# Patient Record
Sex: FEMALE | Race: BLACK OR AFRICAN AMERICAN | NOT HISPANIC OR LATINO | Employment: UNEMPLOYED | URBAN - METROPOLITAN AREA
[De-identification: names, ages, dates, MRNs, and addresses within clinical notes are randomized per-mention and may not be internally consistent; named-entity substitution may affect disease eponyms.]

---

## 2017-04-27 ENCOUNTER — OFFICE VISIT (OUTPATIENT)
Dept: INTERNAL MEDICINE | Facility: CLINIC | Age: 47
End: 2017-04-27
Payer: COMMERCIAL

## 2017-04-27 VITALS
DIASTOLIC BLOOD PRESSURE: 86 MMHG | HEIGHT: 66 IN | HEART RATE: 95 BPM | WEIGHT: 155.88 LBS | BODY MASS INDEX: 25.05 KG/M2 | SYSTOLIC BLOOD PRESSURE: 118 MMHG | TEMPERATURE: 98 F | OXYGEN SATURATION: 99 %

## 2017-04-27 DIAGNOSIS — D48.9 NEOPLASM OF UNCERTAIN BEHAVIOR: ICD-10-CM

## 2017-04-27 DIAGNOSIS — R30.0 DYSURIA: Primary | ICD-10-CM

## 2017-04-27 LAB
BILIRUB SERPL-MCNC: NEGATIVE MG/DL
BLOOD URINE, POC: NEGATIVE
COLOR, POC UA: YELLOW
GLUCOSE UR QL STRIP: NORMAL
KETONES UR QL STRIP: ABNORMAL
LEUKOCYTE ESTERASE URINE, POC: NEGATIVE
NITRITE, POC UA: NEGATIVE
PH, POC UA: 6
PROTEIN, POC: NEGATIVE
SPECIFIC GRAVITY, POC UA: 1.01
UROBILINOGEN, POC UA: NORMAL

## 2017-04-27 PROCEDURE — 81002 URINALYSIS NONAUTO W/O SCOPE: CPT | Mod: S$GLB,,, | Performed by: NURSE PRACTITIONER

## 2017-04-27 PROCEDURE — 1160F RVW MEDS BY RX/DR IN RCRD: CPT | Mod: S$GLB,,, | Performed by: NURSE PRACTITIONER

## 2017-04-27 PROCEDURE — 99213 OFFICE O/P EST LOW 20 MIN: CPT | Mod: S$GLB,,, | Performed by: NURSE PRACTITIONER

## 2017-04-27 PROCEDURE — 99999 PR PBB SHADOW E&M-EST. PATIENT-LVL IV: CPT | Mod: PBBFAC,,, | Performed by: NURSE PRACTITIONER

## 2017-04-27 PROCEDURE — 87086 URINE CULTURE/COLONY COUNT: CPT

## 2017-04-27 RX ORDER — PHENAZOPYRIDINE HYDROCHLORIDE 200 MG/1
200 TABLET, FILM COATED ORAL 3 TIMES DAILY PRN
Qty: 9 TABLET | Refills: 0 | Status: SHIPPED | OUTPATIENT
Start: 2017-04-27 | End: 2017-04-30

## 2017-04-27 NOTE — MR AVS SNAPSHOT
[facility] - Internal Medicine  [address]  [place] [address]  Phone: [phone]  Fax: [phone]                  Laz Quintero   2017 4:30 PM   Office Visit    Description:  Female : 1970   Provider:  Norma Youssef NP   Department:  [facility] - Internal Medicine           Reason for Visit     Urinary Tract Infection           Diagnoses this Visit        Comments    Dysuria    -  Primary     Neoplasm of uncertain behavior                To Do List           Future Appointments        Provider Department Dept Phone    2017 10:20 AM Zora Rivera MD [place] - Dermatology [phone]    2017 2:45 PM Charlotte Jacinto MD [facility] - Internal Medicine [phone]      Goals (5 Years of Data)     None       These Medications        Disp Refills Start End    phenazopyridine (PYRIDIUM) 200 MG tablet 9 tablet 0 2017    Take 1 tablet (200 mg total) by mouth 3 (three) times daily as needed for Pain. - Oral    Pharmacy: [facility] [address] [facility] [address] [facility] Ph #: [phone]         [facility]     [facility] Nurse Care Line -  Assistance  Unless otherwise directed by your provider, please contact [facility], our nurse care line that is available for  assistance.     Registered nurses in the [facility] provide: appointment scheduling, clinical advisement, health education, and other advisory services.  Call: [phone] (toll free)               Medications           Message regarding Medications     Verify the changes and/or additions to your medication regime listed below are the same as discussed with your clinician today.  If any of these changes or additions are incorrect, please notify your healthcare provider.        START taking these NEW medications        Refills    phenazopyridine (PYRIDIUM) 200 MG tablet 0    Sig: Take 1 tablet (200 mg total) by mouth 3 (three) times daily as needed for  "Pain.    Class: Normal    Route: Oral      STOP taking these medications     amoxicillin-clavulanate 875-125mg (AUGMENTIN) 875-125 mg per tablet Take 1 tablet by mouth every 12 (twelve) hours.           Verify that the below list of medications is an accurate representation of the medications you are currently taking.  If none reported, the list may be blank. If incorrect, please contact your healthcare provider. Carry this list with you in case of emergency.           Current Medications     cyclobenzaprine (FLEXERIL) 10 MG tablet Take 0.5-1 tablets (5-10 mg total) by mouth 3 (three) times daily as needed for Muscle spasms.    diclofenac (VOLTAREN) 75 MG EC tablet     docusate sodium (COLACE) 100 MG capsule Take 1 capsule (100 mg total) by mouth 2 (two) times daily as needed for Constipation.    epinephrine (EPIPEN JR) 0.15 mg/0.3 mL (1:2,000) pen injection Inject 0.3 mLs (0.15 mg total) into the muscle as needed.    esomeprazole (NEXIUM) 40 MG capsule Take 1 capsule (40 mg total) by mouth once daily.    fluticasone (FLONASE) 50 mcg/actuation nasal spray 2 sprays by Each Nare route once daily.    gabapentin (NEURONTIN) 300 MG capsule Take 2-3 capsules (600-900 mg total) by mouth 3 (three) times daily.    triamcinolone acetonide 0.1% (KENALOG) 0.1 % cream Apply topically 2 (two) times daily. Cream Topical Twice a day .  disp:  60g tube AAA    escitalopram oxalate (LEXAPRO) 10 MG tablet Take 1 tablet (10 mg total) by mouth once daily.    phenazopyridine (PYRIDIUM) 200 MG tablet Take 1 tablet (200 mg total) by mouth 3 (three) times daily as needed for Pain.           Clinical Reference Information           Your Vitals Were     BP Pulse Temp Height Weight Last Period    118/86 95 97.9 °F (36.6 °C) (Oral) 5' 6" (1.676 m) 70.7 kg (155 lb 13.8 oz) 06/07/2015 (Exact Date)    SpO2 BMI             99% 25.16 kg/m2         Blood Pressure          Most Recent Value    BP  118/86      Allergies as of 4/27/2017     Clindamycin "    Sulfa Dyne    Sulfamethoxazole-trimethoprim    Dermabond [Tissue Glues]      Immunizations Administered on Date of Encounter - 4/27/2017     None      Orders Placed During Today's Visit      Normal Orders This Visit    Ambulatory consult to Dermatology     POCT urine dipstick without microscope     Urine culture       Instructions    - Increase water intake to 1.5 Liters daily       Language Assistance Services     ATTENTION: Language assistance services are available, free of charge. Please call [phone].      ATENCIÓN: Si habla español, tiene a carreon disposición servicios gratuitos de asistencia lingüística. Llame al [phone].     ALEXANDRO Ý: N?u b?n nói Ti?ng Vi?t, có các d?ch v? h? tr? ngôn ng? mi?n phí dành cho b?n. G?i s? [phone].         [facility] complies with applicable Federal civil rights laws and does not discriminate on the basis of race, color, national origin, age, disability, or sex.

## 2017-04-29 LAB — BACTERIA UR CULT: NO GROWTH

## 2017-05-01 NOTE — PROGRESS NOTES
Subjective:       Patient ID: Laz Quintero is a 46 y.o. female.    Chief Complaint: Urinary Tract Infection (frequent urination and buring with urination x4 days)  Ms Quintero just returned from a trip to [facility].  While there she noticed some increased burning with urination and came in to be evaluated for possible UTI.   Urinary Tract Infection    This is a new problem. The current episode started gradual onset. The problem occurs intermittently. The problem has been waxing and waning. The quality of the pain is described as burning. The pain is mild. There has been no fever. Associated symptoms include flank pain, frequency and urgency. Pertinent negatives include no behavior changes, chills, discharge, hematuria, hesitancy, nausea, possible pregnancy, sweats, vomiting, weight loss, constipation, rash or withholding. Her past medical history is significant for recurrent UTIs. There is no history of catheterization, diabetes insipidus, diabetes mellitus, genitourinary reflux, hypertension, kidney stones, a single kidney, STD, urinary stasis or a urological procedure.   Dysuria    This is a new problem. The current episode started in the past 7 days. The problem occurs every urination. The problem has been gradually worsening. The quality of the pain is described as aching and burning. The pain is at a severity of 7/10. The pain is moderate. There has been no fever. The fever has been present for less than 1 day. She is sexually active. There is no history of pyelonephritis. Associated symptoms include flank pain, frequency and urgency. Pertinent negatives include no behavior changes, chills, discharge, hematuria, hesitancy, nausea, possible pregnancy, sweats, vomiting, weight loss, constipation, rash or withholding. She has tried NSAIDs for the symptoms. The treatment provided no relief. Her past medical history is significant for recurrent UTIs. There is no history of catheterization, diabetes insipidus, diabetes  mellitus, genitourinary reflux, hypertension, kidney stones, a single kidney, STD, urinary stasis or a urological procedure.     Review of Systems   Constitutional: Negative for chills and weight loss.   HENT: Negative for facial swelling.    Eyes: Negative for visual disturbance.   Respiratory: Negative for shortness of breath.    Cardiovascular: Negative for chest pain.   Gastrointestinal: Negative for constipation, nausea and vomiting.   Genitourinary: Positive for dysuria, flank pain, frequency and urgency. Negative for hematuria and hesitancy.   Musculoskeletal: Negative for joint swelling.   Skin: Negative for rash.   Neurological: Negative for headaches.   Psychiatric/Behavioral: Negative for confusion.       Objective:      Physical Exam   Constitutional: She is oriented to person, place, and time. She appears well-developed and well-nourished. No distress.   HENT:   Head: Normocephalic and atraumatic.   Eyes: No scleral icterus.   Neck: Normal range of motion. Neck supple.   Cardiovascular: Normal rate, regular rhythm and normal heart sounds.    Pulmonary/Chest: Effort normal and breath sounds normal. No respiratory distress. She has no wheezes. She has no rales.   Abdominal: Soft. Bowel sounds are normal. She exhibits no distension and no mass. There is no tenderness. There is no rebound, no guarding and no CVA tenderness.   Musculoskeletal: Normal range of motion.   Neurological: She is alert and oriented to person, place, and time.   Skin: Skin is warm. She is not diaphoretic.        Psychiatric: She has a normal mood and affect. Her behavior is normal.   Nursing note and vitals reviewed.      Assessment:       1. Dysuria    2. Neoplasm of uncertain behavior        Plan:   1. Dysuria  - Likely due to mild dehydration. Encouraged to increase PO fluid intake.  - POCT urine dipstick without microscope  - Urine culture  - phenazopyridine (PYRIDIUM) 200 MG tablet; Take 1 tablet (200 mg total) by mouth 3  (three) times daily as needed for Pain.  Dispense: 9 tablet; Refill: 0    2. Neoplasm of uncertain behavior  - Ambulatory consult to Dermatology      Pt has been given instructions populated from [employer] database and has verbalized understanding of the after visit summary and information contained wherein.    Follow up with a primary care provider. May go to ER for acute shortness of breath, lightheadedness, fever, or any other emergent complaints or changes in condition.

## 2017-05-25 ENCOUNTER — TELEPHONE (OUTPATIENT)
Dept: DERMATOLOGY | Facility: CLINIC | Age: 47
End: 2017-05-25

## 2017-05-25 ENCOUNTER — INITIAL CONSULT (OUTPATIENT)
Dept: DERMATOLOGY | Facility: CLINIC | Age: 47
End: 2017-05-25
Payer: COMMERCIAL

## 2017-05-25 DIAGNOSIS — L82.1 DERMATOSIS PAPULOSA NIGRA: Primary | ICD-10-CM

## 2017-05-25 DIAGNOSIS — D23.9 SYRINGOMA: ICD-10-CM

## 2017-05-25 DIAGNOSIS — R20.2 NOTALGIA PARESTHETICA: ICD-10-CM

## 2017-05-25 DIAGNOSIS — D23.9 DERMATOFIBROMA: ICD-10-CM

## 2017-05-25 PROCEDURE — 99202 OFFICE O/P NEW SF 15 MIN: CPT | Mod: S$GLB,,, | Performed by: DERMATOLOGY

## 2017-05-25 PROCEDURE — 99999 PR PBB SHADOW E&M-EST. PATIENT-LVL III: CPT | Mod: PBBFAC,,, | Performed by: DERMATOLOGY

## 2017-05-25 NOTE — PROGRESS NOTES
Subjective:       Patient ID:  Laz Quintero is a 46 y.o. female who presents for   Chief Complaint   Patient presents with    Lesion     right shoulder, right eyelid     History of Present Illness: The patient presents with chief complaint of lesion.  Location: right eyelid   Duration: years  Signs/Symptoms: growing    Prior treatments: none    Also lesions on cheeks, a spot on her right shoulder and itching on back for over a year no tx.         Lesion         Review of Systems   Constitutional: Negative for fever.   Skin: Positive for itching. Negative for rash.   Hematologic/Lymphatic: Does not bruise/bleed easily.        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

## 2017-05-25 NOTE — LETTER
May 25, 2017      Norma Youssef, NP  [address]  [place] [address]           [place] - Dermatology  [address] [facility]  [place] [address]  Phone: [phone]  Fax: [phone]          Patient: Laz Quintero   MR Number: [id number]   YOB: 1970   Date of Visit: 5/25/2017       Dear Norma Youssef:    Thank you for referring Laz Quintero to me for evaluation. Attached you will find relevant portions of my assessment and plan of care.    If you have questions, please do not hesitate to call me. I look forward to following Laz Quintero along with you.    Sincerely,    Zora Rivera MD    Enclosure  CC:  No Recipients    If you would like to receive this communication electronically, please contact [email or web address] or [phone] to request more information on [employer] access.    For providers and/or their staff who would like to refer a patient to [facility], please contact us through our one-stop-shop provider referral line, [facility], at [phone].    If you feel you have received this communication in error or would no longer like to receive these types of communications, please e-mail [email or web address]

## 2017-08-22 ENCOUNTER — OFFICE VISIT (OUTPATIENT)
Dept: INTERNAL MEDICINE | Facility: CLINIC | Age: 47
End: 2017-08-22
Payer: COMMERCIAL

## 2017-08-22 DIAGNOSIS — Z13.89 SCREENING FOR MULTIPLE CONDITIONS: ICD-10-CM

## 2017-08-22 DIAGNOSIS — Z01.419 ENCOUNTER FOR WELL WOMAN EXAM WITH ROUTINE GYNECOLOGICAL EXAM: ICD-10-CM

## 2017-08-22 DIAGNOSIS — J98.59 MEDIASTINAL MASS: ICD-10-CM

## 2017-08-22 DIAGNOSIS — Z12.31 ENCOUNTER FOR SCREENING MAMMOGRAM FOR BREAST CANCER: Primary | ICD-10-CM

## 2017-08-22 DIAGNOSIS — F41.9 ANXIETY: ICD-10-CM

## 2017-08-22 DIAGNOSIS — Z80.0 FAMILY HISTORY OF COLON CANCER: ICD-10-CM

## 2017-08-22 DIAGNOSIS — R91.1 LUNG NODULE: ICD-10-CM

## 2017-08-22 PROCEDURE — 99214 OFFICE O/P EST MOD 30 MIN: CPT | Mod: S$GLB,,, | Performed by: INTERNAL MEDICINE

## 2017-08-22 PROCEDURE — 99999 PR PBB SHADOW E&M-EST. PATIENT-LVL V: CPT | Mod: PBBFAC,,, | Performed by: INTERNAL MEDICINE

## 2017-08-22 RX ORDER — ALPRAZOLAM 0.5 MG/1
0.5 TABLET ORAL DAILY PRN
Qty: 20 TABLET | Refills: 1 | Status: SHIPPED | OUTPATIENT
Start: 2017-08-22 | End: 2019-09-24 | Stop reason: SDUPTHER

## 2017-08-22 RX ORDER — AMOXICILLIN 875 MG/1
875 TABLET, FILM COATED ORAL EVERY 12 HOURS
Qty: 14 TABLET | Refills: 0 | Status: SHIPPED | OUTPATIENT
Start: 2017-08-22 | End: 2018-01-10

## 2017-08-26 ENCOUNTER — LAB VISIT (OUTPATIENT)
Dept: LAB | Facility: HOSPITAL | Age: 47
End: 2017-08-26
Attending: INTERNAL MEDICINE
Payer: COMMERCIAL

## 2017-08-26 DIAGNOSIS — Z13.89 SCREENING FOR MULTIPLE CONDITIONS: ICD-10-CM

## 2017-08-26 LAB
25(OH)D3+25(OH)D2 SERPL-MCNC: 18 NG/ML
ALBUMIN SERPL BCP-MCNC: 3.7 G/DL
ALP SERPL-CCNC: 52 U/L
ALT SERPL W/O P-5'-P-CCNC: 12 U/L
ANION GAP SERPL CALC-SCNC: 8 MMOL/L
AST SERPL-CCNC: 15 U/L
BASOPHILS # BLD AUTO: 0.02 K/UL
BASOPHILS NFR BLD: 0.2 %
BILIRUB SERPL-MCNC: 0.3 MG/DL
BUN SERPL-MCNC: 8 MG/DL
CALCIUM SERPL-MCNC: 9.6 MG/DL
CHLORIDE SERPL-SCNC: 105 MMOL/L
CHOLEST/HDLC SERPL: 2.7 {RATIO}
CO2 SERPL-SCNC: 26 MMOL/L
CREAT SERPL-MCNC: 0.7 MG/DL
DIFFERENTIAL METHOD: ABNORMAL
EOSINOPHIL # BLD AUTO: 0.1 K/UL
EOSINOPHIL NFR BLD: 1.2 %
ERYTHROCYTE [DISTWIDTH] IN BLOOD BY AUTOMATED COUNT: 12.7 %
EST. GFR  (AFRICAN AMERICAN): >60 ML/MIN/1.73 M^2
EST. GFR  (NON AFRICAN AMERICAN): >60 ML/MIN/1.73 M^2
GLUCOSE SERPL-MCNC: 93 MG/DL
HCT VFR BLD AUTO: 36 %
HDL/CHOLESTEROL RATIO: 36.7 %
HDLC SERPL-MCNC: 180 MG/DL
HDLC SERPL-MCNC: 66 MG/DL
HGB BLD-MCNC: 12 G/DL
LDLC SERPL CALC-MCNC: 101.4 MG/DL
LYMPHOCYTES # BLD AUTO: 2.8 K/UL
LYMPHOCYTES NFR BLD: 34.6 %
MCH RBC QN AUTO: 28.8 PG
MCHC RBC AUTO-ENTMCNC: 33.3 G/DL
MCV RBC AUTO: 87 FL
MONOCYTES # BLD AUTO: 0.5 K/UL
MONOCYTES NFR BLD: 6.5 %
NEUTROPHILS # BLD AUTO: 4.6 K/UL
NEUTROPHILS NFR BLD: 57.4 %
NONHDLC SERPL-MCNC: 114 MG/DL
PLATELET # BLD AUTO: 245 K/UL
PMV BLD AUTO: 9.7 FL
POTASSIUM SERPL-SCNC: 4.8 MMOL/L
PROT SERPL-MCNC: 7.1 G/DL
PTH-INTACT SERPL-MCNC: 50 PG/ML
RBC # BLD AUTO: 4.16 M/UL
SODIUM SERPL-SCNC: 139 MMOL/L
TRIGL SERPL-MCNC: 63 MG/DL
WBC # BLD AUTO: 8.01 K/UL

## 2017-08-26 PROCEDURE — 83970 ASSAY OF PARATHORMONE: CPT

## 2017-08-26 PROCEDURE — 80061 LIPID PANEL: CPT

## 2017-08-26 PROCEDURE — 82306 VITAMIN D 25 HYDROXY: CPT

## 2017-08-26 PROCEDURE — 80053 COMPREHEN METABOLIC PANEL: CPT

## 2017-08-26 PROCEDURE — 36415 COLL VENOUS BLD VENIPUNCTURE: CPT

## 2017-08-26 PROCEDURE — 85025 COMPLETE CBC W/AUTO DIFF WBC: CPT

## 2017-08-27 ENCOUNTER — TELEPHONE (OUTPATIENT)
Dept: INTERNAL MEDICINE | Facility: CLINIC | Age: 47
End: 2017-08-27

## 2017-08-27 VITALS
WEIGHT: 156 LBS | TEMPERATURE: 98 F | OXYGEN SATURATION: 99 % | SYSTOLIC BLOOD PRESSURE: 124 MMHG | BODY MASS INDEX: 25.07 KG/M2 | DIASTOLIC BLOOD PRESSURE: 70 MMHG | HEIGHT: 66 IN | HEART RATE: 91 BPM

## 2017-08-27 DIAGNOSIS — R91.1 LUNG NODULE: ICD-10-CM

## 2017-08-27 DIAGNOSIS — Z12.31 ENCOUNTER FOR SCREENING MAMMOGRAM FOR BREAST CANCER: ICD-10-CM

## 2017-08-27 DIAGNOSIS — J98.59 MEDIASTINAL MASS: Primary | ICD-10-CM

## 2017-08-27 NOTE — TELEPHONE ENCOUNTER
When she was in the office, I had requested a CT scan, pulmonary appointment and mammogram. Unfortunately, none of these have been scheduled. Orders in, please schedule

## 2017-08-27 NOTE — PROGRESS NOTES
Subjective:       Patient ID: Laz Quintero is a 46 y.o. female.    Chief Complaint: Establish Care    HPI: She is here for an initial visit.  She has a history of a mediastinal mass.  Is in need of a follow-up CT scan of the chest and pulmonary follow-up appointment.    Past medical history: Mediastinal mass, status post hysterectomy, hyperparathyroidism, status post cholecystectomy.  Positive family history of colon cancer-father, diagnosed at age 50    Medications: None    ALLERGIES: Clindamycin, sulfa    Family history: Her father had colon cancer      Review of Systems   Constitutional: Negative for chills, fatigue, fever and unexpected weight change.   Respiratory: Negative for chest tightness and shortness of breath.    Cardiovascular: Negative for chest pain and palpitations.   Gastrointestinal: Negative for abdominal pain and blood in stool.   Neurological: Negative for dizziness, syncope, numbness and headaches.       Objective:      Physical Exam   HENT:   Right Ear: External ear normal.   Left Ear: External ear normal.   Nose: Nose normal.   Mouth/Throat: Oropharynx is clear and moist.   Eyes: Pupils are equal, round, and reactive to light.   Neck: Normal range of motion.   Cardiovascular: Normal rate and regular rhythm.    No murmur heard.  Pulmonary/Chest: Breath sounds normal.   Abdominal: She exhibits no distension. There is no hepatosplenomegaly. There is no tenderness.   Lymphadenopathy:     She has no cervical adenopathy.     She has no axillary adenopathy.   Neurological: She has normal strength and normal reflexes. No cranial nerve deficit or sensory deficit.     breast exam: No mass palpated, no nipple discharge expressed  Assessment:         assessment and plan: 1.  Mediastinal mass: Schedule follow-up CT scan of chest and pulmonary appointments  2.  Family history of colon cancer: Schedule colonoscopy  3.  Check CMP, lipid panel, PTH, CBC, vitamin D.  Schedule mammogram  Plan:       As  above

## 2017-08-28 NOTE — TELEPHONE ENCOUNTER
Spoke with pt and  Pt stated that she's doing an insurance change and will wait til her new insurance takes place. Pt stated she will call to schedule appts

## 2017-09-01 ENCOUNTER — TELEPHONE (OUTPATIENT)
Dept: INTERNAL MEDICINE | Facility: CLINIC | Age: 47
End: 2017-09-01

## 2017-09-01 NOTE — TELEPHONE ENCOUNTER
Please contact patient and let her know her labwork indicates her vitamin D is low. I will need to send a supplement for her. Please ask her what pharmacy she would like that sent to

## 2017-09-05 RX ORDER — CHOLECALCIFEROL (VITAMIN D3) 1250 MCG
1 TABLET ORAL WEEKLY
Qty: 8 TABLET | Refills: 0 | Status: SHIPPED | OUTPATIENT
Start: 2017-09-05 | End: 2017-10-25

## 2017-09-07 ENCOUNTER — HOSPITAL ENCOUNTER (OUTPATIENT)
Dept: RADIOLOGY | Facility: HOSPITAL | Age: 47
Discharge: HOME | End: 2017-09-07
Attending: INTERNAL MEDICINE
Payer: COMMERCIAL

## 2017-09-07 DIAGNOSIS — Z12.31 ENCOUNTER FOR SCREENING MAMMOGRAM FOR BREAST CANCER: ICD-10-CM

## 2017-09-07 PROCEDURE — 77067 SCR MAMMO BI INCL CAD: CPT | Mod: 26,,, | Performed by: RADIOLOGY

## 2017-09-07 PROCEDURE — 77067 SCR MAMMO BI INCL CAD: CPT | Mod: TC

## 2017-11-29 ENCOUNTER — OFFICE VISIT (OUTPATIENT)
Dept: INTERNAL MEDICINE | Facility: CLINIC | Age: 47
End: 2017-11-29
Payer: COMMERCIAL

## 2017-11-29 ENCOUNTER — HOSPITAL ENCOUNTER (OUTPATIENT)
Dept: RADIOLOGY | Facility: HOSPITAL | Age: 47
Discharge: HOME | End: 2017-11-29
Attending: NURSE PRACTITIONER
Payer: COMMERCIAL

## 2017-11-29 VITALS
BODY MASS INDEX: 25.85 KG/M2 | DIASTOLIC BLOOD PRESSURE: 90 MMHG | HEIGHT: 66 IN | HEART RATE: 98 BPM | SYSTOLIC BLOOD PRESSURE: 134 MMHG | WEIGHT: 160.88 LBS | TEMPERATURE: 98 F

## 2017-11-29 DIAGNOSIS — R31.9 HEMATURIA, UNSPECIFIED TYPE: ICD-10-CM

## 2017-11-29 DIAGNOSIS — R31.9 HEMATURIA, UNSPECIFIED TYPE: Primary | ICD-10-CM

## 2017-11-29 DIAGNOSIS — N39.0 URINARY TRACT INFECTION WITH HEMATURIA, SITE UNSPECIFIED: ICD-10-CM

## 2017-11-29 DIAGNOSIS — R10.9 ACUTE RIGHT FLANK PAIN: ICD-10-CM

## 2017-11-29 DIAGNOSIS — R31.9 URINARY TRACT INFECTION WITH HEMATURIA, SITE UNSPECIFIED: ICD-10-CM

## 2017-11-29 DIAGNOSIS — Z29.9 PROPHYLACTIC MEASURE: ICD-10-CM

## 2017-11-29 DIAGNOSIS — R11.0 NAUSEA: ICD-10-CM

## 2017-11-29 LAB
BILIRUB SERPL-MCNC: NORMAL MG/DL
BLOOD URINE, POC: NORMAL
COLOR, POC UA: YELLOW
GLUCOSE UR QL STRIP: NORMAL
KETONES UR QL STRIP: NORMAL
LEUKOCYTE ESTERASE URINE, POC: NORMAL
NITRITE, POC UA: NORMAL
PH, POC UA: 6
PROTEIN, POC: NORMAL
SPECIFIC GRAVITY, POC UA: 1.01
UROBILINOGEN, POC UA: NORMAL

## 2017-11-29 PROCEDURE — 99214 OFFICE O/P EST MOD 30 MIN: CPT | Mod: 25,S$GLB,, | Performed by: NURSE PRACTITIONER

## 2017-11-29 PROCEDURE — 74176 CT ABD & PELVIS W/O CONTRAST: CPT | Mod: 26,,, | Performed by: RADIOLOGY

## 2017-11-29 PROCEDURE — 99999 PR PBB SHADOW E&M-EST. PATIENT-LVL V: CPT | Mod: PBBFAC,,, | Performed by: NURSE PRACTITIONER

## 2017-11-29 PROCEDURE — 81002 URINALYSIS NONAUTO W/O SCOPE: CPT | Mod: S$GLB,,, | Performed by: NURSE PRACTITIONER

## 2017-11-29 PROCEDURE — 87088 URINE BACTERIA CULTURE: CPT

## 2017-11-29 PROCEDURE — 96372 THER/PROPH/DIAG INJ SC/IM: CPT | Mod: S$GLB,,, | Performed by: INTERNAL MEDICINE

## 2017-11-29 PROCEDURE — 87086 URINE CULTURE/COLONY COUNT: CPT

## 2017-11-29 PROCEDURE — 87186 SC STD MICRODIL/AGAR DIL: CPT

## 2017-11-29 PROCEDURE — S0119 ONDANSETRON 4 MG: HCPCS | Mod: S$GLB,,, | Performed by: INTERNAL MEDICINE

## 2017-11-29 PROCEDURE — 74176 CT ABD & PELVIS W/O CONTRAST: CPT | Mod: TC

## 2017-11-29 PROCEDURE — 87077 CULTURE AEROBIC IDENTIFY: CPT

## 2017-11-29 RX ORDER — HYDROCODONE BITARTRATE AND ACETAMINOPHEN 5; 325 MG/1; MG/1
1 TABLET ORAL EVERY 6 HOURS PRN
Qty: 20 TABLET | Refills: 0 | Status: SHIPPED | OUTPATIENT
Start: 2017-11-29 | End: 2017-12-04

## 2017-11-29 RX ORDER — ONDANSETRON 4 MG/1
4 TABLET, FILM COATED ORAL
Status: COMPLETED | OUTPATIENT
Start: 2017-11-29 | End: 2017-11-29

## 2017-11-29 RX ORDER — CIPROFLOXACIN 500 MG/1
500 TABLET ORAL EVERY 12 HOURS
Qty: 14 TABLET | Refills: 0 | Status: SHIPPED | OUTPATIENT
Start: 2017-11-29 | End: 2018-01-10

## 2017-11-29 RX ORDER — FLUCONAZOLE 150 MG/1
150 TABLET ORAL DAILY
Qty: 1 TABLET | Refills: 0 | Status: SHIPPED | OUTPATIENT
Start: 2017-11-29 | End: 2017-11-30

## 2017-11-29 RX ORDER — KETOROLAC TROMETHAMINE 30 MG/ML
60 INJECTION, SOLUTION INTRAMUSCULAR; INTRAVENOUS
Status: COMPLETED | OUTPATIENT
Start: 2017-11-29 | End: 2017-11-29

## 2017-11-29 RX ADMIN — ONDANSETRON 4 MG: 4 TABLET, FILM COATED ORAL at 07:11

## 2017-11-29 RX ADMIN — KETOROLAC TROMETHAMINE 60 MG: 30 INJECTION, SOLUTION INTRAMUSCULAR; INTRAVENOUS at 07:11

## 2017-11-29 NOTE — LETTER
November 29, 2017                   [facility]  Internal Medicine  [address]  [facility] [address]  Phone: [phone]  Fax: [phone]   November 29, 2017     Patient: Laz Quintero   YOB: 1970   Date of Visit: 11/29/2017       To Whom it May Concern:    Laz Quintero was seen in my clinic on 11/29/2017. She may return to work on 12/4/17.    If you have any questions or concerns, please don't hesitate to call.    Sincerely,         Norma Youssef, NP

## 2017-11-29 NOTE — LETTER
November 29, 2017                   [facility]  Internal Medicine  [address]  [facility] [address]  Phone: [phone]  Fax: [phone]   November 29, 2017     Patient: Laz Quintero   YOB: 1970   Date of Visit: 11/29/2017       To Whom it May Concern:    Laz Quintero was seen in my clinic on 11/29/2017. She may return to work on 12/2/17.    If you have any questions or concerns, please don't hesitate to call.    Sincerely,         Norma Youssef, NP

## 2017-11-30 ENCOUNTER — PATIENT MESSAGE (OUTPATIENT)
Dept: INTERNAL MEDICINE | Facility: CLINIC | Age: 47
End: 2017-11-30

## 2017-11-30 RX ORDER — PHENAZOPYRIDINE HYDROCHLORIDE 200 MG/1
200 TABLET, FILM COATED ORAL 3 TIMES DAILY PRN
Qty: 9 TABLET | Refills: 0 | Status: SHIPPED | OUTPATIENT
Start: 2017-11-30 | End: 2017-12-03

## 2017-11-30 RX ORDER — FERROUS SULFATE 325(65) MG
325 TABLET ORAL
Qty: 30 TABLET | Refills: 1 | Status: SHIPPED | OUTPATIENT
Start: 2017-11-30 | End: 2019-12-31

## 2017-12-02 LAB — BACTERIA UR CULT: NORMAL

## 2017-12-14 ENCOUNTER — PATIENT MESSAGE (OUTPATIENT)
Dept: INTERNAL MEDICINE | Facility: CLINIC | Age: 47
End: 2017-12-14

## 2017-12-14 ENCOUNTER — TELEPHONE (OUTPATIENT)
Dept: INTERNAL MEDICINE | Facility: CLINIC | Age: 47
End: 2017-12-14

## 2017-12-14 DIAGNOSIS — R10.9 ABDOMINAL PAIN, UNSPECIFIED ABDOMINAL LOCATION: Primary | ICD-10-CM

## 2017-12-14 DIAGNOSIS — Z12.11 SPECIAL SCREENING FOR MALIGNANT NEOPLASMS, COLON: Primary | ICD-10-CM

## 2017-12-14 RX ORDER — POLYETHYLENE GLYCOL 3350, SODIUM SULFATE ANHYDROUS, SODIUM BICARBONATE, SODIUM CHLORIDE, POTASSIUM CHLORIDE 236; 22.74; 6.74; 5.86; 2.97 G/4L; G/4L; G/4L; G/4L; G/4L
4 POWDER, FOR SOLUTION ORAL ONCE
Qty: 4000 ML | Refills: 0 | Status: SHIPPED | OUTPATIENT
Start: 2017-12-14 | End: 2017-12-14

## 2018-01-10 ENCOUNTER — OFFICE VISIT (OUTPATIENT)
Dept: INTERNAL MEDICINE | Facility: CLINIC | Age: 48
End: 2018-01-10
Payer: COMMERCIAL

## 2018-01-10 ENCOUNTER — LAB VISIT (OUTPATIENT)
Dept: LAB | Facility: HOSPITAL | Age: 48
End: 2018-01-10
Attending: NURSE PRACTITIONER
Payer: COMMERCIAL

## 2018-01-10 VITALS
OXYGEN SATURATION: 99 % | DIASTOLIC BLOOD PRESSURE: 64 MMHG | WEIGHT: 164.44 LBS | TEMPERATURE: 98 F | SYSTOLIC BLOOD PRESSURE: 126 MMHG | HEIGHT: 66 IN | BODY MASS INDEX: 26.43 KG/M2 | HEART RATE: 93 BPM

## 2018-01-10 DIAGNOSIS — M54.42 ACUTE LEFT-SIDED LOW BACK PAIN WITH LEFT-SIDED SCIATICA: Primary | ICD-10-CM

## 2018-01-10 DIAGNOSIS — R60.0 LOCALIZED EDEMA: Primary | ICD-10-CM

## 2018-01-10 DIAGNOSIS — M70.62 TROCHANTERIC BURSITIS OF LEFT HIP: ICD-10-CM

## 2018-01-10 DIAGNOSIS — D64.9 ANEMIA, UNSPECIFIED TYPE: ICD-10-CM

## 2018-01-10 LAB
25(OH)D3+25(OH)D2 SERPL-MCNC: 46 NG/ML
ALBUMIN SERPL BCP-MCNC: 3.6 G/DL
ALP SERPL-CCNC: 58 U/L
ALT SERPL W/O P-5'-P-CCNC: 14 U/L
ANION GAP SERPL CALC-SCNC: 7 MMOL/L
AST SERPL-CCNC: 17 U/L
BASOPHILS # BLD AUTO: 0.02 K/UL
BASOPHILS NFR BLD: 0.2 %
BILIRUB SERPL-MCNC: 0.2 MG/DL
BUN SERPL-MCNC: 10 MG/DL
CALCIUM SERPL-MCNC: 9.2 MG/DL
CHLORIDE SERPL-SCNC: 103 MMOL/L
CO2 SERPL-SCNC: 28 MMOL/L
CREAT SERPL-MCNC: 0.7 MG/DL
DIFFERENTIAL METHOD: ABNORMAL
EOSINOPHIL # BLD AUTO: 0.2 K/UL
EOSINOPHIL NFR BLD: 1.8 %
ERYTHROCYTE [DISTWIDTH] IN BLOOD BY AUTOMATED COUNT: 12.5 %
EST. GFR  (AFRICAN AMERICAN): >60 ML/MIN/1.73 M^2
EST. GFR  (NON AFRICAN AMERICAN): >60 ML/MIN/1.73 M^2
FERRITIN SERPL-MCNC: 136 NG/ML
GLUCOSE SERPL-MCNC: 113 MG/DL
HCT VFR BLD AUTO: 36.3 %
HGB BLD-MCNC: 11.9 G/DL
IRON SERPL-MCNC: 49 UG/DL
LYMPHOCYTES # BLD AUTO: 3.4 K/UL
LYMPHOCYTES NFR BLD: 37.1 %
MCH RBC QN AUTO: 28.7 PG
MCHC RBC AUTO-ENTMCNC: 32.8 G/DL
MCV RBC AUTO: 88 FL
MONOCYTES # BLD AUTO: 0.6 K/UL
MONOCYTES NFR BLD: 6.8 %
NEUTROPHILS # BLD AUTO: 4.9 K/UL
NEUTROPHILS NFR BLD: 53.9 %
NRBC BLD-RTO: 0 /100 WBC
PLATELET # BLD AUTO: 256 K/UL
PMV BLD AUTO: 10 FL
POTASSIUM SERPL-SCNC: 3.8 MMOL/L
PROT SERPL-MCNC: 7.6 G/DL
RBC # BLD AUTO: 4.15 M/UL
SATURATED IRON: 13 %
SODIUM SERPL-SCNC: 138 MMOL/L
TOTAL IRON BINDING CAPACITY: 367 UG/DL
TRANSFERRIN SERPL-MCNC: 248 MG/DL
WBC # BLD AUTO: 9.12 K/UL

## 2018-01-10 PROCEDURE — 99214 OFFICE O/P EST MOD 30 MIN: CPT | Mod: 25,S$GLB,, | Performed by: NURSE PRACTITIONER

## 2018-01-10 PROCEDURE — 96372 THER/PROPH/DIAG INJ SC/IM: CPT | Mod: S$GLB,,, | Performed by: INTERNAL MEDICINE

## 2018-01-10 PROCEDURE — 99999 PR PBB SHADOW E&M-EST. PATIENT-LVL IV: CPT | Mod: PBBFAC,,, | Performed by: NURSE PRACTITIONER

## 2018-01-10 PROCEDURE — 83540 ASSAY OF IRON: CPT

## 2018-01-10 PROCEDURE — 36415 COLL VENOUS BLD VENIPUNCTURE: CPT

## 2018-01-10 PROCEDURE — 82728 ASSAY OF FERRITIN: CPT

## 2018-01-10 PROCEDURE — 85025 COMPLETE CBC W/AUTO DIFF WBC: CPT

## 2018-01-10 PROCEDURE — 82306 VITAMIN D 25 HYDROXY: CPT

## 2018-01-10 PROCEDURE — 80053 COMPREHEN METABOLIC PANEL: CPT

## 2018-01-10 RX ORDER — METHYLPREDNISOLONE 4 MG/1
TABLET ORAL
Qty: 1 PACKAGE | Refills: 0 | Status: SHIPPED | OUTPATIENT
Start: 2018-01-10 | End: 2018-02-19

## 2018-01-10 RX ORDER — OXYCODONE AND ACETAMINOPHEN 5; 325 MG/1; MG/1
1 TABLET ORAL EVERY 6 HOURS PRN
Qty: 24 TABLET | Refills: 0 | Status: SHIPPED | OUTPATIENT
Start: 2018-01-10 | End: 2018-01-16

## 2018-01-10 RX ORDER — CYCLOBENZAPRINE HCL 10 MG
5-10 TABLET ORAL 3 TIMES DAILY PRN
Qty: 90 TABLET | Refills: 2 | Status: SHIPPED | OUTPATIENT
Start: 2018-01-10 | End: 2020-11-20 | Stop reason: ALTCHOICE

## 2018-01-10 RX ORDER — KETOROLAC TROMETHAMINE 30 MG/ML
60 INJECTION, SOLUTION INTRAMUSCULAR; INTRAVENOUS
Status: COMPLETED | OUTPATIENT
Start: 2018-01-10 | End: 2018-01-10

## 2018-01-10 RX ADMIN — KETOROLAC TROMETHAMINE 60 MG: 30 INJECTION, SOLUTION INTRAMUSCULAR; INTRAVENOUS at 06:01

## 2018-01-11 NOTE — PROGRESS NOTES
Subjective:       Patient ID: Laz Quintero is a 47 y.o. female.    Chief Complaint: Back Pain    Ms Quintero presents today for severe low back pain that radiates to her left buttock and left leg.  She says she has not been able to sleep for more than a few hours over the past few weeks because it is so severe.  The pain began about 5 weeks ago and has grown progressively worse.  Gabapentin and NSAIDs are not providing any relief. She had a past occurrence of pain this severe in 2014 and had a epidural which provided significant relief.       Back Pain   This is a recurrent problem. The problem occurs constantly. The problem has been gradually worsening since onset. The pain is present in the lumbar spine, sacro-iliac and gluteal. The quality of the pain is described as aching, burning, shooting and stabbing. The pain radiates to the left thigh. The pain is at a severity of 10/10. The pain is severe. The pain is worse during the night. The symptoms are aggravated by position, bending, twisting and sitting. Stiffness is present all day. Associated symptoms include leg pain, paresthesias and tingling. Pertinent negatives include no abdominal pain, bladder incontinence, bowel incontinence, chest pain, dysuria, fever, headaches, numbness, paresis, pelvic pain, perianal numbness, weakness or weight loss. She has tried NSAIDs for the symptoms. The treatment provided no relief.     Review of Systems   Constitutional: Negative for fever and weight loss.   HENT: Negative for facial swelling.    Eyes: Negative for visual disturbance.   Respiratory: Negative for shortness of breath.    Cardiovascular: Negative for chest pain.   Gastrointestinal: Negative for abdominal pain and bowel incontinence.   Genitourinary: Negative for bladder incontinence, dysuria and pelvic pain.   Musculoskeletal: Positive for back pain.   Skin: Negative for rash.   Neurological: Positive for tingling and paresthesias. Negative for weakness,  numbness and headaches.   Psychiatric/Behavioral: Positive for dysphoric mood.       Objective:      Physical Exam   Constitutional: She is oriented to person, place, and time. She appears well-developed and well-nourished. She appears distressed.   HENT:   Head: Normocephalic and atraumatic.   Eyes: No scleral icterus.   Neck: Normal range of motion. Neck supple.   Cardiovascular: Normal rate, regular rhythm and normal heart sounds.    Pulmonary/Chest: Effort normal and breath sounds normal. No respiratory distress. She has no wheezes. She has no rales.   Musculoskeletal:        Left hip: She exhibits decreased range of motion, decreased strength, tenderness and bony tenderness. She exhibits no swelling, no crepitus, no deformity and no laceration.        Lumbar back: She exhibits decreased range of motion, tenderness and pain. She exhibits no bony tenderness, no swelling, no edema, no deformity, no laceration, no spasm and normal pulse.   Lymphadenopathy:     She has no cervical adenopathy.   Neurological: She is alert and oriented to person, place, and time.   Skin: Skin is warm and dry. She is not diaphoretic.   Psychiatric: She has a normal mood and affect. Her behavior is normal.   Nursing note and vitals reviewed.      Assessment:       1. Acute left-sided low back pain with left-sided sciatica    2. Trochanteric bursitis of left hip    3. Anemia, unspecified type        Plan:   1. Acute left-sided low back pain with left-sided sciatica  - Pain management appt schedule for evaluation for possible repeat epidural.   - ketorolac injection 60 mg; Inject 2 mLs (60 mg total) into the muscle one time.  - cyclobenzaprine (FLEXERIL) 10 MG tablet; Take 0.5-1 tablets (5-10 mg total) by mouth 3 (three) times daily as needed for Muscle spasms.  Dispense: 90 tablet; Refill: 2  - methylPREDNISolone (MEDROL DOSEPACK) 4 mg tablet; use as directed  Dispense: 1 Package; Refill: 0  - oxyCODONE-acetaminophen (PERCOCET) 5-325 mg  per tablet; Take 1 tablet by mouth every 6 (six) hours as needed for Pain.  Dispense: 24 tablet; Refill: 0  - Ambulatory consult to Physical Therapy    2. Trochanteric bursitis of left hip  - ketorolac injection 60 mg; Inject 2 mLs (60 mg total) into the muscle one time.  - methylPREDNISolone (MEDROL DOSEPACK) 4 mg tablet; use as directed  Dispense: 1 Package; Refill: 0  - oxyCODONE-acetaminophen (PERCOCET) 5-325 mg per tablet; Take 1 tablet by mouth every 6 (six) hours as needed for Pain.  Dispense: 24 tablet; Refill: 0  - Ambulatory consult to Physical Therapy    3. Anemia, unspecified type  - CBC auto differential; Future  - IRON AND TIBC; Future  - FERRITIN; Future  - Vitamin D; Future  - Comprehensive metabolic panel; Future      Pt has been given instructions populated from [employer] database and has verbalized understanding of the after visit summary and information contained wherein.    Follow up with a primary care provider. May go to ER for acute shortness of breath, lightheadedness, fever, or any other emergent complaints or changes in condition.

## 2018-01-16 ENCOUNTER — OFFICE VISIT (OUTPATIENT)
Dept: PAIN MEDICINE | Facility: CLINIC | Age: 48
End: 2018-01-16
Attending: ANESTHESIOLOGY
Payer: COMMERCIAL

## 2018-01-16 VITALS
SYSTOLIC BLOOD PRESSURE: 134 MMHG | WEIGHT: 166.88 LBS | TEMPERATURE: 98 F | HEIGHT: 66 IN | DIASTOLIC BLOOD PRESSURE: 90 MMHG | HEART RATE: 91 BPM | BODY MASS INDEX: 26.82 KG/M2

## 2018-01-16 DIAGNOSIS — M47.26 OSTEOARTHRITIS OF SPINE WITH RADICULOPATHY, LUMBAR REGION: Primary | ICD-10-CM

## 2018-01-16 DIAGNOSIS — G83.4 CAUDA EQUINA COMPRESSION: ICD-10-CM

## 2018-01-16 PROCEDURE — 99215 OFFICE O/P EST HI 40 MIN: CPT | Mod: S$GLB,,, | Performed by: ANESTHESIOLOGY

## 2018-01-16 PROCEDURE — 99999 PR PBB SHADOW E&M-EST. PATIENT-LVL III: CPT | Mod: PBBFAC,,, | Performed by: ANESTHESIOLOGY

## 2018-01-16 RX ORDER — DIAZEPAM 2 MG/1
TABLET ORAL
Qty: 2 TABLET | Refills: 0 | Status: SHIPPED | OUTPATIENT
Start: 2018-01-16 | End: 2019-09-24 | Stop reason: SDUPTHER

## 2018-01-16 NOTE — PROGRESS NOTES
Subjective:      Patient ID: Laz Quintero is a 47 y.o. female.    Chief Complaint: No chief complaint on file.    Referred by: Self, Aaareferral     HPI      Chief complaint;  Low back pain radiating to left lower extremity.      She has chronic lower back pain that radiated to the left lower extremity  She used gabapentin and nonsteroidal anti-inflammatory medications She is status post epidural steroid injection 2012 that caused her to feel numb in her lower extremity. He received this injection while hospitalized for lower back pain and radiculopathy. In 2016 the patient received another epidural for several symptomatology with some help.  5 weeks ago she had acute worsening of her lower back pain with radiation to the left lower extremity. It gradually got worse.  She felt weak in her left lower extremity.  2 weeks ago her symptoms got much worse with the weakness in the left lower extremity as well as numbness and tingling in the perineal area.  She also noticed bowel and bladder incontinence. It seems it happens in the neurogenic pattern as well as significant urgency.  No new imaging.  No recent physical therapy.      Past Medical History:   Diagnosis Date    Abnormal Pap smear     Annular tear of lumbar disc, L5-S1. 7/26/2012    Chronic LBP     HPTH (hyperparathyroidism)     Menorrhagia     PONV (postoperative nausea and vomiting)     Right lumbar radiculopathy 7/26/2012    Seasonal allergies        Past Surgical History:   Procedure Laterality Date    CHOLECYSTECTOMY      HYSTERECTOMY      TUBAL LIGATION      [facility]    WISDOM TOOTH EXTRACTION      2005       Review of patient's allergies indicates:   Allergen Reactions    Clindamycin     Sulfa dyne     Sulfamethoxazole-trimethoprim      Other reaction(s): Anaphylaxis    Dermabond [tissue glues] Rash       Current Outpatient Prescriptions   Medication Sig Dispense Refill    cyclobenzaprine (FLEXERIL) 10 MG tablet Take 0.5-1 tablets  (5-10 mg total) by mouth 3 (three) times daily as needed for Muscle spasms. 90 tablet 2    diclofenac (VOLTAREN) 75 MG EC tablet       docusate sodium (COLACE) 100 MG capsule Take 1 capsule (100 mg total) by mouth 2 (two) times daily as needed for Constipation.  0    epinephrine (EPIPEN JR) 0.15 mg/0.3 mL (1:2,000) pen injection Inject 0.3 mLs (0.15 mg total) into the muscle as needed. 1 each 2    ferrous sulfate 325 mg (65 mg iron) Tab tablet Take 1 tablet (325 mg total) by mouth daily with breakfast. 30 tablet 1    fluticasone (FLONASE) 50 mcg/actuation nasal spray 2 sprays by Each Nare route once daily. 16 g 0    gabapentin (NEURONTIN) 300 MG capsule Take 2-3 capsules (600-900 mg total) by mouth 3 (three) times daily. 210 capsule 2    methylPREDNISolone (MEDROL DOSEPACK) 4 mg tablet use as directed 1 Package 0    oxyCODONE-acetaminophen (PERCOCET) 5-325 mg per tablet Take 1 tablet by mouth every 6 (six) hours as needed for Pain. 24 tablet 0    triamcinolone acetonide 0.1% (KENALOG) 0.1 % cream Apply topically 2 (two) times daily. Cream Topical Twice a day .  disp:  60g tube AAA 80 g 1    alprazolam (XANAX) 0.5 MG tablet Take 1 tablet (0.5 mg total) by mouth daily as needed for Anxiety. 20 tablet 1    diazePAM (VALIUM) 2 MG tablet On call to MRI may repeat once 2 tablet 0    esomeprazole (NEXIUM) 40 MG capsule Take 1 capsule (40 mg total) by mouth once daily. 30 capsule 1     No current facility-administered medications for this visit.        Family History   Problem Relation Age of Onset    Diabetes Father     Diabetes Mother     Breast cancer Neg Hx     Colon cancer Neg Hx     Ovarian cancer Neg Hx     Melanoma Neg Hx        Social History     Social History    Marital status:      Spouse name: N/A    Number of children: N/A    Years of education: N/A     Occupational History    nurse [employer]     Social History Main Topics    Smoking status: Never Smoker     "Smokeless tobacco: Never Used    Alcohol use No    Drug use: No    Sexual activity: Yes     Partners: Male     Birth control/ protection: Surgical      Comment:  AND MONOGOMOUS - [facility]     Other Topics Concern    Are You Pregnant Or Think You May Be? No    Breast-Feeding No     Social History Narrative    No narrative on file         Review of Systems   Constitution: Negative for chills, fever, malaise/fatigue, weight gain and weight loss.   HENT: Negative for ear pain and hoarse voice.    Eyes: Negative for blurred vision, pain and visual disturbance.   Cardiovascular: Negative for chest pain, dyspnea on exertion and irregular heartbeat.   Respiratory: Negative for cough, shortness of breath and wheezing.    Endocrine: Negative for cold intolerance and heat intolerance.   Hematologic/Lymphatic: Negative for adenopathy and bleeding problem. Does not bruise/bleed easily.   Skin: Negative for color change, itching and rash.   Musculoskeletal: Negative for back pain and neck pain.   Gastrointestinal: Negative for change in bowel habit, diarrhea, hematemesis, hematochezia, melena and vomiting.   Genitourinary: Negative for flank pain, frequency, hematuria and urgency.   Neurological: Negative for difficulty with concentration, dizziness, headaches, loss of balance and seizures.   Psychiatric/Behavioral: Negative for altered mental status, depression and suicidal ideas. The patient is not nervous/anxious.    Allergic/Immunologic: Negative for HIV exposure.           Objective:      BP (!) 134/90   Pulse 91   Temp 98.3 °F (36.8 °C)   Ht 5' 6" (1.676 m)   Wt 75.7 kg (166 lb 14.2 oz)   LMP 06/07/2015 (Exact Date)   BMI 26.94 kg/m²   Normocephalic.  Atraumatic.  Affect appropriate.  Breathing unlabored.  Extra ocular muscles intact.          General Musculoskeletal Exam   Gait: normal     Right Ankle/Foot Exam     Tests   Heel Walk: able to perform  Tiptoe Walk: able to perform    Left Ankle/Foot Exam "     Tests   Heel Walk: able to perform  Tiptoe Walk: unable to perform      Right Hip Exam     Range of Motion   Internal Rotation: normal   External Rotation: normal     Tests   Pain w/ forced internal rotation (FRANCK): absent  Seth: negative    Other   Sensation: normal  Left Hip Exam     Range of Motion   Internal Rotation: normal   External Rotation: normal     Tests   Pain w/ forced internal rotation (FRANCK): absent  Seth: negative    Other   Sensation: normal      Back (L-Spine & T-Spine) / Neck (C-Spine) Exam     Back (L-Spine & T-Spine) Range of Motion   Extension: normal   Flexion: abnormal Back flexion: with pain and radicular symptoms down the left lower extremity.     Back (L-Spine & T-Spine) Tests   Right Side Tests  Femoral Stretch: negative  Left Side Tests  Straight leg raise:     Sitting SLR: 30 degrees      Supine SLR:  30 degrees  Femoral Stretch: negative    Comments:  He states that she has tingling and numbness in the perineal and perirectal area.      Muscle Strength   Right Lower Extremity   Hip Flexion: 5/5   Quadriceps:  5/5   Hamstrin/5   Gastrocsoleus:  5/5/5  EHL:  5/5  Left Lower Extremity   Hip Flexion: 4/5   Quadriceps:  3/5   Hamstrin/5   Gastrocsoleus:  3/5/5  EHL:  4/5    Reflexes     Left Side  Quadriceps:  1+  Achilles:  0  Babinski Sign:  absent  Ankle Clonus:  absent    Right Side   Quadriceps:  2+  Achilles:  0  Babinski Sign:  absent  Ankle Clonus:  absent    Vascular Exam     Right Pulses  Dorsalis Pedis:      2+          Left Pulses  Dorsalis Pedis:      2+          Capillary Refill  Right Hand: normal capillary refill  Left Hand: normal capillary refill    Edema  Right Upper Leg: absent  Left Upper Leg: absent        Assessment:       Encounter Diagnoses   Name Primary?    Osteoarthritis of spine with radiculopathy, lumbar region Yes    Cauda equina compression          Plan:       Diagnoses and all orders for this visit:    Osteoarthritis of spine with  radiculopathy, lumbar region  -     MRI Lumbar Spine Without Contrast; Future    Cauda equina compression  -     MRI Lumbar Spine Without Contrast; Future    Other orders  -     diazePAM (VALIUM) 2 MG tablet; On call to MRI may repeat once         We discussed with the patient the assessment and recommendations. The following is the plan we agreed on:  1. MRI as soon as possible of the lumbar spine  2.  Spine surgery consult as soon as possible  3.  Return as needed.  Otherwise, follow-up after the above is done  If surgery is the next step she does not need to follow up she may call and cancel her follow-up appointment with me.

## 2018-01-18 ENCOUNTER — HOSPITAL ENCOUNTER (OUTPATIENT)
Dept: RADIOLOGY | Facility: OTHER | Age: 48
Discharge: HOME | End: 2018-01-18
Attending: ANESTHESIOLOGY
Payer: COMMERCIAL

## 2018-01-18 DIAGNOSIS — G83.4 CAUDA EQUINA COMPRESSION: ICD-10-CM

## 2018-01-18 DIAGNOSIS — M47.26 OSTEOARTHRITIS OF SPINE WITH RADICULOPATHY, LUMBAR REGION: ICD-10-CM

## 2018-01-18 PROCEDURE — 72148 MRI LUMBAR SPINE W/O DYE: CPT | Mod: TC

## 2018-01-18 PROCEDURE — 72148 MRI LUMBAR SPINE W/O DYE: CPT | Mod: 26,,, | Performed by: RADIOLOGY

## 2018-01-19 ENCOUNTER — HOSPITAL ENCOUNTER (OUTPATIENT)
Facility: HOSPITAL | Age: 48
Discharge: HOME | End: 2018-01-19
Attending: INTERNAL MEDICINE | Admitting: INTERNAL MEDICINE
Payer: COMMERCIAL

## 2018-01-19 ENCOUNTER — SURGERY (OUTPATIENT)
Age: 48
End: 2018-01-19

## 2018-01-19 ENCOUNTER — ANESTHESIA EVENT (OUTPATIENT)
Dept: ENDOSCOPY | Facility: HOSPITAL | Age: 48
End: 2018-01-19
Payer: COMMERCIAL

## 2018-01-19 ENCOUNTER — ANESTHESIA (OUTPATIENT)
Dept: ENDOSCOPY | Facility: HOSPITAL | Age: 48
End: 2018-01-19
Payer: COMMERCIAL

## 2018-01-19 VITALS
SYSTOLIC BLOOD PRESSURE: 127 MMHG | BODY MASS INDEX: 24.91 KG/M2 | DIASTOLIC BLOOD PRESSURE: 76 MMHG | HEART RATE: 77 BPM | TEMPERATURE: 98 F | OXYGEN SATURATION: 100 % | WEIGHT: 155 LBS | RESPIRATION RATE: 16 BRPM | HEIGHT: 66 IN

## 2018-01-19 DIAGNOSIS — R10.9 ABDOMINAL PAIN, UNSPECIFIED ABDOMINAL LOCATION: Primary | ICD-10-CM

## 2018-01-19 DIAGNOSIS — R10.9 ABDOMINAL PAIN: ICD-10-CM

## 2018-01-19 PROCEDURE — 88305 TISSUE EXAM BY PATHOLOGIST: CPT | Mod: 26,,,

## 2018-01-19 PROCEDURE — 88305 TISSUE EXAM BY PATHOLOGIST: CPT

## 2018-01-19 PROCEDURE — 27201089 HC SNARE, DISP (ANY): Performed by: INTERNAL MEDICINE

## 2018-01-19 PROCEDURE — D9220A PRA ANESTHESIA: Mod: CRNA,,, | Performed by: NURSE ANESTHETIST, CERTIFIED REGISTERED

## 2018-01-19 PROCEDURE — 37000009 HC ANESTHESIA EA ADD 15 MINS: Performed by: INTERNAL MEDICINE

## 2018-01-19 PROCEDURE — 43239 EGD BIOPSY SINGLE/MULTIPLE: CPT | Performed by: INTERNAL MEDICINE

## 2018-01-19 PROCEDURE — 43239 EGD BIOPSY SINGLE/MULTIPLE: CPT | Mod: 51,,, | Performed by: INTERNAL MEDICINE

## 2018-01-19 PROCEDURE — 63600175 PHARM REV CODE 636 W HCPCS: Performed by: ANESTHESIOLOGY

## 2018-01-19 PROCEDURE — 63600175 PHARM REV CODE 636 W HCPCS: Performed by: NURSE ANESTHETIST, CERTIFIED REGISTERED

## 2018-01-19 PROCEDURE — 27201012 HC FORCEPS, HOT/COLD, DISP: Performed by: INTERNAL MEDICINE

## 2018-01-19 PROCEDURE — 45385 COLONOSCOPY W/LESION REMOVAL: CPT | Mod: 33,,, | Performed by: INTERNAL MEDICINE

## 2018-01-19 PROCEDURE — C1773 RET DEV, INSERTABLE: HCPCS | Performed by: INTERNAL MEDICINE

## 2018-01-19 PROCEDURE — 88342 IMHCHEM/IMCYTCHM 1ST ANTB: CPT

## 2018-01-19 PROCEDURE — D9220A PRA ANESTHESIA: Mod: ANES,,, | Performed by: ANESTHESIOLOGY

## 2018-01-19 PROCEDURE — 88342 IMHCHEM/IMCYTCHM 1ST ANTB: CPT | Mod: 26,,,

## 2018-01-19 PROCEDURE — 37000008 HC ANESTHESIA 1ST 15 MINUTES: Performed by: INTERNAL MEDICINE

## 2018-01-19 PROCEDURE — 25000003 PHARM REV CODE 250: Performed by: INTERNAL MEDICINE

## 2018-01-19 PROCEDURE — 25000003 PHARM REV CODE 250: Performed by: NURSE ANESTHETIST, CERTIFIED REGISTERED

## 2018-01-19 PROCEDURE — 45385 COLONOSCOPY W/LESION REMOVAL: CPT | Performed by: INTERNAL MEDICINE

## 2018-01-19 RX ORDER — SODIUM CHLORIDE 9 MG/ML
INJECTION, SOLUTION INTRAVENOUS CONTINUOUS
Status: DISCONTINUED | OUTPATIENT
Start: 2018-01-19 | End: 2018-01-19 | Stop reason: HOSPADM

## 2018-01-19 RX ORDER — ONDANSETRON 2 MG/ML
4 INJECTION INTRAMUSCULAR; INTRAVENOUS ONCE
Status: COMPLETED | OUTPATIENT
Start: 2018-01-19 | End: 2018-01-19

## 2018-01-19 RX ORDER — PROPOFOL 10 MG/ML
VIAL (ML) INTRAVENOUS CONTINUOUS PRN
Status: DISCONTINUED | OUTPATIENT
Start: 2018-01-19 | End: 2018-01-19

## 2018-01-19 RX ORDER — LIDOCAINE HCL/PF 100 MG/5ML
SYRINGE (ML) INTRAVENOUS
Status: DISCONTINUED | OUTPATIENT
Start: 2018-01-19 | End: 2018-01-19

## 2018-01-19 RX ORDER — GLYCOPYRROLATE 0.2 MG/ML
INJECTION INTRAMUSCULAR; INTRAVENOUS
Status: DISCONTINUED | OUTPATIENT
Start: 2018-01-19 | End: 2018-01-19

## 2018-01-19 RX ORDER — PROPOFOL 10 MG/ML
VIAL (ML) INTRAVENOUS
Status: DISCONTINUED | OUTPATIENT
Start: 2018-01-19 | End: 2018-01-19

## 2018-01-19 RX ADMIN — PROPOFOL 50 MG: 10 INJECTION, EMULSION INTRAVENOUS at 11:01

## 2018-01-19 RX ADMIN — ONDANSETRON 4 MG: 2 INJECTION, SOLUTION INTRAMUSCULAR; INTRAVENOUS at 12:01

## 2018-01-19 RX ADMIN — PROPOFOL 100 MG: 10 INJECTION, EMULSION INTRAVENOUS at 11:01

## 2018-01-19 RX ADMIN — GLYCOPYRROLATE 0.2 MG: 0.2 INJECTION, SOLUTION INTRAMUSCULAR; INTRAVENOUS at 11:01

## 2018-01-19 RX ADMIN — PROPOFOL 250 MCG/KG/MIN: 10 INJECTION, EMULSION INTRAVENOUS at 11:01

## 2018-01-19 RX ADMIN — SODIUM CHLORIDE: 0.9 INJECTION, SOLUTION INTRAVENOUS at 10:01

## 2018-01-19 RX ADMIN — LIDOCAINE HYDROCHLORIDE 50 MG: 20 INJECTION, SOLUTION INTRAVENOUS at 11:01

## 2018-01-19 NOTE — H&P
Short Stay Endoscopy History and Physical    PCP - Roger Cormier Jr, MD (Inactive)  Referring Physician - Charlotte Jacinto MD  [address] [place] [address]    Procedure - EGD/colonoscopy  ASA - per anesthesia  Mallampati - per anesthesia  History of Anesthesia problems - see anesthesia note  Family history Anesthesia problems -  see anesthesia note  Plan of anesthesia - as per anesthesia    HPI  47 y.o. female    Reason for procedure: screening CRC, epigastric discomfort    Abdominal/epigastric pain: No  Reflux: No   Dysphagia: No  Change in bowel habits: No      ROS:  Constitutional: No fevers, chills  CV: No chest pain  Pulm: No shortness of breath  GI: see HPI    Medical History:  has a past medical history of Abnormal Pap smear; Annular tear of lumbar disc, L5-S1. (7/26/2012); Chronic LBP; HPTH (hyperparathyroidism); Menorrhagia; PONV (postoperative nausea and vomiting); Right lumbar radiculopathy (7/26/2012); and Seasonal allergies.    Surgical History:  has a past surgical history that includes Johnston tooth extraction; Cholecystectomy; Tubal ligation; and Hysterectomy.    Family History: family history includes Diabetes in her father and mother.    Social History:  reports that she has never smoked. She has never used smokeless tobacco. She reports that she does not drink alcohol or use drugs.    Review of patient's allergies indicates:   Allergen Reactions    Clindamycin     Sulfa dyne     Sulfamethoxazole-trimethoprim      Other reaction(s): Anaphylaxis    Dermabond [tissue glues] Rash       Medications:   Prescriptions Prior to Admission   Medication Sig Dispense Refill Last Dose    cyclobenzaprine (FLEXERIL) 10 MG tablet Take 0.5-1 tablets (5-10 mg total) by mouth 3 (three) times daily as needed for Muscle spasms. 90 tablet 2 1/18/2018 at Unknown time    diazePAM (VALIUM) 2 MG tablet On call to MRI may repeat once 2 tablet 0 Past Week at Unknown time    diclofenac (VOLTAREN) 75 MG EC  tablet    Past Week at Unknown time    docusate sodium (COLACE) 100 MG capsule Take 1 capsule (100 mg total) by mouth 2 (two) times daily as needed for Constipation.  0 Past Week at Unknown time    epinephrine (EPIPEN JR) 0.15 mg/0.3 mL (1:2,000) pen injection Inject 0.3 mLs (0.15 mg total) into the muscle as needed. 1 each 2 Past Week at Unknown time    ferrous sulfate 325 mg (65 mg iron) Tab tablet Take 1 tablet (325 mg total) by mouth daily with breakfast. 30 tablet 1 Past Week at Unknown time    fluticasone (FLONASE) 50 mcg/actuation nasal spray 2 sprays by Each Nare route once daily. 16 g 0 Past Week at Unknown time    gabapentin (NEURONTIN) 300 MG capsule Take 2-3 capsules (600-900 mg total) by mouth 3 (three) times daily. 210 capsule 2 Past Week at Unknown time    methylPREDNISolone (MEDROL DOSEPACK) 4 mg tablet use as directed 1 Package 0 Past Week at Unknown time    triamcinolone acetonide 0.1% (KENALOG) 0.1 % cream Apply topically 2 (two) times daily. Cream Topical Twice a day .  disp:  60g tube AAA 80 g 1 Past Week at Unknown time    alprazolam (XANAX) 0.5 MG tablet Take 1 tablet (0.5 mg total) by mouth daily as needed for Anxiety. 20 tablet 1 Taking    esomeprazole (NEXIUM) 40 MG capsule Take 1 capsule (40 mg total) by mouth once daily. 30 capsule 1 Taking       Physical Exam:    Vital Signs:   Vitals:    01/19/18 1042   BP: 117/80   Pulse: 97   Resp: 15   Temp: 98.6 °F (37 °C)       General Appearance: Well appearing in no acute distress  Lungs: No respiratory distress.   Heart:  Regular rate, S1, S2 normal.  Abdomen: Soft, non tender, non distended with normal bowel sounds, no masses    Labs:  Lab Results   Component Value Date    WBC 9.12 01/10/2018    HGB 11.9 (L) 01/10/2018    HCT 36.3 (L) 01/10/2018    MCV 88 01/10/2018     01/10/2018     Lab Results   Component Value Date    INR 1.0 04/12/2015     Lab Results   Component Value Date    IRON 49 01/10/2018    FERRITIN 136 01/10/2018     TIBC 367 01/10/2018    FESATURATED 13 (L) 01/10/2018     Lab Results   Component Value Date     01/10/2018    K 3.8 01/10/2018     01/10/2018    CO2 28 01/10/2018    BUN 10 01/10/2018    CREATININE 0.7 01/10/2018       I have explained the risks and benefits of this endoscopic procedure to the patient/family including but not limited to bleeding, inflammation, infection, perforation, hypoxia/respiratory failure, and death.       Pavel Samayoa MD  Gastroenterology & Hepatology Fellow  Pager: [phone]

## 2018-01-19 NOTE — TRANSFER OF CARE
"Anesthesia Transfer of Care Note    Patient: Laz Quintero    Procedure(s) Performed: Procedure(s) (LRB):  ESOPHAGOGASTRODUODENOSCOPY (EGD) (N/A)  COLONOSCOPY (N/A)    Patient location: PACU    Anesthesia Type: general    Transport from OR: Transported from OR on 2-3 L/min O2 by NC with adequate spontaneous ventilation    Post pain: adequate analgesia    Post assessment: no apparent anesthetic complications and tolerated procedure well    Post vital signs: stable    Level of consciousness: awake, alert and oriented    Nausea/Vomiting: no nausea/vomiting    Complications: none    Transfer of care protocol was followed      Last vitals:   Visit Vitals  /80   Pulse 97   Temp 37 °C (98.6 °F)   Resp 15   Ht 5' 6" (1.676 m)   Wt 70.3 kg (155 lb)   LMP 06/07/2015 (Exact Date)   SpO2 97%   BMI 25.02 kg/m²     "

## 2018-01-19 NOTE — PROVATION PATIENT INSTRUCTIONS
Discharge Summary/Instructions after an Endoscopic Procedure  Patient Name: Laz Quintero  Patient MRN: [id number]  Patient YOB: 1970 Friday, January 19, 2018  Malachi Olmedo MD  RESTRICTIONS:  During your procedure today, you received medications for sedation.  These   medications may affect your judgment, balance and coordination.  Therefore,   for 24 hours, you have the following restrictions:   - DO NOT drive a car, operate machinery, make legal/financial decisions,   sign important papers or drink alcohol.    ACTIVITY:  The following day: return to full activity including work, except no heavy   lifting, straining or running for 3 days if polyps were removed.  DIET:  Eat and drink normally unless instructed otherwise.     TREATMENT FOR COMMON SIDE EFFECTS:  - Mild abdominal pain, belching, bloating or excessive gas: rest, eat   lightly and use a heating pad.  - Sore Throat: treat with throat lozenges and/or gargle with warm salt   water.  SYMPTOMS TO WATCH FOR AND REPORT TO YOUR PHYSICIAN:  1. Abdominal pain or bloating, other than gas cramps.  2. Chest pain.  3. Back pain.  4. Chills or fever occurring within 24 hours after the procedure.  5. Rectal bleeding, which would show as bright red, maroon, or black stools.   (A tablespoon of blood from the rectum is not serious, especially if   hemorrhoids are present.)  6. Vomiting.  7. Weakness or dizziness.  8. Because air was used during the procedure, expelling large amounts of air   from your rectum or belching is normal.  9. If a bowel prep was taken, you may not have a bowel movement for 1-3   days.  This is normal.  GO DIRECTLY TO THE NEAREST EMERGENCY ROOM IF YOU HAVE ANY OF THE FOLLOWING:      Difficulty breathing  Chills and/or fever over 101 F   Persistent vomiting and/or vomiting blood   Severe abdominal pain   Severe chest pain   Black, tarry stools   Bleeding- more than one tablespoon   Any other symptom or condition that you may feel needs  urgent attention  Your doctor recommends these additional instructions:  If any biopsies were taken, your doctor s clinic will contact you in 1 to 2   weeks with any results.  You are being discharged to home.   You have a contact number available for emergencies.  The signs and symptoms   of potential delayed complications were discussed with you.  You may return   to normal activities tomorrow.  Written discharge instructions were   provided to you.   Resume your previous diet.   Continue your present medications.   We are waiting for your pathology results.   Telephone your GI clinic for pathology results in two weeks.   Your physician has recommended a repeat colonoscopy in five years for   surveillance.  For questions, problems or results please call your physician - Malachi Olmedo MD at Work:  [phone].  [facility], EMERGENCY ROOM PHONE NUMBER: [phone]  IF A COMPLICATION OR EMERGENCY SITUATION ARISES AND YOU ARE UNABLE TO REACH   YOUR PHYSICIAN - GO DIRECTLY TO THE EMERGENCY ROOM.  Malachi Olmedo MD  1/19/2018 11:48:09 AM  This report has been verified and signed electronically.

## 2018-01-19 NOTE — ANESTHESIA PREPROCEDURE EVALUATION
01/19/2018  Laz Quintero is a 47 y.o., female   Patient Active Problem List   Diagnosis    Low back pain radiating to right leg    Seasonal allergies    HPTH (hyperparathyroidism)    Chronic LBP    Right lumbar radiculopathy    Anxiety and depression    S/P laparoscopic hysterectomy    Left lumbar radiculitis    Abdominal pain     .    Anesthesia Evaluation         Review of Systems      Physical Exam  General:  Well nourished    Airway/Jaw/Neck:  Airway Findings: Mouth Opening: Normal Tongue: Normal  General Airway Assessment: Adult  Mallampati: II  Improves to II with phonation.  TM Distance: Normal, at least 6 cm      Dental:  Dental Findings: In tact   Chest/Lungs:  Chest/Lungs Findings: Clear to auscultation     Heart/Vascular:  Heart Findings: Rate: Normal  Rhythm: Regular Rhythm  Sounds: Normal        Mental Status:  Mental Status Findings:  Cooperative, Alert and Oriented         Anesthesia Plan  Type of Anesthesia, risks & benefits discussed:  Anesthesia Type:  general  Patient's Preference: General  Intra-op Monitoring Plan: standard ASA monitors  Intra-op Monitoring Plan Comments: Standard ASA monitors.   Post Op Pain Control Plan: per primary service following discharge from PACU  Post Op Pain Control Plan Comments: Per primary service.     Induction:   IV  Beta Blocker:  Patient is not currently on a Beta-Blocker (No further documentation required).       Informed Consent: Patient understands risks and agrees with Anesthesia plan.  Questions answered. Anesthesia consent signed with patient.  ASA Score: 2     Day of Surgery Review of History & Physical:    H&P update referred to the surgeon.     Anesthesia Plan Notes: Chart reviewed, patient interviewed and examined.  The plan for general anesthesia was explained.  Questions were answered and the consent was signed.  [employer] CONTRERAS          Ready For Surgery From Anesthesia Perspective.

## 2018-01-19 NOTE — ANESTHESIA POSTPROCEDURE EVALUATION
"Anesthesia Post Evaluation    Patient: Laz Quintero    Procedure(s) Performed: Procedure(s) (LRB):  ESOPHAGOGASTRODUODENOSCOPY (EGD) (N/A)  COLONOSCOPY (N/A)    Final Anesthesia Type: general  Patient location during evaluation: PACU  Patient participation: Yes- Able to Participate  Level of consciousness: awake and alert  Post-procedure vital signs: reviewed and stable  Pain management: adequate  Airway patency: patent  PONV status at discharge: No PONV  Anesthetic complications: no      Cardiovascular status: hemodynamically stable  Respiratory status: unassisted  Hydration status: euvolemic  Follow-up not needed.        Visit Vitals  /68 (BP Location: Left arm, Patient Position: Lying)   Pulse 74   Temp 36.7 °C (98.1 °F)   Resp 16   Ht 5' 6" (1.676 m)   Wt 70.3 kg (155 lb)   LMP 06/07/2015 (Exact Date)   SpO2 100%   BMI 25.02 kg/m²       Pain/Maria Guadalupe Score: Pain Assessment Performed: Yes (1/19/2018 12:07 PM)  Presence of Pain: complains of pain/discomfort (1/19/2018 12:07 PM)  Maria Guadalupe Score: 10 (1/19/2018 12:07 PM)      "

## 2018-01-22 ENCOUNTER — OFFICE VISIT (OUTPATIENT)
Dept: NEUROSURGERY | Facility: CLINIC | Age: 48
End: 2018-01-22
Payer: COMMERCIAL

## 2018-01-22 VITALS
BODY MASS INDEX: 26.33 KG/M2 | SYSTOLIC BLOOD PRESSURE: 138 MMHG | WEIGHT: 163.81 LBS | DIASTOLIC BLOOD PRESSURE: 92 MMHG | HEART RATE: 89 BPM | HEIGHT: 66 IN | TEMPERATURE: 98 F

## 2018-01-22 DIAGNOSIS — M54.16 LEFT LUMBAR RADICULITIS: ICD-10-CM

## 2018-01-22 DIAGNOSIS — G89.29 CHRONIC LEFT-SIDED LOW BACK PAIN WITH LEFT-SIDED SCIATICA: Primary | ICD-10-CM

## 2018-01-22 DIAGNOSIS — M54.42 CHRONIC LEFT-SIDED LOW BACK PAIN WITH LEFT-SIDED SCIATICA: Primary | ICD-10-CM

## 2018-01-22 DIAGNOSIS — M51.27 HERNIATED NUCLEUS PULPOSUS, L5-S1, LEFT: ICD-10-CM

## 2018-01-22 PROBLEM — M54.40 CHRONIC LEFT-SIDED LOW BACK PAIN WITH SCIATICA: Status: ACTIVE | Noted: 2018-01-22

## 2018-01-22 PROCEDURE — 99999 PR PBB SHADOW E&M-EST. PATIENT-LVL III: CPT | Mod: PBBFAC,,, | Performed by: NEUROLOGICAL SURGERY

## 2018-01-22 PROCEDURE — 99204 OFFICE O/P NEW MOD 45 MIN: CPT | Mod: S$GLB,,, | Performed by: NEUROLOGICAL SURGERY

## 2018-01-22 NOTE — LETTER
January 22, 2018      Omar Hussein MD  [address]  [address]  [facility] [address]           [facility] - Neurosurgery 7th Fl  [address]  [place] [address]  Phone: [phone]          Patient: Laz Quintero   MR Number: [id number]   YOB: 1970   Date of Visit: 1/22/2018       Dear Dr. Omar Husseni:    Thank you for referring Laz Quintero to me for evaluation. Attached you will find relevant portions of my assessment and plan of care.    If you have questions, please do not hesitate to call me. I look forward to following Laz Quintero along with you.    Sincerely,    Jese Mane MD    Enclosure  CC:  No Recipients    If you would like to receive this communication electronically, please contact [email or web address] or [phone] to request more information on [employer] access.    For providers and/or their staff who would like to refer a patient to [facility], please contact us through our one-stop-shop provider referral line, [facility] , at [phone].    If you feel you have received this communication in error or would no longer like to receive these types of communications, please e-mail [email or web address]

## 2018-01-22 NOTE — PROGRESS NOTES
History & Physical    I, Danial Gómez, attest that this documentation has been prepared under the direction and in the presence of Jese Mane MD.    01/22/2018    Chief Complaint   Patient presents with    Lumbar Spine Pain (L-Spine)       History of Present Illness:  Laz Quintero is a 47 y.o. patient with history of chronic low back pain.  Patient was referred to me by Norma Youssef NP.     Patient reports her symptoms as constant, stabbing, left-sided low back pain and shooting left sciatica like pain (shooting pain over the left medial groin, thigh, calf and going to the toes) as well as occasional bowel and bladder incontinence, when having acute pain. Patient states that her pain is at times 10/10 in intensity. Patient states that her low back pain and left leg pain have been present for 6 weeks and that her numbness and bowel and bladder incontinence have been present for about 4 weeks. Alleviating factors identifiable by patient are standing. Exacerbating factors identifiable by patient are prolonged sitting. Patient had tried all conservative therapies, including NSAIDS, PT and injections. She has also tried a 5 day steroid pack which she just finished 5 days ago and which is no longer providing relief.       Patient states that her symptoms initially started in 2012 (patient works as a nurse) when a patient fell on her when she was trying to stop him from falling. At the time she had back pain and BL non radicular bilateral leg pain. Eventually she completely recovered after 8 months of physical therapy and had been doing well until 2016 when she slipped on a spot of oil in the parking garage and had resumption of back pain and right leg pain. Since then she has had intermittent flare ups of back pain and leg pain which have been managed with gabapentin, muscle relaxants, antiinflammatories, physical therapy, Pain Management.  Patient denies any history of other medical problems, history of  diabetes, history of cardiac issues.     Review of patient's allergies indicates:   Allergen Reactions    Clindamycin     Sulfa dyne     Sulfamethoxazole-trimethoprim      Other reaction(s): Anaphylaxis    Dermabond [tissue glues] Rash       Current Outpatient Prescriptions   Medication Sig Dispense Refill    alprazolam (XANAX) 0.5 MG tablet Take 1 tablet (0.5 mg total) by mouth daily as needed for Anxiety. 20 tablet 1    cyclobenzaprine (FLEXERIL) 10 MG tablet Take 0.5-1 tablets (5-10 mg total) by mouth 3 (three) times daily as needed for Muscle spasms. 90 tablet 2    diazePAM (VALIUM) 2 MG tablet On call to MRI may repeat once 2 tablet 0    diclofenac (VOLTAREN) 75 MG EC tablet       docusate sodium (COLACE) 100 MG capsule Take 1 capsule (100 mg total) by mouth 2 (two) times daily as needed for Constipation.  0    epinephrine (EPIPEN JR) 0.15 mg/0.3 mL (1:2,000) pen injection Inject 0.3 mLs (0.15 mg total) into the muscle as needed. 1 each 2    esomeprazole (NEXIUM) 40 MG capsule Take 1 capsule (40 mg total) by mouth once daily. 30 capsule 1    ferrous sulfate 325 mg (65 mg iron) Tab tablet Take 1 tablet (325 mg total) by mouth daily with breakfast. 30 tablet 1    fluticasone (FLONASE) 50 mcg/actuation nasal spray 2 sprays by Each Nare route once daily. 16 g 0    gabapentin (NEURONTIN) 300 MG capsule Take 2-3 capsules (600-900 mg total) by mouth 3 (three) times daily. 210 capsule 2    methylPREDNISolone (MEDROL DOSEPACK) 4 mg tablet use as directed 1 Package 0    triamcinolone acetonide 0.1% (KENALOG) 0.1 % cream Apply topically 2 (two) times daily. Cream Topical Twice a day .  disp:  60g tube AAA 80 g 1     No current facility-administered medications for this visit.        Past Medical History:   Diagnosis Date    Abnormal Pap smear     Annular tear of lumbar disc, L5-S1. 7/26/2012    Chronic LBP     HPTH (hyperparathyroidism)     Menorrhagia     PONV (postoperative nausea and vomiting)   "   Right lumbar radiculopathy 7/26/2012    Seasonal allergies        Past Surgical History:   Procedure Laterality Date    CHOLECYSTECTOMY      COLONOSCOPY N/A 1/19/2018    Procedure: COLONOSCOPY;  Surgeon: Malachi Olmedo MD;  Location: [facility] ([address] [place] [address]);  Service: Endoscopy;  Laterality: N/A;    HYSTERECTOMY      TUBAL LIGATION      Essure    WISDOM TOOTH EXTRACTION      2005       Family History   Problem Relation Age of Onset    Diabetes Father     Diabetes Mother     Breast cancer Neg Hx     Ovarian cancer Neg Hx     Melanoma Neg Hx        Social History   Substance Use Topics    Smoking status: Never Smoker    Smokeless tobacco: Never Used    Alcohol use No        Review of Systems:  Review of Systems   Constitutional: Negative for activity change.   HENT: Negative for congestion.    Eyes: Negative for discharge.   Respiratory: Negative for apnea.    Cardiovascular: Negative for chest pain.   Gastrointestinal: Negative for abdominal distention.        Positive for bowel incontinence   Endocrine: Negative for cold intolerance.   Genitourinary: Negative for difficulty urinating.        Positive for bladder incontinence.   Musculoskeletal: Positive for back pain. Negative for arthralgias.   Neurological: Positive for numbness (LLE). Negative for dizziness, weakness and headaches.        Positive for left lower extremity pain. Positive for left lower extremity paresthesias.  Positive for vaginal paresthesias and numbness.    Psychiatric/Behavioral: Negative for agitation.       Vital Signs (Most Recent)  Temp: 98.1 °F (36.7 °C) (01/22/18 1509)  Pulse: 89 (01/22/18 1509)  BP: (!) 138/92 (01/22/18 1509)  5' 6" (1.676 m)  74.3 kg (163 lb 12.8 oz)         Physical Exam:    Constitutional: She appears well-developed.     Eyes: Pupils are equal, round, and reactive to light. EOM are normal. Right eye exhibits no discharge. Left eye exhibits no discharge.     Abdominal: Soft.     Skin: Skin displays no " rash on trunk and no rash on extremities.     Psych/Behavior: She is alert. She is oriented to person, place, and time.     Musculoskeletal:        Neck: There is no tenderness.        Back: Range of motion is full.        Right Upper Extremities: Range of motion is full. Muscle strength is 5/5. Tone is normal.        Left Upper Extremities: Range of motion is full. Muscle strength is 5/5. Tone is normal.       Right Lower Extremities: Range of motion is full. Muscle strength is 5/5. Tone is normal.        Left Lower Extremities: Range of motion is full. Muscle strength is 4/5. Tone is normal.     Neurological:        Coordination: She has a normal Romberg Test and normal tandem walking coordination.        Sensory: There is no sensory deficit in the trunk. There is no sensory deficit in the extremities.        DTRs: DTRs are DTRS NORMAL AND SYMMETRICnormal and symmetric. Tricep reflexes are 2+ on the right side and 2+ on the left side. Bicep reflexes are 2+ on the right side and 2+ on the left side. Brachioradialis reflexes are 2+ on the right side and 2+ on the left side. Patellar reflexes are 2+ on the right side and 2+ on the left side. Achilles reflexes are 2+ on the right side and 2+ on the left side. She displays no Babinski's sign on the right side. She displays no Babinski's sign on the left side.        Cranial nerves: Cranial nerve(s) II, III, IV, V, VI, VII, VIII, IX, X, XI and XII are intact.       4/5 strength on the left lower extremity throughout, pain limited.   4/5 strength to EHL on the left side.   Positive SLR test on the left side.   Positive Audi's test on the left side.    Decreased sensation of left inner/upper thigh. Decreased sensation of the outer aspect of the left foot.     Laboratory  CBC: Reviewed  CMP: Reviewed    Diagnostic Results:  US: Reviewed  MRI: Reviewed   MRI Lumbar Spine Without Contrast, dated 1/18/2018: Reviewed personally and explained them to the patient.    Degenerative changes of the lumbar spine which remain most pronounced at L5-S1 secondary to a circumferential disc bulge and superimposed left paracentral disc extrusion narrowing the left lateral recess and likely contacting the descending left S1 nerve root. Please see above for additional level by level details.    CT Renal Stone Study ABD Pelvis WO, dated 11/29/2017: Reviewed.   1. Urinary bladder circumferential wall thickening which may be related to underdistention versus cystitis. Correlate clinically.    2. Otherwise, no radiodense calculus within the collecting systems on either side or evidence of obstructive uropathy.    3. Cholecystectomy and hysterectomy.    4. Hepatomegaly.    5. Previous left renal lower pole cyst is not well-visualized on this exam without intravenous contrast. Further evaluation with elective renal ultrasound versus contrast-enhanced CT or MRI the abdomen with renal mass protocol can be obtained as warranted.    MRI Lumbar Spine Without Contrast , dated 7/15/2016: Reviewed.   Degenerative change of the lumbar spine most pronounced at L5/S1 with bulging disc and superimposed left paracentral disc protrusion.  There is mild left neural foraminal stenosis.  In addition the protruding disc abuts the descending left S1 nerve root, clinical correlation for left S1 nerve radiculopathy advised.    MRI Lumbar Spine Without Contrast, dated 5/29/212: Reviewed.   1.  Small left paracentral disc extrusion at L4-5 impressing upon the   thecal sac and the L4 nerve root on the left.    MRI Lumbar Spine Without Contrast, dated 5/22/2012: Reviewed.   Small left paracentral disk extrusion at L4-5, possibly impinging on the   left descending L5 nerve root.  Small annular tear at L5-S1 noted.  No   significant central canal or neuroforaminal stenosis.    MRI Thoracic Spine Without Contrast, dated 6/14/2012: Reviewed.   1.  No significant abnormality identified.       ASSESSMENT/PLAN:       ICD-10-CM  ICD-9-CM   1. Chronic left-sided low back pain with left-sided sciatica M54.42 724.2    G89.29 724.3     338.29   2. Left lumbar radiculitis M54.16 724.4   3. Herniated nucleus pulposus, L5-S1, left M51.27 722.10       PLAN:      1. L5-S1 left-sided disk herniation with radiculopathy and sciatica.     2. I have explained the natural history of herniated disks, the likelihood of improvement spontaneously over time and the role of surgery to speed up improvement of symptoms.     3. I have recommended the patient to proceed with left-sided minimally invasive microdiskectomy, since she is having worsening pain, LLE weakness and experienced failure of any conservative treatment. I have also explained that because her disk is calcified (seen on the CT abdomen and pelvis from 2017), I am not sure that I will be able to decompress the nerve with a diskectomy alone. I have explained that if the disk is calcified and I need to further decompress the nerves, she will need a left sided transforaminal lumbar interbody fusion at L5-S1. I have explained that given her failure with conservative management, I would favor surgical decompression sooner rather than later given her recent bowel and bladder dysfunction. Patient will discuss with her  and come back to us.     4. I spent 35 minutes with the patient, 50% of time in counseling.       Laz was seen today for lumbar spine pain (l-spine).    Diagnoses and all orders for this visit:    Chronic left-sided low back pain with left-sided sciatica    Left lumbar radiculitis    Herniated nucleus pulposus, L5-S1, left      I, Dr. Jese Mane, personally performed the services described in this documentation. All medical record entries made by the scribe were at my direction and in my presence.  I have reviewed the chart and agree that the record reflects my personal performance and is accurate and complete. Jese Mane MD.  10:17 PM 01/22/2018

## 2018-01-23 ENCOUNTER — TELEPHONE (OUTPATIENT)
Dept: GASTROENTEROLOGY | Facility: CLINIC | Age: 48
End: 2018-01-23

## 2018-01-23 ENCOUNTER — PATIENT MESSAGE (OUTPATIENT)
Dept: GASTROENTEROLOGY | Facility: CLINIC | Age: 48
End: 2018-01-23

## 2018-01-23 DIAGNOSIS — A04.8 H. PYLORI INFECTION: Primary | ICD-10-CM

## 2018-01-23 RX ORDER — CLARITHROMYCIN 500 MG/1
500 TABLET, FILM COATED ORAL 2 TIMES DAILY
Qty: 28 TABLET | Refills: 0 | Status: SHIPPED | OUTPATIENT
Start: 2018-01-23 | End: 2018-02-06

## 2018-01-23 RX ORDER — AMOXICILLIN 500 MG/1
1000 TABLET, FILM COATED ORAL 2 TIMES DAILY
Qty: 56 TABLET | Refills: 0 | Status: SHIPPED | OUTPATIENT
Start: 2018-01-23 | End: 2018-02-06

## 2018-01-23 RX ORDER — FLUCONAZOLE 150 MG/1
150 TABLET ORAL ONCE
Qty: 1 TABLET | Refills: 0 | Status: SHIPPED | OUTPATIENT
Start: 2018-01-23 | End: 2018-01-23

## 2018-01-23 NOTE — TELEPHONE ENCOUNTER
The patient's stomach biopsies are back and are POSITIVE for H pylori.  This can explain her abdominal pain and needs treatment.  I recommend triple therapy with the following:    Amoxicillin 1000 mg PO bid,  Clarithromycin 500 mg PO bid, and   Nexium 40 mg PO bid (already prescribed this and may take her current prescription - however, she can use the OTC version if needed so she doesn't run out).      All meds to be taken for 14 days.    Patient needs follow-up for H pylori in GI clinic in 3 months.  Can be with [facility].      Also, the patient's colon polyp was a precancerous type making it a risk factor for colon cancer, but completely benign (no concerning changes).  Repeat the colonoscopy as previously recommended in 5 years.          MA to call patient with results and recommendations.

## 2018-01-23 NOTE — TELEPHONE ENCOUNTER
Results and recommendation given to patient's per Dr. Olmedo. Patient verbalized understanding. Gastro appointment scheduled and mailed to address on file.

## 2018-01-24 ENCOUNTER — OFFICE VISIT (OUTPATIENT)
Dept: PAIN MEDICINE | Facility: CLINIC | Age: 48
End: 2018-01-24
Attending: ANESTHESIOLOGY
Payer: COMMERCIAL

## 2018-01-24 VITALS
BODY MASS INDEX: 26.22 KG/M2 | HEIGHT: 66 IN | SYSTOLIC BLOOD PRESSURE: 131 MMHG | DIASTOLIC BLOOD PRESSURE: 85 MMHG | WEIGHT: 163.13 LBS | HEART RATE: 88 BPM | RESPIRATION RATE: 18 BRPM | TEMPERATURE: 99 F

## 2018-01-24 DIAGNOSIS — M47.26 OSTEOARTHRITIS OF SPINE WITH RADICULOPATHY, LUMBAR REGION: Primary | ICD-10-CM

## 2018-01-24 PROCEDURE — 99999 PR PBB SHADOW E&M-EST. PATIENT-LVL III: CPT | Mod: PBBFAC,,, | Performed by: ANESTHESIOLOGY

## 2018-01-24 PROCEDURE — 99214 OFFICE O/P EST MOD 30 MIN: CPT | Mod: S$GLB,,, | Performed by: ANESTHESIOLOGY

## 2018-01-24 RX ORDER — GABAPENTIN 100 MG/1
CAPSULE ORAL
Qty: 270 CAPSULE | Refills: 0 | Status: SHIPPED | OUTPATIENT
Start: 2018-01-24 | End: 2018-02-19 | Stop reason: SDUPTHER

## 2018-01-24 RX ORDER — GABAPENTIN 100 MG/1
100 CAPSULE ORAL 3 TIMES DAILY
Qty: 270 CAPSULE | Refills: 0 | Status: SHIPPED | OUTPATIENT
Start: 2018-01-24 | End: 2018-01-24 | Stop reason: SDUPTHER

## 2018-01-24 NOTE — PROGRESS NOTES
Subjective:      Patient ID: Laz Quintero is a 47 y.o. female.    Chief Complaint: Low-back Pain and Leg Pain (left leg pain )    Referred by: No ref. provider found     PDI: 50  Pain Scales  Best: 3/10  Worst: 10/10  Usually: 6/10  Today: 7/10    Low-back Pain   This is a chronic problem. The current episode started more than 1 year ago. The problem has been gradually worsening since onset. The pain is present in the lumbar spine. The pain is at a severity of 7/10. The pain is severe. Associated symptoms include bladder incontinence, bowel incontinence, a fever, leg pain, numbness and tingling. Pertinent negatives include no chest pain, headaches or weight loss. (Left leg pain ) The treatment provided mild relief. Physical therapy was effective (2016 some what effective ).  Leg Pain    Associated symptoms include a fever, numbness and tingling. Pertinent negatives include no itching.     HPI: 47yF with chronic low back pain radiating to left lower extremity last seen in clinic on 1/16/18. Pain had worsened at that visit over the previous 5 weeks, feeling weakness in lle. During the previous two weeks her pain got even worse adding numbness in perineal area and b/b incontinence. At that time of last visit, she was referred for NSGY consultation and MRI. NSGY (Dr. Mane) recommended microdiskectomy and may need left sided transforaminal lumbar interbody fusion at L5-S1. She would like a second opinion and would like to try medications at this time. Has not tried gabapentin. Has not tried injections but would like to wait due to insurance issues at this time. No worsening or new symptoms at this time.    Interventional Pain History  2012 Epidural  2016 Epidural    Review of Systems   Constitution: Positive for fever. Negative for chills, malaise/fatigue, weight gain and weight loss.   HENT: Negative for ear pain and hoarse voice.    Eyes: Negative for blurred vision, pain and visual disturbance.  "  Cardiovascular: Negative for chest pain, dyspnea on exertion and irregular heartbeat.   Respiratory: Negative for cough, shortness of breath and wheezing.    Endocrine: Negative for cold intolerance and heat intolerance.   Hematologic/Lymphatic: Negative for adenopathy and bleeding problem. Does not bruise/bleed easily.   Skin: Negative for color change, itching and rash.   Musculoskeletal: Positive for back pain and myalgias. Negative for neck pain.   Gastrointestinal: Positive for bowel incontinence. Negative for change in bowel habit, diarrhea, hematemesis, hematochezia, melena and vomiting.   Genitourinary: Positive for bladder incontinence. Negative for flank pain, frequency, hematuria and urgency.   Neurological: Positive for numbness and tingling. Negative for difficulty with concentration, dizziness, headaches, loss of balance and seizures.   Psychiatric/Behavioral: Negative for altered mental status, depression and suicidal ideas. The patient is not nervous/anxious.    Allergic/Immunologic: Negative for HIV exposure.           Objective:      /85   Pulse 88   Temp 99.4 °F (37.4 °C) (Oral)   Resp 18   Ht 5' 6" (1.676 m)   Wt 74 kg (163 lb 1.6 oz)   LMP 06/07/2015 (Exact Date)   BMI 26.33 kg/m²   GEN: No apparent distress. Affect normal.   HEENT: Normocephalic, Atraumatic; EOM intact  CV: regular rate and rhythm  RESP: clear to auscultation bilaterally; no increased work of breathing  ABD: soft, non-tender, non-distended, normal bowel sounds  SKIN: intact, No rashes    Ortho/SPM Exam      LUMBAR SPINE EXAM:   INSPECTION: No gross deformities or abnormalities noted.   STABILITY: No instability noted/appreciated.   STRAIGHT LEG RAISE: Positive bilaterally.   FADIR MANEUVER: Unremarkable bilaterally.   HIP EXAM: Unremarkable with internal and external rotation of the hip bilaterally.   MUSCLE STRENGTH: 5/5 and symmetrical bilateral lower extremities except for 4/5 in LLE except for 3/5 in " gastrocsoleus.  DTRs: 2+ and symmetrical in patellar, unable to get in achilles bilaterally.    PATHOLOGICAL REFLEXES:   CLONUS: Negative bilaterally.   BABINSKI: Downgoing plantar response bilaterally.    L-spine MRI 1/18/18  Findings:    Lumbar spine alignment is within normal limits.  The vertebral body heights are well maintained, with no fracture.  There is no marrow signal abnormality suspicious for infiltrative process.  There is disc desiccation at L4-5 and L5-S1.  The conus is normal in appearance and terminates at the L1 level.      L1-L2: There is no focal disk herniation.  No significant central canal or neural foraminal narrowing.    L2-L3: There is no focal disk herniation.  No significant central canal or neural foraminal narrowing.    L3-L4: There is a minimal circumferential disc bulge and ligamentum flavum thickening.  No significant spinal canal or neural foraminal narrowing.    L4-L5: There is a circumferential disc bulge, ligamentum flavum thickening, and bilateral facet arthropathy. There is mild spinal canal stenosis and mild bilateral neural foraminal narrowing, left greater than right.      L5-S1: There is a circumferential disc bulge with superimposed left paracentral disc extrusion.  This narrows the left lateral recess and likely contacts the descending left S1 nerve root. There is associated mild spinal canal narrowing. There is mild left-sided neural foraminal narrowing.    Limited views of the posterior abdomen demonstrate a stable left renal T2 hyperintensity corresponding to a parapelvic cyst.  Impression         Degenerative changes of the lumbar spine which remain most pronounced at L5-S1 secondary to a circumferential disc bulge and superimposed left paracentral disc extrusion narrowing the left lateral recess and likely contacting the descending left S1 nerve root. Please see above for additional level by level details.  Assessment:       Encounter Diagnosis   Name Primary?     Osteoarthritis of spine with radiculopathy, lumbar region Yes         Plan:       Laz was seen today for low-back pain and leg pain.    Diagnoses and all orders for this visit:    Osteoarthritis of spine with radiculopathy, lumbar region    Other orders  -     Discontinue: gabapentin (NEURONTIN) 100 MG capsule; Take 1 capsule (100 mg total) by mouth 3 (three) times daily.  -     gabapentin (NEURONTIN) 100 MG capsule; 1@ bedtime x3 days then one 3x a day for 3 days then two 3x a day for 3 days then 3 caps 3x a day to follow    We discussed with the patient the assessment and recommendations. The following is the plan we agreed on:  1. Advised patient that she will need surgery and that injections were unlikely to be of benefit at this time.  2. Prescribed gabapentin po and will titrate up for lumbar djd with radiculopathy.  3. RTC in one month to titrate up gabapentin    Jluis Carmichael MD, PGY-2    I have personally taken the history and examined this patient and agree with the resident's note as stated above.

## 2018-01-26 ENCOUNTER — TELEPHONE (OUTPATIENT)
Dept: ENDOSCOPY | Facility: HOSPITAL | Age: 48
End: 2018-01-26

## 2018-02-19 ENCOUNTER — OFFICE VISIT (OUTPATIENT)
Dept: OBSTETRICS AND GYNECOLOGY | Facility: CLINIC | Age: 48
End: 2018-02-19
Attending: OBSTETRICS & GYNECOLOGY
Payer: COMMERCIAL

## 2018-02-19 ENCOUNTER — TELEPHONE (OUTPATIENT)
Dept: OBSTETRICS AND GYNECOLOGY | Facility: CLINIC | Age: 48
End: 2018-02-19

## 2018-02-19 VITALS
WEIGHT: 163.56 LBS | DIASTOLIC BLOOD PRESSURE: 80 MMHG | SYSTOLIC BLOOD PRESSURE: 122 MMHG | BODY MASS INDEX: 26.29 KG/M2 | HEIGHT: 66 IN

## 2018-02-19 DIAGNOSIS — Z12.31 ENCOUNTER FOR SCREENING MAMMOGRAM FOR BREAST CANCER: ICD-10-CM

## 2018-02-19 DIAGNOSIS — N39.3 STRESS INCONTINENCE OF URINE: ICD-10-CM

## 2018-02-19 DIAGNOSIS — Z01.419 VISIT FOR GYNECOLOGIC EXAMINATION: Primary | ICD-10-CM

## 2018-02-19 PROCEDURE — 99999 PR PBB SHADOW E&M-EST. PATIENT-LVL III: CPT | Mod: PBBFAC,,, | Performed by: OBSTETRICS & GYNECOLOGY

## 2018-02-19 PROCEDURE — 99396 PREV VISIT EST AGE 40-64: CPT | Mod: S$GLB,,, | Performed by: OBSTETRICS & GYNECOLOGY

## 2018-02-19 NOTE — PROGRESS NOTES
"CC: Well woman exam    Laz Quintero is a 47 y.o. female  presents for a well woman exam.      Reports urinary and fecal incontinence.  She has been diagnosed with back issues and surgery has been recommended.  She has nerve damage as a results.      Past Medical History:   Diagnosis Date    Abnormal Pap smear     Annular tear of lumbar disc, L5-S1. 2012    Chronic LBP     HPTH (hyperparathyroidism)     Menorrhagia     PONV (postoperative nausea and vomiting)     Right lumbar radiculopathy 2012    Seasonal allergies      Past Surgical History:   Procedure Laterality Date    CHOLECYSTECTOMY      COLONOSCOPY N/A 2018    Procedure: COLONOSCOPY;  Surgeon: Malachi Olmedo MD;  Location: [address]);  Service: Endoscopy;  Laterality: N/A;    HYSTERECTOMY      TUBAL LIGATION      Essure    WISDOM TOOTH EXTRACTION           Family History   Problem Relation Age of Onset    Diabetes Father     Diabetes Mother     Breast cancer Neg Hx     Ovarian cancer Neg Hx     Melanoma Neg Hx     Colon cancer Neg Hx      Social History   Substance Use Topics    Smoking status: Never Smoker    Smokeless tobacco: Never Used    Alcohol use No     OB History      Para Term  AB Living    5 5 3 0 0 2    SAB TAB Ectopic Multiple Live Births    0 0 0 0 2          /80 (BP Location: Left arm, Patient Position: Sitting, BP Method: Small (Manual))   Ht 5' 6" (1.676 m)   Wt 74.2 kg (163 lb 9.3 oz)   LMP 2015 (Exact Date)   BMI 26.40 kg/m²     ROS:  GENERAL: Denies weight gain or weight loss. Feeling well overall.   SKIN: Denies rash or lesions.   HEAD: Denies head injury or headache.   NODES: Denies enlarged lymph nodes.   CHEST: Denies chest pain or shortness of breath.   CARDIOVASCULAR: Denies palpitations or left sided chest pain.   ABDOMEN: No abdominal pain, constipation, diarrhea, nausea, vomiting or rectal bleeding.   URINARY: No frequency, dysuria, " hematuria, or burning on urination.  REPRODUCTIVE: See HPI.   BREASTS: The patient performs breast self-examination and denies pain, lumps, or nipple discharge.   HEMATOLOGIC: No easy bruisability or excessive bleeding.   MUSCULOSKELETAL: Denies joint pain or swelling.   NEUROLOGIC: Denies syncope or weakness.   PSYCHIATRIC: Denies depression, anxiety or mood swings.    PE:   APPEARANCE: Well nourished, well developed, in no acute distress.  AFFECT: WNL, alert and oriented x 3.  SKIN: No acne or hirsutism.  NECK: Neck symmetric without masses or thyromegaly.  NODES: No inguinal, cervical, axillary or femoral lymph node enlargement.  CHEST: Good respiratory effort.   ABDOMEN: Soft. No tenderness or masses. No hepatosplenomegaly. No hernias.  BREASTS: Symmetrical, no skin changes or visible lesions. No palpable masses, nipple discharge bilaterally.  PELVIC: Normal external female genitalia without lesions. Normal hair distribution. Adequate perineal body, normal urethral meatus. Vagina atrophic without lesions or discharge. No significant cystocele or rectocele. Bimanual exam shows uterus and cervix to be surgically absent. Adnexa without masses or tenderness.  RECTAL: Rectovaginal exam confirms above with normal sphincter tone, no masses.  EXTREMITIES: No edema.      ICD-10-CM ICD-9-CM    1. Visit for gynecologic examination Z01.419 V72.31    2. Stress incontinence of urine N39.3 [id number] Urine culture      Urinalysis   3. Encounter for screening mammogram for breast cancer Z12.31 V76.12 Mammo Digital Screening Bilat with CAD           Patient was counseled today on A.C.S. Pap guidelines and recommendations for yearly pelvic exams, mammograms and monthly self breast exams; to see her PCP for other health maintenance.     Discussed treatment options for bladder and fecal incontinence.  Recommended f/u after back surgery if symptoms have not resolved.      Follow-up in about 1 year (around 2/19/2019).

## 2018-02-19 NOTE — TELEPHONE ENCOUNTER
Attempted to contact the pt to inquire if she left a urine sample with the staff. No answer, unable to leave VM message for the pt to call the clinic back. Line busy.

## 2018-02-20 ENCOUNTER — TELEPHONE (OUTPATIENT)
Dept: OBSTETRICS AND GYNECOLOGY | Facility: CLINIC | Age: 48
End: 2018-02-20

## 2018-02-20 NOTE — TELEPHONE ENCOUNTER
Attempted to contact the pt to inform her that Dr. Ye wanted to send her urine to the lab to be culture for bacteria. No answer unable to leave VM message for the pt to call the clinic back line busy, home number is a fax machine.

## 2018-10-01 ENCOUNTER — PATIENT MESSAGE (OUTPATIENT)
Dept: PULMONOLOGY | Facility: CLINIC | Age: 48
End: 2018-10-01

## 2018-10-01 DIAGNOSIS — R91.1 LUNG NODULE: ICD-10-CM

## 2018-11-15 ENCOUNTER — HOSPITAL ENCOUNTER (OUTPATIENT)
Dept: RADIOLOGY | Facility: HOSPITAL | Age: 48
Discharge: HOME | End: 2018-11-15
Attending: INTERNAL MEDICINE
Payer: COMMERCIAL

## 2018-11-15 ENCOUNTER — HOSPITAL ENCOUNTER (OUTPATIENT)
Dept: RADIOLOGY | Facility: HOSPITAL | Age: 48
Discharge: HOME | End: 2018-11-15
Attending: OBSTETRICS & GYNECOLOGY
Payer: COMMERCIAL

## 2018-11-15 VITALS — BODY MASS INDEX: 26.2 KG/M2 | WEIGHT: 163 LBS | HEIGHT: 66 IN

## 2018-11-15 DIAGNOSIS — Z12.31 ENCOUNTER FOR SCREENING MAMMOGRAM FOR BREAST CANCER: ICD-10-CM

## 2018-11-15 DIAGNOSIS — R91.1 LUNG NODULE: ICD-10-CM

## 2018-11-15 PROCEDURE — 71250 CT THORAX DX C-: CPT | Mod: TC

## 2018-11-15 PROCEDURE — 77063 BREAST TOMOSYNTHESIS BI: CPT | Mod: TC

## 2018-11-15 PROCEDURE — 77067 SCR MAMMO BI INCL CAD: CPT | Mod: 26,,, | Performed by: RADIOLOGY

## 2018-11-15 PROCEDURE — 77063 BREAST TOMOSYNTHESIS BI: CPT | Mod: 26,,, | Performed by: RADIOLOGY

## 2018-11-15 PROCEDURE — 71250 CT THORAX DX C-: CPT | Mod: 26,,, | Performed by: RADIOLOGY

## 2018-12-05 ENCOUNTER — OFFICE VISIT (OUTPATIENT)
Dept: OBSTETRICS AND GYNECOLOGY | Facility: CLINIC | Age: 48
End: 2018-12-05
Attending: OBSTETRICS & GYNECOLOGY
Payer: COMMERCIAL

## 2018-12-05 VITALS
DIASTOLIC BLOOD PRESSURE: 80 MMHG | BODY MASS INDEX: 24.41 KG/M2 | WEIGHT: 151.88 LBS | HEIGHT: 66 IN | SYSTOLIC BLOOD PRESSURE: 140 MMHG

## 2018-12-05 DIAGNOSIS — R10.2 VULVOVAGINAL PAIN: Primary | ICD-10-CM

## 2018-12-05 DIAGNOSIS — L29.2 VULVAR ITCHING: ICD-10-CM

## 2018-12-05 DIAGNOSIS — N90.89 VULVAR IRRITATION: ICD-10-CM

## 2018-12-05 DIAGNOSIS — B37.31 CANDIDIASIS OF VULVA AND VAGINA: ICD-10-CM

## 2018-12-05 PROCEDURE — 99214 OFFICE O/P EST MOD 30 MIN: CPT | Mod: S$GLB,,, | Performed by: OBSTETRICS & GYNECOLOGY

## 2018-12-05 PROCEDURE — 87106 FUNGI IDENTIFICATION YEAST: CPT

## 2018-12-05 PROCEDURE — 87210 SMEAR WET MOUNT SALINE/INK: CPT | Mod: QW,S$GLB,, | Performed by: OBSTETRICS & GYNECOLOGY

## 2018-12-05 PROCEDURE — 87102 FUNGUS ISOLATION CULTURE: CPT

## 2018-12-05 PROCEDURE — 99999 PR PBB SHADOW E&M-EST. PATIENT-LVL III: CPT | Mod: PBBFAC,,, | Performed by: OBSTETRICS & GYNECOLOGY

## 2018-12-05 PROCEDURE — 3008F BODY MASS INDEX DOCD: CPT | Mod: CPTII,S$GLB,, | Performed by: OBSTETRICS & GYNECOLOGY

## 2018-12-05 RX ORDER — CLOBETASOL PROPIONATE 0.5 MG/G
OINTMENT TOPICAL
Qty: 15 G | Refills: 3 | Status: SHIPPED | OUTPATIENT
Start: 2018-12-05 | End: 2022-01-11

## 2018-12-05 RX ORDER — FLUCONAZOLE 200 MG/1
TABLET ORAL
Qty: 3 TABLET | Refills: 0 | Status: SHIPPED | OUTPATIENT
Start: 2018-12-05 | End: 2019-12-31

## 2018-12-05 NOTE — PROGRESS NOTES
Subjective:     Patient ID: Laz Quintero is a 48 y.o. female.     Chief Complaint: Vulvar Itch (irritation since Monday, increasing in intensity); Vaginal Discharge; and Vulva Burning     History of Present Illness: This patient is a 48 y.o. female, who presents to the GYN Vulva clinic for evaluation of vulvar itching and burning which developed after the patient had back surgery [id number].    Patient's last menstrual period was 06/07/2015 (exact date).    Review of Systems    GENERAL: No fever, chills, fatigability or weightchange  SKIN: No rashes, itching or changes in color or texture of skin.  HEAD: No headaches or recent head trauma.  EYES: Visual acuity fine. No photophobia,r diplopia.  EARS: Denies earache or vertigo  NOSE: No loss of smell, no epistaxis or postnasal drip.  MOUTH & THROAT: No hoarseness or change in voice.   NODES: Denies swollen glands.  CHEST: Denies MEMBRENO, cyanosis, wheezing, cough and sputum production.  CARDIOVASCULAR: Denies chest pain, PND, orthopnea or reduced exercise tolerance.  ABDOMEN: Appetite fine. No weight loss. bloating, Denies diarrhea, abdominal pain, hematemesis or blood in stool.  URINARY: No flank pain, dysuria or hematuria.  PERIPHERAL VASCULAR: No claudication or cyanosis.Varicosities  MUSCULOSKELETAL: No joint stiffness or swelling. Denies back pain.muscle aches  NEUROLOGIC: No history of seizures, paralysis, alteration of gait or coordination.       Objective:       Physical Exam     APPEARANCE: Well nourished, well developed, in no acute distress.    GENITOURINARY:  Vulva: No lesions. Normal female genital architecture.   Urethral Meatus: Normal size and location, no lesions, no prolapse.  Urethra: No masses, tenderness, prolapse or scarring.  Vagina: thin, atrophic and with decreased rugae, thick, white, creamy discharge noted, no significant cystocele or rectocele.  Cervix: Absent  Uterus:Absent  Adnexa: No masses, tenderness or CDS nodularity.  Anus Perineum:  No lesions, no relaxation, no external hemorrhoids.  Abdomen: No masses, tenderness, hernia or ascites, no hepatasplenomegaly  Skin: No rashes, lesions, ulcers, acne, hirsutism.  Peripheral/lower extremities: No edema, erythema or tenderness.  Lymphatic: No axillary, neck or groin nodes palp.  Mental Status: Alert, oriented x 3, normal affect and mood.          @PROCEDURE:@  Wet Prep:  pH = 4.4  -WBCS = occasional  -Lactobacilli = present  -BV = Amsel negative  -Candida = Hyphae multiple hyphae and budding yeast noted  -Trichomnas = none seen  -Cells- Basal and Parabasal, Superficial: Maturation:Superficial cells predominate dominate.  -Impression:  Candida vulvovaginitis  -Treatment:  Diflucan is a 1 every 3rd night for 3 doses and boric acid 600 mg strength capsules 1 a night for 14 days.  -[facility] p.r.n.         Assessment:      1. Vulvovaginal pain    2. Vulvar irritation    3. Vulvar itching               Plan:  1.  Candida culture of the vagina taken today.  2.  Diflucan 200 mg strength tablets dispensed 3 1 every 3rd day for 3 doses.  3.  Boric acid capsules 600 mg strength 1 per vagina nightly for 14 days patient informed that oral boric acid is poison.  4.  Clobetasol ointment apply twice a day for 1 week to 10 days.  5.  Return vulva clinic p.r.n.                    No orders of the defined types were placed in this encounter.

## 2019-01-07 LAB — FUNGUS SPEC CULT: NORMAL

## 2019-09-03 ENCOUNTER — PATIENT MESSAGE (OUTPATIENT)
Dept: GASTROENTEROLOGY | Facility: CLINIC | Age: 49
End: 2019-09-03

## 2019-09-03 ENCOUNTER — TELEPHONE (OUTPATIENT)
Dept: GASTROENTEROLOGY | Facility: CLINIC | Age: 49
End: 2019-09-03

## 2019-09-03 NOTE — TELEPHONE ENCOUNTER
MA attempted to contact patient, but she did not answer. Unable to leave voicemail.     Portal message sent to patient.

## 2019-09-03 NOTE — TELEPHONE ENCOUNTER
----- Message from Kati Hartley sent at 9/3/2019  3:22 PM CDT -----  Contact: self [phone]  Pt states can she come tomorrow states she replied in the email to come today for 2;30 but never was confirmed she states please call back to discuss

## 2019-09-04 ENCOUNTER — PATIENT MESSAGE (OUTPATIENT)
Dept: GASTROENTEROLOGY | Facility: CLINIC | Age: 49
End: 2019-09-04

## 2019-09-05 ENCOUNTER — LAB VISIT (OUTPATIENT)
Dept: LAB | Facility: HOSPITAL | Age: 49
End: 2019-09-05
Payer: COMMERCIAL

## 2019-09-05 ENCOUNTER — OFFICE VISIT (OUTPATIENT)
Dept: GASTROENTEROLOGY | Facility: CLINIC | Age: 49
End: 2019-09-05
Payer: COMMERCIAL

## 2019-09-05 ENCOUNTER — TELEPHONE (OUTPATIENT)
Dept: ENDOSCOPY | Facility: HOSPITAL | Age: 49
End: 2019-09-05

## 2019-09-05 VITALS
DIASTOLIC BLOOD PRESSURE: 78 MMHG | SYSTOLIC BLOOD PRESSURE: 133 MMHG | WEIGHT: 146.19 LBS | BODY MASS INDEX: 23.49 KG/M2 | HEART RATE: 73 BPM | HEIGHT: 66 IN

## 2019-09-05 DIAGNOSIS — R11.2 NAUSEA AND VOMITING, INTRACTABILITY OF VOMITING NOT SPECIFIED, UNSPECIFIED VOMITING TYPE: ICD-10-CM

## 2019-09-05 DIAGNOSIS — R10.9 ABDOMINAL PAIN, UNSPECIFIED ABDOMINAL LOCATION: Primary | ICD-10-CM

## 2019-09-05 DIAGNOSIS — R10.9 ABDOMINAL PAIN, UNSPECIFIED ABDOMINAL LOCATION: ICD-10-CM

## 2019-09-05 DIAGNOSIS — R10.813 RIGHT LOWER QUADRANT ABDOMINAL TENDERNESS WITHOUT REBOUND TENDERNESS: ICD-10-CM

## 2019-09-05 DIAGNOSIS — R63.4 WEIGHT LOSS: ICD-10-CM

## 2019-09-05 LAB
ALBUMIN SERPL BCP-MCNC: 4.2 G/DL (ref 3.5–5.2)
ALP SERPL-CCNC: 53 U/L (ref 55–135)
ALT SERPL W/O P-5'-P-CCNC: 11 U/L (ref 10–44)
ANION GAP SERPL CALC-SCNC: 8 MMOL/L (ref 8–16)
AST SERPL-CCNC: 20 U/L (ref 10–40)
BASOPHILS # BLD AUTO: 0.02 K/UL (ref 0–0.2)
BASOPHILS NFR BLD: 0.3 % (ref 0–1.9)
BILIRUB SERPL-MCNC: 0.7 MG/DL (ref 0.1–1)
BUN SERPL-MCNC: 8 MG/DL (ref 6–20)
CALCIUM SERPL-MCNC: 9.9 MG/DL (ref 8.7–10.5)
CHLORIDE SERPL-SCNC: 99 MMOL/L (ref 95–110)
CO2 SERPL-SCNC: 30 MMOL/L (ref 23–29)
CREAT SERPL-MCNC: 0.8 MG/DL (ref 0.5–1.4)
CRP SERPL-MCNC: 1.3 MG/L (ref 0–8.2)
DIFFERENTIAL METHOD: ABNORMAL
EOSINOPHIL # BLD AUTO: 0.1 K/UL (ref 0–0.5)
EOSINOPHIL NFR BLD: 0.9 % (ref 0–8)
ERYTHROCYTE [DISTWIDTH] IN BLOOD BY AUTOMATED COUNT: 12.8 % (ref 11.5–14.5)
EST. GFR  (AFRICAN AMERICAN): >60 ML/MIN/1.73 M^2
EST. GFR  (NON AFRICAN AMERICAN): >60 ML/MIN/1.73 M^2
GLUCOSE SERPL-MCNC: 87 MG/DL (ref 70–110)
HCT VFR BLD AUTO: 39.4 % (ref 37–48.5)
HGB BLD-MCNC: 12.5 G/DL (ref 12–16)
IMM GRANULOCYTES # BLD AUTO: 0.02 K/UL (ref 0–0.04)
IMM GRANULOCYTES NFR BLD AUTO: 0.3 % (ref 0–0.5)
LIPASE SERPL-CCNC: 8 U/L (ref 4–60)
LYMPHOCYTES # BLD AUTO: 2.6 K/UL (ref 1–4.8)
LYMPHOCYTES NFR BLD: 40.7 % (ref 18–48)
MCH RBC QN AUTO: 28.5 PG (ref 27–31)
MCHC RBC AUTO-ENTMCNC: 31.7 G/DL (ref 32–36)
MCV RBC AUTO: 90 FL (ref 82–98)
MONOCYTES # BLD AUTO: 0.4 K/UL (ref 0.3–1)
MONOCYTES NFR BLD: 6.6 % (ref 4–15)
NEUTROPHILS # BLD AUTO: 3.3 K/UL (ref 1.8–7.7)
NEUTROPHILS NFR BLD: 51.2 % (ref 38–73)
NRBC BLD-RTO: 0 /100 WBC
PLATELET # BLD AUTO: 256 K/UL (ref 150–350)
PMV BLD AUTO: 9.4 FL (ref 9.2–12.9)
POTASSIUM SERPL-SCNC: 3.7 MMOL/L (ref 3.5–5.1)
PROT SERPL-MCNC: 7.6 G/DL (ref 6–8.4)
RBC # BLD AUTO: 4.38 M/UL (ref 4–5.4)
SODIUM SERPL-SCNC: 137 MMOL/L (ref 136–145)
WBC # BLD AUTO: 6.48 K/UL (ref 3.9–12.7)

## 2019-09-05 PROCEDURE — 99999 PR PBB SHADOW E&M-EST. PATIENT-LVL III: CPT | Mod: PBBFAC,,, | Performed by: NURSE PRACTITIONER

## 2019-09-05 PROCEDURE — 3008F PR BODY MASS INDEX (BMI) DOCUMENTED: ICD-10-PCS | Mod: CPTII,S$GLB,, | Performed by: NURSE PRACTITIONER

## 2019-09-05 PROCEDURE — 86140 C-REACTIVE PROTEIN: CPT

## 2019-09-05 PROCEDURE — 83690 ASSAY OF LIPASE: CPT

## 2019-09-05 PROCEDURE — 36415 COLL VENOUS BLD VENIPUNCTURE: CPT

## 2019-09-05 PROCEDURE — 99214 OFFICE O/P EST MOD 30 MIN: CPT | Mod: S$GLB,,, | Performed by: NURSE PRACTITIONER

## 2019-09-05 PROCEDURE — 85025 COMPLETE CBC W/AUTO DIFF WBC: CPT

## 2019-09-05 PROCEDURE — 80053 COMPREHEN METABOLIC PANEL: CPT

## 2019-09-05 PROCEDURE — 99999 PR PBB SHADOW E&M-EST. PATIENT-LVL III: ICD-10-PCS | Mod: PBBFAC,,, | Performed by: NURSE PRACTITIONER

## 2019-09-05 PROCEDURE — 99214 PR OFFICE/OUTPT VISIT, EST, LEVL IV, 30-39 MIN: ICD-10-PCS | Mod: S$GLB,,, | Performed by: NURSE PRACTITIONER

## 2019-09-05 PROCEDURE — 3008F BODY MASS INDEX DOCD: CPT | Mod: CPTII,S$GLB,, | Performed by: NURSE PRACTITIONER

## 2019-09-05 NOTE — PROGRESS NOTES
[facility] Consultation Note    Reason for Consult:  The primary encounter diagnosis was Abdominal pain, unspecified abdominal location. Diagnoses of Weight loss, Nausea and vomiting, intractability of vomiting not specified, unspecified vomiting type, and Right lower quadrant abdominal tenderness without rebound tenderness were also pertinent to this visit.    PCP:   Charlotte Jacinto       Referring MD:  No referring provider defined for this encounter.    HPI:  This is a 48 y.o. female here for evaluation of a number of GI complaints. She is an established pt. Has not been seen in clinic,but had an EGD with Dr. Olmedo 1/201, she was found to be positive for H. Pylori and was treated with Biaxin triple therapy.     She reports her sx have been stable until about May 2019 in her symptoms returned.  They were manageable until about 2 weeks ago she had girls trip to [place] and reports she drank a lot of alcohol.  Since then she has had nausea and vomiting as well as some weight loss.  She did have some diarrhea but that resolved this past Sunday.  She has been having reflux daily.  Her weight loss has consisted of 16 lb since November 2018 and that has been unintentional.  She reports 1 episode of black stools.  No bright red blood per rectum. She started back on the Nexium recently with little to none improvement.        ROS:  Constitutional: No fevers, +chills, + weight loss  ENT: No allergies  CV: No chest pain  Pulm: No cough, No shortness of breath  GI: see HPI  : No dysuria, No hematuria    Medical History:  has a past medical history of Abnormal Pap smear, Annular tear of lumbar disc, L5-S1. (7/26/2012), Chronic LBP, HPTH (hyperparathyroidism), Menorrhagia, PONV (postoperative nausea and vomiting), Right lumbar radiculopathy (7/26/2012), and Seasonal allergies.    Surgical History:  has a past surgical history that includes [place] tooth extraction; Cholecystectomy; Tubal ligation;  Hysterectomy; and Colonoscopy (N/A, 1/19/2018).    Family History: family history includes Diabetes in her father and mother..     Social History:  reports that she has never smoked. She has never used smokeless tobacco. She reports that she does not drink alcohol or use drugs.    Review of patient's allergies indicates:   Allergen Reactions    Clindamycin     Sulfa dyne     Sulfamethoxazole-trimethoprim      Other reaction(s): Anaphylaxis    Dermabond [tissue glues] Rash       Current Outpatient Medications on File Prior to Visit   Medication Sig Dispense Refill    alprazolam (XANAX) 0.5 MG tablet Take 1 tablet (0.5 mg total) by mouth daily as needed for Anxiety. 20 tablet 1    clobetasol 0.05% (TEMOVATE) 0.05 % Oint Apply amount of 1/3 of pencil eraser twice daily for a week to 10 days . (Patient taking differently: Apply amount of 1/3 of pencil eraser twice daily for a week to 10 days .) 15 g 3    cyclobenzaprine (FLEXERIL) 10 MG tablet Take 0.5-1 tablets (5-10 mg total) by mouth 3 (three) times daily as needed for Muscle spasms. 90 tablet 2    diclofenac (VOLTAREN) 75 MG EC tablet       docusate sodium (COLACE) 100 MG capsule Take 1 capsule (100 mg total) by mouth 2 (two) times daily as needed for Constipation.  0    epinephrine (EPIPEN JR) 0.15 mg/0.3 mL (1:2,000) pen injection Inject 0.3 mLs (0.15 mg total) into the muscle as needed. 1 each 2    esomeprazole (NEXIUM) 40 MG capsule Take 1 capsule (40 mg total) by mouth once daily. 30 capsule 1    ferrous sulfate 325 mg (65 mg iron) Tab tablet Take 1 tablet (325 mg total) by mouth daily with breakfast. 30 tablet 1    fluticasone (FLONASE) 50 mcg/actuation nasal spray 2 sprays by Each Nare route once daily. (Patient taking differently: 2 sprays by Each Nostril route once daily. ) 16 g 0    gabapentin (NEURONTIN) 300 MG capsule Take 2-3 capsules (600-900 mg total) by mouth 3 (three) times daily. 210 capsule 2    triamcinolone acetonide 0.1%  "(KENALOG) 0.1 % cream Apply topically 2 (two) times daily. Cream Topical Twice a day .  disp:  60g tube AAA (Patient taking differently: Apply topically 2 (two) times daily. Cream Topical Twice a day .  disp:  60g tube AAA) 80 g 1    diazePAM (VALIUM) 2 MG tablet On call to MRI may repeat once 2 tablet 0    fluconazole (DIFLUCAN) 200 MG Tab Take one tablet by mouth every 3 days for 3 doses. 3 tablet 0     No current facility-administered medications on file prior to visit.          Objective Findings:    Vital Signs:  /78 (BP Location: Right arm)   Pulse 73   Ht 5' 6" (1.676 m)   Wt 66.3 kg (146 lb 2.6 oz)   LMP 06/07/2015 (Exact Date)   BMI 23.59 kg/m²   Body mass index is 23.59 kg/m².    Physical Exam:  General Appearance: Well appearing in no acute distress  Head:   Normocephalic, without obvious abnormality  Eyes:    No scleral icterus  ENT: Neck supple  Lungs: CTA bilaterally in anterior and posterior fields, no wheezes, no crackles.  Heart:  Regular rate and rhythm, S1, S2 normal, no murmurs heard  Abdomen: Soft, non distended with positive bowel sounds in all four quadrants. + moderate tenderness to RLQ  Extremities: No edema  Skin: No rash  Neurologic: AAO x 3      Labs:  Lab Results   Component Value Date    WBC 6.48 09/05/2019    HGB 12.5 09/05/2019    HCT 39.4 09/05/2019     09/05/2019    CRP 1.3 09/05/2019    CHOL 180 08/26/2017    TRIG 63 08/26/2017    HDL 66 08/26/2017    ALT 11 09/05/2019    AST 20 09/05/2019     09/05/2019    K 3.7 09/05/2019    CL 99 09/05/2019    CREATININE 0.8 09/05/2019    BUN 8 09/05/2019    CO2 30 (H) 09/05/2019    TSH 1.192 07/03/2014    INR 1.0 04/12/2015    HGBA1C 5.6 10/13/2015       Imaging:    Endoscopy:    EGD 1/2018 reviewed. HP pos    Colonoscopy 1/2018 Reviewed  - One 3 mm polyp at the hepatic flexure, removed                         with a cold snare. Resected and retrieved.                        - The examination was otherwise normal on " direct                         and retroflexion views.                        - The examined portion of the ileum was normal.    Assessment:    Ms. Quintero is a 48 y.o. BF with:    1. Abdominal pain, unspecified abdominal location    2. Weight loss    3. Nausea and vomiting, intractability of vomiting not specified, unspecified vomiting type    4. Right lower quadrant abdominal tenderness without rebound tenderness      Her unintentional wt loss is the most concerning feature. EGD ordered. Will r/o. H. Pylori with stool. She knows to hold her PPI therapy for at least a week before turning in. Will also get CT due to the wt loss and the abd tenderness.   Will get labs and GI pathogens panel to r/o infection    Recommendations:  1. labs  2. Stool  3. EGD  4. CT  5. May take PPI once stool is turned in.    Follow up in about 5 weeks (around 10/10/2019).      Order summary:  Orders Placed This Encounter    CT Abdomen Pelvis With Contrast    Gastrointestinal Pathogens Panel, PCR    H. pylori antigen, stool    CBC auto differential    Comprehensive metabolic panel    C-reactive protein    Lipase    Case request GI: EGD (ESOPHAGOGASTRODUODENOSCOPY)         Thank you so much for allowing me to participate in the care of Laz Quintero    RICKY Mcadams

## 2019-09-06 ENCOUNTER — ANESTHESIA EVENT (OUTPATIENT)
Dept: ENDOSCOPY | Facility: HOSPITAL | Age: 49
End: 2019-09-06
Payer: COMMERCIAL

## 2019-09-06 ENCOUNTER — ANESTHESIA (OUTPATIENT)
Dept: ENDOSCOPY | Facility: HOSPITAL | Age: 49
End: 2019-09-06
Payer: COMMERCIAL

## 2019-09-06 ENCOUNTER — HOSPITAL ENCOUNTER (OUTPATIENT)
Facility: HOSPITAL | Age: 49
Discharge: HOME | End: 2019-09-06
Attending: INTERNAL MEDICINE | Admitting: INTERNAL MEDICINE
Payer: COMMERCIAL

## 2019-09-06 VITALS
SYSTOLIC BLOOD PRESSURE: 115 MMHG | BODY MASS INDEX: 23.3 KG/M2 | HEIGHT: 66 IN | WEIGHT: 145 LBS | TEMPERATURE: 99 F | HEART RATE: 68 BPM | OXYGEN SATURATION: 100 % | RESPIRATION RATE: 18 BRPM | DIASTOLIC BLOOD PRESSURE: 68 MMHG

## 2019-09-06 DIAGNOSIS — R10.9 ABDOMINAL PAIN: ICD-10-CM

## 2019-09-06 DIAGNOSIS — R10.9 ABDOMINAL PAIN, UNSPECIFIED ABDOMINAL LOCATION: Primary | ICD-10-CM

## 2019-09-06 PROCEDURE — 88305 TISSUE EXAM BY PATHOLOGIST: CPT | Mod: 26,,, | Performed by: PATHOLOGY

## 2019-09-06 PROCEDURE — 88342 TISSUE SPECIMEN TO PATHOLOGY - SURGERY: ICD-10-PCS | Mod: 26,,, | Performed by: PATHOLOGY

## 2019-09-06 PROCEDURE — 88305 TISSUE EXAM BY PATHOLOGIST: CPT | Performed by: PATHOLOGY

## 2019-09-06 PROCEDURE — 88305 TISSUE SPECIMEN TO PATHOLOGY - SURGERY: ICD-10-PCS | Mod: 26,,, | Performed by: PATHOLOGY

## 2019-09-06 PROCEDURE — 43239 EGD BIOPSY SINGLE/MULTIPLE: CPT | Performed by: INTERNAL MEDICINE

## 2019-09-06 PROCEDURE — 43239 EGD BIOPSY SINGLE/MULTIPLE: CPT | Mod: ,,, | Performed by: INTERNAL MEDICINE

## 2019-09-06 PROCEDURE — E9220 PRA ENDO ANESTHESIA: ICD-10-PCS | Mod: ,,, | Performed by: NURSE ANESTHETIST, CERTIFIED REGISTERED

## 2019-09-06 PROCEDURE — 88342 IMHCHEM/IMCYTCHM 1ST ANTB: CPT | Mod: 26,,, | Performed by: PATHOLOGY

## 2019-09-06 PROCEDURE — 43239 PR EGD, FLEX, W/BIOPSY, SGL/MULTI: ICD-10-PCS | Mod: ,,, | Performed by: INTERNAL MEDICINE

## 2019-09-06 PROCEDURE — 88342 IMHCHEM/IMCYTCHM 1ST ANTB: CPT | Performed by: PATHOLOGY

## 2019-09-06 PROCEDURE — 27201012 HC FORCEPS, HOT/COLD, DISP: Performed by: INTERNAL MEDICINE

## 2019-09-06 PROCEDURE — 63600175 PHARM REV CODE 636 W HCPCS: Performed by: NURSE ANESTHETIST, CERTIFIED REGISTERED

## 2019-09-06 PROCEDURE — 37000008 HC ANESTHESIA 1ST 15 MINUTES: Performed by: INTERNAL MEDICINE

## 2019-09-06 PROCEDURE — 37000009 HC ANESTHESIA EA ADD 15 MINS: Performed by: INTERNAL MEDICINE

## 2019-09-06 PROCEDURE — E9220 PRA ENDO ANESTHESIA: HCPCS | Mod: ,,, | Performed by: NURSE ANESTHETIST, CERTIFIED REGISTERED

## 2019-09-06 RX ORDER — PROPOFOL 10 MG/ML
VIAL (ML) INTRAVENOUS
Status: DISCONTINUED | OUTPATIENT
Start: 2019-09-06 | End: 2019-09-06

## 2019-09-06 RX ORDER — LIDOCAINE HCL/PF 100 MG/5ML
SYRINGE (ML) INTRAVENOUS
Status: DISCONTINUED | OUTPATIENT
Start: 2019-09-06 | End: 2019-09-06

## 2019-09-06 RX ORDER — SODIUM CHLORIDE 9 MG/ML
INJECTION, SOLUTION INTRAVENOUS CONTINUOUS PRN
Status: DISCONTINUED | OUTPATIENT
Start: 2019-09-06 | End: 2019-09-06

## 2019-09-06 RX ORDER — SODIUM CHLORIDE 9 MG/ML
INJECTION, SOLUTION INTRAVENOUS CONTINUOUS
Status: DISCONTINUED | OUTPATIENT
Start: 2019-09-06 | End: 2019-09-06 | Stop reason: HOSPADM

## 2019-09-06 RX ORDER — PROPOFOL 10 MG/ML
VIAL (ML) INTRAVENOUS CONTINUOUS PRN
Status: DISCONTINUED | OUTPATIENT
Start: 2019-09-06 | End: 2019-09-06

## 2019-09-06 RX ADMIN — LIDOCAINE HYDROCHLORIDE 100 MG: 20 INJECTION, SOLUTION INTRAVENOUS at 03:09

## 2019-09-06 RX ADMIN — PROPOFOL 100 MG: 10 INJECTION, EMULSION INTRAVENOUS at 03:09

## 2019-09-06 RX ADMIN — PROPOFOL 200 MCG/KG/MIN: 10 INJECTION, EMULSION INTRAVENOUS at 03:09

## 2019-09-06 RX ADMIN — SODIUM CHLORIDE: 0.9 INJECTION, SOLUTION INTRAVENOUS at 02:09

## 2019-09-06 RX ADMIN — PROPOFOL 20 MG: 10 INJECTION, EMULSION INTRAVENOUS at 03:09

## 2019-09-06 NOTE — [employer] PATIENT INSTRUCTIONS
Discharge Summary/Instructions after an Endoscopic Procedure  Patient Name: Laz Quintero  Patient MRN: [id number]  Patient YOB: 1970 Friday, September 06, 2019  Jose Carlos Ventura MD  RESTRICTIONS:  During your procedure today, you received medications for sedation.  These   medications may affect your judgment, balance and coordination.  Therefore,   for 24 hours, you have the following restrictions:   - DO NOT drive a car, operate machinery, make legal/financial decisions,   sign important papers or drink alcohol.    ACTIVITY:  Today: no heavy lifting, straining or running due to procedural   sedation/anesthesia.  The following day: return to full activity including work.  DIET:  Eat and drink normally unless instructed otherwise.     TREATMENT FOR COMMON SIDE EFFECTS:  - Mild abdominal pain, nausea, belching, bloating or excessive gas:  rest,   eat lightly and use a heating pad.  - Sore Throat: treat with throat lozenges and/or gargle with warm salt   water.  - Because air was used during the procedure, expelling large amounts of air   from your rectum or belching is normal.  - If a bowel prep was taken, you may not have a bowel movement for 1-3 days.    This is normal.  SYMPTOMS TO WATCH FOR AND REPORT TO YOUR PHYSICIAN:  1. Abdominal pain or bloating, other than gas cramps.  2. Chest pain.  3. Back pain.  4. Signs of infection such as: chills or fever occurring within 24 hours   after the procedure.  5. Rectal bleeding, which would show as bright red, maroon, or black stools.   (A tablespoon of blood from the rectum is not serious, especially if   hemorrhoids are present.)  6. Vomiting.  7. Weakness or dizziness.  GO DIRECTLY TO THE NEAREST EMERGENCY ROOM IF YOU HAVE ANY OF THE FOLLOWING:      Difficulty breathing              Chills and/or fever over 101 F   Persistent vomiting and/or vomiting blood   Severe abdominal pain   Severe chest pain   Black, tarry stools   Bleeding- more than one  tablespoon   Any other symptom or condition that you feel may need urgent attention  Your doctor recommends these additional instructions:  If any biopsies were taken, your doctors clinic will contact you in 1 to 2   weeks with any results.  - Patient has a contact number available for emergencies.  The signs and   symptoms of potential delayed complications were discussed with the   patient.  Return to normal activities tomorrow.  Written discharge   instructions were provided to the patient.   - Discharge patient to home.   - Resume previous diet.   - Continue present medications.   - Await pathology results.   - Consider cross sectional imaging of the abdomen.  For questions, problems or results please call your physician - Jose Carlos Ventura MD at Work:  [phone].  [facility], EMERGENCY ROOM PHONE NUMBER: [phone]  IF A COMPLICATION OR EMERGENCY SITUATION ARISES AND YOU ARE UNABLE TO REACH   YOUR PHYSICIAN - GO DIRECTLY TO THE EMERGENCY ROOM.  Jose Carlos Ventura MD  9/6/2019 3:14:34 PM  This report has been verified and signed electronically.  [employer]

## 2019-09-06 NOTE — DISCHARGE INSTRUCTIONS

## 2019-09-06 NOTE — ANESTHESIA PREPROCEDURE EVALUATION
09/06/2019  Laz Quintero is a 48 y.o., female.    Past Medical History:   Diagnosis Date    Abnormal Pap smear     Annular tear of lumbar disc, L5-S1. 7/26/2012    Chronic LBP     HPTH (hyperparathyroidism)     Menorrhagia     PONV (postoperative nausea and vomiting)     Right lumbar radiculopathy 7/26/2012    Seasonal allergies      Patient Active Problem List   Diagnosis    Low back pain radiating to right leg    Seasonal allergies    HPTH (hyperparathyroidism)    Chronic LBP    Right lumbar radiculopathy    Anxiety and depression    S/P laparoscopic hysterectomy    Left lumbar radiculitis    Abdominal pain    Herniated nucleus pulposus, L5-S1, left    Chronic left-sided low back pain with sciatica     Past Surgical History:   Procedure Laterality Date    CHOLECYSTECTOMY      CHOLECYSTECTOMY-LAPAROSCOPIC N/A 12/22/2014    Per[facility] David [facility] Jr[facility] OR 2ND FLR    COLONOSCOPY N/A 1/19/2018    Performed by Malachi Olmedo MD at [facility] ENDO (4TH FLR)    CYSTOSCOPY N/A 6/23/2015    [facility] at [facility] OR    ESOPHAGOGASTRODUODENOSCOPY (EGD) N/A 1/19/2018    Performed by Malachi [facility] at [facility] ENDO (4TH FLR)    HYSTERECTOMY      HYSTERECTOMY-TOTAL LAPAROSCOPIC (TLH) N/A 6/23/2015    Performed by [facility] at [facility] OR    TUBAL LIGATION      [place]    WISDOM TOOTH EXTRACTION      2005         Anesthesia Evaluation    I have reviewed the Patient Summary Reports.     I have reviewed the Medications.     Review of Systems  Anesthesia Hx:  Hx of Anesthetic complications PONV Denies Family Hx of Anesthesia complications.   Denies Personal Hx of Anesthesia complications.   Hematology/Oncology:  Hematology Normal   Oncology Normal     EENT/Dental:EENT/Dental Normal   Cardiovascular:  Cardiovascular Normal      Pulmonary:  Pulmonary Normal    Renal/:  Renal/ Normal     Hepatic/GI:  Hepatic/GI Normal    Musculoskeletal:  Musculoskeletal Normal    Neurological:   Neuromuscular Disease,    Endocrine:  Endocrine Normal    Dermatological:  Skin Normal    Psych:  Psychiatric Normal           Physical Exam  General:  Well nourished    Airway/Jaw/Neck:  Airway Findings: Mouth Opening: Normal Tongue: Normal  General Airway Assessment: Adult  Mallampati: II  TM Distance: Normal, at least 6 cm  Jaw/Neck Findings:  Neck ROM: Normal ROM  Neck Findings: Normal     Dental:  Dental Findings: In tact   Chest/Lungs:  Chest/Lungs Clear    Heart/Vascular:  Heart Findings: Normal    Abdomen:  Abdomen Findings: Normal      Mental Status:  Mental Status Findings: Normal        Anesthesia Plan  Type of Anesthesia, risks & benefits discussed:  Anesthesia Type:  general  Patient's Preference:   Intra-op Monitoring Plan: standard ASA monitors  Intra-op Monitoring Plan Comments:   Post Op Pain Control Plan:   Post Op Pain Control Plan Comments:   Induction:   IV  Beta Blocker:  Patient is not currently on a Beta-Blocker (No further documentation required).       Informed Consent: Patient understands risks and agrees with Anesthesia plan.  Questions answered. Anesthesia consent signed with patient.  ASA Score: 2     Day of Surgery Review of History & Physical:    H&P update referred to the provider.         Ready For Surgery From Anesthesia Perspective.

## 2019-09-06 NOTE — ANESTHESIA POSTPROCEDURE EVALUATION
Anesthesia Post Evaluation    Patient: Laz Quintero    Procedure(s) Performed: Procedure(s) (LRB):  EGD (ESOPHAGOGASTRODUODENOSCOPY) (N/A)    Final Anesthesia Type: general  Patient location during evaluation: PACU  Patient participation: Yes- Able to Participate  Level of consciousness: awake and alert and oriented  Post-procedure vital signs: reviewed and stable  Pain management: adequate  Airway patency: patent  PONV status at discharge: No PONV  Anesthetic complications: no      Cardiovascular status: blood pressure returned to baseline and hemodynamically stable  Respiratory status: unassisted, room air and spontaneous ventilation  Hydration status: euvolemic  Follow-up not needed.          Vitals Value Taken Time   /73 9/6/2019  3:15 PM   Temp 37.1 °C (98.8 °F) 9/6/2019  3:15 PM   Pulse 110 9/6/2019  3:15 PM   Resp 18 9/6/2019  3:15 PM   SpO2 100 % 9/6/2019  3:15 PM         No case tracking events are documented in the log.      Pain/Maria Guadalupe Score: Maria Guadalupe Score: 9 (9/6/2019  3:15 PM)

## 2019-09-06 NOTE — H&P
Short Stay Endoscopy History and Physical    PCP - Charlotte Jacinto MD    Procedure - EGD  Sedation: GA  ASA - per anesthesia  Mallampati - per anesthesia  History of Anesthesia problems - no  Family history Anesthesia problems -  no     HPI:  This is a 48 y.o. female here for evaluation of : Upper abdominal pain    Reflux - no  Dysphagia - no  Abdominal pain - yes  Diarrhea - no    ROS:  Constitutional: No fevers, chills, No weight loss  ENT: No allergies  CV: No chest pain  Pulm: No cough, No shortness of breath  Ophtho: No vision changes  GI: see HPI  Medical History:  has a past medical history of Abnormal Pap smear, Annular tear of lumbar disc, L5-S1. (7/26/2012), Chronic LBP, HPTH (hyperparathyroidism), Menorrhagia, PONV (postoperative nausea and vomiting), Right lumbar radiculopathy (7/26/2012), and Seasonal allergies.    Surgical History:  has a past surgical history that includes [place] tooth extraction; Cholecystectomy; Tubal ligation; Hysterectomy; and Colonoscopy (N/A, 1/19/2018).    Family History: family history includes Diabetes in her father and mother.. Otherwise no colon cancer, inflammatory bowel disease, or GI malignancies.    Social History:  reports that she has never smoked. She has never used smokeless tobacco. She reports that she does not drink alcohol or use drugs.    Review of patient's allergies indicates:   Allergen Reactions    Clindamycin     Sulfa dyne     Sulfamethoxazole-trimethoprim      Other reaction(s): Anaphylaxis    Dermabond [tissue glues] Rash       Medications:   Medications Prior to Admission   Medication Sig Dispense Refill Last Dose    cyclobenzaprine (FLEXERIL) 10 MG tablet Take 0.5-1 tablets (5-10 mg total) by mouth 3 (three) times daily as needed for Muscle spasms. 90 tablet 2 Past Month at Unknown time    diazePAM (VALIUM) 2 MG tablet On call to MRI may repeat once 2 tablet 0 Past Month at Unknown time    diclofenac (VOLTAREN) 75 MG EC tablet    Past Month at  Unknown time    docusate sodium (COLACE) 100 MG capsule Take 1 capsule (100 mg total) by mouth 2 (two) times daily as needed for Constipation.  0 Past Month at Unknown time    epinephrine (EPIPEN JR) 0.15 mg/0.3 mL (1:2,000) pen injection Inject 0.3 mLs (0.15 mg total) into the muscle as needed. 1 each 2 Past Month at Unknown time    ferrous sulfate 325 mg (65 mg iron) Tab tablet Take 1 tablet (325 mg total) by mouth daily with breakfast. 30 tablet 1 Past Month at Unknown time    fluconazole (DIFLUCAN) 200 MG Tab Take one tablet by mouth every 3 days for 3 doses. 3 tablet 0 Past Month at Unknown time    fluticasone (FLONASE) 50 mcg/actuation nasal spray 2 sprays by Each Nare route once daily. (Patient taking differently: 2 sprays by Each Nostril route once daily. ) 16 g 0 Past Month at Unknown time    gabapentin (NEURONTIN) 300 MG capsule Take 2-3 capsules (600-900 mg total) by mouth 3 (three) times daily. 210 capsule 2 Past Month at Unknown time    triamcinolone acetonide 0.1% (KENALOG) 0.1 % cream Apply topically 2 (two) times daily. Cream Topical Twice a day .  disp:  60g tube AAA (Patient taking differently: Apply topically 2 (two) times daily. Cream Topical Twice a day .  disp:  60g tube AAA) 80 g 1 Past Month at Unknown time    alprazolam (XANAX) 0.5 MG tablet Take 1 tablet (0.5 mg total) by mouth daily as needed for Anxiety. 20 tablet 1 Taking    clobetasol 0.05% (TEMOVATE) 0.05 % Oint Apply amount of 1/3 of pencil eraser twice daily for a week to 10 days . (Patient taking differently: Apply amount of 1/3 of pencil eraser twice daily for a week to 10 days .) 15 g 3 Unknown at Unknown time    esomeprazole (NEXIUM) 40 MG capsule Take 1 capsule (40 mg total) by mouth once daily. 30 capsule 1 Taking       Objective Findings:    Vital Signs: Per nursing notes.    Physical Exam:  General Appearance: Well appearing in no acute distress  Head:   Normocephalic, without obvious abnormality  Eyes:    No  scleral icterus  Airway: Open  Neck: No restriction in mobility  Lungs: CTA bilaterally in anterior and posterior fields, no wheezes, no crackles.  Heart:  Regular rate and rhythm, S1, S2 normal, no murmurs heard  Abdomen: Soft, non tender, non distended      Labs:  Lab Results   Component Value Date    WBC 6.48 09/05/2019    HGB 12.5 09/05/2019    HCT 39.4 09/05/2019     09/05/2019    CHOL 180 08/26/2017    TRIG 63 08/26/2017    HDL 66 08/26/2017    ALT 11 09/05/2019    AST 20 09/05/2019     09/05/2019    K 3.7 09/05/2019    CL 99 09/05/2019    CREATININE 0.8 09/05/2019    BUN 8 09/05/2019    CO2 30 (H) 09/05/2019    TSH 1.192 07/03/2014    INR 1.0 04/12/2015    HGBA1C 5.6 10/13/2015         I have explained the risks and benefits of endoscopy procedures to the patient including but not limited to bleeding, perforation, infection, and death.    Thank you so much for allowing me to participate in the care of Laz Quintero    Jose Carlos Ventura MD

## 2019-09-06 NOTE — TRANSFER OF CARE
"Anesthesia Transfer of Care Note    Patient: Laz Quintero    Procedure(s) Performed: Procedure(s) (LRB):  EGD (ESOPHAGOGASTRODUODENOSCOPY) (N/A)    Patient location: PACU    Anesthesia Type: general    Transport from OR: Transported from OR on room air with adequate spontaneous ventilation    Post pain: adequate analgesia    Post assessment: no apparent anesthetic complications    Post vital signs: stable    Level of consciousness: sedated    Nausea/Vomiting: no nausea/vomiting    Complications: none    Transfer of care protocol was followed      Last vitals:   Visit Vitals  /73   Pulse 110   Temp 37.1 °C (98.8 °F)   Resp 18   Ht 5' 6" (1.676 m)   Wt 65.8 kg (145 lb)   LMP 06/07/2015 (Exact Date)   SpO2 100%   Breastfeeding? No   BMI 23.40 kg/m²     "

## 2019-09-09 ENCOUNTER — HOSPITAL ENCOUNTER (OUTPATIENT)
Dept: RADIOLOGY | Facility: OTHER | Age: 49
Discharge: HOME | End: 2019-09-09
Attending: NURSE PRACTITIONER
Payer: COMMERCIAL

## 2019-09-09 ENCOUNTER — TELEPHONE (OUTPATIENT)
Dept: GASTROENTEROLOGY | Facility: CLINIC | Age: 49
End: 2019-09-09

## 2019-09-09 DIAGNOSIS — R10.9 ABDOMINAL PAIN, UNSPECIFIED ABDOMINAL LOCATION: ICD-10-CM

## 2019-09-09 DIAGNOSIS — R10.813 RIGHT LOWER QUADRANT ABDOMINAL TENDERNESS WITHOUT REBOUND TENDERNESS: ICD-10-CM

## 2019-09-09 DIAGNOSIS — R11.2 NAUSEA AND VOMITING, INTRACTABILITY OF VOMITING NOT SPECIFIED, UNSPECIFIED VOMITING TYPE: ICD-10-CM

## 2019-09-09 DIAGNOSIS — R63.4 WEIGHT LOSS: ICD-10-CM

## 2019-09-09 PROCEDURE — 74177 CT ABDOMEN PELVIS WITH CONTRAST: ICD-10-PCS | Mod: 26,,, | Performed by: INTERNAL MEDICINE

## 2019-09-09 PROCEDURE — 74177 CT ABD & PELVIS W/CONTRAST: CPT | Mod: 26,,, | Performed by: INTERNAL MEDICINE

## 2019-09-09 PROCEDURE — 74177 CT ABD & PELVIS W/CONTRAST: CPT | Mod: TC

## 2019-09-09 PROCEDURE — 25500020 PHARM REV CODE 255: Performed by: NURSE PRACTITIONER

## 2019-09-09 RX ADMIN — IOHEXOL 1000 ML: 9 SOLUTION ORAL at 12:09

## 2019-09-09 RX ADMIN — IOHEXOL 75 ML: 350 INJECTION, SOLUTION INTRAVENOUS at 01:09

## 2019-09-09 NOTE — TELEPHONE ENCOUNTER
MA contacted pt to give test results per Julia. Pt did not answer, MA was unable to leave message on voicemail.        ----- Message from Julia Swenson NP sent at 9/6/2019  6:39 PM CDT -----  Labs are normal

## 2019-09-10 ENCOUNTER — PATIENT MESSAGE (OUTPATIENT)
Dept: GASTROENTEROLOGY | Facility: CLINIC | Age: 49
End: 2019-09-10

## 2019-09-10 ENCOUNTER — PATIENT MESSAGE (OUTPATIENT)
Dept: OBSTETRICS AND GYNECOLOGY | Facility: CLINIC | Age: 49
End: 2019-09-10

## 2019-09-13 ENCOUNTER — TELEPHONE (OUTPATIENT)
Dept: ENDOSCOPY | Facility: HOSPITAL | Age: 49
End: 2019-09-13

## 2019-09-16 ENCOUNTER — TELEPHONE (OUTPATIENT)
Dept: GASTROENTEROLOGY | Facility: CLINIC | Age: 49
End: 2019-09-16

## 2019-09-16 DIAGNOSIS — N83.201 CYST OF RIGHT OVARY: Primary | ICD-10-CM

## 2019-09-16 NOTE — TELEPHONE ENCOUNTER
----- Message from Jose Carlos Ventura MD sent at 9/16/2019  7:27 AM CDT -----  Please notify patient, the stomach biopsies did not reveal any H.pylori.

## 2019-09-17 ENCOUNTER — TELEPHONE (OUTPATIENT)
Dept: OBSTETRICS AND GYNECOLOGY | Facility: CLINIC | Age: 49
End: 2019-09-17

## 2019-09-17 NOTE — TELEPHONE ENCOUNTER
Contacted the patient to schedule pelvic US per Dr. Ye. No answer, unable to leave voicemail. Call unable to be completed.

## 2019-09-17 NOTE — TELEPHONE ENCOUNTER
----- Message from Iesha Ye MD sent at 9/16/2019  4:00 PM CDT -----  Please order pelvic ultrasound

## 2019-09-20 ENCOUNTER — LAB VISIT (OUTPATIENT)
Dept: LAB | Facility: HOSPITAL | Age: 49
End: 2019-09-20
Payer: COMMERCIAL

## 2019-09-20 DIAGNOSIS — R10.9 ABDOMINAL PAIN, UNSPECIFIED ABDOMINAL LOCATION: ICD-10-CM

## 2019-09-20 PROCEDURE — 87338 HPYLORI STOOL AG IA: CPT

## 2019-09-20 PROCEDURE — 87507 IADNA-DNA/RNA PROBE TQ 12-25: CPT

## 2019-09-24 ENCOUNTER — OFFICE VISIT (OUTPATIENT)
Dept: INTERNAL MEDICINE | Facility: CLINIC | Age: 49
End: 2019-09-24
Payer: COMMERCIAL

## 2019-09-24 DIAGNOSIS — Z13.89 SCREENING FOR MULTIPLE CONDITIONS: Primary | ICD-10-CM

## 2019-09-24 DIAGNOSIS — R91.1 LUNG NODULE: ICD-10-CM

## 2019-09-24 PROCEDURE — 99396 PR PREVENTIVE VISIT,EST,40-64: ICD-10-PCS | Mod: S$GLB,,, | Performed by: INTERNAL MEDICINE

## 2019-09-24 PROCEDURE — 99999 PR PBB SHADOW E&M-EST. PATIENT-LVL IV: ICD-10-PCS | Mod: PBBFAC,,, | Performed by: INTERNAL MEDICINE

## 2019-09-24 PROCEDURE — 99396 PREV VISIT EST AGE 40-64: CPT | Mod: S$GLB,,, | Performed by: INTERNAL MEDICINE

## 2019-09-24 PROCEDURE — 99999 PR PBB SHADOW E&M-EST. PATIENT-LVL IV: CPT | Mod: PBBFAC,,, | Performed by: INTERNAL MEDICINE

## 2019-09-24 RX ORDER — ALPRAZOLAM 0.5 MG/1
0.5 TABLET ORAL DAILY PRN
Qty: 20 TABLET | Refills: 1 | Status: SHIPPED | OUTPATIENT
Start: 2019-09-24 | End: 2022-01-11

## 2019-09-25 ENCOUNTER — TELEPHONE (OUTPATIENT)
Dept: GASTROENTEROLOGY | Facility: CLINIC | Age: 49
End: 2019-09-25

## 2019-09-25 DIAGNOSIS — R19.7 DIARRHEA, UNSPECIFIED TYPE: Primary | ICD-10-CM

## 2019-09-25 NOTE — TELEPHONE ENCOUNTER
"Received this message from the lab:    "Gastrointestinal Pathogens Panel PCR was cancelled on 09/25/2019 at 15:08 by [facility]; Laboratory accident occured while handling this specimen. Patient's account willl be credited. Please recollect."    New order placed. Pt needs to recollect.  "

## 2019-09-26 ENCOUNTER — TELEPHONE (OUTPATIENT)
Dept: GASTROENTEROLOGY | Facility: CLINIC | Age: 49
End: 2019-09-26

## 2019-09-26 LAB — H PYLORI AG STL QL IA: NOT DETECTED

## 2019-09-26 NOTE — TELEPHONE ENCOUNTER
MA contacted pt to give test results per Julia. Pt did not answer, Ma didn't leave message because voicemail box was not setup.        ----- Message from Julia Swenson NP sent at 9/26/2019  1:03 PM CDT -----  H. Pylori neg

## 2019-09-26 NOTE — TELEPHONE ENCOUNTER
MA contacted pt to let her know she needs to recollect her lab (stool collect) and why . Pt did not answer and also had no voicemail set up

## 2019-09-29 VITALS
HEIGHT: 66 IN | OXYGEN SATURATION: 99 % | BODY MASS INDEX: 23.38 KG/M2 | WEIGHT: 145.5 LBS | SYSTOLIC BLOOD PRESSURE: 126 MMHG | HEART RATE: 81 BPM | DIASTOLIC BLOOD PRESSURE: 78 MMHG

## 2019-09-30 NOTE — PROGRESS NOTES
Subjective:       Patient ID: Laz Quintero is a 49 y.o. female.    Chief Complaint: Annual Exam    HPI  She is here for annual exam.  Currently without complaint    Past medical history: Mediastinal mass, status post hysterectomy, hyperparathyroidism, status post cholecystectomy.  Positive family history of colon cancer-father, diagnosed at age 50.  She had a colonoscopy January 2018     Medications: None     ALLERGIES: Clindamycin, sulfa       Review of Systems   Constitutional: Negative for chills, fatigue, fever and unexpected weight change.   Respiratory: Negative for chest tightness and shortness of breath.    Cardiovascular: Negative for chest pain and palpitations.   Gastrointestinal: Negative for abdominal pain and blood in stool.   Neurological: Negative for dizziness, syncope, numbness and headaches.       Objective:      Physical Exam   HENT:   Right Ear: External ear normal.   Left Ear: External ear normal.   Nose: Nose normal.   Mouth/Throat: Oropharynx is clear and moist.   Eyes: Pupils are equal, round, and reactive to light.   Neck: Normal range of motion.   Cardiovascular: Normal rate and regular rhythm.   No murmur heard.  Pulmonary/Chest: Breath sounds normal.   Abdominal: She exhibits no distension. There is no hepatosplenomegaly. There is no tenderness.   Lymphadenopathy:     She has no cervical adenopathy.     She has no axillary adenopathy.   Neurological: She has normal strength and normal reflexes. No cranial nerve deficit or sensory deficit.       Assessment/Plan       Assessment and plan:  Annual exam.  Check lipid panel, PTH, vitamin-D.  Schedule CT scan of chest

## 2019-10-08 ENCOUNTER — HOSPITAL ENCOUNTER (OUTPATIENT)
Dept: RADIOLOGY | Facility: OTHER | Age: 49
Discharge: HOME | End: 2019-10-08
Attending: INTERNAL MEDICINE
Payer: COMMERCIAL

## 2019-10-08 ENCOUNTER — PATIENT OUTREACH (OUTPATIENT)
Dept: ADMINISTRATIVE | Facility: OTHER | Age: 49
End: 2019-10-08

## 2019-10-08 DIAGNOSIS — R91.1 LUNG NODULE: ICD-10-CM

## 2019-10-08 PROCEDURE — 71250 CT CHEST WITHOUT CONTRAST: ICD-10-PCS | Mod: 26,,, | Performed by: RADIOLOGY

## 2019-10-08 PROCEDURE — 71250 CT THORAX DX C-: CPT | Mod: 26,,, | Performed by: RADIOLOGY

## 2019-10-08 PROCEDURE — 71250 CT THORAX DX C-: CPT | Mod: TC

## 2019-10-10 ENCOUNTER — OFFICE VISIT (OUTPATIENT)
Dept: GASTROENTEROLOGY | Facility: CLINIC | Age: 49
End: 2019-10-10
Payer: COMMERCIAL

## 2019-10-10 ENCOUNTER — PATIENT MESSAGE (OUTPATIENT)
Dept: INTERNAL MEDICINE | Facility: CLINIC | Age: 49
End: 2019-10-10

## 2019-10-10 VITALS
HEIGHT: 66 IN | BODY MASS INDEX: 23.77 KG/M2 | HEART RATE: 64 BPM | SYSTOLIC BLOOD PRESSURE: 129 MMHG | DIASTOLIC BLOOD PRESSURE: 77 MMHG | WEIGHT: 147.94 LBS

## 2019-10-10 DIAGNOSIS — R10.9 ABDOMINAL PAIN, UNSPECIFIED ABDOMINAL LOCATION: ICD-10-CM

## 2019-10-10 DIAGNOSIS — K21.9 GASTROESOPHAGEAL REFLUX DISEASE WITHOUT ESOPHAGITIS: Primary | ICD-10-CM

## 2019-10-10 DIAGNOSIS — Z11.3 SCREEN FOR STD (SEXUALLY TRANSMITTED DISEASE): Primary | ICD-10-CM

## 2019-10-10 DIAGNOSIS — R63.4 WEIGHT LOSS: ICD-10-CM

## 2019-10-10 PROCEDURE — 99214 OFFICE O/P EST MOD 30 MIN: CPT | Mod: S$GLB,,, | Performed by: NURSE PRACTITIONER

## 2019-10-10 PROCEDURE — 3008F BODY MASS INDEX DOCD: CPT | Mod: CPTII,S$GLB,, | Performed by: NURSE PRACTITIONER

## 2019-10-10 PROCEDURE — 3008F PR BODY MASS INDEX (BMI) DOCUMENTED: ICD-10-PCS | Mod: CPTII,S$GLB,, | Performed by: NURSE PRACTITIONER

## 2019-10-10 PROCEDURE — 99214 PR OFFICE/OUTPT VISIT, EST, LEVL IV, 30-39 MIN: ICD-10-PCS | Mod: S$GLB,,, | Performed by: NURSE PRACTITIONER

## 2019-10-10 PROCEDURE — 99999 PR PBB SHADOW E&M-EST. PATIENT-LVL III: ICD-10-PCS | Mod: PBBFAC,,, | Performed by: NURSE PRACTITIONER

## 2019-10-10 PROCEDURE — 99999 PR PBB SHADOW E&M-EST. PATIENT-LVL III: CPT | Mod: PBBFAC,,, | Performed by: NURSE PRACTITIONER

## 2019-10-10 RX ORDER — ESOMEPRAZOLE MAGNESIUM 40 MG/1
40 CAPSULE, DELAYED RELEASE ORAL
Qty: 90 CAPSULE | Refills: 1 | Status: SHIPPED | OUTPATIENT
Start: 2019-10-10 | End: 2020-04-07

## 2019-10-10 NOTE — PROGRESS NOTES
[facility] Consultation Note    Reason for Consult:  The primary encounter diagnosis was Gastroesophageal reflux disease without esophagitis. Diagnoses of Abdominal pain, unspecified abdominal location and Weight loss were also pertinent to this visit.    PCP:   Charlotte Jacinto       Referring MD:  No referring provider defined for this encounter.    HPI:  This is a 49 y.o. female here for evaluation of a number of GI complaints. She is an established pt. Has not been seen in clinic,but had an EGD with Dr. Olmedo 1/201, she was found to be positive for H. Pylori and was treated with Biaxin triple therapy.     She reports her sx have been stable until about May 2019 in her symptoms returned.  They were manageable until about 2 weeks ago she had girls trip to [place] and reports she drank a lot of alcohol.  Since then she has had nausea and vomiting as well as some weight loss.  She did have some diarrhea but that resolved this past Sunday.  She has been having reflux daily.  Her weight loss has consisted of 16 lb since November 2018 and that has been unintentional.  She reports 1 episode of black stools.  No bright red blood per rectum. She started back on the Nexium recently with little to none improvement.    Today 10/10/19  Changed diet. No spicy or fried.  Pain not as severe.  Reflux - Rice diet. Not taking Nexium due to h.pylori, never resumed.     ROS:  Constitutional: No fevers, +chills, + weight loss  ENT: No allergies  CV: No chest pain  Pulm: No cough, No shortness of breath  GI: see HPI  : No dysuria, No hematuria    Medical History:  has a past medical history of Abnormal Pap smear, Annular tear of lumbar disc, L5-S1. (7/26/2012), Chronic LBP, HPTH (hyperparathyroidism), Menorrhagia, PONV (postoperative nausea and vomiting), Right lumbar radiculopathy (7/26/2012), and Seasonal allergies.    Surgical History:  has a past surgical history that includes [place] tooth extraction;  Cholecystectomy; Tubal ligation; Hysterectomy; Colonoscopy (N/A, 1/19/2018); and Esophagogastroduodenoscopy (N/A, 9/6/2019).    Family History: family history includes Diabetes in her father and mother..     Social History:  reports that she has never smoked. She has never used smokeless tobacco. She reports that she does not drink alcohol or use drugs.    Review of patient's allergies indicates:   Allergen Reactions    Clindamycin     Sulfa dyne     Sulfamethoxazole-trimethoprim      Other reaction(s): Anaphylaxis    Dermabond [tissue glues] Rash       Current Outpatient Medications on File Prior to Visit   Medication Sig Dispense Refill    ALPRAZolam (XANAX) 0.5 MG tablet Take 1 tablet (0.5 mg total) by mouth daily as needed for Anxiety. 20 tablet 1    clobetasol 0.05% (TEMOVATE) 0.05 % Oint Apply amount of 1/3 of pencil eraser twice daily for a week to 10 days . (Patient taking differently: Apply amount of 1/3 of pencil eraser twice daily for a week to 10 days .) 15 g 3    cyclobenzaprine (FLEXERIL) 10 MG tablet Take 0.5-1 tablets (5-10 mg total) by mouth 3 (three) times daily as needed for Muscle spasms. 90 tablet 2    diclofenac (VOLTAREN) 75 MG EC tablet       docusate sodium (COLACE) 100 MG capsule Take 1 capsule (100 mg total) by mouth 2 (two) times daily as needed for Constipation.  0    epinephrine (EPIPEN JR) 0.15 mg/0.3 mL (1:2,000) pen injection Inject 0.3 mLs (0.15 mg total) into the muscle as needed. 1 each 2    fluconazole (DIFLUCAN) 200 MG Tab Take one tablet by mouth every 3 days for 3 doses. 3 tablet 0    fluticasone (FLONASE) 50 mcg/actuation nasal spray 2 sprays by Each Nare route once daily. (Patient taking differently: 2 sprays by Each Nostril route once daily. ) 16 g 0    gabapentin (NEURONTIN) 300 MG capsule Take 2-3 capsules (600-900 mg total) by mouth 3 (three) times daily. 210 capsule 2    triamcinolone acetonide 0.1% (KENALOG) 0.1 % cream Apply topically 2 (two) times  "daily. Cream Topical Twice a day .  disp:  60g tube AAA (Patient taking differently: Apply topically 2 (two) times daily. Cream Topical Twice a day .  disp:  60g tube AAA) 80 g 1    ferrous sulfate 325 mg (65 mg iron) Tab tablet Take 1 tablet (325 mg total) by mouth daily with breakfast. 30 tablet 1     No current facility-administered medications on file prior to visit.          Objective Findings:    Vital Signs:  /77   Pulse 64   Ht 5' 6" (1.676 m)   Wt 67.1 kg (147 lb 14.9 oz)   LMP 2015 (Exact Date)   BMI 23.88 kg/m²   Body mass index is 23.88 kg/m².    Physical Exam:  General Appearance: Well appearing in no acute distress  Head:   Normocephalic, without obvious abnormality  Eyes:    No scleral icterus  ENT: Neck supple  Extremities: No edema  Skin: No rash  Neurologic: AAO x 3      Labs:  Lab Results   Component Value Date    WBC 6.48 2019    HGB 12.5 2019    HCT 39.4 2019     2019    CRP 1.3 2019    CHOL 180 2017    TRIG 63 2017    HDL 66 2017    ALT 11 2019    AST 20 2019     2019    K 3.7 2019    CL 99 2019    CREATININE 0.8 2019    BUN 8 2019    CO2 30 (H) 2019    TSH 1.192 2014    INR 1.0 2015    HGBA1C 5.6 10/13/2015       Imagin2019 CT abd pelvis  Cholecystectomy and hysterectomy.    1.7 cm hypodensity within the right ovary.  This can be correlated with ultrasound of the pelvis as warranted.    Left renal cysts.    Endoscopy:    EGD 2019 Normal. Bx neg for HP.    EGD 2018 reviewed. HP pos    Colonoscopy 2018 Reviewed  - One 3 mm polyp at the hepatic flexure, removed                         with a cold snare. Resected and retrieved.                        - The examination was otherwise normal on direct                         and retroflexion views.                        - The examined portion of the ileum was normal.    Assessment:    Ms. Quintero is a " 48 y.o. BF with:    1. Gastroesophageal reflux disease without esophagitis    2. Abdominal pain, unspecified abdominal location    3. Weight loss      Her symptoms have resolved. Has gained weight. Likely she had a viral gastroenteritis. She is going to f/u PRN.     Recommendations:  1. Nexium PRN  2. GERD diet    Follow up if symptoms worsen or fail to improve.    25 MINUTES TOTAL FACE TO FACE >50% SPENT IN COUNSELING AND COORDINATION OF CARE     Order summary:  Orders Placed This Encounter    esomeprazole (NEXIUM) 40 MG capsule         Thank you so much for allowing me to participate in the care of Laz Swenson, MEIRP-C

## 2019-10-17 ENCOUNTER — PATIENT OUTREACH (OUTPATIENT)
Dept: ADMINISTRATIVE | Facility: OTHER | Age: 49
End: 2019-10-17

## 2019-10-21 ENCOUNTER — OFFICE VISIT (OUTPATIENT)
Dept: OBSTETRICS AND GYNECOLOGY | Facility: CLINIC | Age: 49
End: 2019-10-21
Attending: OBSTETRICS & GYNECOLOGY
Payer: COMMERCIAL

## 2019-10-21 VITALS
HEIGHT: 66 IN | BODY MASS INDEX: 23.95 KG/M2 | DIASTOLIC BLOOD PRESSURE: 70 MMHG | SYSTOLIC BLOOD PRESSURE: 114 MMHG | WEIGHT: 149.06 LBS

## 2019-10-21 DIAGNOSIS — Z12.31 ENCOUNTER FOR SCREENING MAMMOGRAM FOR BREAST CANCER: ICD-10-CM

## 2019-10-21 DIAGNOSIS — Z01.419 VISIT FOR GYNECOLOGIC EXAMINATION: Primary | ICD-10-CM

## 2019-10-21 PROCEDURE — 99999 PR PBB SHADOW E&M-EST. PATIENT-LVL III: CPT | Mod: PBBFAC,,, | Performed by: OBSTETRICS & GYNECOLOGY

## 2019-10-21 PROCEDURE — 99396 PREV VISIT EST AGE 40-64: CPT | Mod: S$GLB,,, | Performed by: OBSTETRICS & GYNECOLOGY

## 2019-10-21 PROCEDURE — 99999 PR PBB SHADOW E&M-EST. PATIENT-LVL III: ICD-10-PCS | Mod: PBBFAC,,, | Performed by: OBSTETRICS & GYNECOLOGY

## 2019-10-21 PROCEDURE — 99396 PR PREVENTIVE VISIT,EST,40-64: ICD-10-PCS | Mod: S$GLB,,, | Performed by: OBSTETRICS & GYNECOLOGY

## 2019-10-21 NOTE — PROGRESS NOTES
CC: Well woman exam    Laz Quintero is a 49 y.o. female  presents for a well woman exam.      Reports RLQ pain x 3 months.  She says it starts in the belly button and radiates to the RLQ.  Pain is similar to when she needed to have her gall bladder removed.  Nothing makes the pain better or worse.  She is having nausea, no vomiting.  She has had weight loss.  Has seen GI and w/u was negative.  She is on Nexium for GERD.  She denies diarrhea, no constipation.      CT Scan:    FINDINGS:  Lung bases demonstrate no abnormality.  The liver is mildly enlarged.  Liver and spleen demonstrate no focal abnormality.  Stomach, pancreas, adrenal glands are within normal limits.  Gallbladder is not identified.  There is no common ductal dilatation.    The aorta tapers normally with no adjacent lymphadenopathy.  There is no pelvic lymphadenopathy.    Bladder is within normal limits.  There is a small amount of free fluid within the pelvis, physiologic.  Uterus is absent.  There is a 1.7 cm hypodensity within the right ovary, can be correlated with ultrasound as warranted.    There is stool throughout the colon.  There is no bowel distension or inflammatory change about the bowel.  The appendix is within normal limits.  The osseous structures show no abnormality.  Kidneys concentrate contrast satisfactorily.  There is a subcentimeter hypodensity within the left kidney, too small to characterize but likely a cyst.  There is a larger peripelvic cyst.      Impression       Cholecystectomy and hysterectomy.    1.7 cm hypodensity within the right ovary.  This can be correlated with ultrasound of the pelvis as warranted.    Left renal cysts.         Past Medical History:   Diagnosis Date    Abnormal Pap smear     Abnormal Pap smear of cervix     Annular tear of lumbar disc, L5-S1. 2012    Chronic LBP     HPTH (hyperparathyroidism)     Menorrhagia     PONV (postoperative nausea and vomiting)     Right lumbar  "radiculopathy 2012    Seasonal allergies      Past Surgical History:   Procedure Laterality Date    CHOLECYSTECTOMY      COLONOSCOPY N/A 2018    Procedure: COLONOSCOPY;  Surgeon: Malachi Olmedo MD;  [facility]  Service: Endoscopy;  Laterality: N/A;    ESOPHAGOGASTRODUODENOSCOPY N/A 2019    Procedure: EGD (ESOPHAGOGASTRODUODENOSCOPY);  Surgeon: Jose Carlos Ventura MD;  [facility]  Service: Endoscopy;  Laterality: N/A;  pt requesting ASAP    HYSTERECTOMY      TUBAL LIGATION      Essure    WISDOM TOOTH EXTRACTION           Family History   Problem Relation Age of Onset    Diabetes Father     Diabetes Mother     Breast cancer Neg Hx     Ovarian cancer Neg Hx     Melanoma Neg Hx     Colon cancer Neg Hx      Social History     Tobacco Use    Smoking status: Never Smoker    Smokeless tobacco: Never Used   Substance Use Topics    Alcohol use: No    Drug use: No     OB History        3    Para   2    Term   2       0    AB   1    Living   2       SAB   0    TAB   1    Ectopic   0    Multiple   0    Live Births   2                 /70 (BP Location: Left arm, Patient Position: Sitting)   Ht 5' 6" (1.676 m)   Wt 67.6 kg (149 lb 0.5 oz)   LMP 2015 (Exact Date)   BMI 24.05 kg/m²     ROS:  GENERAL: Denies weight gain or weight loss. Feeling well overall.   SKIN: Denies rash or lesions.   HEAD: Denies head injury or headache.   NODES: Denies enlarged lymph nodes.   CHEST: Denies chest pain or shortness of breath.   CARDIOVASCULAR: Denies palpitations or left sided chest pain.   ABDOMEN: Reports abdominal pain, constipation, diarrhea, reports nausea, no vomiting or rectal bleeding.   URINARY: No frequency, dysuria, hematuria, or burning on urination.  REPRODUCTIVE: See HPI.   BREASTS: The patient performs breast self-examination and denies pain, lumps, or nipple discharge.   HEMATOLOGIC: No easy bruisability or excessive " bleeding.   MUSCULOSKELETAL: Denies joint pain or swelling.   NEUROLOGIC: Denies syncope or weakness.   PSYCHIATRIC: Denies depression, anxiety or mood swings.    PE:   APPEARANCE: Well nourished, well developed, in no acute distress.  AFFECT: WNL, alert and oriented x 3.  SKIN: No acne or hirsutism.  NECK: Neck symmetric without masses or thyromegaly.  NODES: No inguinal, cervical, axillary or femoral lymph node enlargement.  CHEST: Good respiratory effort.   ABDOMEN: Soft. No tenderness or masses. No hepatosplenomegaly. No hernias.  BREASTS: Symmetrical, no skin changes or visible lesions. No palpable masses, nipple discharge bilaterally.  PELVIC: Normal external female genitalia without lesions. Normal hair distribution. Adequate perineal body, normal urethral meatus. Vagina atrophic without lesions or discharge. No significant cystocele or rectocele. Bimanual exam shows uterus and cervix to be surgically absent. Adnexa without masses or tenderness.  EXTREMITIES: No edema.      ICD-10-CM ICD-9-CM    1. Visit for gynecologic examination Z01.419 V72.31    2. Encounter for screening mammogram for breast cancer Z12.31 V76.12 Mammo Digital Screening Bilat           Patient was counseled today on A.C.S. Pap guidelines and recommendations for yearly pelvic exams, mammograms and monthly self breast exams; to see her PCP for other health maintenance.     Source of pain is unclear.  Ultrasound scheduled. Will email with results.      Follow up in about 1 year (around 10/21/2020).

## 2019-10-22 ENCOUNTER — HOSPITAL ENCOUNTER (OUTPATIENT)
Dept: RADIOLOGY | Facility: OTHER | Age: 49
Discharge: HOME | End: 2019-10-22
Attending: OBSTETRICS & GYNECOLOGY
Payer: COMMERCIAL

## 2019-10-22 DIAGNOSIS — N83.201 CYST OF RIGHT OVARY: ICD-10-CM

## 2019-10-22 PROCEDURE — 76856 US EXAM PELVIC COMPLETE: CPT | Mod: 26,,, | Performed by: RADIOLOGY

## 2019-10-22 PROCEDURE — 76830 US PELVIS COMP WITH TRANSVAG NON-OB (XPD): ICD-10-PCS | Mod: 26,,, | Performed by: RADIOLOGY

## 2019-10-22 PROCEDURE — 76856 US EXAM PELVIC COMPLETE: CPT | Mod: TC

## 2019-10-22 PROCEDURE — 76856 US PELVIS COMP WITH TRANSVAG NON-OB (XPD): ICD-10-PCS | Mod: 26,,, | Performed by: RADIOLOGY

## 2019-10-22 PROCEDURE — 76830 TRANSVAGINAL US NON-OB: CPT | Mod: 26,,, | Performed by: RADIOLOGY

## 2019-11-02 ENCOUNTER — TELEPHONE (OUTPATIENT)
Dept: INTERNAL MEDICINE | Facility: CLINIC | Age: 49
End: 2019-11-02

## 2019-11-22 ENCOUNTER — HOSPITAL ENCOUNTER (OUTPATIENT)
Dept: RADIOLOGY | Facility: HOSPITAL | Age: 49
Discharge: HOME | End: 2019-11-22
Attending: OBSTETRICS & GYNECOLOGY
Payer: COMMERCIAL

## 2019-11-22 DIAGNOSIS — Z12.31 ENCOUNTER FOR SCREENING MAMMOGRAM FOR BREAST CANCER: ICD-10-CM

## 2019-11-22 PROCEDURE — 77067 SCR MAMMO BI INCL CAD: CPT | Mod: 26,,, | Performed by: RADIOLOGY

## 2019-11-22 PROCEDURE — 77063 BREAST TOMOSYNTHESIS BI: CPT | Mod: 26,,, | Performed by: RADIOLOGY

## 2019-11-22 PROCEDURE — 77067 SCR MAMMO BI INCL CAD: CPT | Mod: TC

## 2019-11-22 PROCEDURE — 77067 MAMMO DIGITAL SCREENING BILAT WITH TOMOSYNTHESIS_CAD: ICD-10-PCS | Mod: 26,,, | Performed by: RADIOLOGY

## 2019-11-22 PROCEDURE — 77063 MAMMO DIGITAL SCREENING BILAT WITH TOMOSYNTHESIS_CAD: ICD-10-PCS | Mod: 26,,, | Performed by: RADIOLOGY

## 2019-12-18 ENCOUNTER — IMMUNIZATION (OUTPATIENT)
Dept: PHARMACY | Facility: CLINIC | Age: 49
End: 2019-12-18
Payer: COMMERCIAL

## 2019-12-31 ENCOUNTER — OFFICE VISIT (OUTPATIENT)
Dept: INTERNAL MEDICINE | Facility: CLINIC | Age: 49
End: 2019-12-31
Payer: COMMERCIAL

## 2019-12-31 VITALS
OXYGEN SATURATION: 99 % | WEIGHT: 149.94 LBS | SYSTOLIC BLOOD PRESSURE: 138 MMHG | TEMPERATURE: 99 F | HEART RATE: 114 BPM | BODY MASS INDEX: 24.1 KG/M2 | DIASTOLIC BLOOD PRESSURE: 86 MMHG | HEIGHT: 66 IN

## 2019-12-31 DIAGNOSIS — B96.89 ACUTE BACTERIAL BRONCHITIS: Primary | ICD-10-CM

## 2019-12-31 DIAGNOSIS — R09.82 POSTNASAL DRIP: ICD-10-CM

## 2019-12-31 DIAGNOSIS — J30.89 ALLERGIC RHINITIS DUE TO OTHER ALLERGIC TRIGGER, UNSPECIFIED SEASONALITY: ICD-10-CM

## 2019-12-31 DIAGNOSIS — J20.8 ACUTE BACTERIAL BRONCHITIS: Primary | ICD-10-CM

## 2019-12-31 DIAGNOSIS — R05.9 COUGHING: ICD-10-CM

## 2019-12-31 PROCEDURE — 3008F BODY MASS INDEX DOCD: CPT | Mod: CPTII,S$GLB,, | Performed by: NURSE PRACTITIONER

## 2019-12-31 PROCEDURE — 99214 PR OFFICE/OUTPT VISIT, EST, LEVL IV, 30-39 MIN: ICD-10-PCS | Mod: S$GLB,,, | Performed by: NURSE PRACTITIONER

## 2019-12-31 PROCEDURE — 99999 PR PBB SHADOW E&M-EST. PATIENT-LVL III: ICD-10-PCS | Mod: PBBFAC,,, | Performed by: NURSE PRACTITIONER

## 2019-12-31 PROCEDURE — 3008F PR BODY MASS INDEX (BMI) DOCUMENTED: ICD-10-PCS | Mod: CPTII,S$GLB,, | Performed by: NURSE PRACTITIONER

## 2019-12-31 PROCEDURE — 99214 OFFICE O/P EST MOD 30 MIN: CPT | Mod: S$GLB,,, | Performed by: NURSE PRACTITIONER

## 2019-12-31 PROCEDURE — 99999 PR PBB SHADOW E&M-EST. PATIENT-LVL III: CPT | Mod: PBBFAC,,, | Performed by: NURSE PRACTITIONER

## 2019-12-31 RX ORDER — FLUTICASONE PROPIONATE 50 MCG
1 SPRAY, SUSPENSION (ML) NASAL 2 TIMES DAILY
Qty: 16 G | Refills: 0 | Status: SHIPPED | OUTPATIENT
Start: 2019-12-31 | End: 2022-01-11

## 2019-12-31 RX ORDER — LEVOFLOXACIN 500 MG/1
500 TABLET, FILM COATED ORAL DAILY
Qty: 7 TABLET | Refills: 0 | Status: SHIPPED | OUTPATIENT
Start: 2019-12-31 | End: 2020-01-07

## 2019-12-31 RX ORDER — BENZONATATE 100 MG/1
100 CAPSULE ORAL 3 TIMES DAILY PRN
Qty: 30 CAPSULE | Refills: 0 | Status: SHIPPED | OUTPATIENT
Start: 2019-12-31 | End: 2020-01-30

## 2019-12-31 RX ORDER — FLUCONAZOLE 150 MG/1
150 TABLET ORAL DAILY
Qty: 1 TABLET | Refills: 0 | Status: SHIPPED | OUTPATIENT
Start: 2019-12-31 | End: 2020-01-01

## 2019-12-31 NOTE — PROGRESS NOTES
Subjective:       Patient ID: Laz Quintero is a 49 y.o. female.    Chief Complaint: Cough; Generalized Body Aches; Sore Throat; Chills; and Fever    Pt of Dr. Lr, here for flu like symptoms    Cough   This is a new problem. The current episode started in the past 7 days (2 days). The problem has been unchanged. The problem occurs every few minutes. The cough is non-productive. Associated symptoms include chest pain, chills, ear congestion, ear pain, a fever, headaches, myalgias, nasal congestion, postnasal drip, a sore throat and shortness of breath. Pertinent negatives include no heartburn, hemoptysis, rash, rhinorrhea, sweats, weight loss or wheezing. Associated symptoms comments: Temp 100.1 yesterday. The symptoms are aggravated by lying down. Risk factors for lung disease include animal exposure (father recently had flu, dog in home). She has tried OTC cough suppressant (ibuprofen 800mg, throat losengers, mucinex) for the symptoms. The treatment provided no relief. Her past medical history is significant for environmental allergies.     Review of Systems   Constitutional: Positive for chills and fever. Negative for weight loss.   HENT: Positive for congestion, ear pain, postnasal drip, sneezing and sore throat. Negative for rhinorrhea, sinus pressure, trouble swallowing and voice change.    Eyes: Negative for visual disturbance.   Respiratory: Positive for cough and shortness of breath. Negative for hemoptysis and wheezing.    Cardiovascular: Positive for chest pain.   Gastrointestinal: Negative for abdominal pain, diarrhea, heartburn, nausea and vomiting.   Musculoskeletal: Positive for myalgias.   Skin: Negative for rash.   Allergic/Immunologic: Positive for environmental allergies. Negative for food allergies and immunocompromised state.        Dog in home, dermabond glue   Neurological: Positive for headaches. Negative for dizziness, syncope, weakness, light-headedness and numbness.    Hematological: Negative for adenopathy. Does not bruise/bleed easily.   Psychiatric/Behavioral: Negative for suicidal ideas.         Review of patient's allergies indicates:   Allergen Reactions    Clindamycin     Sulfa dyne     Sulfamethoxazole-trimethoprim      Other reaction(s): Anaphylaxis    Dermabond [tissue glues] Rash     Current Outpatient Medications:     ALPRAZolam (XANAX) 0.5 MG tablet, Take 1 tablet (0.5 mg total) by mouth daily as needed for Anxiety., Disp: 20 tablet, Rfl: 1    clobetasol 0.05% (TEMOVATE) 0.05 % Oint, Apply amount of 1/3 of pencil eraser twice daily for a week to 10 days . (Patient taking differently: Apply amount of 1/3 of pencil eraser twice daily for a week to 10 days .), Disp: 15 g, Rfl: 3    cyclobenzaprine (FLEXERIL) 10 MG tablet, Take 0.5-1 tablets (5-10 mg total) by mouth 3 (three) times daily as needed for Muscle spasms., Disp: 90 tablet, Rfl: 2    diclofenac (VOLTAREN) 75 MG EC tablet, , Disp: , Rfl:     epinephrine (EPIPEN JR) 0.15 mg/0.3 mL (1:2,000) pen injection, Inject 0.3 mLs (0.15 mg total) into the muscle as needed., Disp: 1 each, Rfl: 2    esomeprazole (NEXIUM) 40 MG capsule, Take 1 capsule (40 mg total) by mouth before breakfast., Disp: 90 capsule, Rfl: 1    fluticasone (FLONASE) 50 mcg/actuation nasal spray, 2 sprays by Each Nare route once daily. (Patient taking differently: 2 sprays by Each Nostril route once daily. ), Disp: 16 g, Rfl: 0    gabapentin (NEURONTIN) 300 MG capsule, Take 2-3 capsules (600-900 mg total) by mouth 3 (three) times daily., Disp: 210 capsule, Rfl: 2    triamcinolone acetonide 0.1% (KENALOG) 0.1 % cream, Apply topically 2 (two) times daily. Cream Topical Twice a day .  disp:  60g tube AAA (Patient taking differently: Apply topically 2 (two) times daily. Cream Topical Twice a day .  disp:  60g tube AAA), Disp: 80 g, Rfl: 1      Patient Active Problem List   Diagnosis    Low back pain radiating to right leg    Seasonal  allergies    HPTH (hyperparathyroidism)    Chronic LBP    Right lumbar radiculopathy    Anxiety and depression    S/P laparoscopic hysterectomy    Left lumbar radiculitis    Abdominal pain    Herniated nucleus pulposus, L5-S1, left    Chronic left-sided low back pain with sciatica     Past Medical History:   Diagnosis Date    Abnormal Pap smear     Abnormal Pap smear of cervix     Annular tear of lumbar disc, L5-S1. 7/26/2012    Chronic LBP     HPTH (hyperparathyroidism)     Menorrhagia     PONV (postoperative nausea and vomiting)     Right lumbar radiculopathy 7/26/2012    Seasonal allergies      Past Surgical History:   Procedure Laterality Date    CHOLECYSTECTOMY      COLONOSCOPY N/A 1/19/2018    Procedure: COLONOSCOPY;  Surgeon: Malachi Olmedo MD;  Location: [facility] ([address] [place] [address]);  Service: Endoscopy;  Laterality: N/A;    ESOPHAGOGASTRODUODENOSCOPY N/A 9/6/2019    Procedure: EGD (ESOPHAGOGASTRODUODENOSCOPY);  Surgeon: Jose Carlos Ventura MD;  Location: [facility] ([address] [place] [address]);  Service: Endoscopy;  Laterality: N/A;  pt requesting ASAP    HYSTERECTOMY      TUBAL LIGATION      [place]    WISDOM TOOTH EXTRACTION      2005     Social History     Socioeconomic History    Marital status:      Spouse name: Not on file    Number of children: Not on file    Years of education: Not on file    Highest education level: Not on file   Occupational History    Occupation: nurse     Employer: [employer]   Social Needs    Financial resource strain: Not on file    Food insecurity:     Worry: Not on file     Inability: Not on file    Transportation needs:     Medical: Not on file     Non-medical: Not on file   Tobacco Use    Smoking status: Never Smoker    Smokeless tobacco: Never Used   Substance and Sexual Activity    Alcohol use: No    Drug use: No    Sexual activity: Yes     Partners: Male     Birth control/protection: See Surgical Hx     Comment:  AND MONOGOMOUS -  "[place]ure   Lifestyle    Physical activity:     Days per week: Not on file     Minutes per session: Not on file    Stress: Not on file   Relationships    Social connections:     Talks on phone: Not on file     Gets together: Not on file     Attends Zoroastrianism service: Not on file     Active member of club or organization: Not on file     Attends meetings of clubs or organizations: Not on file     Relationship status: Not on file   Other Topics Concern    Are you pregnant or think you may be? No    Breast-feeding No   Social History Narrative    Not on file     Family History   Problem Relation Age of Onset    Diabetes Father     Diabetes Mother     Breast cancer Neg Hx     Ovarian cancer Neg Hx     Melanoma Neg Hx     Colon cancer Neg Hx        Objective:       Vitals:    12/31/19 1609   BP: 138/86   Pulse: (!) 114   Temp: 98.9 °F (37.2 °C)   SpO2: 99%   Weight: 68 kg (149 lb 14.6 oz)   Height: 5' 6" (1.676 m)   PainSc:   8   PainLoc: Generalized       Body mass index is 24.2 kg/m².    Physical Exam   Constitutional: She is oriented to person, place, and time. Vital signs are normal. She appears well-developed and well-nourished.   Appears ill   HENT:   Head: Normocephalic.   Right Ear: Tympanic membrane, external ear and ear canal normal.   Left Ear: Tympanic membrane, external ear and ear canal normal.   Nose: No sinus tenderness or nasal deformity. No epistaxis. Right sinus exhibits frontal sinus tenderness. Right sinus exhibits no maxillary sinus tenderness. Left sinus exhibits frontal sinus tenderness. Left sinus exhibits no maxillary sinus tenderness.   Mouth/Throat: Uvula is midline, oropharynx is clear and moist and mucous membranes are normal. Mucous membranes are not pale. No oropharyngeal exudate, posterior oropharyngeal edema, posterior oropharyngeal erythema or tonsillar abscesses.   Clear PND noted on exam   Eyes: Pupils are equal, round, and reactive to light. Conjunctivae, EOM and lids are " normal.   Neck: Trachea normal, normal range of motion and phonation normal. Neck supple. No JVD present. Carotid bruit is not present. No thyromegaly present.   Cardiovascular: Normal rate, regular rhythm, normal heart sounds and intact distal pulses.   Pulmonary/Chest: Effort normal. She has wheezes. She exhibits tenderness.   Abdominal: Normal appearance.   Musculoskeletal: Normal range of motion.   Neurological: She is alert and oriented to person, place, and time.   Skin: Skin is warm, dry and intact. Capillary refill takes less than 2 seconds.   Psychiatric: She has a normal mood and affect. Her speech is normal and behavior is normal. Judgment and thought content normal. Cognition and memory are normal.   Nursing note and vitals reviewed.      Assessment:       1. Acute bacterial bronchitis    2. Coughing    3. Postnasal drip    4. BMI 24.0-24.9, adult    5. Allergic rhinitis        Plan:       Laz was seen today for cough, generalized body aches, sore throat, chills and fever.    Diagnoses and all orders for this visit:    Acute bacterial bronchitis  -     levoFLOXacin (LEVAQUIN) 500 MG tablet; Take 1 tablet (500 mg total) by mouth once daily. for 7 days  -     fluconazole (DIFLUCAN) 150 MG Tab; Take 1 tablet (150 mg total) by mouth once daily. Take after completed antibiotics if yeast infection occurs for 1 day    Coughing  -     benzonatate (TESSALON) 100 MG capsule; Take 1 capsule (100 mg total) by mouth 3 (three) times daily as needed for Cough.    Postnasal drip  -     fluticasone propionate (FLONASE) 50 mcg/actuation nasal spray; 1 spray (50 mcg total) by Each Nostril route 2 (two) times daily.    BMI 24.0-24.9, adult  BMI reviewed    Allergic rhinitis  -     fluticasone propionate (FLONASE) 50 mcg/actuation nasal spray; 1 spray (50 mcg total) by Each Nostril route 2 (two) times daily.    Antibiotics as prescribed    Cough capsule as needed for cough as prescribed    Meds as prescribed    Rest and  Fluids, Tylenol or Motrin otc as needed for pain and or fever    Warm liquids to thin mucus    Allergen avoidance discussed, humidified air recommended    Warm salt water gargles as needed for throat pain    Nasal spray, taught how to correctly use    Self care instructions provided in AVS    Follow up if symptoms worsen or fail to improve.

## 2019-12-31 NOTE — PATIENT INSTRUCTIONS
Antibiotics as prescribed    Cough capsule as needed for cough as prescribed    Meds as prescribed    Rest and Fluids, Tylenol or Motrin otc as needed for pain and or fever    Warm liquids to thin mucus    Allergen avoidance discussed, humidified air recommended    Warm salt water gargles as needed for throat pain    Nasal spray, taught how to correctly use      Bronchitis, Antibiotic Treatment (Adult)    Bronchitis is an infection of the air passages (bronchial tubes) in your lungs. It often occurs when you have a cold. This illness is contagious during the first few days and is spread through the air by coughing and sneezing, or by direct contact (touching the sick person and then touching your own eyes, nose, or mouth).  Symptoms of bronchitis include cough with mucus (phlegm) and low-grade fever. Bronchitis usually lasts 7 to 14 days. Mild cases can be treated with simple home remedies. More severe infection is treated with an antibiotic.  Home care  Follow these guidelines when caring for yourself at home:  · If your symptoms are severe, rest at home for the first 2 to 3 days. When you go back to your usual activities, don't let yourself get too tired.  · Do not smoke. Also avoid being exposed to secondhand smoke.  · You may use over-the-counter medicines to control fever or pain, unless another medicine was prescribed. (Note: If you have chronic liver or kidney disease or have ever had a stomach ulcer or gastrointestinal bleeding, talk with your healthcare provider before using these medicines. Also talk to your provider if you are taking medicine to prevent blood clots.) Aspirin should never be given to anyone younger than 18 years of age who is ill with a viral infection or fever. It may cause severe liver or brain damage.  · Your appetite may be poor, so a light diet is fine. Avoid dehydration by drinking 6 to 8 glasses of fluids per day (such as water, soft drinks, sports drinks, juices, tea, or soup).  Extra fluids will help loosen secretions in the nose and lungs.  · Over-the-counter cough, cold, and sore-throat medicines will not shorten the length of the illness, but they may be helpful to reduce symptoms. (Note: Do not use decongestants if you have high blood pressure.)  · Finish all antibiotic medicine. Do this even if you are feeling better after only a few days.  Follow-up care  Follow up with your healthcare provider, or as advised. If you had an X-ray or ECG (electrocardiogram), a specialist will review it. You will be notified of any new findings that may affect your care.  Note: If you are age 65 or older, or if you have a chronic lung disease or condition that affects your immune system, or you smoke, talk to your healthcare provider about having pneumococcal vaccinations and a yearly influenza vaccination (flu shot).  When to seek medical advice  Call your healthcare provider right away if any of these occur:  · Fever of 100.4°F (38°C) or higher  · Coughing up increased amounts of colored sputum  · Weakness, drowsiness, headache, facial pain, ear pain, or a stiff neck  Call 911, or get immediate medical care  Contact emergency services right away if any of these occur.  · Coughing up blood  · Worsening weakness, drowsiness, headache, or stiff neck  · Trouble breathing, wheezing, or pain with breathing  Date Last Reviewed: 9/13/2015  © 4562-1042 [employer]. [address] [place] [address], [place] [address]. All rights reserved. This information is not intended as a substitute for professional medical care. Always follow your healthcare professional's instructions.

## 2020-01-17 ENCOUNTER — HOSPITAL ENCOUNTER (OUTPATIENT)
Dept: RADIOLOGY | Facility: HOSPITAL | Age: 50
Discharge: HOME | End: 2020-01-17
Attending: INTERNAL MEDICINE
Payer: COMMERCIAL

## 2020-01-17 ENCOUNTER — TELEPHONE (OUTPATIENT)
Dept: INTERNAL MEDICINE | Facility: CLINIC | Age: 50
End: 2020-01-17

## 2020-01-17 ENCOUNTER — OFFICE VISIT (OUTPATIENT)
Dept: INTERNAL MEDICINE | Facility: CLINIC | Age: 50
End: 2020-01-17
Payer: COMMERCIAL

## 2020-01-17 VITALS
HEIGHT: 66 IN | BODY MASS INDEX: 23.63 KG/M2 | TEMPERATURE: 98 F | OXYGEN SATURATION: 99 % | SYSTOLIC BLOOD PRESSURE: 138 MMHG | HEART RATE: 93 BPM | DIASTOLIC BLOOD PRESSURE: 82 MMHG | WEIGHT: 147 LBS

## 2020-01-17 DIAGNOSIS — J40 BRONCHITIS: ICD-10-CM

## 2020-01-17 DIAGNOSIS — J40 BRONCHITIS: Primary | ICD-10-CM

## 2020-01-17 PROCEDURE — 99999 PR PBB SHADOW E&M-EST. PATIENT-LVL IV: CPT | Mod: PBBFAC,,, | Performed by: INTERNAL MEDICINE

## 2020-01-17 PROCEDURE — 71046 X-RAY EXAM CHEST 2 VIEWS: CPT | Mod: 26,,, | Performed by: RADIOLOGY

## 2020-01-17 PROCEDURE — 96372 PR INJECTION,THERAP/PROPH/DIAG2ST, IM OR SUBCUT: ICD-10-PCS | Mod: S$GLB,,, | Performed by: INTERNAL MEDICINE

## 2020-01-17 PROCEDURE — 71046 XR CHEST PA AND LATERAL: ICD-10-PCS | Mod: 26,,, | Performed by: RADIOLOGY

## 2020-01-17 PROCEDURE — 71046 X-RAY EXAM CHEST 2 VIEWS: CPT | Mod: TC

## 2020-01-17 PROCEDURE — 99999 PR PBB SHADOW E&M-EST. PATIENT-LVL IV: ICD-10-PCS | Mod: PBBFAC,,, | Performed by: INTERNAL MEDICINE

## 2020-01-17 PROCEDURE — 99214 PR OFFICE/OUTPT VISIT, EST, LEVL IV, 30-39 MIN: ICD-10-PCS | Mod: 25,S$GLB,, | Performed by: INTERNAL MEDICINE

## 2020-01-17 PROCEDURE — 3008F PR BODY MASS INDEX (BMI) DOCUMENTED: ICD-10-PCS | Mod: CPTII,S$GLB,, | Performed by: INTERNAL MEDICINE

## 2020-01-17 PROCEDURE — 99214 OFFICE O/P EST MOD 30 MIN: CPT | Mod: 25,S$GLB,, | Performed by: INTERNAL MEDICINE

## 2020-01-17 PROCEDURE — 96372 THER/PROPH/DIAG INJ SC/IM: CPT | Mod: S$GLB,,, | Performed by: INTERNAL MEDICINE

## 2020-01-17 PROCEDURE — 3008F BODY MASS INDEX DOCD: CPT | Mod: CPTII,S$GLB,, | Performed by: INTERNAL MEDICINE

## 2020-01-17 RX ORDER — ALBUTEROL SULFATE 90 UG/1
2 AEROSOL, METERED RESPIRATORY (INHALATION) EVERY 6 HOURS PRN
Qty: 1 EACH | Refills: 0 | Status: SHIPPED | OUTPATIENT
Start: 2020-01-17 | End: 2022-01-11

## 2020-01-17 RX ORDER — TRIAMCINOLONE ACETONIDE 40 MG/ML
40 INJECTION, SUSPENSION INTRA-ARTICULAR; INTRAMUSCULAR
Status: COMPLETED | OUTPATIENT
Start: 2020-01-17 | End: 2020-01-17

## 2020-01-17 RX ORDER — PROMETHAZINE HYDROCHLORIDE AND CODEINE PHOSPHATE 6.25; 1 MG/5ML; MG/5ML
5 SOLUTION ORAL EVERY 6 HOURS PRN
Qty: 140 ML | Refills: 0 | Status: SHIPPED | OUTPATIENT
Start: 2020-01-17 | End: 2020-01-27

## 2020-01-17 RX ADMIN — TRIAMCINOLONE ACETONIDE 40 MG: 40 INJECTION, SUSPENSION INTRA-ARTICULAR; INTRAMUSCULAR at 11:01

## 2020-01-17 NOTE — PROGRESS NOTES
"Subjective:       Patient ID: Laz Quintero is a 49 y.o. female.    Chief Complaint: Bronchitis (patient was seen by NP recently, reports of sx like general body aches, low grade fever, dry cough etc. patient was prescribed Tessalon perles (cough), Flonase and given by PCP, 5 day course of steroids. (not effective).   ) and Palpitations (upon dx of bronchitis, started about a week and a half ago, possibly due to excessive coughing, complains of heart palpitations and chest tightness. with exertion (sitting or standing). HR runs 140-150. at rest, runs normal. )    HPI   50 yo F here for urgent eval of bronchitis. She was seen by CARY Morales on 12/31 and dx bacterial bronchitis treated with benzonotate, levaquin, flonase.  Nurse at [employer].     Body aches, fever, dry cough.     Started around 12/29. Getting progressively worse.   Nothing seems to be helping. Still bad coughing now with palpitations and chest tightness. Nothing is coming up when coughs.   Just finished a 5 day course just finished Wednesday and course of levaquin.     Review of Systems   Constitutional: Negative for fever.   Respiratory: Negative for shortness of breath.    Cardiovascular: Negative for chest pain.   Musculoskeletal: Negative.    Skin: Negative.        Objective:   /82 (BP Location: Right arm, Patient Position: Sitting, BP Method: Medium (Manual))   Pulse 93   Temp 98.4 °F (36.9 °C) (Oral)   Ht 5' 6" (1.676 m)   Wt 66.7 kg (147 lb)   LMP 06/07/2015 (Exact Date)   SpO2 99%   BMI 23.73 kg/m²      Physical Exam   Constitutional: She is oriented to person, place, and time. She appears well-developed and well-nourished. No distress.   HENT:   Head: Normocephalic and atraumatic.   Cardiovascular: Normal rate and regular rhythm.   Pulmonary/Chest: Effort normal. No respiratory distress. She has no wheezes. She has no rales.   Neurological: She is alert and oriented to person, place, and time.   Skin: Skin is warm and dry. She " is not diaphoretic.   Psychiatric: She has a normal mood and affect. Her behavior is normal.       Assessment:       1. Bronchitis        Plan:       Laz was seen today for bronchitis and palpitations.    Diagnoses and all orders for this visit:    Bronchitis  -     X-Ray Chest PA And Lateral; Future  -     albuterol (PROVENTIL/VENTOLIN HFA) 90 mcg/actuation inhaler; Inhale 2 puffs into the lungs every 6 (six) hours as needed for Wheezing.  -     promethazine-codeine 6.25-10 mg/5 ml (PHENERGAN WITH CODEINE) 6.25-10 mg/5 mL syrup; Take 5 mLs by mouth every 6 (six) hours as needed for Cough.  -     triamcinolone acetonide injection 40 mg

## 2020-03-17 DIAGNOSIS — R50.9 FEVER, UNSPECIFIED FEVER CAUSE: Primary | ICD-10-CM

## 2020-03-19 ENCOUNTER — PATIENT OUTREACH (OUTPATIENT)
Dept: ADMINISTRATIVE | Facility: HOSPITAL | Age: 50
End: 2020-03-19

## 2020-03-25 ENCOUNTER — PATIENT OUTREACH (OUTPATIENT)
Dept: ADMINISTRATIVE | Facility: HOSPITAL | Age: 50
End: 2020-03-25

## 2020-03-25 NOTE — PROGRESS NOTES
2nd attempt- No answer and no VM option- 3/25/2020-   No answer and no VM option-  3/19-    Tried to contact patient twice to schedule covid testing- patient is not answering phone calls - unable to schedule.

## 2020-03-30 ENCOUNTER — PATIENT OUTREACH (OUTPATIENT)
Dept: ADMINISTRATIVE | Facility: HOSPITAL | Age: 50
End: 2020-03-30

## 2020-04-21 DIAGNOSIS — Z01.84 ANTIBODY RESPONSE EXAMINATION: ICD-10-CM

## 2020-05-21 DIAGNOSIS — Z01.84 ANTIBODY RESPONSE EXAMINATION: ICD-10-CM

## 2020-06-20 DIAGNOSIS — Z01.84 ANTIBODY RESPONSE EXAMINATION: ICD-10-CM

## 2020-07-20 DIAGNOSIS — Z01.84 ANTIBODY RESPONSE EXAMINATION: ICD-10-CM

## 2020-08-19 DIAGNOSIS — Z01.84 ANTIBODY RESPONSE EXAMINATION: ICD-10-CM

## 2020-09-18 DIAGNOSIS — Z01.84 ANTIBODY RESPONSE EXAMINATION: ICD-10-CM

## 2020-10-18 DIAGNOSIS — Z01.84 ANTIBODY RESPONSE EXAMINATION: ICD-10-CM

## 2020-11-04 ENCOUNTER — OFFICE VISIT (OUTPATIENT)
Dept: URGENT CARE | Facility: CLINIC | Age: 50
End: 2020-11-04
Payer: COMMERCIAL

## 2020-11-04 VITALS
DIASTOLIC BLOOD PRESSURE: 92 MMHG | TEMPERATURE: 97 F | HEIGHT: 66 IN | BODY MASS INDEX: 24.43 KG/M2 | RESPIRATION RATE: 18 BRPM | SYSTOLIC BLOOD PRESSURE: 135 MMHG | HEART RATE: 76 BPM | OXYGEN SATURATION: 97 % | WEIGHT: 152 LBS

## 2020-11-04 DIAGNOSIS — M54.9 DORSALGIA, UNSPECIFIED: ICD-10-CM

## 2020-11-04 DIAGNOSIS — M54.42 ACUTE LOW BACK PAIN WITH LEFT-SIDED SCIATICA, UNSPECIFIED BACK PAIN LATERALITY: ICD-10-CM

## 2020-11-04 DIAGNOSIS — M54.17 LUMBOSACRAL RADICULOPATHY: Primary | ICD-10-CM

## 2020-11-04 PROCEDURE — 99204 PR OFFICE/OUTPT VISIT, NEW, LEVL IV, 45-59 MIN: ICD-10-PCS | Mod: 25,S$GLB,, | Performed by: NURSE PRACTITIONER

## 2020-11-04 PROCEDURE — 96372 THER/PROPH/DIAG INJ SC/IM: CPT | Mod: S$GLB,,, | Performed by: PREVENTIVE MEDICINE

## 2020-11-04 PROCEDURE — 72110 X-RAY EXAM L-2 SPINE 4/>VWS: CPT | Mod: FY,S$GLB,, | Performed by: RADIOLOGY

## 2020-11-04 PROCEDURE — 99204 OFFICE O/P NEW MOD 45 MIN: CPT | Mod: 25,S$GLB,, | Performed by: NURSE PRACTITIONER

## 2020-11-04 PROCEDURE — 96372 PR INJECTION,THERAP/PROPH/DIAG2ST, IM OR SUBCUT: ICD-10-PCS | Mod: S$GLB,,, | Performed by: PREVENTIVE MEDICINE

## 2020-11-04 PROCEDURE — 72110 XR LUMBAR SPINE COMPLETE 5 VIEW: ICD-10-PCS | Mod: FY,S$GLB,, | Performed by: RADIOLOGY

## 2020-11-04 RX ORDER — KETOROLAC TROMETHAMINE 30 MG/ML
30 INJECTION, SOLUTION INTRAMUSCULAR; INTRAVENOUS
Status: COMPLETED | OUTPATIENT
Start: 2020-11-04 | End: 2020-11-04

## 2020-11-04 RX ORDER — NAPROXEN 500 MG/1
500 TABLET ORAL 2 TIMES DAILY WITH MEALS
Qty: 30 TABLET | Refills: 0 | Status: SHIPPED | OUTPATIENT
Start: 2020-11-04 | End: 2020-12-02 | Stop reason: ALTCHOICE

## 2020-11-04 RX ORDER — TIZANIDINE 4 MG/1
4 TABLET ORAL NIGHTLY PRN
Qty: 30 TABLET | Refills: 0 | Status: SHIPPED | OUTPATIENT
Start: 2020-11-04 | End: 2020-11-20 | Stop reason: ALTCHOICE

## 2020-11-04 RX ADMIN — KETOROLAC TROMETHAMINE 30 MG: 30 INJECTION, SOLUTION INTRAMUSCULAR; INTRAVENOUS at 04:11

## 2020-11-04 NOTE — LETTER
[facility]  [address], [address]  [place] [address]  Phone: [phone]  Fax: [phone]  [facility]: [email or web address]    Pt Name: Laz Quintero  Injury Date: 10/24/2020   Employee ID: [id number] Date of First Treatment: 11/04/2020   Company: [employer]      Appointment Time:  Arrived: 2:30 PM   Provider: Elsy Amaya NP Time Out: 5:10 PM     Office Treatment:   1. Lumbosacral radiculopathy    2. Acute low back pain with left-sided sciatica, unspecified back pain laterality    3. Dorsalgia, unspecified      Medications Ordered This Encounter   Medications    ketorolac injection 30 mg    naproxen (NAPROSYN) 500 MG tablet    tiZANidine (ZANAFLEX) 4 MG tablet      Patient Instructions: Attention not to aggravate affected area, Daily home exercises/warm soaks, PT to be scheduled once authorized    Restrictions: Home today, No lifting/pushing/pulling more than 10 lbs, Avoid frequent bending/lifting/twisting, No Prolonged standing/walking     Return Appointment: 11/11/2020 at 2:30 PM       MORENITA

## 2020-11-04 NOTE — PROGRESS NOTES
Subjective:       Patient ID: Laz Quintero is a 50 y.o. female.    Chief Complaint: Back Pain    New  Lower Back Injury (DOI 10- ) While assisting a patient ( pushing/pulling motion ) she injured her lower back. Had previous Back Surger 2018 but hasn't had any problems until now. Pain score 8/10 with complaints of Constant Shooting Pain in Lower Back that radiates down her LT Leg, Prolonged Sitting produces Numbness/Tingling of LT Leg, Stiffness, Pain with Walking, Spasms. Taking Muscle relaxer and Gabapentin that she already had at home. SH    She had a work related back injury in 2011. Had minimally invasive laminectomy (in [place]) 2 years ago. Says she has been fine since then. On 10/24/2020 and 10/25/2020 she was caring for a confused elderly patient and throughout the weekend she hurt her back. Initially when sliding pt from bed to stretcher with slide board and again the next day when the patient started to fall and she caught him. Since then her back pain has worsened. She took Ibuprofen, Robaxin and Neurotin. She had old prescriptions at home. Says the pain radiates down the back of L thigh and calf to L foot. Intermittent numbness to L foot to toes. Mostly great toe and 2nd toe. No bowel or bladder dysfunction. Pain awakens her at night. Pain worsens when she coughs or sneezes. She has continued to work with this injury. MWT    Back Pain  This is a new problem. The current episode started 1 to 4 weeks ago. The problem occurs constantly. The problem has been gradually worsening since onset. The pain is present in the lumbar spine. The quality of the pain is described as shooting. The pain radiates to the left thigh, left knee and left foot. The pain is at a severity of 8/10. The pain is moderate. The pain is the same all the time. The symptoms are aggravated by lying down, sitting, standing and twisting. Stiffness is present all day. Associated symptoms include numbness and tingling. Pertinent  negatives include no abdominal pain, bladder incontinence, bowel incontinence or dysuria. She has tried muscle relaxant and NSAIDs for the symptoms.       Constitution: Negative for fatigue.   Gastrointestinal: Negative for abdominal pain, nausea, vomiting and bowel incontinence.   Genitourinary: Negative for dysuria, urgency, bladder incontinence and hematuria.   Musculoskeletal: Positive for pain, joint pain, abnormal ROM of joint, back pain and muscle cramps. Negative for history of spine disorder.   Skin: Negative for rash.   Neurological: Positive for numbness and tingling. Negative for coordination disturbances.        Objective:      Physical Exam  Vitals signs and nursing note reviewed.   Constitutional:       Appearance: Normal appearance.      Comments: Patient does appear to be in significant pain during the exam.   HENT:      Right Ear: External ear normal.      Left Ear: External ear normal.   Eyes:      Conjunctiva/sclera: Conjunctivae normal.   Cardiovascular:      Rate and Rhythm: Normal rate and regular rhythm.      Pulses: Normal pulses.      Heart sounds: Normal heart sounds.   Pulmonary:      Effort: Pulmonary effort is normal.      Breath sounds: Normal breath sounds.   Abdominal:      General: Bowel sounds are normal.      Palpations: Abdomen is soft.   Musculoskeletal:         General: Tenderness present.      Lumbar back: She exhibits decreased range of motion, tenderness, pain and spasm. She exhibits normal pulse.        Back:       Comments: Pain to lower back with radiation to L hip and leg. Increased pain with flexion 30 degree. Relief with extension. Minimal pain with bilateral bending. Increased pain with bilateral rotation. LBP with R SLR. Radiating pain from L lower back to hip with L SLR. Able to heel and toe walk. Able to resist dorsiflexion of great toes. Small surgical scar noted to lower back.   Skin:     General: Skin is warm and dry.   Neurological:      Mental Status: She is  alert and oriented to person, place, and time.   Psychiatric:         Mood and Affect: Mood normal.         Behavior: Behavior normal.         Thought Content: Thought content normal.         Judgment: Judgment normal.         Assessment:       1. Lumbosacral radiculopathy    2. Acute low back pain with left-sided sciatica, unspecified back pain laterality    3. Dorsalgia, unspecified        Plan:       Patient is in significant pain today with a rating of 8/10 on pain scale. She reports she has had a Toradol injection in the past and tolerated it well without any adverse effects. No salicylate allergy Toradol injection given today. Tolerated well. She will start the Naprosyn tomorrow.    May take OTC Tylenol (Acetominophen)  in addition to the prescribed anti-inflammatory medicine (NSAID).  Do not take other NSAIDS (such as Ibuprofen,Advil, Aleve, Motrin) in addition to the prescribed NSAID.    Medications Ordered This Encounter   Medications    ketorolac injection 30 mg    naproxen (NAPROSYN) 500 MG tablet     Sig: Take 1 tablet (500 mg total) by mouth 2 (two) times daily with meals.     Dispense:  30 tablet     Refill:  0    tiZANidine (ZANAFLEX) 4 MG tablet     Sig: Take 1 tablet (4 mg total) by mouth nightly as needed (Do not take while working or driving.).     Dispense:  30 tablet     Refill:  0   X-ray Lumbar Spine Complete 5 View    Result Date: 11/4/2020  EXAMINATION: XR LUMBAR SPINE COMPLETE 5 VIEW CLINICAL HISTORY: Back pain or radiculopathy, prior surgery, new symptoms;Dorsalgia, unspecified. TECHNIQUE: AP, lateral, spot and bilateral oblique views of the lumbar spine were performed. COMPARISON: Radiographs of the lumbar spine from 05/17/2018 and additional priors. FINDINGS: There are 5 non-rib-bearing lumbar spine vertebrae.  There is no acute fracture or dislocation of the lumbar spine.  Alignment is normal.  There is mild disc height loss at L5-S1 with anterior osteophytes.  There is multilevel  mild bilateral facet arthropathy.  There is no pars defect.     No acute fracture or subluxation of the lumbar spine. Electronically signed by: Raheel Metzger Date:    11/04/2020 Time:    16:57  Patient Instructions: Attention not to aggravate affected area, Daily home exercises/warm soaks, PT to be scheduled once authorized   Restrictions: Home today, No lifting/pushing/pulling more than 10 lbs, Avoid frequent bending/lifting/twisting, No Prolonged standing/walking  Follow up in about 1 week (around 11/11/2020).

## 2020-11-09 PROBLEM — R29.898 WEAKNESS OF BOTH LOWER EXTREMITIES: Status: ACTIVE | Noted: 2020-11-09

## 2020-11-09 PROBLEM — M25.69 DECREASED RANGE OF MOTION OF TRUNK AND BACK: Status: ACTIVE | Noted: 2020-11-09

## 2020-11-11 ENCOUNTER — OFFICE VISIT (OUTPATIENT)
Dept: URGENT CARE | Facility: CLINIC | Age: 50
End: 2020-11-11
Payer: COMMERCIAL

## 2020-11-11 DIAGNOSIS — M54.42 ACUTE LOW BACK PAIN WITH LEFT-SIDED SCIATICA, UNSPECIFIED BACK PAIN LATERALITY: ICD-10-CM

## 2020-11-11 DIAGNOSIS — M54.17 LUMBOSACRAL RADICULOPATHY: Primary | ICD-10-CM

## 2020-11-11 DIAGNOSIS — M54.9 BACK PAIN WITH HISTORY OF SPINAL SURGERY: ICD-10-CM

## 2020-11-11 DIAGNOSIS — Z98.890 BACK PAIN WITH HISTORY OF SPINAL SURGERY: ICD-10-CM

## 2020-11-11 DIAGNOSIS — M54.9 DORSALGIA, UNSPECIFIED: ICD-10-CM

## 2020-11-11 PROCEDURE — 99214 PR OFFICE/OUTPT VISIT, EST, LEVL IV, 30-39 MIN: ICD-10-PCS | Mod: S$GLB,,, | Performed by: PREVENTIVE MEDICINE

## 2020-11-11 PROCEDURE — 99214 OFFICE O/P EST MOD 30 MIN: CPT | Mod: S$GLB,,, | Performed by: PREVENTIVE MEDICINE

## 2020-11-11 RX ORDER — METHYLPREDNISOLONE 4 MG/1
TABLET ORAL
Qty: 1 PACKAGE | Refills: 0 | Status: SHIPPED | OUTPATIENT
Start: 2020-11-11 | End: 2021-02-24 | Stop reason: ALTCHOICE

## 2020-11-11 RX ORDER — OXYCODONE AND ACETAMINOPHEN 5; 325 MG/1; MG/1
1 TABLET ORAL EVERY 6 HOURS PRN
Qty: 15 TABLET | Refills: 0 | Status: SHIPPED | OUTPATIENT
Start: 2020-11-11 | End: 2020-11-20 | Stop reason: ALTCHOICE

## 2020-11-11 NOTE — PROGRESS NOTES
Subjective:       Patient ID: Laz Quintero is a 50 y.o. female.    Chief Complaint: Back Pain     Follow-up of Back Pain ( DOI 10- ) Pain score today is 7-8/10 with complaints of Constant Burning/Shooting/Stabbing/Aching Pain, Numbness/Tingling of LT Foot at night, Poor ROM, Stiffness. Taking Naprosyn 500mg, Zanaflex 4mg, going to PT today. SH    Back Pain  Associated symptoms include numbness. Pertinent negatives include no abdominal pain, bladder incontinence, bowel incontinence or dysuria.       Constitution: Negative for fatigue.   Gastrointestinal: Negative for abdominal pain and bowel incontinence.   Genitourinary: Negative for dysuria, urgency, bladder incontinence and hematuria.   Musculoskeletal: Positive for pain, joint pain, abnormal ROM of joint, back pain, muscle cramps and muscle ache. Negative for history of spine disorder.   Skin: Negative for rash.   Neurological: Positive for numbness and tingling. Negative for coordination disturbances.        Objective:      Physical Exam  Vitals signs and nursing note reviewed.   Constitutional:       Appearance: She is well-developed.   HENT:      Head: Normocephalic.   Eyes:      Pupils: Pupils are equal, round, and reactive to light.   Neck:      Musculoskeletal: Normal range of motion.   Cardiovascular:      Rate and Rhythm: Normal rate.   Pulmonary:      Effort: Pulmonary effort is normal.   Musculoskeletal:      Lumbar back: She exhibits decreased range of motion, tenderness and pain. She exhibits no bony tenderness, no swelling, no edema, no deformity, no laceration, no spasm and normal pulse.        Back:       Comments: Patient has significant pain with both palpation and range of motion testing of her lower back.  She has a well-healed scar midline in her lower lumbar region from previous back surgery.  Back pain is primarily about her left low back radiating to her buttock and left thigh.  She has complaints of pain with spasm noted on  palpation of her left lower back.  She also has complaints of pain with forward flexion of her lower back to less than 25°, extension to less than 10°, and lateral bending to less than 10°.  Her deep tendon reflexes in her lower extremities are 1+ and equal.  Straight leg raising is positive on the left and negative on the right.  She has no other motor or sensory deficits noted about her left lower extremity.   Skin:     General: Skin is warm.   Neurological:      Mental Status: She is alert.      Comments: No focal neurologic deficits         Assessment:       1. Lumbosacral radiculopathy    2. Acute low back pain with left-sided sciatica, unspecified back pain laterality    3. Dorsalgia, unspecified    4. Back pain with history of spinal surgery        Plan:     discussed with patient the results of her previous x-rays of the lumbosacral spine which revealed multilevel bilateral facet arthropathy with mild disc height loss at L5-S1 with anterior osteophytes.  No acute fractures or bony abnormalities associated with trauma were noted.  Patient has undergone a previous back surgical procedure for herniated disc approximately 2 years ago.  She has been attempting to work through some her pain but she now has significant pain and spasm rendering her unable to do anything beyond sedentary type work.    Medications Ordered This Encounter   Medications    methylPREDNISolone (MEDROL DOSEPACK) 4 mg tablet     Sig: use as directed     Dispense:  1 Package     Refill:  0    oxyCODONE-acetaminophen (PERCOCET) 5-325 mg per tablet     Sig: Take 1 tablet by mouth every 6 (six) hours as needed for Pain.     Dispense:  15 tablet     Refill:  0     Quantity prescribed more than 7 day supply? Yes, quantity medically necessary     Patient Instructions: Daily home exercises/warm soaks, Continue Physical Therapy(Patient will have physical therapy 3 times per week for 3 weeks at [facility] location)    Restrictions: Sedentary work only, No lifting/pushing/pulling more than 10 lbs, Avoid frequent bending/lifting/twisting, Sit or stand as needed  Follow up in about 5 days (around 11/16/2020).

## 2020-11-11 NOTE — LETTER
[facility] [place]  [address], [address]  [place] [address]  Phone: [phone]  Fax: [phone]  [facility]: [id number]    Pt Name: Laz Quintero  Injury Date: 10/24/2020   Employee ID: [id number] Date of First Treatment: 11/11/2020   Company: [employer]      Appointment Time: 02:30 PM Arrived: 2:15pm   Provider: Neto Houser MD Time Out: 3:40pm     Office Treatment:   1. Lumbosacral radiculopathy    2. Acute low back pain with left-sided sciatica, unspecified back pain laterality    3. Dorsalgia, unspecified    4. Back pain with history of spinal surgery      Medications Ordered This Encounter   Medications    methylPREDNISolone (MEDROL DOSEPACK) 4 mg tablet    oxyCODONE-acetaminophen (PERCOCET) 5-325 mg per tablet      Patient Instructions: Daily home exercises/warm soaks, Continue Physical Therapy(Patient will have physical therapy 3 times per week for 3 weeks at [facility] location)    Restrictions: Sedentary work only, No lifting/pushing/pulling more than 10 lbs, Avoid frequent bending/lifting/twisting, Sit or stand as needed     Return Appointment: 11/16/2020 at 12:30pm  SB

## 2020-11-16 ENCOUNTER — OFFICE VISIT (OUTPATIENT)
Dept: URGENT CARE | Facility: CLINIC | Age: 50
End: 2020-11-16
Payer: COMMERCIAL

## 2020-11-16 DIAGNOSIS — Z98.890 BACK PAIN WITH HISTORY OF SPINAL SURGERY: ICD-10-CM

## 2020-11-16 DIAGNOSIS — M54.9 BACK PAIN WITH HISTORY OF SPINAL SURGERY: ICD-10-CM

## 2020-11-16 DIAGNOSIS — M54.42 ACUTE LOW BACK PAIN WITH LEFT-SIDED SCIATICA, UNSPECIFIED BACK PAIN LATERALITY: ICD-10-CM

## 2020-11-16 DIAGNOSIS — M54.17 LUMBOSACRAL RADICULOPATHY: Primary | ICD-10-CM

## 2020-11-16 DIAGNOSIS — M54.9 DORSALGIA, UNSPECIFIED: ICD-10-CM

## 2020-11-16 PROCEDURE — 99214 OFFICE O/P EST MOD 30 MIN: CPT | Mod: S$GLB,,, | Performed by: PREVENTIVE MEDICINE

## 2020-11-16 PROCEDURE — 99214 PR OFFICE/OUTPT VISIT, EST, LEVL IV, 30-39 MIN: ICD-10-PCS | Mod: S$GLB,,, | Performed by: PREVENTIVE MEDICINE

## 2020-11-16 NOTE — PROGRESS NOTES
Subjective:       Patient ID: Laz Quintero is a 50 y.o. female.    Chief Complaint: Back Pain    Pt is being seen today for a follow up for a Back injury from 10/24/2020.  Complaints of constant Burning/Shooting/Stabbing/Aching/Numbness/Tingling Pain in her Lower Back that radiates to the Left Side and down her Left Leg.  Her pain level is 7/10.  She has discontinued the Naproysn and continues to take Zanaflex 4mg with moderate relief.  KD      Constitution: Negative for fatigue.   Gastrointestinal: Negative for abdominal pain and bowel incontinence.   Genitourinary: Negative for dysuria, urgency, bladder incontinence and hematuria.   Musculoskeletal: Positive for joint pain, back pain and muscle ache. Negative for muscle cramps and history of spine disorder.   Skin: Negative for rash.   Neurological: Negative for coordination disturbances, numbness and tingling.        Objective:      Physical Exam  Vitals signs and nursing note reviewed.   Constitutional:       Appearance: She is well-developed.   HENT:      Head: Normocephalic.   Eyes:      Pupils: Pupils are equal, round, and reactive to light.   Neck:      Musculoskeletal: Normal range of motion.   Cardiovascular:      Rate and Rhythm: Normal rate.   Pulmonary:      Effort: Pulmonary effort is normal.   Musculoskeletal:      Lumbar back: She exhibits decreased range of motion, tenderness and pain. She exhibits no bony tenderness, no swelling, no edema, no deformity, no laceration, no spasm and normal pulse.        Back:       Comments: Patient continues to complain of pain primarily about her left low back with both palpation and range of motion testing.  Back pain is about her left low back radiating to her buttock and left thigh.  She has complaints of pain with spasm noted on palpation of her left lower back.  She also has complaints of pain with forward flexion of her lower back to less than 25°, extension to less than 10°, and lateral bending to less  than 10°.  Her deep tendon reflexes in her lower extremities are 1+ and equal.  Straight leg raising is positive on the left and negative on the right.  She has no other motor or sensory deficits noted about her left lower extremity.   Skin:     General: Skin is warm.   Neurological:      Mental Status: She is alert.      Comments: No focal neurologic deficits         Assessment:       1. Lumbosacral radiculopathy    2. Acute low back pain with left-sided sciatica, unspecified back pain laterality    3. Dorsalgia, unspecified    4. Back pain with history of spinal surgery        Plan:     due to patient's continued pain about her lower back especially after having to work in at light duty capacity, she will be disabled for the next several days to determine if her low back pain will resolve or improve.  She will finish her Medrol Dosepak and continue tizanidine 4 mg at night as needed.  She will also continue taking Percocet only for pain following physical therapy       Patient Instructions: Daily home exercises/warm soaks, Continue Physical Therapy   Restrictions: Disabled until next office visit  Follow up in about 4 days (around 11/20/2020).

## 2020-11-16 NOTE — LETTER
[facility] [place]  [address]  [place] [address]  Phone: [phone]  Fax: [phone]  [facility]: [id number]    Pt Name: Laz Quintero  Injury Date: 10/24/2020   Employee ID: [id number] Date of First Treatment: 11/16/2020   Company: [employer]      Appointment Time: 12:30 PM Arrived: 12:28pm   Provider: Neto Houser MD Time Out:1:35pm     Office Treatment:   1. Lumbosacral radiculopathy    2. Acute low back pain with left-sided sciatica, unspecified back pain laterality    3. Dorsalgia, unspecified    4. Back pain with history of spinal surgery          Patient Instructions: Daily home exercises/warm soaks, Continue Physical Therapy    Restrictions: Disabled until next office visit     Return Appointment: 11/20/2020 at 12:30pm  SB

## 2020-11-17 DIAGNOSIS — Z01.84 ANTIBODY RESPONSE EXAMINATION: ICD-10-CM

## 2020-11-20 ENCOUNTER — OFFICE VISIT (OUTPATIENT)
Dept: URGENT CARE | Facility: CLINIC | Age: 50
End: 2020-11-20
Payer: COMMERCIAL

## 2020-11-20 DIAGNOSIS — M54.42 ACUTE LOW BACK PAIN WITH LEFT-SIDED SCIATICA, UNSPECIFIED BACK PAIN LATERALITY: ICD-10-CM

## 2020-11-20 DIAGNOSIS — M54.9 BACK PAIN WITH HISTORY OF SPINAL SURGERY: ICD-10-CM

## 2020-11-20 DIAGNOSIS — M54.17 LUMBOSACRAL RADICULOPATHY: Primary | ICD-10-CM

## 2020-11-20 DIAGNOSIS — Z98.890 BACK PAIN WITH HISTORY OF SPINAL SURGERY: ICD-10-CM

## 2020-11-20 DIAGNOSIS — M54.9 DORSALGIA, UNSPECIFIED: ICD-10-CM

## 2020-11-20 PROCEDURE — 99214 PR OFFICE/OUTPT VISIT, EST, LEVL IV, 30-39 MIN: ICD-10-PCS | Mod: S$GLB,,, | Performed by: PREVENTIVE MEDICINE

## 2020-11-20 PROCEDURE — 99214 OFFICE O/P EST MOD 30 MIN: CPT | Mod: S$GLB,,, | Performed by: PREVENTIVE MEDICINE

## 2020-11-20 RX ORDER — OXYCODONE AND ACETAMINOPHEN 5; 325 MG/1; MG/1
1 TABLET ORAL EVERY 6 HOURS PRN
Qty: 15 TABLET | Refills: 0 | Status: SHIPPED | OUTPATIENT
Start: 2020-11-20 | End: 2022-01-11

## 2020-11-20 RX ORDER — METHOCARBAMOL 750 MG/1
750 TABLET, FILM COATED ORAL NIGHTLY PRN
Qty: 40 TABLET | Refills: 0 | Status: SHIPPED | OUTPATIENT
Start: 2020-11-20 | End: 2020-11-30

## 2020-11-20 NOTE — PROGRESS NOTES
Subjective:       Patient ID: Laz Quintero is a 50 y.o. female.    Chief Complaint: Back Pain    Pt is being seen today for a Back injury from 10/24/2020.  Continues to have Back pain with Intermittent Burning/Stabbing/Aching/Numbness/Tingling that radiates down her Left Leg.  Pain level is 5/10.  Taking Zanaflex 4 mg with mild relief.  KD      Constitution: Negative for fatigue.   Gastrointestinal: Negative for abdominal pain and bowel incontinence.   Genitourinary: Negative for dysuria, urgency, bladder incontinence and hematuria.   Musculoskeletal: Positive for pain and joint pain. Negative for muscle cramps and history of spine disorder.   Skin: Negative for rash.   Neurological: Negative for coordination disturbances, numbness and tingling.        Objective:      Physical Exam  Musculoskeletal:      Thoracic back: She exhibits decreased range of motion, tenderness and pain. She exhibits no bony tenderness, no swelling, no edema, no deformity, no laceration, no spasm and normal pulse.      Lumbar back: She exhibits decreased range of motion, tenderness and pain. She exhibits no bony tenderness, no swelling, no edema, no deformity, no laceration, no spasm and normal pulse.        Back:       Comments: Pain persists about low back and mid back with palpation and range of motion testing.         Assessment:       1. Lumbosacral radiculopathy    2. Acute low back pain with left-sided sciatica, unspecified back pain laterality    3. Dorsalgia, unspecified    4. Back pain with history of spinal surgery        Plan:            Patient Instructions: Daily home exercises/warm soaks, Continue Physical Therapy   Restrictions: No lifting/pushing/pulling more than 10 lbs, Avoid frequent bending/lifting/twisting  Follow up in about 12 days (around 12/2/2020).

## 2020-11-20 NOTE — LETTER
[facility] [place]  [address]  [place] [address]  Phone: [phone]  Fax: [phone]  [facility]: [id number]    Pt Name: Laz Quintero  Injury Date: 10/24/2020   Employee ID: [id number] Date of Treatment: 11/20/2020   Company: [employer]      Appointment Time: 02:30 PM Arrived: 2:14 PM   Provider: Neto Houser MD Time Out: 4:30 PM     Office Treatment:   1. Lumbosacral radiculopathy    2. Acute low back pain with left-sided sciatica, unspecified back pain laterality    3. Dorsalgia, unspecified    4. Back pain with history of spinal surgery      Medications Ordered This Encounter   Medications    methocarbamoL (ROBAXIN) 750 MG Tab    oxyCODONE-acetaminophen (PERCOCET) 5-325 mg per tablet      Patient Instructions: Daily home exercises/warm soaks, Continue Physical Therapy    Restrictions: No lifting/pushing/pulling more than 10 lbs, Avoid frequent bending/lifting/twisting     Return Appointment: 12/2/2020 at 8:45 AM       KD

## 2020-11-27 DIAGNOSIS — Z12.31 OTHER SCREENING MAMMOGRAM: ICD-10-CM

## 2020-12-02 ENCOUNTER — OFFICE VISIT (OUTPATIENT)
Dept: URGENT CARE | Facility: CLINIC | Age: 50
End: 2020-12-02
Payer: COMMERCIAL

## 2020-12-02 DIAGNOSIS — M54.9 DORSALGIA, UNSPECIFIED: ICD-10-CM

## 2020-12-02 DIAGNOSIS — M54.42 ACUTE LOW BACK PAIN WITH LEFT-SIDED SCIATICA, UNSPECIFIED BACK PAIN LATERALITY: ICD-10-CM

## 2020-12-02 DIAGNOSIS — M54.17 LUMBOSACRAL RADICULOPATHY: Primary | ICD-10-CM

## 2020-12-02 PROCEDURE — 99214 OFFICE O/P EST MOD 30 MIN: CPT | Mod: S$GLB,,, | Performed by: PREVENTIVE MEDICINE

## 2020-12-02 PROCEDURE — 99214 PR OFFICE/OUTPT VISIT, EST, LEVL IV, 30-39 MIN: ICD-10-PCS | Mod: S$GLB,,, | Performed by: PREVENTIVE MEDICINE

## 2020-12-02 RX ORDER — IBUPROFEN 800 MG/1
800 TABLET ORAL 3 TIMES DAILY
Qty: 30 TABLET | Refills: 0 | Status: SHIPPED | OUTPATIENT
Start: 2020-12-02 | End: 2020-12-09 | Stop reason: SDUPTHER

## 2020-12-02 NOTE — PROGRESS NOTES
Subjective:       Patient ID: Laz Quintero is a 50 y.o. female.    Chief Complaint: Back Pain    Pt is here for a follow up WC visit Back DOI: 10/24/20 rates pain 5/10 describes it as a dull sharp shooting pain that raidiates to the left side down to the foot . Pt still following restrictions and taking meds as needed..sb    Back Pain  This is a recurrent problem. The current episode started more than 1 month ago. The problem occurs constantly. The problem has been gradually improving since onset. The pain is present in the lumbar spine. The quality of the pain is described as shooting, stabbing and aching. The pain radiates to the left foot, left thigh and left knee. The pain is at a severity of 5/10. The pain is moderate. The pain is the same all the time. Stiffness is present all day. Pertinent negatives include no abdominal pain, bladder incontinence, bowel incontinence, chest pain, dysuria, fever, headaches, leg pain, numbness, paresis, paresthesias, pelvic pain, perianal numbness, tingling, weakness or weight loss. She has tried chiropractic manipulation, home exercises and heat for the symptoms. The treatment provided mild relief.       Constitution: Negative for fatigue and fever.   Cardiovascular: Negative for chest pain.   Gastrointestinal: Negative for abdominal pain and bowel incontinence.   Genitourinary: Negative for dysuria, urgency, bladder incontinence, hematuria and pelvic pain.   Musculoskeletal: Positive for back pain. Negative for muscle cramps and history of spine disorder.   Skin: Negative for rash.   Neurological: Negative for coordination disturbances, headaches, numbness and tingling.        Objective:      Physical Exam  Musculoskeletal:      Thoracic back: She exhibits decreased range of motion, tenderness and pain. She exhibits no bony tenderness, no swelling, no edema, no deformity, no laceration, no spasm and normal pulse.      Lumbar back: She exhibits decreased range of motion,  tenderness and pain. She exhibits no bony tenderness, no swelling, no edema, no deformity, no laceration, no spasm and normal pulse.        Back:       Comments: Pain persists about low back and mid back with palpation and range of motion testing.  Patient has complaints of pain with forward flexion of her lower back to approximately 45°, extension to 10°, and lateral bending the 25°.  She has evidence of spasm in her left low back with both palpation range of motion testing.  She has no pain with straight leg raising bilaterally.  There are no motor or sensory deficits about her lower extremities.  Deep tendon reflexes are 1+ and equal bilaterally.  Distal pulses are equal and intact.         Assessment:       1. Lumbosacral radiculopathy    2. Acute low back pain with left-sided sciatica, unspecified back pain laterality    3. Dorsalgia, unspecified        Plan:       Patient has tolerated physical therapy and seems to gradually improve her range of motion with episodes of intermittent back pain with exercises she is doing at home.  She will attempt taking ibuprofen 800 mg on a regular basis with over-the-counter antacids as tolerated.  Patient has not yet been able to return to work in a modified duty capacity as none has been made available to her.  She will discuss this further with the appropriate staff at [facility].     Patient Instructions: Daily home exercises/warm soaks, Continue Physical Therapy   Restrictions: No lifting/pushing/pulling more than 10 lbs, Avoid frequent bending/lifting/twisting  Follow up in about 1 week (around 12/9/2020).

## 2020-12-02 NOTE — LETTER
[facility] [place]  [address]  [place] [address]  Phone: [phone]  Fax: [phone]  [facility]: [id number]    Pt Name: Laz Quintero  Injury Date: 10/24/2020   Employee ID: [id number] Date of Treatment: 12/02/2020   Company: [employer]      Appointment Time: 08:45 AM Arrived: 8:43 AM   Provider: Neto Houser MD Time Out: 9:55 AM     Office Treatment:   1. Lumbosacral radiculopathy    2. Acute low back pain with left-sided sciatica, unspecified back pain laterality    3. Dorsalgia, unspecified      Medications Ordered This Encounter   Medications    ibuprofen (ADVIL,MOTRIN) 800 MG tablet      Patient Instructions: Daily home exercises/warm soaks, Continue Physical Therapy      Restrictions: No lifting/pushing/pulling more than 10 lbs, Avoid frequent bending/lifting/twisting     Return Appointment:   12/9/2020 at 2:30 PM       SH

## 2020-12-09 ENCOUNTER — OFFICE VISIT (OUTPATIENT)
Dept: URGENT CARE | Facility: CLINIC | Age: 50
End: 2020-12-09
Payer: COMMERCIAL

## 2020-12-09 DIAGNOSIS — M54.9 BACK PAIN WITH HISTORY OF SPINAL SURGERY: ICD-10-CM

## 2020-12-09 DIAGNOSIS — M54.9 DORSALGIA, UNSPECIFIED: ICD-10-CM

## 2020-12-09 DIAGNOSIS — Z98.890 BACK PAIN WITH HISTORY OF SPINAL SURGERY: ICD-10-CM

## 2020-12-09 DIAGNOSIS — M54.42 ACUTE LOW BACK PAIN WITH LEFT-SIDED SCIATICA, UNSPECIFIED BACK PAIN LATERALITY: ICD-10-CM

## 2020-12-09 DIAGNOSIS — M54.17 LUMBOSACRAL RADICULOPATHY: Primary | ICD-10-CM

## 2020-12-09 PROCEDURE — 99214 OFFICE O/P EST MOD 30 MIN: CPT | Mod: S$GLB,,, | Performed by: PREVENTIVE MEDICINE

## 2020-12-09 PROCEDURE — 99214 PR OFFICE/OUTPT VISIT, EST, LEVL IV, 30-39 MIN: ICD-10-PCS | Mod: S$GLB,,, | Performed by: PREVENTIVE MEDICINE

## 2020-12-09 RX ORDER — METHOCARBAMOL 750 MG/1
750 TABLET, FILM COATED ORAL 2 TIMES DAILY
Qty: 40 TABLET | Refills: 1 | Status: SHIPPED | OUTPATIENT
Start: 2020-12-09 | End: 2020-12-18 | Stop reason: ALTCHOICE

## 2020-12-09 RX ORDER — IBUPROFEN 800 MG/1
800 TABLET ORAL 3 TIMES DAILY
Qty: 50 TABLET | Refills: 1 | Status: SHIPPED | OUTPATIENT
Start: 2020-12-09 | End: 2020-12-18 | Stop reason: ALTCHOICE

## 2020-12-09 NOTE — PROGRESS NOTES
Subjective:       Patient ID: Laz Quintero is a 50 y.o. female.    Chief Complaint: Back Pain     Follow-up of Back Pain ( DOI 10- ) Pain score today is 3.5/10 with complaints of Intermittent Sharp/Shooting pain with twisting/bending movements that radiates down LT Leg, Constant Dull pain when picking up heavy objects until she puts them down, Stiffness with Prolonged Sitting/Standing. Taking IBP 800mg, DHX w/wm soaks, PT. SH    Back Pain  Pertinent negatives include no abdominal pain, bladder incontinence, bowel incontinence, dysuria or numbness.       Constitution: Negative for fatigue.   Gastrointestinal: Negative for abdominal pain and bowel incontinence.   Genitourinary: Negative for dysuria, urgency, bladder incontinence and hematuria.   Musculoskeletal: Positive for joint pain and back pain. Negative for muscle cramps and history of spine disorder.   Skin: Negative for rash.   Neurological: Negative for coordination disturbances, numbness and tingling.        Objective:      Physical Exam  Musculoskeletal:      Lumbar back: She exhibits decreased range of motion, tenderness and pain. She exhibits no bony tenderness, no swelling, no edema, no deformity, no laceration, no spasm and normal pulse.        Back:       Comments: Patient has low back pain persists with both palpation and range of motion testing.  Her low back pain radiates to her left buttock and left leg with flexion to approximately 45°, extension to 10°, and lateral bending and rotation to 25°.  No evidence of swelling or spasm about the left lower leg are left buttock.  She has some minimal spasm along the left paralumbar region.  No motor or sensory deficits about the lower extremities.  Distal pulses are equal and intact.         Assessment:       1. Lumbosacral radiculopathy    2. Acute low back pain with left-sided sciatica, unspecified back pain laterality    3. Dorsalgia, unspecified    4. Back pain with history of spinal  surgery        Plan:       Patient has not yet been provided light duty work and this will be changed following communication with her supervisor and worker's compensation office.  She will continue her physical therapy and take medication as prescribed.  She will also continue doing her warm soaks with exercises at home on a daily basis  Medications Ordered This Encounter   Medications    ibuprofen (ADVIL,MOTRIN) 800 MG tablet     Sig: Take 1 tablet (800 mg total) by mouth 3 (three) times daily.     Dispense:  50 tablet     Refill:  1    methocarbamoL (ROBAXIN) 750 MG Tab     Sig: Take 1 tablet (750 mg total) by mouth 2 (two) times a day. for 10 days     Dispense:  40 tablet     Refill:  1     Patient Instructions: Daily home exercises/warm soaks, Continue Physical Therapy   Restrictions: No lifting/pushing/pulling more than 10 lbs, Avoid frequent bending/lifting/twisting  Follow up in about 1 week (around 12/16/2020).

## 2020-12-09 NOTE — LETTER
[facility]  [address]  [place] [address]  Phone: [phone]  Fax: [phone]  [facility]: [id number]    Pt Name: Laz Quintero  Injury Date: 10/24/2020   Employee ID: [id number] Date of Treatment: 12/09/2020   Company: [employer]      Appointment Time: 02:30 PM Arrived: 2:29pm   Provider: Neto Houser MD Time Out: 4:05pm     Office Treatment:   1. Lumbosacral radiculopathy    2. Acute low back pain with left-sided sciatica, unspecified back pain laterality    3. Dorsalgia, unspecified    4. Back pain with history of spinal surgery      Medications Ordered This Encounter   Medications    ibuprofen (ADVIL,MOTRIN) 800 MG tablet    methocarbamoL (ROBAXIN) 750 MG Tab      Patient Instructions: Daily home exercises/warm soaks, Continue Physical Therapy    Restrictions: No lifting/pushing/pulling more than 10 lbs, Avoid frequent bending/lifting/twisting     Return Appointment: 12/16/2020 at 2:40pm  SB

## 2020-12-16 ENCOUNTER — OFFICE VISIT (OUTPATIENT)
Dept: URGENT CARE | Facility: CLINIC | Age: 50
End: 2020-12-16
Payer: COMMERCIAL

## 2020-12-16 DIAGNOSIS — Z98.890 BACK PAIN WITH HISTORY OF SPINAL SURGERY: ICD-10-CM

## 2020-12-16 DIAGNOSIS — M54.9 BACK PAIN WITH HISTORY OF SPINAL SURGERY: ICD-10-CM

## 2020-12-16 DIAGNOSIS — M54.17 LUMBOSACRAL RADICULOPATHY: Primary | ICD-10-CM

## 2020-12-16 DIAGNOSIS — M54.9 DORSALGIA, UNSPECIFIED: ICD-10-CM

## 2020-12-16 DIAGNOSIS — M54.42 ACUTE LOW BACK PAIN WITH LEFT-SIDED SCIATICA, UNSPECIFIED BACK PAIN LATERALITY: ICD-10-CM

## 2020-12-16 PROCEDURE — 99214 OFFICE O/P EST MOD 30 MIN: CPT | Mod: S$GLB,,, | Performed by: PREVENTIVE MEDICINE

## 2020-12-16 PROCEDURE — 99214 PR OFFICE/OUTPT VISIT, EST, LEVL IV, 30-39 MIN: ICD-10-PCS | Mod: S$GLB,,, | Performed by: PREVENTIVE MEDICINE

## 2020-12-16 NOTE — LETTER
[facility]  [address], [address]  [place] [address]  Phone: [phone]  Fax: [phone]  [facility]: [id number]    Pt Name: Laz Quintero  Injury Date: 10/24/2020   Employee ID: [id number] Date of Treatment: 12/16/2020   Company: [employer]      Appointment Time: 02:40 PM Arrived: 2:30pm   Provider: Neto Houser MD Time Out:3:32pm     Office Treatment:   1. Lumbosacral radiculopathy    2. Acute low back pain with left-sided sciatica, unspecified back pain laterality    3. Dorsalgia, unspecified    4. Back pain with history of spinal surgery          Patient Instructions: Daily home exercises/warm soaks    Restrictions: No lifting/pushing/pulling more than 10 lbs, Avoid frequent bending/lifting/twisting     Return Appointment: 1/6/2021 at 2:30pm  SB

## 2020-12-16 NOTE — PROGRESS NOTES
Subjective:       Patient ID: Laz Quintero is a 50 y.o. female.    Chief Complaint: Back Pain     Follow-up of Back Pain ( DOI 10- ) Pain score 3/10 with complaints of Intermittent Sharp pain that radiates down LT Leg, Intermittent Dull pain when picking up heavy objects, Stiffness with prolonged sitting/standing. Taking IBP 800mg, Robaxin 750mg, Daily home exercises w/wm sks, PT SH    Back Pain  Pertinent negatives include no abdominal pain, bladder incontinence, bowel incontinence, dysuria or numbness.       Constitution: Negative for fatigue.   Gastrointestinal: Negative for abdominal pain and bowel incontinence.   Genitourinary: Negative for dysuria, urgency, bladder incontinence and hematuria.   Musculoskeletal: Positive for joint pain and back pain. Negative for muscle cramps and history of spine disorder.   Skin: Negative for rash.   Neurological: Negative for coordination disturbances, numbness and tingling.        Objective:      Physical Exam  Musculoskeletal:      Lumbar back: She exhibits decreased range of motion, tenderness and pain. She exhibits no bony tenderness, no swelling, no edema, no deformity, no laceration, no spasm and normal pulse.        Back:       Comments: Persistent pain with both palpation and range of motion testing of low back radiates to her left buttock and left leg with flexion to approximately 45°, extension to 10°, and lateral bending and rotation to 25°.  No evidence of swelling or spasm about the left lower leg are left buttock.  She has some minimal spasm along the left paralumbar region.  No motor or sensory deficits about the lower extremities.  Distal pulses are equal and intact.         Assessment:       1. Lumbosacral radiculopathy    2. Acute low back pain with left-sided sciatica, unspecified back pain laterality    3. Dorsalgia, unspecified    4. Back pain with history of spinal surgery        Plan:     this patient has tolerated her physical therapy  and this will be completed shortly.  Her range of motion has improved but she will attempt to increase her home exercises with warm soaks daily.  She will also continue taking ibuprofen 800 mg 3 times per day with food and Robaxin 500 mg primarily at night.  She will maintain her light duty status until or follow-up visit in approximately 3 weeks.       Patient Instructions: Daily home exercises/warm soaks   Restrictions: No lifting/pushing/pulling more than 10 lbs, Avoid frequent bending/lifting/twisting  Follow up in about 3 weeks (around 1/6/2021).

## 2020-12-18 ENCOUNTER — OFFICE VISIT (OUTPATIENT)
Dept: URGENT CARE | Facility: CLINIC | Age: 50
End: 2020-12-18
Payer: COMMERCIAL

## 2020-12-18 DIAGNOSIS — Z98.890 BACK PAIN WITH HISTORY OF SPINAL SURGERY: ICD-10-CM

## 2020-12-18 DIAGNOSIS — M54.42 ACUTE LOW BACK PAIN WITH LEFT-SIDED SCIATICA, UNSPECIFIED BACK PAIN LATERALITY: ICD-10-CM

## 2020-12-18 DIAGNOSIS — M54.9 DORSALGIA, UNSPECIFIED: ICD-10-CM

## 2020-12-18 DIAGNOSIS — M54.9 BACK PAIN WITH HISTORY OF SPINAL SURGERY: ICD-10-CM

## 2020-12-18 DIAGNOSIS — G89.29 ACUTE EXACERBATION OF CHRONIC LOW BACK PAIN: ICD-10-CM

## 2020-12-18 DIAGNOSIS — M54.50 ACUTE EXACERBATION OF CHRONIC LOW BACK PAIN: ICD-10-CM

## 2020-12-18 DIAGNOSIS — M54.17 LUMBOSACRAL RADICULOPATHY: Primary | ICD-10-CM

## 2020-12-18 PROCEDURE — 99214 OFFICE O/P EST MOD 30 MIN: CPT | Mod: 25,S$GLB,, | Performed by: PREVENTIVE MEDICINE

## 2020-12-18 PROCEDURE — 96372 THER/PROPH/DIAG INJ SC/IM: CPT | Mod: S$GLB,,, | Performed by: PREVENTIVE MEDICINE

## 2020-12-18 PROCEDURE — 96372 PR INJECTION,THERAP/PROPH/DIAG2ST, IM OR SUBCUT: ICD-10-PCS | Mod: S$GLB,,, | Performed by: PREVENTIVE MEDICINE

## 2020-12-18 PROCEDURE — 99214 PR OFFICE/OUTPT VISIT, EST, LEVL IV, 30-39 MIN: ICD-10-PCS | Mod: 25,S$GLB,, | Performed by: PREVENTIVE MEDICINE

## 2020-12-18 RX ORDER — KETOROLAC TROMETHAMINE 30 MG/ML
60 INJECTION, SOLUTION INTRAMUSCULAR; INTRAVENOUS
Status: COMPLETED | OUTPATIENT
Start: 2020-12-18 | End: 2020-12-18

## 2020-12-18 RX ORDER — CYCLOBENZAPRINE HCL 10 MG
10 TABLET ORAL NIGHTLY
Qty: 30 TABLET | Refills: 0 | Status: SHIPPED | OUTPATIENT
Start: 2020-12-18 | End: 2020-12-28

## 2020-12-18 RX ORDER — HYDROCODONE BITARTRATE AND ACETAMINOPHEN 5; 325 MG/1; MG/1
1 TABLET ORAL EVERY 6 HOURS PRN
Qty: 15 TABLET | Refills: 0 | Status: SHIPPED | OUTPATIENT
Start: 2020-12-18 | End: 2021-01-13 | Stop reason: ALTCHOICE

## 2020-12-18 RX ADMIN — KETOROLAC TROMETHAMINE 60 MG: 30 INJECTION, SOLUTION INTRAMUSCULAR; INTRAVENOUS at 03:12

## 2020-12-18 NOTE — LETTER
[facility] [place]  [address], [address]  [place] [address]  Phone: [phone]  Fax: [phone]  [facility]: [id number]    Pt Name: Laz Quintero  Injury Date: 10/24/2020   Employee ID: [id number] Date of Treatment: 12/18/2020   Company: [employer]      Appointment Time: 04:30 PM Arrived: 2:58 PM   Provider: Neto Houser MD Time Out: 3:55 PM     Office Treatment:   1. Lumbosacral radiculopathy    2. Acute low back pain with left-sided sciatica, unspecified back pain laterality    3. Dorsalgia, unspecified    4. Acute exacerbation of chronic low back pain    5. Back pain with history of spinal surgery      Medications Ordered This Encounter   Medications    cyclobenzaprine (FLEXERIL) 10 MG tablet    HYDROcodone-acetaminophen (NORCO) 5-325 mg per tablet    ketorolac injection 60 mg      Patient Instructions: Daily home exercises/warm soaks      Restrictions: Home today, No lifting/pushing/pulling more than 10 lbs, Avoid frequent bending/lifting/twisting, Sedentary work only(Patient will remain home for 1 week and resume light duty on 12/28/2020)     Return Appointment:     1/4/2021 at 9:30 AM     SH

## 2020-12-18 NOTE — PROGRESS NOTES
Subjective:       Patient ID: Laz Quintero is a 50 y.o. female.    Chief Complaint: Back Pain     Follow-up of Back Pain ( DOI 10- ) Pain score today is 8/10 with complaints of Constant Stabbing/Throbbing pain that radiates down LT Leg, Mid Back pain caused by a very strong sneeze, Stiffness, Numbness/Tingling of LT Foot/Toes, Pain w/walking,sitting,standing. Taking IBP 800mg, Robaxin 750mg, Daily home exercises w/wm soaks, PT. SH    Back Pain  Associated symptoms include numbness. Pertinent negatives include no abdominal pain, bladder incontinence, bowel incontinence or dysuria.       Constitution: Negative for fatigue.   Gastrointestinal: Negative for abdominal pain and bowel incontinence.   Genitourinary: Negative for dysuria, urgency, bladder incontinence and hematuria.   Musculoskeletal: Positive for joint pain, abnormal ROM of joint and back pain. Negative for muscle cramps and history of spine disorder.   Skin: Negative for rash.   Neurological: Positive for numbness and tingling. Negative for coordination disturbances.        Objective:      Physical Exam  Musculoskeletal:      Thoracic back: She exhibits decreased range of motion, tenderness, pain and spasm. She exhibits no bony tenderness, no swelling, no edema, no deformity, no laceration and normal pulse.      Lumbar back: She exhibits decreased range of motion, tenderness, pain and spasm. She exhibits no bony tenderness, no swelling, no edema, no deformity, no laceration and normal pulse.        Back:       Comments: Patient has increased pain with spasm about her left thoracic and lower lumbar area noted with both palpation and range of motion testing.  She is now unable to flex her back due to pain and spasm beyond 25°, in unable to extend beyond 10°.  The spasm causes radiating pain her left buttock and left leg.  She has a positive straight leg raising on the left at 45°.  Her deep tendon reflexes are equal.  Distal pulses are equal  and intact.         Assessment:       1. Lumbosacral radiculopathy    2. Acute low back pain with left-sided sciatica, unspecified back pain laterality    3. Dorsalgia, unspecified    4. Acute exacerbation of chronic low back pain    5. Back pain with history of spinal surgery        Plan:       Patient reaggravated her left low back and thoracic muscle sprain and this has caused increasing pain and spasm.  She will therefore need a continuation of her physical therapy and remain home for 1 week until she has resolved her acute exasperation of low back injury.  Medications Ordered This Encounter   Medications    cyclobenzaprine (FLEXERIL) 10 MG tablet     Sig: Take 1 tablet (10 mg total) by mouth every evening. for 10 days     Dispense:  30 tablet     Refill:  0    HYDROcodone-acetaminophen (NORCO) 5-325 mg per tablet     Sig: Take 1 tablet by mouth every 6 (six) hours as needed for Pain.     Dispense:  15 tablet     Refill:  0     Quantity prescribed more than 7 day supply? Yes, quantity medically necessary    ketorolac injection 60 mg     Patient Instructions: Daily home exercises/warm soaks   Restrictions: Home today, No lifting/pushing/pulling more than 10 lbs, Avoid frequent bending/lifting/twisting, Sedentary work only(Patient will remain home for 1 week and resume light duty on 12/28/2020)  Follow up in about 17 days (around 1/4/2021).

## 2021-01-06 ENCOUNTER — OFFICE VISIT (OUTPATIENT)
Dept: URGENT CARE | Facility: CLINIC | Age: 51
End: 2021-01-06
Payer: COMMERCIAL

## 2021-01-06 DIAGNOSIS — G89.29 ACUTE EXACERBATION OF CHRONIC LOW BACK PAIN: ICD-10-CM

## 2021-01-06 DIAGNOSIS — M54.42 ACUTE LOW BACK PAIN WITH LEFT-SIDED SCIATICA, UNSPECIFIED BACK PAIN LATERALITY: ICD-10-CM

## 2021-01-06 DIAGNOSIS — M54.9 DORSALGIA, UNSPECIFIED: ICD-10-CM

## 2021-01-06 DIAGNOSIS — M54.17 LUMBOSACRAL RADICULOPATHY: Primary | ICD-10-CM

## 2021-01-06 DIAGNOSIS — M54.50 ACUTE EXACERBATION OF CHRONIC LOW BACK PAIN: ICD-10-CM

## 2021-01-06 PROCEDURE — 99214 OFFICE O/P EST MOD 30 MIN: CPT | Mod: S$GLB,,, | Performed by: PREVENTIVE MEDICINE

## 2021-01-06 PROCEDURE — 99214 PR OFFICE/OUTPT VISIT, EST, LEVL IV, 30-39 MIN: ICD-10-PCS | Mod: S$GLB,,, | Performed by: PREVENTIVE MEDICINE

## 2021-01-13 ENCOUNTER — OFFICE VISIT (OUTPATIENT)
Dept: URGENT CARE | Facility: CLINIC | Age: 51
End: 2021-01-13
Payer: COMMERCIAL

## 2021-01-13 DIAGNOSIS — M54.42 ACUTE LOW BACK PAIN WITH LEFT-SIDED SCIATICA, UNSPECIFIED BACK PAIN LATERALITY: ICD-10-CM

## 2021-01-13 DIAGNOSIS — M54.50 ACUTE EXACERBATION OF CHRONIC LOW BACK PAIN: ICD-10-CM

## 2021-01-13 DIAGNOSIS — Z98.890 BACK PAIN WITH HISTORY OF SPINAL SURGERY: ICD-10-CM

## 2021-01-13 DIAGNOSIS — G89.29 ACUTE EXACERBATION OF CHRONIC LOW BACK PAIN: ICD-10-CM

## 2021-01-13 DIAGNOSIS — M54.17 LUMBOSACRAL RADICULOPATHY: Primary | ICD-10-CM

## 2021-01-13 DIAGNOSIS — M54.9 BACK PAIN WITH HISTORY OF SPINAL SURGERY: ICD-10-CM

## 2021-01-13 DIAGNOSIS — M54.9 DORSALGIA, UNSPECIFIED: ICD-10-CM

## 2021-01-13 PROCEDURE — 99214 PR OFFICE/OUTPT VISIT, EST, LEVL IV, 30-39 MIN: ICD-10-PCS | Mod: 25,S$GLB,, | Performed by: PREVENTIVE MEDICINE

## 2021-01-13 PROCEDURE — 99214 OFFICE O/P EST MOD 30 MIN: CPT | Mod: 25,S$GLB,, | Performed by: PREVENTIVE MEDICINE

## 2021-01-13 PROCEDURE — 96372 THER/PROPH/DIAG INJ SC/IM: CPT | Mod: S$GLB,,, | Performed by: PREVENTIVE MEDICINE

## 2021-01-13 PROCEDURE — 96372 PR INJECTION,THERAP/PROPH/DIAG2ST, IM OR SUBCUT: ICD-10-PCS | Mod: S$GLB,,, | Performed by: PREVENTIVE MEDICINE

## 2021-01-13 RX ORDER — HYDROCODONE BITARTRATE AND ACETAMINOPHEN 5; 325 MG/1; MG/1
1 TABLET ORAL EVERY 6 HOURS PRN
Qty: 20 TABLET | Refills: 0 | Status: SHIPPED | OUTPATIENT
Start: 2021-01-13 | End: 2021-03-18 | Stop reason: ALTCHOICE

## 2021-01-13 RX ORDER — GABAPENTIN 300 MG/1
300 CAPSULE ORAL 3 TIMES DAILY
Qty: 40 CAPSULE | Refills: 1 | Status: SHIPPED | OUTPATIENT
Start: 2021-01-13 | End: 2021-01-20 | Stop reason: SDUPTHER

## 2021-01-13 RX ORDER — KETOROLAC TROMETHAMINE 30 MG/ML
60 INJECTION, SOLUTION INTRAMUSCULAR; INTRAVENOUS
Status: COMPLETED | OUTPATIENT
Start: 2021-01-13 | End: 2021-01-13

## 2021-01-13 RX ADMIN — KETOROLAC TROMETHAMINE 60 MG: 30 INJECTION, SOLUTION INTRAMUSCULAR; INTRAVENOUS at 03:01

## 2021-01-16 ENCOUNTER — HOSPITAL ENCOUNTER (OUTPATIENT)
Dept: RADIOLOGY | Facility: HOSPITAL | Age: 51
Discharge: HOME | End: 2021-01-16
Attending: PREVENTIVE MEDICINE
Payer: COMMERCIAL

## 2021-01-16 DIAGNOSIS — M54.9 DORSALGIA, UNSPECIFIED: ICD-10-CM

## 2021-01-16 PROCEDURE — 72148 MRI LUMBAR SPINE W/O DYE: CPT | Mod: 26,,, | Performed by: RADIOLOGY

## 2021-01-16 PROCEDURE — 72148 MRI LUMBAR SPINE W/O DYE: CPT | Mod: TC

## 2021-01-16 PROCEDURE — 72148 MRI LUMBAR SPINE WITHOUT CONTRAST: ICD-10-PCS | Mod: 26,,, | Performed by: RADIOLOGY

## 2021-01-20 ENCOUNTER — OFFICE VISIT (OUTPATIENT)
Dept: URGENT CARE | Facility: CLINIC | Age: 51
End: 2021-01-20
Payer: COMMERCIAL

## 2021-01-20 DIAGNOSIS — M54.9 BACK PAIN WITH HISTORY OF SPINAL SURGERY: ICD-10-CM

## 2021-01-20 DIAGNOSIS — Z98.890 BACK PAIN WITH HISTORY OF SPINAL SURGERY: ICD-10-CM

## 2021-01-20 DIAGNOSIS — M54.9 DORSALGIA, UNSPECIFIED: ICD-10-CM

## 2021-01-20 DIAGNOSIS — M51.26 HERNIATED LUMBAR INTERVERTEBRAL DISC: Primary | ICD-10-CM

## 2021-01-20 DIAGNOSIS — M54.42 ACUTE LOW BACK PAIN WITH LEFT-SIDED SCIATICA, UNSPECIFIED BACK PAIN LATERALITY: ICD-10-CM

## 2021-01-20 DIAGNOSIS — G89.29 ACUTE EXACERBATION OF CHRONIC LOW BACK PAIN: ICD-10-CM

## 2021-01-20 DIAGNOSIS — M54.17 LUMBOSACRAL RADICULOPATHY: ICD-10-CM

## 2021-01-20 DIAGNOSIS — M54.50 ACUTE EXACERBATION OF CHRONIC LOW BACK PAIN: ICD-10-CM

## 2021-01-20 PROCEDURE — 99214 PR OFFICE/OUTPT VISIT, EST, LEVL IV, 30-39 MIN: ICD-10-PCS | Mod: 25,S$GLB,, | Performed by: PREVENTIVE MEDICINE

## 2021-01-20 PROCEDURE — 96372 THER/PROPH/DIAG INJ SC/IM: CPT | Mod: S$GLB,,, | Performed by: PREVENTIVE MEDICINE

## 2021-01-20 PROCEDURE — 96372 PR INJECTION,THERAP/PROPH/DIAG2ST, IM OR SUBCUT: ICD-10-PCS | Mod: S$GLB,,, | Performed by: PREVENTIVE MEDICINE

## 2021-01-20 PROCEDURE — 99214 OFFICE O/P EST MOD 30 MIN: CPT | Mod: 25,S$GLB,, | Performed by: PREVENTIVE MEDICINE

## 2021-01-20 RX ORDER — GABAPENTIN 300 MG/1
300 CAPSULE ORAL 3 TIMES DAILY
Qty: 40 CAPSULE | Refills: 1 | Status: SHIPPED | OUTPATIENT
Start: 2021-01-20 | End: 2021-01-27 | Stop reason: ALTCHOICE

## 2021-01-20 RX ORDER — KETOROLAC TROMETHAMINE 30 MG/ML
60 INJECTION, SOLUTION INTRAMUSCULAR; INTRAVENOUS
Status: COMPLETED | OUTPATIENT
Start: 2021-01-20 | End: 2021-01-20

## 2021-01-20 RX ADMIN — KETOROLAC TROMETHAMINE 60 MG: 30 INJECTION, SOLUTION INTRAMUSCULAR; INTRAVENOUS at 03:01

## 2021-01-27 ENCOUNTER — OFFICE VISIT (OUTPATIENT)
Dept: URGENT CARE | Facility: CLINIC | Age: 51
End: 2021-01-27
Payer: COMMERCIAL

## 2021-01-27 DIAGNOSIS — M51.26 HERNIATED LUMBAR INTERVERTEBRAL DISC: Primary | ICD-10-CM

## 2021-01-27 DIAGNOSIS — M54.9 BACK PAIN WITH HISTORY OF SPINAL SURGERY: ICD-10-CM

## 2021-01-27 DIAGNOSIS — M54.50 ACUTE EXACERBATION OF CHRONIC LOW BACK PAIN: ICD-10-CM

## 2021-01-27 DIAGNOSIS — Z98.890 BACK PAIN WITH HISTORY OF SPINAL SURGERY: ICD-10-CM

## 2021-01-27 DIAGNOSIS — M54.17 LUMBOSACRAL RADICULOPATHY: ICD-10-CM

## 2021-01-27 DIAGNOSIS — M54.42 ACUTE LOW BACK PAIN WITH LEFT-SIDED SCIATICA, UNSPECIFIED BACK PAIN LATERALITY: ICD-10-CM

## 2021-01-27 DIAGNOSIS — G89.29 ACUTE EXACERBATION OF CHRONIC LOW BACK PAIN: ICD-10-CM

## 2021-01-27 DIAGNOSIS — M54.9 DORSALGIA, UNSPECIFIED: ICD-10-CM

## 2021-01-27 PROCEDURE — 99214 OFFICE O/P EST MOD 30 MIN: CPT | Mod: S$GLB,,, | Performed by: PREVENTIVE MEDICINE

## 2021-01-27 PROCEDURE — 99214 PR OFFICE/OUTPT VISIT, EST, LEVL IV, 30-39 MIN: ICD-10-PCS | Mod: S$GLB,,, | Performed by: PREVENTIVE MEDICINE

## 2021-01-27 RX ORDER — DICLOFENAC SODIUM 10 MG/G
2 GEL TOPICAL DAILY
Qty: 100 G | Refills: 1 | Status: SHIPPED | OUTPATIENT
Start: 2021-01-27 | End: 2021-02-24 | Stop reason: SDUPTHER

## 2021-01-27 RX ORDER — IBUPROFEN 800 MG/1
800 TABLET ORAL 3 TIMES DAILY
Qty: 30 TABLET | Refills: 0 | Status: SHIPPED | OUTPATIENT
Start: 2021-01-27 | End: 2021-03-31 | Stop reason: SDUPTHER

## 2021-01-27 RX ORDER — GABAPENTIN 300 MG/1
300 CAPSULE ORAL 3 TIMES DAILY
Qty: 50 CAPSULE | Refills: 2 | Status: SHIPPED | OUTPATIENT
Start: 2021-01-27 | End: 2021-02-10 | Stop reason: SDUPTHER

## 2021-02-10 ENCOUNTER — OFFICE VISIT (OUTPATIENT)
Dept: URGENT CARE | Facility: CLINIC | Age: 51
End: 2021-02-10
Payer: COMMERCIAL

## 2021-02-10 DIAGNOSIS — G89.29 ACUTE EXACERBATION OF CHRONIC LOW BACK PAIN: ICD-10-CM

## 2021-02-10 DIAGNOSIS — M54.9 BACK PAIN WITH HISTORY OF SPINAL SURGERY: ICD-10-CM

## 2021-02-10 DIAGNOSIS — M51.26 HERNIATED LUMBAR INTERVERTEBRAL DISC: Primary | ICD-10-CM

## 2021-02-10 DIAGNOSIS — M54.17 LUMBOSACRAL RADICULOPATHY: ICD-10-CM

## 2021-02-10 DIAGNOSIS — M54.50 ACUTE EXACERBATION OF CHRONIC LOW BACK PAIN: ICD-10-CM

## 2021-02-10 DIAGNOSIS — M54.42 ACUTE LOW BACK PAIN WITH LEFT-SIDED SCIATICA, UNSPECIFIED BACK PAIN LATERALITY: ICD-10-CM

## 2021-02-10 DIAGNOSIS — M54.9 DORSALGIA, UNSPECIFIED: ICD-10-CM

## 2021-02-10 DIAGNOSIS — Z98.890 BACK PAIN WITH HISTORY OF SPINAL SURGERY: ICD-10-CM

## 2021-02-10 PROCEDURE — 96372 PR INJECTION,THERAP/PROPH/DIAG2ST, IM OR SUBCUT: ICD-10-PCS | Mod: S$GLB,,, | Performed by: PREVENTIVE MEDICINE

## 2021-02-10 PROCEDURE — 96372 THER/PROPH/DIAG INJ SC/IM: CPT | Mod: S$GLB,,, | Performed by: PREVENTIVE MEDICINE

## 2021-02-10 PROCEDURE — 99214 PR OFFICE/OUTPT VISIT, EST, LEVL IV, 30-39 MIN: ICD-10-PCS | Mod: 25,S$GLB,, | Performed by: PREVENTIVE MEDICINE

## 2021-02-10 PROCEDURE — 99214 OFFICE O/P EST MOD 30 MIN: CPT | Mod: 25,S$GLB,, | Performed by: PREVENTIVE MEDICINE

## 2021-02-10 RX ORDER — CYCLOBENZAPRINE HCL 10 MG
10 TABLET ORAL NIGHTLY
Qty: 30 TABLET | Refills: 1 | Status: SHIPPED | OUTPATIENT
Start: 2021-02-10 | End: 2021-02-20

## 2021-02-10 RX ORDER — GABAPENTIN 300 MG/1
300 CAPSULE ORAL 3 TIMES DAILY
Qty: 50 CAPSULE | Refills: 2 | Status: SHIPPED | OUTPATIENT
Start: 2021-02-10 | End: 2021-02-24 | Stop reason: SDUPTHER

## 2021-02-10 RX ORDER — KETOROLAC TROMETHAMINE 30 MG/ML
60 INJECTION, SOLUTION INTRAMUSCULAR; INTRAVENOUS
Status: COMPLETED | OUTPATIENT
Start: 2021-02-10 | End: 2021-02-10

## 2021-02-10 RX ORDER — TRAMADOL HYDROCHLORIDE 50 MG/1
50 TABLET ORAL EVERY 8 HOURS PRN
Qty: 30 TABLET | Refills: 0 | Status: SHIPPED | OUTPATIENT
Start: 2021-02-10 | End: 2021-02-20

## 2021-02-10 RX ADMIN — KETOROLAC TROMETHAMINE 60 MG: 30 INJECTION, SOLUTION INTRAMUSCULAR; INTRAVENOUS at 05:02

## 2021-02-24 ENCOUNTER — OFFICE VISIT (OUTPATIENT)
Dept: URGENT CARE | Facility: CLINIC | Age: 51
End: 2021-02-24
Payer: COMMERCIAL

## 2021-02-24 DIAGNOSIS — M54.50 ACUTE EXACERBATION OF CHRONIC LOW BACK PAIN: ICD-10-CM

## 2021-02-24 DIAGNOSIS — M54.17 LUMBOSACRAL RADICULOPATHY: ICD-10-CM

## 2021-02-24 DIAGNOSIS — Z98.890 BACK PAIN WITH HISTORY OF SPINAL SURGERY: ICD-10-CM

## 2021-02-24 DIAGNOSIS — M51.26 HERNIATED LUMBAR INTERVERTEBRAL DISC: Primary | ICD-10-CM

## 2021-02-24 DIAGNOSIS — M54.42 ACUTE LOW BACK PAIN WITH LEFT-SIDED SCIATICA, UNSPECIFIED BACK PAIN LATERALITY: ICD-10-CM

## 2021-02-24 DIAGNOSIS — M54.9 BACK PAIN WITH HISTORY OF SPINAL SURGERY: ICD-10-CM

## 2021-02-24 DIAGNOSIS — M54.9 DORSALGIA, UNSPECIFIED: ICD-10-CM

## 2021-02-24 DIAGNOSIS — G89.29 ACUTE EXACERBATION OF CHRONIC LOW BACK PAIN: ICD-10-CM

## 2021-02-24 PROCEDURE — 96372 PR INJECTION,THERAP/PROPH/DIAG2ST, IM OR SUBCUT: ICD-10-PCS | Mod: S$GLB,,, | Performed by: PREVENTIVE MEDICINE

## 2021-02-24 PROCEDURE — 99214 PR OFFICE/OUTPT VISIT, EST, LEVL IV, 30-39 MIN: ICD-10-PCS | Mod: 25,S$GLB,, | Performed by: PREVENTIVE MEDICINE

## 2021-02-24 PROCEDURE — 99214 OFFICE O/P EST MOD 30 MIN: CPT | Mod: 25,S$GLB,, | Performed by: PREVENTIVE MEDICINE

## 2021-02-24 PROCEDURE — 96372 THER/PROPH/DIAG INJ SC/IM: CPT | Mod: S$GLB,,, | Performed by: PREVENTIVE MEDICINE

## 2021-02-24 RX ORDER — METHYLPREDNISOLONE 4 MG/1
TABLET ORAL
Qty: 1 PACKAGE | Refills: 0 | Status: SHIPPED | OUTPATIENT
Start: 2021-02-24 | End: 2021-04-20 | Stop reason: ALTCHOICE

## 2021-02-24 RX ORDER — KETOROLAC TROMETHAMINE 30 MG/ML
60 INJECTION, SOLUTION INTRAMUSCULAR; INTRAVENOUS
Status: COMPLETED | OUTPATIENT
Start: 2021-02-24 | End: 2021-02-24

## 2021-02-24 RX ORDER — DICLOFENAC SODIUM 10 MG/G
2 GEL TOPICAL DAILY
Qty: 100 G | Refills: 1 | Status: SHIPPED | OUTPATIENT
Start: 2021-02-24 | End: 2024-01-05

## 2021-02-24 RX ORDER — GABAPENTIN 300 MG/1
300 CAPSULE ORAL 3 TIMES DAILY
Qty: 50 CAPSULE | Refills: 2 | Status: SHIPPED | OUTPATIENT
Start: 2021-02-24 | End: 2021-03-31 | Stop reason: SDUPTHER

## 2021-02-24 RX ORDER — CYCLOBENZAPRINE HCL 10 MG
10 TABLET ORAL NIGHTLY
Qty: 30 TABLET | Refills: 0 | Status: SHIPPED | OUTPATIENT
Start: 2021-02-24 | End: 2021-03-06

## 2021-02-24 RX ADMIN — KETOROLAC TROMETHAMINE 60 MG: 30 INJECTION, SOLUTION INTRAMUSCULAR; INTRAVENOUS at 03:02

## 2021-03-18 ENCOUNTER — OFFICE VISIT (OUTPATIENT)
Dept: URGENT CARE | Facility: CLINIC | Age: 51
End: 2021-03-18
Payer: COMMERCIAL

## 2021-03-18 DIAGNOSIS — M54.50 ACUTE EXACERBATION OF CHRONIC LOW BACK PAIN: ICD-10-CM

## 2021-03-18 DIAGNOSIS — G89.29 ACUTE EXACERBATION OF CHRONIC LOW BACK PAIN: ICD-10-CM

## 2021-03-18 DIAGNOSIS — M54.9 BACK PAIN WITH HISTORY OF SPINAL SURGERY: ICD-10-CM

## 2021-03-18 DIAGNOSIS — M51.26 HERNIATED LUMBAR INTERVERTEBRAL DISC: Primary | ICD-10-CM

## 2021-03-18 DIAGNOSIS — M54.42 ACUTE LOW BACK PAIN WITH LEFT-SIDED SCIATICA, UNSPECIFIED BACK PAIN LATERALITY: ICD-10-CM

## 2021-03-18 DIAGNOSIS — M54.17 LUMBOSACRAL RADICULOPATHY: ICD-10-CM

## 2021-03-18 DIAGNOSIS — Z98.890 BACK PAIN WITH HISTORY OF SPINAL SURGERY: ICD-10-CM

## 2021-03-18 DIAGNOSIS — M54.9 DORSALGIA, UNSPECIFIED: ICD-10-CM

## 2021-03-18 PROCEDURE — 99214 OFFICE O/P EST MOD 30 MIN: CPT | Mod: 25,S$GLB,, | Performed by: PREVENTIVE MEDICINE

## 2021-03-18 PROCEDURE — 99214 PR OFFICE/OUTPT VISIT, EST, LEVL IV, 30-39 MIN: ICD-10-PCS | Mod: 25,S$GLB,, | Performed by: PREVENTIVE MEDICINE

## 2021-03-18 PROCEDURE — 96372 PR INJECTION,THERAP/PROPH/DIAG2ST, IM OR SUBCUT: ICD-10-PCS | Mod: S$GLB,,, | Performed by: PREVENTIVE MEDICINE

## 2021-03-18 PROCEDURE — 96372 THER/PROPH/DIAG INJ SC/IM: CPT | Mod: S$GLB,,, | Performed by: PREVENTIVE MEDICINE

## 2021-03-18 RX ORDER — KETOROLAC TROMETHAMINE 30 MG/ML
60 INJECTION, SOLUTION INTRAMUSCULAR; INTRAVENOUS
Status: COMPLETED | OUTPATIENT
Start: 2021-03-18 | End: 2021-03-18

## 2021-03-18 RX ORDER — HYDROCODONE BITARTRATE AND ACETAMINOPHEN 10; 325 MG/1; MG/1
1 TABLET ORAL EVERY 6 HOURS PRN
Qty: 20 TABLET | Refills: 0 | Status: SHIPPED | OUTPATIENT
Start: 2021-03-18 | End: 2021-04-20 | Stop reason: SDUPTHER

## 2021-03-18 RX ADMIN — KETOROLAC TROMETHAMINE 60 MG: 30 INJECTION, SOLUTION INTRAMUSCULAR; INTRAVENOUS at 09:03

## 2021-03-31 ENCOUNTER — OFFICE VISIT (OUTPATIENT)
Dept: URGENT CARE | Facility: CLINIC | Age: 51
End: 2021-03-31
Payer: COMMERCIAL

## 2021-03-31 VITALS — BODY MASS INDEX: 24.43 KG/M2 | HEIGHT: 66 IN | WEIGHT: 152 LBS

## 2021-03-31 DIAGNOSIS — M54.42 ACUTE LOW BACK PAIN WITH LEFT-SIDED SCIATICA, UNSPECIFIED BACK PAIN LATERALITY: ICD-10-CM

## 2021-03-31 DIAGNOSIS — Z98.890 BACK PAIN WITH HISTORY OF SPINAL SURGERY: ICD-10-CM

## 2021-03-31 DIAGNOSIS — G89.29 ACUTE EXACERBATION OF CHRONIC LOW BACK PAIN: ICD-10-CM

## 2021-03-31 DIAGNOSIS — M51.26 HERNIATED LUMBAR INTERVERTEBRAL DISC: Primary | ICD-10-CM

## 2021-03-31 DIAGNOSIS — M54.9 DORSALGIA, UNSPECIFIED: ICD-10-CM

## 2021-03-31 DIAGNOSIS — M54.9 BACK PAIN WITH HISTORY OF SPINAL SURGERY: ICD-10-CM

## 2021-03-31 DIAGNOSIS — M54.50 ACUTE EXACERBATION OF CHRONIC LOW BACK PAIN: ICD-10-CM

## 2021-03-31 DIAGNOSIS — M54.17 LUMBOSACRAL RADICULOPATHY: ICD-10-CM

## 2021-03-31 PROCEDURE — 99214 OFFICE O/P EST MOD 30 MIN: CPT | Mod: S$GLB,,, | Performed by: PREVENTIVE MEDICINE

## 2021-03-31 PROCEDURE — 99214 PR OFFICE/OUTPT VISIT, EST, LEVL IV, 30-39 MIN: ICD-10-PCS | Mod: S$GLB,,, | Performed by: PREVENTIVE MEDICINE

## 2021-03-31 RX ORDER — CYCLOBENZAPRINE HCL 10 MG
10 TABLET ORAL NIGHTLY
Qty: 30 TABLET | Refills: 0 | Status: SHIPPED | OUTPATIENT
Start: 2021-03-31 | End: 2021-04-10

## 2021-03-31 RX ORDER — IBUPROFEN 800 MG/1
800 TABLET ORAL 3 TIMES DAILY
Qty: 50 TABLET | Refills: 0 | Status: SHIPPED | OUTPATIENT
Start: 2021-03-31 | End: 2022-01-11

## 2021-03-31 RX ORDER — GABAPENTIN 300 MG/1
300 CAPSULE ORAL 3 TIMES DAILY
Qty: 50 CAPSULE | Refills: 2 | Status: SHIPPED | OUTPATIENT
Start: 2021-03-31 | End: 2023-03-24

## 2021-04-05 ENCOUNTER — PATIENT MESSAGE (OUTPATIENT)
Dept: ADMINISTRATIVE | Facility: HOSPITAL | Age: 51
End: 2021-04-05

## 2021-04-20 ENCOUNTER — OFFICE VISIT (OUTPATIENT)
Dept: URGENT CARE | Facility: CLINIC | Age: 51
End: 2021-04-20
Payer: COMMERCIAL

## 2021-04-20 DIAGNOSIS — M54.17 LUMBOSACRAL RADICULOPATHY: ICD-10-CM

## 2021-04-20 DIAGNOSIS — G89.29 ACUTE EXACERBATION OF CHRONIC LOW BACK PAIN: ICD-10-CM

## 2021-04-20 DIAGNOSIS — M54.50 ACUTE EXACERBATION OF CHRONIC LOW BACK PAIN: ICD-10-CM

## 2021-04-20 DIAGNOSIS — Z98.890 BACK PAIN WITH HISTORY OF SPINAL SURGERY: ICD-10-CM

## 2021-04-20 DIAGNOSIS — M54.9 BACK PAIN WITH HISTORY OF SPINAL SURGERY: ICD-10-CM

## 2021-04-20 DIAGNOSIS — M54.9 DORSALGIA, UNSPECIFIED: ICD-10-CM

## 2021-04-20 DIAGNOSIS — M54.42 ACUTE LOW BACK PAIN WITH LEFT-SIDED SCIATICA, UNSPECIFIED BACK PAIN LATERALITY: ICD-10-CM

## 2021-04-20 DIAGNOSIS — M51.26 HERNIATED LUMBAR INTERVERTEBRAL DISC: Primary | ICD-10-CM

## 2021-04-20 PROCEDURE — 99214 OFFICE O/P EST MOD 30 MIN: CPT | Mod: S$GLB,,, | Performed by: PREVENTIVE MEDICINE

## 2021-04-20 PROCEDURE — 99214 PR OFFICE/OUTPT VISIT, EST, LEVL IV, 30-39 MIN: ICD-10-PCS | Mod: S$GLB,,, | Performed by: PREVENTIVE MEDICINE

## 2021-04-20 RX ORDER — HYDROCODONE BITARTRATE AND ACETAMINOPHEN 10; 325 MG/1; MG/1
1 TABLET ORAL EVERY 6 HOURS PRN
Qty: 20 TABLET | Refills: 0 | Status: SHIPPED | OUTPATIENT
Start: 2021-04-20 | End: 2021-05-14 | Stop reason: SDUPTHER

## 2021-04-20 RX ORDER — METHYLPREDNISOLONE 4 MG/1
TABLET ORAL
Qty: 1 PACKAGE | Refills: 0 | Status: SHIPPED | OUTPATIENT
Start: 2021-04-20 | End: 2022-01-11

## 2021-04-20 RX ORDER — CYCLOBENZAPRINE HCL 10 MG
10 TABLET ORAL NIGHTLY
Qty: 30 TABLET | Refills: 0 | Status: SHIPPED | OUTPATIENT
Start: 2021-04-20 | End: 2021-04-30

## 2021-05-03 ENCOUNTER — OFFICE VISIT (OUTPATIENT)
Dept: URGENT CARE | Facility: CLINIC | Age: 51
End: 2021-05-03
Payer: COMMERCIAL

## 2021-05-03 VITALS
TEMPERATURE: 99 F | OXYGEN SATURATION: 100 % | WEIGHT: 153 LBS | DIASTOLIC BLOOD PRESSURE: 70 MMHG | HEART RATE: 86 BPM | BODY MASS INDEX: 24.59 KG/M2 | HEIGHT: 66 IN | SYSTOLIC BLOOD PRESSURE: 149 MMHG

## 2021-05-03 DIAGNOSIS — Z20.822 CONTACT WITH AND (SUSPECTED) EXPOSURE TO COVID-19: ICD-10-CM

## 2021-05-03 DIAGNOSIS — J30.2 SEASONAL ALLERGIC RHINITIS, UNSPECIFIED TRIGGER: Primary | ICD-10-CM

## 2021-05-03 DIAGNOSIS — R09.81 SINUS CONGESTION: ICD-10-CM

## 2021-05-03 DIAGNOSIS — R06.7 SNEEZING: ICD-10-CM

## 2021-05-03 LAB
CTP QC/QA: YES
SARS-COV-2 RDRP RESP QL NAA+PROBE: NEGATIVE

## 2021-05-03 PROCEDURE — 99214 PR OFFICE/OUTPT VISIT, EST, LEVL IV, 30-39 MIN: ICD-10-PCS | Mod: S$GLB,,, | Performed by: PHYSICIAN ASSISTANT

## 2021-05-03 PROCEDURE — 3008F BODY MASS INDEX DOCD: CPT | Mod: CPTII,S$GLB,, | Performed by: PHYSICIAN ASSISTANT

## 2021-05-03 PROCEDURE — 99214 OFFICE O/P EST MOD 30 MIN: CPT | Mod: S$GLB,,, | Performed by: PHYSICIAN ASSISTANT

## 2021-05-03 PROCEDURE — 3008F PR BODY MASS INDEX (BMI) DOCUMENTED: ICD-10-PCS | Mod: CPTII,S$GLB,, | Performed by: PHYSICIAN ASSISTANT

## 2021-05-03 PROCEDURE — U0002: ICD-10-PCS | Mod: QW,S$GLB,, | Performed by: PHYSICIAN ASSISTANT

## 2021-05-03 PROCEDURE — U0002 COVID-19 LAB TEST NON-CDC: HCPCS | Mod: QW,S$GLB,, | Performed by: PHYSICIAN ASSISTANT

## 2021-05-03 RX ORDER — TRAMADOL HYDROCHLORIDE 50 MG/1
50 TABLET ORAL EVERY 8 HOURS PRN
Status: ON HOLD | COMMUNITY
Start: 2021-02-25 | End: 2023-03-23 | Stop reason: HOSPADM

## 2021-05-03 RX ORDER — FLUTICASONE PROPIONATE 50 MCG
2 SPRAY, SUSPENSION (ML) NASAL DAILY
Qty: 11.1 ML | Refills: 0 | Status: SHIPPED | OUTPATIENT
Start: 2021-05-03 | End: 2024-01-05 | Stop reason: SDUPTHER

## 2021-05-14 ENCOUNTER — OFFICE VISIT (OUTPATIENT)
Dept: URGENT CARE | Facility: CLINIC | Age: 51
End: 2021-05-14
Payer: COMMERCIAL

## 2021-05-14 DIAGNOSIS — M54.42 ACUTE LOW BACK PAIN WITH LEFT-SIDED SCIATICA, UNSPECIFIED BACK PAIN LATERALITY: ICD-10-CM

## 2021-05-14 DIAGNOSIS — G89.29 ACUTE EXACERBATION OF CHRONIC LOW BACK PAIN: ICD-10-CM

## 2021-05-14 DIAGNOSIS — M54.9 DORSALGIA, UNSPECIFIED: ICD-10-CM

## 2021-05-14 DIAGNOSIS — Z98.890 BACK PAIN WITH HISTORY OF SPINAL SURGERY: ICD-10-CM

## 2021-05-14 DIAGNOSIS — M54.17 LUMBOSACRAL RADICULOPATHY: ICD-10-CM

## 2021-05-14 DIAGNOSIS — M54.50 ACUTE EXACERBATION OF CHRONIC LOW BACK PAIN: ICD-10-CM

## 2021-05-14 DIAGNOSIS — M51.26 HERNIATED LUMBAR INTERVERTEBRAL DISC: Primary | ICD-10-CM

## 2021-05-14 DIAGNOSIS — M54.9 BACK PAIN WITH HISTORY OF SPINAL SURGERY: ICD-10-CM

## 2021-05-14 PROCEDURE — 99214 PR OFFICE/OUTPT VISIT, EST, LEVL IV, 30-39 MIN: ICD-10-PCS | Mod: S$GLB,,, | Performed by: PREVENTIVE MEDICINE

## 2021-05-14 PROCEDURE — 99214 OFFICE O/P EST MOD 30 MIN: CPT | Mod: S$GLB,,, | Performed by: PREVENTIVE MEDICINE

## 2021-05-14 RX ORDER — HYDROCODONE BITARTRATE AND ACETAMINOPHEN 10; 325 MG/1; MG/1
1 TABLET ORAL EVERY 6 HOURS PRN
Qty: 20 TABLET | Refills: 0 | Status: SHIPPED | OUTPATIENT
Start: 2021-05-14 | End: 2022-01-11

## 2021-05-14 RX ORDER — CYCLOBENZAPRINE HCL 10 MG
10 TABLET ORAL NIGHTLY
Qty: 30 TABLET | Refills: 0 | Status: SHIPPED | OUTPATIENT
Start: 2021-05-14 | End: 2021-05-24

## 2021-05-18 ENCOUNTER — IMMUNIZATION (OUTPATIENT)
Dept: FAMILY MEDICINE | Facility: CLINIC | Age: 51
End: 2021-05-18
Payer: COMMERCIAL

## 2021-05-18 DIAGNOSIS — Z23 NEED FOR VACCINATION: Primary | ICD-10-CM

## 2021-05-18 PROCEDURE — 91300 COVID-19, MRNA, LNP-S, PF, 30 MCG/0.3 ML DOSE VACCINE: CPT | Mod: PBBFAC | Performed by: FAMILY MEDICINE

## 2021-07-07 ENCOUNTER — PATIENT MESSAGE (OUTPATIENT)
Dept: ADMINISTRATIVE | Facility: HOSPITAL | Age: 51
End: 2021-07-07

## 2021-07-13 ENCOUNTER — IMMUNIZATION (OUTPATIENT)
Dept: FAMILY MEDICINE | Facility: CLINIC | Age: 51
End: 2021-07-13
Payer: COMMERCIAL

## 2021-07-13 DIAGNOSIS — Z23 NEED FOR VACCINATION: Primary | ICD-10-CM

## 2021-07-13 PROCEDURE — 0002A COVID-19, MRNA, LNP-S, PF, 30 MCG/0.3 ML DOSE VACCINE: CPT | Mod: PBBFAC | Performed by: FAMILY MEDICINE

## 2021-07-13 PROCEDURE — 91300 COVID-19, MRNA, LNP-S, PF, 30 MCG/0.3 ML DOSE VACCINE: CPT | Mod: PBBFAC | Performed by: FAMILY MEDICINE

## 2021-10-04 ENCOUNTER — PATIENT MESSAGE (OUTPATIENT)
Dept: ADMINISTRATIVE | Facility: HOSPITAL | Age: 51
End: 2021-10-04

## 2021-10-07 ENCOUNTER — HOSPITAL ENCOUNTER (OUTPATIENT)
Dept: RADIOLOGY | Facility: HOSPITAL | Age: 51
Discharge: HOME | End: 2021-10-07
Attending: INTERNAL MEDICINE
Payer: COMMERCIAL

## 2021-10-07 VITALS — WEIGHT: 160 LBS | BODY MASS INDEX: 25.71 KG/M2 | HEIGHT: 66 IN

## 2021-10-07 DIAGNOSIS — Z12.31 OTHER SCREENING MAMMOGRAM: ICD-10-CM

## 2021-10-07 PROCEDURE — 77067 SCR MAMMO BI INCL CAD: CPT | Mod: 26,,, | Performed by: RADIOLOGY

## 2021-10-07 PROCEDURE — 77067 SCR MAMMO BI INCL CAD: CPT | Mod: TC

## 2021-10-07 PROCEDURE — 77067 MAMMO DIGITAL SCREENING BILAT WITH TOMO: ICD-10-PCS | Mod: 26,,, | Performed by: RADIOLOGY

## 2021-10-07 PROCEDURE — 77063 BREAST TOMOSYNTHESIS BI: CPT | Mod: 26,,, | Performed by: RADIOLOGY

## 2021-10-07 PROCEDURE — 77063 MAMMO DIGITAL SCREENING BILAT WITH TOMO: ICD-10-PCS | Mod: 26,,, | Performed by: RADIOLOGY

## 2021-10-13 ENCOUNTER — TELEPHONE (OUTPATIENT)
Dept: RADIOLOGY | Facility: HOSPITAL | Age: 51
End: 2021-10-13

## 2021-10-14 ENCOUNTER — HOSPITAL ENCOUNTER (OUTPATIENT)
Dept: RADIOLOGY | Facility: HOSPITAL | Age: 51
Discharge: HOME | End: 2021-10-14
Attending: INTERNAL MEDICINE
Payer: COMMERCIAL

## 2021-10-14 DIAGNOSIS — R92.8 ABNORMAL FINDING ON BREAST IMAGING: ICD-10-CM

## 2021-10-14 PROCEDURE — 76642 ULTRASOUND BREAST LIMITED: CPT | Mod: 26,LT,, | Performed by: RADIOLOGY

## 2021-10-14 PROCEDURE — 77061 MAMMO DIGITAL DIAGNOSTIC LEFT WITH TOMO: ICD-10-PCS | Mod: 26,LT,, | Performed by: RADIOLOGY

## 2021-10-14 PROCEDURE — 76642 ULTRASOUND BREAST LIMITED: CPT | Mod: TC,LT

## 2021-10-14 PROCEDURE — 77061 BREAST TOMOSYNTHESIS UNI: CPT | Mod: 26,LT,, | Performed by: RADIOLOGY

## 2021-10-14 PROCEDURE — 76642 US BREAST LEFT LIMITED: ICD-10-PCS | Mod: 26,LT,, | Performed by: RADIOLOGY

## 2021-10-14 PROCEDURE — 77065 MAMMO DIGITAL DIAGNOSTIC LEFT WITH TOMO: ICD-10-PCS | Mod: 26,LT,, | Performed by: RADIOLOGY

## 2021-10-14 PROCEDURE — 77061 BREAST TOMOSYNTHESIS UNI: CPT | Mod: TC,LT

## 2021-10-14 PROCEDURE — 77065 DX MAMMO INCL CAD UNI: CPT | Mod: 26,LT,, | Performed by: RADIOLOGY

## 2021-10-19 NOTE — PATIENT INSTRUCTIONS
Stop taking the nexium and all other antacids x 1 week. Then turn in your stool.   Once you've collected the stool, you may continue with the Nexium.   [Post Hospitalization] : a post hospitalization visit [Family Member] : family member

## 2022-01-26 ENCOUNTER — PATIENT MESSAGE (OUTPATIENT)
Dept: ADMINISTRATIVE | Facility: HOSPITAL | Age: 52
End: 2022-01-26
Payer: COMMERCIAL

## 2022-03-16 ENCOUNTER — PATIENT MESSAGE (OUTPATIENT)
Dept: ADMINISTRATIVE | Facility: HOSPITAL | Age: 52
End: 2022-03-16
Payer: COMMERCIAL

## 2022-03-26 ENCOUNTER — PATIENT MESSAGE (OUTPATIENT)
Dept: ADMINISTRATIVE | Facility: HOSPITAL | Age: 52
End: 2022-03-26
Payer: COMMERCIAL

## 2022-03-27 ENCOUNTER — OFFICE VISIT (OUTPATIENT)
Dept: URGENT CARE | Facility: CLINIC | Age: 52
End: 2022-03-27
Payer: COMMERCIAL

## 2022-03-27 VITALS
TEMPERATURE: 98 F | DIASTOLIC BLOOD PRESSURE: 82 MMHG | WEIGHT: 160 LBS | RESPIRATION RATE: 20 BRPM | SYSTOLIC BLOOD PRESSURE: 145 MMHG | HEART RATE: 89 BPM | OXYGEN SATURATION: 98 % | BODY MASS INDEX: 25.82 KG/M2
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
## 2022-03-27 PROCEDURE — 3079F PR MOST RECENT DIASTOLIC BLOOD PRESSURE 80-89 MM HG: ICD-10-PCS | Mod: CPTII,S$GLB,, | Performed by: FAMILY MEDICINE

## 2022-03-27 PROCEDURE — 3079F DIAST BP 80-89 MM HG: CPT | Mod: CPTII,S$GLB,, | Performed by: FAMILY MEDICINE

## 2022-03-27 PROCEDURE — 99213 OFFICE O/P EST LOW 20 MIN: CPT | Mod: 25,S$GLB,, | Performed by: FAMILY MEDICINE

## 2022-03-27 PROCEDURE — 96372 THER/PROPH/DIAG INJ SC/IM: CPT | Mod: S$GLB,,, | Performed by: FAMILY MEDICINE

## 2022-03-27 PROCEDURE — 3077F SYST BP >= 140 MM HG: CPT | Mod: CPTII,S$GLB,, | Performed by: FAMILY MEDICINE

## 2022-03-27 PROCEDURE — 3008F PR BODY MASS INDEX (BMI) DOCUMENTED: ICD-10-PCS | Mod: CPTII,S$GLB,, | Performed by: FAMILY MEDICINE

## 2022-03-27 PROCEDURE — 99213 PR OFFICE/OUTPT VISIT, EST, LEVL III, 20-29 MIN: ICD-10-PCS | Mod: 25,S$GLB,, | Performed by: FAMILY MEDICINE

## 2022-03-27 PROCEDURE — 3077F PR MOST RECENT SYSTOLIC BLOOD PRESSURE >= 140 MM HG: ICD-10-PCS | Mod: CPTII,S$GLB,, | Performed by: FAMILY MEDICINE

## 2022-03-27 PROCEDURE — 1159F MED LIST DOCD IN RCRD: CPT | Mod: CPTII,S$GLB,, | Performed by: FAMILY MEDICINE

## 2022-03-27 PROCEDURE — 1159F PR MEDICATION LIST DOCUMENTED IN MEDICAL RECORD: ICD-10-PCS | Mod: CPTII,S$GLB,, | Performed by: FAMILY MEDICINE

## 2022-03-27 PROCEDURE — 3008F BODY MASS INDEX DOCD: CPT | Mod: CPTII,S$GLB,, | Performed by: FAMILY MEDICINE

## 2022-03-27 PROCEDURE — 96372 PR INJECTION,THERAP/PROPH/DIAG2ST, IM OR SUBCUT: ICD-10-PCS | Mod: S$GLB,,, | Performed by: FAMILY MEDICINE

## 2022-03-27 RX ORDER — KETOROLAC TROMETHAMINE 10 MG/1
TABLET, FILM COATED ORAL
Qty: 20 TABLET | Refills: 0 | Status: SHIPPED | OUTPATIENT
Start: 2022-03-27

## 2022-03-27 RX ORDER — FLUCONAZOLE 150 MG/1
150 TABLET ORAL DAILY
Qty: 1 TABLET | Refills: 0 | Status: SHIPPED | OUTPATIENT
Start: 2022-03-27 | End: 2022-03-28

## 2022-03-27 RX ORDER — KETOROLAC TROMETHAMINE 30 MG/ML
30 INJECTION, SOLUTION INTRAMUSCULAR; INTRAVENOUS
Status: COMPLETED | OUTPATIENT
Start: 2022-03-27 | End: 2022-03-27

## 2022-03-27 RX ORDER — CEPHALEXIN 500 MG/1
500 CAPSULE ORAL EVERY 8 HOURS
Qty: 30 CAPSULE | Refills: 0 | Status: SHIPPED | OUTPATIENT
Start: 2022-03-27 | End: 2022-04-06

## 2022-03-27 RX ADMIN — KETOROLAC TROMETHAMINE 30 MG: 30 INJECTION, SOLUTION INTRAMUSCULAR; INTRAVENOUS at 11:03

## 2022-03-27 NOTE — PROGRESS NOTES
Subjective:       Patient ID: Laz Quintero is a 51 y.o. female.    Vitals:  weight is 72.6 kg (160 lb). Her temperature is 98 °F (36.7 °C). Her blood pressure is 145/82 (abnormal) and her pulse is 89. Her respiration is 20 and oxygen saturation is 98%.     Chief Complaint: Insect Bite    PT is complaining of a possible of spider bite that happened Thursday. She is having trouble moving her arms and head is hurting on the left side. The bite is on the neck.   C/o pain on moving or touching neck area, denies fever or chills      ROS    Objective:      Physical Exam   Constitutional: She is oriented to person, place, and time. She appears well-developed. She is cooperative.  Non-toxic appearance. She does not appear ill. No distress.   HENT:   Head: Normocephalic and atraumatic.   Ears:   Right Ear: Hearing, tympanic membrane, external ear and ear canal normal.   Left Ear: Hearing, tympanic membrane, external ear and ear canal normal.   Nose: Nose normal. No mucosal edema, rhinorrhea or nasal deformity. No epistaxis. Right sinus exhibits no maxillary sinus tenderness and no frontal sinus tenderness. Left sinus exhibits no maxillary sinus tenderness and no frontal sinus tenderness.   Mouth/Throat: Uvula is midline, oropharynx is clear and moist and mucous membranes are normal. Mucous membranes are moist. No trismus in the jaw. Normal dentition. No uvula swelling. No posterior oropharyngeal erythema. Oropharynx is clear.   Eyes: Conjunctivae and lids are normal. Right eye exhibits no discharge. Left eye exhibits no discharge. No scleral icterus.   Neck: Trachea normal and phonation normal. Neck supple.      Comments: Mid posterior neck area with one small puncture wound with no induration or abscess,  Mild surrounding tenderness on palpation     Cardiovascular: Normal rate, regular rhythm, normal heart sounds and normal pulses.   Pulmonary/Chest: Effort normal and breath sounds normal. No respiratory distress.    Abdominal: Normal appearance and bowel sounds are normal. She exhibits no distension, no pulsatile midline mass and no mass. Soft. There is no abdominal tenderness.   Musculoskeletal: Normal range of motion.         General: No deformity. Normal range of motion.   Neurological: She is alert and oriented to person, place, and time. She exhibits normal muscle tone. Coordination normal.   Skin: Skin is warm, dry, intact, not diaphoretic and not pale.   Psychiatric: Her speech is normal and behavior is normal. Judgment and thought content normal.   Nursing note and vitals reviewed.        Assessment:       1. Insect bite, initial encounter          Plan:         Insect bite, initial encounter    Other orders  -     cephALEXin (KEFLEX) 500 MG capsule; Take 1 capsule (500 mg total) by mouth every 8 (eight) hours. for 10 days  Dispense: 30 capsule; Refill: 0  -     ketorolac injection 30 mg  -     ketorolac (TORADOL) 10 mg tablet; Take one po q 8 hrs prn pain  Dispense: 20 tablet; Refill: 0         FU with PCP if persistent pain

## 2022-10-04 ENCOUNTER — TELEPHONE (OUTPATIENT)
Dept: OPHTHALMOLOGY | Facility: CLINIC | Age: 52
End: 2022-10-04
Payer: COMMERCIAL

## 2022-10-04 ENCOUNTER — TELEPHONE (OUTPATIENT)
Dept: INTERNAL MEDICINE | Facility: CLINIC | Age: 52
End: 2022-10-04
Payer: COMMERCIAL

## 2022-10-04 DIAGNOSIS — H57.89 RED EYE: Primary | ICD-10-CM

## 2022-10-04 NOTE — TELEPHONE ENCOUNTER
Appt has been schedule for 10/5/2022, reoccurring broken blood vessel, left eye. Photophobia, eye pain and blurred vision. Cornea is yellow per pt.     ----- Message from Trista Webb sent at 10/4/2022  4:38 PM CDT -----  Patient is calling stating that it looks like her OS blood vessel has burst. She stated that she tried visine and it hasn't cleared. She stated that it's yellowish. She stated the it does feel like there is some dust or sand in it.     There is a referral on file. Please contact patient at [phone]

## 2022-10-05 ENCOUNTER — OFFICE VISIT (OUTPATIENT)
Dept: OPTOMETRY | Facility: CLINIC | Age: 52
End: 2022-10-05
Payer: COMMERCIAL

## 2022-10-05 DIAGNOSIS — H11.32 SUBCONJUNCTIVAL HEMORRHAGE OF LEFT EYE: Primary | ICD-10-CM

## 2022-10-05 DIAGNOSIS — H57.12 EYE DISCOMFORT, LEFT: ICD-10-CM

## 2022-10-05 PROCEDURE — 92002 INTRM OPH EXAM NEW PATIENT: CPT | Mod: S$GLB,,, | Performed by: OPTOMETRIST

## 2022-10-05 PROCEDURE — 92002 PR EYE EXAM, NEW PATIENT,INTERMED: ICD-10-PCS | Mod: S$GLB,,, | Performed by: OPTOMETRIST

## 2022-10-05 PROCEDURE — 99999 PR PBB SHADOW E&M-EST. PATIENT-LVL III: ICD-10-PCS | Mod: PBBFAC,,, | Performed by: OPTOMETRIST

## 2022-10-05 PROCEDURE — 99999 PR PBB SHADOW E&M-EST. PATIENT-LVL III: CPT | Mod: PBBFAC,,, | Performed by: OPTOMETRIST

## 2022-10-05 PROCEDURE — 1159F MED LIST DOCD IN RCRD: CPT | Mod: CPTII,S$GLB,, | Performed by: OPTOMETRIST

## 2022-10-05 PROCEDURE — 1159F PR MEDICATION LIST DOCUMENTED IN MEDICAL RECORD: ICD-10-PCS | Mod: CPTII,S$GLB,, | Performed by: OPTOMETRIST

## 2022-10-05 RX ORDER — PREDNISOLONE ACETATE 10 MG/ML
1 SUSPENSION/ DROPS OPHTHALMIC 4 TIMES DAILY
Qty: 5 ML | Refills: 11 | Status: SHIPPED | OUTPATIENT
Start: 2022-10-05 | End: 2023-10-05

## 2022-10-05 RX ORDER — METHOCARBAMOL 750 MG/1
TABLET, FILM COATED ORAL
Status: ON HOLD | COMMUNITY
Start: 2022-08-25 | End: 2023-03-23 | Stop reason: HOSPADM

## 2022-10-05 RX ORDER — OXYCODONE HYDROCHLORIDE AND ACETAMINOPHEN 5; 325 MG/1; MG/1
1 TABLET ORAL DAILY PRN
Status: ON HOLD | COMMUNITY
Start: 2022-06-07 | End: 2023-03-23 | Stop reason: HOSPADM

## 2022-10-05 RX ORDER — PREDNISOLONE ACETATE 10 MG/ML
1 SUSPENSION/ DROPS OPHTHALMIC 4 TIMES DAILY
Qty: 5 ML | Refills: 1 | Status: SHIPPED | OUTPATIENT
Start: 2022-10-05 | End: 2022-10-05

## 2022-10-05 NOTE — PROGRESS NOTES
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 Yellowish tint   look in eyes when the subconj heme occurs. Wears glasses correction 2   years old computer use and night driving.    (-)Flashes no flashing lights but see's after image of light in OS   (+)Floaters OU but mainly in OS  (+)Itch, (-)tear, (-)burn, (-)Dryness.  (+) OTC Drops Visine PRN   (+)Photophobia x 1wk   (+)Glare    No past eye sx     FamOhx : Mac Degen - Father   Last edited by Aixa Crenshaw on 10/5/2022 10:41 AM.            Assessment /Plan     For exam results, see Encounter Report.    Subconjunctival hemorrhage of left eye  -     Discontinue: prednisoLONE acetate (PRED FORTE) 1 % DrpS; Place 1 drop into the left eye 4 (four) times daily.  Dispense: 5 mL; Refill: 1  -     prednisoLONE acetate (PRED FORTE) 1 % DrpS; Place 1 drop into the left eye 4 (four) times daily.  Dispense: 5 mL; Refill: 11    Eye discomfort, left  PT EDUCATED ON FINDINGS.   RX PRED FORTE 1% 1GTT QID X 1 WEEK AND TID X 1 WEEK.   RECURRENT SUB CONJ HEMES, 2 EPISODES IN THE LAST 3 MONTHS. R/O AUTOIMMUNE CONDITION IF KEEPS HAPPENING. CONSIDER RHEU W/U (H/O JOINT/BACK PAIN; RECENT BACK SURGERY)  RTC IN 2 WEEKS FOR F/U.

## 2022-10-13 ENCOUNTER — PATIENT MESSAGE (OUTPATIENT)
Dept: OPTOMETRY | Facility: CLINIC | Age: 52
End: 2022-10-13
Payer: COMMERCIAL

## 2022-10-21 ENCOUNTER — OFFICE VISIT (OUTPATIENT)
Dept: OPTOMETRY | Facility: CLINIC | Age: 52
End: 2022-10-21
Payer: COMMERCIAL

## 2022-10-21 DIAGNOSIS — H57.12 EYE DISCOMFORT, LEFT: ICD-10-CM

## 2022-10-21 DIAGNOSIS — J32.9 SINUSITIS, UNSPECIFIED CHRONICITY, UNSPECIFIED LOCATION: Primary | ICD-10-CM

## 2022-10-21 DIAGNOSIS — H10.9 CONJUNCTIVITIS OF BOTH EYES, UNSPECIFIED CONJUNCTIVITIS TYPE: ICD-10-CM

## 2022-10-21 PROCEDURE — 92012 PR EYE EXAM, EST PATIENT,INTERMED: ICD-10-PCS | Mod: S$GLB,,, | Performed by: OPTOMETRIST

## 2022-10-21 PROCEDURE — 1159F MED LIST DOCD IN RCRD: CPT | Mod: CPTII,S$GLB,, | Performed by: OPTOMETRIST

## 2022-10-21 PROCEDURE — 92012 INTRM OPH EXAM EST PATIENT: CPT | Mod: S$GLB,,, | Performed by: OPTOMETRIST

## 2022-10-21 PROCEDURE — 99999 PR PBB SHADOW E&M-EST. PATIENT-LVL II: CPT | Mod: PBBFAC,,, | Performed by: OPTOMETRIST

## 2022-10-21 PROCEDURE — 99999 PR PBB SHADOW E&M-EST. PATIENT-LVL II: ICD-10-PCS | Mod: PBBFAC,,, | Performed by: OPTOMETRIST

## 2022-10-21 PROCEDURE — 1159F PR MEDICATION LIST DOCUMENTED IN MEDICAL RECORD: ICD-10-PCS | Mod: CPTII,S$GLB,, | Performed by: OPTOMETRIST

## 2022-10-21 RX ORDER — AZITHROMYCIN 250 MG/1
TABLET, FILM COATED ORAL
Qty: 6 TABLET | Refills: 0 | Status: SHIPPED | OUTPATIENT
Start: 2022-10-21 | End: 2022-10-26

## 2022-10-21 NOTE — PROGRESS NOTES
HPI    As soon as she stopped taking the steroid drops (Tuesday), the eyes   started getting irritated again. She restarded taking the drops on   Wednesday and it got better again. Has had a runny nose for a couple of   days.  Alaway drops don't really help.  Feels like there is a film over the left eye when she wakes up and   struggles to open it.  No autimmune conditions in the past.  Has had sinus infections in the past.  Had back surgery in August 2022.      Last edited by Ayleen Grant, OD on 10/21/2022 10:15 AM.            Assessment /Plan     For exam results, see Encounter Report.    Sinusitis, unspecified chronicity, unspecified location  -     azithromycin (Z-EDMUND) 250 MG tablet; Take 2 tablets by mouth on day 1; Take 1 tablet by mouth on days 2-5  Dispense: 6 tablet; Refill: 0    Eye discomfort, left  -     azithromycin (Z-EDMUND) 250 MG tablet; Take 2 tablets by mouth on day 1; Take 1 tablet by mouth on days 2-5  Dispense: 6 tablet; Refill: 0    Conjunctivitis of both eyes, unspecified conjunctivitis type  -     azithromycin (Z-EDMUND) 250 MG tablet; Take 2 tablets by mouth on day 1; Take 1 tablet by mouth on days 2-5  Dispense: 6 tablet; Refill: 0    PT EDUCATED ON FINDINGS.  NO CORNEAL STAINING WITH NaFl.  RX Z-EDMUND p.o FOR SINUS INFLAMMATION. TAKE AS DIRECTED.  CONTINUE USING PRED FORTE 1% 1GTT TID.  CONSIDER DOING AUTOIMMUNE AND BLOOD PANEL IF CONDITION DOES NOT IMPROVE.  RTC IN 2 WEEKS FOR F/U

## 2022-10-26 DIAGNOSIS — Z12.31 OTHER SCREENING MAMMOGRAM: ICD-10-CM

## 2022-10-31 ENCOUNTER — PATIENT MESSAGE (OUTPATIENT)
Dept: ADMINISTRATIVE | Facility: HOSPITAL | Age: 52
End: 2022-10-31
Payer: COMMERCIAL

## 2022-11-09 ENCOUNTER — HOSPITAL ENCOUNTER (OUTPATIENT)
Dept: RADIOLOGY | Facility: HOSPITAL | Age: 52
Discharge: HOME | End: 2022-11-09
Attending: INTERNAL MEDICINE
Payer: COMMERCIAL

## 2022-11-09 DIAGNOSIS — Z12.31 OTHER SCREENING MAMMOGRAM: ICD-10-CM

## 2022-11-09 PROCEDURE — 77063 MAMMO DIGITAL SCREENING BILAT WITH TOMO: ICD-10-PCS | Mod: 26,,, | Performed by: RADIOLOGY

## 2022-11-09 PROCEDURE — 77067 SCR MAMMO BI INCL CAD: CPT | Mod: 26,,, | Performed by: RADIOLOGY

## 2022-11-09 PROCEDURE — 77063 BREAST TOMOSYNTHESIS BI: CPT | Mod: TC

## 2022-11-09 PROCEDURE — 77067 MAMMO DIGITAL SCREENING BILAT WITH TOMO: ICD-10-PCS | Mod: 26,,, | Performed by: RADIOLOGY

## 2022-11-09 PROCEDURE — 77063 BREAST TOMOSYNTHESIS BI: CPT | Mod: 26,,, | Performed by: RADIOLOGY

## 2022-11-17 ENCOUNTER — OFFICE VISIT (OUTPATIENT)
Dept: INTERNAL MEDICINE | Facility: CLINIC | Age: 52
End: 2022-11-17
Payer: COMMERCIAL

## 2022-11-17 DIAGNOSIS — Z01.419 WELL WOMAN EXAM: ICD-10-CM

## 2022-11-17 DIAGNOSIS — R10.9 ABDOMINAL PAIN, UNSPECIFIED ABDOMINAL LOCATION: ICD-10-CM

## 2022-11-17 DIAGNOSIS — Z00.00 PREVENTATIVE HEALTH CARE: ICD-10-CM

## 2022-11-17 DIAGNOSIS — R91.1 SOLITARY PULMONARY NODULE: Primary | ICD-10-CM

## 2022-11-17 DIAGNOSIS — E21.3 HYPERPARATHYROIDISM: ICD-10-CM

## 2022-11-17 DIAGNOSIS — K63.5 POLYP OF COLON, UNSPECIFIED PART OF COLON, UNSPECIFIED TYPE: ICD-10-CM

## 2022-11-17 DIAGNOSIS — J98.59 MEDIASTINAL MASS: ICD-10-CM

## 2022-11-17 PROCEDURE — 99396 PR PREVENTIVE VISIT,EST,40-64: ICD-10-PCS | Mod: S$GLB,,, | Performed by: INTERNAL MEDICINE

## 2022-11-17 PROCEDURE — 99999 PR PBB SHADOW E&M-EST. PATIENT-LVL V: ICD-10-PCS | Mod: PBBFAC,,, | Performed by: INTERNAL MEDICINE

## 2022-11-17 PROCEDURE — 3079F PR MOST RECENT DIASTOLIC BLOOD PRESSURE 80-89 MM HG: ICD-10-PCS | Mod: CPTII,S$GLB,, | Performed by: INTERNAL MEDICINE

## 2022-11-17 PROCEDURE — 3008F PR BODY MASS INDEX (BMI) DOCUMENTED: ICD-10-PCS | Mod: CPTII,S$GLB,, | Performed by: INTERNAL MEDICINE

## 2022-11-17 PROCEDURE — 3044F PR MOST RECENT HEMOGLOBIN A1C LEVEL <7.0%: ICD-10-PCS | Mod: CPTII,S$GLB,, | Performed by: INTERNAL MEDICINE

## 2022-11-17 PROCEDURE — 3079F DIAST BP 80-89 MM HG: CPT | Mod: CPTII,S$GLB,, | Performed by: INTERNAL MEDICINE

## 2022-11-17 PROCEDURE — 3008F BODY MASS INDEX DOCD: CPT | Mod: CPTII,S$GLB,, | Performed by: INTERNAL MEDICINE

## 2022-11-17 PROCEDURE — 3075F PR MOST RECENT SYSTOLIC BLOOD PRESS GE 130-139MM HG: ICD-10-PCS | Mod: CPTII,S$GLB,, | Performed by: INTERNAL MEDICINE

## 2022-11-17 PROCEDURE — 99999 PR PBB SHADOW E&M-EST. PATIENT-LVL V: CPT | Mod: PBBFAC,,, | Performed by: INTERNAL MEDICINE

## 2022-11-17 PROCEDURE — 3075F SYST BP GE 130 - 139MM HG: CPT | Mod: CPTII,S$GLB,, | Performed by: INTERNAL MEDICINE

## 2022-11-17 PROCEDURE — 3044F HG A1C LEVEL LT 7.0%: CPT | Mod: CPTII,S$GLB,, | Performed by: INTERNAL MEDICINE

## 2022-11-17 PROCEDURE — 99396 PREV VISIT EST AGE 40-64: CPT | Mod: S$GLB,,, | Performed by: INTERNAL MEDICINE

## 2022-11-17 RX ORDER — ESOMEPRAZOLE MAGNESIUM 40 MG/1
40 CAPSULE, DELAYED RELEASE ORAL DAILY PRN
Qty: 30 CAPSULE | Refills: 11 | Status: SHIPPED | OUTPATIENT
Start: 2022-11-17 | End: 2024-01-05

## 2022-11-17 RX ORDER — ALPRAZOLAM 0.25 MG/1
0.25 TABLET ORAL DAILY PRN
Qty: 20 TABLET | Refills: 1 | Status: SHIPPED | OUTPATIENT
Start: 2022-11-17 | End: 2024-01-05

## 2022-11-17 RX ORDER — ESCITALOPRAM OXALATE 10 MG/1
10 TABLET ORAL DAILY
Qty: 30 TABLET | Refills: 11 | Status: SHIPPED | OUTPATIENT
Start: 2022-11-17 | End: 2023-02-22 | Stop reason: SDUPTHER

## 2022-11-19 ENCOUNTER — TELEPHONE (OUTPATIENT)
Dept: INTERNAL MEDICINE | Facility: CLINIC | Age: 52
End: 2022-11-19
Payer: COMMERCIAL

## 2022-11-19 NOTE — TELEPHONE ENCOUNTER
Please contact patient and inform her that her cholesterol is elevated.  I will need to give her a medication for this.  Please ask her what pharmacy she would like that sent to

## 2022-11-21 ENCOUNTER — TELEPHONE (OUTPATIENT)
Dept: INTERNAL MEDICINE | Facility: CLINIC | Age: 52
End: 2022-11-21
Payer: COMMERCIAL

## 2022-11-21 ENCOUNTER — PATIENT MESSAGE (OUTPATIENT)
Dept: INTERNAL MEDICINE | Facility: CLINIC | Age: 52
End: 2022-11-21
Payer: COMMERCIAL

## 2022-11-22 ENCOUNTER — TELEPHONE (OUTPATIENT)
Dept: ENDOSCOPY | Facility: HOSPITAL | Age: 52
End: 2022-11-22
Payer: COMMERCIAL

## 2022-11-22 DIAGNOSIS — Z86.010 ENCOUNTER FOR COLONOSCOPY DUE TO HISTORY OF COLONIC POLYP: Primary | ICD-10-CM

## 2022-11-22 DIAGNOSIS — Z12.11 ENCOUNTER FOR COLONOSCOPY DUE TO HISTORY OF COLONIC POLYP: Primary | ICD-10-CM

## 2022-11-22 RX ORDER — ATORVASTATIN CALCIUM 10 MG/1
10 TABLET, FILM COATED ORAL DAILY
Qty: 90 TABLET | Refills: 1 | Status: SHIPPED | OUTPATIENT
Start: 2022-11-22 | End: 2023-05-22

## 2022-11-22 RX ORDER — CHOLECALCIFEROL (VITAMIN D3) 1250 MCG
1 TABLET ORAL WEEKLY
Qty: 8 TABLET | Refills: 0 | Status: SHIPPED | OUTPATIENT
Start: 2022-11-22 | End: 2023-01-11

## 2022-11-22 RX ORDER — POLYETHYLENE GLYCOL 3350, SODIUM SULFATE ANHYDROUS, SODIUM BICARBONATE, SODIUM CHLORIDE, POTASSIUM CHLORIDE 236; 22.74; 6.74; 5.86; 2.97 G/4L; G/4L; G/4L; G/4L; G/4L
4 POWDER, FOR SOLUTION ORAL ONCE
Qty: 4000 ML | Refills: 0 | Status: SHIPPED | OUTPATIENT
Start: 2022-11-22 | End: 2022-11-22

## 2022-11-24 ENCOUNTER — TELEPHONE (OUTPATIENT)
Dept: INTERNAL MEDICINE | Facility: CLINIC | Age: 52
End: 2022-11-24
Payer: COMMERCIAL

## 2022-11-24 VITALS
DIASTOLIC BLOOD PRESSURE: 88 MMHG | BODY MASS INDEX: 27.32 KG/M2 | WEIGHT: 170 LBS | HEIGHT: 66 IN | HEART RATE: 68 BPM | TEMPERATURE: 99 F | OXYGEN SATURATION: 99 % | SYSTOLIC BLOOD PRESSURE: 138 MMHG

## 2022-11-24 NOTE — TELEPHONE ENCOUNTER
When she was in the office, I had requested a CT scan of chest.  Unfortunately it was not scheduled.  Please schedule CT scan chest

## 2022-11-24 NOTE — PROGRESS NOTES
Subjective:       Patient ID: Laz Quintero is a 52 y.o. female.    Chief Complaint: Annual Exam    HPI  She is here for annual exam        Past medical history: Mediastinal mass, status post hysterectomy, hyperparathyroidism, lumbar fusion, status post cholecystectomy.  Positive family history of colon cancer-father, diagnosed at age 50, colon adenoma.  She had a colonoscopy January 2018     Medications: None     ALLERGIES: Clindamycin, sulfa    Review of Systems   Constitutional:  Negative for chills, fatigue, fever and unexpected weight change.   Respiratory:  Negative for chest tightness and shortness of breath.    Cardiovascular:  Negative for chest pain and palpitations.   Gastrointestinal:  Negative for abdominal pain and blood in stool.   Neurological:  Negative for dizziness, syncope, numbness and headaches.     Objective:      Physical Exam  HENT:      Right Ear: External ear normal.      Left Ear: External ear normal.      Nose: Nose normal.      Mouth/Throat:      Mouth: Mucous membranes are moist.      Pharynx: Oropharynx is clear.   Eyes:      Pupils: Pupils are equal, round, and reactive to light.   Cardiovascular:      Rate and Rhythm: Normal rate and regular rhythm.      Heart sounds: No murmur heard.  Pulmonary:      Breath sounds: Normal breath sounds.   Abdominal:      General: There is no distension.      Palpations: There is no hepatomegaly or splenomegaly.      Tenderness: There is no abdominal tenderness.   Musculoskeletal:      Cervical back: Normal range of motion.   Lymphadenopathy:      Cervical: No cervical adenopathy.      Upper Body:      Right upper body: No axillary adenopathy.      Left upper body: No axillary adenopathy.   Neurological:      Cranial Nerves: No cranial nerve deficit.      Sensory: No sensory deficit.      Motor: Motor function is intact.      Deep Tendon Reflexes: Reflexes are normal and symmetric.       Assessment/Plan       Assessment and plan:  1.  Annual  exam.  Check CMP, lipid panel, CBC, TSH, A1c, PTH.  Schedule colonoscopy  2.  Mediastinal mass: Schedule CT scan of chest  3. Elevated blood pressure: Recommended low-sodium diet.  Return to clinic in 2 months blood pressure check

## 2022-11-28 ENCOUNTER — OFFICE VISIT (OUTPATIENT)
Dept: GASTROENTEROLOGY | Facility: CLINIC | Age: 52
End: 2022-11-28
Payer: COMMERCIAL

## 2022-11-28 VITALS
HEART RATE: 99 BPM | DIASTOLIC BLOOD PRESSURE: 87 MMHG | HEIGHT: 66 IN | WEIGHT: 168.63 LBS | SYSTOLIC BLOOD PRESSURE: 126 MMHG | BODY MASS INDEX: 27.1 KG/M2

## 2022-11-28 DIAGNOSIS — R10.30 LOWER ABDOMINAL PAIN: Primary | ICD-10-CM

## 2022-11-28 PROCEDURE — 1160F RVW MEDS BY RX/DR IN RCRD: CPT | Mod: CPTII,S$GLB,, | Performed by: NURSE PRACTITIONER

## 2022-11-28 PROCEDURE — 3044F HG A1C LEVEL LT 7.0%: CPT | Mod: CPTII,S$GLB,, | Performed by: NURSE PRACTITIONER

## 2022-11-28 PROCEDURE — 3079F DIAST BP 80-89 MM HG: CPT | Mod: CPTII,S$GLB,, | Performed by: NURSE PRACTITIONER

## 2022-11-28 PROCEDURE — 99214 PR OFFICE/OUTPT VISIT, EST, LEVL IV, 30-39 MIN: ICD-10-PCS | Mod: S$GLB,,, | Performed by: NURSE PRACTITIONER

## 2022-11-28 PROCEDURE — 3074F PR MOST RECENT SYSTOLIC BLOOD PRESSURE < 130 MM HG: ICD-10-PCS | Mod: CPTII,S$GLB,, | Performed by: NURSE PRACTITIONER

## 2022-11-28 PROCEDURE — 3044F PR MOST RECENT HEMOGLOBIN A1C LEVEL <7.0%: ICD-10-PCS | Mod: CPTII,S$GLB,, | Performed by: NURSE PRACTITIONER

## 2022-11-28 PROCEDURE — 1159F PR MEDICATION LIST DOCUMENTED IN MEDICAL RECORD: ICD-10-PCS | Mod: CPTII,S$GLB,, | Performed by: NURSE PRACTITIONER

## 2022-11-28 PROCEDURE — 1160F PR REVIEW ALL MEDS BY PRESCRIBER/CLIN PHARMACIST DOCUMENTED: ICD-10-PCS | Mod: CPTII,S$GLB,, | Performed by: NURSE PRACTITIONER

## 2022-11-28 PROCEDURE — 3079F PR MOST RECENT DIASTOLIC BLOOD PRESSURE 80-89 MM HG: ICD-10-PCS | Mod: CPTII,S$GLB,, | Performed by: NURSE PRACTITIONER

## 2022-11-28 PROCEDURE — 1159F MED LIST DOCD IN RCRD: CPT | Mod: CPTII,S$GLB,, | Performed by: NURSE PRACTITIONER

## 2022-11-28 PROCEDURE — 3008F BODY MASS INDEX DOCD: CPT | Mod: CPTII,S$GLB,, | Performed by: NURSE PRACTITIONER

## 2022-11-28 PROCEDURE — 99214 OFFICE O/P EST MOD 30 MIN: CPT | Mod: S$GLB,,, | Performed by: NURSE PRACTITIONER

## 2022-11-28 PROCEDURE — 99999 PR PBB SHADOW E&M-EST. PATIENT-LVL V: ICD-10-PCS | Mod: PBBFAC,,, | Performed by: NURSE PRACTITIONER

## 2022-11-28 PROCEDURE — 3074F SYST BP LT 130 MM HG: CPT | Mod: CPTII,S$GLB,, | Performed by: NURSE PRACTITIONER

## 2022-11-28 PROCEDURE — 99999 PR PBB SHADOW E&M-EST. PATIENT-LVL V: CPT | Mod: PBBFAC,,, | Performed by: NURSE PRACTITIONER

## 2022-11-28 PROCEDURE — 3008F PR BODY MASS INDEX (BMI) DOCUMENTED: ICD-10-PCS | Mod: CPTII,S$GLB,, | Performed by: NURSE PRACTITIONER

## 2022-11-28 RX ORDER — DICYCLOMINE HYDROCHLORIDE 20 MG/1
20 TABLET ORAL 3 TIMES DAILY PRN
Qty: 60 TABLET | Refills: 2 | Status: SHIPPED | OUTPATIENT
Start: 2022-11-28 | End: 2024-01-05

## 2022-11-30 NOTE — PROGRESS NOTES
Subjective:       Patient ID: Laz Quintero is a 52 y.o. female.    Chief Complaint: Abdominal Pain    53 y/o female with history of H. Pylori (2018) presents to clinic with c/o abdominal pain.     Abdominal Pain  This is a new problem. The current episode started 1 to 4 weeks ago. The problem occurs intermittently. Pain location: lower abdomen. The quality of the pain is aching and burning. Associated symptoms include flatus. Pertinent negatives include no belching, constipation, diarrhea, fever, hematochezia, melena, nausea or vomiting. Nothing aggravates the pain. The pain is relieved by Nothing. She has tried nothing for the symptoms.     Past Medical History:   Diagnosis Date    Abnormal Pap smear     Abnormal Pap smear of cervix     Annular tear of lumbar disc, L5-S1. 7/26/2012    Chronic LBP     HPTH (hyperparathyroidism)     Menorrhagia     PONV (postoperative nausea and vomiting)     Right lumbar radiculopathy 7/26/2012    Seasonal allergies        Past Surgical History:   Procedure Laterality Date    BACK SURGERY  2018    CHOLECYSTECTOMY      COLONOSCOPY N/A 1/19/2018    Procedure: COLONOSCOPY;  Surgeon: Malachi Olmedo MD;  Location: [facility] ([address] [place] [address]);  Service: Endoscopy;  Laterality: N/A;    ESOPHAGOGASTRODUODENOSCOPY N/A 9/6/2019    Procedure: EGD (ESOPHAGOGASTRODUODENOSCOPY);  Surgeon: Jose Carlos Ventura MD;  Location: [address]);  Service: Endoscopy;  Laterality: N/A;  pt requesting ASAP    HYSTERECTOMY      TUBAL LIGATION      Essure    WISDOM TOOTH EXTRACTION      2005       Family History   Problem Relation Age of Onset    Diabetes Father     Coronary artery disease Mother     Breast cancer Neg Hx     Ovarian cancer Neg Hx     Melanoma Neg Hx     Colon cancer Neg Hx        Social History     Socioeconomic History    Marital status: Single   Occupational History    Occupation: nurse     Employer: [employer]   Tobacco Use    Smoking status: Never    Smokeless tobacco:  "Never   Substance and Sexual Activity    Alcohol use: No    Drug use: No    Sexual activity: Yes     Partners: Male     Birth control/protection: See Surgical Hx     Comment:  AND MONOGOMOUS - Essure   Other Topics Concern    Are you pregnant or think you may be? No    Breast-feeding No       Review of Systems   Constitutional:  Negative for appetite change, fever and unexpected weight change.   HENT:  Negative for trouble swallowing.    Respiratory:  Negative for shortness of breath.    Cardiovascular:  Negative for chest pain.   Gastrointestinal:  Positive for abdominal pain and flatus. Negative for constipation, diarrhea, hematochezia, melena, nausea and vomiting.   Hematological:  Negative for adenopathy. Does not bruise/bleed easily.   Psychiatric/Behavioral:  Negative for dysphoric mood.        Objective:     Vitals:    11/28/22 1319   BP: 126/87   BP Location: Right arm   Patient Position: Sitting   BP Method: Large (Automatic)   Pulse: 99   Weight: 76.5 kg (168 lb 9.6 oz)   Height: 5' 6" (1.676 m)          Physical Exam  Constitutional:       General: She is not in acute distress.     Appearance: Normal appearance. She is not ill-appearing.   HENT:      Head: Normocephalic.   Eyes:      Conjunctiva/sclera: Conjunctivae normal.      Pupils: Pupils are equal, round, and reactive to light.   Pulmonary:      Effort: Pulmonary effort is normal. No respiratory distress.   Musculoskeletal:         General: Normal range of motion.      Cervical back: Normal range of motion.   Skin:     General: Skin is warm and dry.   Neurological:      Mental Status: She is alert and oriented to person, place, and time.   Psychiatric:         Mood and Affect: Mood normal.         Behavior: Behavior normal.             Assessment:         ICD-10-CM ICD-9-CM   1. Lower abdominal pain  R10.30 789.09       Plan:       Lower abdominal pain  -     Ambulatory referral/consult to Gastroenterology  -     X-Ray Abdomen Flat And Erect; " Future; Expected date: 2022  -     Pregnancy, urine rapid; Future  -     H. pylori antigen, stool; Future; Expected date: 2022  -     dicyclomine (BENTYL) 20 mg tablet; Take 1 tablet (20 mg total) by mouth 3 (three) times daily as needed (abdominal pain).  Dispense: 60 tablet; Refill: 2    Follow up if symptoms worsen or fail to improve.     Patient's Medications   New Prescriptions    DICYCLOMINE (BENTYL) 20 MG TABLET    Take 1 tablet (20 mg total) by mouth 3 (three) times daily as needed (abdominal pain).   Previous Medications    ALPRAZOLAM (XANAX) 0.25 MG TABLET    Take 1 tablet (0.25 mg total) by mouth daily as needed for Anxiety.    ATORVASTATIN (LIPITOR) 10 MG TABLET    Take 1 tablet (10 mg total) by mouth once daily.    CHOLECALCIFEROL, VITAMIN D3, 1,250 MCG (50,000 UNIT) TAB    Take 1 tablet by mouth once a week. for 8 doses    DICLOFENAC SODIUM (VOLTAREN) 1 % GEL    Apply 2 g topically once daily. Use gloves to apply    ESCITALOPRAM OXALATE (LEXAPRO) 10 MG TABLET    Take 1 tablet (10 mg total) by mouth once daily.    ESOMEPRAZOLE (NEXIUM) 40 MG CAPSULE    Take 1 capsule (40 mg total) by mouth daily as needed.    FLUTICASONE PROPIONATE (FLONASE) 50 MCG/ACTUATION NASAL SPRAY    2 sprays (100 mcg total) by Each Nostril route once daily.    GABAPENTIN (NEURONTIN) 300 MG CAPSULE    Take 1 capsule (300 mg total) by mouth 3 (three) times daily.    KETOROLAC (TORADOL) 10 MG TABLET    Take one po q 8 hrs prn pain    METHOCARBAMOL (ROBAXIN) 750 MG TAB    SMARTSI Tablet(s) By Mouth Every 12 Hours    PERCOCET 5-325 MG PER TABLET    Take 1 tablet by mouth daily as needed.    PREDNISOLONE ACETATE (PRED FORTE) 1 % DRPS    Place 1 drop into the left eye 4 (four) times daily.    TRAMADOL (ULTRAM) 50 MG TABLET    Take 50 mg by mouth every 8 (eight) hours as needed.   Modified Medications    No medications on file   Discontinued Medications    No medications on file

## 2022-12-10 ENCOUNTER — TELEPHONE (OUTPATIENT)
Dept: INTERNAL MEDICINE | Facility: CLINIC | Age: 52
End: 2022-12-10
Payer: COMMERCIAL

## 2022-12-10 DIAGNOSIS — J98.59 MEDIASTINAL MASS: Primary | ICD-10-CM

## 2022-12-10 NOTE — TELEPHONE ENCOUNTER
Please contact patient and inform her that her CT scan confirmed she has a mediastinal mass I would like for her to see Cardiothoracic surgery.  Order in.  Please schedule

## 2022-12-12 ENCOUNTER — TELEPHONE (OUTPATIENT)
Dept: INTERNAL MEDICINE | Facility: CLINIC | Age: 52
End: 2022-12-12
Payer: COMMERCIAL

## 2022-12-12 ENCOUNTER — PATIENT MESSAGE (OUTPATIENT)
Dept: ENDOSCOPY | Facility: HOSPITAL | Age: 52
End: 2022-12-12
Payer: COMMERCIAL

## 2022-12-12 NOTE — TELEPHONE ENCOUNTER
Called and spoke to patient about CT results. Cardiothoracic surgery reached out to patient this morning and appt scheduled for this week. Patient inquiring about what the treatment is for the diverticulosis coli.

## 2022-12-13 ENCOUNTER — PATIENT MESSAGE (OUTPATIENT)
Dept: INTERNAL MEDICINE | Facility: CLINIC | Age: 52
End: 2022-12-13
Payer: COMMERCIAL

## 2022-12-14 DIAGNOSIS — Z12.11 SPECIAL SCREENING FOR MALIGNANT NEOPLASMS, COLON: Primary | ICD-10-CM

## 2022-12-14 RX ORDER — POLYETHYLENE GLYCOL 3350, SODIUM SULFATE ANHYDROUS, SODIUM BICARBONATE, SODIUM CHLORIDE, POTASSIUM CHLORIDE 236; 22.74; 6.74; 5.86; 2.97 G/4L; G/4L; G/4L; G/4L; G/4L
4 POWDER, FOR SOLUTION ORAL ONCE
Qty: 4000 ML | Refills: 0 | Status: SHIPPED | OUTPATIENT
Start: 2022-12-14 | End: 2022-12-14

## 2022-12-15 ENCOUNTER — OFFICE VISIT (OUTPATIENT)
Dept: CARDIOTHORACIC SURGERY | Facility: CLINIC | Age: 52
End: 2022-12-15
Payer: COMMERCIAL

## 2022-12-15 VITALS
HEIGHT: 66 IN | SYSTOLIC BLOOD PRESSURE: 141 MMHG | OXYGEN SATURATION: 95 % | BODY MASS INDEX: 27.1 KG/M2 | WEIGHT: 168.63 LBS | HEART RATE: 86 BPM | DIASTOLIC BLOOD PRESSURE: 95 MMHG

## 2022-12-15 DIAGNOSIS — J98.59 MEDIASTINAL MASS: Primary | ICD-10-CM

## 2022-12-15 DIAGNOSIS — Z01.818 PRE-OP EXAM: ICD-10-CM

## 2022-12-15 DIAGNOSIS — Z01.810 PRE-OPERATIVE CARDIOVASCULAR EXAMINATION: ICD-10-CM

## 2022-12-15 PROCEDURE — 3077F SYST BP >= 140 MM HG: CPT | Mod: CPTII,S$GLB,, | Performed by: STUDENT IN AN ORGANIZED HEALTH CARE EDUCATION/TRAINING PROGRAM

## 2022-12-15 PROCEDURE — 99205 PR OFFICE/OUTPT VISIT, NEW, LEVL V, 60-74 MIN: ICD-10-PCS | Mod: 57,S$GLB,, | Performed by: STUDENT IN AN ORGANIZED HEALTH CARE EDUCATION/TRAINING PROGRAM

## 2022-12-15 PROCEDURE — 99205 OFFICE O/P NEW HI 60 MIN: CPT | Mod: 57,S$GLB,, | Performed by: STUDENT IN AN ORGANIZED HEALTH CARE EDUCATION/TRAINING PROGRAM

## 2022-12-15 PROCEDURE — 3044F HG A1C LEVEL LT 7.0%: CPT | Mod: CPTII,S$GLB,, | Performed by: STUDENT IN AN ORGANIZED HEALTH CARE EDUCATION/TRAINING PROGRAM

## 2022-12-15 PROCEDURE — 3008F BODY MASS INDEX DOCD: CPT | Mod: CPTII,S$GLB,, | Performed by: STUDENT IN AN ORGANIZED HEALTH CARE EDUCATION/TRAINING PROGRAM

## 2022-12-15 PROCEDURE — 99999 PR PBB SHADOW E&M-EST. PATIENT-LVL IV: ICD-10-PCS | Mod: PBBFAC,,, | Performed by: STUDENT IN AN ORGANIZED HEALTH CARE EDUCATION/TRAINING PROGRAM

## 2022-12-15 PROCEDURE — 3008F PR BODY MASS INDEX (BMI) DOCUMENTED: ICD-10-PCS | Mod: CPTII,S$GLB,, | Performed by: STUDENT IN AN ORGANIZED HEALTH CARE EDUCATION/TRAINING PROGRAM

## 2022-12-15 PROCEDURE — 3080F PR MOST RECENT DIASTOLIC BLOOD PRESSURE >= 90 MM HG: ICD-10-PCS | Mod: CPTII,S$GLB,, | Performed by: STUDENT IN AN ORGANIZED HEALTH CARE EDUCATION/TRAINING PROGRAM

## 2022-12-15 PROCEDURE — 3077F PR MOST RECENT SYSTOLIC BLOOD PRESSURE >= 140 MM HG: ICD-10-PCS | Mod: CPTII,S$GLB,, | Performed by: STUDENT IN AN ORGANIZED HEALTH CARE EDUCATION/TRAINING PROGRAM

## 2022-12-15 PROCEDURE — 3080F DIAST BP >= 90 MM HG: CPT | Mod: CPTII,S$GLB,, | Performed by: STUDENT IN AN ORGANIZED HEALTH CARE EDUCATION/TRAINING PROGRAM

## 2022-12-15 PROCEDURE — 99999 PR PBB SHADOW E&M-EST. PATIENT-LVL IV: CPT | Mod: PBBFAC,,, | Performed by: STUDENT IN AN ORGANIZED HEALTH CARE EDUCATION/TRAINING PROGRAM

## 2022-12-15 PROCEDURE — 3044F PR MOST RECENT HEMOGLOBIN A1C LEVEL <7.0%: ICD-10-PCS | Mod: CPTII,S$GLB,, | Performed by: STUDENT IN AN ORGANIZED HEALTH CARE EDUCATION/TRAINING PROGRAM

## 2022-12-15 NOTE — PROGRESS NOTES
History & Physical    SUBJECTIVE:     History of Present Illness:  Patient is a 52 y.o. female never smoker with HLD, h/o lumbar fusion (08/2022), and hyperparathyroidism here today for evaluation of anterior mediastinal mass. Anterior mediastinal mass first identified in 2015. It remained grossly stable on imaging through 2019. Most recently underwent repeat CT with grossly similar measurements at 2.0 x 1.4 cm. Today patient reports occasional blurred vision, headaches, palpitations, and occasional chest pain (which she attributes to baseline anxiety).     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       Chief Complaint   Patient presents with    Consult       Review of patient's allergies indicates:   Allergen Reactions    Clindamycin     Sulfa dyne     Sulfamethoxazole-trimethoprim      Other reaction(s): Anaphylaxis    Dermabond [tissue glues] Rash       Current Outpatient Medications   Medication Sig Dispense Refill    ALPRAZolam (XANAX) 0.25 MG tablet Take 1 tablet (0.25 mg total) by mouth daily as needed for Anxiety. 20 tablet 1    atorvastatin (LIPITOR) 10 MG tablet Take 1 tablet (10 mg total) by mouth once daily. 90 tablet 1    cholecalciferol, vitamin D3, 1,250 mcg (50,000 unit) Tab Take 1 tablet by mouth once a week. for 8 doses 8 tablet 0    dicyclomine (BENTYL) 20 mg tablet Take 1 tablet (20 mg total) by mouth 3 (three) times daily as needed (abdominal pain). 60 tablet 2    EScitalopram oxalate (LEXAPRO) 10 MG tablet Take 1 tablet (10 mg total) by mouth once daily. 30 tablet 11    esomeprazole (NEXIUM) 40 MG capsule Take 1 capsule (40 mg total) by mouth daily as needed. 30 capsule 11    fluticasone propionate (FLONASE) 50 mcg/actuation nasal spray 2 sprays (100 mcg total) by Each Nostril route once daily. 11.1 mL 0    ketorolac (TORADOL) 10 mg tablet Take one po q 8 hrs prn pain 20 tablet 0    methocarbamoL (ROBAXIN) 750 MG Tab  SMARTSI Tablet(s) By Mouth Every 12 Hours      PERCOCET 5-325 mg per tablet Take 1 tablet by mouth daily as needed.      prednisoLONE acetate (PRED FORTE) 1 % DrpS Place 1 drop into the left eye 4 (four) times daily. 5 mL 11    traMADoL (ULTRAM) 50 mg tablet Take 50 mg by mouth every 8 (eight) hours as needed.      diclofenac sodium (VOLTAREN) 1 % Gel Apply 2 g topically once daily. Use gloves to apply (Patient not taking: Reported on 2022) 100 g 1    gabapentin (NEURONTIN) 300 MG capsule Take 1 capsule (300 mg total) by mouth 3 (three) times daily. 50 capsule 2     No current facility-administered medications for this visit.       Past Medical History:   Diagnosis Date    Abnormal Pap smear     Abnormal Pap smear of cervix     Annular tear of lumbar disc, L5-S1. 2012    Chronic LBP     HPTH (hyperparathyroidism)     Menorrhagia     PONV (postoperative nausea and vomiting)     Right lumbar radiculopathy 2012    Seasonal allergies      Past Surgical History:   Procedure Laterality Date    BACK SURGERY      CHOLECYSTECTOMY      COLONOSCOPY N/A 2018    Procedure: COLONOSCOPY;  Surgeon: Malachi Olmedo MD;  [facility]  Service: Endoscopy;  Laterality: N/A;    ESOPHAGOGASTRODUODENOSCOPY N/A 2019    Procedure: EGD (ESOPHAGOGASTRODUODENOSCOPY);  Surgeon: Jose Carlos Ventura MD;  [facility]  Service: Endoscopy;  Laterality: N/A;  pt requesting ASAP    HYSTERECTOMY      TUBAL LIGATION      Essure    WISDOM TOOTH EXTRACTION           Family History   Problem Relation Age of Onset    Diabetes Father     Coronary artery disease Mother     Breast cancer Neg Hx     Ovarian cancer Neg Hx     Melanoma Neg Hx     Colon cancer Neg Hx      Social History     Tobacco Use    Smoking status: Never    Smokeless tobacco: Never   Substance Use Topics    Alcohol use: No    Drug use: No        Review of Systems:  Review of Systems   Constitutional:  Positive for  "diaphoresis and fatigue. Negative for fever and unexpected weight change.   HENT: Negative.     Eyes:  Positive for visual disturbance (occasional blurred vision).   Respiratory:  Negative for cough, chest tightness and shortness of breath.    Cardiovascular:  Positive for chest pain (occasional chest pain which she attributes to anxiety) and palpitations.   Gastrointestinal: Negative.    Endocrine: Negative.    Genitourinary: Negative.    Musculoskeletal:  Positive for back pain (s/p lumbar fusion 08/2022). Negative for joint swelling, neck pain and neck stiffness.   Skin: Negative.    Neurological:  Positive for headaches. Negative for dizziness, speech difficulty and weakness.   Hematological: Negative.    Psychiatric/Behavioral: Negative.       OBJECTIVE:     Vital Signs (Most Recent)  Vitals:    12/15/22 0943   BP: (!) 141/95   Pulse: 86   SpO2: 95%   Weight: 76.5 kg (168 lb 10.4 oz)   Height: 5' 6" (1.676 m)   PainSc: 0-No pain       Physical Exam:  Physical Exam  Constitutional:       Appearance: Normal appearance.   Eyes:      Extraocular Movements: Extraocular movements intact.      Pupils: Pupils are equal, round, and reactive to light.   Cardiovascular:      Rate and Rhythm: Normal rate and regular rhythm.      Pulses: Normal pulses.   Pulmonary:      Effort: Pulmonary effort is normal.      Breath sounds: Normal breath sounds.   Abdominal:      General: Abdomen is flat.      Palpations: Abdomen is soft.   Skin:     General: Skin is warm and dry.   Neurological:      General: No focal deficit present.      Mental Status: She is alert and oriented to person, place, and time. Mental status is at baseline.   Psychiatric:         Mood and Affect: Mood normal.         Behavior: Behavior normal.         Thought Content: Thought content normal.       Chest CT without 12/15/22:  The soft tissues and vascular structures at the base of the neck are significant for a subcentimeter right-sided thyroid hypodensity too " small to characterize.  The mediastinum including the heart and great vessels the is significant for a 2 x 1.4 cm soft tissue collection within the anterior mediastinum which measured 1.9 x 1.3 cm on previous performed 10/08/2019.  The visualized intra-abdominal content is significant for diverticulosis coli.  The osseous structures demonstrate mild degenerative change.  The trachea is patent and free of any intraluminal filling defects.  The lungs are well expanded there is a 0.3 cm pleural based left upper lobe noncalcified pulmonary nodule unchanged compared to previous.  There is bilateral dependent atelectatic versus fibrotic change.  There is no pleural or pericardial fluid.      ASSESSMENT/PLAN:     Patient is a 52 y.o. female with hyperparathyroidism here today for evaluation of anterior mediastinal mass.     PLAN:Plan   Order myasthenia gravis panel, including: anti-Ach antibody, anti-voltage gate calcium channel antibody, anti-Musk antibody, anti-LRP4 antibody  Order 24 hr urine and plasma metanephrines collection  Order Echo, EKG, and PFTs for further workup    Pending results from above plan for robotic assisted resection of mediastinal mass. Risks benefits and alternatives discussed with Mrs Quintero. Consent not obtained during this visit    Anton Evangelista M.D.  [phone] (direct)  Thoracic Surgery   Demian Koroma

## 2022-12-19 ENCOUNTER — HOSPITAL ENCOUNTER (OUTPATIENT)
Dept: CARDIOLOGY | Facility: HOSPITAL | Age: 52
Discharge: HOME | End: 2022-12-19
Attending: STUDENT IN AN ORGANIZED HEALTH CARE EDUCATION/TRAINING PROGRAM
Payer: COMMERCIAL

## 2022-12-19 ENCOUNTER — HOSPITAL ENCOUNTER (OUTPATIENT)
Dept: PULMONOLOGY | Facility: CLINIC | Age: 52
Discharge: HOME | End: 2022-12-19
Payer: COMMERCIAL

## 2022-12-19 VITALS
DIASTOLIC BLOOD PRESSURE: 80 MMHG | WEIGHT: 168 LBS | HEART RATE: 92 BPM | BODY MASS INDEX: 27 KG/M2 | SYSTOLIC BLOOD PRESSURE: 130 MMHG | HEIGHT: 66 IN

## 2022-12-19 DIAGNOSIS — Z01.810 PRE-OPERATIVE CARDIOVASCULAR EXAMINATION: ICD-10-CM

## 2022-12-19 DIAGNOSIS — Z01.818 PRE-OP EXAM: ICD-10-CM

## 2022-12-19 PROCEDURE — 94060 PR EVAL OF BRONCHOSPASM: ICD-10-PCS | Mod: S$GLB,,, | Performed by: INTERNAL MEDICINE

## 2022-12-19 PROCEDURE — 93306 TTE W/DOPPLER COMPLETE: CPT

## 2022-12-19 PROCEDURE — 93306 ECHO (CUPID ONLY): ICD-10-PCS | Mod: 26,,, | Performed by: INTERNAL MEDICINE

## 2022-12-19 PROCEDURE — 94726 PLETHYSMOGRAPHY LUNG VOLUMES: CPT | Mod: S$GLB,,, | Performed by: INTERNAL MEDICINE

## 2022-12-19 PROCEDURE — 94060 EVALUATION OF WHEEZING: CPT | Mod: S$GLB,,, | Performed by: INTERNAL MEDICINE

## 2022-12-19 PROCEDURE — 93306 TTE W/DOPPLER COMPLETE: CPT | Mod: 26,,, | Performed by: INTERNAL MEDICINE

## 2022-12-19 PROCEDURE — 94729 DIFFUSING CAPACITY: CPT | Mod: S$GLB,,, | Performed by: INTERNAL MEDICINE

## 2022-12-19 PROCEDURE — 94729 PR C02/MEMBANE DIFFUSE CAPACITY: ICD-10-PCS | Mod: S$GLB,,, | Performed by: INTERNAL MEDICINE

## 2022-12-19 PROCEDURE — 94726 PULM FUNCT TST PLETHYSMOGRAP: ICD-10-PCS | Mod: S$GLB,,, | Performed by: INTERNAL MEDICINE

## 2022-12-20 LAB
ASCENDING AORTA: 2.66 CM
AV INDEX (PROSTH): 0.68
AV MEAN GRADIENT: 3 MMHG
AV PEAK GRADIENT: 5 MMHG
AV VALVE AREA: 2.51 CM2
AV VELOCITY RATIO: 0.67
BSA FOR ECHO PROCEDURE: 1.88 M2
CV ECHO LV RWT: 0.45 CM
DLCO ADJ PRE: 18.69 ML/(MIN*MMHG) (ref 13.51–24.98)
DLCO SINGLE BREATH LLN: 19.72
DLCO SINGLE BREATH PRE REF: 73 %
DLCO SINGLE BREATH REF: 25.45
DLCOC SBVA LLN: 3.41
DLCOC SBVA PRE REF: 86.2 %
DLCOC SBVA REF: 4.83
DLCOC SINGLE BREATH LLN: 19.72
DLCOC SINGLE BREATH PRE REF: 73.4 %
DLCOC SINGLE BREATH REF: 25.45
DLCOCSBVAULN: 6.24
DLCOCSINGLEBREATHULN: 31.18
DLCOSINGLEBREATHULN: 31.18
DLCOVA LLN: 3.41
DLCOVA PRE REF: 85.6 %
DLCOVA PRE: 4.13 ML/(MIN*MMHG*L) (ref 3.18–7.52)
DLCOVA REF: 4.83
DLCOVAULN: 6.24
DLVAADJ PRE: 4.16 ML/(MIN*MMHG*L) (ref 3.18–7.52)
DOP CALC AO PEAK VEL: 1.09 M/S
DOP CALC AO VTI: 19.85 CM
DOP CALC LVOT AREA: 3.7 CM2
DOP CALC LVOT DIAMETER: 2.17 CM
DOP CALC LVOT PEAK VEL: 0.73 M/S
DOP CALC LVOT STROKE VOLUME: 49.79 CM3
DOP CALCLVOT PEAK VEL VTI: 13.47 CM
E WAVE DECELERATION TIME: 130.09 MSEC
E/A RATIO: 0.67
E/E' RATIO: 8.4 M/S
ECHO LV POSTERIOR WALL: 0.88 CM (ref 0.6–1.1)
EJECTION FRACTION: 50 %
ERV LLN: -16449.03
ERV PRE REF: 69.3 %
ERV REF: 0.97
ERVULN: ABNORMAL
FEF 25 75 LLN: 1.22
FEF 25 75 PRE REF: 174 %
FEF 25 75 REF: 2.51
FET100 CHG: -1.4 %
FEV05 LLN: 1.22
FEV05 REF: 2.08
FEV1 CHG: 2.6 %
FEV1 FVC LLN: 70
FEV1 FVC PRE REF: 109.8 %
FEV1 FVC REF: 81
FEV1 LLN: 1.92
FEV1 PRE REF: 109.6 %
FEV1 REF: 2.54
FEV1 VOL CHG: 0.07
FRACTIONAL SHORTENING: 34 % (ref 28–44)
FRCPLETH LLN: 1.98
FRCPLETH PREREF: 72.1 %
FRCPLETH REF: 2.81
FRCPLETHULN: 3.63
FVC CHG: 2.2 %
FVC LLN: 2.42
FVC PRE REF: 99.3 %
FVC REF: 3.16
FVC VOL CHG: 0.07
INTERVENTRICULAR SEPTUM: 1.03 CM (ref 0.6–1.1)
IVC PRE: 3.26 L (ref 1.67–2.72)
IVC SINGLE BREATH LLN: 2.42
IVC SINGLE BREATH PRE REF: 102.9 %
IVC SINGLE BREATH REF: 3.16
IVCSINGLEBREATHULN: 3.94
LA MAJOR: 3.87 CM
LA MINOR: 4.18 CM
LA WIDTH: 3.36 CM
LEFT ATRIUM SIZE: 3.01 CM
LEFT ATRIUM VOLUME INDEX MOD: 15.8 ML/M2
LEFT ATRIUM VOLUME INDEX: 18.6 ML/M2
LEFT ATRIUM VOLUME MOD: 29.39 CM3
LEFT ATRIUM VOLUME: 34.55 CM3
LEFT INTERNAL DIMENSION IN SYSTOLE: 2.61 CM (ref 2.1–4)
LEFT VENTRICLE DIASTOLIC VOLUME INDEX: 36.52 ML/M2
LEFT VENTRICLE DIASTOLIC VOLUME: 67.93 ML
LEFT VENTRICLE MASS INDEX: 63 G/M2
LEFT VENTRICLE SYSTOLIC VOLUME INDEX: 13.4 ML/M2
LEFT VENTRICLE SYSTOLIC VOLUME: 24.89 ML
LEFT VENTRICULAR INTERNAL DIMENSION IN DIASTOLE: 3.95 CM (ref 3.5–6)
LEFT VENTRICULAR MASS: 116.75 G
LLN IC: -16447.64
LV LATERAL E/E' RATIO: 7 M/S
LV SEPTAL E/E' RATIO: 10.5 M/S
MV A" WAVE DURATION": 9.99 MSEC
MV PEAK A VEL: 0.63 M/S
MV PEAK E VEL: 0.42 M/S
MV STENOSIS PRESSURE HALF TIME: 37.73 MS
MV VALVE AREA P 1/2 METHOD: 5.83 CM2
PEF LLN: 4.39
PEF PRE REF: 109.6 %
PEF REF: 6.52
PHYSICIAN COMMENT: ABNORMAL
PISA TR MAX VEL: 2.16 M/S
POST FEF 25 75: 4.32 L/S (ref 0.97–3.36)
POST FET 100: 6.8 SEC
POST FEV1 FVC: 88.9 % (ref 71.12–91.44)
POST FEV1: 2.86 L (ref 1.35–2.21)
POST FEV5: 2.46 L (ref 0.74–2.45)
POST FVC: 3.21 L (ref 1.67–2.72)
POST PEF: 8.19 L/S (ref 3.43–6.5)
PRE DLCO: 18.57 ML/(MIN*MMHG) (ref 13.51–24.98)
PRE ERV: 0.67 L (ref -16449.14–16450.86)
PRE FEF 25 75: 4.36 L/S (ref 0.97–3.36)
PRE FET 100: 6.9 SEC
PRE FEV05 REF: 115.6 %
PRE FEV1 FVC: 88.57 % (ref 71.12–91.44)
PRE FEV1: 2.78 L (ref 1.35–2.21)
PRE FEV5: 2.4 L (ref 0.74–2.45)
PRE FRC PL: 2.02 L (ref 1.41–3.06)
PRE FVC: 3.14 L (ref 1.67–2.72)
PRE IC: 2.58 L (ref -16448.71–16451.29)
PRE PEF: 7.15 L/S (ref 3.43–6.5)
PRE REF IC: 109.4 %
PRE RV: 1.35 L (ref 0.8–1.95)
PRE TLC: 4.61 L (ref 2.61–4.58)
PRE VTG: 2.08 L
PULM VEIN S/D RATIO: 2.26
PV PEAK D VEL: 0.27 M/S
PV PEAK S VEL: 0.61 M/S
QEF: 46 %
RA MAJOR: 3.79 CM
RA PRESSURE: 3 MMHG
RA WIDTH: 2.44 CM
RAW PRE REF: 86.7 %
RAW PRE: 2.65 CMH2O*S/L (ref 3.06–3.06)
RAW REF: 3.06
REF IC: 2.36
RIGHT VENTRICULAR END-DIASTOLIC DIMENSION: 3.52 CM
RV LLN: 1.26
RV PRE REF: 73.6 %
RV REF: 1.83
RV TISSUE DOPPLER FREE WALL SYSTOLIC VELOCITY 1 (APICAL 4 CHAMBER VIEW): 11.17 CM/S
RVTLC LLN: 26
RVTLC PRE REF: 81.5 %
RVTLC PRE: 29.32 % (ref 26.37–45.55)
RVTLC REF: 36
RVTLCULN: 46
RVULN: 2.41
SGAW PRE REF: 141.7 %
SGAW PRE: 0.14 1/(CMH2O*S) (ref 0.1–0.1)
SGAW REF: 0.1
SINUS: 2.78 CM
STJ: 2.54 CM
TDI LATERAL: 0.06 M/S
TDI SEPTAL: 0.04 M/S
TDI: 0.05 M/S
TLC LLN: 4.29
TLC PRE REF: 87.3 %
TLC REF: 5.27
TLC ULN: 6.26
TR MAX PG: 19 MMHG
TRICUSPID ANNULAR PLANE SYSTOLIC EXCURSION: 1.43 CM
TV REST PULMONARY ARTERY PRESSURE: 22 MMHG
ULN IC: ABNORMAL
VA PRE: 4.49 L (ref 3.45–3.45)
VA SINGLE BREATH LLN: 5.12
VA SINGLE BREATH PRE REF: 87.7 %
VA SINGLE BREATH REF: 5.12
VASINGLEBREATHULN: 5.12
VC LLN: 2.42
VC PRE REF: 102.9 %
VC PRE: 3.26 L (ref 1.67–2.72)
VC REF: 3.16
VC ULN: 3.94

## 2022-12-21 ENCOUNTER — LAB VISIT (OUTPATIENT)
Dept: LAB | Facility: HOSPITAL | Age: 52
End: 2022-12-21
Attending: STUDENT IN AN ORGANIZED HEALTH CARE EDUCATION/TRAINING PROGRAM

## 2022-12-21 DIAGNOSIS — J98.59 MEDIASTINAL MASS: ICD-10-CM

## 2022-12-21 DIAGNOSIS — Z01.810 PRE-OPERATIVE CARDIOVASCULAR EXAMINATION: Primary | ICD-10-CM

## 2022-12-21 PROCEDURE — 83835 ASSAY OF METANEPHRINES: CPT | Performed by: STUDENT IN AN ORGANIZED HEALTH CARE EDUCATION/TRAINING PROGRAM

## 2022-12-26 LAB
ANNOTATION COMMENT IMP: NORMAL
COLLECT DURATION TIME UR: 24 H
METANEPH 24H UR-MRATE: 198 MCG/24 H
METANEPHS 24H UR-MRATE: 856 MCG/24 H
NORMETANEPHRINE 24H UR-MRATE: 658 MCG/24 H
SPECIMEN VOL 24H UR: 2600 ML

## 2022-12-28 ENCOUNTER — ANESTHESIA (OUTPATIENT)
Dept: ENDOSCOPY | Facility: HOSPITAL | Age: 52
End: 2022-12-28

## 2022-12-28 ENCOUNTER — ANESTHESIA EVENT (OUTPATIENT)
Dept: ENDOSCOPY | Facility: HOSPITAL | Age: 52
End: 2022-12-28

## 2022-12-28 ENCOUNTER — HOSPITAL ENCOUNTER (OUTPATIENT)
Facility: HOSPITAL | Age: 52
Discharge: HOME | End: 2022-12-28
Attending: INTERNAL MEDICINE | Admitting: INTERNAL MEDICINE
Payer: COMMERCIAL

## 2022-12-28 VITALS
DIASTOLIC BLOOD PRESSURE: 62 MMHG | TEMPERATURE: 98 F | SYSTOLIC BLOOD PRESSURE: 105 MMHG | OXYGEN SATURATION: 100 % | HEIGHT: 66 IN | RESPIRATION RATE: 18 BRPM | WEIGHT: 160 LBS | BODY MASS INDEX: 25.71 KG/M2 | HEART RATE: 61 BPM

## 2022-12-28 DIAGNOSIS — Z86.010 HISTORY OF COLON POLYPS: ICD-10-CM

## 2022-12-28 PROCEDURE — 45385 COLONOSCOPY W/LESION REMOVAL: CPT | Mod: 33,,, | Performed by: INTERNAL MEDICINE

## 2022-12-28 PROCEDURE — 45385 COLONOSCOPY W/LESION REMOVAL: CPT | Mod: PT | Performed by: INTERNAL MEDICINE

## 2022-12-28 PROCEDURE — E9220 PRA ENDO ANESTHESIA: ICD-10-PCS | Mod: ,,, | Performed by: NURSE ANESTHETIST, CERTIFIED REGISTERED

## 2022-12-28 PROCEDURE — 88305 TISSUE EXAM BY PATHOLOGIST: CPT | Mod: 26,,, | Performed by: PATHOLOGY

## 2022-12-28 PROCEDURE — 37000008 HC ANESTHESIA 1ST 15 MINUTES: Performed by: INTERNAL MEDICINE

## 2022-12-28 PROCEDURE — E9220 PRA ENDO ANESTHESIA: HCPCS | Mod: ,,, | Performed by: NURSE ANESTHETIST, CERTIFIED REGISTERED

## 2022-12-28 PROCEDURE — 45385 PR COLONOSCOPY,REMV LESN,SNARE: ICD-10-PCS | Mod: 33,,, | Performed by: INTERNAL MEDICINE

## 2022-12-28 PROCEDURE — 37000009 HC ANESTHESIA EA ADD 15 MINS: Performed by: INTERNAL MEDICINE

## 2022-12-28 PROCEDURE — 27201089 HC SNARE, DISP (ANY): Performed by: INTERNAL MEDICINE

## 2022-12-28 PROCEDURE — 25000003 PHARM REV CODE 250: Performed by: INTERNAL MEDICINE

## 2022-12-28 PROCEDURE — 88305 TISSUE EXAM BY PATHOLOGIST: ICD-10-PCS | Mod: 26,,, | Performed by: PATHOLOGY

## 2022-12-28 PROCEDURE — 88305 TISSUE EXAM BY PATHOLOGIST: CPT | Performed by: PATHOLOGY

## 2022-12-28 RX ORDER — PROPOFOL 10 MG/ML
INJECTION, EMULSION INTRAVENOUS
Status: DISCONTINUED | OUTPATIENT
Start: 2022-12-28 | End: 2022-12-28

## 2022-12-28 RX ORDER — PROPOFOL 10 MG/ML
INJECTION, EMULSION INTRAVENOUS CONTINUOUS PRN
Status: DISCONTINUED | OUTPATIENT
Start: 2022-12-28 | End: 2022-12-28

## 2022-12-28 RX ORDER — SODIUM CHLORIDE 9 MG/ML
INJECTION, SOLUTION INTRAVENOUS CONTINUOUS
Status: DISCONTINUED | OUTPATIENT
Start: 2022-12-28 | End: 2022-12-28 | Stop reason: HOSPADM

## 2022-12-28 RX ORDER — LIDOCAINE HCL/PF 100 MG/5ML
SYRINGE (ML) INTRAVENOUS
Status: DISCONTINUED | OUTPATIENT
Start: 2022-12-28 | End: 2022-12-28

## 2022-12-28 RX ADMIN — Medication 50 MG: at 10:12

## 2022-12-28 RX ADMIN — PROPOFOL 150 MCG/KG/MIN: 10 INJECTION, EMULSION INTRAVENOUS at 10:12

## 2022-12-28 RX ADMIN — SODIUM CHLORIDE: 0.9 INJECTION, SOLUTION INTRAVENOUS at 09:12

## 2022-12-28 RX ADMIN — PROPOFOL 50 MG: 10 INJECTION, EMULSION INTRAVENOUS at 10:12

## 2022-12-28 RX ADMIN — PROPOFOL 30 MG: 10 INJECTION, EMULSION INTRAVENOUS at 10:12

## 2022-12-28 NOTE — ANESTHESIA PREPROCEDURE EVALUATION
12/28/2022  Laz Quintero is a 52 y.o., female.    Past Medical History:   Diagnosis Date    Abnormal Pap smear     Abnormal Pap smear of cervix     Annular tear of lumbar disc, L5-S1. 7/26/2012    Chronic LBP     HPTH (hyperparathyroidism)     Menorrhagia     PONV (postoperative nausea and vomiting)     Right lumbar radiculopathy 7/26/2012    Seasonal allergies      Patient Active Problem List   Diagnosis    Low back pain radiating to right leg    Seasonal allergies    HPTH (hyperparathyroidism)    Chronic LBP    Right lumbar radiculopathy    Anxiety and depression    S/P laparoscopic hysterectomy    Left lumbar radiculitis    Abdominal pain    Herniated nucleus pulposus, L5-S1, left    Chronic left-sided low back pain with sciatica    Weakness of both lower extremities    Decreased range of motion of trunk and back    Pre-op exam     Past Surgical History:   Procedure Laterality Date    BACK SURGERY  2018    CHOLECYSTECTOMY      COLONOSCOPY N/A 1/19/2018    Procedure: COLONOSCOPY;  Surgeon: Malachi Olmedo MD;  Location: [address]);  Service: Endoscopy;  Laterality: N/A;    ESOPHAGOGASTRODUODENOSCOPY N/A 9/6/2019    Procedure: EGD (ESOPHAGOGASTRODUODENOSCOPY);  Surgeon: Jose Carlos Ventura MD;  Location: [address]);  Service: Endoscopy;  Laterality: N/A;  pt requesting ASAP    HYSTERECTOMY      TUBAL LIGATION      Essure    WISDOM TOOTH EXTRACTION      2005         Pre-op Assessment    I have reviewed the Patient Summary Reports.   I have reviewed the NPO Status.   I have reviewed the Medications.     Review of Systems  Anesthesia Hx:  No problems with previous Anesthesia PONV Denies Family Hx of Anesthesia complications.   Denies Personal Hx of Anesthesia complications.   Social:  Non-Smoker    Hematology/Oncology:  Hematology Normal   Oncology Normal      EENT/Dental:EENT/Dental Normal   Cardiovascular:  Cardiovascular Normal     Pulmonary:  Pulmonary Normal    Renal/:  Renal/ Normal     Hepatic/GI:  Hepatic/GI Normal    Musculoskeletal:  Musculoskeletal Normal    Neurological:   Neuromuscular Disease,    Endocrine:  Endocrine Normal    Dermatological:  Skin Normal    Psych:  Psychiatric Normal           Physical Exam  General:  Well nourished      Airway/Jaw/Neck:  Airway Findings: Mouth Opening: Normal   Tongue: Normal   General Airway Assessment: Adult Mallampati: II  TM Distance: Normal, at least 6 cm   Jaw/Neck Findings:  Neck ROM: Normal ROM   Neck Findings: Normal     Dental:  Dental Findings: In tact          Mental Status:  Mental Status Findings: Normal        Anesthesia Plan  Type of Anesthesia, risks & benefits discussed:  Anesthesia Type:  Gen Natural Airway    Patient's Preference:   Plan Factors:          Intra-op Monitoring Plan: standard ASA monitors  Intra-op Monitoring Plan Comments:   Post Op Pain Control Plan:   Post Op Pain Control Plan Comments:     Induction:   IV  Beta Blocker:  Patient is not currently on a Beta-Blocker (No further documentation required).       Informed Consent: Informed consent signed with the Patient and all parties understand the risks and agree with anesthesia plan.  All questions answered.  Anesthesia consent signed with patient.  ASA Score: 2     Day of Surgery Review of History & Physical:    H&P Update referred to the surgeon/provider.          Ready For Surgery From Anesthesia Perspective.           Physical Exam  General: Well nourished    Airway:  Mallampati: II   Mouth Opening: Normal  TM Distance: Normal, at least 6 cm  Tongue: Normal  Neck ROM: Normal ROM    Dental:  In tact        Anesthesia Plan  Type of Anesthesia, risks & benefits discussed:    Anesthesia Type: Gen Natural Airway  Intra-op Monitoring Plan: standard ASA monitors  Induction:  IV  Informed Consent: Informed consent signed with the  Patient and all parties understand the risks and agree with anesthesia plan.  All questions answered.   ASA Score: 2  Day of Surgery Review of History & Physical: H&P Update referred to the surgeon/provider.    Ready For Surgery From Anesthesia Perspective.

## 2022-12-28 NOTE — H&P
Short Stay Endoscopy History and Physical    PCP - Charlotte Jacinto MD    Procedure - Colonoscopy  ASA - 2  Mallampati - per anesthesia  History of Anesthesia problems - no  Family history Anesthesia problems -  no     HPI:  This is a 52 y.o. female here for evaluation of :     Average Risk Screening: No  High risk screening: yes  History of polyps: yes  Anemia: No  Blood in stools: No  Diarrhea: No  Abdominal Pain: No    Review of Systems:  CONSTITUTIONAL: Denies weight change,  fatigue, fevers, chills, night sweats.  CARDIOVASCULAR: Denies chest pain, shortness of breath, orthopnea and edema.  RESPIRATORY: Denies cough, hemoptysis, dyspnea, and wheezing.  GI: See HPI.    Medical History:  Past Medical History:   Diagnosis Date    Abnormal Pap smear     Abnormal Pap smear of cervix     Annular tear of lumbar disc, L5-S1. 7/26/2012    Chronic LBP     HPTH (hyperparathyroidism)     Menorrhagia     PONV (postoperative nausea and vomiting)     Right lumbar radiculopathy 7/26/2012    Seasonal allergies        Surgical History:   Past Surgical History:   Procedure Laterality Date    BACK SURGERY  2018    CHOLECYSTECTOMY      COLONOSCOPY N/A 1/19/2018    Procedure: COLONOSCOPY;  Surgeon: Malachi Olmedo MD;  Location: [facility] ([address] [place] [address]);  Service: Endoscopy;  Laterality: N/A;    ESOPHAGOGASTRODUODENOSCOPY N/A 9/6/2019    Procedure: EGD (ESOPHAGOGASTRODUODENOSCOPY);  Surgeon: Jose Carlos Ventura MD;  Location: [address]);  Service: Endoscopy;  Laterality: N/A;  pt requesting ASAP    HYSTERECTOMY      TUBAL LIGATION      Essure    WISDOM TOOTH EXTRACTION      2005       Family History:   Family History   Problem Relation Age of Onset    Coronary artery disease Mother     Diabetes Father     Colon polyps Father     Breast cancer Neg Hx     Ovarian cancer Neg Hx     Melanoma Neg Hx     Colon cancer Neg Hx        Social History:   Social History     Tobacco Use    Smoking status: Never    Smokeless tobacco: Never    Substance Use Topics    Alcohol use: No    Drug use: No       Allergies: Reviewed.    Medications:  No current facility-administered medications on file prior to encounter.     Current Outpatient Medications on File Prior to Encounter   Medication Sig Dispense Refill    ALPRAZolam (XANAX) 0.25 MG tablet Take 1 tablet (0.25 mg total) by mouth daily as needed for Anxiety. 20 tablet 1    atorvastatin (LIPITOR) 10 MG tablet Take 1 tablet (10 mg total) by mouth once daily. 90 tablet 1    EScitalopram oxalate (LEXAPRO) 10 MG tablet Take 1 tablet (10 mg total) by mouth once daily. 30 tablet 11    esomeprazole (NEXIUM) 40 MG capsule Take 1 capsule (40 mg total) by mouth daily as needed. 30 capsule 11    gabapentin (NEURONTIN) 300 MG capsule Take 1 capsule (300 mg total) by mouth 3 (three) times daily. 50 capsule 2    ketorolac (TORADOL) 10 mg tablet Take one po q 8 hrs prn pain 20 tablet 0    methocarbamoL (ROBAXIN) 750 MG Tab SMARTSI Tablet(s) By Mouth Every 12 Hours      PERCOCET 5-325 mg per tablet Take 1 tablet by mouth daily as needed.      traMADoL (ULTRAM) 50 mg tablet Take 50 mg by mouth every 8 (eight) hours as needed.      cholecalciferol, vitamin D3, 1,250 mcg (50,000 unit) Tab Take 1 tablet by mouth once a week. for 8 doses 8 tablet 0    diclofenac sodium (VOLTAREN) 1 % Gel Apply 2 g topically once daily. Use gloves to apply (Patient not taking: Reported on 2022) 100 g 1    fluticasone propionate (FLONASE) 50 mcg/actuation nasal spray 2 sprays (100 mcg total) by Each Nostril route once daily. 11.1 mL 0    prednisoLONE acetate (PRED FORTE) 1 % DrpS Place 1 drop into the left eye 4 (four) times daily. 5 mL 11       Physical Exam:  Vital Signs:   Vitals:    22 0934   BP: 133/78   Pulse: 91   Resp: 16   Temp: 98.6 °F (37 °C)     General Appearance: Well appearing in no acute distress  ENT: OP clear  Chest: CTA B  CV: RRR, no m/r/g  Abd: s/nt/nd/nabs  Ext: no  edema    Labs:  Reviewed    IMPRESSION:    Hx of polyps    Plan:  I have explained the risks and benefits of colonoscopy to the patient including but not limited to bleeding, perforation, infection, and death. The patient wishes to proceed with colonoscopy.

## 2022-12-28 NOTE — ANESTHESIA POSTPROCEDURE EVALUATION
Anesthesia Post Evaluation    Patient: Laz Quintero    Procedure(s) Performed: Procedure(s) (LRB):  COLONOSCOPY (N/A)    Final Anesthesia Type: general      Patient location during evaluation: PACU  Patient participation: Yes- Able to Participate  Level of consciousness: awake and alert and oriented  Post-procedure vital signs: reviewed and stable  Pain management: adequate  Airway patency: patent    PONV status at discharge: No PONV  Anesthetic complications: no      Cardiovascular status: hemodynamically stable  Respiratory status: unassisted, spontaneous ventilation and room air  Hydration status: euvolemic  Follow-up not needed.          Vitals Value Taken Time   /62 12/28/22 1058   Temp 36.6 °C (97.8 °F) 12/28/22 1033   Pulse 61 12/28/22 1058   Resp 18 12/28/22 1058   SpO2 100 % 12/28/22 1058         No case tracking events are documented in the log.      Pain/Maria Guadalupe Score: Maria Guadalupe Score: 10 (12/28/2022 11:01 AM)

## 2022-12-28 NOTE — TRANSFER OF CARE
"Anesthesia Transfer of Care Note    Patient: Laz Quintero    Procedure(s) Performed: Procedure(s) (LRB):  COLONOSCOPY (N/A)    Patient location: GI    Anesthesia Type: general    Transport from OR: Transported from OR on 2-3 L/min O2 by NC with adequate spontaneous ventilation    Post pain: adequate analgesia    Post assessment: no apparent anesthetic complications and tolerated procedure well    Post vital signs: stable    Level of consciousness: awake and responds to stimulation    Nausea/Vomiting: no nausea/vomiting    Complications: none    Transfer of care protocol was followed      Last vitals:   Visit Vitals  /78 (BP Location: Left arm, Patient Position: Lying)   Pulse 91   Temp 37 °C (98.6 °F) (Temporal)   Resp 16   Ht 5' 6" (1.676 m)   Wt 72.6 kg (160 lb)   LMP 06/07/2015 (Exact Date)   SpO2 99%   Breastfeeding No   BMI 25.82 kg/m²     "

## 2022-12-28 NOTE — PROVATION PATIENT INSTRUCTIONS
Discharge Summary/Instructions after an Endoscopic Procedure  Patient Name: Laz Quintero  Patient MRN: [id number]  Patient YOB: 1970 Wednesday, December 28, 2022  Ezekiel Alanis MD  Dear patient,  As a result of recent federal legislation (The Federal Cures Act), you may   receive lab or pathology results from your procedure in your [facility]   account before your physician is able to contact you. Your physician or   their representative will relay the results to you with their   recommendations at their soonest availability.  Thank you,  RESTRICTIONS:  During your procedure today, you received medications for sedation.  These   medications may affect your judgment, balance and coordination.  Therefore,   for 24 hours, you have the following restrictions:   - DO NOT drive a car, operate machinery, make legal/financial decisions,   sign important papers or drink alcohol.    ACTIVITY:  Today: no heavy lifting, straining or running due to procedural   sedation/anesthesia.  The following day: return to full activity including work.  DIET:  Eat and drink normally unless instructed otherwise.     TREATMENT FOR COMMON SIDE EFFECTS:  - Mild abdominal pain, nausea, belching, bloating or excessive gas:  rest,   eat lightly and use a heating pad.  - Sore Throat: treat with throat lozenges and/or gargle with warm salt   water.  - Because air was used during the procedure, expelling large amounts of air   from your rectum or belching is normal.  - If a bowel prep was taken, you may not have a bowel movement for 1-3 days.    This is normal.  SYMPTOMS TO WATCH FOR AND REPORT TO YOUR PHYSICIAN:  1. Abdominal pain or bloating, other than gas cramps.  2. Chest pain.  3. Back pain.  4. Signs of infection such as: chills or fever occurring within 24 hours   after the procedure.  5. Rectal bleeding, which would show as bright red, maroon, or black stools.   (A tablespoon of blood from the rectum is not serious, especially  if   hemorrhoids are present.)  6. Vomiting.  7. Weakness or dizziness.  GO DIRECTLY TO THE NEAREST EMERGENCY ROOM IF YOU HAVE ANY OF THE FOLLOWING:      Difficulty breathing              Chills and/or fever over 101 F   Persistent vomiting and/or vomiting blood   Severe abdominal pain   Severe chest pain   Black, tarry stools   Bleeding- more than one tablespoon   Any other symptom or condition that you feel may need urgent attention  Your doctor recommends these additional instructions:  If any biopsies were taken, your doctors clinic will contact you in 1 to 2   weeks with any results.  - Discharge patient to home.   - High fiber diet indefinitely.   - Continue present medications.   - Use fiber, for example Citrucel, Fibercon, Konsyl or Metamucil.   - Await pathology results.   - Repeat colonoscopy in 5 years for surveillance.   - Return to referring physician as previously scheduled.   - Patient has a contact number available for emergencies.  The signs and   symptoms of potential delayed complications were discussed with the   patient.  Return to normal activities tomorrow.  Written discharge   instructions were provided to the patient.  For questions, problems or results please call your physician - Ezekiel Alanis MD at Work:  [phone].  [facility], EMERGENCY ROOM PHONE NUMBER: [phone]  IF A COMPLICATION OR EMERGENCY SITUATION ARISES AND YOU ARE UNABLE TO REACH   YOUR PHYSICIAN - GO DIRECTLY TO THE EMERGENCY ROOM.  Ezekiel Alanis MD  12/28/2022 10:26:38 AM  This report has been verified and signed electronically.  Dear patient,  As a result of recent federal legislation (The Federal Cures Act), you may   receive lab or pathology results from your procedure in your [facility]   account before your physician is able to contact you. Your physician or   their representative will relay the results to you with their   recommendations at their soonest availability.  Thank  you,  PROVATION

## 2022-12-29 ENCOUNTER — HOSPITAL ENCOUNTER (OUTPATIENT)
Dept: CARDIOLOGY | Facility: HOSPITAL | Age: 52
Discharge: HOME | End: 2022-12-29
Attending: PHYSICIAN ASSISTANT
Payer: COMMERCIAL

## 2022-12-29 VITALS
WEIGHT: 160 LBS | HEIGHT: 66 IN | RESPIRATION RATE: 16 BRPM | BODY MASS INDEX: 25.71 KG/M2 | SYSTOLIC BLOOD PRESSURE: 150 MMHG | HEART RATE: 74 BPM | DIASTOLIC BLOOD PRESSURE: 84 MMHG

## 2022-12-29 DIAGNOSIS — Z01.810 PRE-OPERATIVE CARDIOVASCULAR EXAMINATION: ICD-10-CM

## 2022-12-29 LAB
ASCENDING AORTA: 2.38 CM
BSA FOR ECHO PROCEDURE: 1.84 M2
CV ECHO LV RWT: 0.48 CM
CV STRESS BASE HR: 74 BPM
DIASTOLIC BLOOD PRESSURE: 79 MMHG
DOP CALC LVOT AREA: 3.2 CM2
DOP CALC LVOT DIAMETER: 2.02 CM
DOP CALC LVOT PEAK VEL: 0.95 M/S
DOP CALC LVOT STROKE VOLUME: 69.92 CM3
DOP CALCLVOT PEAK VEL VTI: 21.83 CM
E WAVE DECELERATION TIME: 256.99 MSEC
E/A RATIO: 1.1
E/E' RATIO: 5.74 M/S
ECHO LV POSTERIOR WALL: 0.98 CM (ref 0.6–1.1)
EJECTION FRACTION: 63 %
FRACTIONAL SHORTENING: 34 % (ref 28–44)
INTERVENTRICULAR SEPTUM: 0.68 CM (ref 0.6–1.1)
IVRT: 74.22 MSEC
LA MAJOR: 5.58 CM
LA MINOR: 5.55 CM
LA WIDTH: 4.24 CM
LEFT ATRIUM SIZE: 3.35 CM
LEFT ATRIUM VOLUME INDEX MOD: 37.9 ML/M2
LEFT ATRIUM VOLUME INDEX: 36.9 ML/M2
LEFT ATRIUM VOLUME MOD: 68.92 CM3
LEFT ATRIUM VOLUME: 67.19 CM3
LEFT INTERNAL DIMENSION IN SYSTOLE: 2.68 CM (ref 2.1–4)
LEFT VENTRICLE DIASTOLIC VOLUME INDEX: 39.51 ML/M2
LEFT VENTRICLE DIASTOLIC VOLUME: 71.9 ML
LEFT VENTRICLE MASS INDEX: 55 G/M2
LEFT VENTRICLE SYSTOLIC VOLUME INDEX: 14.6 ML/M2
LEFT VENTRICLE SYSTOLIC VOLUME: 26.59 ML
LEFT VENTRICULAR INTERNAL DIMENSION IN DIASTOLE: 4.05 CM (ref 3.5–6)
LEFT VENTRICULAR MASS: 100.22 G
LV LATERAL E/E' RATIO: 5.5 M/S
LV SEPTAL E/E' RATIO: 6 M/S
MV A" WAVE DURATION": 7.42 MSEC
MV PEAK A VEL: 0.6 M/S
MV PEAK E VEL: 0.66 M/S
MV STENOSIS PRESSURE HALF TIME: 74.53 MS
MV VALVE AREA P 1/2 METHOD: 2.95 CM2
OHS CV CPX 1 MINUTE RECOVERY HEART RATE: 133 BPM
OHS CV CPX 85 PERCENT MAX PREDICTED HEART RATE MALE: 138
OHS CV CPX MAX PREDICTED HEART RATE: 162
OHS CV CPX PATIENT IS FEMALE: 1
OHS CV CPX PATIENT IS MALE: 0
OHS CV CPX PEAK DIASTOLIC BLOOD PRESSURE: 72 MMHG
OHS CV CPX PEAK HEAR RATE: 144 BPM
OHS CV CPX PEAK RATE PRESSURE PRODUCT: NORMAL
OHS CV CPX PEAK SYSTOLIC BLOOD PRESSURE: 145 MMHG
OHS CV CPX PERCENT MAX PREDICTED HEART RATE ACHIEVED: 89
OHS CV CPX RATE PRESSURE PRODUCT PRESENTING: NORMAL
PISA TR MAX VEL: 2.62 M/S
PULM VEIN S/D RATIO: 0.62
PV PEAK D VEL: 0.58 M/S
PV PEAK S VEL: 0.36 M/S
RA MAJOR: 4.96 CM
RA PRESSURE: 3 MMHG
RA WIDTH: 3.62 CM
RIGHT VENTRICULAR END-DIASTOLIC DIMENSION: 3.31 CM
RV TISSUE DOPPLER FREE WALL SYSTOLIC VELOCITY 1 (APICAL 4 CHAMBER VIEW): 15.08 CM/S
SINUS: 2.68 CM
STJ: 2.26 CM
STRESS ST DEPRESSION: 0.5 MM
SYSTOLIC BLOOD PRESSURE: 137 MMHG
TDI LATERAL: 0.12 M/S
TDI SEPTAL: 0.11 M/S
TDI: 0.12 M/S
TR MAX PG: 27 MMHG
TRICUSPID ANNULAR PLANE SYSTOLIC EXCURSION: 2.05 CM
TV REST PULMONARY ARTERY PRESSURE: 30 MMHG

## 2022-12-29 PROCEDURE — 93351 STRESS ECHO (CUPID ONLY): ICD-10-PCS | Mod: 26,,, | Performed by: INTERNAL MEDICINE

## 2022-12-29 PROCEDURE — 93351 STRESS TTE COMPLETE: CPT

## 2022-12-29 PROCEDURE — 93351 STRESS TTE COMPLETE: CPT | Mod: 26,,, | Performed by: INTERNAL MEDICINE

## 2022-12-29 PROCEDURE — 63600150 PHARM REV CODE 636: Performed by: PHYSICIAN ASSISTANT

## 2022-12-29 RX ORDER — DOBUTAMINE HYDROCHLORIDE 100 MG/100ML
10 INJECTION INTRAVENOUS
Status: COMPLETED | OUTPATIENT
Start: 2022-12-29 | End: 2022-12-29

## 2022-12-29 RX ADMIN — DOBUTAMINE IN DEXTROSE 10 MCG/KG/MIN: 100 INJECTION, SOLUTION INTRAVENOUS at 04:12

## 2023-01-03 ENCOUNTER — TELEPHONE (OUTPATIENT)
Dept: ENDOCRINOLOGY | Facility: CLINIC | Age: 53
End: 2023-01-03

## 2023-01-03 DIAGNOSIS — R00.2 PALPITATIONS: Primary | ICD-10-CM

## 2023-01-06 ENCOUNTER — CLINICAL SUPPORT (OUTPATIENT)
Dept: ENDOCRINOLOGY | Facility: CLINIC | Age: 53
End: 2023-01-06

## 2023-01-06 DIAGNOSIS — R00.2 PALPITATIONS: ICD-10-CM

## 2023-01-06 PROCEDURE — 83835 ASSAY OF METANEPHRINES: CPT | Performed by: INTERNAL MEDICINE

## 2023-01-06 PROCEDURE — 36415 COLL VENOUS BLD VENIPUNCTURE: CPT | Performed by: INTERNAL MEDICINE

## 2023-01-09 NOTE — PROGRESS NOTES
Patient arrives in clinic of lab draw, placed patient in supine position for 30 minutes, Metanephrines draw 2 lavender top tubes placed on ice then walked to lab. Patient tolerated well.

## 2023-01-10 ENCOUNTER — PATIENT MESSAGE (OUTPATIENT)
Dept: CARDIOTHORACIC SURGERY | Facility: CLINIC | Age: 53
End: 2023-01-10
Payer: COMMERCIAL

## 2023-01-10 ENCOUNTER — PATIENT MESSAGE (OUTPATIENT)
Dept: INTERNAL MEDICINE | Facility: CLINIC | Age: 53
End: 2023-01-10
Payer: COMMERCIAL

## 2023-01-10 LAB
FINAL PATHOLOGIC DIAGNOSIS: NORMAL
GROSS: NORMAL
Lab: NORMAL

## 2023-01-11 ENCOUNTER — TELEPHONE (OUTPATIENT)
Dept: INTERNAL MEDICINE | Facility: CLINIC | Age: 53
End: 2023-01-11
Payer: COMMERCIAL

## 2023-01-11 ENCOUNTER — HOSPITAL ENCOUNTER (EMERGENCY)
Facility: HOSPITAL | Age: 53
Discharge: HOME | End: 2023-01-11
Attending: STUDENT IN AN ORGANIZED HEALTH CARE EDUCATION/TRAINING PROGRAM

## 2023-01-11 VITALS
DIASTOLIC BLOOD PRESSURE: 74 MMHG | SYSTOLIC BLOOD PRESSURE: 137 MMHG | OXYGEN SATURATION: 98 % | RESPIRATION RATE: 17 BRPM | HEART RATE: 66 BPM | TEMPERATURE: 99 F

## 2023-01-11 DIAGNOSIS — R07.9 CHEST PAIN: ICD-10-CM

## 2023-01-11 DIAGNOSIS — R00.2 PALPITATION: Primary | ICD-10-CM

## 2023-01-11 LAB
ALBUMIN SERPL BCP-MCNC: 4.5 G/DL (ref 3.5–5.2)
ALP SERPL-CCNC: 81 U/L (ref 55–135)
ALT SERPL W/O P-5'-P-CCNC: 17 U/L (ref 10–44)
ANION GAP SERPL CALC-SCNC: 11 MMOL/L (ref 8–16)
AST SERPL-CCNC: 19 U/L (ref 10–40)
BASOPHILS # BLD AUTO: 0.03 K/UL (ref 0–0.2)
BASOPHILS NFR BLD: 0.2 % (ref 0–1.9)
BILIRUB SERPL-MCNC: 0.3 MG/DL (ref 0.1–1)
BNP SERPL-MCNC: <10 PG/ML (ref 0–99)
BUN SERPL-MCNC: 14 MG/DL (ref 6–20)
CALCIUM SERPL-MCNC: 10.7 MG/DL (ref 8.7–10.5)
CHLORIDE SERPL-SCNC: 103 MMOL/L (ref 95–110)
CO2 SERPL-SCNC: 22 MMOL/L (ref 23–29)
CREAT SERPL-MCNC: 0.8 MG/DL (ref 0.5–1.4)
D DIMER PPP IA.FEU-MCNC: 0.19 MG/L FEU
DIFFERENTIAL METHOD: ABNORMAL
EOSINOPHIL # BLD AUTO: 0 K/UL (ref 0–0.5)
EOSINOPHIL NFR BLD: 0.1 % (ref 0–8)
ERYTHROCYTE [DISTWIDTH] IN BLOOD BY AUTOMATED COUNT: 12.2 % (ref 11.5–14.5)
EST. GFR  (NO RACE VARIABLE): >60 ML/MIN/1.73 M^2
GLUCOSE SERPL-MCNC: 113 MG/DL (ref 70–110)
HCT VFR BLD AUTO: 42.3 % (ref 37–48.5)
HCV AB SERPL QL IA: NORMAL
HGB BLD-MCNC: 13.8 G/DL (ref 12–16)
HIV 1+2 AB+HIV1 P24 AG SERPL QL IA: NORMAL
IMM GRANULOCYTES # BLD AUTO: 0.04 K/UL (ref 0–0.04)
IMM GRANULOCYTES NFR BLD AUTO: 0.3 % (ref 0–0.5)
LYMPHOCYTES # BLD AUTO: 3.2 K/UL (ref 1–4.8)
LYMPHOCYTES NFR BLD: 25.3 % (ref 18–48)
MCH RBC QN AUTO: 28 PG (ref 27–31)
MCHC RBC AUTO-ENTMCNC: 32.6 G/DL (ref 32–36)
MCV RBC AUTO: 86 FL (ref 82–98)
MONOCYTES # BLD AUTO: 0.6 K/UL (ref 0.3–1)
MONOCYTES NFR BLD: 4.9 % (ref 4–15)
NEUTROPHILS # BLD AUTO: 8.7 K/UL (ref 1.8–7.7)
NEUTROPHILS NFR BLD: 69.2 % (ref 38–73)
NRBC BLD-RTO: 0 /100 WBC
PLATELET # BLD AUTO: 337 K/UL (ref 150–450)
PMV BLD AUTO: 9.2 FL (ref 9.2–12.9)
POTASSIUM SERPL-SCNC: 4.4 MMOL/L (ref 3.5–5.1)
PROT SERPL-MCNC: 8.5 G/DL (ref 6–8.4)
RBC # BLD AUTO: 4.92 M/UL (ref 4–5.4)
SODIUM SERPL-SCNC: 136 MMOL/L (ref 136–145)
TROPONIN I SERPL DL<=0.01 NG/ML-MCNC: 0.02 NG/ML (ref 0–0.03)
TSH SERPL DL<=0.005 MIU/L-ACNC: 1.29 UIU/ML (ref 0.4–4)
WBC # BLD AUTO: 12.55 K/UL (ref 3.9–12.7)

## 2023-01-11 PROCEDURE — 87389 HIV-1 AG W/HIV-1&-2 AB AG IA: CPT | Performed by: PHYSICIAN ASSISTANT

## 2023-01-11 PROCEDURE — 99284 EMERGENCY DEPT VISIT MOD MDM: CPT | Mod: ,,, | Performed by: STUDENT IN AN ORGANIZED HEALTH CARE EDUCATION/TRAINING PROGRAM

## 2023-01-11 PROCEDURE — 84484 ASSAY OF TROPONIN QUANT: CPT | Performed by: EMERGENCY MEDICINE

## 2023-01-11 PROCEDURE — 93005 ELECTROCARDIOGRAM TRACING: CPT

## 2023-01-11 PROCEDURE — 85025 COMPLETE CBC W/AUTO DIFF WBC: CPT | Performed by: EMERGENCY MEDICINE

## 2023-01-11 PROCEDURE — 99284 PR EMERGENCY DEPT VISIT,LEVEL IV: ICD-10-PCS | Mod: ,,, | Performed by: STUDENT IN AN ORGANIZED HEALTH CARE EDUCATION/TRAINING PROGRAM

## 2023-01-11 PROCEDURE — 83880 ASSAY OF NATRIURETIC PEPTIDE: CPT | Performed by: EMERGENCY MEDICINE

## 2023-01-11 PROCEDURE — 86803 HEPATITIS C AB TEST: CPT | Performed by: PHYSICIAN ASSISTANT

## 2023-01-11 PROCEDURE — 84443 ASSAY THYROID STIM HORMONE: CPT | Performed by: EMERGENCY MEDICINE

## 2023-01-11 PROCEDURE — 85379 FIBRIN DEGRADATION QUANT: CPT | Performed by: EMERGENCY MEDICINE

## 2023-01-11 PROCEDURE — 93010 EKG 12-LEAD: ICD-10-PCS | Mod: ,,, | Performed by: INTERNAL MEDICINE

## 2023-01-11 PROCEDURE — 99285 EMERGENCY DEPT VISIT HI MDM: CPT | Mod: 25

## 2023-01-11 PROCEDURE — 93010 ELECTROCARDIOGRAM REPORT: CPT | Mod: ,,, | Performed by: INTERNAL MEDICINE

## 2023-01-11 PROCEDURE — 80053 COMPREHEN METABOLIC PANEL: CPT | Performed by: EMERGENCY MEDICINE

## 2023-01-11 NOTE — TELEPHONE ENCOUNTER
Spoke to patient. Patient stated that it has gone down. Just had stress and echo recently. Down upper 80s but during the conversation back up to 105. Encouraged patient to be seen in ER for eval. Patient stated shes going.

## 2023-01-12 NOTE — DISCHARGE INSTRUCTIONS
Please continue your outpatient workup with endocrinology and thoracic surgery.  Stay resting when you have a palpitation or lightheadedness.  Return to ED if you have worsening chest pain, loss of consciousness, or other concerns.

## 2023-01-12 NOTE — ED PROVIDER NOTES
Encounter Date: 1/11/2023       History     Chief Complaint   Patient presents with    Chest Pain     Chest pain that radiated to R shoulder and under L breast, heart palpitations and SOB x3 days. States was told by PCP to come to ED for eval. Had echo done 1 week ago for same c/o. Denies cardiac hx     HPI  52-year-old female with history of hyperparathyroidism, presents to the ED for chest pain.  Patient reports that she has been feeling hot flashes, heart palpitation, sometimes accompany with chest pain, short of breath, lightheadedness, and bilateral upper extremity tingling.  Patient states that these episodes happen randomly, no known aggravating or alleviating factor.  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  Patient denies recent illness, no fever or chill, no family history of cardiac arrest.  Review of patient's allergies indicates:   Allergen Reactions    Clindamycin     Sulfa dyne     Sulfamethoxazole-trimethoprim      Other reaction(s): Anaphylaxis    Dermabond [tissue glues] Rash     Past Medical History:   Diagnosis Date    Abnormal Pap smear     Abnormal Pap smear of cervix     Annular tear of lumbar disc, L5-S1. 7/26/2012    Chronic LBP     HPTH (hyperparathyroidism)     Menorrhagia     PONV (postoperative nausea and vomiting)     Right lumbar radiculopathy 7/26/2012    Seasonal allergies      Past Surgical History:   Procedure Laterality Date    BACK SURGERY  2018    CHOLECYSTECTOMY      COLONOSCOPY N/A 1/19/2018    Procedure: COLONOSCOPY;  Surgeon: Malachi Olmedo MD;  Location: [address]);  Service: Endoscopy;  Laterality: N/A;    COLONOSCOPY N/A 12/28/2022    Procedure: COLONOSCOPY;  Surgeon: Ezekiel Alanis MD;  Location: [facility] ([address] [place] [address]);  Service: Endoscopy;   Laterality: N/A;  inst via portal  pre call complete [facility]    ESOPHAGOGASTRODUODENOSCOPY N/A 9/6/2019    Procedure: EGD (ESOPHAGOGASTRODUODENOSCOPY);  Surgeon: Jose Carlos Ventura MD;  Location: [facility] ([address] [place] [address]);  Service: Endoscopy;  Laterality: N/A;  pt requesting ASAP    HYSTERECTOMY      TUBAL LIGATION      Essure    WISDOM TOOTH EXTRACTION      2005     Family History   Problem Relation Age of Onset    Coronary artery disease Mother     Diabetes Father     Colon polyps Father     Breast cancer Neg Hx     Ovarian cancer Neg Hx     Melanoma Neg Hx     Colon cancer Neg Hx      Social History     Tobacco Use    Smoking status: Never    Smokeless tobacco: Never   Substance Use Topics    Alcohol use: No    Drug use: No     Review of Systems   Constitutional:  Negative for chills and fever.   HENT:  Negative for congestion and sore throat.    Eyes:  Negative for pain and visual disturbance.   Respiratory:  Positive for shortness of breath. Negative for cough.    Cardiovascular:  Positive for chest pain and palpitations. Negative for leg swelling.   Gastrointestinal:  Negative for abdominal pain, nausea and vomiting.   Genitourinary:  Negative for dysuria and flank pain.   Musculoskeletal:  Negative for back pain and neck pain.   Skin:  Negative for rash.   Neurological:  Positive for light-headedness and numbness. Negative for weakness and headaches.   Psychiatric/Behavioral:  Negative for behavioral problems and confusion.      Physical Exam     Initial Vitals [01/11/23 2007]   BP Pulse Resp Temp SpO2   (!) 187/104 (!) 127 20 98.3 °F (36.8 °C) 100 %      MAP       --         Physical Exam    Nursing note and vitals reviewed.  Constitutional: She appears well-developed. No distress.   HENT:   Head: Normocephalic and atraumatic.   Mouth/Throat: Oropharynx is clear and moist.   Eyes: Conjunctivae and EOM are normal.   Neck: No JVD present.   Normal range of motion.  Cardiovascular:  Normal rate, regular  rhythm, normal heart sounds and intact distal pulses.           Pulmonary/Chest: Breath sounds normal. No respiratory distress.   Abdominal: Abdomen is soft. She exhibits no distension. There is no abdominal tenderness.   Musculoskeletal:         General: No tenderness or edema. Normal range of motion.      Cervical back: Normal range of motion.     Neurological: She is alert and oriented to person, place, and time. She has normal strength.   Skin: Skin is warm and dry. Capillary refill takes less than 2 seconds.       ED Course   Procedures  Labs Reviewed   CBC W/ AUTO DIFFERENTIAL - Abnormal; Notable for the following components:       Result Value    Gran # (ANC) 8.7 (*)     All other components within normal limits   COMPREHENSIVE METABOLIC PANEL - Abnormal; Notable for the following components:    CO2 22 (*)     Glucose 113 (*)     Calcium 10.7 (*)     Total Protein 8.5 (*)     All other components within normal limits   HIV 1 / 2 ANTIBODY    Narrative:     Release to patient->Immediate   HEPATITIS C ANTIBODY    Narrative:     Release to patient->Immediate   TROPONIN I    Narrative:     add on tsh per dr benjamin diaz/order#[id number] @22:47 01/11/2023   B-TYPE NATRIURETIC PEPTIDE    Narrative:     add on tsh per dr benjamin diaz/order#[id number] @22:47 01/11/2023   D DIMER, QUANTITATIVE   TSH   TSH    Narrative:     add on tsh per dr benjamin diza/order#[id number] @22:47 01/11/2023     EKG Readings: (Independently Interpreted)   Initial Reading: No STEMI. Rhythm: Sinus Tachycardia. Heart Rate: 98. Ectopy: No Ectopy. Conduction: Normal. ST Segments: Normal ST Segments. T Waves: Normal. Axis: Normal. Clinical Impression: Normal Sinus Rhythm     Imaging Results              X-Ray Chest AP Portable (Final result)  Result time 01/11/23 23:16:56      Final result by Vu Mondragon MD (01/11/23 23:16:56)                   Impression:      No acute findings in the chest.      Electronically signed by: Vu Mondragon  MD  Date:    01/11/2023  Time:    23:16               Narrative:    EXAMINATION:  XR CHEST AP PORTABLE    CLINICAL HISTORY:  Chest Pain;    TECHNIQUE:  Single frontal view of the chest was performed.    COMPARISON:  01/17/2020.    FINDINGS:  No consolidation, pleural effusion or pneumothorax.    Cardiomediastinal silhouette is unremarkable.                                    X-Rays:   Independently Interpreted Readings:   Chest X-Ray: Normal heart size.  No infiltrates.  No acute abnormalities.   Medications - No data to display  Medical Decision Making:   History:   Old Medical Records: I decided to obtain old medical records.  Old Records Summarized: records from clinic visits.       <> Summary of Records: Elevated metanephrine and normetanephrine    Initial Assessment:   52-year-old female with history of hyperparathyroidism, presents to the ED for chest pain.   Differential Diagnosis:   ACS, PE, pneumonia, anxiety attack, pheochromocytoma   Clinical Tests:   Lab Tests: Ordered and Reviewed  Radiological Study: Ordered and Reviewed  Medical Tests: Ordered and Reviewed           ED Course as of 01/12/23 0154   Wed Jan 11, 2023   2306 Sodium: 136 [NC]   2307 Potassium: 4.4 [NC]   2307 BUN: 14 [NC]   2307 Creatinine: 0.8 [NC]   2307 WBC: 12.55 [NC]   2307 Hemoglobin: 13.8 [NC]   2307 Platelets: 337 [NC]   2307 D-Dimer: 0.19  Less concerning for PE [NC]   2307 BNP: <10 [NC]   2307 Troponin I: 0.016  EKG normal sinus rhythm, troponins negative, less likely ACS [NC]   2307 BNP: <10  No sign of fluid overload clinically, BNP negative, no concerning for congestive heart failure [NC]   u Jan 12, 2023   0151 Her work-up is less concerning for emergency process. Patient is chest pain free in the ED. Her HR is trending down with her BP. Her presentation is likely from endocrinology etiology vs. Essential hypertension. Recommended her to continue out patient work up with her PCP and endocrinology. Provided discharge  instruction and return to the ED precaution. No other question. [NC]      ED Course User Index  [NC] Torey Jeffrey MD                 Clinical Impression:   Final diagnoses:  [R07.9] Chest pain  [R00.2] Palpitation (Primary)        ED Disposition Condition    Discharge Stable          ED Prescriptions    None       Follow-up Information       Follow up With Specialties Details Why Contact Info    Charlotte Jacinto MD Internal Medicine Schedule an appointment as soon as possible for a visit   [address]  [place] [id number] [phone]      [facility] - Emergency Dept Emergency Medicine  As needed [address] [facility] [id number] [phone]    Endocrinology  Schedule an appointment as soon as possible for a visit                Torey Jeffrey MD  Resident  01/12/23 0154

## 2023-01-12 NOTE — ED NOTES
Pt c/o left sided chest pain and bilateral lower back pain described as intermittent and worsens when taking a deep breath. Denies taking any pain medication. Denies changes in urination, SOB.

## 2023-01-13 ENCOUNTER — OFFICE VISIT (OUTPATIENT)
Dept: ENDOCRINOLOGY | Facility: CLINIC | Age: 53
End: 2023-01-13

## 2023-01-13 VITALS
WEIGHT: 162.94 LBS | SYSTOLIC BLOOD PRESSURE: 160 MMHG | HEART RATE: 112 BPM | HEIGHT: 66 IN | OXYGEN SATURATION: 98 % | DIASTOLIC BLOOD PRESSURE: 100 MMHG | BODY MASS INDEX: 26.19 KG/M2

## 2023-01-13 DIAGNOSIS — I10 HYPERTENSION, UNSPECIFIED TYPE: ICD-10-CM

## 2023-01-13 DIAGNOSIS — E01.0 THYROMEGALY: ICD-10-CM

## 2023-01-13 DIAGNOSIS — R79.89 ELEVATED PLASMA METANEPHRINES: Primary | ICD-10-CM

## 2023-01-13 DIAGNOSIS — R00.2 PALPITATIONS: ICD-10-CM

## 2023-01-13 DIAGNOSIS — R23.2 HOT FLASHES: ICD-10-CM

## 2023-01-13 LAB
METANEPH FREE SERPL-MCNC: 60 PG/ML
METANEPHS SERPL-MCNC: 229 PG/ML
NORMETANEPH FREE SERPL-MCNC: 169 PG/ML

## 2023-01-13 PROCEDURE — 99999 PR PBB SHADOW E&M-EST. PATIENT-LVL IV: ICD-10-PCS | Mod: PBBFAC,,, | Performed by: GENERAL ACUTE CARE HOSPITAL

## 2023-01-13 PROCEDURE — 99204 PR OFFICE/OUTPT VISIT, NEW, LEVL IV, 45-59 MIN: ICD-10-PCS | Mod: S$PBB,,, | Performed by: GENERAL ACUTE CARE HOSPITAL

## 2023-01-13 PROCEDURE — 99999 PR PBB SHADOW E&M-EST. PATIENT-LVL IV: CPT | Mod: PBBFAC,,, | Performed by: GENERAL ACUTE CARE HOSPITAL

## 2023-01-13 PROCEDURE — 99214 OFFICE O/P EST MOD 30 MIN: CPT | Mod: PBBFAC | Performed by: GENERAL ACUTE CARE HOSPITAL

## 2023-01-13 PROCEDURE — 99204 OFFICE O/P NEW MOD 45 MIN: CPT | Mod: S$PBB,,, | Performed by: GENERAL ACUTE CARE HOSPITAL

## 2023-01-13 NOTE — PROGRESS NOTES
Subjective:      Patient ID: Laz Quintero is a 52 y.o.    Chief Complaint:  Anterior mediastinal mass, elevated metanephrines; Initial visit          History of Present Illness  53 YO Female w/ PMH as above that was referred by cardiothoracic surgery for evaluation of elevated metanephrines during work up for soft tissue mediastinal mass.   Pt today reports having episodes of hotflashes that are accompanied by palpitations, headaches and SOB.  Per patient symptoms started around 9/2022 after she noticed having scleral capillary ruptures and HTN.  Per patient she attributed these symptoms to anxiety in the past.  Pt was prescribed SSRIs but she has not started taking them.  Per patient episodes happen a random times of the day and can last from 2- 30 minutes.   Denies diaphoresis, chest pain, N/V or dizziness.               With regards to Elevated plasma metanephrines/ Mediastinal mass/ Episodic Hotflashes and palpitations:      Latest Reference Range & Units 12/19/22 15:50 12/21/22 08:04 01/09/23 08:30   Metanephrine, Free < OR = 57 pg/mL 68 (H)     Normetanephrine, Free < OR = 148 pg/mL 219 (H)     Metanephrine, Total, Plasma < OR = 205 pg/mL 287 (H)     Epinephrine, 24H Ur <21 mcg/24 h   3.9   Norepinephrine, 24H Ur 15 - 80 mcg/24 h   67   Dopamine , 24H Ur 65 - 400 mcg/24 h   375   Metanephrines, 24H Ur mcg/24 h  198 197   Normetanephrine, 24H Ur mcg/24 h  658 466   Total Metanephrine, urine mcg/24 h  856 663   Comment, (Metanephrines)   Test Not Performed Test Not Performed   (H): Data is abnormally high     Latest Reference Range & Units 12/19/22 15:50 01/06/23 10:55   Metanephrine, Free < OR = 57 pg/mL 68 (H) 60 (H)   Normetanephrine, Free < OR = 148 pg/mL 219 (H) 169 (H)   Metanephrine, Total, Plasma < OR = 205 pg/mL 287 (H) 229 (H)   (H): Data is abnormally high          Minimally elevated PLASMA metanephrines < 2 times ULN in the setting of HTN and possible anxiety.   (LESS LIKELY PARAGANGLIOMA OR  PHEOCHROMOCYTOMA as Biochemical Dx of either requires elevated metanephrines or catecholamines > 3 times ULN)      24hr urine metanephrines/ Cathecoloamines are WNL     Functional paraganglioma or pheochromocytoma has been ruled out.      Pt is having HTN but not formally diagnosed or on treatment     No classic pheochromocytoma triad     Palpitations and hot flashes could be related to anxiety and/or Menopause  (Pt had partial hysterectomy in the past with intact ovaries)      ROS:   As above    Objective:     LMP 06/07/2015 (Exact Date)     There is no height or weight on file to calculate BMI.      Physical Exam  Vitals reviewed.   Constitutional:       General: She is not in acute distress.     Appearance: Normal appearance. She is well-developed. She is not ill-appearing.   HENT:      Nose: Nose normal. No rhinorrhea.      Mouth/Throat:      Mouth: Mucous membranes are moist.   Eyes:      Extraocular Movements: Extraocular movements intact.      Pupils: Pupils are equal, round, and reactive to light.      Comments: No proptosis, No lid lag, No conjunctival erythema    Neck:      Thyroid: No thyromegaly.      Trachea: No tracheal deviation.      Comments: No thyromegaly or Thyroid nodules palpated on exam   Cardiovascular:      Rate and Rhythm: Normal rate and regular rhythm.      Pulses: Normal pulses.   Pulmonary:      Effort: Pulmonary effort is normal.      Breath sounds: Normal breath sounds.   Abdominal:      Palpations: Abdomen is soft. There is no mass.      Tenderness: There is no abdominal tenderness.      Hernia: No hernia is present.      Comments: No scar tissue, No Violaceous striae    Musculoskeletal:         General: No swelling.      Cervical back: Neck supple. No tenderness.      Right lower leg: No edema.      Left lower leg: No edema.   Skin:     General: Skin is warm.      Findings: No rash.   Neurological:      General: No focal deficit present.      Mental Status: She is alert and oriented  to person, place, and time.   Psychiatric:         Mood and Affect: Mood normal.         Judgment: Judgment normal.              Lab Review:   Lab Results   Component Value Date    HGBA1C 5.4 11/18/2022     Lab Results   Component Value Date    CHOL 268 (H) 11/18/2022    HDL 69 11/18/2022    LDLCALC 171.8 (H) 11/18/2022    TRIG 136 11/18/2022    CHOLHDL 25.7 11/18/2022     Lab Results   Component Value Date     01/11/2023    K 4.4 01/11/2023     01/11/2023    CO2 22 (L) 01/11/2023     (H) 01/11/2023    BUN 14 01/11/2023    CREATININE 0.8 01/11/2023    CALCIUM 10.7 (H) 01/11/2023    PROT 8.5 (H) 01/11/2023    ALBUMIN 4.5 01/11/2023    BILITOT 0.3 01/11/2023    ALKPHOS 81 01/11/2023    AST 19 01/11/2023    ALT 17 01/11/2023    ANIONGAP 11 01/11/2023    ESTGFRAFRICA >60.0 09/05/2019    EGFRNONAA >60.0 09/05/2019    TSH 1.292 01/11/2023     Vit D, 25-Hydroxy   Date Value Ref Range Status   10/22/2019 28 (L) 30 - 96 ng/mL Final     Comment:     Vitamin D deficiency.........<10 ng/mL                              Vitamin D insufficiency......10-29 ng/mL       Vitamin D sufficiency........> or equal to 30 ng/mL  Vitamin D toxicity............>100 ng/mL         Assessment and Plan         Elevated plasma metanephrines  Hot flashes  Palpitations  Hypertension, unspecified type    Minimally elevated PLASMA metanephrines < 2 times ULN in the setting of HTN and possible anxiety.   (LESS LIKELY PARAGANGLIOMA OR PHEOCHROMOCYTOMA as Biochemical Dx of either requires elevated metanephrines or catecholamines > 3 times ULN)      24hr urine metanephrines/ Cathecoloamines are WNL     SSRIs can falsely elevate plasma metanephrines but usually < 3 times ULN which is the cutoff for Diagnosis of Pheochromocytoma or hormonally active paraganglioma     Mediastinal mass is unlikely to be a Functional paraganglioma or pheochromocytoma     No need to repeat metanephrines or catecholamines     Hot flashes and palpitations could  be related to menopause (Will send Estradiol and FSH levels)       If patient is having menopausal hot flashes will consider HRT     Will give referral to cardiology for evaluation of palpitations and treatment of HTN         Thyromegaly  -     Thyroid Stimulating Immunoglobulin; Future; Expected date: 01/13/2023  -     Thyrotropin Receptor Antibody; Future; Expected date: 01/13/2023  -     US Soft Tissue Head Neck Thyroid; Future; Expected date: 01/13/2023

## 2023-01-13 NOTE — PATIENT INSTRUCTIONS
On your repeat metanephrine and cathecholamine levels the levels are lower and less than 3 times upper limit normal which rules out having over production of adrenaline.      Due to symptoms of Hot flashes, palpitations and headaches, I will recommend the following.     We will rule out menopausal hotflashes as the cause of the symptoms with fasting blood work at 8am.     Cardiology referral for Holter monitor and considering Beta blockers.       Low sodium diet.        Due to thyroid nodules felt on exam we will investigate further with a thyroid ultrasound.        Once I have results, I will contact you with next steps to follow.       If any questions feel free to contact me.     Have a nice day.     Sincerely,       Casey Lamb MD   Endocrinology   1/13/2023 4:13 PM

## 2023-01-18 ENCOUNTER — TELEPHONE (OUTPATIENT)
Dept: INTERNAL MEDICINE | Facility: CLINIC | Age: 53
End: 2023-01-18
Payer: COMMERCIAL

## 2023-01-18 ENCOUNTER — TELEPHONE (OUTPATIENT)
Dept: ENDOCRINOLOGY | Facility: CLINIC | Age: 53
End: 2023-01-18
Payer: COMMERCIAL

## 2023-01-18 ENCOUNTER — PATIENT MESSAGE (OUTPATIENT)
Dept: OBSTETRICS AND GYNECOLOGY | Facility: CLINIC | Age: 53
End: 2023-01-18
Payer: COMMERCIAL

## 2023-01-18 ENCOUNTER — PATIENT MESSAGE (OUTPATIENT)
Dept: ENDOCRINOLOGY | Facility: CLINIC | Age: 53
End: 2023-01-18
Payer: COMMERCIAL

## 2023-01-18 DIAGNOSIS — J98.59 MEDIASTINAL MASS: Primary | ICD-10-CM

## 2023-01-18 DIAGNOSIS — R23.2 HOT FLASHES: Primary | ICD-10-CM

## 2023-01-18 NOTE — TELEPHONE ENCOUNTER
----- Message from Precious Olivia MA sent at 1/18/2023  3:00 PM CST -----  I am not sure she would need to contact her insurance company and if they have any paper work then we can ask Dr Rolon if that is something he would be able to fill out   ----- Message -----  From: Basil Ventura MA  Sent: 1/18/2023   2:52 PM CST  To: ANYA Kumar,    Patient insurance is asking would she be approve for disability due to Dr. Rolon diagnosis her with something new   ----- Message -----  From: Ernestina Dos Santos  Sent: 1/18/2023  10:40 AM CST  To: Ольга Peña Staff    Lise w/ [employer] calling regarding wanting to verify if the PT is restricted or to be out of work long term to approve the disability request, call back [phone]

## 2023-01-18 NOTE — TELEPHONE ENCOUNTER
Called Ms. Lezama back about this patient  to see if she can fax over her paperwork about her disability with our fax number

## 2023-01-18 NOTE — TELEPHONE ENCOUNTER
----- Message from Azeb Nicholson sent at 1/18/2023 10:46 AM CST -----  Contact: Tess Umana/Lise Camacho/[phone] case#[id number]  Lise said that she is calling in regards to needing to get a return call from the nurse to get an answer to if  pt has any limitations or restrictions from work. Please advise

## 2023-01-20 ENCOUNTER — HOSPITAL ENCOUNTER (OUTPATIENT)
Dept: RADIOLOGY | Facility: HOSPITAL | Age: 53
Discharge: HOME | End: 2023-01-20
Attending: GENERAL ACUTE CARE HOSPITAL

## 2023-01-20 DIAGNOSIS — E01.0 THYROMEGALY: ICD-10-CM

## 2023-01-20 PROCEDURE — 76536 US EXAM OF HEAD AND NECK: CPT | Mod: TC

## 2023-01-20 PROCEDURE — 76536 US SOFT TISSUE HEAD NECK THYROID: ICD-10-PCS | Mod: 26,,, | Performed by: RADIOLOGY

## 2023-01-20 PROCEDURE — 76536 US EXAM OF HEAD AND NECK: CPT | Mod: 26,,, | Performed by: RADIOLOGY

## 2023-01-23 ENCOUNTER — PATIENT MESSAGE (OUTPATIENT)
Dept: ENDOCRINOLOGY | Facility: CLINIC | Age: 53
End: 2023-01-23
Payer: COMMERCIAL

## 2023-01-23 DIAGNOSIS — E04.2 MULTINODULAR GOITER (NONTOXIC): Primary | ICD-10-CM

## 2023-01-25 ENCOUNTER — PATIENT MESSAGE (OUTPATIENT)
Dept: ENDOCRINOLOGY | Facility: CLINIC | Age: 53
End: 2023-01-25
Payer: COMMERCIAL

## 2023-01-31 ENCOUNTER — OFFICE VISIT (OUTPATIENT)
Dept: CARDIOLOGY | Facility: CLINIC | Age: 53
End: 2023-01-31

## 2023-01-31 VITALS
HEART RATE: 85 BPM | SYSTOLIC BLOOD PRESSURE: 123 MMHG | OXYGEN SATURATION: 99 % | BODY MASS INDEX: 26.5 KG/M2 | WEIGHT: 164.88 LBS | HEIGHT: 66 IN | DIASTOLIC BLOOD PRESSURE: 80 MMHG

## 2023-01-31 DIAGNOSIS — Z01.818 PRE-OP EXAM: Primary | ICD-10-CM

## 2023-01-31 DIAGNOSIS — R00.2 PALPITATIONS: ICD-10-CM

## 2023-01-31 DIAGNOSIS — R79.89 ELEVATED PLASMA METANEPHRINES: ICD-10-CM

## 2023-01-31 DIAGNOSIS — I10 PRIMARY HYPERTENSION: ICD-10-CM

## 2023-01-31 PROCEDURE — 99215 OFFICE O/P EST HI 40 MIN: CPT | Mod: PBBFAC | Performed by: STUDENT IN AN ORGANIZED HEALTH CARE EDUCATION/TRAINING PROGRAM

## 2023-01-31 PROCEDURE — 99204 OFFICE O/P NEW MOD 45 MIN: CPT | Mod: S$PBB,,, | Performed by: STUDENT IN AN ORGANIZED HEALTH CARE EDUCATION/TRAINING PROGRAM

## 2023-01-31 PROCEDURE — 99999 PR PBB SHADOW E&M-EST. PATIENT-LVL V: ICD-10-PCS | Mod: PBBFAC,,, | Performed by: STUDENT IN AN ORGANIZED HEALTH CARE EDUCATION/TRAINING PROGRAM

## 2023-01-31 PROCEDURE — 99204 PR OFFICE/OUTPT VISIT, NEW, LEVL IV, 45-59 MIN: ICD-10-PCS | Mod: S$PBB,,, | Performed by: STUDENT IN AN ORGANIZED HEALTH CARE EDUCATION/TRAINING PROGRAM

## 2023-01-31 PROCEDURE — 99999 PR PBB SHADOW E&M-EST. PATIENT-LVL V: CPT | Mod: PBBFAC,,, | Performed by: STUDENT IN AN ORGANIZED HEALTH CARE EDUCATION/TRAINING PROGRAM

## 2023-01-31 NOTE — PROGRESS NOTES
PCP - Charlotte Jacinto MD    Subjective:   Patient ID:  Laz Quintero is a 52 y.o. y.o. female who presents for evaluation and treatment of palpitations and HTN. Has been seen by endocrinology for evaluation of elevated metanephrines during work up for soft tissue mediastinal mass. Patient feels these symptoms started on 9/2022. She initially thought is was related to anxiety.    She reports episodes of sudden onset of HA, sweating, palpitations with elevated BP readings in there 160s. She reports this episodes occur daily primarily during moments of stress. She does report a history of cardiac arrest after induction of general anesthesia for a lumbar fusion. She had recent labs which showed mildly elevated mmetanephrines of which endocrinology feels pheochromocytoma is a low probability as they would anticipate a pheo or functional paraganglioma to have secreting hormone levels 2-3x upper limit of normal. Of note, she has also had a prior CT which showed normal adrenal glands. Given above findings it is thought symptoms could be in fact related to anxiety or menopausal.     PMHx: newly diagnosed thymoma for which plans for surgical removal, 2 thyroid nodules with plans for FNA in March    Has METS>4. Today she has no angina, heart failure symptoms or claudication.     An ECG from 1/11 shows LVH with bilateral atrial enlargement. QRS 89 ms.     History:     Social History     Tobacco Use    Smoking status: Never    Smokeless tobacco: Never   Substance Use Topics    Alcohol use: No     Family History   Problem Relation Age of Onset    Coronary artery disease Mother     Diabetes Father     Colon polyps Father     Breast cancer Neg Hx     Ovarian cancer Neg Hx     Melanoma Neg Hx     Colon cancer Neg Hx        Meds:     Review of patient's allergies indicates:   Allergen Reactions    Clindamycin     Sulfa dyne     Sulfamethoxazole-trimethoprim      Other reaction(s): Anaphylaxis    Dermabond [tissue glues]  Rash       Current Outpatient Medications:     ALPRAZolam (XANAX) 0.25 MG tablet, Take 1 tablet (0.25 mg total) by mouth daily as needed for Anxiety., Disp: 20 tablet, Rfl: 1    atorvastatin (LIPITOR) 10 MG tablet, Take 1 tablet (10 mg total) by mouth once daily., Disp: 90 tablet, Rfl: 1    diclofenac sodium (VOLTAREN) 1 % Gel, Apply 2 g topically once daily. Use gloves to apply (Patient not taking: Reported on 2022), Disp: 100 g, Rfl: 1    dicyclomine (BENTYL) 20 mg tablet, Take 1 tablet (20 mg total) by mouth 3 (three) times daily as needed (abdominal pain)., Disp: 60 tablet, Rfl: 2    ergocalciferol (ERGOCALCIFEROL) 50,000 unit Cap, TAKE 1 CAPSULE BY MOUTH ONCE A WEEK., Disp: 8 capsule, Rfl: 0    EScitalopram oxalate (LEXAPRO) 10 MG tablet, Take 1 tablet (10 mg total) by mouth once daily., Disp: 30 tablet, Rfl: 11    esomeprazole (NEXIUM) 40 MG capsule, Take 1 capsule (40 mg total) by mouth daily as needed., Disp: 30 capsule, Rfl: 11    fluticasone propionate (FLONASE) 50 mcg/actuation nasal spray, 2 sprays (100 mcg total) by Each Nostril route once daily., Disp: 11.1 mL, Rfl: 0    gabapentin (NEURONTIN) 300 MG capsule, Take 1 capsule (300 mg total) by mouth 3 (three) times daily., Disp: 50 capsule, Rfl: 2    ketorolac (TORADOL) 10 mg tablet, Take one po q 8 hrs prn pain, Disp: 20 tablet, Rfl: 0    methocarbamoL (ROBAXIN) 750 MG Tab, SMARTSI Tablet(s) By Mouth Every 12 Hours, Disp: , Rfl:     PERCOCET 5-325 mg per tablet, Take 1 tablet by mouth daily as needed., Disp: , Rfl:     prednisoLONE acetate (PRED FORTE) 1 % DrpS, Place 1 drop into the left eye 4 (four) times daily. (Patient not taking: Reported on 2023), Disp: 5 mL, Rfl: 11    traMADoL (ULTRAM) 50 mg tablet, Take 50 mg by mouth every 8 (eight) hours as needed., Disp: , Rfl:     Review of Systems   Constitutional:  Negative for chills, fever, malaise/fatigue and weight loss.   HENT:  Negative for ear pain, hearing loss and tinnitus.     Respiratory:  Negative for cough, hemoptysis, sputum production and shortness of breath.    Cardiovascular:  Positive for palpitations. Negative for chest pain, orthopnea, claudication, leg swelling and PND.   Gastrointestinal:  Negative for abdominal pain, diarrhea, heartburn, nausea and vomiting.   Genitourinary:  Negative for dysuria and urgency.   Musculoskeletal:  Negative for back pain, myalgias and neck pain.   Skin:         flushing   Neurological:  Positive for headaches. Negative for dizziness.   Psychiatric/Behavioral:  Negative for depression.      Objective:   LMP 06/07/2015 (Exact Date)   Physical Exam  Vitals and nursing note reviewed.   Constitutional:       Appearance: Normal appearance.   Cardiovascular:      Rate and Rhythm: Normal rate and regular rhythm.      Pulses: Normal pulses.           Carotid pulses are 2+ on the right side and 2+ on the left side.       Radial pulses are 2+ on the right side and 2+ on the left side.        Femoral pulses are 2+ on the right side and 2+ on the left side.       Popliteal pulses are 2+ on the right side and 2+ on the left side.        Dorsalis pedis pulses are 2+ on the right side and 2+ on the left side.        Posterior tibial pulses are 2+ on the right side and 2+ on the left side.      Heart sounds: No murmur heard.  Pulmonary:      Effort: Pulmonary effort is normal. No respiratory distress.      Breath sounds: No stridor. No wheezing.   Abdominal:      General: Abdomen is flat. Bowel sounds are normal. There is no distension.      Palpations: Abdomen is soft.   Musculoskeletal:         General: No swelling. Normal range of motion.   Skin:     General: Skin is warm and dry.      Capillary Refill: Capillary refill takes less than 2 seconds.   Neurological:      General: No focal deficit present.      Mental Status: She is alert and oriented to person, place, and time.       Labs:     Lab Results   Component Value Date     01/11/2023    K 4.4  01/11/2023     01/11/2023    CO2 22 (L) 01/11/2023    BUN 14 01/11/2023    CREATININE 0.8 01/11/2023    ANIONGAP 11 01/11/2023     Lab Results   Component Value Date    HGBA1C 5.4 11/18/2022     Lab Results   Component Value Date    BNP <10 01/11/2023       Lab Results   Component Value Date    WBC 12.55 01/11/2023    HGB 13.8 01/11/2023    HCT 42.3 01/11/2023     01/11/2023    GRAN 8.7 (H) 01/11/2023    GRAN 69.2 01/11/2023     Lab Results   Component Value Date    CHOL 268 (H) 11/18/2022    HDL 69 11/18/2022    LDLCALC 171.8 (H) 11/18/2022    TRIG 136 11/18/2022       Lab Results   Component Value Date     01/11/2023    K 4.4 01/11/2023     01/11/2023    CO2 22 (L) 01/11/2023    BUN 14 01/11/2023    CREATININE 0.8 01/11/2023    ANIONGAP 11 01/11/2023     Lab Results   Component Value Date    HGBA1C 5.4 11/18/2022     Lab Results   Component Value Date    BNP <10 01/11/2023    Lab Results   Component Value Date    WBC 12.55 01/11/2023    HGB 13.8 01/11/2023    HCT 42.3 01/11/2023     01/11/2023    GRAN 8.7 (H) 01/11/2023    GRAN 69.2 01/11/2023     Lab Results   Component Value Date    CHOL 268 (H) 11/18/2022    HDL 69 11/18/2022    LDLCALC 171.8 (H) 11/18/2022    TRIG 136 11/18/2022                Cardiovascular Imaging:     Echo Stress test 12/19/2022  The patient reached the end of the protocol.  During stress, the following significant arrhythmias were observed: rare PACs, occasional PVCs Including couplet..  The left ventricle is normal in size with concentric remodeling and normal systolic function.  The estimated ejection fraction is 63%.  Normal left ventricular diastolic function.  Normal right ventricular size with normal right ventricular systolic function.  Mild left atrial enlargement.  Mild right atrial enlargement.  Mild tricuspid regurgitation.  Normal central venous pressure (3 mmHg).  The estimated PA systolic pressure is 30 mmHg.  The stress echo portion of this  study is negative for myocardial ischemia.    LHC: no prior    Assessment & Plan:     1. Pre-op exam    2. Palpitations    3. Primary hypertension      Laz Quintero is a 52 y.o. female who presents for assessment of palpitations and HTN and preoperative evaluation.     Patient with symptoms of HA, sweating, palpitations which initially raised concerns for pheochromocytoma however after evaluation by endocrinology it was felt that pheochromocytoma is of low probability as they would anticipate a pheo or functional paraganglioma to have secreting hormone levels 2-3x upper limit of normal. Of note, she has also had a prior CT which showed normal adrenal glands. Given above findings it is thought symptoms could be in fact related to anxiety or menopausal.     She does report a history of cardiac arrest after induction of general anesthesia for a lumbar fusion.     Plan:  ordered repeat urine metanephrine- if repeat significantly elevated to prior, will consider imaging for pheochromocytoma  discussed case with endocrinology who will present case at their case conference given patients prior arrest during anesthesia induction; however they feel comfortable in ruling out a functional  mass at this time.   ordered Holter monitor to further investigate palpitations   if all negative, likely patient should be managed for menopause vs anxiety- endo considering HRT    Signed:  Luly Kang M.D.  Cardiovascular Fellow  [facility]

## 2023-02-01 NOTE — PROGRESS NOTES
I have reviewed the notes, assessments, and/or procedures performed by fellow, I concur with her/his documentation of Laz Quintero.     Case was discussed further with endocrinology team who has reviewed case further in depth as a team and they do feel comfortable ruling out pheochromocytoma or a functional paraganglioma for the reasons mentioned in the note. If any further concerns for functional mass, surgeons/ anesthesia can consider low dose anthony blocker like Doxazosin 1mg daily to be safe for surgery.        [Follow-Up: _____] : a [unfilled] follow-up visit

## 2023-02-02 ENCOUNTER — TELEPHONE (OUTPATIENT)
Dept: ENDOCRINOLOGY | Facility: CLINIC | Age: 53
End: 2023-02-02
Payer: COMMERCIAL

## 2023-02-16 ENCOUNTER — CLINICAL SUPPORT (OUTPATIENT)
Dept: CARDIOLOGY | Facility: HOSPITAL | Age: 53
End: 2023-02-16
Attending: STUDENT IN AN ORGANIZED HEALTH CARE EDUCATION/TRAINING PROGRAM
Payer: MEDICAID

## 2023-02-16 DIAGNOSIS — R00.2 PALPITATIONS: ICD-10-CM

## 2023-02-16 PROCEDURE — 93225 XTRNL ECG REC<48 HRS REC: CPT

## 2023-02-16 PROCEDURE — 93227 XTRNL ECG REC<48 HR R&I: CPT | Mod: ,,, | Performed by: INTERNAL MEDICINE

## 2023-02-16 PROCEDURE — 93227 HOLTER MONITOR - 24 HOUR (CUPID ONLY): ICD-10-PCS | Mod: ,,, | Performed by: INTERNAL MEDICINE

## 2023-02-20 LAB
OHS CV EVENT MONITOR DAY: 0
OHS CV HOLTER LENGTH DECIMAL HOURS: 48
OHS CV HOLTER LENGTH HOURS: 48
OHS CV HOLTER LENGTH MINUTES: 0
OHS CV HOLTER SINUS AVERAGE HR: 80
OHS CV HOLTER SINUS MAX HR: 132
OHS CV HOLTER SINUS MIN HR: 52

## 2023-02-22 ENCOUNTER — OFFICE VISIT (OUTPATIENT)
Dept: INTERNAL MEDICINE | Facility: CLINIC | Age: 53
End: 2023-02-22
Payer: MEDICAID

## 2023-02-22 DIAGNOSIS — E78.5 HYPERLIPIDEMIA, UNSPECIFIED HYPERLIPIDEMIA TYPE: Primary | ICD-10-CM

## 2023-02-22 PROCEDURE — 99213 PR OFFICE/OUTPT VISIT, EST, LEVL III, 20-29 MIN: ICD-10-PCS | Mod: S$PBB,,, | Performed by: INTERNAL MEDICINE

## 2023-02-22 PROCEDURE — 3008F PR BODY MASS INDEX (BMI) DOCUMENTED: ICD-10-PCS | Mod: CPTII,,, | Performed by: INTERNAL MEDICINE

## 2023-02-22 PROCEDURE — 3008F BODY MASS INDEX DOCD: CPT | Mod: CPTII,,, | Performed by: INTERNAL MEDICINE

## 2023-02-22 PROCEDURE — 99999 PR PBB SHADOW E&M-EST. PATIENT-LVL IV: ICD-10-PCS | Mod: PBBFAC,,, | Performed by: INTERNAL MEDICINE

## 2023-02-22 PROCEDURE — 1159F PR MEDICATION LIST DOCUMENTED IN MEDICAL RECORD: ICD-10-PCS | Mod: CPTII,,, | Performed by: INTERNAL MEDICINE

## 2023-02-22 PROCEDURE — 99214 OFFICE O/P EST MOD 30 MIN: CPT | Mod: PBBFAC | Performed by: INTERNAL MEDICINE

## 2023-02-22 PROCEDURE — 3074F PR MOST RECENT SYSTOLIC BLOOD PRESSURE < 130 MM HG: ICD-10-PCS | Mod: CPTII,,, | Performed by: INTERNAL MEDICINE

## 2023-02-22 PROCEDURE — 3079F DIAST BP 80-89 MM HG: CPT | Mod: CPTII,,, | Performed by: INTERNAL MEDICINE

## 2023-02-22 PROCEDURE — 99213 OFFICE O/P EST LOW 20 MIN: CPT | Mod: S$PBB,,, | Performed by: INTERNAL MEDICINE

## 2023-02-22 PROCEDURE — 1159F MED LIST DOCD IN RCRD: CPT | Mod: CPTII,,, | Performed by: INTERNAL MEDICINE

## 2023-02-22 PROCEDURE — 3079F PR MOST RECENT DIASTOLIC BLOOD PRESSURE 80-89 MM HG: ICD-10-PCS | Mod: CPTII,,, | Performed by: INTERNAL MEDICINE

## 2023-02-22 PROCEDURE — 3074F SYST BP LT 130 MM HG: CPT | Mod: CPTII,,, | Performed by: INTERNAL MEDICINE

## 2023-02-22 PROCEDURE — 99999 PR PBB SHADOW E&M-EST. PATIENT-LVL IV: CPT | Mod: PBBFAC,,, | Performed by: INTERNAL MEDICINE

## 2023-02-22 RX ORDER — ESCITALOPRAM OXALATE 10 MG/1
10 TABLET ORAL DAILY
Qty: 30 TABLET | Refills: 11 | Status: SHIPPED | OUTPATIENT
Start: 2023-02-22 | End: 2024-02-22

## 2023-02-25 VITALS
WEIGHT: 160.94 LBS | TEMPERATURE: 99 F | BODY MASS INDEX: 25.86 KG/M2 | HEIGHT: 66 IN | DIASTOLIC BLOOD PRESSURE: 84 MMHG | OXYGEN SATURATION: 99 % | HEART RATE: 62 BPM | SYSTOLIC BLOOD PRESSURE: 120 MMHG

## 2023-02-25 NOTE — PROGRESS NOTES
Subjective:       Patient ID: Laz Quintero is a 52 y.o. female.    Chief Complaint: Hyperlipidemia    HPI  She has hyperlipidemia.  Denies pain located in her chest     Past medical history: Mediastinal mass, hyperlipidemia, thyroid nodule, status post hysterectomy, hyperparathyroidism, lumbar fusion, status post cholecystectomy.  Positive family history of colon cancer-father, diagnosed at age 50, colon adenoma.  She had a colonoscopy December 2022     Medications:  Lipitor 10 mg daily     ALLERGIES: Clindamycin, sulfa    Review of Systems   Constitutional:  Negative for chills, fatigue, fever and unexpected weight change.   Respiratory:  Negative for chest tightness and shortness of breath.    Cardiovascular:  Negative for chest pain and palpitations.   Gastrointestinal:  Negative for abdominal pain and blood in stool.   Neurological:  Negative for dizziness, syncope, numbness and headaches.     Objective:      Physical Exam  HENT:      Right Ear: External ear normal.      Left Ear: External ear normal.      Nose: Nose normal.      Mouth/Throat:      Mouth: Mucous membranes are moist.      Pharynx: Oropharynx is clear.   Eyes:      Pupils: Pupils are equal, round, and reactive to light.   Cardiovascular:      Rate and Rhythm: Normal rate and regular rhythm.      Heart sounds: No murmur heard.  Pulmonary:      Breath sounds: Normal breath sounds.   Abdominal:      General: There is no distension.      Palpations: There is no hepatomegaly or splenomegaly.      Tenderness: There is no abdominal tenderness.   Musculoskeletal:      Cervical back: Normal range of motion.   Lymphadenopathy:      Cervical: No cervical adenopathy.      Upper Body:      Right upper body: No axillary adenopathy.      Left upper body: No axillary adenopathy.   Neurological:      Cranial Nerves: No cranial nerve deficit.      Sensory: No sensory deficit.      Motor: Motor function is intact.      Deep Tendon Reflexes: Reflexes are normal  and symmetric.       Assessment/Plan       Assessment and plan:  Hyperlipidemia:  Check CMP and lipid panel

## 2023-02-27 NOTE — PROGRESS NOTES
As Dr. Bowling requested me to do is to inform Ms. Quintero about her test results. I called and informed ms. Quintero that her  holter test is normal. She verbalized and understood.

## 2023-03-07 ENCOUNTER — OFFICE VISIT (OUTPATIENT)
Dept: CARDIOLOGY | Facility: CLINIC | Age: 53
End: 2023-03-07
Payer: MEDICAID

## 2023-03-07 VITALS
SYSTOLIC BLOOD PRESSURE: 146 MMHG | DIASTOLIC BLOOD PRESSURE: 80 MMHG | BODY MASS INDEX: 25.79 KG/M2 | HEIGHT: 66 IN | OXYGEN SATURATION: 100 % | HEART RATE: 82 BPM | WEIGHT: 160.5 LBS

## 2023-03-07 DIAGNOSIS — R00.2 PALPITATIONS: ICD-10-CM

## 2023-03-07 DIAGNOSIS — I10 PRIMARY HYPERTENSION: Primary | ICD-10-CM

## 2023-03-07 PROCEDURE — 3077F SYST BP >= 140 MM HG: CPT | Mod: CPTII,,, | Performed by: INTERNAL MEDICINE

## 2023-03-07 PROCEDURE — 99214 PR OFFICE/OUTPT VISIT, EST, LEVL IV, 30-39 MIN: ICD-10-PCS | Mod: S$PBB,,, | Performed by: INTERNAL MEDICINE

## 2023-03-07 PROCEDURE — 3008F PR BODY MASS INDEX (BMI) DOCUMENTED: ICD-10-PCS | Mod: CPTII,,, | Performed by: INTERNAL MEDICINE

## 2023-03-07 PROCEDURE — 99214 OFFICE O/P EST MOD 30 MIN: CPT | Mod: PBBFAC | Performed by: INTERNAL MEDICINE

## 2023-03-07 PROCEDURE — 99214 OFFICE O/P EST MOD 30 MIN: CPT | Mod: S$PBB,,, | Performed by: INTERNAL MEDICINE

## 2023-03-07 PROCEDURE — 99999 PR PBB SHADOW E&M-EST. PATIENT-LVL IV: ICD-10-PCS | Mod: PBBFAC,,, | Performed by: INTERNAL MEDICINE

## 2023-03-07 PROCEDURE — 3077F PR MOST RECENT SYSTOLIC BLOOD PRESSURE >= 140 MM HG: ICD-10-PCS | Mod: CPTII,,, | Performed by: INTERNAL MEDICINE

## 2023-03-07 PROCEDURE — 3079F DIAST BP 80-89 MM HG: CPT | Mod: CPTII,,, | Performed by: INTERNAL MEDICINE

## 2023-03-07 PROCEDURE — 99999 PR PBB SHADOW E&M-EST. PATIENT-LVL IV: CPT | Mod: PBBFAC,,, | Performed by: INTERNAL MEDICINE

## 2023-03-07 PROCEDURE — 3079F PR MOST RECENT DIASTOLIC BLOOD PRESSURE 80-89 MM HG: ICD-10-PCS | Mod: CPTII,,, | Performed by: INTERNAL MEDICINE

## 2023-03-07 PROCEDURE — 3008F BODY MASS INDEX DOCD: CPT | Mod: CPTII,,, | Performed by: INTERNAL MEDICINE

## 2023-03-07 NOTE — PROGRESS NOTES
PCP - Charlotte Jacinto MD    Subjective:   Patient ID:  Laz Quintero is a 52 y.o. y.o. female who presents for evaluation and treatment of palpitations and HTN. Has been seen by endocrinology for evaluation of elevated metanephrines during work up for soft tissue mediastinal mass. Patient feels these symptoms started on 9/2022. She initially thought is was related to anxiety.    She reports episodes of sudden onset of HA, sweating, palpitations with elevated BP readings in there 160s. She reports this episodes occur daily primarily during moments of stress. She does report a history of cardiac arrest after induction of general anesthesia for a lumbar fusion. She had recent labs which showed mildly elevated mmetanephrines of which endocrinology feels pheochromocytoma is a low probability as they would anticipate a pheo or functional paraganglioma to have secreting hormone levels 2-3x upper limit of normal. Of note, she has also had a prior CT which showed normal adrenal glands. Given above findings it is thought symptoms could be in fact related to anxiety or menopausal.     PMHx: newly diagnosed thymoma for which plans for surgical removal, 2 thyroid nodules with plans for FNA in March    Has METS>4. Today she has no angina, heart failure symptoms or claudication.     An ECG from 1/11 shows LVH with bilateral atrial enlargement. QRS 89 ms.     History:     Social History     Tobacco Use    Smoking status: Never    Smokeless tobacco: Never   Substance Use Topics    Alcohol use: No     Family History   Problem Relation Age of Onset    Coronary artery disease Mother     Diabetes Father     Colon polyps Father     Breast cancer Neg Hx     Ovarian cancer Neg Hx     Melanoma Neg Hx     Colon cancer Neg Hx        Meds:     Review of patient's allergies indicates:   Allergen Reactions    Clindamycin     Sulfa dyne     Sulfamethoxazole-trimethoprim      Other reaction(s): Anaphylaxis    Dermabond [tissue glues]  Rash       Current Outpatient Medications:     ALPRAZolam (XANAX) 0.25 MG tablet, Take 1 tablet (0.25 mg total) by mouth daily as needed for Anxiety., Disp: 20 tablet, Rfl: 1    atorvastatin (LIPITOR) 10 MG tablet, Take 1 tablet (10 mg total) by mouth once daily., Disp: 90 tablet, Rfl: 1    diclofenac sodium (VOLTAREN) 1 % Gel, Apply 2 g topically once daily. Use gloves to apply (Patient not taking: Reported on 2022), Disp: 100 g, Rfl: 1    dicyclomine (BENTYL) 20 mg tablet, Take 1 tablet (20 mg total) by mouth 3 (three) times daily as needed (abdominal pain)., Disp: 60 tablet, Rfl: 2    ergocalciferol (ERGOCALCIFEROL) 50,000 unit Cap, TAKE 1 CAPSULE BY MOUTH ONCE A WEEK., Disp: 8 capsule, Rfl: 0    EScitalopram oxalate (LEXAPRO) 10 MG tablet, Take 1 tablet (10 mg total) by mouth once daily., Disp: 30 tablet, Rfl: 11    esomeprazole (NEXIUM) 40 MG capsule, Take 1 capsule (40 mg total) by mouth daily as needed., Disp: 30 capsule, Rfl: 11    fluticasone propionate (FLONASE) 50 mcg/actuation nasal spray, 2 sprays (100 mcg total) by Each Nostril route once daily., Disp: 11.1 mL, Rfl: 0    gabapentin (NEURONTIN) 300 MG capsule, Take 1 capsule (300 mg total) by mouth 3 (three) times daily., Disp: 50 capsule, Rfl: 2    ketorolac (TORADOL) 10 mg tablet, Take one po q 8 hrs prn pain, Disp: 20 tablet, Rfl: 0    methocarbamoL (ROBAXIN) 750 MG Tab, SMARTSI Tablet(s) By Mouth Every 12 Hours, Disp: , Rfl:     PERCOCET 5-325 mg per tablet, Take 1 tablet by mouth daily as needed., Disp: , Rfl:     prednisoLONE acetate (PRED FORTE) 1 % DrpS, Place 1 drop into the left eye 4 (four) times daily., Disp: 5 mL, Rfl: 11    traMADoL (ULTRAM) 50 mg tablet, Take 50 mg by mouth every 8 (eight) hours as needed., Disp: , Rfl:     Review of Systems   Constitutional:  Negative for chills, fever, malaise/fatigue and weight loss.   HENT:  Negative for ear pain, hearing loss and tinnitus.    Respiratory:  Negative for cough, hemoptysis,  "sputum production and shortness of breath.    Cardiovascular:  Positive for palpitations. Negative for chest pain, orthopnea, claudication, leg swelling and PND.   Gastrointestinal:  Negative for abdominal pain, diarrhea, heartburn, nausea and vomiting.   Genitourinary:  Negative for dysuria and urgency.   Musculoskeletal:  Negative for back pain, myalgias and neck pain.   Skin:         flushing   Neurological:  Positive for headaches. Negative for dizziness.   Psychiatric/Behavioral:  Negative for depression.      Objective:   BP (!) 146/80 (BP Location: Left arm, Patient Position: Sitting, BP Method: Medium (Automatic))   Pulse 82   Ht 5' 6" (1.676 m)   Wt 72.8 kg (160 lb 7.9 oz)   LMP 06/07/2015 (Exact Date)   SpO2 100%   BMI 25.90 kg/m²     Physical Exam  Vitals and nursing note reviewed.   Constitutional:       Appearance: Normal appearance.   Cardiovascular:      Rate and Rhythm: Normal rate and regular rhythm.      Pulses: Normal pulses.           Carotid pulses are 2+ on the right side and 2+ on the left side.       Radial pulses are 2+ on the right side and 2+ on the left side.        Femoral pulses are 2+ on the right side and 2+ on the left side.       Popliteal pulses are 2+ on the right side and 2+ on the left side.        Dorsalis pedis pulses are 2+ on the right side and 2+ on the left side.        Posterior tibial pulses are 2+ on the right side and 2+ on the left side.      Heart sounds: No murmur heard.  Pulmonary:      Effort: Pulmonary effort is normal. No respiratory distress.      Breath sounds: No stridor. No wheezing.   Abdominal:      General: Abdomen is flat. Bowel sounds are normal. There is no distension.      Palpations: Abdomen is soft.   Musculoskeletal:         General: No swelling. Normal range of motion.   Skin:     General: Skin is warm and dry.      Capillary Refill: Capillary refill takes less than 2 seconds.   Neurological:      General: No focal deficit present.      " Mental Status: She is alert and oriented to person, place, and time.       Labs:     Lab Results   Component Value Date     02/23/2023    K 4.3 02/23/2023     02/23/2023    CO2 30 (H) 02/23/2023    BUN 8 02/23/2023    CREATININE 0.59 02/23/2023    ANIONGAP 7 (L) 02/23/2023     Lab Results   Component Value Date    HGBA1C 5.4 11/18/2022     Lab Results   Component Value Date    BNP <10 01/11/2023       Lab Results   Component Value Date    WBC 12.55 01/11/2023    HGB 13.8 01/11/2023    HCT 42.3 01/11/2023     01/11/2023    GRAN 8.7 (H) 01/11/2023    GRAN 69.2 01/11/2023     Lab Results   Component Value Date    CHOL 233 (H) 02/23/2023    HDL 61 02/23/2023    LDLCALC 158.8 02/23/2023    TRIG 66 02/23/2023       Lab Results   Component Value Date     02/23/2023    K 4.3 02/23/2023     02/23/2023    CO2 30 (H) 02/23/2023    BUN 8 02/23/2023    CREATININE 0.59 02/23/2023    ANIONGAP 7 (L) 02/23/2023     Lab Results   Component Value Date    HGBA1C 5.4 11/18/2022     Lab Results   Component Value Date    BNP <10 01/11/2023    Lab Results   Component Value Date    WBC 12.55 01/11/2023    HGB 13.8 01/11/2023    HCT 42.3 01/11/2023     01/11/2023    GRAN 8.7 (H) 01/11/2023    GRAN 69.2 01/11/2023     Lab Results   Component Value Date    CHOL 233 (H) 02/23/2023    HDL 61 02/23/2023    LDLCALC 158.8 02/23/2023    TRIG 66 02/23/2023                Cardiovascular Imaging:     Echo Stress test 12/19/2022  The patient reached the end of the protocol.  During stress, the following significant arrhythmias were observed: rare PACs, occasional PVCs Including couplet..  The left ventricle is normal in size with concentric remodeling and normal systolic function.  The estimated ejection fraction is 63%.  Normal left ventricular diastolic function.  Normal right ventricular size with normal right ventricular systolic function.  Mild left atrial enlargement.  Mild right atrial enlargement.  Mild  tricuspid regurgitation.  Normal central venous pressure (3 mmHg).  The estimated PA systolic pressure is 30 mmHg.  The stress echo portion of this study is negative for myocardial ischemia.    LHC: no prior    Holter 2/16/2023:   Sinus rhythm, Rare ectopy, No arrhythmias seen    Assessment & Plan:     1. Primary hypertension    2. Palpitations      Laz Quintero is a 52 y.o. female who presents for assessment of palpitations and HTN. Her Holter was reviewed and is normal.     Patient with symptoms of HA, sweating, palpitations which initially raised concerns for pheochromocytoma however after evaluation by endocrinology it was felt that pheochromocytoma is of low probability (levels expected to be 2-3x upper limit of normal). Additionally, prior CT which showed normal adrenal glands.     Given above findings it is thought symptoms could be in fact related to anxiety or menopausal. She does report a history of cardiac arrest after induction of general anesthesia for a lumbar fusion.     Repeat urine metanephrine are not significant for pheochromocytoma.   Holter monitor is normal.     Return to clinic PRN    Signed:  Roberto Hurst M.D.  Cardiovascular Fellow  [facility]

## 2023-03-08 DIAGNOSIS — J98.59 MEDIASTINAL MASS: Primary | ICD-10-CM

## 2023-03-13 ENCOUNTER — PATIENT MESSAGE (OUTPATIENT)
Dept: INTERNAL MEDICINE | Facility: CLINIC | Age: 53
End: 2023-03-13
Payer: MEDICAID

## 2023-03-16 ENCOUNTER — PATIENT MESSAGE (OUTPATIENT)
Dept: ENDOCRINOLOGY | Facility: CLINIC | Age: 53
End: 2023-03-16
Payer: MEDICAID

## 2023-03-16 ENCOUNTER — OFFICE VISIT (OUTPATIENT)
Dept: URGENT CARE | Facility: CLINIC | Age: 53
End: 2023-03-16
Payer: MEDICAID

## 2023-03-16 ENCOUNTER — HOSPITAL ENCOUNTER (OUTPATIENT)
Dept: PREADMISSION TESTING | Facility: HOSPITAL | Age: 53
Discharge: HOME | End: 2023-03-16
Attending: STUDENT IN AN ORGANIZED HEALTH CARE EDUCATION/TRAINING PROGRAM
Payer: MEDICAID

## 2023-03-16 VITALS
HEIGHT: 66 IN | SYSTOLIC BLOOD PRESSURE: 156 MMHG | HEART RATE: 87 BPM | RESPIRATION RATE: 18 BRPM | TEMPERATURE: 99 F | OXYGEN SATURATION: 98 % | BODY MASS INDEX: 25.88 KG/M2 | WEIGHT: 161 LBS | DIASTOLIC BLOOD PRESSURE: 98 MMHG

## 2023-03-16 VITALS
WEIGHT: 161.69 LBS | HEART RATE: 83 BPM | HEIGHT: 66 IN | SYSTOLIC BLOOD PRESSURE: 156 MMHG | DIASTOLIC BLOOD PRESSURE: 99 MMHG | BODY MASS INDEX: 25.98 KG/M2 | RESPIRATION RATE: 16 BRPM | OXYGEN SATURATION: 99 % | TEMPERATURE: 98 F

## 2023-03-16 DIAGNOSIS — H00.014 HORDEOLUM EXTERNUM OF LEFT UPPER EYELID: Primary | ICD-10-CM

## 2023-03-16 PROCEDURE — 99213 PR OFFICE/OUTPT VISIT, EST, LEVL III, 20-29 MIN: ICD-10-PCS | Mod: S$GLB,,, | Performed by: NURSE PRACTITIONER

## 2023-03-16 PROCEDURE — 99213 OFFICE O/P EST LOW 20 MIN: CPT | Mod: S$GLB,,, | Performed by: NURSE PRACTITIONER

## 2023-03-16 RX ORDER — ERYTHROMYCIN 5 MG/G
OINTMENT OPHTHALMIC 3 TIMES DAILY
Qty: 3.5 G | Refills: 0 | Status: SHIPPED | OUTPATIENT
Start: 2023-03-16 | End: 2024-01-05

## 2023-03-16 NOTE — PROGRESS NOTES
"Subjective:       Patient ID: Laz Quintero is a 52 y.o. female.    Vitals:  height is 5' 6" (1.676 m) and weight is 73 kg (161 lb). Her oral temperature is 98.7 °F (37.1 °C). Her blood pressure is 156/98 (abnormal) and her pulse is 87. Her respiration is 18 and oxygen saturation is 98%.     Chief Complaint: Stye    52 yr old female came in with complaints of a stye to her left eye. Her symptoms started Saturday.  She has tried multiple regimens to clear this up, including allergy eye drops, warm compresses, leftover oral antibiotics, neosporin, tea-bag, all with no relief.  Denies eye drainage or problem with the eyeball.     Other  This is a new problem. The current episode started in the past 7 days. The problem occurs constantly. The problem has been gradually worsening. Pertinent negatives include no abdominal pain, anorexia, arthralgias, change in bowel habit, chest pain, chills, congestion, coughing, diaphoresis, fatigue, fever, headaches, joint swelling, myalgias, nausea, neck pain, numbness, rash, sore throat, swollen glands, urinary symptoms, vertigo, visual change, vomiting or weakness. Nothing aggravates the symptoms. She has tried nothing for the symptoms.     Constitution: Negative for chills, sweating, fatigue and fever.   HENT:  Negative for congestion and sore throat.    Neck: Negative for neck pain.   Cardiovascular:  Negative for chest pain.   Eyes:  Positive for eyelid swelling. Negative for eye trauma, eye discharge, eye itching and eye redness.   Respiratory:  Negative for cough.    Gastrointestinal:  Negative for abdominal pain, nausea and vomiting.   Musculoskeletal:  Negative for joint pain, joint swelling and muscle ache.   Skin:  Negative for rash.   Neurological:  Negative for history of vertigo, headaches and numbness.     Objective:      Physical Exam   Constitutional: She is oriented to person, place, and time.   HENT:   Head: Normocephalic.   Nose: Nose normal.   Mouth/Throat: " Mucous membranes are moist.   Eyes: Lids are normal. Right eye exhibits no chemosis, no discharge, no exudate and no hordeolum. No foreign body present in the right eye. Left eye exhibits hordeolum. Left eye exhibits no chemosis, no discharge and no exudate. No foreign body present in the left eye. Right conjunctiva is not injected. Left conjunctiva is not injected. vision grossly intact   Neck: No neck rigidity present.   Cardiovascular: Normal rate.   Pulmonary/Chest: Effort normal.   Abdominal: Normal appearance.   Musculoskeletal: Normal range of motion.         General: Normal range of motion.   Neurological: She is alert and oriented to person, place, and time.   Skin: Skin is warm and dry.   Psychiatric: Her behavior is normal. Mood normal.   Nursing note and vitals reviewed.      Assessment:       1. Hordeolum externum of left upper eyelid          Plan:         Hordeolum externum of left upper eyelid  -     erythromycin (ROMYCIN) ophthalmic ointment; Place into the left eye 3 (three) times daily.  Dispense: 3.5 g; Refill: 0      Patient Instructions   Continue warm compresses  Ibuprofen for pain  Good handwashing   Avoid rubbing eyes  Follow up with eye doctor with worsening symptoms

## 2023-03-16 NOTE — PATIENT INSTRUCTIONS
Continue warm compresses  Ibuprofen for pain  Good handwashing   Avoid rubbing eyes  Follow up with eye doctor with worsening symptoms

## 2023-03-16 NOTE — ANESTHESIA PAT ROS NOTE
03/16/2023  Laz Quintero is a 52 y.o., female.      Pre-op Assessment    I have reviewed the NPO Status.   I have reviewed the Medications.     Review of Systems  Anesthesia Hx:    Patient reports the following:  PONV  8/2022 CARDIAC ARREST AFTER LUMBAR SURGICAL PROCEDURE ALONG W  DIFFICULT EXTUBATION RESULTING IN DAMAGE AND PTSD.     History of prior surgery of interest to airway management or planning:  Previous anesthesia: MAC       12/28/22 COLONOSCOPY with MAC.  Procedure performed at an [facility].  Denies Family Hx of Anesthesia complications.   Personal Hx of Anesthesia complications, Post-Operative Nausea/Vomiting, with every anesthetic, treatment not known     Hoarseness after General Anesthesia, temporary vocal cord injury   Pulmonary/Ventilatory Issues, patient describes breathing difficulties after anesthesia, not specified   Slow To Awaken/Delayed Emergence and significant; extubation delayed; prolonged PACU stay          Social:  Non-Smoker, No Alcohol Use       Hematology/Oncology:  Hematology Normal   Oncology Normal                                   EENT/Dental:   CURRENT LEFT EYE STY      SEASONAL ALLERGIES            Cardiovascular:  Exercise tolerance: poor    Denies Hypertension.       Denies Angina.     hyperlipidemia    ACTIVITY HTN W PALPITATIONS AND HOT FLASH WITH HEADACHES.                         Pulmonary:       Denies Shortness of breath.  Denies Recent URI.  Denies Sleep Apnea.                Renal/:  Renal/ Normal                 Hepatic/GI:   Denies PUD.  GERD, well controlled Denies Liver Disease.  Denies Hepatitis.           Musculoskeletal:     Joint Disease:  Arthritis, Osteoarthritis        Lumbar Spine Disorders, Lumbar Disc Disease, Radiculopathy, S/P Lumbar Fusion 11/2019, 6/2021, 8/2022 Lumbar Spine SURGERIES: FUSIONS WITH BONE GRAFT    HX: LUMBAR  STENOSIS with herniated nucleus pulposus, L5-S1, LEFT  OB/GYN/PEDS:          S/P LAPAROSCOPIC HYSTERECTOMY   Neurological:    Neuromuscular Disease,  Headaches Denies Seizures.      Pain , onset is chronic , location of left lumbar       Arthritis, Osteoarthritis, Low Back Pain, Lumbar Radiculopathy   Left lumbar radiculitis                          Endocrine:  Denies Diabetes.     Thyroid Disease   Toxic Nodule    Mediastinal mass  THYMECTOMY PLANNED          Dermatological:  Skin Normal    Psych:  Psychiatric History anxiety depression PTSD SINCE 6/2022 SURGERY WITH DIFFICULT EXTUBATION           Physical Exam  General: Well nourished, Cooperative, Alert and Oriented    Airway:  Mouth Opening: Normal  Tongue: Normal  Neck ROM: Normal ROM    Dental:  Caps / Implants, Intact    Chest/Lungs:  Clear to auscultation, Normal Respiratory Rate    Heart:  Rate: Normal  Rhythm: Regular Rhythm  Sounds: Normal    3/16/2023  Cardiology clearance request emailed to Dr. Hurst and staff.  Medical Clearance request emailed to Dr. Charlotte Jacinto and staff  Iesha Guerra RN  Anesthesia Clinician      3/20/2023  CARDIOLOGY CLEARANCE  Assessment & Plan:      1. Primary hypertension    2. Palpitations       Her Holter was reviewed and is normal.      Patient with symptoms of HA, sweating, palpitations which initially raised concerns for pheochromocytoma however after evaluation by endocrinology it was felt that pheochromocytoma is of low probability (levels expected to be 2-3x upper limit of normal). Additionally, prior CT which showed normal adrenal glands.      Given above findings it is thought symptoms could be in fact related to anxiety or menopausal. She does report a history of cardiac arrest after induction of general anesthesia for a lumbar fusion.      Repeat urine metanephrine are not significant for pheochromocytoma.      Have been requested Medical and Cardiology clearance for Thymectomy scheduled for 3/21/2023 with  Dr. Anton Evangelista.      Her estimated risk with the proposed surgery/procedure for an adverse perioperative cardiac outcome (MI, pulmonary edema, ventricular fibrillation or primary cardiac arrest, and complete heart block) is 0.4% (0.05%-1.5%) and for an adverse 30 day cardiac outcome (myocardial infarction, cardiac arrest, or death) is 3.9% (95% CI 2.8%-5.4%) (Revised Cardiac Risk Index [RCRI] Score = 0  based on the following risk factors: None). (Perioperative outcomes - Remy at al Circ 1999; 100: 4203-7870; 30 day outcomes - Barron et al. Can J Cardiol 2017; 33:17-32)     She does not need further cardiac testing prior to Thymectomy scheduled for 3/21/2023 with Dr. Anton Evangelista.      Extra caution given history of cardiac arrhythmia during anesthesia induction, as per HPI.      Roberto Hurst   Cards fellow      Electronically signed by Roberto Hurst MD at 3/20/2023  9:54 AM

## 2023-03-16 NOTE — DISCHARGE INSTRUCTIONS
Your surgery has been scheduled for:______3/21/2023____________________________________    You should report to:  ____[facility], located on the [place] side of the first floor of the           [facility] ([phone])  __x__[facility], located on the [facility] side of the            Second floor of the [facility] ([phone])  ____3rd Floor [facility] located on the [facility] side of the [facility] [phone]  ____[facility]: [address] [place] [address]     Please Note   Tell your doctor if you take Aspirin, products containing Aspirin, herbal medications  or blood thinners, such as Coumadin, Ticlid, or Plavix.  (Consult your provider regarding holding or stopping before surgery).  Arrange for someone to drive you home following surgery.  You will not be allowed to leave the surgical facility alone or drive yourself home following sedation and anesthesia.    Before Surgery  Stop taking all herbal medications, vitamins, and supplements 7 days prior to surgery  No Motrin/Advil (Ibuprofen) 7 days before surgery  No Aleve (Naproxen) 7 days before surgery  Stop Taking Asprin, products containing Asprin ___7__days before surgery  Stop taking blood thinners___na____days before surgery  No Goody's/BC  Powder 7 days before surgery  Refrain from drinking alcoholic beverages for 24hours before and after surgery  Stop or limit smoking ____7_____days before surgery  You may take Tylenol for pain    Night before Surgery  Do not eat or drink after midnight  Take a shower or bath (shower is recommended).  Bathe with Hibiclens soap or an antibacterial soap from the neck down.  If not supplied by your surgeon, hibiclens soap will need to be purchased over the counter in pharmacy.  Rinse soap off thoroughly.  Shampoo your hair with your regular shampoo    The Day of  Surgery  Take another bath or shower with hibiclens or any antibacterial soap, to reduce the chance of infection.  Take heart and blood pressure medications with a small sip of water, as advised by the perioperative team.  Do not take fluid pills  You may brush your teeth and rinse your mouth, but do not swall any additional water.   Do not apply perfumes, powder, body lotions or deodorant on the day of surgery.  Nail polish should be removed.  Do not wear makeup or moisturizer  Wear comfortable clothes, such as a button front shirt and loose fitting pants.  Leave all jewelry, including body piercings, and valuables at home.    Bring any devices you will neeed after surgery such as crutches or canes.  If you have sleep apnea, please bring your CPAP machine  In the event that your physical condition changes including the onset of a cold or respiratory illness, or if you have to delay or cancel your surgery, please notify your surgeon.       Anesthesia: General Anesthesia     You are watched continuously during your procedure by your anesthesia provider.     Youre due to have surgery. During surgery, youll be given medicine called anesthesia or anesthetic. This will keep you comfortable and pain-free. Your anesthesia provider will use general anesthesia.  What is general anesthesia?  General anesthesia puts you into a state like deep sleep. It goes into the bloodstream (IV anesthetics), into the lungs (gas anesthetics), or both. You feel nothing during the procedure. You will not remember it. During the procedure, the anesthesia provider monitors you continuously. He or she checks your heart rate and rhythm, blood pressure, breathing, and blood oxygen.  IV anesthetics. IV anesthetics are given through an IV line in your arm. Theyre often given first. This is so you are asleep before a gas anesthetic is started. Some kinds of IV anesthetics relieve pain. Others relax you. Your doctor will decide which kind is best  in your case.  Gas anesthetics. Gas anesthetics are breathed into the lungs. They are often used to keep you asleep. They can be given through a facemask or a tube placed in your larynx or trachea (breathing tube).  If you have a facemask, your anesthesia provider will most likely place it over your nose and mouth while youre still awake. Youll breathe oxygen through the mask as your IV anesthetic is started. Gas anesthetic may be added through the mask.  If you have a tube in the larynx or trachea, it will be inserted into your throat after youre asleep.  Anesthesia tools and medicines  You will likely have:  IV anesthetics. These are put into an IV line into your bloodstream.  Gas anesthetics. You breathe these anesthetics into your lungs, where they pass into your bloodstream.  Pulse oximeter. This is a small clip that is attached to the end of your finger. This measures your blood oxygen level.  Electrocardiography leads (electrodes). These are small sticky pads that are placed on your chest. They record your heart rate and rhythm.  Blood pressure cuff. This reads your blood pressure.  Risks and possible complications  General anesthesia has some risks. These include:  Breathing problems  Nausea and vomiting  Sore throat or hoarseness (usually temporary)  Allergic reaction to the anesthetic  Irregular heartbeat (rare)  Cardiac arrest (rare)   Anesthesia safety  Follow all instructions you are given for how long not to eat or drink before your procedure.  Be sure your doctor knows what medicines and drugs you take. This includes over-the-counter medicines, herbs, supplements, alcohol or other drugs. You will be asked when those were last taken.  Have an adult family member or friend drive you home after the procedure.  For the first 24 hours after your surgery:  Do not drive or use heavy equipment.  Do not make important decisions or sign legal documents. If important decisions or signing legal documents is  necessary during the first 24 hours after surgery, have a trusted family member or spouse act on your behalf.  Avoid alcohol.  Have a responsible adult stay with you. He or she can watch for problems and help keep you safe.  Date Last Reviewed: 12/1/2016 © 2000-2017 [employer] [address] [place] [address], [place] [address]. All rights reserved. This information is not intended as a substitute for professional medical care. Always follow your healthcare professional's instructions.

## 2023-03-17 ENCOUNTER — TELEPHONE (OUTPATIENT)
Dept: ENDOCRINOLOGY | Facility: CLINIC | Age: 53
End: 2023-03-17
Payer: MEDICAID

## 2023-03-17 ENCOUNTER — TELEPHONE (OUTPATIENT)
Dept: INTERNAL MEDICINE | Facility: CLINIC | Age: 53
End: 2023-03-17
Payer: MEDICAID

## 2023-03-17 NOTE — TELEPHONE ENCOUNTER
Unfortunately Dr. Jacinto is completely booked and the next open appt in the entire clinic isn't until 03/21. Dr. Jacinto said you will have to either reschedule the surgery or do the pts surgery without clearance.

## 2023-03-17 NOTE — TELEPHONE ENCOUNTER
----- Message from Nurse Iesha Guerra RN sent at 3/16/2023  3:09 PM CDT -----  Regarding: CARDIOLOGY AND MEDICAL CLEARANCES  Good Afternoon,    Our above  patient requires Medical and Cardiology clearance for Thymectomy scheduled for 3/21/2023 with Dr. Anton Evangelista.    Your assistance meeting the guideline is greatly appreciated.    Thank you,  Iesha Guerra, RN  Anesthesia Clinician

## 2023-03-17 NOTE — TELEPHONE ENCOUNTER
Spoke with patient, patient states she will leave appointment scheduled on 3/22/23 as is! Patient states she will give an call or message on portal if need to reschedule.

## 2023-03-20 ENCOUNTER — TELEPHONE (OUTPATIENT)
Dept: CARDIOTHORACIC SURGERY | Facility: CLINIC | Age: 53
End: 2023-03-20
Payer: MEDICAID

## 2023-03-20 ENCOUNTER — TELEPHONE (OUTPATIENT)
Dept: INTERNAL MEDICINE | Facility: CLINIC | Age: 53
End: 2023-03-20
Payer: MEDICAID

## 2023-03-20 ENCOUNTER — DOCUMENTATION ONLY (OUTPATIENT)
Dept: CARDIAC CATH/INVASIVE PROCEDURES | Facility: HOSPITAL | Age: 53
End: 2023-03-20
Payer: MEDICAID

## 2023-03-20 ENCOUNTER — ANESTHESIA EVENT (OUTPATIENT)
Dept: SURGERY | Facility: HOSPITAL | Age: 53
DRG: 828 | End: 2023-03-20
Payer: MEDICAID

## 2023-03-20 NOTE — TELEPHONE ENCOUNTER
----- Message from Roberto Hurst MD sent at 3/20/2023  9:54 AM CDT -----  Regarding: RE: CARDIOLOGY AND MEDICAL CLEARANCES  Documentation provided. Does not need further cardiac testing prior to her scheduled surgery    ----- Message -----  From: Nurse Iesha Guerra RN  Sent: 3/16/2023   3:13 PM CDT  To: Charlotte Jacinto MD, Roberto Hurst MD, #  Subject: CARDIOLOGY AND MEDICAL CLEARANCES                Good Afternoon,    Our above  patient requires Medical and Cardiology clearance for Thymectomy scheduled for 3/21/2023 with Dr. Anton Evangelista.    Your assistance meeting the guideline is greatly appreciated.    Thank you,  Iesha Guerra, RN  Anesthesia Clinician

## 2023-03-20 NOTE — TELEPHONE ENCOUNTER
----- Message from Nurse Iesha Guerra RN sent at 3/20/2023 11:59 AM CDT -----  Regarding: RE: CARDIOLOGY AND MEDICAL CLEARANCES  Thank you,  Iesha Guerra RN  Anesthesia Clinician  ----- Message -----  From: Roberto Hurst MD  Sent: 3/20/2023   9:55 AM CDT  To: Charlotte Jacinto MD, #  Subject: RE: CARDIOLOGY AND MEDICAL CLEARANCES            Documentation provided. Does not need further cardiac testing prior to her scheduled surgery    ----- Message -----  From: Nurse Iesha Guerra RN  Sent: 3/16/2023   3:13 PM CDT  To: Charlotte Jacinto MD, Roberto Hurst MD, #  Subject: CARDIOLOGY AND MEDICAL CLEARANCES                Good Afternoon,    Our above  patient requires Medical and Cardiology clearance for Thymectomy scheduled for 3/21/2023 with Dr. Anton Evangelista.    Your assistance meeting the guideline is greatly appreciated.    Thank you,  Iesha Guerra RN  Anesthesia Clinician

## 2023-03-20 NOTE — PROGRESS NOTES
52 /female who was recently seen in clinic for treatment of palpitations and HTN. Has been seen by endocrinology for evaluation of elevated metanephrines during work up for soft tissue mediastinal mass. Patient feels these symptoms started on 9/2022. She initially thought is was related to anxiety.     Has had episodes of sudden onset of HA, sweating, palpitations with elevated BP readings in there 160s, primarily during moments of stress. She does report a history of cardiac arrest after induction of general anesthesia for a lumbar fusion. She had recent labs which showed mildly elevated mmetanephrines of which endocrinology feels pheochromocytoma is a low probability as they would anticipate a pheo or functional paraganglioma to have secreting hormone levels 2-3x upper limit of normal. Of note, she has also had a prior CT which showed normal adrenal glands. Given above findings it is thought symptoms could be in fact related to anxiety or menopausal.      PMHx: newly diagnosed thymoma for which plans for surgical removal, 2 thyroid nodules with plans for FNA in March     Has METS>4. Today she has no angina, heart failure symptoms or claudication.      An ECG from 1/11 shows LVH with bilateral atrial enlargement. QRS 89 ms.     Assessment & Plan:      1. Primary hypertension    2. Palpitations       Her Holter was reviewed and is normal.      Patient with symptoms of HA, sweating, palpitations which initially raised concerns for pheochromocytoma however after evaluation by endocrinology it was felt that pheochromocytoma is of low probability (levels expected to be 2-3x upper limit of normal). Additionally, prior CT which showed normal adrenal glands.      Given above findings it is thought symptoms could be in fact related to anxiety or menopausal. She does report a history of cardiac arrest after induction of general anesthesia for a lumbar fusion.      Repeat urine metanephrine are not significant for  pheochromocytoma.     Have been requested Medical and Cardiology clearance for Thymectomy scheduled for 3/21/2023 with Dr. Anton Evangelista.     Her estimated risk with the proposed surgery/procedure for an adverse perioperative cardiac outcome (MI, pulmonary edema, ventricular fibrillation or primary cardiac arrest, and complete heart block) is 0.4% (0.05%-1.5%) and for an adverse 30 day cardiac outcome (myocardial infarction, cardiac arrest, or death) is 3.9% (95% CI 2.8%-5.4%) (Revised Cardiac Risk Index [RCRI] Score = 0  based on the following risk factors: None). (Perioperative outcomes - Remy at al Circ 1999; 100: 5144-1883; 30 day outcomes - aBrron et al. Can J Cardiol 2017; 33:17-32)    She does not need further cardiac testing prior to Thymectomy scheduled for 3/21/2023 with Dr. Anton Evangelista.     Extra caution given history of cardiac arrhythmia during anesthesia induction, as per HPI.     Roberto Montgomery fellow

## 2023-03-20 NOTE — ANESTHESIA PREPROCEDURE EVALUATION
[facility]  Anesthesia Pre-Operative Evaluation         Patient Name: Laz Quintero  YOB: 1970  MRN: [id number]    SUBJECTIVE:     Pre-operative evaluation for Procedure(s) (LRB):  XI ROBOTIC THYMECTOMY (Right)     03/20/2023    Laz Quintero is a 52 y.o. female w/ a significant PMHx of hyperparathyroidism here today for evaluation of anterior mediastinal mass.    Patient now presents for the above procedure(s).      LDA: None documented.        Prev airway:  Method of Intubation: Direct laryngoscopy; Inserted by: CRNA; Airway Device: Endotracheal Tube; Mask Ventilation: Easy; Intubated: Postinduction; Blade: Craft #2; Airway Device Size: 7.0; Style: Cuffed; Cuff Inflation: Minimal occlusive pressure; Inflation Amount: 6; Placement Verified By: Auscultation, Capnometry; Grade: Grade I; Complicating Factors: None; Intubation Findings: Positive EtCO2, Bilateral breath sounds, Atraumatic/Condition of teeth unchanged;  Depth of Insertion: 20; Securment: Lips; Complications: None; Breath Sounds: Equal Bilateral; Insertion Attempts: 1    Drips: None documented.       Patient Active Problem List   Diagnosis    Low back pain radiating to right leg    Seasonal allergies    HPTH (hyperparathyroidism)    Chronic LBP    Right lumbar radiculopathy    Anxiety and depression    S/P laparoscopic hysterectomy    Left lumbar radiculitis    Abdominal pain    Herniated nucleus pulposus, L5-S1, left    Chronic left-sided low back pain with sciatica    Weakness of both lower extremities    Decreased range of motion of trunk and back    Pre-op exam    Palpitations    Primary hypertension       Review of patient's allergies indicates:   Allergen Reactions    Clindamycin     Sulfa dyne     Sulfamethoxazole-trimethoprim      Other reaction(s): Anaphylaxis    Dermabond [tissue glues] Rash       Current Outpatient Medications:  No current  facility-administered medications for this encounter.    Current Outpatient Medications:     ALPRAZolam (XANAX) 0.25 MG tablet, Take 1 tablet (0.25 mg total) by mouth daily as needed for Anxiety., Disp: 20 tablet, Rfl: 1    atorvastatin (LIPITOR) 10 MG tablet, Take 1 tablet (10 mg total) by mouth once daily., Disp: 90 tablet, Rfl: 1    diclofenac sodium (VOLTAREN) 1 % Gel, Apply 2 g topically once daily. Use gloves to apply (Patient not taking: Reported on 2022), Disp: 100 g, Rfl: 1    dicyclomine (BENTYL) 20 mg tablet, Take 1 tablet (20 mg total) by mouth 3 (three) times daily as needed (abdominal pain)., Disp: 60 tablet, Rfl: 2    ergocalciferol (ERGOCALCIFEROL) 50,000 unit Cap, TAKE 1 CAPSULE BY MOUTH ONCE A WEEK., Disp: 8 capsule, Rfl: 0    erythromycin (ROMYCIN) ophthalmic ointment, Place into the left eye 3 (three) times daily., Disp: 3.5 g, Rfl: 0    EScitalopram oxalate (LEXAPRO) 10 MG tablet, Take 1 tablet (10 mg total) by mouth once daily., Disp: 30 tablet, Rfl: 11    esomeprazole (NEXIUM) 40 MG capsule, Take 1 capsule (40 mg total) by mouth daily as needed., Disp: 30 capsule, Rfl: 11    fluticasone propionate (FLONASE) 50 mcg/actuation nasal spray, 2 sprays (100 mcg total) by Each Nostril route once daily., Disp: 11.1 mL, Rfl: 0    gabapentin (NEURONTIN) 300 MG capsule, Take 1 capsule (300 mg total) by mouth 3 (three) times daily., Disp: 50 capsule, Rfl: 2    ketorolac (TORADOL) 10 mg tablet, Take one po q 8 hrs prn pain, Disp: 20 tablet, Rfl: 0    methocarbamoL (ROBAXIN) 750 MG Tab, SMARTSI Tablet(s) By Mouth Every 12 Hours, Disp: , Rfl:     PERCOCET 5-325 mg per tablet, Take 1 tablet by mouth daily as needed., Disp: , Rfl:     prednisoLONE acetate (PRED FORTE) 1 % DrpS, Place 1 drop into the left eye 4 (four) times daily., Disp: 5 mL, Rfl: 11    traMADoL (ULTRAM) 50 mg tablet, Take 50 mg by mouth every 8 (eight) hours as needed., Disp: , Rfl:     Past Surgical History:    Procedure Laterality Date    BACK SURGERY  2018    CHOLECYSTECTOMY      COLONOSCOPY N/A 1/19/2018    Procedure: COLONOSCOPY;  Surgeon: Malachi Olmedo MD;  [facility]  Service: Endoscopy;  Laterality: N/A;    COLONOSCOPY N/A 12/28/2022    Procedure: COLONOSCOPY;  Surgeon: Ezekiel Alanis MD;  Location[facility]  Service: Endoscopy;  Laterality: N/A;  inst via portal  pre call complete [facility]    ESOPHAGOGASTRODUODENOSCOPY N/A 9/6/2019    Procedure: EGD (ESOPHAGOGASTRODUODENOSCOPY);  Surgeon: Jose Carlos Ventura MD;  Location[facility]  Service: Endoscopy;  Laterality: N/A;  pt requesting ASAP    HYSTERECTOMY      TUBAL LIGATION      [place]    WISDOM TOOTH EXTRACTION      2005       Social History     Socioeconomic History    Marital status: Single   Occupational History    Occupation: nurse     Employer: [employer]   Tobacco Use    Smoking status: Never     Passive exposure: Never    Smokeless tobacco: Never   Substance and Sexual Activity    Alcohol use: No    Drug use: No    Sexual activity: Yes     Partners: Male     Birth control/protection: See Surgical Hx     Comment:  AND MONOGOMOUS - [place]   Other Topics Concern    Are you pregnant or think you may be? No    Breast-feeding No       OBJECTIVE:     Vital Signs Range (Last 24H):         Significant Labs:  Lab Results   Component Value Date    WBC 12.55 01/11/2023    HGB 13.8 01/11/2023    HCT 42.3 01/11/2023     01/11/2023    CHOL 233 (H) 02/23/2023    TRIG 66 02/23/2023    HDL 61 02/23/2023    ALT 22 02/23/2023    AST 22 02/23/2023     02/23/2023    K 4.3 02/23/2023     02/23/2023    CREATININE 0.59 02/23/2023    BUN 8 02/23/2023    CO2 30 (H) 02/23/2023    TSH 1.292 01/11/2023    INR 1.0 04/12/2015    HGBA1C 5.4 11/18/2022       Diagnostic Studies: No relevant studies.    EKG:   Results for orders placed or performed during the hospital encounter of  "01/11/23   EKG 12-lead    Collection Time: 01/11/23  8:07 PM    Narrative    Test Reason : R07.9,    Vent. Rate : 098 BPM     Atrial Rate : 098 BPM     P-R Int : 122 ms          QRS Dur : 082 ms      QT Int : 332 ms       P-R-T Axes : 071 011 078 degrees     QTc Int : 423 ms    Normal sinus rhythm  Biatrial enlargement  Voltage criteria for left ventricular hypertrophy ( [place] product ) Now  present  Nonspecific ST-t wave abnormality More prominent than previously  Abnormal ECG  When compared with ECG of 29-DEC-2022 15:48,    Confirmed by NICOLLE MANE MD ([id number]) on 1/12/2023 2:37:28 PM    Referred By: [email or web address]   SELF           Confirmed By:NICOLLE MANE MD       2D ECHO:  TTE:  Results for orders placed or performed during the hospital encounter of 12/19/22   Echo   Result Value Ref Range    BSA 1.88 m2    TDI SEPTAL 0.04 m/s    LV LATERAL E/E' RATIO 7.00 m/s    LV SEPTAL E/E' RATIO 10.50 m/s    LA WIDTH 3.36 cm    TDI LATERAL 0.06 m/s    LVIDd 3.95 3.5 - 6.0 cm    IVS 1.03 0.6 - 1.1 cm    Posterior Wall 0.88 0.6 - 1.1 cm    LVIDs 2.61 2.1 - 4.0 cm    FS 34 28 - 44 %    LA volume 34.55 cm3    Sinus 2.78 cm    STJ 2.54 cm    Ascending aorta 2.66 cm    LV mass 116.75 g    LA size 3.01 cm    RVDD 3.52 cm    TAPSE 1.43 cm    RV S' 11.17 cm/s    Left Ventricle Relative Wall Thickness 0.45 cm    AV mean gradient 3 mmHg    AV valve area 2.51 cm2    AV Velocity Ratio 0.67     AV index (prosthetic) 0.68     MV valve area p 1/2 method 5.83 cm2    E/A ratio 0.67     Mean e' 0.05 m/s    E wave deceleration time 130.09 msec    MV "A" wave duration 9.99 msec    Pulm vein S/D ratio 2.26     LVOT diameter 2.17 cm    LVOT area 3.7 cm2    LVOT peak jemal 0.73 m/s    LVOT peak VTI 13.47 cm    Ao peak jemal 1.09 m/s    Ao VTI 19.85 cm    LVOT stroke volume 49.79 cm3    AV peak gradient 5 mmHg    E/E' ratio 8.40 m/s    MV Peak E Jemal 0.42 m/s    TR Max Jemal 2.16 m/s    MV stenosis pressure 1/2 time 37.73 ms    MV Peak A Jemal 0.63 m/s    " PV Peak S Jemal 0.61 m/s    PV Peak D Jemal 0.27 m/s    LV Systolic Volume 24.89 mL    LV Systolic Volume Index 13.4 mL/m2    LV Diastolic Volume 67.93 mL    LV Diastolic Volume Index 36.52 mL/m2    LA Volume Index 18.6 mL/m2    LV Mass Index 63 g/m2    RA Major Axis 3.79 cm    Left Atrium Minor Axis 4.18 cm    Left Atrium Major Axis 3.87 cm    Triscuspid Valve Regurgitation Peak Gradient 19 mmHg    LA Volume Index (Mod) 15.8 mL/m2    LA volume (mod) 29.39 cm3    RA Width 2.44 cm    Right Atrial Pressure (from IVC) 3 mmHg    QEF 46 %    EF 50 %    TV rest pulmonary artery pressure 22 mmHg    Narrative    · The left ventricle is normal in size with concentric remodeling and low   normal systolic function.  · The estimated ejection fraction is 50%.  · Grade I left ventricular diastolic dysfunction.  · Normal right ventricular size with normal right ventricular systolic   function.  · Mild pulmonic regurgitation.  · The quantitatively derived ejection fraction is 46%.  · Mild tricuspid regurgitation.  · Normal central venous pressure (3 mmHg).  · The estimated PA systolic pressure is 22 mmHg.          DOMINICK:  No results found for this or any previous visit.    ASSESSMENT/PLAN:                                                                                       03/20/2023  Laz Quintero is a 52 y.o., female.      Pre-op Assessment    I have reviewed the NPO Status.   I have reviewed the Medications.     Review of Systems  Anesthesia Hx:  Patient reports the following:  PONV  8/2022 CARDIAC ARREST AFTER LUMBAR SURGICAL PROCEDURE ALONG W  DIFFICULT EXTUBATION RESULTING IN DAMAGE AND PTSD.     History of prior surgery of interest to airway management or planning: Previous anesthesia: MAC  12/28/22 COLONOSCOPY with MAC.  Procedure performed at an [facility]. Denies Family Hx of Anesthesia complications.  Personal Hx of Anesthesia complications, Post-Operative Nausea/Vomiting, with every anesthetic, treatment not known   Hoarseness after General Anesthesia, temporary vocal cord injury Pulmonary/Ventilatory Issues, patient describes breathing difficulties after anesthesia, not specified Slow To Awaken/Delayed Emergence and significant; extubation delayed; prolonged PACU stay   Social:  Non-Smoker, No Alcohol Use    Hematology/Oncology:  Hematology Normal   Oncology Normal     EENT/Dental:   CURRENT LEFT EYE STY      SEASONAL ALLERGIES     Cardiovascular:   Exercise tolerance: poor Denies Hypertension.   Denies Angina. hyperlipidemia ACTIVITY HTN W PALPITATIONS AND HOT FLASH WITH HEADACHES.   Pulmonary:   Denies Shortness of breath.  Denies Recent URI.  Denies Sleep Apnea.    Renal/:  Renal/ Normal     Hepatic/GI:   Denies PUD. GERD, well controlled Denies Liver Disease. Denies Hepatitis.    Musculoskeletal:  Joint Disease:  Arthritis, Osteoarthritis  Lumbar Spine Disorders, Lumbar Disc Disease, Radiculopathy, S/P Lumbar Fusion 11/2019, 6/2021, 8/2022 Lumbar Spine SURGERIES: FUSIONS WITH BONE GRAFT    HX: LUMBAR STENOSIS with herniated nucleus pulposus, L5-S1, LEFT  OB/GYN/PEDS:  S/P LAPAROSCOPIC HYSTERECTOMY   Neurological:   Neuromuscular Disease, Headaches Denies Seizures.  Pain , onset is chronic , location of left lumbar  Arthritis, Osteoarthritis, Low Back Pain, Lumbar Radiculopathy Left lumbar radiculitis     Endocrine:   Denies Diabetes.  Thyroid Disease Toxic Nodule Mediastinal mass  THYMECTOMY PLANNED       Dermatological:  Skin Normal    Psych:   Psychiatric History anxiety depression PTSD SINCE 6/2022 SURGERY WITH DIFFICULT EXTUBATION         Physical Exam  General: Well nourished, Cooperative, Alert and Oriented    Airway:  Mallampati: III / II  Mouth Opening: Normal  Tongue: Normal  Neck ROM: Normal ROM    Dental:  Caps / Implants, Intact        Anesthesia Plan  Type of Anesthesia, risks & benefits discussed:    Anesthesia Type: Gen ETT  Intra-op Monitoring Plan: Standard ASA Monitors and Art Line  Post Op Pain  Control Plan: multimodal analgesia  Induction:  IV  Airway Plan: Direct, Post-Induction  Informed Consent: Informed consent signed with the Patient and all parties understand the risks and agree with anesthesia plan.  All questions answered.   ASA Score: 2  Day of Surgery Review of History & Physical: H&P Update referred to the surgeon/provider.    Ready For Surgery From Anesthesia Perspective.     .

## 2023-03-21 ENCOUNTER — HOSPITAL ENCOUNTER (INPATIENT)
Facility: HOSPITAL | Age: 53
LOS: 2 days | Discharge: HOME | DRG: 828 | End: 2023-03-23
Attending: STUDENT IN AN ORGANIZED HEALTH CARE EDUCATION/TRAINING PROGRAM | Admitting: STUDENT IN AN ORGANIZED HEALTH CARE EDUCATION/TRAINING PROGRAM
Payer: MEDICAID

## 2023-03-21 ENCOUNTER — ANESTHESIA (OUTPATIENT)
Dept: SURGERY | Facility: HOSPITAL | Age: 53
DRG: 828 | End: 2023-03-21
Payer: MEDICAID

## 2023-03-21 DIAGNOSIS — J98.59 MEDIASTINAL MASS: Primary | ICD-10-CM

## 2023-03-21 LAB
ABO + RH BLD: ABNORMAL
BLD GP AB SCN CELLS X3 SERPL QL: ABNORMAL
BLOOD GROUP ANTIBODIES SERPL: NORMAL
SPECIMEN OUTDATE: ABNORMAL

## 2023-03-21 PROCEDURE — 37000008 HC ANESTHESIA 1ST 15 MINUTES: Performed by: STUDENT IN AN ORGANIZED HEALTH CARE EDUCATION/TRAINING PROGRAM

## 2023-03-21 PROCEDURE — 36000713 HC OR TIME LEV V EA ADD 15 MIN: Performed by: STUDENT IN AN ORGANIZED HEALTH CARE EDUCATION/TRAINING PROGRAM

## 2023-03-21 PROCEDURE — D9220A PRA ANESTHESIA: Mod: ,,, | Performed by: ANESTHESIOLOGY

## 2023-03-21 PROCEDURE — 32662 THORACOSCOPY W/MEDIAST EXC: CPT | Mod: RT,,, | Performed by: STUDENT IN AN ORGANIZED HEALTH CARE EDUCATION/TRAINING PROGRAM

## 2023-03-21 PROCEDURE — 88342 IMHCHEM/IMCYTCHM 1ST ANTB: CPT | Performed by: STUDENT IN AN ORGANIZED HEALTH CARE EDUCATION/TRAINING PROGRAM

## 2023-03-21 PROCEDURE — C1729 CATH, DRAINAGE: HCPCS | Performed by: STUDENT IN AN ORGANIZED HEALTH CARE EDUCATION/TRAINING PROGRAM

## 2023-03-21 PROCEDURE — 32662 THORACOSCOPY W/MEDIAST EXC: CPT | Mod: AS,RT,, | Performed by: PHYSICIAN ASSISTANT

## 2023-03-21 PROCEDURE — 71000015 HC POSTOP RECOV 1ST HR: Performed by: STUDENT IN AN ORGANIZED HEALTH CARE EDUCATION/TRAINING PROGRAM

## 2023-03-21 PROCEDURE — 36620 ARTERIAL: ICD-10-PCS | Mod: 59,,, | Performed by: ANESTHESIOLOGY

## 2023-03-21 PROCEDURE — 27201423 OPTIME MED/SURG SUP & DEVICES STERILE SUPPLY: Performed by: STUDENT IN AN ORGANIZED HEALTH CARE EDUCATION/TRAINING PROGRAM

## 2023-03-21 PROCEDURE — 63600175 PHARM REV CODE 636 W HCPCS

## 2023-03-21 PROCEDURE — 88307 TISSUE EXAM BY PATHOLOGIST: CPT | Mod: 26,,, | Performed by: STUDENT IN AN ORGANIZED HEALTH CARE EDUCATION/TRAINING PROGRAM

## 2023-03-21 PROCEDURE — 94761 N-INVAS EAR/PLS OXIMETRY MLT: CPT

## 2023-03-21 PROCEDURE — 36620 INSERTION CATHETER ARTERY: CPT | Mod: 59,,, | Performed by: ANESTHESIOLOGY

## 2023-03-21 PROCEDURE — 37000009 HC ANESTHESIA EA ADD 15 MINS: Performed by: STUDENT IN AN ORGANIZED HEALTH CARE EDUCATION/TRAINING PROGRAM

## 2023-03-21 PROCEDURE — 88341 PR IHC OR ICC EACH ADD'L SINGLE ANTIBODY  STAINPR: ICD-10-PCS | Mod: 26,,, | Performed by: STUDENT IN AN ORGANIZED HEALTH CARE EDUCATION/TRAINING PROGRAM

## 2023-03-21 PROCEDURE — 88342 CHG IMMUNOCYTOCHEMISTRY: ICD-10-PCS | Mod: 26,,, | Performed by: STUDENT IN AN ORGANIZED HEALTH CARE EDUCATION/TRAINING PROGRAM

## 2023-03-21 PROCEDURE — 86870 RBC ANTIBODY IDENTIFICATION: CPT

## 2023-03-21 PROCEDURE — 20600001 HC STEP DOWN PRIVATE ROOM

## 2023-03-21 PROCEDURE — D9220A PRA ANESTHESIA: ICD-10-PCS | Mod: ,,, | Performed by: ANESTHESIOLOGY

## 2023-03-21 PROCEDURE — 32662 PR THORACOSCOPY SURG EXC MEDIAST MASS: ICD-10-PCS | Mod: AS,RT,, | Performed by: PHYSICIAN ASSISTANT

## 2023-03-21 PROCEDURE — 25000003 PHARM REV CODE 250: Performed by: PHYSICIAN ASSISTANT

## 2023-03-21 PROCEDURE — 25000003 PHARM REV CODE 250

## 2023-03-21 PROCEDURE — 63600175 PHARM REV CODE 636 W HCPCS: Performed by: STUDENT IN AN ORGANIZED HEALTH CARE EDUCATION/TRAINING PROGRAM

## 2023-03-21 PROCEDURE — 88341 IMHCHEM/IMCYTCHM EA ADD ANTB: CPT | Mod: 26,,, | Performed by: STUDENT IN AN ORGANIZED HEALTH CARE EDUCATION/TRAINING PROGRAM

## 2023-03-21 PROCEDURE — 88342 IMHCHEM/IMCYTCHM 1ST ANTB: CPT | Mod: 26,,, | Performed by: STUDENT IN AN ORGANIZED HEALTH CARE EDUCATION/TRAINING PROGRAM

## 2023-03-21 PROCEDURE — 88307 TISSUE EXAM BY PATHOLOGIST: CPT | Performed by: STUDENT IN AN ORGANIZED HEALTH CARE EDUCATION/TRAINING PROGRAM

## 2023-03-21 PROCEDURE — C9290 INJ, BUPIVACAINE LIPOSOME: HCPCS | Performed by: STUDENT IN AN ORGANIZED HEALTH CARE EDUCATION/TRAINING PROGRAM

## 2023-03-21 PROCEDURE — 86905 BLOOD TYPING RBC ANTIGENS: CPT

## 2023-03-21 PROCEDURE — 86900 BLOOD TYPING SEROLOGIC ABO: CPT

## 2023-03-21 PROCEDURE — 88341 IMHCHEM/IMCYTCHM EA ADD ANTB: CPT | Mod: 59 | Performed by: STUDENT IN AN ORGANIZED HEALTH CARE EDUCATION/TRAINING PROGRAM

## 2023-03-21 PROCEDURE — 63600175 PHARM REV CODE 636 W HCPCS: Performed by: PHYSICIAN ASSISTANT

## 2023-03-21 PROCEDURE — 36000712 HC OR TIME LEV V 1ST 15 MIN: Performed by: STUDENT IN AN ORGANIZED HEALTH CARE EDUCATION/TRAINING PROGRAM

## 2023-03-21 PROCEDURE — 25000003 PHARM REV CODE 250: Performed by: STUDENT IN AN ORGANIZED HEALTH CARE EDUCATION/TRAINING PROGRAM

## 2023-03-21 PROCEDURE — 71000016 HC POSTOP RECOV ADDL HR: Performed by: STUDENT IN AN ORGANIZED HEALTH CARE EDUCATION/TRAINING PROGRAM

## 2023-03-21 PROCEDURE — 99900035 HC TECH TIME PER 15 MIN (STAT)

## 2023-03-21 PROCEDURE — 32662 PR THORACOSCOPY SURG EXC MEDIAST MASS: ICD-10-PCS | Mod: RT,,, | Performed by: STUDENT IN AN ORGANIZED HEALTH CARE EDUCATION/TRAINING PROGRAM

## 2023-03-21 PROCEDURE — 71000033 HC RECOVERY, INTIAL HOUR: Performed by: STUDENT IN AN ORGANIZED HEALTH CARE EDUCATION/TRAINING PROGRAM

## 2023-03-21 PROCEDURE — 88307 PR  SURG PATH,LEVEL V: ICD-10-PCS | Mod: 26,,, | Performed by: STUDENT IN AN ORGANIZED HEALTH CARE EDUCATION/TRAINING PROGRAM

## 2023-03-21 RX ORDER — LIDOCAINE HYDROCHLORIDE 10 MG/ML
1 INJECTION, SOLUTION EPIDURAL; INFILTRATION; INTRACAUDAL; PERINEURAL ONCE
Status: DISCONTINUED | OUTPATIENT
Start: 2023-03-21 | End: 2023-03-21

## 2023-03-21 RX ORDER — FENTANYL CITRATE 50 UG/ML
25 INJECTION, SOLUTION INTRAMUSCULAR; INTRAVENOUS EVERY 5 MIN PRN
Status: COMPLETED | OUTPATIENT
Start: 2023-03-21 | End: 2023-03-21

## 2023-03-21 RX ORDER — ACETAMINOPHEN 500 MG
1000 TABLET ORAL
Status: COMPLETED | OUTPATIENT
Start: 2023-03-21 | End: 2023-03-21

## 2023-03-21 RX ORDER — PROCHLORPERAZINE EDISYLATE 5 MG/ML
5 INJECTION INTRAMUSCULAR; INTRAVENOUS EVERY 30 MIN PRN
Status: DISCONTINUED | OUTPATIENT
Start: 2023-03-21 | End: 2023-03-21 | Stop reason: HOSPADM

## 2023-03-21 RX ORDER — ROCURONIUM BROMIDE 10 MG/ML
INJECTION, SOLUTION INTRAVENOUS
Status: DISCONTINUED | OUTPATIENT
Start: 2023-03-21 | End: 2023-03-21

## 2023-03-21 RX ORDER — BISACODYL 10 MG
10 SUPPOSITORY, RECTAL RECTAL DAILY PRN
Status: DISCONTINUED | OUTPATIENT
Start: 2023-03-21 | End: 2023-03-23 | Stop reason: HOSPADM

## 2023-03-21 RX ORDER — MIDAZOLAM HYDROCHLORIDE 1 MG/ML
INJECTION INTRAMUSCULAR; INTRAVENOUS
Status: DISCONTINUED | OUTPATIENT
Start: 2023-03-21 | End: 2023-03-21

## 2023-03-21 RX ORDER — BUPIVACAINE HYDROCHLORIDE 2.5 MG/ML
INJECTION, SOLUTION EPIDURAL; INFILTRATION; INTRACAUDAL
Status: DISCONTINUED | OUTPATIENT
Start: 2023-03-21 | End: 2023-03-21 | Stop reason: HOSPADM

## 2023-03-21 RX ORDER — HYDROMORPHONE HYDROCHLORIDE 1 MG/ML
0.2 INJECTION, SOLUTION INTRAMUSCULAR; INTRAVENOUS; SUBCUTANEOUS EVERY 10 MIN PRN
Status: DISCONTINUED | OUTPATIENT
Start: 2023-03-21 | End: 2023-03-21 | Stop reason: HOSPADM

## 2023-03-21 RX ORDER — DEXAMETHASONE SODIUM PHOSPHATE 4 MG/ML
INJECTION, SOLUTION INTRA-ARTICULAR; INTRALESIONAL; INTRAMUSCULAR; INTRAVENOUS; SOFT TISSUE
Status: DISCONTINUED | OUTPATIENT
Start: 2023-03-21 | End: 2023-03-21

## 2023-03-21 RX ORDER — PROPOFOL 10 MG/ML
VIAL (ML) INTRAVENOUS
Status: DISCONTINUED | OUTPATIENT
Start: 2023-03-21 | End: 2023-03-21

## 2023-03-21 RX ORDER — ENOXAPARIN SODIUM 100 MG/ML
40 INJECTION SUBCUTANEOUS DAILY
Status: DISCONTINUED | OUTPATIENT
Start: 2023-03-22 | End: 2023-03-23 | Stop reason: HOSPADM

## 2023-03-21 RX ORDER — LIDOCAINE HYDROCHLORIDE 20 MG/ML
INJECTION, SOLUTION EPIDURAL; INFILTRATION; INTRACAUDAL; PERINEURAL
Status: DISCONTINUED | OUTPATIENT
Start: 2023-03-21 | End: 2023-03-21

## 2023-03-21 RX ORDER — KETAMINE HCL IN 0.9 % NACL 50 MG/5 ML
SYRINGE (ML) INTRAVENOUS
Status: DISCONTINUED | OUTPATIENT
Start: 2023-03-21 | End: 2023-03-21

## 2023-03-21 RX ORDER — FENTANYL CITRATE 50 UG/ML
INJECTION, SOLUTION INTRAMUSCULAR; INTRAVENOUS
Status: DISCONTINUED | OUTPATIENT
Start: 2023-03-21 | End: 2023-03-21

## 2023-03-21 RX ORDER — ERGOCALCIFEROL 1.25 MG/1
CAPSULE ORAL
Qty: 8 CAPSULE | Refills: 0 | OUTPATIENT
Start: 2023-03-21

## 2023-03-21 RX ORDER — SODIUM CHLORIDE 0.9 % (FLUSH) 0.9 %
10 SYRINGE (ML) INJECTION
Status: DISCONTINUED | OUTPATIENT
Start: 2023-03-21 | End: 2023-03-21 | Stop reason: HOSPADM

## 2023-03-21 RX ORDER — OXYCODONE HYDROCHLORIDE 10 MG/1
10 TABLET ORAL EVERY 4 HOURS PRN
Status: DISCONTINUED | OUTPATIENT
Start: 2023-03-21 | End: 2023-03-22

## 2023-03-21 RX ORDER — AMOXICILLIN 250 MG
1 CAPSULE ORAL 2 TIMES DAILY
Status: DISCONTINUED | OUTPATIENT
Start: 2023-03-21 | End: 2023-03-23 | Stop reason: HOSPADM

## 2023-03-21 RX ORDER — POLYETHYLENE GLYCOL 3350 17 G/17G
17 POWDER, FOR SOLUTION ORAL DAILY
Status: DISCONTINUED | OUTPATIENT
Start: 2023-03-21 | End: 2023-03-23 | Stop reason: HOSPADM

## 2023-03-21 RX ORDER — ATORVASTATIN CALCIUM 10 MG/1
10 TABLET, FILM COATED ORAL DAILY
Status: DISCONTINUED | OUTPATIENT
Start: 2023-03-21 | End: 2023-03-23 | Stop reason: HOSPADM

## 2023-03-21 RX ORDER — PANTOPRAZOLE SODIUM 40 MG/1
40 TABLET, DELAYED RELEASE ORAL DAILY
Status: DISCONTINUED | OUTPATIENT
Start: 2023-03-22 | End: 2023-03-23 | Stop reason: HOSPADM

## 2023-03-21 RX ORDER — FENTANYL CITRATE 50 UG/ML
INJECTION, SOLUTION INTRAMUSCULAR; INTRAVENOUS
Status: DISPENSED
Start: 2023-03-21 | End: 2023-03-21

## 2023-03-21 RX ORDER — ESCITALOPRAM OXALATE 10 MG/1
10 TABLET ORAL DAILY
Status: DISCONTINUED | OUTPATIENT
Start: 2023-03-21 | End: 2023-03-23 | Stop reason: HOSPADM

## 2023-03-21 RX ORDER — GABAPENTIN 300 MG/1
300 CAPSULE ORAL 3 TIMES DAILY
Status: DISCONTINUED | OUTPATIENT
Start: 2023-03-21 | End: 2023-03-23 | Stop reason: HOSPADM

## 2023-03-21 RX ORDER — ESMOLOL HYDROCHLORIDE 10 MG/ML
INJECTION INTRAVENOUS
Status: DISCONTINUED | OUTPATIENT
Start: 2023-03-21 | End: 2023-03-21

## 2023-03-21 RX ORDER — ACETAMINOPHEN 325 MG/1
650 TABLET ORAL EVERY 8 HOURS
Status: DISCONTINUED | OUTPATIENT
Start: 2023-03-21 | End: 2023-03-23 | Stop reason: HOSPADM

## 2023-03-21 RX ORDER — PHENYLEPHRINE HYDROCHLORIDE 10 MG/ML
INJECTION INTRAVENOUS
Status: DISCONTINUED | OUTPATIENT
Start: 2023-03-21 | End: 2023-03-21

## 2023-03-21 RX ORDER — GABAPENTIN 300 MG/1
300 CAPSULE ORAL NIGHTLY
Status: DISCONTINUED | OUTPATIENT
Start: 2023-03-21 | End: 2023-03-21

## 2023-03-21 RX ORDER — ONDANSETRON 2 MG/ML
INJECTION INTRAMUSCULAR; INTRAVENOUS
Status: DISCONTINUED | OUTPATIENT
Start: 2023-03-21 | End: 2023-03-21

## 2023-03-21 RX ORDER — ACETAMINOPHEN 325 MG/1
650 TABLET ORAL EVERY 4 HOURS PRN
Status: DISCONTINUED | OUTPATIENT
Start: 2023-03-21 | End: 2023-03-23 | Stop reason: HOSPADM

## 2023-03-21 RX ORDER — OXYCODONE HYDROCHLORIDE 5 MG/1
5 TABLET ORAL EVERY 4 HOURS PRN
Status: DISCONTINUED | OUTPATIENT
Start: 2023-03-21 | End: 2023-03-22

## 2023-03-21 RX ORDER — METHOCARBAMOL 500 MG/1
500 TABLET, FILM COATED ORAL 4 TIMES DAILY
Status: DISCONTINUED | OUTPATIENT
Start: 2023-03-21 | End: 2023-03-23 | Stop reason: HOSPADM

## 2023-03-21 RX ORDER — ERYTHROMYCIN 5 MG/G
OINTMENT OPHTHALMIC 3 TIMES DAILY
Status: DISCONTINUED | OUTPATIENT
Start: 2023-03-21 | End: 2023-03-23 | Stop reason: HOSPADM

## 2023-03-21 RX ORDER — ONDANSETRON 8 MG/1
8 TABLET, ORALLY DISINTEGRATING ORAL EVERY 8 HOURS PRN
Status: DISCONTINUED | OUTPATIENT
Start: 2023-03-21 | End: 2023-03-23 | Stop reason: HOSPADM

## 2023-03-21 RX ADMIN — METHOCARBAMOL 500 MG: 500 TABLET ORAL at 08:03

## 2023-03-21 RX ADMIN — PHENYLEPHRINE HYDROCHLORIDE 100 MCG: 10 INJECTION INTRAVENOUS at 09:03

## 2023-03-21 RX ADMIN — PROCHLORPERAZINE EDISYLATE 5 MG: 5 INJECTION INTRAMUSCULAR; INTRAVENOUS at 11:03

## 2023-03-21 RX ADMIN — ERYTHROMYCIN 1 INCH: 5 OINTMENT OPHTHALMIC at 08:03

## 2023-03-21 RX ADMIN — ESMOLOL HYDROCHLORIDE 20 MG: 100 INJECTION, SOLUTION INTRAVENOUS at 08:03

## 2023-03-21 RX ADMIN — ONDANSETRON 8 MG: 8 TABLET, ORALLY DISINTEGRATING ORAL at 10:03

## 2023-03-21 RX ADMIN — LIDOCAINE HYDROCHLORIDE 100 MG: 20 INJECTION, SOLUTION EPIDURAL; INFILTRATION; INTRACAUDAL; PERINEURAL at 07:03

## 2023-03-21 RX ADMIN — OXYCODONE HYDROCHLORIDE 10 MG: 10 TABLET ORAL at 11:03

## 2023-03-21 RX ADMIN — CEFAZOLIN 2 G: 2 INJECTION, POWDER, FOR SOLUTION INTRAMUSCULAR; INTRAVENOUS at 04:03

## 2023-03-21 RX ADMIN — ATORVASTATIN CALCIUM 10 MG: 10 TABLET, FILM COATED ORAL at 11:03

## 2023-03-21 RX ADMIN — FENTANYL CITRATE 25 MCG: 50 INJECTION INTRAMUSCULAR; INTRAVENOUS at 11:03

## 2023-03-21 RX ADMIN — PROPOFOL 50 MG: 10 INJECTION, EMULSION INTRAVENOUS at 07:03

## 2023-03-21 RX ADMIN — GABAPENTIN 300 MG: 300 CAPSULE ORAL at 08:03

## 2023-03-21 RX ADMIN — SODIUM CHLORIDE: 0.9 INJECTION, SOLUTION INTRAVENOUS at 07:03

## 2023-03-21 RX ADMIN — OXYCODONE HYDROCHLORIDE 10 MG: 10 TABLET ORAL at 04:03

## 2023-03-21 RX ADMIN — ERYTHROMYCIN 1 INCH: 5 OINTMENT OPHTHALMIC at 02:03

## 2023-03-21 RX ADMIN — ROCURONIUM BROMIDE 50 MG: 10 INJECTION INTRAVENOUS at 09:03

## 2023-03-21 RX ADMIN — SODIUM CHLORIDE, SODIUM GLUCONATE, SODIUM ACETATE, POTASSIUM CHLORIDE, MAGNESIUM CHLORIDE, SODIUM PHOSPHATE, DIBASIC, AND POTASSIUM PHOSPHATE: .53; .5; .37; .037; .03; .012; .00082 INJECTION, SOLUTION INTRAVENOUS at 08:03

## 2023-03-21 RX ADMIN — Medication 50 MG: at 07:03

## 2023-03-21 RX ADMIN — DEXAMETHASONE SODIUM PHOSPHATE 4 MG: 4 INJECTION, SOLUTION INTRAMUSCULAR; INTRAVENOUS at 07:03

## 2023-03-21 RX ADMIN — SENNOSIDES AND DOCUSATE SODIUM 1 TABLET: 50; 8.6 TABLET ORAL at 11:03

## 2023-03-21 RX ADMIN — ACETAMINOPHEN 650 MG: 325 TABLET ORAL at 02:03

## 2023-03-21 RX ADMIN — Medication 20 MG: at 09:03

## 2023-03-21 RX ADMIN — Medication 10 MG: at 10:03

## 2023-03-21 RX ADMIN — PROPOFOL 150 MG: 10 INJECTION, EMULSION INTRAVENOUS at 07:03

## 2023-03-21 RX ADMIN — ESCITALOPRAM OXALATE 10 MG: 10 TABLET ORAL at 02:03

## 2023-03-21 RX ADMIN — CEFAZOLIN 2 G: 2 INJECTION, POWDER, FOR SOLUTION INTRAMUSCULAR; INTRAVENOUS at 11:03

## 2023-03-21 RX ADMIN — ACETAMINOPHEN 1000 MG: 500 TABLET ORAL at 06:03

## 2023-03-21 RX ADMIN — SUGAMMADEX 400 MG: 100 INJECTION, SOLUTION INTRAVENOUS at 10:03

## 2023-03-21 RX ADMIN — GABAPENTIN 400 MG: 300 CAPSULE ORAL at 06:03

## 2023-03-21 RX ADMIN — ROCURONIUM BROMIDE 100 MG: 10 INJECTION INTRAVENOUS at 07:03

## 2023-03-21 RX ADMIN — CEFAZOLIN 2 G: 2 INJECTION, POWDER, FOR SOLUTION INTRAMUSCULAR; INTRAVENOUS at 08:03

## 2023-03-21 RX ADMIN — ONDANSETRON 4 MG: 2 INJECTION INTRAMUSCULAR; INTRAVENOUS at 09:03

## 2023-03-21 RX ADMIN — ACETAMINOPHEN 650 MG: 325 TABLET ORAL at 09:03

## 2023-03-21 RX ADMIN — MIDAZOLAM HYDROCHLORIDE 2 MG: 1 INJECTION, SOLUTION INTRAMUSCULAR; INTRAVENOUS at 07:03

## 2023-03-21 RX ADMIN — FENTANYL CITRATE 100 MCG: 50 INJECTION, SOLUTION INTRAMUSCULAR; INTRAVENOUS at 07:03

## 2023-03-21 RX ADMIN — OXYCODONE HYDROCHLORIDE 10 MG: 10 TABLET ORAL at 10:03

## 2023-03-21 RX ADMIN — SENNOSIDES AND DOCUSATE SODIUM 1 TABLET: 50; 8.6 TABLET ORAL at 08:03

## 2023-03-21 NOTE — OP NOTE
Thoracic Surgery Operative Report       Date: 03/21/2023      Preoperative Diagnosis: Anterior Mediastinal Mass      Postoperative Diagnosis: The Same.       Procedure Performed:   1, Right Robotic-Assisted Thoracoscopic Surgery, Mediastinal mass excision with Partial Thymectomy   2. Intercostal Nerve Blocks      Intraoperative Findings:  Freely mobile anterior mediastinal mass       Surgeon: Anton Hassan M.D.       Residents: Leia Saez M.D.    Assistant:   Bedside assistant attestation:  Brooke Wright PA-C. functioned as a bedside 1st assistant.  Her duties included robotic docking, instrument exchange, and specimen retrieval throughout the entire surgery.  There was no qualified resident available to function as a bedside first assistant given the case complexity and extent of duties described above.     Anesthesia Type: General Anesthesia.       Estimated Blood Loss: minimal       Intravenous Fluid: See Anesthesia records.       Specimens:   Specimen (24h ago, onward)       Start     Ordered    03/21/23 1017  Specimen to Pathology, Surgery Pulmonary and Thoracic  Once        Comments: Pre-op Diagnosis: Mediastinal mass [J98.59]Procedure(s):XI ROBOTIC THYMECTOMYBLOCK, NERVE, INTERCOSTAL, 2 OR MORE Number of specimens: 1Name of specimens: 1. Thymic mass - permanent     References:    Click here for ordering Quick Tip   Question Answer Comment   Procedure Type: Pulmonary and Thoracic    Specimen Class: Routine/Screening    Which provider would you like to cc? ANTON HASSAN    Release to patient Immediate        03/21/23 1016                      Drains: 24 Azeri Damaso Chest Tube.       Complications: None.       Disposition: The patient tolerated the operation well. She was extubated and transported the postanesthesia care unit in stable condition.       Indication for the procedure:   Mrs. Quintero is a 52 year/old woman with an anterior mediastinal mass. Anterior mediastinal mass first identified in  2015. It remained grossly stable on imaging through 2019. Most recently underwent repeat CT with grossly similar measurements at 2.0 x 1.4 cm. Risk, benefits, and alternatives were discussed with the patient and informed consent was obtained.     Description of the operation:       Mrs. Quintero was brought to the operating room. Intravenous lines were placed by the anesthesia team. The patient received 2 gm of IV Ancef prior to the incision for antimicrobial prophylaxis. In addition compression boots were placed on lower extremities for DVT prophylaxis. A Davison catheter and arterial line were placed. General anesthesia was induced and he was intubated with a double-lumen endotracheal tube.      The patient was then positioned in the lazy left lateral position with the right chest facing upward and arms tucked. The right chest was prepped with chlorhexidine. Sterile drapes were then placed on the patient. A total of three 8 mm robotic trocars were placed between the mid-clavicular and anterior axillary line. The camera port was placed in the 4th intercostal space, and instrument ports were placed in the second and sixth intercostal space. The [employer]i Xi robot was then docked to the robotic ports. A cadiere grasper and monopolar cautery were placed through the ports. The right hemithorax was inspected and there was no evidence of pleural disease or pleural effusion. The thymus appeared  mildly thickened and nodular. There was a 2cm mass anterior and the the left of the ascending aorta     The surgery was commenced by incising the pleura and fat parallel to the right phrenic nerve and  the thymic tissue from the pericardium, specifically, using the bipolar cautery the thymus and mass were dissected off the pericardium overlying the ascending aorta and pulmonary artery trunk. Because there was no suspicion for myasthenia gravis, a total thymectomy was not indicated the superior mediastinum was not dissected and  the thymic veins were not divided. Instead, after mobilizing the mass and surrounding thymic tissue off the pericardium attention was then turned to the superior attachments. The thymus was dissected off the posterior sternum. Additionally in order to obtain an adequate margin the left sided thymus was dissected free. The left pleural space was not entered. The thymus and thymic mass was then  from residual attachments. The robot was then undocked and the specimen was removed using and endo-catch bag through the inferior port. The resection basin was examined and and was hemostatic. Intercostal nerve blocks were performed with Exaprel injections from T2-7.    A 24 Hungarian katerin chest tube was placed in the access port and place straddling the mediastinum. The chest tube was secured with a ethibond suture. The right lung was then re-expanded under direct vision. The robotic incisions were closed in 3 layers. The muscle was closed with an interrupted 0 Vicryl, subcutaneous  layers were reapproximated a running 2-0 Vicryl suture and the skin was reapproximated a running 4-0 monocryl suture. Sterile dressings were placed on the incisions.       The instrument counts were correct x 2. I was present for the entire procedure  The patient was extubated in the operating room without difficulty and was transported to the PACU by the anesthesia team. There were no intraoperative surgical complications.

## 2023-03-21 NOTE — TRANSFER OF CARE
Anesthesia Transfer of Care Note    Patient: Laz Quintero    Procedure(s) Performed: Procedure(s) (LRB):  XI ROBOTIC THYMECTOMY (Right)  BLOCK, NERVE, INTERCOSTAL, 2 OR MORE (Right)    Patient location: PACU    Anesthesia Type: general    Transport from OR: Transported from OR on 6-10 L/min O2 by face mask with adequate spontaneous ventilation    Post pain: adequate analgesia    Post assessment: no apparent anesthetic complications    Post vital signs: stable    Level of consciousness: awake and alert    Nausea/Vomiting: no nausea/vomiting    Complications: none    Transfer of care protocol was followed      Last vitals:   Visit Vitals  BP (!) 168/91 (BP Location: Right arm, Patient Position: Lying)   Pulse 100   Temp 36 °C (96.8 °F) (Temporal)   Resp 16   Wt 73 kg (161 lb)   LMP 06/07/2015 (Exact Date)   SpO2 100%   Breastfeeding No   BMI 25.99 kg/m²

## 2023-03-21 NOTE — NURSING TRANSFER
Nursing Transfer Note      3/21/2023     Reason patient is being transferred: admit to [facility] from PACU    Transfer To: 1058    Transfer via bed    Transfer with chest tube    Transported by RN and PCT    Medicines sent: none    Any special needs or follow-up needed: no    Chart send with patient: Yes    Notified: mom, significant other    Patient reassessed at: 1238

## 2023-03-21 NOTE — PLAN OF CARE
Pt arrival to [place] [address]. Chest tube -20 continuous wall suction set up complete and is to right chest, drainage marked on dressing and canister. Pt is on telemetry TXX#[id number] in place an appears NSR on monitor. Pt denies nausea and pain is 9/10 at this time but refuses medications. Skin assessment complete with myself and AARON Martinez and skin is otherwise intact besides chest tube incisions to right axilla, and R breast area. Pt oriented to room and fall contract signed.

## 2023-03-21 NOTE — BRIEF OP NOTE
Certification of Assistant at Surgery       Surgery Date: 3/21/2023     Participating Surgeons:  Surgeon(s) and Role:     * Anton Evangelista MD - Primary     * Brooke Wright PA-C - Assisting     * Leia Saez MD - Resident - Assisting    Procedures:  Procedure(s) (LRB):  XI ROBOTIC THYMECTOMY (Right)  BLOCK, NERVE, INTERCOSTAL, 2 OR MORE (Right)    Assistant Surgeon's Certification of Necessity:  I understand that section 1842 (b) (6) (d) of the Social Security Act generally prohibits Medicare Part B reasonable charge payment for the services of assistants at surgery in teaching hospitals when qualified residents are available to furnish such services. I certify that the services for which payment is claimed were medically necessary, and that no qualified resident was available to perform the services. I further understand that these services are subject to post-payment review by the Medicare carrier.      Brooke Wright PA-C    03/21/2023  10:35 AM

## 2023-03-21 NOTE — H&P
Patient seen and examined. H&P has been reviewed and is included below.    I agree with the findings and no changes have occurred since H&P was written.    Anesthesia/Surgery risks, benefits, and alternative options discussed and understood by the patient who wishes to proceed.    To OR for planned procedure.      Leia Saez MD  General Surgery Resident, PGY-IV  Pager: [phone]  3/21/2023 6:35 AM          History & Physical     SUBJECTIVE:      History of Present Illness:  Patient is a 52 y.o. female never smoker with HLD, h/o lumbar fusion (08/2022), and hyperparathyroidism here today for evaluation of anterior mediastinal mass. Anterior mediastinal mass first identified in 2015. It remained grossly stable on imaging through 2019. Most recently underwent repeat CT with grossly similar measurements at 2.0 x 1.4 cm. Today patient reports occasional blurred vision, headaches, palpitations, and occasional chest pain (which she attributes to baseline anxiety).      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             Chief Complaint   Patient presents with    Consult               Review of patient's allergies indicates:   Allergen Reactions    Clindamycin      Sulfa dyne      Sulfamethoxazole-trimethoprim         Other reaction(s): Anaphylaxis    Dermabond [tissue glues] Rash         Current Medications          Current Outpatient Medications   Medication Sig Dispense Refill    ALPRAZolam (XANAX) 0.25 MG tablet Take 1 tablet (0.25 mg total) by mouth daily as needed for Anxiety. 20 tablet 1    atorvastatin (LIPITOR) 10 MG tablet Take 1 tablet (10 mg total) by mouth once daily. 90 tablet 1    cholecalciferol, vitamin D3, 1,250 mcg (50,000 unit) Tab Take 1 tablet by mouth once a week. for 8 doses 8 tablet 0    dicyclomine (BENTYL) 20 mg tablet Take 1 tablet (20 mg total) by mouth 3 (three) times daily as needed (abdominal pain). 60 tablet  2    EScitalopram oxalate (LEXAPRO) 10 MG tablet Take 1 tablet (10 mg total) by mouth once daily. 30 tablet 11    esomeprazole (NEXIUM) 40 MG capsule Take 1 capsule (40 mg total) by mouth daily as needed. 30 capsule 11    fluticasone propionate (FLONASE) 50 mcg/actuation nasal spray 2 sprays (100 mcg total) by Each Nostril route once daily. 11.1 mL 0    ketorolac (TORADOL) 10 mg tablet Take one po q 8 hrs prn pain 20 tablet 0    methocarbamoL (ROBAXIN) 750 MG Tab SMARTSI Tablet(s) By Mouth Every 12 Hours        PERCOCET 5-325 mg per tablet Take 1 tablet by mouth daily as needed.        prednisoLONE acetate (PRED FORTE) 1 % DrpS Place 1 drop into the left eye 4 (four) times daily. 5 mL 11    traMADoL (ULTRAM) 50 mg tablet Take 50 mg by mouth every 8 (eight) hours as needed.        diclofenac sodium (VOLTAREN) 1 % Gel Apply 2 g topically once daily. Use gloves to apply (Patient not taking: Reported on 2022) 100 g 1    gabapentin (NEURONTIN) 300 MG capsule Take 1 capsule (300 mg total) by mouth 3 (three) times daily. 50 capsule 2      No current facility-administered medications for this visit.                 Past Medical History:   Diagnosis Date    Abnormal Pap smear      Abnormal Pap smear of cervix      Annular tear of lumbar disc, L5-S1. 2012    Chronic LBP      HPTH (hyperparathyroidism)      Menorrhagia      PONV (postoperative nausea and vomiting)      Right lumbar radiculopathy 2012    Seasonal allergies              Past Surgical History:   Procedure Laterality Date    BACK SURGERY   2018    CHOLECYSTECTOMY        COLONOSCOPY N/A 2018     Procedure: COLONOSCOPY;  Surgeon: Malachi Olmedo MD;  Location: [facility] ([address] [place] [address]);  Service: Endoscopy;  Laterality: N/A;    ESOPHAGOGASTRODUODENOSCOPY N/A 2019     Procedure: EGD (ESOPHAGOGASTRODUODENOSCOPY);  Surgeon: Jose Carlos Ventura MD;  Location: [facility] ([address] [place] [address]);  Service: Endoscopy;  Laterality: N/A;  pt requesting ASAP     "HYSTERECTOMY        TUBAL LIGATION         Essure    WISDOM TOOTH EXTRACTION         2005            Family History   Problem Relation Age of Onset    Diabetes Father      Coronary artery disease Mother      Breast cancer Neg Hx      Ovarian cancer Neg Hx      Melanoma Neg Hx      Colon cancer Neg Hx        Social History           Tobacco Use    Smoking status: Never    Smokeless tobacco: Never   Substance Use Topics    Alcohol use: No    Drug use: No         Review of Systems:  Review of Systems   Constitutional:  Positive for diaphoresis and fatigue. Negative for fever and unexpected weight change.   HENT: Negative.     Eyes:  Positive for visual disturbance (occasional blurred vision).   Respiratory:  Negative for cough, chest tightness and shortness of breath.    Cardiovascular:  Positive for chest pain (occasional chest pain which she attributes to anxiety) and palpitations.   Gastrointestinal: Negative.    Endocrine: Negative.    Genitourinary: Negative.    Musculoskeletal:  Positive for back pain (s/p lumbar fusion 08/2022). Negative for joint swelling, neck pain and neck stiffness.   Skin: Negative.    Neurological:  Positive for headaches. Negative for dizziness, speech difficulty and weakness.   Hematological: Negative.    Psychiatric/Behavioral: Negative.        OBJECTIVE:      Vital Signs (Most Recent)      Vitals:     12/15/22 0943   BP: (!) 141/95   Pulse: 86   SpO2: 95%   Weight: 76.5 kg (168 lb 10.4 oz)   Height: 5' 6" (1.676 m)   PainSc: 0-No pain         Physical Exam:  Physical Exam  Constitutional:       Appearance: Normal appearance.   Eyes:      Extraocular Movements: Extraocular movements intact.      Pupils: Pupils are equal, round, and reactive to light.   Cardiovascular:      Rate and Rhythm: Normal rate and regular rhythm.      Pulses: Normal pulses.   Pulmonary:      Effort: Pulmonary effort is normal.      Breath sounds: Normal breath sounds.   Abdominal:      General: Abdomen is flat. "      Palpations: Abdomen is soft.   Skin:     General: Skin is warm and dry.   Neurological:      General: No focal deficit present.      Mental Status: She is alert and oriented to person, place, and time. Mental status is at baseline.   Psychiatric:         Mood and Affect: Mood normal.         Behavior: Behavior normal.         Thought Content: Thought content normal.         Chest CT without 12/15/22:  The soft tissues and vascular structures at the base of the neck are significant for a subcentimeter right-sided thyroid hypodensity too small to characterize.  The mediastinum including the heart and great vessels the is significant for a 2 x 1.4 cm soft tissue collection within the anterior mediastinum which measured 1.9 x 1.3 cm on previous performed 10/08/2019.  The visualized intra-abdominal content is significant for diverticulosis coli.  The osseous structures demonstrate mild degenerative change.  The trachea is patent and free of any intraluminal filling defects.  The lungs are well expanded there is a 0.3 cm pleural based left upper lobe noncalcified pulmonary nodule unchanged compared to previous.  There is bilateral dependent atelectatic versus fibrotic change.  There is no pleural or pericardial fluid.        ASSESSMENT/PLAN:      Patient is a 52 y.o. female with hyperparathyroidism here today for evaluation of anterior mediastinal mass.      PLAN:Plan   Order myasthenia gravis panel, including: anti-Ach antibody, anti-voltage gate calcium channel antibody, anti-Musk antibody, anti-LRP4 antibody  Order 24 hr urine and plasma metanephrines collection  Order Echo, EKG, and PFTs for further workup     Pending results from above plan for robotic assisted resection of mediastinal mass. Risks benefits and alternatives discussed with Mrs Quintero. Consent not obtained during this visit     Anton Evangelista M.D.  [phone] (direct)  Thoracic Surgery   [facility]

## 2023-03-21 NOTE — BRIEF OP NOTE
[facility] - Surgery ([facility])  Surgery Department  Operative Note    SUMMARY     Date of Procedure: 3/21/2023     Procedure: Procedure(s) (LRB):  XI ROBOTIC THYMECTOMY (Right)  BLOCK, NERVE, INTERCOSTAL, 2 OR MORE (Right)     Surgeon(s) and Role:     * Anton Hassan MD - Primary     * Brooke Wright PA-C - Assisting     * Leia Saez MD - Resident - Assisting        Pre-Operative Diagnosis: Mediastinal mass [J98.59]    Post-Operative Diagnosis: Post-Op Diagnosis Codes:     * Mediastinal mass [J98.59]    Anesthesia: General    Description of Technical Procedures: right robotic assisted thymectomy with multilevel intercostal block. 24 Citizen of [place] right pleural katerin drain    Significant Surgical Tasks Conducted by the Assistant(s), if Applicable: bedside     Estimated Blood Loss (EBL): * No values recorded between 3/21/2023  8:34 AM and 3/21/2023 10:33 AM *           Implants: * No implants in log *    Specimens:   Specimen (24h ago, onward)       Start     Ordered    03/21/23 1017  Specimen to Pathology, Surgery Pulmonary and Thoracic  Once        Comments: Pre-op Diagnosis: Mediastinal mass [J98.59]Procedure(s):XI ROBOTIC THYMECTOMYBLOCK, NERVE, INTERCOSTAL, 2 OR MORE Number of specimens: 1Name of specimens: 1. Thymic mass - permanent     References:    Click here for ordering Quick Tip   Question Answer Comment   Procedure Type: Pulmonary and Thoracic    Specimen Class: Routine/Screening    Which provider would you like to cc? ANTON HASSAN    Release to patient Immediate        03/21/23 1016                            Condition: Good    Disposition: PACU - hemodynamically stable.

## 2023-03-22 PROBLEM — J98.59 MEDIASTINAL MASS: Status: ACTIVE | Noted: 2023-03-22

## 2023-03-22 LAB
ALBUMIN SERPL BCP-MCNC: 3.5 G/DL (ref 3.5–5.2)
ALP SERPL-CCNC: 74 U/L (ref 55–135)
ALT SERPL W/O P-5'-P-CCNC: 38 U/L (ref 10–44)
ANION GAP SERPL CALC-SCNC: 9 MMOL/L (ref 8–16)
AST SERPL-CCNC: 61 U/L (ref 10–40)
BASOPHILS # BLD AUTO: 0.04 K/UL (ref 0–0.2)
BASOPHILS NFR BLD: 0.3 % (ref 0–1.9)
BILIRUB SERPL-MCNC: 0.5 MG/DL (ref 0.1–1)
BUN SERPL-MCNC: 6 MG/DL (ref 6–20)
CALCIUM SERPL-MCNC: 9 MG/DL (ref 8.7–10.5)
CHLORIDE SERPL-SCNC: 101 MMOL/L (ref 95–110)
CO2 SERPL-SCNC: 25 MMOL/L (ref 23–29)
CREAT SERPL-MCNC: 0.7 MG/DL (ref 0.5–1.4)
CREAT SERPL-MCNC: 0.8 MG/DL (ref 0.5–1.4)
DIFFERENTIAL METHOD: ABNORMAL
EOSINOPHIL # BLD AUTO: 0.2 K/UL (ref 0–0.5)
EOSINOPHIL NFR BLD: 1.5 % (ref 0–8)
ERYTHROCYTE [DISTWIDTH] IN BLOOD BY AUTOMATED COUNT: 13.2 % (ref 11.5–14.5)
EST. GFR  (NO RACE VARIABLE): >60 ML/MIN/1.73 M^2
EST. GFR  (NO RACE VARIABLE): >60 ML/MIN/1.73 M^2
GLUCOSE SERPL-MCNC: 113 MG/DL (ref 70–110)
HCT VFR BLD AUTO: 38.4 % (ref 37–48.5)
HGB BLD-MCNC: 12.3 G/DL (ref 12–16)
IMM GRANULOCYTES # BLD AUTO: 0.06 K/UL (ref 0–0.04)
IMM GRANULOCYTES NFR BLD AUTO: 0.4 % (ref 0–0.5)
LYMPHOCYTES # BLD AUTO: 2.6 K/UL (ref 1–4.8)
LYMPHOCYTES NFR BLD: 16.5 % (ref 18–48)
MAGNESIUM SERPL-MCNC: 1.6 MG/DL (ref 1.6–2.6)
MCH RBC QN AUTO: 28.4 PG (ref 27–31)
MCHC RBC AUTO-ENTMCNC: 32 G/DL (ref 32–36)
MCV RBC AUTO: 89 FL (ref 82–98)
MONOCYTES # BLD AUTO: 0.9 K/UL (ref 0.3–1)
MONOCYTES NFR BLD: 5.8 % (ref 4–15)
NEUTROPHILS # BLD AUTO: 11.8 K/UL (ref 1.8–7.7)
NEUTROPHILS NFR BLD: 75.5 % (ref 38–73)
NRBC BLD-RTO: 0 /100 WBC
PHOSPHATE SERPL-MCNC: 3.1 MG/DL (ref 2.7–4.5)
PLATELET # BLD AUTO: 284 K/UL (ref 150–450)
PMV BLD AUTO: 9.2 FL (ref 9.2–12.9)
POTASSIUM SERPL-SCNC: 3.6 MMOL/L (ref 3.5–5.1)
PROT SERPL-MCNC: 6.7 G/DL (ref 6–8.4)
RBC # BLD AUTO: 4.33 M/UL (ref 4–5.4)
SODIUM SERPL-SCNC: 135 MMOL/L (ref 136–145)
WBC # BLD AUTO: 15.59 K/UL (ref 3.9–12.7)

## 2023-03-22 PROCEDURE — 99900035 HC TECH TIME PER 15 MIN (STAT)

## 2023-03-22 PROCEDURE — 27000221 HC OXYGEN, UP TO 24 HOURS

## 2023-03-22 PROCEDURE — 99900031 HC PATIENT EDUCATION (STAT)

## 2023-03-22 PROCEDURE — 80053 COMPREHEN METABOLIC PANEL: CPT | Performed by: STUDENT IN AN ORGANIZED HEALTH CARE EDUCATION/TRAINING PROGRAM

## 2023-03-22 PROCEDURE — 63600175 PHARM REV CODE 636 W HCPCS: Performed by: PHYSICIAN ASSISTANT

## 2023-03-22 PROCEDURE — 36415 COLL VENOUS BLD VENIPUNCTURE: CPT | Performed by: STUDENT IN AN ORGANIZED HEALTH CARE EDUCATION/TRAINING PROGRAM

## 2023-03-22 PROCEDURE — 84100 ASSAY OF PHOSPHORUS: CPT | Performed by: STUDENT IN AN ORGANIZED HEALTH CARE EDUCATION/TRAINING PROGRAM

## 2023-03-22 PROCEDURE — 25000003 PHARM REV CODE 250

## 2023-03-22 PROCEDURE — 27201037 HC PRESSURE MONITORING SET UP

## 2023-03-22 PROCEDURE — 94761 N-INVAS EAR/PLS OXIMETRY MLT: CPT

## 2023-03-22 PROCEDURE — 94799 UNLISTED PULMONARY SVC/PX: CPT

## 2023-03-22 PROCEDURE — 83735 ASSAY OF MAGNESIUM: CPT | Performed by: STUDENT IN AN ORGANIZED HEALTH CARE EDUCATION/TRAINING PROGRAM

## 2023-03-22 PROCEDURE — 85025 COMPLETE CBC W/AUTO DIFF WBC: CPT | Performed by: STUDENT IN AN ORGANIZED HEALTH CARE EDUCATION/TRAINING PROGRAM

## 2023-03-22 PROCEDURE — 20600001 HC STEP DOWN PRIVATE ROOM

## 2023-03-22 PROCEDURE — 25000003 PHARM REV CODE 250: Performed by: PHYSICIAN ASSISTANT

## 2023-03-22 PROCEDURE — 82565 ASSAY OF CREATININE: CPT | Performed by: STUDENT IN AN ORGANIZED HEALTH CARE EDUCATION/TRAINING PROGRAM

## 2023-03-22 RX ORDER — TRAMADOL HYDROCHLORIDE 50 MG/1
50 TABLET ORAL EVERY 6 HOURS PRN
Status: DISCONTINUED | OUTPATIENT
Start: 2023-03-22 | End: 2023-03-22

## 2023-03-22 RX ORDER — OXYCODONE HYDROCHLORIDE 5 MG/1
5 TABLET ORAL EVERY 4 HOURS PRN
Status: DISCONTINUED | OUTPATIENT
Start: 2023-03-22 | End: 2023-03-23 | Stop reason: HOSPADM

## 2023-03-22 RX ORDER — TRAMADOL HYDROCHLORIDE 50 MG/1
100 TABLET ORAL EVERY 6 HOURS PRN
Status: DISCONTINUED | OUTPATIENT
Start: 2023-03-22 | End: 2023-03-22

## 2023-03-22 RX ORDER — METHOCARBAMOL 500 MG/1
500 TABLET, FILM COATED ORAL 4 TIMES DAILY
Status: DISCONTINUED | OUTPATIENT
Start: 2023-03-22 | End: 2023-03-22

## 2023-03-22 RX ORDER — OXYCODONE HYDROCHLORIDE 10 MG/1
10 TABLET ORAL EVERY 4 HOURS PRN
Status: DISCONTINUED | OUTPATIENT
Start: 2023-03-22 | End: 2023-03-23 | Stop reason: HOSPADM

## 2023-03-22 RX ORDER — LIDOCAINE 50 MG/G
1 PATCH TOPICAL
Status: DISCONTINUED | OUTPATIENT
Start: 2023-03-22 | End: 2023-03-23 | Stop reason: HOSPADM

## 2023-03-22 RX ORDER — KETOROLAC TROMETHAMINE 15 MG/ML
15 INJECTION, SOLUTION INTRAMUSCULAR; INTRAVENOUS EVERY 6 HOURS
Status: CANCELLED | OUTPATIENT
Start: 2023-03-22 | End: 2023-03-23

## 2023-03-22 RX ADMIN — ACETAMINOPHEN 650 MG: 325 TABLET ORAL at 01:03

## 2023-03-22 RX ADMIN — ERYTHROMYCIN 1 INCH: 5 OINTMENT OPHTHALMIC at 09:03

## 2023-03-22 RX ADMIN — GABAPENTIN 300 MG: 300 CAPSULE ORAL at 09:03

## 2023-03-22 RX ADMIN — METHOCARBAMOL 500 MG: 500 TABLET ORAL at 01:03

## 2023-03-22 RX ADMIN — OXYCODONE HYDROCHLORIDE 10 MG: 10 TABLET ORAL at 02:03

## 2023-03-22 RX ADMIN — POLYETHYLENE GLYCOL 3350 17 G: 17 POWDER, FOR SOLUTION ORAL at 08:03

## 2023-03-22 RX ADMIN — GABAPENTIN 300 MG: 300 CAPSULE ORAL at 02:03

## 2023-03-22 RX ADMIN — ERYTHROMYCIN: 5 OINTMENT OPHTHALMIC at 02:03

## 2023-03-22 RX ADMIN — SENNOSIDES AND DOCUSATE SODIUM 1 TABLET: 50; 8.6 TABLET ORAL at 08:03

## 2023-03-22 RX ADMIN — METHOCARBAMOL 500 MG: 500 TABLET ORAL at 05:03

## 2023-03-22 RX ADMIN — OXYCODONE HYDROCHLORIDE 10 MG: 10 TABLET ORAL at 09:03

## 2023-03-22 RX ADMIN — ENOXAPARIN SODIUM 40 MG: 40 INJECTION SUBCUTANEOUS at 08:03

## 2023-03-22 RX ADMIN — SENNOSIDES AND DOCUSATE SODIUM 1 TABLET: 50; 8.6 TABLET ORAL at 09:03

## 2023-03-22 RX ADMIN — BISACODYL 10 MG: 10 SUPPOSITORY RECTAL at 11:03

## 2023-03-22 RX ADMIN — LIDOCAINE 1 PATCH: 50 PATCH CUTANEOUS at 07:03

## 2023-03-22 RX ADMIN — ACETAMINOPHEN 650 MG: 325 TABLET ORAL at 09:03

## 2023-03-22 RX ADMIN — OXYCODONE HYDROCHLORIDE 10 MG: 10 TABLET ORAL at 03:03

## 2023-03-22 RX ADMIN — ACETAMINOPHEN 650 MG: 325 TABLET ORAL at 05:03

## 2023-03-22 RX ADMIN — METHOCARBAMOL 500 MG: 500 TABLET ORAL at 09:03

## 2023-03-22 RX ADMIN — ATORVASTATIN CALCIUM 10 MG: 10 TABLET, FILM COATED ORAL at 08:03

## 2023-03-22 RX ADMIN — OXYCODONE HYDROCHLORIDE 10 MG: 10 TABLET ORAL at 11:03

## 2023-03-22 RX ADMIN — PANTOPRAZOLE SODIUM 40 MG: 40 TABLET, DELAYED RELEASE ORAL at 08:03

## 2023-03-22 RX ADMIN — TRAMADOL HYDROCHLORIDE 100 MG: 50 TABLET, COATED ORAL at 07:03

## 2023-03-22 RX ADMIN — METHOCARBAMOL 500 MG: 500 TABLET ORAL at 08:03

## 2023-03-22 RX ADMIN — ONDANSETRON 8 MG: 8 TABLET, ORALLY DISINTEGRATING ORAL at 04:03

## 2023-03-22 RX ADMIN — GABAPENTIN 300 MG: 300 CAPSULE ORAL at 08:03

## 2023-03-22 RX ADMIN — ERYTHROMYCIN: 5 OINTMENT OPHTHALMIC at 08:03

## 2023-03-22 RX ADMIN — OXYCODONE HYDROCHLORIDE 10 MG: 10 TABLET ORAL at 07:03

## 2023-03-22 RX ADMIN — ESCITALOPRAM OXALATE 10 MG: 10 TABLET ORAL at 08:03

## 2023-03-22 NOTE — ASSESSMENT & PLAN NOTE
52 y.o. female never smoker with HLD, h/o lumbar fusion (08/2022), and hyperparathyroidism who is POD1 robotic assisted thymectomy.     - Pull CT today. Possible discharge today versus tomorrow  - Multimodal pain management  - DVT ppx  - Daily CXR  - Regular Diet  - IS use  - OOB, ambulate as tolerated

## 2023-03-22 NOTE — PLAN OF CARE
Plan of care reviewed with pt:  -AAOx4, on RA, VS stable. On Tele, in NSR-ST with HR 80s-low 100s. IS encouraged to use, can get to 750cc on IS  -2 lap sites with gauze and Tegaderm CDI  -Chest tube removed by MD this morning, pt tolearted it well  -Pain now under control with scheduled pain meds and Oxycodone 10 mg PRN  -Walked to the bathroom multiple times with standby assist. Pt complained of burning sensation with urination, pt said she has hx of UTI due to the use of ATX-DARRYL Cox notified-no intervention. Pt also claimed that using the wet purple wipes made it worse-so switch to use toilet tissue paper   -Call light in reach, [facility]

## 2023-03-22 NOTE — HPI
Patient is a 52 y.o. female never smoker with HLD, h/o lumbar fusion (08/2022), and hyperparathyroidism here today for evaluation of anterior mediastinal mass. Anterior mediastinal mass first identified in 2015. It remained grossly stable on imaging through 2019. Most recently underwent repeat CT with grossly similar measurements at 2.0 x 1.4 cm. Today patient reports occasional blurred vision, headaches, palpitations, and occasional chest pain (which she attributes to baseline anxiety).

## 2023-03-22 NOTE — ANESTHESIA POSTPROCEDURE EVALUATION
Anesthesia Post Evaluation    Patient: Laz Quintero    Procedure(s) Performed: Procedure(s) (LRB):  XI ROBOTIC THYMECTOMY (Right)  BLOCK, NERVE, INTERCOSTAL, 2 OR MORE (Right)    Final Anesthesia Type: general      Patient location during evaluation: PACU  Patient participation: Yes- Able to Participate  Level of consciousness: awake  Post-procedure vital signs: reviewed and stable  Pain management: adequate  Airway patency: patent    PONV status at discharge: No PONV  Anesthetic complications: no      Cardiovascular status: blood pressure returned to baseline  Respiratory status: unassisted  Hydration status: euvolemic  Follow-up not needed.          Vitals Value Taken Time   /87 03/22/23 0811   Temp 37.3 °C (99.1 °F) 03/22/23 0840   Pulse 98 03/22/23 0811   Resp 18 03/22/23 0918   SpO2 95 % 03/22/23 0811         Event Time   Out of Recovery 11:30:00         Pain/Maria Guadalupe Score: Pain Rating Prior to Med Admin: 8 (3/22/2023  9:18 AM)  Pain Rating Post Med Admin: 6 (3/22/2023  4:55 AM)  Maria Guadalupe Score: 9 (3/21/2023 11:30 AM)

## 2023-03-22 NOTE — PLAN OF CARE
[facility]  [facility]  Initial Discharge Assessment       Primary Care Provider: Charlotte Jacinto MD    Admission Diagnosis: Mediastinal mass [J98.59]    Admission Date: 3/21/2023  Expected Discharge Date: 3/23/2023    Discharge Barriers Identified: None    Payor: MEDICAID / Plan: HEALTHY BLUE [place] / Product Type: Managed Medicaid /     Extended Emergency Contact Information  Primary Emergency Contact: Gracy Garza  Address: [address] [place] [address]  Home Phone: [phone]  Mobile Phone: [phone]  Relation: Mother    Discharge Plan A: Home  Discharge Plan B: Home with family      [id number] - [place], [place] - [address]  [address] [facility] [address]  [facility] [address]  Phone: [phone] Fax: [phone]    [facility][id number] - [place], [place] - [address]  [address]  [place] [address]  Phone: [phone] Fax: [phone]    [facility]  [address]  [facility] [address]  Phone: [phone] Fax: [phone]    [id number] - [place] - [address] [facility] [address] [facility] [address]  [address]  [place] [address]  Phone: [phone] Fax: [phone]    [id number] - [place], [place] - [address] [facility] [address]  [address]  [place] [address]  Phone: [phone] Fax: [phone]      Initial Assessment (most recent)       Adult Discharge Assessment - 03/22/23 1122          Discharge Assessment    Assessment Type Discharge Planning Assessment     Confirmed/corrected address, phone number and insurance Yes     Confirmed Demographics Correct on Facesheet     Source of Information patient     Contact Name/Number Gracy Garza (mother/emergency contact) [phone]     Does patient/caregiver understand observation status No   Admitted as inpatient    Was observation education provided? No     Communicated JOHNIE with patient/caregiver Yes     Reason For  Admission Medicastinal Mass     People in Home significant other;child(jeanine), adult;child(jeanine), dependent   Paty (S.O.) and Robbin Gould (Son)    Facility Arrived From: From Home same day of surgery (3/21/2023)     Do you expect to return to your current living situation? Yes     Do you have help at home or someone to help you manage your care at home? Yes     Who are your caregiver(s) and their phone number(s)? Paty (S.O.) / Robbin Gould (Son) / Gracy Garza (mother) [phone]     Prior to hospitilization cognitive status: Alert/Oriented;No Deficits     Current cognitive status: Alert/Oriented;No Deficits     Walking or Climbing Stairs --   N/A - does not use HME    Dressing/Bathing --   N/A - does not use HME    Home Accessibility stairs to enter home     Number of Stairs, Main Entrance three   Lives with S.O. and adult son in single family 2 story home with 3 steps to point of entry. No rails at front of home. MBR is on 1st level.    Surface of Stairs, Main Entrance concrete     Stair Railings, Main Entrance none     Home Layout Able to live on 1st floor     Equipment Currently Used at Home none     Readmission within 30 days? No     Patient currently being followed by outpatient case management? No     Do you currently have service(s) that help you manage your care at home? No     Do you take prescription medications? Yes     Do you have prescription coverage? Yes     Coverage Payor:  MEDICAID - [facility] [place] [facility]     Do you have any problems affording any of your prescribed medications? No     Is the patient taking medications as prescribed? yes     Who is going to help you get home at discharge? Paty (S.O.) / Robbin Gould (Son) / Gracy Garza (mother) [phone]     How do you get to doctors appointments? car, drives self;family or friend will provide     Are you on dialysis? No     Do you take coumadin? No     Discharge Plan A Home     Discharge Plan B Home with family     DME Needed  Upon Discharge  none     Discharge Plan discussed with: Patient     Discharge Barriers Identified None        Physical Activity    On average, how many days per week do you engage in moderate to strenuous exercise (like a brisk walk)? 2 days     On average, how many minutes do you engage in exercise at this level? 20 min        Financial Resource Strain    How hard is it for you to pay for the very basics like food, housing, medical care, and heating? Not very hard        Housing Stability    In the last 12 months, was there a time when you were not able to pay the mortgage or rent on time? No     In the last 12 months, how many places have you lived? 1     In the last 12 months, was there a time when you did not have a steady place to sleep or slept in a shelter (including now)? No        Transportation Needs    In the past 12 months, has lack of transportation kept you from medical appointments or from getting medications? No     In the past 12 months, has lack of transportation kept you from meetings, work, or from getting things needed for daily living? No        Food Insecurity    Within the past 12 months, you worried that your food would run out before you got the money to buy more. Never true     Within the past 12 months, the food you bought just didn't last and you didn't have money to get more. Never true        Social Connections    In a typical week, how many times do you talk on the phone with family, friends, or neighbors? More than three times a week     How often do you get together with friends or relatives? Three times a week     How often do you attend Yazidi or Jain services? More than 4 times per year     Do you belong to any clubs or organizations such as Yazidi groups, unions, fraternal or athletic groups, or school groups? No     How often do you attend meetings of the clubs or organizations you belong to? Never     Are you , , , , never , or living  with a partner? Living with partner        Alcohol Use    Q1: How often do you have a drink containing alcohol? Never     Q3: How often do you have six or more drinks on one occasion? Never        OTHER    Name(s) of People in Home Paty (S.O.) / Robbin Gould (Son) / Gracy Garza (mother) [phone]                      This CM met with patient in room 1058 to complete DPA. Questions answered / contact numbers provided.  Use BEDSIDE DELIVERY for any necessary medications at time of discharge. Ms. Quintero is independent with all ADLs - does not use DME, In-home equipment, is not on HD, BTs or home oxygen.Paty (S.O.) / Robbin Gould (Son) / Gracy Garza (mother) [phone] will be assisting with help upon discharge. Paty (S.O.)  will be providing transportation home. Hospital follow up will be scheduled with PCP. Will continue to follow for course of hospitalization.     JORGE LUIS Ly  Supervisor - Case Management  Ext [phone]

## 2023-03-22 NOTE — SUBJECTIVE & OBJECTIVE
Interval History: NAEON. AFVSS. CT 90 ml out since surgery. No air leak. Voiding. Pain controlled.     Medications:  Continuous Infusions:  Scheduled Meds:   acetaminophen  650 mg Oral Q8H    atorvastatin  10 mg Oral Daily    enoxaparin  40 mg Subcutaneous Daily    erythromycin   Left Eye TID    EScitalopram oxalate  10 mg Oral Daily    gabapentin  300 mg Oral TID    LIDOcaine  1 patch Transdermal Q24H    methocarbamoL  500 mg Oral QID    pantoprazole  40 mg Oral Daily    polyethylene glycol  17 g Oral Daily    senna-docusate 8.6-50 mg  1 tablet Oral BID     PRN Meds:acetaminophen, bisacodyL, ondansetron, oxyCODONE, oxyCODONE, traMADoL, traMADoL     Review of patient's allergies indicates:   Allergen Reactions    Clindamycin     Sulfa dyne     Sulfamethoxazole-trimethoprim      Other reaction(s): Anaphylaxis    Dermabond [tissue glues] Rash     Objective:     Vital Signs (Most Recent):  Temp: 98.3 °F (36.8 °C) (03/22/23 1158)  Pulse: 80 (03/22/23 1311)  Resp: 17 (03/22/23 1158)  BP: 114/66 (03/22/23 1158)  SpO2: 95 % (03/22/23 1158) Vital Signs (24h Range):  Temp:  [96.6 °F (35.9 °C)-100.1 °F (37.8 °C)] 98.3 °F (36.8 °C)  Pulse:  [] 80  Resp:  [16-19] 17  SpO2:  [92 %-98 %] 95 %  BP: (114-150)/(66-87) 114/66     Intake/Output - Last 3 Shifts         03/20 0700  03/21 0659 03/21 0700  03/22 0659 03/22 0700  03/23 0659    P.O.  720     I.V. (mL/kg)  500 (6.7)     IV Piggyback  250     Total Intake(mL/kg)  1470 (19.8)     Urine (mL/kg/hr)  2000 (1.1) 200 (0.4)    Emesis/NG output   0    Other   0    Stool  0 0    Chest Tube  90 35    Total Output  2090 235    Net  -620 -235           Urine Occurrence  4 x 3 x    Stool Occurrence  0 x 0 x    Emesis Occurrence   0 x            SpO2: 95 %       Physical Exam  Constitutional:       Appearance: Normal appearance.   Eyes:      Pupils: Pupils are equal, round, and reactive to light.   Cardiovascular:      Rate and Rhythm: Normal rate and regular rhythm.      Pulses:  Normal pulses.   Pulmonary:      Effort: Pulmonary effort is normal.      Breath sounds: Normal breath sounds.   Abdominal:      General: Abdomen is flat.      Palpations: Abdomen is soft.   Skin:     General: Skin is warm and dry.   Neurological:      General: No focal deficit present.      Mental Status: She is alert and oriented to person, place, and time. Mental status is at baseline.   Psychiatric:         Mood and Affect: Mood normal.         Behavior: Behavior normal.         Thought Content: Thought content normal.       Significant Labs:  All pertinent labs from the last 24 hours have been reviewed.    Significant Diagnostics:  CXR: I have reviewed all pertinent results/findings within the past 24 hours    VTE Risk Mitigation (From admission, onward)           Ordered     enoxaparin injection 40 mg  Daily         03/21/23 1150

## 2023-03-22 NOTE — PROGRESS NOTES
Demian Koroma - [facility]  Thoracic Surgery  Progress Note    Subjective:     History of Present Illness:  Patient is a 52 y.o. female never smoker with HLD, h/o lumbar fusion (08/2022), and hyperparathyroidism here today for evaluation of anterior mediastinal mass. Anterior mediastinal mass first identified in 2015. It remained grossly stable on imaging through 2019. Most recently underwent repeat CT with grossly similar measurements at 2.0 x 1.4 cm. Today patient reports occasional blurred vision, headaches, palpitations, and occasional chest pain (which she attributes to baseline anxiety).       Post-Op Info:  Procedure(s) (LRB):  XI ROBOTIC THYMECTOMY (Right)  BLOCK, NERVE, INTERCOSTAL, 2 OR MORE (Right)   1 Day Post-Op     Interval History: NAEON. AFVSS. CT 90 ml out since surgery. No air leak. Voiding. Pain controlled.     Medications:  Continuous Infusions:  Scheduled Meds:   acetaminophen  650 mg Oral Q8H    atorvastatin  10 mg Oral Daily    enoxaparin  40 mg Subcutaneous Daily    erythromycin   Left Eye TID    EScitalopram oxalate  10 mg Oral Daily    gabapentin  300 mg Oral TID    LIDOcaine  1 patch Transdermal Q24H    methocarbamoL  500 mg Oral QID    pantoprazole  40 mg Oral Daily    polyethylene glycol  17 g Oral Daily    senna-docusate 8.6-50 mg  1 tablet Oral BID     PRN Meds:acetaminophen, bisacodyL, ondansetron, oxyCODONE, oxyCODONE, traMADoL, traMADoL     Review of patient's allergies indicates:   Allergen Reactions    Clindamycin     Sulfa dyne     Sulfamethoxazole-trimethoprim      Other reaction(s): Anaphylaxis    Dermabond [tissue glues] Rash     Objective:     Vital Signs (Most Recent):  Temp: 98.3 °F (36.8 °C) (03/22/23 1158)  Pulse: 80 (03/22/23 1311)  Resp: 17 (03/22/23 1158)  BP: 114/66 (03/22/23 1158)  SpO2: 95 % (03/22/23 1158) Vital Signs (24h Range):  Temp:  [96.6 °F (35.9 °C)-100.1 °F (37.8 °C)] 98.3 °F (36.8 °C)  Pulse:  [] 80  Resp:  [16-19] 17  SpO2:  [92 %-98 %] 95  %  BP: (114-150)/(66-87) 114/66     Intake/Output - Last 3 Shifts         03/20 0700  03/21 0659 03/21 0700  03/22 0659 03/22 0700  03/23 0659    P.O.  720     I.V. (mL/kg)  500 (6.7)     IV Piggyback  250     Total Intake(mL/kg)  1470 (19.8)     Urine (mL/kg/hr)  2000 (1.1) 200 (0.4)    Emesis/NG output   0    Other   0    Stool  0 0    Chest Tube  90 35    Total Output  2090 235    Net  -620 -235           Urine Occurrence  4 x 3 x    Stool Occurrence  0 x 0 x    Emesis Occurrence   0 x            SpO2: 95 %       Physical Exam  Constitutional:       Appearance: Normal appearance.   Eyes:      Pupils: Pupils are equal, round, and reactive to light.   Cardiovascular:      Rate and Rhythm: Normal rate and regular rhythm.      Pulses: Normal pulses.   Pulmonary:      Effort: Pulmonary effort is normal.      Breath sounds: Normal breath sounds.   Abdominal:      General: Abdomen is flat.      Palpations: Abdomen is soft.   Skin:     General: Skin is warm and dry.   Neurological:      General: No focal deficit present.      Mental Status: She is alert and oriented to person, place, and time. Mental status is at baseline.   Psychiatric:         Mood and Affect: Mood normal.         Behavior: Behavior normal.         Thought Content: Thought content normal.       Significant Labs:  All pertinent labs from the last 24 hours have been reviewed.    Significant Diagnostics:  CXR: I have reviewed all pertinent results/findings within the past 24 hours    VTE Risk Mitigation (From admission, onward)           Ordered     enoxaparin injection 40 mg  Daily         03/21/23 1150                  Assessment/Plan:     Mediastinal mass  52 y.o. female never smoker with HLD, h/o lumbar fusion (08/2022), and hyperparathyroidism who is POD1 robotic assisted thymectomy.     - Pull CT today. Possible discharge today versus tomorrow  - Multimodal pain management  - DVT ppx  - Daily CXR  - Regular Diet  - IS use  - OOB, ambulate as  tolerated        Darron Medina PA-C  Thoracic Surgery  [facility]  [facility]

## 2023-03-23 VITALS
DIASTOLIC BLOOD PRESSURE: 76 MMHG | SYSTOLIC BLOOD PRESSURE: 145 MMHG | RESPIRATION RATE: 18 BRPM | HEIGHT: 66 IN | BODY MASS INDEX: 26.29 KG/M2 | TEMPERATURE: 99 F | WEIGHT: 163.56 LBS | HEART RATE: 88 BPM | OXYGEN SATURATION: 96 %

## 2023-03-23 PROCEDURE — 25000003 PHARM REV CODE 250: Performed by: PHYSICIAN ASSISTANT

## 2023-03-23 PROCEDURE — 99024 PR POST-OP FOLLOW-UP VISIT: ICD-10-PCS | Mod: ,,, | Performed by: STUDENT IN AN ORGANIZED HEALTH CARE EDUCATION/TRAINING PROGRAM

## 2023-03-23 PROCEDURE — 63600175 PHARM REV CODE 636 W HCPCS: Performed by: PHYSICIAN ASSISTANT

## 2023-03-23 PROCEDURE — 99024 POSTOP FOLLOW-UP VISIT: CPT | Mod: ,,, | Performed by: STUDENT IN AN ORGANIZED HEALTH CARE EDUCATION/TRAINING PROGRAM

## 2023-03-23 PROCEDURE — 25000003 PHARM REV CODE 250

## 2023-03-23 RX ORDER — OXYCODONE HYDROCHLORIDE 5 MG/1
5 TABLET ORAL EVERY 4 HOURS PRN
Qty: 41 TABLET | Refills: 0 | Status: SHIPPED | OUTPATIENT
Start: 2023-03-23

## 2023-03-23 RX ORDER — METHOCARBAMOL 500 MG/1
500 TABLET, FILM COATED ORAL 4 TIMES DAILY
Qty: 40 TABLET | Refills: 0 | Status: SHIPPED | OUTPATIENT
Start: 2023-03-23 | End: 2023-04-02

## 2023-03-23 RX ADMIN — METHOCARBAMOL 500 MG: 500 TABLET ORAL at 12:03

## 2023-03-23 RX ADMIN — OXYCODONE HYDROCHLORIDE 10 MG: 10 TABLET ORAL at 04:03

## 2023-03-23 RX ADMIN — ACETAMINOPHEN 650 MG: 325 TABLET ORAL at 06:03

## 2023-03-23 RX ADMIN — GABAPENTIN 300 MG: 300 CAPSULE ORAL at 08:03

## 2023-03-23 RX ADMIN — ESCITALOPRAM OXALATE 10 MG: 10 TABLET ORAL at 08:03

## 2023-03-23 RX ADMIN — OXYCODONE HYDROCHLORIDE 10 MG: 10 TABLET ORAL at 11:03

## 2023-03-23 RX ADMIN — LIDOCAINE 1 PATCH: 50 PATCH CUTANEOUS at 08:03

## 2023-03-23 RX ADMIN — ONDANSETRON 8 MG: 8 TABLET, ORALLY DISINTEGRATING ORAL at 12:03

## 2023-03-23 RX ADMIN — ERYTHROMYCIN 1 INCH: 5 OINTMENT OPHTHALMIC at 08:03

## 2023-03-23 RX ADMIN — POLYETHYLENE GLYCOL 3350 17 G: 17 POWDER, FOR SOLUTION ORAL at 08:03

## 2023-03-23 RX ADMIN — METHOCARBAMOL 500 MG: 500 TABLET ORAL at 08:03

## 2023-03-23 RX ADMIN — ENOXAPARIN SODIUM 40 MG: 40 INJECTION SUBCUTANEOUS at 08:03

## 2023-03-23 RX ADMIN — PANTOPRAZOLE SODIUM 40 MG: 40 TABLET, DELAYED RELEASE ORAL at 08:03

## 2023-03-23 RX ADMIN — ATORVASTATIN CALCIUM 10 MG: 10 TABLET, FILM COATED ORAL at 08:03

## 2023-03-23 RX ADMIN — SENNOSIDES AND DOCUSATE SODIUM 1 TABLET: 50; 8.6 TABLET ORAL at 08:03

## 2023-03-23 NOTE — PLAN OF CARE
Pt Aox4, VS remained stable throughout shift, pain controlled with PRN meds. Pt to bathroom ambulatory with assist, voiding yellow urine with adequate output. Pt rec'd suppository with extremely small hard stool output, not good bowel movement. Dressing to area under breast C/D/I. Educated on POC, resting comfortably, bed low and locked, call light within reach, will continue to monitor.     Problem: Adult Inpatient Plan of Care  Goal: Plan of Care Review  Outcome: Ongoing, Progressing  Goal: Patient-Specific Goal (Individualized)  Outcome: Ongoing, Progressing  Goal: Absence of Hospital-Acquired Illness or Injury  Outcome: Ongoing, Progressing  Goal: Optimal Comfort and Wellbeing  Outcome: Ongoing, Progressing  Goal: Readiness for Transition of Care  Outcome: Ongoing, Progressing     Problem: Fall Injury Risk  Goal: Absence of Fall and Fall-Related Injury  Outcome: Ongoing, Progressing

## 2023-03-23 NOTE — CLINICAL REVIEW
"RAPID RESPONSE NURSE CHART REVIEW       Chart Reviewed: 03/22/2023, 10:34 PM    MRN: [id number]  Bed: 1058/1058 A    Dx: <principal problem not specified>    Laz Quintero has a past medical history of Abnormal Pap smear, Abnormal Pap smear of cervix, Annular tear of lumbar disc, L5-S1., Chronic LBP, HPTH (hyperparathyroidism), Menorrhagia, PONV (postoperative nausea and vomiting), Primary hypertension, Right lumbar radiculopathy, and Seasonal allergies.    Last VS: /71   Pulse 95   Temp 100.2 °F (37.9 °C)   Resp 18   Ht 5' 6" (1.676 m)   Wt 74.1 kg (163 lb 5.8 oz)   LMP 06/07/2015 (Exact Date)   SpO2 (!) 94%   Breastfeeding No   BMI 26.37 kg/m²     24H Vital Sign Range:  Temp:  [96.6 °F (35.9 °C)-100.2 °F (37.9 °C)]   Pulse:  []   Resp:  [16-18]   BP: (114-150)/(65-87)   SpO2:  [85 %-95 %]     Level of Consciousness (AVPU): alert    No results for input(s): CBC, WBC, HGB, HCT, PLT in the last 72 hours.    Recent Labs     03/22/23  0354   CREATININE 0.8        No results for input(s): PH, PCO2, PO2, HCO3, POCSATURATED, BE in the last 72 hours.     OXYGEN:  Flow (L/min): 2          MEWS score: 1    Rounding completed with charge AARON Herrera. contacted. No concerns verbalized at this time. Instructed to call [phone] for further concerns or assistance.    Darron Mcmanus RN       "

## 2023-03-23 NOTE — PLAN OF CARE
[facility]  Discharge Final Note    Primary Care Provider: Charlotte Jacinto MD    Expected Discharge Date: 3/23/2023    Final Discharge Note (most recent)       Final Note - 03/23/23 1010          Final Note    Assessment Type Final Discharge Note     Anticipated Discharge Disposition Home or Self Care     Hospital Resources/Appts/Education Provided Provided patient/caregiver with written discharge plan information        Post-Acute Status    Discharge Delays None known at this time                     Important Message from Medicare             Contact Info       Anton Evangelista MD   Specialty: Cardiothoracic Surgery    [address] [facility] [address]   Phone: [phone]       Next Steps: Follow up in 2 week(s)          Pt d/c home with family. No d/c needs reported by medical team at this time.       Marguerite Lino LCSW  Case Management/[facility]  [phone]

## 2023-03-23 NOTE — NURSING
Notified Dr. Santos of pt temp 100.2, O2 sats 85-88% on RA, belly firm and tender. Rec'd order to give all scheduled meds now, including 2200 acetaminoph order. Stated that he would try to come see her. Will implement orders and continue to monitor closely.

## 2023-03-23 NOTE — NURSING
D/c instructions reviewed with pt. F/u appointment reviewed with pt.  Pt. Verbalized understanding. Medications at bedside with all belongings. Pt. Left with  via transport.

## 2023-03-24 ENCOUNTER — PATIENT OUTREACH (OUTPATIENT)
Dept: ADMINISTRATIVE | Facility: CLINIC | Age: 53
End: 2023-03-24
Payer: MEDICAID

## 2023-03-24 DIAGNOSIS — G89.29 ACUTE EXACERBATION OF CHRONIC LOW BACK PAIN: ICD-10-CM

## 2023-03-24 DIAGNOSIS — M54.17 LUMBOSACRAL RADICULOPATHY: ICD-10-CM

## 2023-03-24 DIAGNOSIS — M54.50 ACUTE EXACERBATION OF CHRONIC LOW BACK PAIN: ICD-10-CM

## 2023-03-24 DIAGNOSIS — M54.9 DORSALGIA, UNSPECIFIED: ICD-10-CM

## 2023-03-24 DIAGNOSIS — M54.9 BACK PAIN WITH HISTORY OF SPINAL SURGERY: ICD-10-CM

## 2023-03-24 DIAGNOSIS — M51.26 HERNIATED LUMBAR INTERVERTEBRAL DISC: ICD-10-CM

## 2023-03-24 DIAGNOSIS — Z90.89 STATUS POST THYMECTOMY: Primary | ICD-10-CM

## 2023-03-24 DIAGNOSIS — Z98.890 BACK PAIN WITH HISTORY OF SPINAL SURGERY: ICD-10-CM

## 2023-03-24 DIAGNOSIS — M54.42 ACUTE LOW BACK PAIN WITH LEFT-SIDED SCIATICA, UNSPECIFIED BACK PAIN LATERALITY: ICD-10-CM

## 2023-03-24 DIAGNOSIS — M79.2 NEUROPATHIC PAIN: Primary | ICD-10-CM

## 2023-03-24 RX ORDER — GABAPENTIN 300 MG/1
CAPSULE ORAL
Qty: 120 CAPSULE | Refills: 1 | Status: SHIPPED | OUTPATIENT
Start: 2023-03-24 | End: 2024-01-05

## 2023-03-24 NOTE — DISCHARGE SUMMARY
Demian evan - [facility]  General Surgery  Discharge Summary      Patient Name: Laz Quintero  MRN: [id number]  Admission Date: 3/21/2023  Hospital Length of Stay: 2 days  Discharge Date and Time:  03/24/2023 3:49 PM  Attending Physician: No att. providers found   Discharging Provider: Brooke Wright PA-C  Primary Care Provider: Charlotte Jacinto MD     HPI:   Patient is a 52 y.o. female never smoker with HLD, h/o lumbar fusion (08/2022), and hyperparathyroidism here today for evaluation of anterior mediastinal mass. Anterior mediastinal mass first identified in 2015. It remained grossly stable on imaging through 2019. Most recently underwent repeat CT with grossly similar measurements at 2.0 x 1.4 cm. Today patient reports occasional blurred vision, headaches, palpitations, and occasional chest pain (which she attributes to baseline anxiety).      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     Procedure(s) (LRB):  XI ROBOTIC THYMECTOMY (Right)  BLOCK, NERVE, INTERCOSTAL, 2 OR MORE (Right)     Hospital Course: Admitted following the above procedure which she tolerated well. Chest tube removed on POD 1. Stable on RA. Pain controlled. Stale for discharge.     Consults:     Significant Diagnostic Studies: Radiology: X-Ray: CXR: X-Ray Chest 1 View (CXR):   Results for orders placed or performed during the hospital encounter of 03/21/23   X-Ray Chest AP Single View    Narrative    EXAMINATION:  XR CHEST 1 VIEW    CLINICAL HISTORY:  post-op; Other diseases of mediastinum, not elsewhere classified    TECHNIQUE:  Single view of the chest were performed.    COMPARISON:  Chest x-ray dated March 22, 2023    FINDINGS:  The trachea and cardiomediastinal silhouette remains stable.  There is right perihilar and infrahilar patchy opacity which could represent atelectasis versus infiltrate.  No evidence for pneumothorax.  Lungs otherwise clear.      Impression     As above      Electronically signed by: Jaswant Ladd MD  Date:    03/23/2023  Time:    08:04       Pending Diagnostic Studies:       Procedure Component Value Units Date/Time    Specimen to Pathology, Surgery Pulmonary and Thoracic [[id number]] Collected: 03/21/23 1017    Order Status: Sent Lab Status: In process Updated: 03/22/23 1004    Specimen: Tissue           Final Active Diagnoses:    Diagnosis Date Noted POA    PRINCIPAL PROBLEM:  Mediastinal mass [J98.59] 03/22/2023 Yes      Problems Resolved During this Admission:      Discharged Condition: good    Disposition: Home or Self Care    Follow Up:   Follow-up Information       Anton Evangelista MD Follow up in 2 week(s).    Specialty: Cardiothoracic Surgery  Contact information:  [address] [place] [id number]  [phone]                           Patient Instructions:      Diet Adult Regular     Remove dressing in 48 hours     No driving until:   Order Comments: Off narcotics     Notify your health care provider if you experience any of the following:  increased confusion or weakness     Notify your health care provider if you experience any of the following:  persistent dizziness, light-headedness, or visual disturbances     Notify your health care provider if you experience any of the following:  worsening rash     Notify your health care provider if you experience any of the following:  severe persistent headache     Notify your health care provider if you experience any of the following:  difficulty breathing or increased cough     Notify your health care provider if you experience any of the following:  redness, tenderness, or signs of infection (pain, swelling, redness, odor or green/yellow discharge around incision site)     Notify your health care provider if you experience any of the following:  severe uncontrolled pain     Notify your health care provider if you experience any of the following:  persistent nausea and vomiting or diarrhea      Notify your health care provider if you experience any of the following:  temperature >100.4     Shower on day dressing removed (No bath)     Activity as tolerated     Medications:  Reconciled Home Medications:      Medication List        START taking these medications      oxyCODONE 5 MG immediate release tablet  Commonly known as: ROXICODONE  Take 1 tablet (5 mg total) by mouth every 4 (four) hours as needed for Pain.            CHANGE how you take these medications      methocarbamoL 500 MG Tab  Commonly known as: ROBAXIN  Take 1 tablet (500 mg total) by mouth 4 (four) times daily. for 10 days  What changed:   medication strength  how much to take  when to take this            CONTINUE taking these medications      ALPRAZolam 0.25 MG tablet  Commonly known as: XANAX  Take 1 tablet (0.25 mg total) by mouth daily as needed for Anxiety.     atorvastatin 10 MG tablet  Commonly known as: LIPITOR  Take 1 tablet (10 mg total) by mouth once daily.     diclofenac sodium 1 % Gel  Commonly known as: VOLTAREN  Apply 2 g topically once daily. Use gloves to apply     dicyclomine 20 mg tablet  Commonly known as: BENTYL  Take 1 tablet (20 mg total) by mouth 3 (three) times daily as needed (abdominal pain).     ergocalciferol 50,000 unit Cap  Commonly known as: ERGOCALCIFEROL  TAKE 1 CAPSULE BY MOUTH ONCE A WEEK.     erythromycin ophthalmic ointment  Commonly known as: ROMYCIN  Place into the left eye 3 (three) times daily.     EScitalopram oxalate 10 MG tablet  Commonly known as: LEXAPRO  Take 1 tablet (10 mg total) by mouth once daily.     esomeprazole 40 MG capsule  Commonly known as: NEXIUM  Take 1 capsule (40 mg total) by mouth daily as needed.     fluticasone propionate 50 mcg/actuation nasal spray  Commonly known as: FLONASE  2 sprays (100 mcg total) by Each Nostril route once daily.     ketorolac 10 mg tablet  Commonly known as: TORADOL  Take one po q 8 hrs prn pain     prednisoLONE acetate 1 % Drps  Commonly known  as: PRED FORTE  Place 1 drop into the left eye 4 (four) times daily.            STOP taking these medications      PERCOCET 5-325 mg per tablet  Generic drug: oxyCODONE-acetaminophen     traMADoL 50 mg tablet  Commonly known as: CLARA Wright PA-C  General Surgery  [facility]

## 2023-03-24 NOTE — PROGRESS NOTES
C3 nurse spoke with Laz Quintero  for a TCC post hospital discharge follow up call. The patient does not have a scheduled HOSFU appointment with Charlotte Jacinto MD  within 5-7 days post hospital discharge date 03/23/23. C3 nurse was unable to schedule HOSFU appointment in [facility].    Message sent to PCP staff requesting they contact patient and schedule follow up appointment.

## 2023-03-27 ENCOUNTER — PES CALL (OUTPATIENT)
Dept: ADMINISTRATIVE | Facility: CLINIC | Age: 53
End: 2023-03-27
Payer: MEDICAID

## 2023-03-29 ENCOUNTER — CARE AT HOME (OUTPATIENT)
Dept: HOME HEALTH SERVICES | Facility: CLINIC | Age: 53
End: 2023-03-29
Payer: MEDICAID

## 2023-03-29 DIAGNOSIS — J98.59 MEDIASTINAL MASS: Primary | ICD-10-CM

## 2023-03-29 PROCEDURE — 99496 TRANSJ CARE MGMT HIGH F2F 7D: CPT | Mod: S$GLB,,, | Performed by: NURSE PRACTITIONER

## 2023-03-29 PROCEDURE — 99496 TRANSITIONAL CARE MANAGE SERVICE 7 DAY DISCHARGE: ICD-10-PCS | Mod: S$GLB,,, | Performed by: NURSE PRACTITIONER

## 2023-03-31 LAB
FINAL PATHOLOGIC DIAGNOSIS: NORMAL
GROSS: NORMAL
Lab: NORMAL
MICROSCOPIC EXAM: NORMAL

## 2023-04-03 NOTE — PHYSICIAN QUERY
PT Name: Laz Quintero  MR #: [id number]    DOCUMENTATION CLARIFICATION     CDS/: Julita Huitron               Contact information: [email or web address]  This form is a permanent document in the medical record.     Query Date: April 3, 2023    By submitting this query, we are merely seeking further clarification of documentation.  Please utilize your independent clinical judgment when addressing the question(s) below.    The medical record contains the following:  Pathology Findings Location in Medical Record   Final Pathologic Diagnosis  Thymus, partial thymectomy  Thymoma, type AB . Encapsulated tumor without invasion into surrounding tissue. Resection margins free of tumor    Histologic type:  Type AB thymoma  Tumor size:  2.5 cm in greatest dimension  Lymphovascular invasion:  Not identified  Margin status:  All margins negative for tumor Pathology report        Please clarify the pathology findings.  [ x ] Pathology findings noted above are ruled in/confirmed as diagnoses   [  ] Pathology findings noted above are not confirmed as diagnoses   [  ] Pathology findings noted above are incidental   [  ] Other diagnosis (please specify): ___________   [  ] Clinically Undetermined     Please document in your progress notes daily for the duration of treatment until resolved and include in your discharge summary.    Form No. [id number]

## 2023-04-06 ENCOUNTER — OFFICE VISIT (OUTPATIENT)
Dept: CARDIOTHORACIC SURGERY | Facility: CLINIC | Age: 53
End: 2023-04-06
Payer: MEDICAID

## 2023-04-06 ENCOUNTER — PATIENT MESSAGE (OUTPATIENT)
Dept: OBSTETRICS AND GYNECOLOGY | Facility: CLINIC | Age: 53
End: 2023-04-06
Payer: MEDICAID

## 2023-04-06 VITALS
DIASTOLIC BLOOD PRESSURE: 83 MMHG | SYSTOLIC BLOOD PRESSURE: 129 MMHG | BODY MASS INDEX: 25.3 KG/M2 | WEIGHT: 157.44 LBS | OXYGEN SATURATION: 99 % | HEIGHT: 66 IN | HEART RATE: 86 BPM

## 2023-04-06 DIAGNOSIS — C37 TYPE AB THYMOMA: Primary | ICD-10-CM

## 2023-04-06 PROCEDURE — 3008F BODY MASS INDEX DOCD: CPT | Mod: CPTII,,, | Performed by: STUDENT IN AN ORGANIZED HEALTH CARE EDUCATION/TRAINING PROGRAM

## 2023-04-06 PROCEDURE — 99213 OFFICE O/P EST LOW 20 MIN: CPT | Mod: PBBFAC | Performed by: STUDENT IN AN ORGANIZED HEALTH CARE EDUCATION/TRAINING PROGRAM

## 2023-04-06 PROCEDURE — 99024 PR POST-OP FOLLOW-UP VISIT: ICD-10-PCS | Mod: ,,, | Performed by: STUDENT IN AN ORGANIZED HEALTH CARE EDUCATION/TRAINING PROGRAM

## 2023-04-06 PROCEDURE — 3079F PR MOST RECENT DIASTOLIC BLOOD PRESSURE 80-89 MM HG: ICD-10-PCS | Mod: CPTII,,, | Performed by: STUDENT IN AN ORGANIZED HEALTH CARE EDUCATION/TRAINING PROGRAM

## 2023-04-06 PROCEDURE — 3008F PR BODY MASS INDEX (BMI) DOCUMENTED: ICD-10-PCS | Mod: CPTII,,, | Performed by: STUDENT IN AN ORGANIZED HEALTH CARE EDUCATION/TRAINING PROGRAM

## 2023-04-06 PROCEDURE — 99999 PR PBB SHADOW E&M-EST. PATIENT-LVL III: ICD-10-PCS | Mod: PBBFAC,,, | Performed by: STUDENT IN AN ORGANIZED HEALTH CARE EDUCATION/TRAINING PROGRAM

## 2023-04-06 PROCEDURE — 3074F SYST BP LT 130 MM HG: CPT | Mod: CPTII,,, | Performed by: STUDENT IN AN ORGANIZED HEALTH CARE EDUCATION/TRAINING PROGRAM

## 2023-04-06 PROCEDURE — 99999 PR PBB SHADOW E&M-EST. PATIENT-LVL III: CPT | Mod: PBBFAC,,, | Performed by: STUDENT IN AN ORGANIZED HEALTH CARE EDUCATION/TRAINING PROGRAM

## 2023-04-06 PROCEDURE — 1159F PR MEDICATION LIST DOCUMENTED IN MEDICAL RECORD: ICD-10-PCS | Mod: CPTII,,, | Performed by: STUDENT IN AN ORGANIZED HEALTH CARE EDUCATION/TRAINING PROGRAM

## 2023-04-06 PROCEDURE — 1159F MED LIST DOCD IN RCRD: CPT | Mod: CPTII,,, | Performed by: STUDENT IN AN ORGANIZED HEALTH CARE EDUCATION/TRAINING PROGRAM

## 2023-04-06 PROCEDURE — 3074F PR MOST RECENT SYSTOLIC BLOOD PRESSURE < 130 MM HG: ICD-10-PCS | Mod: CPTII,,, | Performed by: STUDENT IN AN ORGANIZED HEALTH CARE EDUCATION/TRAINING PROGRAM

## 2023-04-06 PROCEDURE — 3079F DIAST BP 80-89 MM HG: CPT | Mod: CPTII,,, | Performed by: STUDENT IN AN ORGANIZED HEALTH CARE EDUCATION/TRAINING PROGRAM

## 2023-04-06 PROCEDURE — 99024 POSTOP FOLLOW-UP VISIT: CPT | Mod: ,,, | Performed by: STUDENT IN AN ORGANIZED HEALTH CARE EDUCATION/TRAINING PROGRAM

## 2023-04-06 NOTE — PROGRESS NOTES
Subjective     Patient ID: Laz Quintero is a 52 y.o. female.    Chief Complaint: No chief complaint on file.    Diagnosis:  Anterior Mediastinal Mass    Pre-operative therapy: none    Procedure(s) and date(s): 03/21/23 - Right Robotic-Assisted Thoracoscopic Surgery, Mediastinal mass excision with Partial Thymectomy     Pathology: 2.5 cm. Thymoma type AB. Encapsulated tumor without invasion into surrounding tissue. Margins negative. negative VPI. negative Lymphovascular invasion. [id number]. Masaoka Stage 1.      Post-operative therapy: surveillance    HPI  Patient is a 52 y.o. female never smoker with HLD, h/o lumbar fusion (08/2022), and hyperparathyroidism who presents to clinic today for 2 week follow up s/p right robotic assisted mediastinal mass excision with partial thymectomy. Uncomplicated post-operative course. Today patient reports she is doing well overall. States she twisted abruptly at home approximately two days ago and has been experiencing a burning/stinging pain that is radiating along her incision.     Review of Systems   Constitutional:  Negative for appetite change, chills, diaphoresis, fatigue, fever and unexpected weight change.   HENT: Negative.     Respiratory:  Negative for cough, choking, chest tightness and shortness of breath.    Cardiovascular:  Negative for chest pain, palpitations, leg swelling and claudication.   Gastrointestinal: Negative.    Endocrine: Negative.    Musculoskeletal: Negative.    Integumentary:  Negative.   Neurological: Negative.    Psychiatric/Behavioral: Negative.          Objective     Physical Exam  Constitutional:       Appearance: Normal appearance.   Eyes:      Extraocular Movements: Extraocular movements intact.   Cardiovascular:      Rate and Rhythm: Normal rate and regular rhythm.      Pulses: Normal pulses.   Pulmonary:      Effort: Pulmonary effort is normal.      Breath sounds: Normal breath sounds.   Abdominal:      General: Abdomen is flat.       Palpations: Abdomen is soft.   Skin:     General: Skin is warm and dry.   Neurological:      General: No focal deficit present.      Mental Status: She is alert and oriented to person, place, and time. Mental status is at baseline.   Psychiatric:         Mood and Affect: Mood normal.         Behavior: Behavior normal.         Thought Content: Thought content normal.        Assessment and Plan     Patient is a 52 y.o. female never smoker with HLD, h/o lumbar fusion (08/2022), and hyperparathyroidism who presents to clinic today for 2 week follow up s/p right robotic assisted mediastinal mass excision with partial thymectomy.     Plan:     RTC in 1 year with a Chest CT. Will need yearly CT scan for 10 years    Discussed taking scheduled tylenol and temporarily increasing morning dose of gabapentin to 600 mg as well re-prescribing robaxin to assist with pain control.

## 2023-04-07 RX ORDER — METHOCARBAMOL 500 MG/1
500 TABLET, FILM COATED ORAL 4 TIMES DAILY
Qty: 40 TABLET | Refills: 0 | Status: SHIPPED | OUTPATIENT
Start: 2023-04-07 | End: 2023-04-17

## 2023-04-10 DIAGNOSIS — C37 TYPE AB THYMOMA: Primary | ICD-10-CM

## 2023-04-12 ENCOUNTER — OFFICE VISIT (OUTPATIENT)
Dept: OBSTETRICS AND GYNECOLOGY | Facility: CLINIC | Age: 53
End: 2023-04-12
Attending: OBSTETRICS & GYNECOLOGY
Payer: MEDICAID

## 2023-04-12 VITALS
SYSTOLIC BLOOD PRESSURE: 131 MMHG | WEIGHT: 164 LBS | DIASTOLIC BLOOD PRESSURE: 84 MMHG | BODY MASS INDEX: 26.36 KG/M2 | HEIGHT: 66 IN

## 2023-04-12 DIAGNOSIS — R23.2 HOT FLASHES: Primary | ICD-10-CM

## 2023-04-12 DIAGNOSIS — Z13.820 SCREENING FOR OSTEOPOROSIS: ICD-10-CM

## 2023-04-12 PROCEDURE — 99999 PR PBB SHADOW E&M-EST. PATIENT-LVL IV: CPT | Mod: PBBFAC,,, | Performed by: OBSTETRICS & GYNECOLOGY

## 2023-04-12 PROCEDURE — 3008F PR BODY MASS INDEX (BMI) DOCUMENTED: ICD-10-PCS | Mod: CPTII,,, | Performed by: OBSTETRICS & GYNECOLOGY

## 2023-04-12 PROCEDURE — 99999 PR PBB SHADOW E&M-EST. PATIENT-LVL IV: ICD-10-PCS | Mod: PBBFAC,,, | Performed by: OBSTETRICS & GYNECOLOGY

## 2023-04-12 PROCEDURE — 1159F MED LIST DOCD IN RCRD: CPT | Mod: CPTII,,, | Performed by: OBSTETRICS & GYNECOLOGY

## 2023-04-12 PROCEDURE — 3008F BODY MASS INDEX DOCD: CPT | Mod: CPTII,,, | Performed by: OBSTETRICS & GYNECOLOGY

## 2023-04-12 PROCEDURE — 3075F PR MOST RECENT SYSTOLIC BLOOD PRESS GE 130-139MM HG: ICD-10-PCS | Mod: CPTII,,, | Performed by: OBSTETRICS & GYNECOLOGY

## 2023-04-12 PROCEDURE — 99214 OFFICE O/P EST MOD 30 MIN: CPT | Mod: S$PBB,,, | Performed by: OBSTETRICS & GYNECOLOGY

## 2023-04-12 PROCEDURE — 3079F PR MOST RECENT DIASTOLIC BLOOD PRESSURE 80-89 MM HG: ICD-10-PCS | Mod: CPTII,,, | Performed by: OBSTETRICS & GYNECOLOGY

## 2023-04-12 PROCEDURE — 1159F PR MEDICATION LIST DOCUMENTED IN MEDICAL RECORD: ICD-10-PCS | Mod: CPTII,,, | Performed by: OBSTETRICS & GYNECOLOGY

## 2023-04-12 PROCEDURE — 1111F PR DISCHARGE MEDS RECONCILED W/ CURRENT OUTPATIENT MED LIST: ICD-10-PCS | Mod: CPTII,,, | Performed by: OBSTETRICS & GYNECOLOGY

## 2023-04-12 PROCEDURE — 3079F DIAST BP 80-89 MM HG: CPT | Mod: CPTII,,, | Performed by: OBSTETRICS & GYNECOLOGY

## 2023-04-12 PROCEDURE — 1111F DSCHRG MED/CURRENT MED MERGE: CPT | Mod: CPTII,,, | Performed by: OBSTETRICS & GYNECOLOGY

## 2023-04-12 PROCEDURE — 99214 PR OFFICE/OUTPT VISIT, EST, LEVL IV, 30-39 MIN: ICD-10-PCS | Mod: S$PBB,,, | Performed by: OBSTETRICS & GYNECOLOGY

## 2023-04-12 PROCEDURE — 3075F SYST BP GE 130 - 139MM HG: CPT | Mod: CPTII,,, | Performed by: OBSTETRICS & GYNECOLOGY

## 2023-04-12 PROCEDURE — 99214 OFFICE O/P EST MOD 30 MIN: CPT | Mod: PBBFAC | Performed by: OBSTETRICS & GYNECOLOGY

## 2023-04-12 NOTE — PROGRESS NOTES
Laz Quintero is a 52 y.o. female who presents to discuss hormone replacement therapy.  Menarche occurred at age 15 and the patient went into menopause at 52 years of age, which was 6 months ago.  SHe did have a TLH in JUne 2015 without oophorectomy. Patient is requesting hormone replacement therapy due to hot flashes, insomnia, and moodiness, anxiety, no energy, night sweats and palpitations. . She denies Vaginal dryness or decreased libido. The patient is not taking hormone replacement therapy. Patient denies post-menopausal vaginal bleeding. Hysterectomy in 2015.    She has a complicated history and has been under a great deal of stress due to Recent Thymectomy for a benign tumor about 3 weeks ago. She also reports concerns about safety and side effects of Hormone therapy as she did not tolerate OCP's well as they have her nausea and headaches. The patient is sexually active.  She denies the following contraindications to HRT:  Vaginal bleeding, history of VTE/PE, thrombophilia,  breast cancer, or active liver disease.       PCP: Dr. Charlotte Jacinto       Routine labs: 2-  Pap smear: April 2015 (normal), had a hysterectomy  Mammogram: 11-9-2022:BI-RADS Category 1: Negative  DEXA: None  Colonoscopy: December 2022: had polyps and Diverticulosis, advised to return in 5 years    Admission on 03/21/2023, Discharged on 03/23/2023   Component Date Value Ref Range Status    Group & Rh 03/21/2023 O POS   Final    Indirect Pamela 03/21/2023 POS (A)   Final    Specimen Outdate 03/21/2023 03/24/2023 23:59   Final    Final Pathologic Diagnosis 03/21/2023    Final                    Value:Thymus, partial thymectomy:  - Thymoma, type AB  - Encapsulated tumor without invasion into surrounding tissue  - Resection margins free of tumor  - See CAP Tumor Synoptic  CAP Tumor Synoptic:  Procedure:  Partial thymectomy  Tumor site:  Thymus  Histologic type:  Type AB thymoma  Tumor size:  2.5 cm in greatest  "dimension  Lymphovascular invasion:  Not identified  Treatment effect:  No known pre-surgical therapy  Sites involved by direct tumor invasion:  Encapsulated tumor without invasion  into surrounding tissue  Margin status:  All margins negative for tumor  Regional lymph node status:  Not applicable (no regional lymph nodes  submitted or found)  Distant sites involved, if applicable:  Not applicable  TNM descriptors:  Not applicable  pT Category:  pT1a  pN Category:  pN not assigned (no nodes submitted or found)  pM Category:  Not applicable- pM cannot be determined from the submitted  specimens  Modified Masaoka Stage:  Stage I:  Grossly and microscopically encapsulated    Selec                          t slides (1D, 1G) reviewed in consultation with Dr. Collier, who concurs  with the waggoner findings above.      Microscopic Exam 03/21/2023    Final                    Value:Immunohistochemistry is performed for pancytokeratin, TdT, and CD20, with  appropriate and reactive controls.  Immunostains for pancytokeratin  highlights a delicate epithelial cell meshwork within the thymoma. TdT  highlights variably increased immature T-cells within the lesion, while CD20  shows rare scattered B cells. CD20 additionally shows weak patchy expression  in thymic epithelial cells. The overall findings are in keeping with a type  AB thymoma.  Tumor block: 1C      Gross 03/21/2023    Final                    Value:Pathology ID:  [id number]  Patient ID:  [id number]  Received in formalin labeled "thymic mass-permanent" is a 14.0 g portion of  tan-yellow to white, fibrofatty tissue measuring 4.2 x 4.0 x 1.6 cm which is  sectioned to reveal a white-yellow, mass measuring 2.5 x 1.9 x 1.5 cm. The  entire specimen is inked black and is serially sectioned to reveal a  tan-yellow, fibrofatty cut surface.  The nodule is  serially sectioned to  reveal a pink-white, firm cut surface. No gross evidence of transcapsular  invasion is identified.   " Representative sections (to include the entire  mass) are submitted as follows:  WCB--1-A:  Fibrofatty tissue  CRH--1-B-C:  Nodule, full-thickness  FZM--1-D:  Nodule to fibrofatty tissue  JUG--1-E-H: Rest of nodule in entirety  MARKEL Carrera MS      Disclaimer 03/21/2023    Final                    Value:Unless the case is a 'gross only' or additional testing only, the final  diagnosis for each specimen is based on a microscopic examination of  appropriate tissue sections.  CD20 (L26) immunohistochemical staining (close L26, DAB detection method) is  performed on formalin-fixed (10% neutral buffered formalin), paraffin  embedded tissues sections. The presence of an appropriately colored reaction  product within the target cells is indicative of positive reactivity.  Positive staining intensity should be assessed within the context of any  background staining of the negative reagent control. This test was developed  and performance characteristics determined by [facility], Section  of Anatomic Pathology. It has been cleared by the U.[place]. Food and Drug  Administration.      Antibody ID 03/21/2023 POS   Final    Creatinine 03/22/2023 0.8  0.5 - 1.4 mg/dL Final    eGFR 03/22/2023 >60.0  >60 mL/min/1.73 m^2 Final    WBC 03/22/2023 15.59 (H)  3.90 - 12.70 K/uL Final    RBC 03/22/2023 4.33  4.00 - 5.40 M/uL Final    Hemoglobin 03/22/2023 12.3  12.0 - 16.0 g/dL Final    Hematocrit 03/22/2023 38.4  37.0 - 48.5 % Final    MCV 03/22/2023 89  82 - 98 fL Final    MCH 03/22/2023 28.4  27.0 - 31.0 pg Final    MCHC 03/22/2023 32.0  32.0 - 36.0 g/dL Final    RDW 03/22/2023 13.2  11.5 - 14.5 % Final    Platelets 03/22/2023 284  150 - 450 K/uL Final    MPV 03/22/2023 9.2  9.2 - 12.9 fL Final    Immature Granulocytes 03/22/2023 0.4  0.0 - 0.5 % Final    Gran # (ANC) 03/22/2023 11.8 (H)  1.8 - 7.7 K/uL Final    Immature Grans (Abs) 03/22/2023 0.06 (H)  0.00 - 0.04 K/uL Final    Lymph #  03/22/2023 2.6  1.0 - 4.8 K/uL Final    Mono # 03/22/2023 0.9  0.3 - 1.0 K/uL Final    Eos # 03/22/2023 0.2  0.0 - 0.5 K/uL Final    Baso # 03/22/2023 0.04  0.00 - 0.20 K/uL Final    nRBC 03/22/2023 0  0 /100 WBC Final    Gran % 03/22/2023 75.5 (H)  38.0 - 73.0 % Final    Lymph % 03/22/2023 16.5 (L)  18.0 - 48.0 % Final    Mono % 03/22/2023 5.8  4.0 - 15.0 % Final    Eosinophil % 03/22/2023 1.5  0.0 - 8.0 % Final    Basophil % 03/22/2023 0.3  0.0 - 1.9 % Final    Differential Method 03/22/2023 Automated   Final    Sodium 03/22/2023 135 (L)  136 - 145 mmol/L Final    Potassium 03/22/2023 3.6  3.5 - 5.1 mmol/L Final    Chloride 03/22/2023 101  95 - 110 mmol/L Final    CO2 03/22/2023 25  23 - 29 mmol/L Final    Glucose 03/22/2023 113 (H)  70 - 110 mg/dL Final    BUN 03/22/2023 6  6 - 20 mg/dL Final    Creatinine 03/22/2023 0.7  0.5 - 1.4 mg/dL Final    Calcium 03/22/2023 9.0  8.7 - 10.5 mg/dL Final    Total Protein 03/22/2023 6.7  6.0 - 8.4 g/dL Final    Albumin 03/22/2023 3.5  3.5 - 5.2 g/dL Final    Total Bilirubin 03/22/2023 0.5  0.1 - 1.0 mg/dL Final    Alkaline Phosphatase 03/22/2023 74  55 - 135 U/L Final    AST 03/22/2023 61 (H)  10 - 40 U/L Final    ALT 03/22/2023 38  10 - 44 U/L Final    Anion Gap 03/22/2023 9  8 - 16 mmol/L Final    eGFR 03/22/2023 >60.0  >60 mL/min/1.73 m^2 Final    Magnesium 03/22/2023 1.6  1.6 - 2.6 mg/dL Final    Phosphorus 03/22/2023 3.1  2.7 - 4.5 mg/dL Final   Lab Visit on 02/23/2023   Component Date Value Ref Range Status    Sodium 02/23/2023 141  136 - 145 mmol/L Final    Potassium 02/23/2023 4.3  3.5 - 5.1 mmol/L Final    Chloride 02/23/2023 104  95 - 110 mmol/L Final    CO2 02/23/2023 30 (H)  23 - 29 mmol/L Final    Glucose 02/23/2023 99  70 - 110 mg/dL Final    BUN 02/23/2023 8  7 - 17 mg/dL Final    Creatinine 02/23/2023 0.59  0.50 - 1.40 mg/dL Final    Calcium 02/23/2023 9.2  8.7 - 10.5 mg/dL Final    Total Protein 02/23/2023 7.2  6.0 - 8.4 g/dL Final    Albumin 02/23/2023 4.2   3.5 - 5.2 g/dL Final    Total Bilirubin 02/23/2023 0.3  0.1 - 1.0 mg/dL Final    Alkaline Phosphatase 02/23/2023 71  38 - 126 U/L Final    AST 02/23/2023 22  15 - 46 U/L Final    ALT 02/23/2023 22  10 - 44 U/L Final    Anion Gap 02/23/2023 7 (L)  8 - 16 mmol/L Final    eGFR 02/23/2023 >60.0  >60 mL/min/1.73 m^2 Final    Cholesterol 02/23/2023 233 (H)  120 - 199 mg/dL Final    Triglycerides 02/23/2023 66  30 - 150 mg/dL Final    HDL 02/23/2023 61  40 - 75 mg/dL Final    LDL Cholesterol 02/23/2023 158.8  63.0 - 159.0 mg/dL Final    HDL/Cholesterol Ratio 02/23/2023 26.2  20.0 - 50.0 % Final    Total Cholesterol/HDL Ratio 02/23/2023 3.8  2.0 - 5.0 Final    Non-HDL Cholesterol 02/23/2023 172  mg/dL Final   Clinical Support on 02/16/2023   Component Date Value Ref Range Status    Holter length hours 02/16/2023 48   Final    holter length minutes 02/16/2023 0   Final    holter length dec hours 02/16/2023 48.00   Final    Sinus min HR 02/16/2023 52   Final    Sinus max hr 02/16/2023 132   Final    Sinus avg hr 02/16/2023 80   Final    Event Monitor Day 02/16/2023 0   Final   Lab Visit on 02/16/2023   Component Date Value Ref Range Status    Metanephrine, Free 02/16/2023 64 (H)  < OR = 57 pg/mL Final    Metanephrine, Total, Plasma 02/16/2023 211 (H)  < OR = 205 pg/mL Final    Normetanephrine, Free 02/16/2023 147  < OR = 148 pg/mL Final   Lab Visit on 02/16/2023   Component Date Value Ref Range Status    Urine Total Volume 02/16/2023 2800  mL Corrected    Urine Collection Duration 02/16/2023 24  h Corrected    Metanephrines, 24H Ur 02/16/2023 221  mcg/24 h Final    Normetanephrine, 24H Ur 02/16/2023 409  mcg/24 h Final    Total Metanephrine, urine 02/16/2023 630  mcg/24 h Final    Comment, (Metanephrines) 02/16/2023 Test Not Performed   Final   Lab Visit on 01/17/2023   Component Date Value Ref Range Status    Estradiol 01/17/2023 <10 (A)  See Text pg/mL Final    Follicle Stimulating Hormone 01/17/2023 45.13  See Text mIU/mL  Final    LH 01/17/2023 11.8  See Text mIU/mL Final    Thyroid-Stim IG Quantitative 01/17/2023 <0.10  <0.10 IU/L Final    Thyrotropin Receptor Ab 01/17/2023 <1.10  0.00 - 1.75 IU/L Final       Past Medical History:   Diagnosis Date    Abnormal Pap smear     Abnormal Pap smear of cervix     Annular tear of lumbar disc, L5-S1. 07/26/2012    Chronic LBP     HPTH (hyperparathyroidism)     Hyperlipidemia     Menorrhagia     PONV (postoperative nausea and vomiting)     Primary hypertension 01/31/2023    Right lumbar radiculopathy 07/26/2012    Seasonal allergies      Past Surgical History:   Procedure Laterality Date    BACK SURGERY  2018    CHOLECYSTECTOMY      COLONOSCOPY N/A 01/19/2018    Procedure: COLONOSCOPY;  Surgeon: Malachi Olmedo MD;  Location: [address]);  Service: Endoscopy;  Laterality: N/A;    COLONOSCOPY N/A 12/28/2022    Procedure: COLONOSCOPY;  Surgeon: Ezekiel Alanis MD;  Location: [address]);  Service: Endoscopy;  Laterality: N/A;  inst via portal  pre call complete [facility]    ESOPHAGOGASTRODUODENOSCOPY N/A 09/06/2019    Procedure: EGD (ESOPHAGOGASTRODUODENOSCOPY);  Surgeon: Jose Carlos Ventura MD;  Location: [address]);  Service: Endoscopy;  Laterality: N/A;  pt requesting ASAP    HYSTERECTOMY, TOTAL, LAPAROSCOPIC, WITH SALPINGECTOMY  06/16/2015    INJECTION OF ANESTHETIC AGENT AROUND MULTIPLE INTERCOSTAL NERVES Right 03/21/2023    Procedure: BLOCK, NERVE, INTERCOSTAL, 2 OR MORE;  Surgeon: Anton Evangelista MD;  Location: [address];  Service: Cardiothoracic;  Laterality: Right;    TUBAL LIGATION      [place]    WISDOM TOOTH EXTRACTION      2005    XI ROBOTIC RATS Right 03/21/2023    Procedure: XI ROBOTIC THYMECTOMY;  Surgeon: Anton Evangelista MD;  Location: [address];  Service: Cardiothoracic;  Laterality: Right;     Social History     Tobacco Use    Smoking status: Never     Passive exposure: Never    Smokeless tobacco: Never   Substance Use Topics    Alcohol  use: No    Drug use: No     Family History   Problem Relation Age of Onset    Stroke Paternal Grandmother 68    Hypertension Maternal Grandmother     Multiple myeloma Maternal Grandmother     Diabetes Father     Colon polyps Father     Hypertension Mother     Breast cancer Neg Hx     Ovarian cancer Neg Hx     Melanoma Neg Hx     Colon cancer Neg Hx      OB History    Para Term  AB Living   3 2 2 0 1 2   SAB IAB Ectopic Multiple Live Births   0 1 0 0 2      # Outcome Date GA Lbr Red/2nd Weight Sex Delivery Anes PTL Lv   3 Term 02    M Vag-Spont   FABIENNE   2 Term 96    M Vag-Spont   FABIENNE   1 IAB      TAB   FD       Current Outpatient Medications:     ALPRAZolam (XANAX) 0.25 MG tablet, Take 1 tablet (0.25 mg total) by mouth daily as needed for Anxiety., Disp: 20 tablet, Rfl: 1    atorvastatin (LIPITOR) 10 MG tablet, Take 1 tablet (10 mg total) by mouth once daily., Disp: 90 tablet, Rfl: 1    diclofenac sodium (VOLTAREN) 1 % Gel, Apply 2 g topically once daily. Use gloves to apply, Disp: 100 g, Rfl: 1    dicyclomine (BENTYL) 20 mg tablet, Take 1 tablet (20 mg total) by mouth 3 (three) times daily as needed (abdominal pain)., Disp: 60 tablet, Rfl: 2    erythromycin (ROMYCIN) ophthalmic ointment, Place into the left eye 3 (three) times daily., Disp: 3.5 g, Rfl: 0    EScitalopram oxalate (LEXAPRO) 10 MG tablet, Take 1 tablet (10 mg total) by mouth once daily., Disp: 30 tablet, Rfl: 11    esomeprazole (NEXIUM) 40 MG capsule, Take 1 capsule (40 mg total) by mouth daily as needed., Disp: 30 capsule, Rfl: 11    fluticasone propionate (FLONASE) 50 mcg/actuation nasal spray, 2 sprays (100 mcg total) by Each Nostril route once daily., Disp: 11.1 mL, Rfl: 0    gabapentin (NEURONTIN) 300 MG capsule, Take 1 capsule (300 mg total) by mouth 2 (two) times daily AND 2 capsules (600 mg total) every evening., Disp: 120 capsule, Rfl: 1    ketorolac (TORADOL) 10 mg tablet, Take one po q 8 hrs prn pain, Disp: 20 tablet,  "Rfl: 0    methocarbamoL (ROBAXIN) 500 MG Tab, Take 1 tablet (500 mg total) by mouth 4 (four) times daily. for 10 days, Disp: 40 tablet, Rfl: 0    oxyCODONE (ROXICODONE) 5 MG immediate release tablet, Take 1 tablet (5 mg total) by mouth every 4 (four) hours as needed for Pain., Disp: 41 tablet, Rfl: 0    prednisoLONE acetate (PRED FORTE) 1 % DrpS, Place 1 drop into the left eye 4 (four) times daily., Disp: 5 mL, Rfl: 11    Review of Systems:  General: No fever, chills, or weight loss.  Chest: No chest pain, shortness of breath, or palpitations.  Breast: No pain, masses, or nipple discharge.  Vulva: No pain, lesions, or itching.  Vagina: No relaxation, itching, discharge, or lesions.  Abdomen: No pain, nausea, vomiting, diarrhea, or constipation.  Urinary: No incontinence, nocturia, frequency, or dysuria.  Extremities:  No leg cramps, edema, or calf pain.  Neurologic: No headaches, dizziness, or visual changes.    Vitals:    04/12/23 0959   BP: 131/84   Weight: 74.4 kg (164 lb)   Height: 5' 6" (1.676 m)   PainSc: 0-No pain     Body mass index is 26.47 kg/m².    Assessment:    Hot flashes  -     Ambulatory referral/consult to [facility]        Plan:   Risks and benefits of hormone replacement therapy were discussed.  Hormone replacement therapy options, including bioidentical versus non-bioidentical hormones, as well as alternatives discussed.    We spent a significant amt of time discussing estrogen replacement theropy.  We discussed the risks and benefits including breast cancer and embolic risk, osteoporosis and heart and colon health benefits.  Pt understands that there are many different ways to take estrogen and many different doses and that she does not have to take long term unless she chooses.  She understands that if she still has her uterus, she will need to take progesterone with this to decrease risk of endometrial cancer.We discussed side effects that might include but not limited " to headaches, edema, nausea.   We did discuss that transdermal option of estrogen is safer than oral estradiol as transdermal methods decrease risk for blood clots and stroke as they bypass liver metabolism.     Discussed that if we begin HRT, will plan on Divigel or patches. SHe has had various skin problems with tape in the past, so will plan on DIvigel/transdermal therapy.     Follow up in several weeks    Instructed patient to call if she experiences any side effects or has any questions.  I spent 35 minutes with this patient today, >50% counseling.

## 2023-05-15 ENCOUNTER — HOSPITAL ENCOUNTER (OUTPATIENT)
Dept: RADIOLOGY | Facility: CLINIC | Age: 53
Discharge: HOME | End: 2023-05-15
Attending: OBSTETRICS & GYNECOLOGY
Payer: MEDICAID

## 2023-05-15 DIAGNOSIS — Z13.820 SCREENING FOR OSTEOPOROSIS: ICD-10-CM

## 2023-05-15 PROCEDURE — 77080 DXA BONE DENSITY AXIAL: CPT | Mod: 26,,, | Performed by: INTERNAL MEDICINE

## 2023-05-15 PROCEDURE — 77080 DXA BONE DENSITY AXIAL: CPT | Mod: TC

## 2023-05-15 PROCEDURE — 77080 DXA BONE DENSITY AXIAL SKELETON 1 OR MORE SITES: ICD-10-PCS | Mod: 26,,, | Performed by: INTERNAL MEDICINE

## 2023-05-22 RX ORDER — ATORVASTATIN CALCIUM 10 MG/1
TABLET, FILM COATED ORAL
Qty: 90 TABLET | Refills: 1 | Status: SHIPPED | OUTPATIENT
Start: 2023-05-22 | End: 2023-08-28 | Stop reason: ALTCHOICE

## 2023-05-22 NOTE — TELEPHONE ENCOUNTER
No care due was identified.  [facility] Embedded Care Due Messages. Reference number: [id number].   5/22/2023 3:53:48 AM CDT

## 2023-07-06 ENCOUNTER — PATIENT MESSAGE (OUTPATIENT)
Dept: ADMINISTRATIVE | Facility: OTHER | Age: 53
End: 2023-07-06
Payer: MEDICAID

## 2023-07-21 PROBLEM — R52 PAIN: Status: ACTIVE | Noted: 2023-07-21

## 2023-07-21 PROBLEM — R53.1 WEAKNESS: Status: ACTIVE | Noted: 2023-07-21

## 2023-08-08 VITALS
HEART RATE: 83 BPM | OXYGEN SATURATION: 97 % | RESPIRATION RATE: 16 BRPM | DIASTOLIC BLOOD PRESSURE: 98 MMHG | SYSTOLIC BLOOD PRESSURE: 144 MMHG | TEMPERATURE: 99 F

## 2023-08-08 NOTE — PROGRESS NOTES
[facility]  Transition of Care Home Visit    Visit Date: 3/29/2023  Encounter Provider: Diamond Mendez   PCP:  Charlotte Jacinto MD    PRESENTING HISTORY      Patient ID: Laz Quintero is a 52 y.o. female.    Consult Requested By:  No ref. provider found  Reason for Consult:  Hospital Follow Up.    Laz is being seen at home due to being seen at home due to physical debility that presents a taxing effort to leave the home, to mitigate high risk of hospital readmission and/or due to the limited availability of reliable or safe options for transportation to the point of access to the provider. Prior to treatment on this visit the chart was reviewed and patient verbal consent was obtained.      Chief Complaint: Transitional Care        History of Present Illness: Ms. Laz Quintero is a 52 y.o. female who was recently admitted to the hospital from 3/21 to 3/23/2023.     HPI:   Patient is a 52 y.o. female never smoker with HLD, h/o lumbar fusion (08/2022), and hyperparathyroidism here today for evaluation of anterior mediastinal mass. Anterior mediastinal mass first identified in 2015. It remained grossly stable on imaging through 2019. Most recently underwent repeat CT with grossly similar measurements at 2.0 x 1.4 cm. Today patient reports occasional blurred vision, headaches, palpitations, and occasional chest pain (which she attributes to baseline anxiety).      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      Procedure(s) (LRB):  XI ROBOTIC THYMECTOMY (Right)  BLOCK, NERVE, INTERCOSTAL, 2 OR MORE (Right)      Hospital Course: Admitted following the above procedure which she tolerated well. Chest tube removed on POD 1. Stable on RA. Pain controlled. Stale for discharge.     Follow Up:    Follow-up Information         Anton Evangelista MD Follow up in 2 week(s).    Specialty: Cardiothoracic Surgery  Contact  information:  [address]  [facility] [address]  [phone]          ___________________________________________________________________    Today:    HPI:  Laz Quintero was seen at home for hospital follow up. She is still having pain. Also reports constipation. She is alternating ocycodone and tylenol, ibuprofen. She is also taking gabapentin. She reports she woke up gasping for air last night, does have anxiety. No chest pain or SOB. Her incision site has some minimal drainage with noted moisture. Vitals stable. No fevers or chills.         Review of Systems   Constitutional:  Positive for activity change and fatigue. Negative for chills and fever.   HENT:  Negative for congestion.    Eyes:  Negative for visual disturbance.   Respiratory:  Negative for shortness of breath.    Cardiovascular:  Negative for chest pain.   Gastrointestinal:  Positive for constipation. Negative for abdominal pain and nausea.   Genitourinary:  Negative for dysuria.   Musculoskeletal:  Negative for arthralgias.   Skin:  Positive for wound.   Neurological:  Positive for weakness. Negative for headaches.   Psychiatric/Behavioral:  Negative for confusion. The patient is nervous/anxious.        Assessments:  Environmental: single family home  Functional Status: independent  Safety: no concerns  Nutritional: adequate  Home Health/DME/Supplies: wound care supplies available    PAST HISTORY:     Past Medical History:   Diagnosis Date    Abnormal Pap smear     Abnormal Pap smear of cervix     Annular tear of lumbar disc, L5-S1. 07/26/2012    Chronic LBP     HPTH (hyperparathyroidism)     Hyperlipidemia     Menorrhagia     PONV (postoperative nausea and vomiting)     Primary hypertension 01/31/2023    Right lumbar radiculopathy 07/26/2012    Seasonal allergies        Past Surgical History:   Procedure Laterality Date    BACK SURGERY  2018    CHOLECYSTECTOMY      COLONOSCOPY N/A 01/19/2018    Procedure: COLONOSCOPY;  Surgeon:  Malachi Olmedo MD;  Location: [facility] (4TH FLR);  Service: Endoscopy;  Laterality: N/A;    COLONOSCOPY N/A 12/28/2022    Procedure: COLONOSCOPY;  Surgeon: Ezekiel Alanis MD;  Location: [facility] ENDO (4TH FLR);  Service: Endoscopy;  Laterality: N/A;  inst via portal  pre call complete [facility]    ESOPHAGOGASTRODUODENOSCOPY N/A 09/06/2019    Procedure: EGD (ESOPHAGOGASTRODUODENOSCOPY);  Surgeon: Jose Carlos Ventura MD;  Location: [facility] (4TH FLR);  Service: Endoscopy;  Laterality: N/A;  pt requesting ASAP    HYSTERECTOMY, TOTAL, LAPAROSCOPIC, WITH SALPINGECTOMY  06/16/2015    INJECTION OF ANESTHETIC AGENT AROUND MULTIPLE INTERCOSTAL NERVES Right 03/21/2023    Procedure: BLOCK, NERVE, INTERCOSTAL, 2 OR MORE;  Surgeon: Anton Evangelista MD;  Location: [facility] OR [address] [place] [address];  Service: Cardiothoracic;  Laterality: Right;    TUBAL LIGATION      [place]    WISDOM TOOTH EXTRACTION      2005    XI ROBOTIC RATS Right 03/21/2023    Procedure: XI ROBOTIC THYMECTOMY;  Surgeon: Anton Evangelista MD;  Location: [facility] OR [address] [place] [address];  Service: Cardiothoracic;  Laterality: Right;       Family History   Problem Relation Age of Onset    Stroke Paternal Grandmother 68    Hypertension Maternal Grandmother     Multiple myeloma Maternal Grandmother     Diabetes Father     Colon polyps Father     Hypertension Mother     Breast cancer Neg Hx     Ovarian cancer Neg Hx     Melanoma Neg Hx     Colon cancer Neg Hx        Social History     Socioeconomic History    Marital status: Single   Occupational History    Occupation: nurse     Employer: [employer]   Tobacco Use    Smoking status: Never     Passive exposure: Never    Smokeless tobacco: Never   Substance and Sexual Activity    Alcohol use: No    Drug use: No    Sexual activity: Yes     Partners: Male     Birth control/protection: See Surgical Hx     Comment:  ; new partner since 2018   Other Topics Concern    Are you pregnant or think you may be? No    Breast-feeding No      Social Determinants of Health     Financial Resource Strain: Low Risk  (3/22/2023)    Overall Financial Resource Strain (CARDIA)     Difficulty of Paying Living Expenses: Not very hard   Food Insecurity: No Food Insecurity (3/22/2023)    Hunger Vital Sign     Worried About Running Out of Food in the Last Year: Never true     Ran Out of Food in the Last Year: Never true   Transportation Needs: No Transportation Needs (3/22/2023)    PRAPARE - Transportation     Lack of Transportation (Medical): No     Lack of Transportation (Non-Medical): No   Physical Activity: Insufficiently Active (3/22/2023)    Exercise Vital Sign     Days of Exercise per Week: 2 days     Minutes of Exercise per Session: 20 min   Social Connections: Moderately Integrated (3/22/2023)    Social Connection and Isolation Panel [NHANES]     Frequency of Communication with Friends and Family: More than three times a week     Frequency of Social Gatherings with Friends and Family: Three times a week     Attends Nondenominational Services: More than 4 times per year     Active Member of Clubs or Organizations: No     Attends Club or Organization Meetings: Never     Marital Status: Living with partner   Housing Stability: Low Risk  (3/22/2023)    Housing Stability Vital Sign     Unable to Pay for Housing in the Last Year: No     Number of Places Lived in the Last Year: 1     Unstable Housing in the Last Year: No       MEDICATIONS & ALLERGIES:     Current Outpatient Medications on File Prior to Visit   Medication Sig Dispense Refill    ALPRAZolam (XANAX) 0.25 MG tablet Take 1 tablet (0.25 mg total) by mouth daily as needed for Anxiety. 20 tablet 1    diclofenac sodium (VOLTAREN) 1 % Gel Apply 2 g topically once daily. Use gloves to apply 100 g 1    dicyclomine (BENTYL) 20 mg tablet Take 1 tablet (20 mg total) by mouth 3 (three) times daily as needed (abdominal pain). 60 tablet 2    erythromycin (ROMYCIN) ophthalmic ointment Place into the left eye 3 (three)  times daily. 3.5 g 0    EScitalopram oxalate (LEXAPRO) 10 MG tablet Take 1 tablet (10 mg total) by mouth once daily. 30 tablet 11    esomeprazole (NEXIUM) 40 MG capsule Take 1 capsule (40 mg total) by mouth daily as needed. 30 capsule 11    fluticasone propionate (FLONASE) 50 mcg/actuation nasal spray 2 sprays (100 mcg total) by Each Nostril route once daily. 11.1 mL 0    gabapentin (NEURONTIN) 300 MG capsule Take 1 capsule (300 mg total) by mouth 2 (two) times daily AND 2 capsules (600 mg total) every evening. 120 capsule 1    ketorolac (TORADOL) 10 mg tablet Take one po q 8 hrs prn pain 20 tablet 0    oxyCODONE (ROXICODONE) 5 MG immediate release tablet Take 1 tablet (5 mg total) by mouth every 4 (four) hours as needed for Pain. 41 tablet 0    prednisoLONE acetate (PRED FORTE) 1 % DrpS Place 1 drop into the left eye 4 (four) times daily. 5 mL 11     No current facility-administered medications on file prior to visit.        Review of patient's allergies indicates:   Allergen Reactions    Clindamycin     Sulfa dyne     Sulfamethoxazole-trimethoprim      Other reaction(s): Anaphylaxis    Dermabond [tissue glues] Rash       OBJECTIVE:     Vital Signs:  Vitals:    03/29/23 1300   BP: (!) 144/98   Pulse: 83   Resp: 16   Temp: 98.7 °F (37.1 °C)     There is no height or weight on file to calculate BMI.     Physical Exam:  Physical Exam  Vitals and nursing note reviewed.   Constitutional:       General: She is not in acute distress.     Appearance: She is not ill-appearing.   HENT:      Head: Normocephalic and atraumatic.      Nose: Nose normal.      Mouth/Throat:      Mouth: Mucous membranes are moist.   Eyes:      Pupils: Pupils are equal, round, and reactive to light.   Cardiovascular:      Rate and Rhythm: Normal rate and regular rhythm.      Pulses: Normal pulses.      Heart sounds: Normal heart sounds.   Pulmonary:      Effort: Pulmonary effort is normal.      Breath sounds: Normal breath sounds.   Abdominal:       "General: Bowel sounds are normal.      Palpations: Abdomen is soft.   Musculoskeletal:         General: No swelling. Normal range of motion.      Cervical back: Normal range of motion.   Skin:     General: Skin is warm and dry.      Findings: Lesion (drainage with moisture) present.   Neurological:      Mental Status: She is alert and oriented to person, place, and time.      Motor: Weakness present.   Psychiatric:         Mood and Affect: Mood normal.         Behavior: Behavior normal.         Thought Content: Thought content normal.         Laboratory  Lab Results   Component Value Date    WBC 15.59 (H) 03/22/2023    HGB 12.3 03/22/2023    HCT 38.4 03/22/2023    MCV 89 03/22/2023     03/22/2023     Lab Results   Component Value Date    INR 1.0 04/12/2015     Lab Results   Component Value Date    HGBA1C 5.4 11/18/2022     No results for input(s): "POCTGLUCOSE" in the last 72 hours.    Diagnostic Results: n/a    TRANSITION OF CARE:     [facility] Contact Note: yes date    Family and/or Caretaker present at visit?  Yes.  Diagnostic tests reviewed/disposition: No diagnosic tests pending after this hospitalization.  Disease/illness education: wound care  Home health/community services discussion/referrals: Patient does not have home health established from hospital visit.  They do not need home health.  If needed, we will set up home health for the patient.   Establishment or re-establishment of referral orders for community resources: No other necessary community resources.   Discussion with other health care providers: No discussion with other health care providers necessary.     Transition of Care Visit:  I have reviewed and updated the history and problem list.  I have reconciled the medication list.  I have discussed the hospitalization and current medical issues, prognosis and plans with the patient/family.  I  spent more than 50% of time discussing the care with the patient/family.  Total " Face-to-Face Encounter: 60 minutes.    Medications Reconciliation:   I have reconciled the patient's home medications and discharge medications with the patient/family. I have updated all changes.  Refer to After-Visit Medication List.    ASSESSMENT & PLAN:     -place gauze to absorb drainage and moisture to incision site, keep clean and dry  -keep f/u appt with CT surgery   -increase miralax to BID  -f/u with PCP    Problem List Items Addressed This Visit          Oncology    Mediastinal mass - Primary        Were controlled substances prescribed?  No    Instructions for the patient:    Scheduled Follow-up :  Future Appointments   Date Time Provider Department Center   8/8/2023 11:40 AM Moi Chawla, PT [facility]   8/9/2023  2:20 PM Cari Pope, PTA [facility]   8/17/2023  2:20 PM Cari Pope, PTA [facility]   8/18/2023  9:00 AM Moi Chawla, PT [facility]   8/22/2023  1:30 PM Charlotte Jacinto MD [facility] IM [facility]   8/23/2023  2:20 PM Cari Pope, PTA [facility]   8/24/2023  2:20 PM Moi Chawla, PT [facility]   8/28/2023  2:20 PM Moi Chawla, PT [facility]   8/29/2023  2:20 PM Moi Chawla, PT [facility]   4/8/2024  9:30 AM [facility] OIC-CT2 500 LB LIMIT [facility]   4/8/2024 10:45 AM Anton Evangelista MD [facility] LILLY Kwon       After Visit Medication List :     Medication List            Accurate as of March 29, 2023 11:59 PM. If you have any questions, ask your nurse or doctor.                CONTINUE taking these medications      ALPRAZolam 0.25 MG tablet  Commonly known as: XANAX  Take 1 tablet (0.25 mg total) by mouth daily as needed for Anxiety.     atorvastatin 10 MG tablet  Commonly known as: LIPITOR  Take 1 tablet (10 mg total) by mouth once daily.     diclofenac sodium 1 % Gel  Commonly known as: VOLTAREN  Apply 2 g topically once daily. Use gloves to apply     dicyclomine 20 mg tablet  Commonly  known as: BENTYL  Take 1 tablet (20 mg total) by mouth 3 (three) times daily as needed (abdominal pain).     erythromycin ophthalmic ointment  Commonly known as: ROMYCIN  Place into the left eye 3 (three) times daily.     EScitalopram oxalate 10 MG tablet  Commonly known as: LEXAPRO  Take 1 tablet (10 mg total) by mouth once daily.     esomeprazole 40 MG capsule  Commonly known as: NEXIUM  Take 1 capsule (40 mg total) by mouth daily as needed.     fluticasone propionate 50 mcg/actuation nasal spray  Commonly known as: FLONASE  2 sprays (100 mcg total) by Each Nostril route once daily.     gabapentin 300 MG capsule  Commonly known as: NEURONTIN  Take 1 capsule (300 mg total) by mouth 2 (two) times daily AND 2 capsules (600 mg total) every evening.     ketorolac 10 mg tablet  Commonly known as: TORADOL  Take one po q 8 hrs prn pain     methocarbamoL 500 MG Tab  Commonly known as: ROBAXIN  Take 1 tablet (500 mg total) by mouth 4 (four) times daily. for 10 days     oxyCODONE 5 MG immediate release tablet  Commonly known as: ROXICODONE  Take 1 tablet (5 mg total) by mouth every 4 (four) hours as needed for Pain.     prednisoLONE acetate 1 % Drps  Commonly known as: PRED FORTE  Place 1 drop into the left eye 4 (four) times daily.              Signature: Diamond Mendez NP

## 2023-08-22 ENCOUNTER — LAB VISIT (OUTPATIENT)
Dept: LAB | Facility: HOSPITAL | Age: 53
End: 2023-08-22
Attending: INTERNAL MEDICINE
Payer: MEDICAID

## 2023-08-22 ENCOUNTER — OFFICE VISIT (OUTPATIENT)
Dept: INTERNAL MEDICINE | Facility: CLINIC | Age: 53
End: 2023-08-22
Payer: MEDICAID

## 2023-08-22 DIAGNOSIS — E78.5 HYPERLIPIDEMIA, UNSPECIFIED HYPERLIPIDEMIA TYPE: Primary | ICD-10-CM

## 2023-08-22 DIAGNOSIS — E04.1 THYROID NODULE: ICD-10-CM

## 2023-08-22 DIAGNOSIS — E78.5 HYPERLIPIDEMIA, UNSPECIFIED HYPERLIPIDEMIA TYPE: ICD-10-CM

## 2023-08-22 LAB
ALBUMIN SERPL BCP-MCNC: 4.3 G/DL (ref 3.5–5.2)
ALP SERPL-CCNC: 76 U/L (ref 55–135)
ALT SERPL W/O P-5'-P-CCNC: 20 U/L (ref 10–44)
ANION GAP SERPL CALC-SCNC: 11 MMOL/L (ref 8–16)
AST SERPL-CCNC: 21 U/L (ref 10–40)
BILIRUB SERPL-MCNC: 0.6 MG/DL (ref 0.1–1)
BUN SERPL-MCNC: 9 MG/DL (ref 6–20)
CALCIUM SERPL-MCNC: 10.3 MG/DL (ref 8.7–10.5)
CHLORIDE SERPL-SCNC: 104 MMOL/L (ref 95–110)
CHOLEST SERPL-MCNC: 277 MG/DL (ref 120–199)
CHOLEST/HDLC SERPL: 3.6 {RATIO} (ref 2–5)
CO2 SERPL-SCNC: 26 MMOL/L (ref 23–29)
CREAT SERPL-MCNC: 0.8 MG/DL (ref 0.5–1.4)
EST. GFR  (NO RACE VARIABLE): >60 ML/MIN/1.73 M^2
GLUCOSE SERPL-MCNC: 84 MG/DL (ref 70–110)
HDLC SERPL-MCNC: 78 MG/DL (ref 40–75)
HDLC SERPL: 28.2 % (ref 20–50)
LDLC SERPL CALC-MCNC: 176.8 MG/DL (ref 63–159)
NONHDLC SERPL-MCNC: 199 MG/DL
POTASSIUM SERPL-SCNC: 3.9 MMOL/L (ref 3.5–5.1)
PROT SERPL-MCNC: 7.9 G/DL (ref 6–8.4)
SODIUM SERPL-SCNC: 141 MMOL/L (ref 136–145)
TRIGL SERPL-MCNC: 111 MG/DL (ref 30–150)
WBC # BLD AUTO: 8.64 K/UL (ref 3.9–12.7)

## 2023-08-22 PROCEDURE — 99214 OFFICE O/P EST MOD 30 MIN: CPT | Mod: PBBFAC | Performed by: INTERNAL MEDICINE

## 2023-08-22 PROCEDURE — 85048 AUTOMATED LEUKOCYTE COUNT: CPT | Performed by: INTERNAL MEDICINE

## 2023-08-22 PROCEDURE — 80053 COMPREHEN METABOLIC PANEL: CPT | Performed by: INTERNAL MEDICINE

## 2023-08-22 PROCEDURE — 3008F PR BODY MASS INDEX (BMI) DOCUMENTED: ICD-10-PCS | Mod: CPTII,,, | Performed by: INTERNAL MEDICINE

## 2023-08-22 PROCEDURE — 3075F PR MOST RECENT SYSTOLIC BLOOD PRESS GE 130-139MM HG: ICD-10-PCS | Mod: CPTII,,, | Performed by: INTERNAL MEDICINE

## 2023-08-22 PROCEDURE — 3008F BODY MASS INDEX DOCD: CPT | Mod: CPTII,,, | Performed by: INTERNAL MEDICINE

## 2023-08-22 PROCEDURE — 99999 PR PBB SHADOW E&M-EST. PATIENT-LVL IV: ICD-10-PCS | Mod: PBBFAC,,, | Performed by: INTERNAL MEDICINE

## 2023-08-22 PROCEDURE — 99999 PR PBB SHADOW E&M-EST. PATIENT-LVL IV: CPT | Mod: PBBFAC,,, | Performed by: INTERNAL MEDICINE

## 2023-08-22 PROCEDURE — 99214 PR OFFICE/OUTPT VISIT, EST, LEVL IV, 30-39 MIN: ICD-10-PCS | Mod: S$PBB,,, | Performed by: INTERNAL MEDICINE

## 2023-08-22 PROCEDURE — 3075F SYST BP GE 130 - 139MM HG: CPT | Mod: CPTII,,, | Performed by: INTERNAL MEDICINE

## 2023-08-22 PROCEDURE — 1159F PR MEDICATION LIST DOCUMENTED IN MEDICAL RECORD: ICD-10-PCS | Mod: CPTII,,, | Performed by: INTERNAL MEDICINE

## 2023-08-22 PROCEDURE — 80061 LIPID PANEL: CPT | Performed by: INTERNAL MEDICINE

## 2023-08-22 PROCEDURE — 3079F PR MOST RECENT DIASTOLIC BLOOD PRESSURE 80-89 MM HG: ICD-10-PCS | Mod: CPTII,,, | Performed by: INTERNAL MEDICINE

## 2023-08-22 PROCEDURE — 1159F MED LIST DOCD IN RCRD: CPT | Mod: CPTII,,, | Performed by: INTERNAL MEDICINE

## 2023-08-22 PROCEDURE — 3079F DIAST BP 80-89 MM HG: CPT | Mod: CPTII,,, | Performed by: INTERNAL MEDICINE

## 2023-08-22 PROCEDURE — 36415 COLL VENOUS BLD VENIPUNCTURE: CPT | Performed by: INTERNAL MEDICINE

## 2023-08-22 PROCEDURE — 99214 OFFICE O/P EST MOD 30 MIN: CPT | Mod: S$PBB,,, | Performed by: INTERNAL MEDICINE

## 2023-08-24 ENCOUNTER — HOSPITAL ENCOUNTER (OUTPATIENT)
Dept: ENDOCRINOLOGY | Facility: CLINIC | Age: 53
Discharge: HOME | End: 2023-08-24
Attending: GENERAL ACUTE CARE HOSPITAL
Payer: MEDICAID

## 2023-08-24 DIAGNOSIS — E04.2 MULTINODULAR GOITER (NONTOXIC): Primary | ICD-10-CM

## 2023-08-24 PROCEDURE — 10006 US FINE NEEDLE ASPIRATION THYROID EA ADDITIONAL LESION: ICD-10-PCS | Mod: ,,, | Performed by: INTERNAL MEDICINE

## 2023-08-24 PROCEDURE — 88173 PR  INTERPRETATION OF FNA SMEAR: ICD-10-PCS | Mod: 26,,, | Performed by: PATHOLOGY

## 2023-08-24 PROCEDURE — 10005 US FINE NEEDLE ASPIRATION THYROID, FIRST LESION: ICD-10-PCS | Mod: ,,, | Performed by: INTERNAL MEDICINE

## 2023-08-24 PROCEDURE — 10005 FNA BX W/US GDN 1ST LES: CPT | Mod: ,,, | Performed by: INTERNAL MEDICINE

## 2023-08-24 PROCEDURE — 88173 CYTOPATH EVAL FNA REPORT: CPT | Mod: 26,,, | Performed by: PATHOLOGY

## 2023-08-24 PROCEDURE — 88173 CYTOPATH EVAL FNA REPORT: CPT | Performed by: PATHOLOGY

## 2023-08-24 PROCEDURE — 10006 FNA BX W/US GDN EA ADDL: CPT | Mod: ,,, | Performed by: INTERNAL MEDICINE

## 2023-08-26 ENCOUNTER — TELEPHONE (OUTPATIENT)
Dept: INTERNAL MEDICINE | Facility: CLINIC | Age: 53
End: 2023-08-26
Payer: MEDICAID

## 2023-08-26 NOTE — TELEPHONE ENCOUNTER
Please contact patient and inform her that her cholesterol is elevated.  I will need to increase the dose of her atorvastatin.  Please ask her what pharmacy she would like that sent to

## 2023-08-27 VITALS
BODY MASS INDEX: 25.86 KG/M2 | TEMPERATURE: 99 F | HEIGHT: 66 IN | WEIGHT: 160.94 LBS | HEART RATE: 96 BPM | OXYGEN SATURATION: 99 % | SYSTOLIC BLOOD PRESSURE: 132 MMHG | DIASTOLIC BLOOD PRESSURE: 86 MMHG

## 2023-08-27 NOTE — PROGRESS NOTES
Subjective:       Patient ID: Laz Quintero is a 52 y.o. female.    Chief Complaint: Hyperlipidemia    HPI  She returns for management of hyperlipidemia.  She is had hyperlipidemia for over a year.  Current treatment has included medications outlined medication list.  She denies pain located in her chest.  She has a thyroid nodule.  Denies fatigue       Past medical history: Mediastinal mass, status post thymectomy, hyperlipidemia, thyroid nodule, status post hysterectomy, hyperparathyroidism, lumbar fusion, status post cholecystectomy.  Positive family history of colon cancer-father, diagnosed at age 50, colon adenoma.  She had a colonoscopy December 2022     Medications:  Lipitor 10 mg daily     ALLERGIES: Clindamycin, sulfa    Review of Systems   Constitutional:  Negative for chills, fatigue, fever and unexpected weight change.   Respiratory:  Negative for chest tightness and shortness of breath.    Cardiovascular:  Negative for chest pain and palpitations.   Gastrointestinal:  Negative for abdominal pain and blood in stool.   Neurological:  Negative for dizziness, syncope, numbness and headaches.       Objective:      Physical Exam  HENT:      Right Ear: External ear normal.      Left Ear: External ear normal.      Nose: Nose normal.      Mouth/Throat:      Mouth: Mucous membranes are moist.      Pharynx: Oropharynx is clear.   Eyes:      Pupils: Pupils are equal, round, and reactive to light.   Cardiovascular:      Rate and Rhythm: Normal rate and regular rhythm.      Heart sounds: No murmur heard.  Pulmonary:      Breath sounds: Normal breath sounds.   Abdominal:      General: There is no distension.      Palpations: There is no hepatomegaly or splenomegaly.      Tenderness: There is no abdominal tenderness.   Musculoskeletal:      Cervical back: Normal range of motion.   Lymphadenopathy:      Cervical: No cervical adenopathy.      Upper Body:      Right upper body: No axillary adenopathy.      Left upper  body: No axillary adenopathy.   Neurological:      Cranial Nerves: No cranial nerve deficit.      Sensory: No sensory deficit.      Motor: Motor function is intact.      Deep Tendon Reflexes: Reflexes are normal and symmetric.         Assessment/Plan       Assessment and plan:  1.  Hyperlipidemia:  Controlled.  Continue Lipitor.  Check CMP and lipid panel  2.  Thyroid nodule:  Follow-up with endocrinology  3.  Check WBC

## 2023-08-28 ENCOUNTER — TELEPHONE (OUTPATIENT)
Dept: INTERNAL MEDICINE | Facility: CLINIC | Age: 53
End: 2023-08-28
Payer: MEDICAID

## 2023-08-28 ENCOUNTER — PATIENT MESSAGE (OUTPATIENT)
Dept: ENDOCRINOLOGY | Facility: CLINIC | Age: 53
End: 2023-08-28
Payer: MEDICAID

## 2023-08-28 LAB
FINAL PATHOLOGIC DIAGNOSIS: NORMAL
FINAL PATHOLOGIC DIAGNOSIS: NORMAL
Lab: NORMAL
Lab: NORMAL

## 2023-08-28 RX ORDER — ATORVASTATIN CALCIUM 20 MG/1
20 TABLET, FILM COATED ORAL DAILY
Qty: 90 TABLET | Refills: 1 | Status: SHIPPED | OUTPATIENT
Start: 2023-08-28 | End: 2024-08-27

## 2023-10-14 ENCOUNTER — OFFICE VISIT (OUTPATIENT)
Dept: URGENT CARE | Facility: CLINIC | Age: 53
End: 2023-10-14
Payer: MEDICAID

## 2023-10-14 VITALS
TEMPERATURE: 98 F | WEIGHT: 160 LBS | HEART RATE: 95 BPM | SYSTOLIC BLOOD PRESSURE: 139 MMHG | BODY MASS INDEX: 25.71 KG/M2 | RESPIRATION RATE: 20 BRPM | DIASTOLIC BLOOD PRESSURE: 93 MMHG | HEIGHT: 66 IN | OXYGEN SATURATION: 96 %

## 2023-10-14 DIAGNOSIS — Z11.59 SCREENING FOR VIRAL DISEASE: ICD-10-CM

## 2023-10-14 DIAGNOSIS — B96.89 ACUTE BRONCHITIS, BACTERIAL: Primary | ICD-10-CM

## 2023-10-14 DIAGNOSIS — J20.8 ACUTE BRONCHITIS, BACTERIAL: Primary | ICD-10-CM

## 2023-10-14 DIAGNOSIS — R09.81 SINUS CONGESTION: ICD-10-CM

## 2023-10-14 DIAGNOSIS — R05.1 ACUTE COUGH: ICD-10-CM

## 2023-10-14 LAB
CTP QC/QA: YES
CTP QC/QA: YES
POC MOLECULAR INFLUENZA A AGN: NEGATIVE
POC MOLECULAR INFLUENZA B AGN: NEGATIVE
SARS-COV-2 AG RESP QL IA.RAPID: NEGATIVE

## 2023-10-14 PROCEDURE — 99213 OFFICE O/P EST LOW 20 MIN: CPT | Mod: S$GLB,,,

## 2023-10-14 PROCEDURE — 87502 INFLUENZA DNA AMP PROBE: CPT | Mod: QW,S$GLB,,

## 2023-10-14 PROCEDURE — 87811 SARS-COV-2 COVID19 W/OPTIC: CPT | Mod: QW,S$GLB,,

## 2023-10-14 PROCEDURE — 87811 SARS CORONAVIRUS 2 ANTIGEN POCT, MANUAL READ: ICD-10-PCS | Mod: QW,S$GLB,,

## 2023-10-14 PROCEDURE — 87502 POCT INFLUENZA A/B MOLECULAR: ICD-10-PCS | Mod: QW,S$GLB,,

## 2023-10-14 PROCEDURE — 99213 PR OFFICE/OUTPT VISIT, EST, LEVL III, 20-29 MIN: ICD-10-PCS | Mod: S$GLB,,,

## 2023-10-14 RX ORDER — FLUTICASONE PROPIONATE 50 MCG
1 SPRAY, SUSPENSION (ML) NASAL DAILY
Qty: 11.1 ML | Refills: 0 | Status: SHIPPED | OUTPATIENT
Start: 2023-10-14

## 2023-10-14 RX ORDER — AMOXICILLIN AND CLAVULANATE POTASSIUM 875; 125 MG/1; MG/1
1 TABLET, FILM COATED ORAL EVERY 12 HOURS
Qty: 14 TABLET | Refills: 0 | Status: SHIPPED | OUTPATIENT
Start: 2023-10-14 | End: 2023-10-21

## 2023-10-14 RX ORDER — BENZONATATE 200 MG/1
200 CAPSULE ORAL 3 TIMES DAILY PRN
Qty: 20 CAPSULE | Refills: 0 | Status: SHIPPED | OUTPATIENT
Start: 2023-10-14 | End: 2024-01-05 | Stop reason: SDUPTHER

## 2023-10-14 NOTE — PROGRESS NOTES
"Subjective:      Patient ID: Laz Quintero is a 53 y.o. female.    Vitals:  height is 5' 6" (1.676 m) and weight is 72.6 kg (160 lb). Her temperature is 98.3 °F (36.8 °C). Her blood pressure is 139/93 (abnormal) and her pulse is 95. Her respiration is 20 and oxygen saturation is 96%.     Chief Complaint: Cough    53-year-old female patient presents with loss of taste, cough, body aches, fever, chills x2 days.  Patient states she has been taking Tylenol, Zyrtec, and Mucinex for her symptoms with no relief.  Patient states the lost of taste has made her feel nauseous.  Denies any abdominal pain, shortness a breath, chest pain, or any other associated symptoms        Cough  This is a new problem. The current episode started yesterday. The problem has been gradually worsening. The cough is Productive of purulent sputum. Associated symptoms include chills, myalgias, nasal congestion, postnasal drip, rhinorrhea, a sore throat and sweats. Pertinent negatives include no chest pain, ear pain, fever, headaches, hemoptysis, rash, shortness of breath or wheezing. She has tried OTC cough suppressant for the symptoms. There is no history of asthma, bronchiectasis, bronchitis, COPD, emphysema, environmental allergies or pneumonia.       Constitution: Positive for chills. Negative for activity change, appetite change, sweating, fatigue, fever, unexpected weight change and generalized weakness.   HENT:  Positive for postnasal drip, sinus pressure and sore throat. Negative for ear pain, ear discharge, foreign body in ear, tinnitus, hearing loss, facial swelling, congestion, nosebleeds, foreign body in nose, sinus pain, trouble swallowing and voice change.    Neck: Negative for neck pain, neck stiffness and painful lymph nodes.   Cardiovascular:  Negative for chest trauma, chest pain, leg swelling and palpitations.   Eyes:  Negative for eye trauma, foreign body in eye, eye discharge, eye itching and eye pain.   Respiratory:  " Positive for cough. Negative for sleep apnea, chest tightness, sputum production, bloody sputum, COPD, shortness of breath, stridor, wheezing and asthma.    Gastrointestinal:  Negative for abdominal trauma, abdominal pain, abdominal bloating and history of abdominal surgery.   Endocrine: hair loss, cold intolerance, heat intolerance and excessive thirst.   Genitourinary:  Negative for dysuria, frequency, urgency, urine decreased and flank pain.   Musculoskeletal:  Positive for muscle ache. Negative for pain, trauma, joint pain, joint swelling, abnormal ROM of joint, arthritis, gout, back pain and muscle cramps.   Skin:  Negative for color change, pale, rash, wound, abrasion, laceration and lesion.   Allergic/Immunologic: Negative for environmental allergies, seasonal allergies, food allergies, eczema and asthma.   Neurological:  Negative for dizziness, history of vertigo, light-headedness, passing out, headaches, disorientation and altered mental status.   Hematologic/Lymphatic: Negative for swollen lymph nodes, easy bruising/bleeding and trouble clotting. Does not bruise/bleed easily.   Psychiatric/Behavioral:  Negative for altered mental status, disorientation, confusion, agitation, nervous/anxious and sleep disturbance. The patient is not nervous/anxious.       Objective:     Physical Exam   Constitutional: She is oriented to person, place, and time. She appears well-developed. She is cooperative.  Non-toxic appearance. She does not appear ill. No distress.   HENT:   Head: Normocephalic and atraumatic.   Ears:   Right Ear: Hearing, tympanic membrane, external ear and ear canal normal.   Left Ear: Hearing, tympanic membrane, external ear and ear canal normal.   Nose: Rhinorrhea present. No mucosal edema or nasal deformity. No epistaxis. Right sinus exhibits no maxillary sinus tenderness and no frontal sinus tenderness. Left sinus exhibits no maxillary sinus tenderness and no frontal sinus tenderness.    Mouth/Throat: Uvula is midline, oropharynx is clear and moist and mucous membranes are normal. No trismus in the jaw. Normal dentition. No uvula swelling. No oropharyngeal exudate, posterior oropharyngeal edema or posterior oropharyngeal erythema.   Eyes: Conjunctivae and lids are normal. No scleral icterus.   Neck: Trachea normal and phonation normal. Neck supple. No edema present. No erythema present. No neck rigidity present.   Cardiovascular: Normal rate, regular rhythm, normal heart sounds and normal pulses.   Pulmonary/Chest: Effort normal and breath sounds normal. No stridor. No respiratory distress. She has no decreased breath sounds. She has no wheezes. She has no rhonchi. She has no rales. She exhibits no tenderness.   Abdominal: Normal appearance and bowel sounds are normal. She exhibits no distension and no mass. Soft. There is no abdominal tenderness. There is no rebound, no guarding, no left CVA tenderness and no right CVA tenderness. No hernia.   Musculoskeletal: Normal range of motion.         General: No deformity. Normal range of motion.   Neurological: She is alert and oriented to person, place, and time. She exhibits normal muscle tone. Coordination normal.   Skin: Skin is warm, dry, intact, not diaphoretic and not pale.   Psychiatric: Her speech is normal and behavior is normal. Judgment and thought content normal.   Nursing note and vitals reviewed.    Results for orders placed or performed in visit on 10/14/23   SARS Coronavirus 2 Antigen, POCT Manual Read   Result Value Ref Range    SARS Coronavirus 2 Antigen Negative Negative     Acceptable Yes    POCT Influenza A/B MOLECULAR   Result Value Ref Range    POC Molecular Influenza A Ag Negative Negative, Not Reported    POC Molecular Influenza B Ag Negative Negative, Not Reported     Acceptable Yes         Assessment:     1. Acute bronchitis, bacterial    2. Screening for viral disease    3. Acute cough    4.  Sinus congestion        Plan:       Acute bronchitis, bacterial  -     amoxicillin-clavulanate 875-125mg (AUGMENTIN) 875-125 mg per tablet; Take 1 tablet by mouth every 12 (twelve) hours. for 7 days  Dispense: 14 tablet; Refill: 0  -     benzonatate (TESSALON) 200 MG capsule; Take 1 capsule (200 mg total) by mouth 3 (three) times daily as needed for Cough.  Dispense: 20 capsule; Refill: 0    Screening for viral disease  -     SARS Coronavirus 2 Antigen, POCT Manual Read  -     POCT Influenza A/B MOLECULAR    Acute cough  -     benzonatate (TESSALON) 200 MG capsule; Take 1 capsule (200 mg total) by mouth 3 (three) times daily as needed for Cough.  Dispense: 20 capsule; Refill: 0    Sinus congestion  -     fluticasone propionate (FLONASE) 50 mcg/actuation nasal spray; 1 spray (50 mcg total) by Each Nostril route once daily.  Dispense: 11.1 mL; Refill: 0             Additional MDM:     Heart Failure Score:   COPD = No

## 2023-10-14 NOTE — PATIENT INSTRUCTIONS
Take Augmentin twice a day for 7 days.  Take Flonase and Zyrtec daily for your sinus.  Take Tessalon Perles 3 times a day as needed for cough.    What care is needed at home?   Call your regular doctor to let them know you were in the ED. Make a follow-up appointment if you were told to.  To help you feel better:  Use a cool mist humidifier to avoid breathing dry air.  Use hard candy or cough drops to soothe sore throat and cough.  Gargle with salt water (mix 1/2 teaspoon salt with 1 cup warm water) a few times a day.  Spray saltwater mist in each nostril. Any normal saline spray works.  Sip warm liquids to keep your throat moist.  Take warm, steamy showers to help soothe the cough.  Do not smoke or be in smoke-filled places. Avoid things that may cause breathing problems like vaping, fumes, pollution, dust, and other common allergens.  You may want to use over-the-counter medicines for allergies or acid reflux if your cough is due to one of these problems.  You can also use an over-the-counter cough medicine.  When do I need to get emergency help?   Return to the ED if:   You have chest pain when you cough or trouble breathing.  You start to cough up blood or yellow or green mucus.  When do I need to call the doctor?   You have a fever of 100.4°F (38°C) or higher or chills.  You cough so hard you throw up.  You are still coughing in 10 days.  You have new or worsening symptoms.  - Rest.    - Drink plenty of fluids.    - Acetaminophen (tylenol) or Ibuprofen (advil,motrin) as directed as needed for fever/pain. Avoid tylenol if you have a history of liver disease. Do not take ibuprofen if you have a history of GI bleeding, kidney disease, or if you take blood thinners.     - Follow up with your PCP or specialty clinic as directed in the next 1-2 weeks if not improved or as needed.  You can call [phone] to schedule an appointment with the appropriate provider.    - Go to the ER or seek medical attention  immediately if you develop new or worsening symptoms.     - You must understand that you have received an Urgent Care treatment only and that you may be released before all of your medical problems are known or treated.   - You, the patient, will arrange for follow up care as instructed.   - If your condition worsens or fails to improve we recommend that you receive another evaluation at the ER immediately or contact your PCP to discuss your concerns or return here.

## 2023-12-13 DIAGNOSIS — S73.122D: ICD-10-CM

## 2023-12-13 DIAGNOSIS — M25.552 LEFT HIP PAIN: ICD-10-CM

## 2023-12-26 ENCOUNTER — PATIENT MESSAGE (OUTPATIENT)
Dept: OBSTETRICS AND GYNECOLOGY | Facility: CLINIC | Age: 53
End: 2023-12-26
Payer: COMMERCIAL

## 2023-12-26 DIAGNOSIS — N95.1 MENOPAUSAL SYNDROME: Primary | ICD-10-CM

## 2023-12-26 RX ORDER — ESTRADIOL 1 MG/G
1 GEL TOPICAL DAILY
Qty: 30 G | Refills: 3 | Status: SHIPPED | OUTPATIENT
Start: 2023-12-26

## 2023-12-27 DIAGNOSIS — M54.16 LUMBAR RADICULOPATHY, CHRONIC: Primary | ICD-10-CM

## 2023-12-29 ENCOUNTER — TELEPHONE (OUTPATIENT)
Dept: URGENT CARE | Facility: CLINIC | Age: 53
End: 2023-12-29
Payer: MEDICAID

## 2024-01-05 ENCOUNTER — OFFICE VISIT (OUTPATIENT)
Dept: FAMILY MEDICINE | Facility: CLINIC | Age: 54
End: 2024-01-05

## 2024-01-05 VITALS
HEIGHT: 66 IN | OXYGEN SATURATION: 98 % | BODY MASS INDEX: 25.89 KG/M2 | WEIGHT: 161.06 LBS | SYSTOLIC BLOOD PRESSURE: 130 MMHG | HEART RATE: 94 BPM | DIASTOLIC BLOOD PRESSURE: 70 MMHG

## 2024-01-05 DIAGNOSIS — U07.1 COVID: ICD-10-CM

## 2024-01-05 DIAGNOSIS — R05.9 COUGH, UNSPECIFIED TYPE: Primary | ICD-10-CM

## 2024-01-05 DIAGNOSIS — R68.83 CHILLS: ICD-10-CM

## 2024-01-05 LAB
CTP QC/QA: YES
FLUAV AG NPH QL: NEGATIVE
FLUBV AG NPH QL: NEGATIVE
S PYO RRNA THROAT QL PROBE: NEGATIVE
SARS-COV-2 RDRP RESP QL NAA+PROBE: POSITIVE

## 2024-01-05 PROCEDURE — 99999PBSHW: Mod: PBBFAC,,,

## 2024-01-05 PROCEDURE — 87635 SARS-COV-2 COVID-19 AMP PRB: CPT | Mod: PBBFAC,PN

## 2024-01-05 PROCEDURE — 99214 OFFICE O/P EST MOD 30 MIN: CPT | Mod: PBBFAC,PN

## 2024-01-05 PROCEDURE — 87502 INFLUENZA DNA AMP PROBE: CPT | Mod: PBBFAC,PN

## 2024-01-05 PROCEDURE — 99999 PR PBB SHADOW E&M-EST. PATIENT-LVL IV: CPT | Mod: PBBFAC,,,

## 2024-01-05 PROCEDURE — 99214 OFFICE O/P EST MOD 30 MIN: CPT | Mod: S$PBB,,,

## 2024-01-05 PROCEDURE — 87880 STREP A ASSAY W/OPTIC: CPT | Mod: PBBFAC,PN

## 2024-01-05 PROCEDURE — 99999PBSHW POCT INFLUENZA A/B: Mod: 59,PBBFAC,,

## 2024-01-05 PROCEDURE — 99999PBSHW POCT RAPID STREP A: Mod: PBBFAC,,,

## 2024-01-05 RX ORDER — PROMETHAZINE HYDROCHLORIDE AND CODEINE PHOSPHATE 6.25; 1 MG/5ML; MG/5ML
5 SOLUTION ORAL EVERY 4 HOURS PRN
Qty: 240 ML | Refills: 0 | Status: SHIPPED | OUTPATIENT
Start: 2024-01-05 | End: 2024-01-06

## 2024-01-05 RX ORDER — DULOXETINE HYDROCHLORIDE 60 MG/1
60 CAPSULE, DELAYED RELEASE ORAL
COMMUNITY
Start: 2023-12-06

## 2024-01-05 RX ORDER — BENZONATATE 200 MG/1
200 CAPSULE ORAL 3 TIMES DAILY PRN
Qty: 20 CAPSULE | Refills: 0 | Status: SHIPPED | OUTPATIENT
Start: 2024-01-05

## 2024-01-05 RX ORDER — PREGABALIN 75 MG/1
75 CAPSULE ORAL 2 TIMES DAILY
COMMUNITY
Start: 2023-12-06

## 2024-01-05 RX ORDER — METHOCARBAMOL 750 MG/1
750 TABLET, FILM COATED ORAL EVERY 12 HOURS
COMMUNITY
Start: 2023-09-06

## 2024-01-05 RX ORDER — ALBUTEROL SULFATE 90 UG/1
2 AEROSOL, METERED RESPIRATORY (INHALATION) EVERY 6 HOURS PRN
Qty: 18 G | Refills: 0 | Status: SHIPPED | OUTPATIENT
Start: 2024-01-05 | End: 2024-01-29

## 2024-01-05 RX ORDER — MELOXICAM 15 MG/1
15 TABLET ORAL
COMMUNITY
Start: 2023-10-27

## 2024-01-05 RX ORDER — BUTALBITAL, ACETAMINOPHEN AND CAFFEINE 50; 325; 40 MG/1; MG/1; MG/1
1 TABLET ORAL EVERY 4 HOURS PRN
COMMUNITY
Start: 2023-05-11

## 2024-01-05 NOTE — PATIENT INSTRUCTIONS
Hold lipitor, gabapentin, lyrica, and xanax until you are done with your Paxlovid.   Continue mucinex  Use caution with promethazine with codeine as it may cause drowsiness.   Tylenol/ibuprofen as needed for fever  -Rest   -Increase water/fluid intake  -drink hot or cold fluids, use throat lozenges for sore throat        Isolate for 5 days, if no symptoms okay to end isolation  If symptoms persist isolate for 5 additional days or until fever free without medication  Regardless of when you end isolation, until at least day 11 avoid being around people who are at risk, remember to wear a high quality mask when indoors, around others and in public.   Do not go places where you are unable to wear a mask, until after day 10.  If symptoms worsen, go to ER/urgent care

## 2024-01-05 NOTE — PROGRESS NOTES
Subjective:            Chief Complaint: Sinus Problem     Laz Quintero is a 53 y.o. female, patient of Charlotte Jacinto MD with chronic back pain, anxiety and depression, hypertension, hyperparathyroidism, unknown to me, presents today with complaints of Sinus Problem  Reports  started feeling sick on Wednesday, cough started on yesterday. Reports grandkids have been ill.     URI   This is a new problem. The current episode started in the past 7 days (Wednesday). The problem has been gradually worsening. The maximum temperature recorded prior to her arrival was 102 - 102.9 F. The fever has been present for 1 to 2 days. Associated symptoms include congestion, coughing, diarrhea, ear pain (right worse than left), headaches, nausea, a plugged ear sensation, rhinorrhea, sinus pain, sneezing, a sore throat and wheezing. Pertinent negatives include no joint pain or vomiting. She has tried antihistamine, decongestant and acetaminophen (mucinex, zyrtec OTC, benzonatate, dai tea; vicks vapor rub, tylenol (last at 1 pm)) for the symptoms. The treatment provided no relief.   Currently not taking oxycodone at this time.     Past Medical History:   Diagnosis Date    Abnormal Pap smear     Abnormal Pap smear of cervix     Annular tear of lumbar disc, L5-S1. 07/26/2012    Chronic LBP     HPTH (hyperparathyroidism)     Hyperlipidemia     Menorrhagia     PONV (postoperative nausea and vomiting)     Primary hypertension 01/31/2023    Right lumbar radiculopathy 07/26/2012    Seasonal allergies        Past Surgical History:   Procedure Laterality Date    BACK SURGERY  2018    CHOLECYSTECTOMY      COLONOSCOPY N/A 01/19/2018    Procedure: COLONOSCOPY;  Surgeon: Malachi Olmedo MD;  Location: [facility] ([address] [place] [address]);  Service: Endoscopy;  Laterality: N/A;    COLONOSCOPY N/A 12/28/2022    Procedure: COLONOSCOPY;  Surgeon: Ezekiel Alanis MD;  Location: [facility] ([address] [place] [address]);  Service: Endoscopy;  Laterality: N/A;  inst via  portal  pre call complete [facility]    ESOPHAGOGASTRODUODENOSCOPY N/A 09/06/2019    Procedure: EGD (ESOPHAGOGASTRODUODENOSCOPY);  Surgeon: Jose Carlos Ventura MD;  Location: [facility] ENDO (4TH FLR);  Service: Endoscopy;  Laterality: N/A;  pt requesting ASAP    HYSTERECTOMY, TOTAL, LAPAROSCOPIC, WITH SALPINGECTOMY  06/16/2015    INJECTION OF ANESTHETIC AGENT AROUND MULTIPLE INTERCOSTAL NERVES Right 03/21/2023    Procedure: BLOCK, NERVE, INTERCOSTAL, 2 OR MORE;  Surgeon: Anton Evangelista MD;  Location: [facility] OR 2ND FLR;  Service: Cardiothoracic;  Laterality: Right;    TUBAL LIGATION      [place]    WISDOM TOOTH EXTRACTION      2005    XI ROBOTIC RATS Right 03/21/2023    Procedure: XI ROBOTIC THYMECTOMY;  Surgeon: Anton Evangelista MD;  Location: [facility] OR [address] [place] [address];  Service: Cardiothoracic;  Laterality: Right;       Family History   Problem Relation Age of Onset    Stroke Paternal Grandmother 68    Hypertension Maternal Grandmother     Multiple myeloma Maternal Grandmother     Diabetes Father     Colon polyps Father     Hypertension Mother     Breast cancer Neg Hx     Ovarian cancer Neg Hx     Melanoma Neg Hx     Colon cancer Neg Hx        Social History     Socioeconomic History    Marital status: Single   Occupational History    Occupation: nurse     Employer: [employer]   Tobacco Use    Smoking status: Never     Passive exposure: Never    Smokeless tobacco: Never   Substance and Sexual Activity    Alcohol use: No    Drug use: No    Sexual activity: Yes     Partners: Male     Birth control/protection: See Surgical Hx     Comment:  ; new partner since 2018   Other Topics Concern    Are you pregnant or think you may be? No    Breast-feeding No     Social Determinants of Health     Financial Resource Strain: Low Risk  (3/22/2023)    Overall Financial Resource Strain (CARDIA)     Difficulty of Paying Living Expenses: Not very hard   Food Insecurity: No Food Insecurity (3/22/2023)    Hunger Vital Sign     Worried  "About Running Out of Food in the Last Year: Never true     Ran Out of Food in the Last Year: Never true   Transportation Needs: No Transportation Needs (3/22/2023)    PRAPARE - Transportation     Lack of Transportation (Medical): No     Lack of Transportation (Non-Medical): No   Physical Activity: Insufficiently Active (3/22/2023)    Exercise Vital Sign     Days of Exercise per Week: 2 days     Minutes of Exercise per Session: 20 min   Social Connections: Moderately Integrated (3/22/2023)    Social Connection and Isolation Panel [NHANES]     Frequency of Communication with Friends and Family: More than three times a week     Frequency of Social Gatherings with Friends and Family: Three times a week     Attends Sikhism Services: More than 4 times per year     Active Member of Clubs or Organizations: No     Attends Club or Organization Meetings: Never     Marital Status: Living with partner   Housing Stability: Low Risk  (3/22/2023)    Housing Stability Vital Sign     Unable to Pay for Housing in the Last Year: No     Number of Places Lived in the Last Year: 1     Unstable Housing in the Last Year: No       Review of Systems   Constitutional:  Positive for chills and fever.   HENT:  Positive for congestion, ear pain (right worse than left), rhinorrhea, sinus pressure, sinus pain, sneezing and sore throat.    Respiratory:  Positive for cough and wheezing.    Gastrointestinal:  Positive for diarrhea and nausea. Negative for vomiting.   Musculoskeletal:  Negative for joint pain.   Neurological:  Positive for dizziness, weakness and headaches.         Objective:     Vitals:    01/05/24 1529   BP: 130/70   BP Location: Left arm   Patient Position: Sitting   BP Method: Large (Manual)   Pulse: 94   SpO2: 98%   Weight: 73.1 kg (161 lb 0.7 oz)   Height: 5' 6" (1.676 m)          Physical Exam  Vitals reviewed.   Constitutional:       Appearance: Normal appearance. She is well-developed and well-groomed.   HENT:      Head: " "Normocephalic and atraumatic.      Right Ear: Tympanic membrane normal. There is impacted cerumen.      Left Ear: Tympanic membrane normal.      Ears:      Comments: Cerumen removed from right ear with curette, patient tolerated well with no complaints.   Cardiovascular:      Rate and Rhythm: Normal rate and regular rhythm.      Heart sounds: Normal heart sounds.   Pulmonary:      Effort: Pulmonary effort is normal.      Breath sounds: Normal breath sounds. No wheezing, rhonchi or rales.      Comments: Frequent nonproductive cough in office   Skin:     General: Skin is warm and dry.   Neurological:      General: No focal deficit present.      Mental Status: She is alert and oriented to person, place, and time.   Psychiatric:         Speech: Speech normal.         Behavior: Behavior is cooperative.           Laboratory:  CBC:  No results for input(s): "WBC", "RBC", "HGB", "HCT", "PLT", "MCV", "MCH", "MCHC" in the last 2160 hours.  CMP:  No results for input(s): "GLU", "CALCIUM", "ALBUMIN", "PROT", "NA", "K", "CO2", "CL", "BUN", "ALKPHOS", "ALT", "AST", "BILITOT" in the last 2160 hours.    Invalid input(s): "CREATININ"  URINALYSIS:  No results for input(s): "COLORU", "CLARITYU", "SPECGRAV", "PHUR", "PROTEINUA", "GLUCOSEU", "BILIRUBINCON", "BLOODU", "WBCU", "RBCU", "BACTERIA", "MUCUS", "NITRITE", "LEUKOCYTESUR", "UROBILINOGEN", "HYALINECASTS" in the last 2160 hours.   LIPIDS:  No results for input(s): "TSH", "HDL", "CHOL", "TRIG", "LDLCALC", "CHOLHDL", "NONHDLCHOL", "TOTALCHOLEST" in the last 2160 hours.  TSH:  No results for input(s): "TSH" in the last 2160 hours.  A1C:  No results for input(s): "HGBA1C" in the last 2160 hours.    Assessment:         ICD-10-CM ICD-9-CM   1. Cough, unspecified type  R05.9 786.2   2. Chills  R68.83 780.64   3. COVID  U07.1 079.89       Plan:       Cough, unspecified type  -     POCT COVID-19 Rapid Screening  -     POCT Influenza A/B  -     POCT Rapid Strep A  -     promethazine-codeine " 6.25-10 mg/5 ml (PHENERGAN WITH CODEINE) 6.25-10 mg/5 mL syrup; Take 5 mLs by mouth every 4 (four) hours as needed for Cough (nausea/cough). 5 ml every 4-6 hours, not to exceed  30 ml/day  Dispense: 240 mL; Refill: 0  -     benzonatate (TESSALON) 200 MG capsule; Take 1 capsule (200 mg total) by mouth 3 (three) times daily as needed for Cough.  Dispense: 20 capsule; Refill: 0    Chills  -     POCT COVID-19 Rapid Screening  -     POCT Influenza A/B  -     POCT Rapid Strep A  Flu and Strep negative. COVID positive     COVID  -     promethazine-codeine 6.25-10 mg/5 ml (PHENERGAN WITH CODEINE) 6.25-10 mg/5 mL syrup; Take 5 mLs by mouth every 4 (four) hours as needed for Cough (nausea/cough). 5 ml every 4-6 hours, not to exceed  30 ml/day  Dispense: 240 mL; Refill: 0  -     nirmatrelvir-ritonavir 300 mg (150 mg x 2)-100 mg copackaged tablets (EUA); Take 3 tablets by mouth 2 (two) times daily for 5 days. Each dose contains 2 nirmatrelvir (pink tablets) and 1 ritonavir (white tablet). Take all 3 tablets together  Dispense: 30 tablet; Refill: 0  -     albuterol (VENTOLIN HFA) 90 mcg/actuation inhaler; Inhale 2 puffs into the lungs every 6 (six) hours as needed for Wheezing. Rescue  Dispense: 18 g; Refill: 0  Patient currently not taking oxycodone. Will give promethazine-codeine for severe cough and nausea.     Patient instructed on the following:  Hold lipitor, gabapentin, lyrica, and xanax until you are done with your Paxlovid.   Continue mucinex  Use caution with promethazine with codeine as it may cause drowsiness.   Tylenol/ibuprofen as needed for fever  -Rest   -Increase water/fluid intake  -drink hot or cold fluids, use throat lozenges for sore throat    Isolate for 5 days, if no symptoms okay to end isolation  If symptoms persist isolate for 5 additional days or until fever free without medication  Regardless of when you end isolation, until at least day 11 avoid being around people who are at risk, remember to wear a  high quality mask when indoors, around others and in public.   Do not go places where you are unable to wear a mask, until after day 10.  If symptoms worsen, go to ER/urgent care       If symptoms do not improve, return to clinic.   Keep appointments with all specialists.     Patient verbalizes understanding and agrees with current treatment plan.      Follow up if symptoms worsen or fail to improve.     Patient's Medications   New Prescriptions    ALBUTEROL (VENTOLIN HFA) 90 MCG/ACTUATION INHALER    Inhale 2 puffs into the lungs every 6 (six) hours as needed for Wheezing. Rescue    NIRMATRELVIR-RITONAVIR 300 MG (150 MG X 2)-100 MG COPACKAGED TABLETS (EUA)    Take 3 tablets by mouth 2 (two) times daily for 5 days. Each dose contains 2 nirmatrelvir (pink tablets) and 1 ritonavir (white tablet). Take all 3 tablets together    PROMETHAZINE-CODEINE 6.25-10 MG/5 ML (PHENERGAN WITH CODEINE) 6.25-10 MG/5 ML SYRUP    Take 5 mLs by mouth every 4 (four) hours as needed for Cough (nausea/cough). 5 ml every 4-6 hours, not to exceed  30 ml/day   Previous Medications    ALPRAZOLAM (XANAX) 0.25 MG TABLET    Take 1 tablet (0.25 mg total) by mouth daily as needed for Anxiety.    ATORVASTATIN (LIPITOR) 20 MG TABLET    Take 1 tablet (20 mg total) by mouth once daily.    BUTALBITAL-ACETAMINOPHEN-CAFFEINE -40 MG (FIORICET, ESGIC) -40 MG PER TABLET    Take 1 tablet by mouth every 4 (four) hours as needed.    CYMBALTA 60 MG CAPSULE    Take 60 mg by mouth.    DICLOFENAC SODIUM (VOLTAREN) 1 % GEL    Apply 2 g topically once daily. Use gloves to apply    DICYCLOMINE (BENTYL) 20 MG TABLET    Take 1 tablet (20 mg total) by mouth 3 (three) times daily as needed (abdominal pain).    ERYTHROMYCIN (ROMYCIN) OPHTHALMIC OINTMENT    Place into the left eye 3 (three) times daily.    ESCITALOPRAM OXALATE (LEXAPRO) 10 MG TABLET    Take 1 tablet (10 mg total) by mouth once daily.    ESOMEPRAZOLE (NEXIUM) 40 MG CAPSULE    Take 1 capsule (40 mg  total) by mouth daily as needed.    ESTRADIOL (DIVIGEL) 1 MG/GRAM (0.1 %) TOPICAL GEL    Place 1 g onto the skin once daily.    FLUTICASONE PROPIONATE (FLONASE) 50 MCG/ACTUATION NASAL SPRAY    1 spray (50 mcg total) by Each Nostril route once daily.    GABAPENTIN (NEURONTIN) 300 MG CAPSULE    Take 1 capsule (300 mg total) by mouth 2 (two) times daily AND 2 capsules (600 mg total) every evening.    KETOROLAC (TORADOL) 10 MG TABLET    Take one po q 8 hrs prn pain    MELOXICAM (MOBIC) 15 MG TABLET    Take 15 mg by mouth.    METHOCARBAMOL (ROBAXIN) 750 MG TAB    Take 750 mg by mouth every 12 (twelve) hours.    OXYCODONE (ROXICODONE) 5 MG IMMEDIATE RELEASE TABLET    Take 1 tablet (5 mg total) by mouth every 4 (four) hours as needed for Pain.    PREGABALIN (LYRICA) 75 MG CAPSULE    Take 75 mg by mouth 2 (two) times daily.   Modified Medications    Modified Medication Previous Medication    BENZONATATE (TESSALON) 200 MG CAPSULE benzonatate (TESSALON) 200 MG capsule       Take 1 capsule (200 mg total) by mouth 3 (three) times daily as needed for Cough.    Take 1 capsule (200 mg total) by mouth 3 (three) times daily as needed for Cough.   Discontinued Medications    FLUTICASONE PROPIONATE (FLONASE) 50 MCG/ACTUATION NASAL SPRAY    2 sprays (100 mcg total) by Each Nostril route once daily.         Criss Pollock NP

## 2024-01-06 ENCOUNTER — PATIENT MESSAGE (OUTPATIENT)
Dept: FAMILY MEDICINE | Facility: CLINIC | Age: 54
End: 2024-01-06
Payer: COMMERCIAL

## 2024-01-06 ENCOUNTER — TELEPHONE (OUTPATIENT)
Dept: FAMILY MEDICINE | Facility: CLINIC | Age: 54
End: 2024-01-06
Payer: COMMERCIAL

## 2024-01-06 DIAGNOSIS — U07.1 COVID: Primary | ICD-10-CM

## 2024-01-06 DIAGNOSIS — R05.9 COUGH, UNSPECIFIED TYPE: ICD-10-CM

## 2024-01-06 RX ORDER — PROMETHAZINE HYDROCHLORIDE AND DEXTROMETHORPHAN HYDROBROMIDE 6.25; 15 MG/5ML; MG/5ML
5 SYRUP ORAL EVERY 4 HOURS PRN
Qty: 180 ML | Refills: 0 | Status: SHIPPED | OUTPATIENT
Start: 2024-01-06 | End: 2024-01-16

## 2024-01-06 RX ORDER — PROMETHAZINE HYDROCHLORIDE AND CODEINE PHOSPHATE 6.25; 1 MG/5ML; MG/5ML
5 SOLUTION ORAL EVERY 4 HOURS PRN
Qty: 240 ML | Refills: 0 | Status: SHIPPED | OUTPATIENT
Start: 2024-01-06 | End: 2024-01-06 | Stop reason: ALTCHOICE

## 2024-01-06 NOTE — TELEPHONE ENCOUNTER
Call placed to [facility] pharmacy. S/w [facility]. Requested to dc order for promethazine with codeine. Will be dc'd per [facility] as they do not carry this medication.     Call placed to Joann s/w [facility] who states they do not have a prescription on file for promethazine with codeine; also they do not supply Robitussin with codeine.     Pharmacies are unable to dispense promethazine with codeine. Will send promethazine DM prn cough and nausea as patient is not able to get other medications.

## 2024-01-09 ENCOUNTER — HOSPITAL ENCOUNTER (OUTPATIENT)
Dept: RADIOLOGY | Facility: HOSPITAL | Age: 54
Discharge: HOME | End: 2024-01-09
Attending: PHYSICAL MEDICINE & REHABILITATION
Payer: COMMERCIAL

## 2024-01-09 DIAGNOSIS — M54.16 LUMBAR RADICULOPATHY, CHRONIC: ICD-10-CM

## 2024-01-09 PROCEDURE — 72148 MRI LUMBAR SPINE W/O DYE: CPT | Mod: TC

## 2024-01-09 PROCEDURE — 72148 MRI LUMBAR SPINE W/O DYE: CPT | Mod: 26,,, | Performed by: RADIOLOGY

## 2024-01-27 DIAGNOSIS — U07.1 COVID: ICD-10-CM

## 2024-01-29 RX ORDER — ALBUTEROL SULFATE 90 UG/1
AEROSOL, METERED RESPIRATORY (INHALATION)
Qty: 8.5 G | Refills: 0 | Status: SHIPPED | OUTPATIENT
Start: 2024-01-29

## 2024-02-12 NOTE — TELEPHONE ENCOUNTER
Jessica daughter Jessica called in stating that she is returning [place]'s call about her mom test results.       Left message and call back number to schedule  X-ray and MRI.     Patient should report to [facility] to be seen for imaging.              Pantera Forrester   Workers Comp Patient  Coordinator  [facility]  [phone])    Clinics-[place]*[facility]  [phone] Direct Line  [phone] Fax

## 2024-02-26 ENCOUNTER — TELEPHONE (OUTPATIENT)
Dept: URGENT CARE | Facility: CLINIC | Age: 54
End: 2024-02-26
Payer: COMMERCIAL

## 2024-02-26 NOTE — TELEPHONE ENCOUNTER
Spoke with the Pt. She had an AMY recently and will contact the providers office to ensure there are no contraindications to have the MRI w/contrast.

## 2024-03-15 ENCOUNTER — TELEPHONE (OUTPATIENT)
Dept: URGENT CARE | Facility: CLINIC | Age: 54
End: 2024-03-15
Payer: COMMERCIAL

## 2024-03-15 NOTE — TELEPHONE ENCOUNTER
Contacted patient about the MRI w/contrast that was outstanding.  She stated her provider asked to wait until the follow up appt [facility] on 03/21

## 2024-04-09 NOTE — PROGRESS NOTES
Subjective     Patient ID: Laz Quintero is a 53 y.o. female.    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 Margins negative. negative VPI. negative Lymphovascular invasion. [id number]. [place] Stage 1.      Post-operative therapy: surveillance    HPI  Patient is a 52 y.o. female never smoker with HLD, h/o lumbar fusion (08/2022), and hyperparathyroidism who presents to clinic today for 2 week follow up s/p right robotic assisted mediastinal mass excision with partial thymectomy. Uncomplicated post-operative course. Today patient reports she is doing well overall. No surgical complaints.     Review of Systems   Constitutional:  Negative for appetite change, chills, diaphoresis, fatigue, fever and unexpected weight change.   HENT: Negative.     Respiratory:  Negative for cough, choking, chest tightness and shortness of breath.    Cardiovascular:  Negative for chest pain, palpitations, leg swelling and claudication.   Gastrointestinal: Negative.    Endocrine: Negative.    Musculoskeletal: Negative.    Integumentary:  Negative.   Neurological: Negative.    Psychiatric/Behavioral: Negative.            Objective     Physical Exam  Constitutional:       Appearance: Normal appearance.   Eyes:      Extraocular Movements: Extraocular movements intact.   Cardiovascular:      Rate and Rhythm: Normal rate and regular rhythm.      Pulses: Normal pulses.   Pulmonary:      Effort: Pulmonary effort is normal.      Breath sounds: Normal breath sounds.   Abdominal:      General: Abdomen is flat.      Palpations: Abdomen is soft.   Skin:     General: Skin is warm and dry.   Neurological:      General: No focal deficit present.      Mental  Status: She is alert and oriented to person, place, and time. Mental status is at baseline.   Psychiatric:         Mood and Affect: Mood normal.         Behavior: Behavior normal.         Thought Content: Thought content normal.     Diagnostics:     Chest CT 04/11/24: images reviewed. [facility]WRENCE.      Assessment and Plan     Patient is a 52 y.o. female never smoker with HLD, h/o lumbar fusion (08/2022), and hyperparathyroidism who presents to clinic today for 2 week follow up s/p right robotic assisted mediastinal mass excision with partial thymectomy. No evidence of disease on todays CT Chest    Plan:     RTC in 1 year with a Chest CT. Will need yearly CT scan for 10 years

## 2024-04-11 ENCOUNTER — HOSPITAL ENCOUNTER (OUTPATIENT)
Dept: RADIOLOGY | Facility: HOSPITAL | Age: 54
Discharge: HOME | End: 2024-04-11
Attending: PHYSICIAN ASSISTANT

## 2024-04-11 ENCOUNTER — OFFICE VISIT (OUTPATIENT)
Dept: CARDIOTHORACIC SURGERY | Facility: CLINIC | Age: 54
End: 2024-04-11
Attending: PHYSICIAN ASSISTANT

## 2024-04-11 VITALS
DIASTOLIC BLOOD PRESSURE: 95 MMHG | SYSTOLIC BLOOD PRESSURE: 141 MMHG | HEIGHT: 66 IN | OXYGEN SATURATION: 100 % | BODY MASS INDEX: 25.83 KG/M2 | WEIGHT: 160.69 LBS

## 2024-04-11 DIAGNOSIS — J98.59 MEDIASTINAL MASS: Primary | ICD-10-CM

## 2024-04-11 DIAGNOSIS — C37 TYPE AB THYMOMA: ICD-10-CM

## 2024-04-11 PROCEDURE — 71250 CT THORAX DX C-: CPT | Mod: TC

## 2024-04-11 PROCEDURE — 99214 OFFICE O/P EST MOD 30 MIN: CPT | Mod: S$PBB,,, | Performed by: STUDENT IN AN ORGANIZED HEALTH CARE EDUCATION/TRAINING PROGRAM

## 2024-04-11 PROCEDURE — 99214 OFFICE O/P EST MOD 30 MIN: CPT | Mod: PBBFAC,25 | Performed by: STUDENT IN AN ORGANIZED HEALTH CARE EDUCATION/TRAINING PROGRAM

## 2024-04-11 PROCEDURE — 71250 CT THORAX DX C-: CPT | Mod: 26,,, | Performed by: RADIOLOGY

## 2024-04-11 PROCEDURE — 99999 PR PBB SHADOW E&M-EST. PATIENT-LVL IV: CPT | Mod: PBBFAC,,, | Performed by: STUDENT IN AN ORGANIZED HEALTH CARE EDUCATION/TRAINING PROGRAM

## 2024-04-24 DIAGNOSIS — M54.17 RADICULOPATHY, LUMBOSACRAL REGION: Primary | ICD-10-CM

## 2024-04-26 ENCOUNTER — HOSPITAL ENCOUNTER (OUTPATIENT)
Dept: RADIOLOGY | Facility: HOSPITAL | Age: 54
Discharge: HOME | End: 2024-04-26
Payer: COMMERCIAL

## 2024-04-26 DIAGNOSIS — M54.17 RADICULOPATHY, LUMBOSACRAL REGION: ICD-10-CM

## 2024-04-26 PROCEDURE — 25500020 PHARM REV CODE 255

## 2024-04-26 PROCEDURE — 72158 MRI LUMBAR SPINE W/O & W/DYE: CPT | Mod: 26,,, | Performed by: RADIOLOGY

## 2024-04-26 PROCEDURE — 72158 MRI LUMBAR SPINE W/O & W/DYE: CPT | Mod: TC

## 2024-04-26 PROCEDURE — A9585 GADOBUTROL INJECTION: HCPCS

## 2024-04-26 RX ORDER — GADOBUTROL 604.72 MG/ML
8 INJECTION INTRAVENOUS
Status: COMPLETED | OUTPATIENT
Start: 2024-04-26 | End: 2024-04-26

## 2024-04-26 RX ADMIN — GADOBUTROL 8 ML: 604.72 INJECTION INTRAVENOUS at 03:04

## 2024-05-17 ENCOUNTER — CLINICAL SUPPORT (OUTPATIENT)
Dept: REHABILITATION | Facility: HOSPITAL | Age: 54
End: 2024-05-17
Payer: COMMERCIAL

## 2024-05-17 DIAGNOSIS — M54.16 RIGHT LUMBAR RADICULOPATHY: Primary | ICD-10-CM

## 2024-05-17 DIAGNOSIS — M54.16 LEFT LUMBAR RADICULOPATHY: ICD-10-CM

## 2024-05-17 PROCEDURE — 97161 PT EVAL LOW COMPLEX 20 MIN: CPT

## 2024-05-17 NOTE — PROGRESS NOTES
[facility]   Physical Therapy Initial Evaluation     Date: 5/17/2024   Name: Laz Quintero  Clinic Number: [id number]    Therapy Diagnosis:   Encounter Diagnoses   Name Primary?    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  By the time she got home that day she could barely walk. She reported it to her work  the following day. Since that time she has had several courses of PT, MRIs, a Lumbar Decompression 6/2021. This PT and surgery failed and she continued with worsening pain levels in her L hip and back.  She stated eventually they did another MRI which revealed the failed surgery and they did a L5/S1 Fusion 8/2022. This also did not work and she continued with shooting into her left leg and hip. She knew something was wrong as PT was making her pain worse. She finally had a CT of her left hip which revealed a Labral tear and arthritis. She had an injection in her hip which reduced her pain for a few weeks.  She is currently seeing Ortho, Pain management and Neurosurgeon. She stated her most recent MRI revealed bulging disc at L4 and L5/S1 herniation. She has always felt set back with land PT so they are going to try Aquatic PT and hope to prevent surgery. She stated in her second surgery she coded so  they would like to avoid surgery,     Falls: none    Prior Therapy: Just finished with PT a few weeks ago due to increased pain levels.  She reports she has been in and out of PT for last few years.   Social History: lives with their family in 1 story -4 stairs to enter with reported difficulty ascending the stairs.  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  She is unable to take a bath in the but but able to take a shower.      Pain:  Current 7/10, worst 10/10, best 6/10   Location: L Lumbosacral and posterior hip   Description: Throbbing, burning, numbness, tingling  Aggravating Factors: bending forward, sitting greater than 15-20 minutes, driving greater than 15-20 minutes, standing greater than 10-15 minutes, cooking, stairs   Easing Factors: stretching, morning walks to the corner and around the block when able.     Patients goals:      Medical History:   Past Medical History:   Diagnosis Date    Abnormal Pap smear     Abnormal Pap smear of cervix     Annular tear of lumbar disc, L5-S1. 07/26/2012    Chronic LBP     HPTH (hyperparathyroidism)     Hyperlipidemia     Menorrhagia     PONV (postoperative nausea and vomiting)     Primary hypertension 01/31/2023    Right lumbar radiculopathy 07/26/2012    Seasonal allergies        Surgical History:   Laz Quintero  has a past surgical history that includes [place] tooth extraction; Cholecystectomy; Tubal ligation; hysterectomy, total, laparoscopic, with salpingectomy (06/16/2015); Colonoscopy (N/A, 01/19/2018); Esophagogastroduodenoscopy (N/A, 09/06/2019); Back surgery (2018); Colonoscopy (N/A, 12/28/2022); xi robotic rats (Right, 03/21/2023); and Injection of anesthetic agent around multiple intercostal nerves (Right,  2023).    Medications:   Laz has a current medication list which includes the following prescription(s): albuterol, alprazolam, atorvastatin, benzonatate, butalbital-acetaminophen-caffeine -40 mg, cymbalta, diclofenac sodium, escitalopram oxalate, estradiol, fluticasone propionate, gabapentin, hydrochlorothiazide, ketorolac, meloxicam, methocarbamol, oxycodone, and pregabalin.    Allergies:   Review of patient's allergies indicates:   Allergen Reactions    Clindamycin     Sulfa (sulfonamide antibiotics) Anaphylaxis    Sulfa dyne     Sulfamethoxazole-trimethoprim      Other reaction(s): Anaphylaxis    Dermabond [tissue glues] Rash        Pool contraindications, including but not limited to, incontinence, seizures, fever/GI issues were reviewed with the patient. Patient agrees that based on their knowledge and medical history, they are appropriate for Aquatic Therapy.     OBJECTIVE     TU seconds with moderate antalgic gait and difficulty with initiation of walking from sit to stand.      5 Times Sit to Stand: 22 seconds without use of hands with reported pain L hip and lumbar on the L.      Gait: The patient ambulates with moderate antalgic gait, reduced stance phase on the L and reduced hip extension on the L.       Range of Motion:     AROM Pain   Flexion Limited ~90% Pain L lumbar and posterior hip      Extension To neural  Pain L lumbar and posterior hip      SB R Limited ~90% Pain L Lumbar and posterior hip   SB L  Limited ~90% Pain L Lumbar and posterior hip        Lower Extremity Strength  Right LE   Left LE     Knee extension: 4+/5 Knee extension: 4-/5   Knee flexion: 4/5 Knee flexion: 4-/5   Hip flexion: 4/5 Hip flexion: 3/5   Hip abduction: 4-/5 Hip abduction: 3-/5   Ankle dorsiflexion: 4+/5 Ankle dorsiflexion: 4/5   Ankle plantarflexion: 4+/5 Ankle plantarflexion: 4/5   The patient reported pain with all L MMT testing      Palpation: (+) TTP noted L Lumbar Paraspinals, PSIS, Piriformis,  Gluteals.    Flexibility:   Hamstring R: Moderate restriction with pain B lumbar        L  Severe restriction with posterior hip, Lumbar pain     Piriformis R Moderate restriction with B Lumbar pain     L Unable to assess due to inability to reach end range      PATIENT EDUCATION AND HOME EXERCISES     Education provided:   - Diagnosis, prognosis, relevant anatomy, role of therapy      Written Home Exercises Provided: Continue HEP from previous course of care.     ASSESSMENT     Laz is a 53 y.o. female referred to outpatient Physical Therapy with a medical diagnosis of L Lumbar Radiculopathy. Patient presents with limited lumbar AROM, decreased L more than R LE strength, increased time for TUG and 5 times sit to stand and functional limitations that impact the patient's ability to perform ADLs and preferred activities at prior level of function.     Patient prognosis is Good.     Patient will benefit from skilled outpatient Physical Therapy to address the deficits stated above and in the chart below, provide patient /family education, and to maximize patientt's level of independence.     Plan of care discussed with patient: Yes    Patient's spiritual, cultural and educational needs considered and patient is agreeable to the plan of care and goals as stated below:     Anticipated Barriers for therapy: None    Medical Necessity is demonstrated by the following  History  Co-morbidities and personal factors that may impact the plan of care Co-morbidities:   Past Medical History:   Diagnosis Date    Abnormal Pap smear     Abnormal Pap smear of cervix     Annular tear of lumbar disc, L5-S1. 07/26/2012    Chronic LBP     HPTH (hyperparathyroidism)     Hyperlipidemia     Menorrhagia     PONV (postoperative nausea and vomiting)     Primary hypertension 01/31/2023    Right lumbar radiculopathy 07/26/2012    Seasonal allergies        Personal Factors:   no deficits     low   Examination  Body Structures and Functions,  activity limitations and participation restrictions that may impact the plan of care Body Regions:   back  lower extremities    Body Systems:    ROM  strength  gross coordinated movement  balance  gait  transfers  transitions    Participation Restrictions:   none    Activity limitations:   Learning and applying knowledge  no deficits    General Tasks and Commands  no deficits    Communication  no deficits    Mobility  lifting and carrying objects  walking  driving (bike, car, motorcycle)    Self care  washing oneself (bathing, drying, washing hands)  dressing    Domestic Life  shopping  cooking  doing house work (cleaning house, washing dishes, laundry)    Interactions/Relationships  no deficits    Life Areas  no deficits    Community and Social Life  no deficits         moderate   Clinical Presentation stable and uncomplicated low   Decision Making/ Complexity Score: low       Goals:  Short Term Goals: 6 weeks   1. Patient will be independent in HEP & progressions.  2. Patient will achieve TUG score of 11 sec to demonstrate improved mobility  3. The patient will achieve 5 sit to stand score of 20 seconds to demonstrate improved transfers and endurance.     Long Term Goals: 12 weeks   1. The patient will demonstrate independence with extensive HEP.  2. Patient will achieve 5 sit to stand score of 19 seconds to demonstrate improved transfers & endurance.  3. Patent is able to demonstrate MMT 4/5 L LE and 4+/5 R LE without pain reports during testing.      PLAN   Plan of care Certification: 5/17/2024 to 7/17/24.    Outpatient Physical Therapy 2 times weekly for 8 weeks to include the following interventions: manual therapy, aquatic therapy, patient education, therapeutic exercise, therapeutic activities.    Patient may be seen by PTA as part of rehabilitation team.    Frances Reddy, PT      I CERTIFY THE NEED FOR THESE SERVICES FURNISHED UNDER THIS PLAN OF TREATMENT AND WHILE UNDER MY CARE   Physician's comments:      Physician's Signature: ___________________________________________________

## 2024-05-20 ENCOUNTER — CLINICAL SUPPORT (OUTPATIENT)
Dept: REHABILITATION | Facility: HOSPITAL | Age: 54
End: 2024-05-20
Payer: COMMERCIAL

## 2024-05-20 DIAGNOSIS — M54.16 LEFT LUMBAR RADICULOPATHY: Primary | ICD-10-CM

## 2024-05-20 PROCEDURE — 97113 AQUATIC THERAPY/EXERCISES: CPT

## 2024-05-20 NOTE — PROGRESS NOTES
[facility]   Physical Therapy Treatment Note     Name: Laz Quintero  Clinic Number: [id number]    Therapy Diagnosis:   Encounter Diagnosis   Name Primary?    Left lumbar radiculopathy Yes     Physician: aRmin Sloan MD    Visit Date: 5/20/2024    Physician Orders: Aquatic Therapy   Medical Diagnosis from Referral: LBP with radicular symptoms  Evaluation Date: 5/17/2024  Authorization Period Expiration: 5/6/25  Plan of Care Expiration: 7/17/24  Visit # / Visits authorized: 2/12     Precautions: Standard and Fall    PTA Visit #: 0/5     Time In: 9:35 am   Time Out: 10:30 am   Total Billable Time: 55 minutes    SUBJECTIVE     Pt reports: she was a little sore after the evaluation but not too bad.    She was compliant with home exercise program.    Response to previous treatment: first pool visit    Functional change: initiated aquatic PT    Pain: 6.5/10  Location: left back  and hip      OBJECTIVE     Objective Measures updated at progress report unless specified.     Treatment     Laz received aquatic therapeutic exercises to develop strength, endurance, ROM, flexibility, posture, and core stabilization for 55 minutes including:    FUNCTIONAL MOBILITY TRAINING x 2 laps each at beginning and 1 lap each at end of session  Walk forward/backward/lateral with slow controlled steps    STRETCHES 2 x 30sec  HS--add as tolerated    LE EX x 20  Squat  Heel raise with gluteal set  Hip abduction  Hip flex  HS curl  Hip Ext--add next visti    Seated on Pool stool  Sit to stand   LAQ  Hip flex  Clam    Walking marches x 2 laps      UE EX/CORE  x 20--Add next visit  Shoulder flex/ext TA activation paddles CLOSED  Shoulder horizontal abd/add TA activation paddles CLOSED  Shoulder abd/add with TA activation and paddles CLOSED    Mini squat with push/pull red kickboard      ENDURANCE  LTR x 2'  Bicycle in // bars x 3'--add next       Patient Education and Home Exercises     Home Exercises  Provided and Patient Education Provided     Education provided:   Role of aquatic therapy  Hydration post therapy      Written Home Exercises Provided: Patient instructed to cont prior HEP.    ASSESSMENT     The patient tolerated her initial pool session well today. She reported a slight reduction of pain to 6/10 upon completion.  Will progress all exercises as tolerated by the patient.     Laz Is progressing well towards her goals.     Pt prognosis is Good.     Pt will continue to benefit from skilled outpatient physical therapy to address the deficits listed in the problem list box on initial evaluation, provide pt/family education and to maximize pt's level of independence in the home and community environment.     Pt's spiritual, cultural and educational needs considered and pt agreeable to plan of care and goals.     Anticipated barriers to physical therapy: chronicity and multi body part involvement    Goals:   Short Term Goals: 6 weeks   1. Patient will be independent in HEP & progressions.  2. Patient will achieve TUG score of 11 sec to demonstrate improved mobility  3. The patient will achieve 5 sit to stand score of 20 seconds to demonstrate improved transfers and endurance.     Long Term Goals: 12 weeks   1. The patient will demonstrate independence with extensive HEP.  2. Patient will achieve 5 sit to stand score of 19 seconds to demonstrate improved transfers & endurance.  3. Patent is able to demonstrate MMT 4/5 L LE and 4+/5 R LE without pain reports during testing.      PLAN   Plan of care Certification: 5/17/2024 to 7/17/24.    Outpatient Physical Therapy 2 times weekly for 8 weeks to include the following interventions: manual therapy, aquatic therapy, patient education, therapeutic exercise, therapeutic activities.    Patient may be seen by PTA as part of rehabilitation team.        Frances Reddy, PT

## 2024-05-20 NOTE — PLAN OF CARE
[facility]   Physical Therapy Initial Evaluation     Date: 5/17/2024   Name: Laz Quintero  Clinic Number: [id number]    Therapy Diagnosis:   Encounter Diagnoses   Name Primary?    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  By the time she got home that day she could barely walk. She reported it to her work  the following day. Since that time she has had several courses of PT, MRIs, a Lumbar Decompression 6/2021. This PT and surgery failed and she continued with worsening pain levels in her L hip and back.  She stated eventually they did another MRI which revealed the failed surgery and they did a L5/S1 Fusion 8/2022. This also did not work and she continued with shooting into her left leg and hip. She knew something was wrong as PT was making her pain worse. She finally had a CT of her left hip which revealed a Labral tear and arthritis. She had an injection in her hip which reduced her pain for a few weeks.  She is currently seeing Ortho, Pain management and Neurosurgeon. She stated her most recent MRI revealed bulging disc at L4 and L5/S1 herniation. She has always felt set back with land PT so they are going to try Aquatic PT and hope to prevent surgery. She stated in her second surgery she coded so  they would like to avoid surgery,     Falls: none    Prior Therapy: Just finished with PT a few weeks ago due to increased pain levels.  She reports she has been in and out of PT for last few years.   Social History: lives with their family in 1 story -4 stairs to enter with reported difficulty ascending the stairs.  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  She is unable to take a bath in the but but able to take a shower.      Pain:  Current 7/10, worst 10/10, best 6/10   Location: L Lumbosacral and posterior hip   Description: Throbbing, burning, numbness, tingling  Aggravating Factors: bending forward, sitting greater than 15-20 minutes, driving greater than 15-20 minutes, standing greater than 10-15 minutes, cooking, stairs   Easing Factors: stretching, morning walks to the corner and around the block when able.     Patients goals:      Medical History:   Past Medical History:   Diagnosis Date    Abnormal Pap smear     Abnormal Pap smear of cervix     Annular tear of lumbar disc, L5-S1. 07/26/2012    Chronic LBP     HPTH (hyperparathyroidism)     Hyperlipidemia     Menorrhagia     PONV (postoperative nausea and vomiting)     Primary hypertension 01/31/2023    Right lumbar radiculopathy 07/26/2012    Seasonal allergies        Surgical History:   Laz Quintero  has a past surgical history that includes [place] tooth extraction; Cholecystectomy; Tubal ligation; hysterectomy, total, laparoscopic, with salpingectomy (06/16/2015); Colonoscopy (N/A, 01/19/2018); Esophagogastroduodenoscopy (N/A, 09/06/2019); Back surgery (2018); Colonoscopy (N/A, 12/28/2022); xi robotic rats (Right, 03/21/2023); and Injection of anesthetic agent around multiple intercostal nerves (Right,  2023).    Medications:   Laz has a current medication list which includes the following prescription(s): albuterol, alprazolam, atorvastatin, benzonatate, butalbital-acetaminophen-caffeine -40 mg, cymbalta, diclofenac sodium, escitalopram oxalate, estradiol, fluticasone propionate, gabapentin, hydrochlorothiazide, ketorolac, meloxicam, methocarbamol, oxycodone, and pregabalin.    Allergies:   Review of patient's allergies indicates:   Allergen Reactions    Clindamycin     Sulfa (sulfonamide antibiotics) Anaphylaxis    Sulfa dyne     Sulfamethoxazole-trimethoprim      Other reaction(s): Anaphylaxis    Dermabond [tissue glues] Rash        Pool contraindications, including but not limited to, incontinence, seizures, fever/GI issues were reviewed with the patient. Patient agrees that based on their knowledge and medical history, they are appropriate for Aquatic Therapy.     OBJECTIVE     TU seconds with moderate antalgic gait and difficulty with initiation of walking from sit to stand.      5 Times Sit to Stand: 22 seconds without use of hands with reported pain L hip and lumbar on the L.      Gait: The patient ambulates with moderate antalgic gait, reduced stance phase on the L and reduced hip extension on the L.       Range of Motion:     AROM Pain   Flexion Limited ~90% Pain L lumbar and posterior hip      Extension To neural  Pain L lumbar and posterior hip      SB R Limited ~90% Pain L Lumbar and posterior hip   SB L  Limited ~90% Pain L Lumbar and posterior hip        Lower Extremity Strength  Right LE   Left LE     Knee extension: 4+/5 Knee extension: 4-/5   Knee flexion: 4/5 Knee flexion: 4-/5   Hip flexion: 4/5 Hip flexion: 3/5   Hip abduction: 4-/5 Hip abduction: 3-/5   Ankle dorsiflexion: 4+/5 Ankle dorsiflexion: 4/5   Ankle plantarflexion: 4+/5 Ankle plantarflexion: 4/5   The patient reported pain with all L MMT testing      Palpation: (+) TTP noted L Lumbar Paraspinals, PSIS, Piriformis,  Gluteals.    Flexibility:   Hamstring R: Moderate restriction with pain B lumbar        L  Severe restriction with posterior hip, Lumbar pain     Piriformis R Moderate restriction with B Lumbar pain     L Unable to assess due to inability to reach end range      PATIENT EDUCATION AND HOME EXERCISES     Education provided:   - Diagnosis, prognosis, relevant anatomy, role of therapy      Written Home Exercises Provided: Continue HEP from previous course of care.     ASSESSMENT     Laz is a 53 y.o. female referred to outpatient Physical Therapy with a medical diagnosis of L Lumbar Radiculopathy. Patient presents with limited lumbar AROM, decreased L more than R LE strength, increased time for TUG and 5 times sit to stand and functional limitations that impact the patient's ability to perform ADLs and preferred activities at prior level of function.     Patient prognosis is Good.     Patient will benefit from skilled outpatient Physical Therapy to address the deficits stated above and in the chart below, provide patient /family education, and to maximize patientt's level of independence.     Plan of care discussed with patient: Yes    Patient's spiritual, cultural and educational needs considered and patient is agreeable to the plan of care and goals as stated below:     Anticipated Barriers for therapy: None    Medical Necessity is demonstrated by the following  History  Co-morbidities and personal factors that may impact the plan of care Co-morbidities:   Past Medical History:   Diagnosis Date    Abnormal Pap smear     Abnormal Pap smear of cervix     Annular tear of lumbar disc, L5-S1. 07/26/2012    Chronic LBP     HPTH (hyperparathyroidism)     Hyperlipidemia     Menorrhagia     PONV (postoperative nausea and vomiting)     Primary hypertension 01/31/2023    Right lumbar radiculopathy 07/26/2012    Seasonal allergies        Personal Factors:   no deficits     low   Examination  Body Structures and Functions,  activity limitations and participation restrictions that may impact the plan of care Body Regions:   back  lower extremities    Body Systems:    ROM  strength  gross coordinated movement  balance  gait  transfers  transitions    Participation Restrictions:   none    Activity limitations:   Learning and applying knowledge  no deficits    General Tasks and Commands  no deficits    Communication  no deficits    Mobility  lifting and carrying objects  walking  driving (bike, car, motorcycle)    Self care  washing oneself (bathing, drying, washing hands)  dressing    Domestic Life  shopping  cooking  doing house work (cleaning house, washing dishes, laundry)    Interactions/Relationships  no deficits    Life Areas  no deficits    Community and Social Life  no deficits         moderate   Clinical Presentation stable and uncomplicated low   Decision Making/ Complexity Score: low       Goals:  Short Term Goals: 6 weeks   1. Patient will be independent in HEP & progressions.  2. Patient will achieve TUG score of 11 sec to demonstrate improved mobility  3. The patient will achieve 5 sit to stand score of 20 seconds to demonstrate improved transfers and endurance.     Long Term Goals: 12 weeks   1. The patient will demonstrate independence with extensive HEP.  2. Patient will achieve 5 sit to stand score of 19 seconds to demonstrate improved transfers & endurance.  3. Patent is able to demonstrate MMT 4/5 L LE and 4+/5 R LE without pain reports during testing.      PLAN   Plan of care Certification: 5/17/2024 to 7/17/24.    Outpatient Physical Therapy 2 times weekly for 8 weeks to include the following interventions: manual therapy, aquatic therapy, patient education, therapeutic exercise, therapeutic activities.    Patient may be seen by PTA as part of rehabilitation team.    Frances Reddy, PT      I CERTIFY THE NEED FOR THESE SERVICES FURNISHED UNDER THIS PLAN OF TREATMENT AND WHILE UNDER MY CARE   Physician's comments:      Physician's Signature: ___________________________________________________

## 2024-05-21 ENCOUNTER — CLINICAL SUPPORT (OUTPATIENT)
Dept: REHABILITATION | Facility: HOSPITAL | Age: 54
End: 2024-05-21
Payer: COMMERCIAL

## 2024-05-21 DIAGNOSIS — M54.16 LEFT LUMBAR RADICULOPATHY: Primary | ICD-10-CM

## 2024-05-21 PROCEDURE — 97113 AQUATIC THERAPY/EXERCISES: CPT

## 2024-05-21 NOTE — PROGRESS NOTES
[facility]   Physical Therapy Treatment Note     Name: Laz Quintero  Clinic Number: [id number]    Therapy Diagnosis:   Encounter Diagnosis   Name Primary?    Left lumbar radiculopathy Yes     Physician: Ramin Sloan MD    Visit Date: 5/21/2024    Physician Orders: Aquatic Therapy   Medical Diagnosis from Referral: LBP with radicular symptoms  Evaluation Date: 5/17/2024  Authorization Period Expiration: 5/6/25  Plan of Care Expiration: 7/17/24  Visit # / Visits authorized: 3/12     Precautions: Standard and Fall    PTA Visit #: 0/5     Time In: 10:30 am   Time Out: 11:25 am   Total Billable Time: 55 minutes    SUBJECTIVE     Pt reports: she is sore today after her treatment yesterday.    She was compliant with home exercise program.    Response to previous treatment: soreness all over    Functional change: initiated aquatic PT    Pain: 6.5/10  Location: left back  and hip      OBJECTIVE     Objective Measures updated at progress report unless specified.     Treatment     Laz received aquatic therapeutic exercises to develop strength, endurance, ROM, flexibility, posture, and core stabilization for 55 minutes including:    FUNCTIONAL MOBILITY TRAINING x 2 laps each at beginning and 1 lap each at end of session  Walk forward/backward/lateral with slow controlled steps    STRETCHES 2 x 30sec  HS--add as tolerated    LE EX x 20  Squat  Heel raise with gluteal set  Hip abduction  Hip flex  HS curl-hold  Hip Ext    Seated on Pool stool  Sit to stand   LAQ  Hip flex  Clam    Walking marches x 2 laps      UE EX/CORE  x 20--Add as tolerated  Shoulder flex/ext TA activation paddles CLOSED  Shoulder horizontal abd/add TA activation paddles CLOSED  Shoulder abd/add with TA activation and paddles CLOSED    Mini squat with push/pull red kickboard      ENDURANCE  LTR x 2'  Bicycle in // bars x 3'--add next       Patient Education and Home Exercises     Home Exercises Provided and Patient  Education Provided     Education provided:   Role of aquatic therapy  Hydration post therapy      Written Home Exercises Provided: Patient instructed to cont prior HEP.    ASSESSMENT     The patient reported she felt like she was loosening up a little bit after the session. Her treatment was not progressed due to attending PT back to back day this week and will progress next visit.     Laz Is progressing well towards her goals.     Pt prognosis is Good.   thn  Pt will continue to benefit from skilled outpatient physical therapy to address the deficits listed in the problem list box on initial evaluation, provide pt/family education and to maximize pt's level of independence in the home and community environment.     Pt's spiritual, cultural and educational needs considered and pt agreeable to plan of care and goals.     Anticipated barriers to physical therapy: chronicity and multi body part involvement    Goals:   Short Term Goals: 6 weeks   1. Patient will be independent in HEP & progressions.  2. Patient will achieve TUG score of 11 sec to demonstrate improved mobility  3. The patient will achieve 5 sit to stand score of 20 seconds to demonstrate improved transfers and endurance.     Long Term Goals: 12 weeks   1. The patient will demonstrate independence with extensive HEP.  2. Patient will achieve 5 sit to stand score of 19 seconds to demonstrate improved transfers & endurance.  3. Patent is able to demonstrate MMT 4/5 L LE and 4+/5 R LE without pain reports during testing.      PLAN   Plan of care Certification: 5/17/2024 to 7/17/24.    Outpatient Physical Therapy 2 times weekly for 8 weeks to include the following interventions: manual therapy, aquatic therapy, patient education, therapeutic exercise, therapeutic activities.    Patient may be seen by PTA as part of rehabilitation team.        Frances Reddy, PT

## 2024-05-29 ENCOUNTER — CLINICAL SUPPORT (OUTPATIENT)
Dept: REHABILITATION | Facility: HOSPITAL | Age: 54
End: 2024-05-29
Payer: COMMERCIAL

## 2024-05-29 DIAGNOSIS — M54.16 LEFT LUMBAR RADICULOPATHY: Primary | ICD-10-CM

## 2024-05-29 PROCEDURE — 97113 AQUATIC THERAPY/EXERCISES: CPT

## 2024-05-29 NOTE — PROGRESS NOTES
[facility]   Physical Therapy Treatment Note     Name: Laz Quintero  Clinic Number: [id number]    Therapy Diagnosis:   Encounter Diagnosis   Name Primary?    Left lumbar radiculopathy Yes     Physician: Ramin Sloan MD    Visit Date: 5/29/2024    Physician Orders: Aquatic Therapy   Medical Diagnosis from Referral: LBP with radicular symptoms  Evaluation Date: 5/17/2024  Authorization Period Expiration: 5/6/25  Plan of Care Expiration: 7/17/24  Visit # / Visits authorized: 4/12     Precautions: Standard and Fall    PTA Visit #: 0/5     Time In: 11:30 am   Time Out: 12:20 pm  Total Billable Time: 50 minutes    SUBJECTIVE     Pt reports: she has so much going on that her pain is about a 5/10 today.  She stated she has to go out of town for a family emergency next week.  She stated she was sore after the last visit and it lasted ~2 days. She was in her neighbors pool Sunday and did some of her exercises.     She was compliant with home exercise program.    Response to previous treatment: soreness all over    Functional change: initiated aquatic PT    Pain: 6.5/10  Location: left back  and hip      OBJECTIVE     Objective Measures updated at progress report unless specified.     Treatment     Laz received aquatic therapeutic exercises to develop strength, endurance, ROM, flexibility, posture, and core stabilization for 50 minutes including:    FUNCTIONAL MOBILITY TRAINING x 2 laps each at beginning and 1 lap each at end of session  Walk forward/backward/lateral with slow controlled steps    STRETCHES 2 x 30sec  HS--add as tolerated    LE EX x 20  Squat  Heel raise with gluteal set  Hip abduction  Hip flex  Hip Ext    HS curl-hold    Seated on Pool stool  Sit to stand   LAQ  Hip flex  Clam    Walking marches x 2 laps    UE EX/CORE  x 20--Add next visit  Shoulder flex/ext TA activation paddles OPEN  Shoulder horizontal abd/add TA activation paddles OPEN  Shoulder abd/add with TA  activation and paddles OPEN    Mini squat with push/pull red kickboard    ENDURANCE  LTR x 2'  Bicycle in // bars x 3'--add next       Patient Education and Home Exercises     Home Exercises Provided and Patient Education Provided     Education provided:   Role of aquatic therapy  Hydration post therapy      Written Home Exercises Provided: Patient instructed to cont prior HEP.    ASSESSMENT     The patient performed all exercises with slow controlled motions with intermittent finger hold on wall for added support. She reported an overall slight increase in pain level to 6/10 throughout the session but this did not limit her ability perform exercises at full reps and sets.     Laz Is progressing well towards her goals.     Pt prognosis is Good.   thn  Pt will continue to benefit from skilled outpatient physical therapy to address the deficits listed in the problem list box on initial evaluation, provide pt/family education and to maximize pt's level of independence in the home and community environment.     Pt's spiritual, cultural and educational needs considered and pt agreeable to plan of care and goals.     Anticipated barriers to physical therapy: chronicity and multi body part involvement    Goals:   Short Term Goals: 6 weeks   1. Patient will be independent in HEP & progressions.  2. Patient will achieve TUG score of 11 sec to demonstrate improved mobility  3. The patient will achieve 5 sit to stand score of 20 seconds to demonstrate improved transfers and endurance.     Long Term Goals: 12 weeks   1. The patient will demonstrate independence with extensive HEP.  2. Patient will achieve 5 sit to stand score of 19 seconds to demonstrate improved transfers & endurance.  3. Patent is able to demonstrate MMT 4/5 L LE and 4+/5 R LE without pain reports during testing.      PLAN   Plan of care Certification: 5/17/2024 to 7/17/24.    Outpatient Physical Therapy 2 times weekly for 8 weeks to include the following  interventions: manual therapy, aquatic therapy, patient education, therapeutic exercise, therapeutic activities.    Patient may be seen by PTA as part of rehabilitation team.      Frances Reddy, PT

## 2024-05-30 ENCOUNTER — CLINICAL SUPPORT (OUTPATIENT)
Dept: REHABILITATION | Facility: HOSPITAL | Age: 54
End: 2024-05-30
Payer: COMMERCIAL

## 2024-05-30 DIAGNOSIS — M54.16 LEFT LUMBAR RADICULOPATHY: Primary | ICD-10-CM

## 2024-05-30 PROCEDURE — 97113 AQUATIC THERAPY/EXERCISES: CPT

## 2024-05-30 NOTE — PROGRESS NOTES
[facility]   Physical Therapy Treatment Note     Name: Laz Quintero  Clinic Number: [id number]    Therapy Diagnosis:   Encounter Diagnosis   Name Primary?    Left lumbar radiculopathy Yes     Physician: Ramin Sloan MD    Visit Date: 5/30/2024    Physician Orders: Aquatic Therapy   Medical Diagnosis from Referral: LBP with radicular symptoms  Evaluation Date: 5/17/2024  Authorization Period Expiration: 5/6/25  Plan of Care Expiration: 7/17/24  Visit # / Visits authorized: 5/12     Precautions: Standard and Fall    PTA Visit #: 0/5     Time In: 1:30 pm  Time Out: 2:25 pm  Total Billable Time: 55 minutes    SUBJECTIVE     Pt reports: she was sore after her treatment yesterday. She stated her pain is mostly on the posterior left hip pain. She stated she took an anti-inflammatory and muscle relaxer as she felt like she was getting a muscle spasm. She is leaving [facility] Saturday to go to [place] for a family emergency and plans to return next Wednesday.     She was compliant with home exercise program.    Response to previous treatment: soreness all over    Functional change: initiated aquatic PT    Pain: 5.5-6/10  Location: left back and hip      OBJECTIVE     Objective Measures updated at progress report unless specified.     Treatment     Laz received aquatic therapeutic exercises to develop strength, endurance, ROM, flexibility, posture, and core stabilization for 55 minutes including:    FUNCTIONAL MOBILITY TRAINING x 2 laps each at beginning and 1 lap each at end of session  Walk forward/backward/lateral with slow controlled steps    STRETCHES 2 x 30sec  HS--add as tolerated    LE EX x 20  Squat  Heel raise with gluteal set  Hip abduction  Hip flex  Hip Ext--hold  HS curl-re-added today    Seated on Pool stool  Sit to stand   LAQ  Hip flex  Clam    Walking marches x 2 laps    UE EX/CORE  x 20  Shoulder abd/add TA activation paddles OPEN--added today  Shoulder horizontal abd/add TA  activation paddles OPEN--added today      Shoulder flex/ext with TA activation and paddles OPEN--Add as tolerated    Mini squat with push/pull red kick board--add as tolerated    ENDURANCE  LTR x 3'  Bicycle in // bars x 3'--add next       Patient Education and Home Exercises     Home Exercises Provided and Patient Education Provided     Education provided:   Role of aquatic therapy  Hydration post therapy      Written Home Exercises Provided: Patient instructed to cont prior HEP.    ASSESSMENT     The patient presented with some elevated L posterior hip pain therefore held hip extension today and re-added the HS curl to avoid increased pain. She was able to initiate the UE core exercises with open paddles today with good core activation and minimal cueing to avoid lumbar lordosis.     Laz Is progressing well towards her goals.     Pt prognosis is Good.   thn  Pt will continue to benefit from skilled outpatient physical therapy to address the deficits listed in the problem list box on initial evaluation, provide pt/family education and to maximize pt's level of independence in the home and community environment.     Pt's spiritual, cultural and educational needs considered and pt agreeable to plan of care and goals.     Anticipated barriers to physical therapy: chronicity and multi body part involvement    Goals:   Short Term Goals: 6 weeks   1. Patient will be independent in HEP & progressions.  2. Patient will achieve TUG score of 11 sec to demonstrate improved mobility  3. The patient will achieve 5 sit to stand score of 20 seconds to demonstrate improved transfers and endurance.     Long Term Goals: 12 weeks   1. The patient will demonstrate independence with extensive HEP.  2. Patient will achieve 5 sit to stand score of 19 seconds to demonstrate improved transfers & endurance.  3. Patent is able to demonstrate MMT 4/5 L LE and 4+/5 R LE without pain reports during testing.      PLAN   Plan of care  Certification: 5/17/2024 to 7/17/24.    Outpatient Physical Therapy 2 times weekly for 8 weeks to include the following interventions: manual therapy, aquatic therapy, patient education, therapeutic exercise, therapeutic activities.    Patient may be seen by PTA as part of rehabilitation team.      Frances Reddy, PT

## 2024-06-06 ENCOUNTER — CLINICAL SUPPORT (OUTPATIENT)
Dept: REHABILITATION | Facility: HOSPITAL | Age: 54
End: 2024-06-06
Payer: COMMERCIAL

## 2024-06-06 DIAGNOSIS — M54.16 LEFT LUMBAR RADICULOPATHY: Primary | ICD-10-CM

## 2024-06-06 PROCEDURE — 97113 AQUATIC THERAPY/EXERCISES: CPT

## 2024-06-06 NOTE — PROGRESS NOTES
[facility]   Physical Therapy Treatment Note     Name: Laz Quintero  Clinic Number: [id number]    Therapy Diagnosis:   Encounter Diagnosis   Name Primary?    Left lumbar radiculopathy Yes     Physician: Ramin Sloan MD    Visit Date: 6/6/2024    Physician Orders: Aquatic Therapy   Medical Diagnosis from Referral: LBP with radicular symptoms  Evaluation Date: 5/17/2024  Authorization Period Expiration: 5/6/25  Plan of Care Expiration: 7/17/24  Visit # / Visits authorized: 6/12     Precautions: Standard and Fall    PTA Visit #: 0/5     Time In: 1:28 pm  Time Out:2:30 pm  Total Billable Time: 60 minutes    SUBJECTIVE     Pt reports: while out of town she had to take some pain medication.  She reported that her pain was increased when she first got there which she attributed to being on the flight but took some medicine and it was better. She has less pain after flying back last night. She did a lot of sitting in the hospital with her mother but tried to walk around and stay as active as possible.     She was compliant with home exercise program.    Response to previous treatment: soreness, but less than previous visits    Functional change: none at this time    Pain: 5.5-6/10  Location: left back and hip      OBJECTIVE     Objective Measures updated at progress report unless specified.     Treatment     Laz received aquatic therapeutic exercises to develop strength, endurance, ROM, flexibility, posture, and core stabilization for 60 minutes including:    FUNCTIONAL MOBILITY TRAINING x 2 laps each at beginning and 1 lap each at end of session  Walk forward/backward/lateral with slow controlled steps    STRETCHES 2 x 30sec  HS--add as tolerated    LE EX x 20  Squat  Heel raise with gluteal set  Hip abduction  Hip flex  HS curl    Hip Ext--hold    Seated on Pool stool  Sit to stand   LAQ  Hip flex  Clam    Walking marches x 2 laps    UE EX/CORE  x 20  Shoulder abd/add TA activation  paddles OPEN  Shoulder horizontal abd/add TA activation paddles OPEN      Shoulder flex/ext with TA activation and paddles OPEN--Add as tolerated  Mini squat with push/pull red kick board--add as tolerated    ENDURANCE  LTR x 3'  Bicycle in // bars x 3'--add next       Patient Education and Home Exercises     Home Exercises Provided and Patient Education Provided     Education provided:   Role of aquatic therapy  Hydration post therapy      Written Home Exercises Provided: Patient instructed to cont prior HEP.    ASSESSMENT   The patient performed all exercises which were performed at the previous visit with no increase in symptoms today. She performed them all with slow controlled motions with good core activation. The treatment will attempt to be progressed next visit pending pain levels and tolerance to today's session.     Laz Is progressing well towards her goals.     Pt prognosis is Good.   thn  Pt will continue to benefit from skilled outpatient physical therapy to address the deficits listed in the problem list box on initial evaluation, provide pt/family education and to maximize pt's level of independence in the home and community environment.     Pt's spiritual, cultural and educational needs considered and pt agreeable to plan of care and goals.     Anticipated barriers to physical therapy: chronicity and multi body part involvement    Goals:   Short Term Goals: 6 weeks   1. Patient will be independent in HEP & progressions.  2. Patient will achieve TUG score of 11 sec to demonstrate improved mobility  3. The patient will achieve 5 sit to stand score of 20 seconds to demonstrate improved transfers and endurance.     Long Term Goals: 12 weeks   1. The patient will demonstrate independence with extensive HEP.  2. Patient will achieve 5 sit to stand score of 19 seconds to demonstrate improved transfers & endurance.  3. Patent is able to demonstrate MMT 4/5 L LE and 4+/5 R LE without pain reports during  testing.      PLAN   Plan of care Certification: 5/17/2024 to 7/17/24.    Outpatient Physical Therapy 2 times weekly for 8 weeks to include the following interventions: manual therapy, aquatic therapy, patient education, therapeutic exercise, therapeutic activities.    Patient may be seen by PTA as part of rehabilitation team.      Frances Reddy, PT

## 2024-06-07 ENCOUNTER — CLINICAL SUPPORT (OUTPATIENT)
Dept: REHABILITATION | Facility: HOSPITAL | Age: 54
End: 2024-06-07
Payer: COMMERCIAL

## 2024-06-07 DIAGNOSIS — M54.16 LEFT LUMBAR RADICULOPATHY: Primary | ICD-10-CM

## 2024-06-07 PROCEDURE — 97113 AQUATIC THERAPY/EXERCISES: CPT

## 2024-06-07 NOTE — PROGRESS NOTES
[facility]   Physical Therapy Treatment Note     Name: Laz Quintero  Clinic Number: [id number]    Therapy Diagnosis:   Encounter Diagnosis   Name Primary?    Left lumbar radiculopathy Yes     Physician: Ramin Sloan MD    Visit Date: 6/7/2024    Physician Orders: Aquatic Therapy   Medical Diagnosis from Referral: LBP with radicular symptoms  Evaluation Date: 5/17/2024  Authorization Period Expiration: 5/6/25  Plan of Care Expiration: 7/17/24  Visit # / Visits authorized: 7/12     Precautions: Standard and Fall    PTA Visit #: 0/5     Time In: 12:15 pm  Time Out:1:30 pm  Total Billable Time: 70 minutes    SUBJECTIVE     Pt reports: she has muscle soreness from her treatment yesterday.     She was compliant with home exercise program.    Response to previous treatment: soreness, but less than previous visits    Functional change: none at this time    Pain: 5.5-6/10  Location: left back and hip      OBJECTIVE     Objective Measures updated at progress report unless specified.     Treatment     Laz received aquatic therapeutic exercises to develop strength, endurance, ROM, flexibility, posture, and core stabilization for 70 minutes including:    FUNCTIONAL MOBILITY TRAINING x 2 laps each at beginning and 1 lap each at end of session  Walk forward/backward/lateral with slow controlled steps    STRETCHES 2 x 30sec  HS--add as tolerated    LE EX x 22 reps  Squat  Heel raise with gluteal set  Hip abduction  Hip flex  HS curl    Hip Ext--hold    Seated on Pool stool  Sit to stand   LAQ  Hip flex  Clam    Walking marches x 2 laps in // bars   Tandem walking x 2 laps in // bars     UE EX/CORE  x 20  Shoulder abd/add TA activation paddles CLOSED  Shoulder horizontal abd/add TA activation paddles CLOSED  Shoulder flex/ext with TA activation and paddles CLOSED       Mini squat with push/pull red kick board--add as tolerated    ENDURANCE  LTR x 3'--performed 2 times throughout the  session  Bicycle in // bars x 3'--add next       Patient Education and Home Exercises     Home Exercises Provided and Patient Education Provided     Education provided:   Role of aquatic therapy  Hydration post therapy      Written Home Exercises Provided: Patient instructed to cont prior HEP.    ASSESSMENT   The patient tolerated the advanced resistance of UE exercises with no increase of symtoms and performed with closed paddles today.     Laz Is progressing well towards her goals.     Pt prognosis is Good.   thn  Pt will continue to benefit from skilled outpatient physical therapy to address the deficits listed in the problem list box on initial evaluation, provide pt/family education and to maximize pt's level of independence in the home and community environment.     Pt's spiritual, cultural and educational needs considered and pt agreeable to plan of care and goals.     Anticipated barriers to physical therapy: chronicity and multi body part involvement    Goals:   Short Term Goals: 6 weeks   1. Patient will be independent in HEP & progressions.  2. Patient will achieve TUG score of 11 sec to demonstrate improved mobility  3. The patient will achieve 5 sit to stand score of 20 seconds to demonstrate improved transfers and endurance.     Long Term Goals: 12 weeks   1. The patient will demonstrate independence with extensive HEP.  2. Patient will achieve 5 sit to stand score of 19 seconds to demonstrate improved transfers & endurance.  3. Patent is able to demonstrate MMT 4/5 L LE and 4+/5 R LE without pain reports during testing.      PLAN   Plan of care Certification: 5/17/2024 to 7/17/24.    Outpatient Physical Therapy 2 times weekly for 8 weeks to include the following interventions: manual therapy, aquatic therapy, patient education, therapeutic exercise, therapeutic activities.    Patient may be seen by PTA as part of rehabilitation team.      Frances Reddy, PT

## 2024-06-10 ENCOUNTER — HOSPITAL ENCOUNTER (EMERGENCY)
Facility: HOSPITAL | Age: 54
Discharge: HOME | End: 2024-06-10
Attending: EMERGENCY MEDICINE

## 2024-06-10 ENCOUNTER — PATIENT MESSAGE (OUTPATIENT)
Dept: INTERNAL MEDICINE | Facility: CLINIC | Age: 54
End: 2024-06-10
Payer: COMMERCIAL

## 2024-06-10 VITALS
DIASTOLIC BLOOD PRESSURE: 71 MMHG | TEMPERATURE: 98 F | RESPIRATION RATE: 18 BRPM | SYSTOLIC BLOOD PRESSURE: 135 MMHG | WEIGHT: 158 LBS | OXYGEN SATURATION: 100 % | HEART RATE: 54 BPM | BODY MASS INDEX: 25.5 KG/M2

## 2024-06-10 DIAGNOSIS — N32.3 BLADDER DIVERTICULUM: ICD-10-CM

## 2024-06-10 DIAGNOSIS — R10.9 ABDOMINAL PAIN, UNSPECIFIED ABDOMINAL LOCATION: Primary | ICD-10-CM

## 2024-06-10 LAB
ALBUMIN SERPL BCP-MCNC: 4 G/DL (ref 3.5–5.2)
ALP SERPL-CCNC: 68 U/L (ref 55–135)
ALT SERPL W/O P-5'-P-CCNC: 14 U/L (ref 10–44)
ANION GAP SERPL CALC-SCNC: 10 MMOL/L (ref 8–16)
AST SERPL-CCNC: 20 U/L (ref 10–40)
BACTERIA GENITAL QL WET PREP: ABNORMAL
BASOPHILS # BLD AUTO: 0.04 K/UL (ref 0–0.2)
BASOPHILS NFR BLD: 0.5 % (ref 0–1.9)
BILIRUB SERPL-MCNC: 0.4 MG/DL (ref 0.1–1)
BILIRUB UR QL STRIP: NEGATIVE
BUN SERPL-MCNC: 14 MG/DL (ref 6–20)
CALCIUM SERPL-MCNC: 9.9 MG/DL (ref 8.7–10.5)
CHLORIDE SERPL-SCNC: 106 MMOL/L (ref 95–110)
CLARITY UR: CLEAR
CLUE CELLS VAG QL WET PREP: ABNORMAL
CO2 SERPL-SCNC: 24 MMOL/L (ref 23–29)
COLOR UR: COLORLESS
CREAT SERPL-MCNC: 0.8 MG/DL (ref 0.5–1.4)
DIFFERENTIAL METHOD BLD: NORMAL
EOSINOPHIL # BLD AUTO: 0.1 K/UL (ref 0–0.5)
EOSINOPHIL NFR BLD: 0.9 % (ref 0–8)
ERYTHROCYTE [DISTWIDTH] IN BLOOD BY AUTOMATED COUNT: 12.6 % (ref 11.5–14.5)
EST. GFR  (NO RACE VARIABLE): >60 ML/MIN/1.73 M^2
FILAMENT FUNGI VAG WET PREP-#/AREA: ABNORMAL
GLUCOSE SERPL-MCNC: 96 MG/DL (ref 70–110)
GLUCOSE UR QL STRIP: NEGATIVE
HCT VFR BLD AUTO: 39.8 % (ref 37–48.5)
HGB BLD-MCNC: 13.3 G/DL (ref 12–16)
HGB UR QL STRIP: NEGATIVE
IMM GRANULOCYTES # BLD AUTO: 0.02 K/UL (ref 0–0.04)
IMM GRANULOCYTES NFR BLD AUTO: 0.2 % (ref 0–0.5)
KETONES UR QL STRIP: NEGATIVE
LACTATE SERPL-SCNC: 0.6 MMOL/L (ref 0.5–2.2)
LEUKOCYTE ESTERASE UR QL STRIP: NEGATIVE
LIPASE SERPL-CCNC: 12 U/L (ref 4–60)
LYMPHOCYTES # BLD AUTO: 2.9 K/UL (ref 1–4.8)
LYMPHOCYTES NFR BLD: 34 % (ref 18–48)
MCH RBC QN AUTO: 28.6 PG (ref 27–31)
MCHC RBC AUTO-ENTMCNC: 33.4 G/DL (ref 32–36)
MCV RBC AUTO: 86 FL (ref 82–98)
MONOCYTES # BLD AUTO: 0.3 K/UL (ref 0.3–1)
MONOCYTES NFR BLD: 4 % (ref 4–15)
NEUTROPHILS # BLD AUTO: 5.2 K/UL (ref 1.8–7.7)
NEUTROPHILS NFR BLD: 60.4 % (ref 38–73)
NITRITE UR QL STRIP: NEGATIVE
NRBC BLD-RTO: 0 /100 WBC
PH UR STRIP: 6 [PH] (ref 5–8)
PLATELET # BLD AUTO: 303 K/UL (ref 150–450)
PMV BLD AUTO: 9.3 FL (ref 9.2–12.9)
POTASSIUM SERPL-SCNC: 3.8 MMOL/L (ref 3.5–5.1)
PROT SERPL-MCNC: 7.6 G/DL (ref 6–8.4)
PROT UR QL STRIP: NEGATIVE
RBC # BLD AUTO: 4.65 M/UL (ref 4–5.4)
SODIUM SERPL-SCNC: 140 MMOL/L (ref 136–145)
SP GR UR STRIP: 1.01 (ref 1–1.03)
SPECIMEN SOURCE: ABNORMAL
T VAGINALIS GENITAL QL WET PREP: ABNORMAL
URN SPEC COLLECT METH UR: ABNORMAL
UROBILINOGEN UR STRIP-ACNC: NEGATIVE EU/DL
WBC # BLD AUTO: 8.6 K/UL (ref 3.9–12.7)
WBC #/AREA VAG WET PREP: ABNORMAL
YEAST GENITAL QL WET PREP: ABNORMAL

## 2024-06-10 PROCEDURE — 87491 CHLMYD TRACH DNA AMP PROBE: CPT | Performed by: NURSE PRACTITIONER

## 2024-06-10 PROCEDURE — 99285 EMERGENCY DEPT VISIT HI MDM: CPT | Mod: 25

## 2024-06-10 PROCEDURE — 81003 URINALYSIS AUTO W/O SCOPE: CPT | Performed by: NURSE PRACTITIONER

## 2024-06-10 PROCEDURE — 25500020 PHARM REV CODE 255: Performed by: EMERGENCY MEDICINE

## 2024-06-10 PROCEDURE — 80053 COMPREHEN METABOLIC PANEL: CPT | Performed by: NURSE PRACTITIONER

## 2024-06-10 PROCEDURE — 85025 COMPLETE CBC W/AUTO DIFF WBC: CPT | Performed by: NURSE PRACTITIONER

## 2024-06-10 PROCEDURE — 63600175 PHARM REV CODE 636 W HCPCS: Performed by: NURSE PRACTITIONER

## 2024-06-10 PROCEDURE — 96361 HYDRATE IV INFUSION ADD-ON: CPT

## 2024-06-10 PROCEDURE — 25000003 PHARM REV CODE 250: Performed by: NURSE PRACTITIONER

## 2024-06-10 PROCEDURE — 63600175 PHARM REV CODE 636 W HCPCS: Performed by: EMERGENCY MEDICINE

## 2024-06-10 PROCEDURE — 96365 THER/PROPH/DIAG IV INF INIT: CPT

## 2024-06-10 PROCEDURE — 96375 TX/PRO/DX INJ NEW DRUG ADDON: CPT

## 2024-06-10 PROCEDURE — 83605 ASSAY OF LACTIC ACID: CPT | Performed by: NURSE PRACTITIONER

## 2024-06-10 PROCEDURE — 83690 ASSAY OF LIPASE: CPT | Performed by: NURSE PRACTITIONER

## 2024-06-10 PROCEDURE — 87210 SMEAR WET MOUNT SALINE/INK: CPT | Performed by: NURSE PRACTITIONER

## 2024-06-10 PROCEDURE — 87591 N.GONORRHOEAE DNA AMP PROB: CPT | Performed by: NURSE PRACTITIONER

## 2024-06-10 RX ORDER — SODIUM CHLORIDE, SODIUM LACTATE, POTASSIUM CHLORIDE, CALCIUM CHLORIDE 600; 310; 30; 20 MG/100ML; MG/100ML; MG/100ML; MG/100ML
1000 INJECTION, SOLUTION INTRAVENOUS
Status: COMPLETED | OUTPATIENT
Start: 2024-06-10 | End: 2024-06-10

## 2024-06-10 RX ORDER — ONDANSETRON HYDROCHLORIDE 2 MG/ML
4 INJECTION, SOLUTION INTRAVENOUS
Status: COMPLETED | OUTPATIENT
Start: 2024-06-10 | End: 2024-06-10

## 2024-06-10 RX ORDER — PROMETHAZINE HYDROCHLORIDE 25 MG/1
25 TABLET ORAL EVERY 6 HOURS PRN
Qty: 15 TABLET | Refills: 0 | Status: ON HOLD | OUTPATIENT
Start: 2024-06-10 | End: 2024-06-13

## 2024-06-10 RX ORDER — MORPHINE SULFATE 4 MG/ML
4 INJECTION, SOLUTION INTRAMUSCULAR; INTRAVENOUS
Status: COMPLETED | OUTPATIENT
Start: 2024-06-10 | End: 2024-06-10

## 2024-06-10 RX ORDER — HYDROMORPHONE HYDROCHLORIDE 1 MG/ML
1 INJECTION, SOLUTION INTRAMUSCULAR; INTRAVENOUS; SUBCUTANEOUS
Status: COMPLETED | OUTPATIENT
Start: 2024-06-10 | End: 2024-06-10

## 2024-06-10 RX ADMIN — SODIUM CHLORIDE, POTASSIUM CHLORIDE, SODIUM LACTATE AND CALCIUM CHLORIDE 1000 ML: 600; 310; 30; 20 INJECTION, SOLUTION INTRAVENOUS at 12:06

## 2024-06-10 RX ADMIN — ONDANSETRON 4 MG: 2 INJECTION INTRAMUSCULAR; INTRAVENOUS at 11:06

## 2024-06-10 RX ADMIN — HYDROMORPHONE HYDROCHLORIDE 1 MG: 1 INJECTION, SOLUTION INTRAMUSCULAR; INTRAVENOUS; SUBCUTANEOUS at 12:06

## 2024-06-10 RX ADMIN — IOHEXOL 75 ML: 350 INJECTION, SOLUTION INTRAVENOUS at 12:06

## 2024-06-10 RX ADMIN — SODIUM CHLORIDE 1000 ML: 9 INJECTION, SOLUTION INTRAVENOUS at 11:06

## 2024-06-10 RX ADMIN — PROMETHAZINE HYDROCHLORIDE 12.5 MG: 25 INJECTION INTRAMUSCULAR; INTRAVENOUS at 02:06

## 2024-06-10 RX ADMIN — MORPHINE SULFATE 4 MG: 4 INJECTION INTRAVENOUS at 11:06

## 2024-06-10 NOTE — DISCHARGE INSTRUCTIONS

## 2024-06-10 NOTE — FIRST PROVIDER EVALUATION
Emergency Department TeleTriage Encounter Note      CHIEF COMPLAINT    Chief Complaint   Patient presents with    Abdominal Pain     I'm having LRQ pain (2wks), nausea and vomiting (3 days) last episode last night, no appetite unable to eat  Denies diarrhea        VITAL SIGNS   Initial Vitals [06/10/24 1043]   BP Pulse Resp Temp SpO2   136/75 86 16 98 °F (36.7 °C) 100 %      MAP       --            ALLERGIES    Review of patient's allergies indicates:   Allergen Reactions    Clindamycin     Sulfa (sulfonamide antibiotics) Anaphylaxis    Sulfa dyne     Sulfamethoxazole-trimethoprim      Other reaction(s): Anaphylaxis    Dermabond [tissue glues] Rash       PROVIDER TRIAGE NOTE  This is a teletriage evaluation of a 53 y.o. female presenting to the ED complaining of abd pain for two weeks with n/v for two days. No hematemesis.     Alert, no distress.     Initial orders will be placed and care will be transferred to an alternate provider when patient is roomed for a full evaluation. Any additional orders and the final disposition will be determined by that provider.         ORDERS  Labs Reviewed   CBC W/ AUTO DIFFERENTIAL   COMPREHENSIVE METABOLIC PANEL   LIPASE   URINALYSIS, REFLEX TO URINE CULTURE       ED Orders (720h ago, onward)      Start Ordered     Status Ordering Provider    06/10/24 1100 06/10/24 1047  sodium chloride 0.9% bolus 1,000 mL 1,000 mL  Once         Ordered FRANKIE LEOS    06/10/24 1100 06/10/24 1047  ondansetron injection 4 mg  ED 1 Time         Ordered FRANKIE LEOS    06/10/24 1048 06/10/24 1047  Vital signs  Every 2 hours         Ordered FRANKIE LEOS    06/10/24 1048 06/10/24 1047  Diet NPO  Diet effective now         Ordered FRANKIE LEOS    06/10/24 1048 06/10/24 1047  Insert peripheral IV  Once         Ordered FRANKIE LEOS    06/10/24 1048 06/10/24 1047  CBC W/ AUTO DIFFERENTIAL  STAT         Ordered FRANKIE LEOS     06/10/24 1048 06/10/24 1047  Comp. Metabolic Panel  STAT         Ordered FRANKIE LEOS N.    06/10/24 1048 06/10/24 1047  Lipase  STAT         Ordered FRANKIE LEOS N.    06/10/24 1048 06/10/24 1047  Urinalysis, Reflex to Urine Culture Urine, Clean Catch  STAT         Ordered FRANKIE LEOS N.              Virtual Visit Note: The provider triage portion of this emergency department evaluation and documentation was performed via [employer], a HIPAA-compliant telemedicine application, in concert with a tele-presenter in the room. A face to face patient evaluation with one of my colleagues will occur once the patient is placed in an emergency department room.      DISCLAIMER: This note was prepared with [employer] voice recognition transcription software. Garbled syntax, mangled pronouns, and other bizarre constructions may be attributed to that software system.

## 2024-06-10 NOTE — ED TRIAGE NOTES
Pt presents to ED today c/o abd pain , N/V onset x 2 weeks   Reports taking OTC pain medication with on relief   NAD noted

## 2024-06-10 NOTE — ED PROVIDER NOTES
Encounter Date: 6/10/2024       History     Chief Complaint   Patient presents with    Abdominal Pain     I'm having LRQ pain (2wks), nausea and vomiting (3 days) last episode last night, no appetite unable to eat  Denies diarrhea      53-year-old female presents emergency room with reports of generalized abdominal discomfort however more specific to the right lower quadrant that has been present for the past 2 weeks.  Patient reports over the past 3 days she has had worsening of pain and this morning could not attend water aerobics due to pain.  She reports nausea and vomiting. LBM was     denies fever, or urinary complaints.  Denies vaginal bleeding or discharge.  Patient has a past medical history of annular tear of L5 and S1, hyperlipidemia, hypertension, seasonal allergies, PONV, hysterectomy.    The history is provided by the patient. No  was used.     Review of patient's allergies indicates:   Allergen Reactions    Clindamycin     Sulfa (sulfonamide antibiotics) Anaphylaxis    Sulfa dyne     Sulfamethoxazole-trimethoprim      Other reaction(s): Anaphylaxis    Dermabond [tissue glues] Rash     Past Medical History:   Diagnosis Date    Abnormal Pap smear     Abnormal Pap smear of cervix     Annular tear of lumbar disc, L5-S1. 07/26/2012    Chronic LBP     HPTH (hyperparathyroidism)     Hyperlipidemia     Menorrhagia     PONV (postoperative nausea and vomiting)     Primary hypertension 01/31/2023    Right lumbar radiculopathy 07/26/2012    Seasonal allergies      Past Surgical History:   Procedure Laterality Date    BACK SURGERY  2018    CHOLECYSTECTOMY      COLONOSCOPY N/A 01/19/2018    Procedure: COLONOSCOPY;  Surgeon: Malachi Olmedo MD;  Location: [address]);  Service: Endoscopy;  Laterality: N/A;    COLONOSCOPY N/A 12/28/2022    Procedure: COLONOSCOPY;  Surgeon: Ezekiel Alanis MD;  Location: [address]);  Service: Endoscopy;  Laterality: N/A;  inst via portal  pre  call complete [facility]    ESOPHAGOGASTRODUODENOSCOPY N/A 09/06/2019    Procedure: EGD (ESOPHAGOGASTRODUODENOSCOPY);  Surgeon: Jose Carlos Ventura MD;  Location: [facility] (4TH FLR);  Service: Endoscopy;  Laterality: N/A;  pt requesting ASAP    HYSTERECTOMY, TOTAL, LAPAROSCOPIC, WITH SALPINGECTOMY  06/16/2015    INJECTION OF ANESTHETIC AGENT AROUND MULTIPLE INTERCOSTAL NERVES Right 03/21/2023    Procedure: BLOCK, NERVE, INTERCOSTAL, 2 OR MORE;  Surgeon: Anton Evangelista MD;  [facility];  Service: Cardiothoracic;  Laterality: Right;    TUBAL LIGATION      Essure    WISDOM TOOTH EXTRACTION      2005    XI ROBOTIC RATS Right 03/21/2023    Procedure: XI ROBOTIC THYMECTOMY;  Surgeon: Anton Evangelista MD;  Location: [facility] OR [address] [place] [address];  Service: Cardiothoracic;  Laterality: Right;     Family History   Problem Relation Name Age of Onset    Stroke Paternal Grandmother  68    Hypertension Maternal Grandmother      Multiple myeloma Maternal Grandmother      Diabetes Father      Colon polyps Father      Hypertension Mother      Breast cancer Neg Hx      Ovarian cancer Neg Hx      Melanoma Neg Hx      Colon cancer Neg Hx       Social History     Tobacco Use    Smoking status: Never     Passive exposure: Never    Smokeless tobacco: Never   Substance Use Topics    Alcohol use: No    Drug use: No     Review of Systems   Gastrointestinal:  Positive for abdominal pain, nausea and vomiting.   All other systems reviewed and are negative.      Physical Exam     Initial Vitals [06/10/24 1043]   BP Pulse Resp Temp SpO2   136/75 86 16 98 °F (36.7 °C) 100 %      MAP       --         Physical Exam    Constitutional: She appears well-developed and well-nourished.   HENT:   Head: Normocephalic.   Right Ear: Hearing and tympanic membrane normal.   Left Ear: Hearing and tympanic membrane normal.   Nose: Nose normal.   Mouth/Throat: Oropharynx is clear and moist.   Eyes: Lids are normal. Pupils are equal, round, and reactive to light.    Neck:   Normal range of motion.  Cardiovascular:  Normal rate.           Pulmonary/Chest: Breath sounds normal. No respiratory distress. She has no wheezes. She has no rhonchi.   Abdominal: Abdomen is soft. There is generalized abdominal tenderness and tenderness in the right lower quadrant. There is rebound and guarding.   Genitourinary:    Genitourinary Comments: White vaginal dc noted. No lesions noted. Pt has prior total hysterectomy.      Musculoskeletal:         General: Normal range of motion.      Cervical back: Normal range of motion. No rigidity.     Neurological: She is alert and oriented to person, place, and time.   Skin: Skin is warm and dry. No rash noted.   Psychiatric: She has a normal mood and affect. Her behavior is normal. Judgment and thought content normal.         ED Course   Procedures  Labs Reviewed   VAGINAL SCREEN - Abnormal; Notable for the following components:       Result Value    Bacteria - Vaginal Screen Few (*)     All other components within normal limits    Narrative:     Release to patient->Immediate   URINALYSIS, REFLEX TO URINE CULTURE - Abnormal; Notable for the following components:    Color, UA Colorless (*)     All other components within normal limits    Narrative:     Specimen Source->Urine   CBC W/ AUTO DIFFERENTIAL   COMPREHENSIVE METABOLIC PANEL   LIPASE   LACTIC ACID, PLASMA   C. TRACHOMATIS/N. GONORRHOEAE BY AMP DNA          Imaging Results              US Pelvis Comp with Transvag NON-OB (xpd (Final result)  Result time 06/10/24 14:01:45      Final result by Raheel Metzger DO (06/10/24 14:01:45)                   Impression:      Limited examination.  Nonvisualization of the uterus and the bilateral ovaries.    Small urinary bladder diverticulum.      Electronically signed by: Raheel Metzger  Date:    06/10/2024  Time:    14:01               Narrative:    EXAMINATION:  US PELVIS COMP WITH TRANSVAG NON-OB (XPD)    CLINICAL HISTORY:  right lower abd  pain;    TECHNIQUE:  Transabdominal sonography of the pelvis was performed, followed by transvaginal sonography to better evaluate the uterus and ovaries.    COMPARISON:  CT abdomen and pelvis from earlier today.  Pelvic ultrasound from 10/22/2019.    FINDINGS:  Examination is limited by artifact from bowel gas.    Uterus:    Surgically absent.    Right ovary:    Not visualized.    Left ovary:    Not visualized.    Free Fluid:    None.    There is a small simple cystic lesion arising from the posterior aspect of the urinary bladder wall measuring 0.8 x 0.7 x 0.9 cm, compatible with a bladder diverticulum.                                       CT Abdomen Pelvis With IV Contrast NO Oral Contrast (Final result)  Result time 06/10/24 12:48:49      Final result by Darron Reyes MD (06/10/24 12:48:49)                   Impression:      1. Bilateral mild hydronephrosis, no findings to suggest ureteral obstruction.  The urinary bladder is mildly prominent, correlation with any history of urinary retention or outlet obstruction.  Urinalysis as warranted.  2. Possible hepatic steatosis, correlation with LFTs recommended.  3. Please see above for additional findings.      Electronically signed by: Darron Reyes MD  Date:    06/10/2024  Time:    12:48               Narrative:    EXAMINATION:  CT ABDOMEN PELVIS WITH IV CONTRAST    CLINICAL HISTORY:  RLQ abdominal pain (Age >= 14y);    TECHNIQUE:  Low dose axial images, sagittal and coronal reformations were obtained from the lung bases to the pubic symphysis following the IV administration of 75 mL of Omnipaque 350 .  Oral contrast was not given.    COMPARISON:  Radiograph 03/22/2023, CT chest 4/11/2024, CT abdomen and pelvis 09/09/2019    FINDINGS:  Images of the lower thorax are remarkable for bilateral dependent atelectasis.    The spleen, pancreas, and adrenal glands are grossly unremarkable.  The stomach is mildly hypoattenuating, likely related to contrast phase  however steatosis is not excluded, correlation with LFTs as warranted.  There is a subcentimeter low attenuating lesion arising from the right hepatic dome, too small for characterization.  The stomach is decompressed without wall thickening.  The portal vein, splenic vein, SMV, celiac axis and SMA all are patent.  No significant abdominal lymphadenopathy.    The kidneys enhance symmetrically.  There is mild bilateral hydronephrosis.  No nephrolithiasis.  The bilateral ureters are unremarkable without calculi seen.  The urinary bladder is mildly distended without wall thickening.  The uterus is absent the adnexa is unremarkable.    There is gaseous distention of the rectum.  There are a few scattered colonic diverticula without inflammation.  The terminal ileum is unremarkable.  The appendix is unremarkable.  The small bowel is grossly unremarkable.  There are a few scattered shotty periaortic, pericaval, and mesenteric lymph nodes.  There is a subcentimeter soft tissue focus adjacent to the posterior aspect of the left iliacus musculature, stable.  No focal organized pelvic fluid collection.    There are degenerative changes of the bilateral femoroacetabular joints, sacroiliac joints, and spine.  There are surgical changes of the spine noting some degree of osteopenia.  Posterior spinal fusion hardware spans L5-S1 noting disc spacing material at L5-S1.  No significant inguinal lymphadenopathy.                                       Medications   sodium chloride 0.9% bolus 1,000 mL 1,000 mL (0 mLs Intravenous Stopped 6/10/24 1205)   ondansetron injection 4 mg (4 mg Intravenous Given 6/10/24 1116)   morphine injection 4 mg (4 mg Intravenous Given 6/10/24 1128)   HYDROmorphone injection 1 mg (1 mg Intravenous Given 6/10/24 1216)   lactated ringers infusion (1,000 mLs Intravenous New Bag 6/10/24 1218)   iohexoL (OMNIPAQUE 350) injection 75 mL (75 mLs Intravenous Given 6/10/24 1229)   promethazine (PHENERGAN) 12.5 mg in  dextrose 5 % (D5W) 50 mL IVPB (0 mg Intravenous Stopped 6/10/24 1508)     Medical Decision Making  Differential Diagnosis includes, but is not limited to:  AAA, aortic dissection, mesenteric ischemia, perforated viscous, MI/ACS, SBO/volvulus, incarcerated/strangulated hernia, intussusception, ileus, appendicitis, cholecystitis, cholangitis, diverticulitis, esophagitis, hepatitis, nephrolithiasis, pancreatitis, gastroenteritis, colitis, IBD/IBS, biliary colic, GERD, PUD, constipation, UTI/pyelonephritis,  disorder.       Amount and/or Complexity of Data Reviewed  Labs: ordered. Decision-making details documented in ED Course.  Radiology: ordered.    Risk  Prescription drug management.               ED Course as of 06/10/24 1943   Mon Jerry 10, 2024   1134 Urinalysis, Reflex to Urine Culture Urine, Clean Catch(!) [DT]   1134 CBC W/ AUTO DIFFERENTIAL [DT]   1145 Comp. Metabolic Panel [DT]   1145 Lipase [DT]   1216 Pt states she is having pain and is requesting additional meds for pain control. CT pending. Dilaudid ordered.  [DT]   1250 FINDINGS:  Images of the lower thorax are remarkable for bilateral dependent atelectasis.     The spleen, pancreas, and adrenal glands are grossly unremarkable.  The stomach is mildly hypoattenuating, likely related to contrast phase however steatosis is not excluded, correlation with LFTs as warranted.  There is a subcentimeter low attenuating lesion arising from the right hepatic dome, too small for characterization.  The stomach is decompressed without wall thickening.  The portal vein, splenic vein, SMV, celiac axis and SMA all are patent.  No significant abdominal lymphadenopathy.     The kidneys enhance symmetrically.  There is mild bilateral hydronephrosis.  No nephrolithiasis.  The bilateral ureters are unremarkable without calculi seen.  The urinary bladder is mildly distended without wall thickening.  The uterus is absent the adnexa is unremarkable.     There is gaseous  distention of the rectum.  There are a few scattered colonic diverticula without inflammation.  The terminal ileum is unremarkable.  The appendix is unremarkable.  The small bowel is grossly unremarkable.  There are a few scattered shotty periaortic, pericaval, and mesenteric lymph nodes.  There is a subcentimeter soft tissue focus adjacent to the posterior aspect of the left iliacus musculature, stable.  No focal organized pelvic fluid collection.     There are degenerative changes of the bilateral femoroacetabular joints, sacroiliac joints, and spine.  There are surgical changes of the spine noting some degree of osteopenia.  Posterior spinal fusion hardware spans L5-S1 noting disc spacing material at L5-S1.  No significant inguinal lymphadenopathy.     Impression:     1. Bilateral mild hydronephrosis, no findings to suggest ureteral obstruction.  The urinary bladder is mildly prominent, correlation with any history of urinary retention or outlet obstruction.  Urinalysis as warranted.  2. Possible hepatic steatosis, correlation with LFTs recommended.     [DT]   1417 Impression:     Limited examination.  Nonvisualization of the uterus and the bilateral ovaries.     Small urinary bladder diverticulum.      [DT]   1417 Lactic acid, plasma [DT]   1511 Vaginal Screen(!) [DT]   1530 Patient reported feeling improvement in her nausea after the Phenergan was given.  Patient was also independently seen per Dr Chen. Encouraged to follow up with GYN in addition to gastro. Referral was placed to gastro. Pt states she is seen per Dr Ye and will follow up with her therefore no referral is needed. STRICT return precautions given otherwise stable at this time for dc with meds prescribed.  [DT]      ED Course User Index  [DT] Mica Harper, NP                           Clinical Impression:  Final diagnoses:  [R10.9] Abdominal pain, unspecified abdominal location (Primary)  [N32.3] Bladder diverticulum          ED  Disposition Condition    Discharge Stable          ED Prescriptions       Medication Sig Dispense Start Date End Date Auth. Provider    promethazine (PHENERGAN) 25 MG tablet Take 1 tablet (25 mg total) by mouth every 6 (six) hours as needed for Nausea. 15 tablet 6/10/2024 -- Mica Harper NP          Follow-up Information       Follow up With Specialties Details Why Contact Info    Charlotte Jacinto MD Internal Medicine Schedule an appointment as soon as possible for a visit in 2 days  [address]  [place] [address]  [phone]               Mica Harper NP  06/10/24 2227

## 2024-06-11 ENCOUNTER — HOSPITAL ENCOUNTER (OUTPATIENT)
Facility: HOSPITAL | Age: 54
Discharge: HOME | End: 2024-06-13
Attending: EMERGENCY MEDICINE | Admitting: HOSPITALIST

## 2024-06-11 DIAGNOSIS — K29.60 EROSIVE GASTRITIS: Primary | ICD-10-CM

## 2024-06-11 DIAGNOSIS — Z91.89 AT RISK FOR LONG QT SYNDROME: ICD-10-CM

## 2024-06-11 DIAGNOSIS — K27.9 PEPTIC ULCER DISEASE: ICD-10-CM

## 2024-06-11 DIAGNOSIS — R11.2 INTRACTABLE VOMITING WITH NAUSEA: ICD-10-CM

## 2024-06-11 DIAGNOSIS — R10.13 EPIGASTRIC PAIN: ICD-10-CM

## 2024-06-11 LAB
ALBUMIN SERPL BCP-MCNC: 3.8 G/DL (ref 3.5–5.2)
ALP SERPL-CCNC: 64 U/L (ref 55–135)
ALT SERPL W/O P-5'-P-CCNC: 18 U/L (ref 10–44)
ANION GAP SERPL CALC-SCNC: 10 MMOL/L (ref 8–16)
AST SERPL-CCNC: 19 U/L (ref 10–40)
BASOPHILS # BLD AUTO: 0.04 K/UL (ref 0–0.2)
BASOPHILS NFR BLD: 0.5 % (ref 0–1.9)
BILIRUB SERPL-MCNC: 0.4 MG/DL (ref 0.1–1)
BILIRUB UR QL STRIP: NEGATIVE
BUN SERPL-MCNC: 7 MG/DL (ref 6–20)
BUN SERPL-MCNC: 7 MG/DL (ref 6–30)
C TRACH DNA SPEC QL NAA+PROBE: NOT DETECTED
CALCIUM SERPL-MCNC: 9.7 MG/DL (ref 8.7–10.5)
CHLORIDE SERPL-SCNC: 104 MMOL/L (ref 95–110)
CHLORIDE SERPL-SCNC: 106 MMOL/L (ref 95–110)
CLARITY UR REFRACT.AUTO: CLEAR
CO2 SERPL-SCNC: 25 MMOL/L (ref 23–29)
COLOR UR AUTO: YELLOW
CREAT SERPL-MCNC: 0.7 MG/DL (ref 0.5–1.4)
CREAT SERPL-MCNC: 0.7 MG/DL (ref 0.5–1.4)
DIFFERENTIAL METHOD BLD: ABNORMAL
EOSINOPHIL # BLD AUTO: 0.1 K/UL (ref 0–0.5)
EOSINOPHIL NFR BLD: 1 % (ref 0–8)
ERYTHROCYTE [DISTWIDTH] IN BLOOD BY AUTOMATED COUNT: 12.7 % (ref 11.5–14.5)
EST. GFR  (NO RACE VARIABLE): >60 ML/MIN/1.73 M^2
GLUCOSE SERPL-MCNC: 101 MG/DL (ref 70–110)
GLUCOSE SERPL-MCNC: 98 MG/DL (ref 70–110)
GLUCOSE UR QL STRIP: NEGATIVE
HCT VFR BLD AUTO: 39.7 % (ref 37–48.5)
HCT VFR BLD CALC: 38 %PCV (ref 36–54)
HCV AB SERPL QL IA: NORMAL
HGB BLD-MCNC: 13.1 G/DL (ref 12–16)
HGB UR QL STRIP: NEGATIVE
HIV 1+2 AB+HIV1 P24 AG SERPL QL IA: NORMAL
IMM GRANULOCYTES # BLD AUTO: 0.03 K/UL (ref 0–0.04)
IMM GRANULOCYTES NFR BLD AUTO: 0.3 % (ref 0–0.5)
KETONES UR QL STRIP: NEGATIVE
LEUKOCYTE ESTERASE UR QL STRIP: NEGATIVE
LIPASE SERPL-CCNC: 12 U/L (ref 4–60)
LYMPHOCYTES # BLD AUTO: 2.6 K/UL (ref 1–4.8)
LYMPHOCYTES NFR BLD: 30.2 % (ref 18–48)
MCH RBC QN AUTO: 28.5 PG (ref 27–31)
MCHC RBC AUTO-ENTMCNC: 33 G/DL (ref 32–36)
MCV RBC AUTO: 87 FL (ref 82–98)
MONOCYTES # BLD AUTO: 0.3 K/UL (ref 0.3–1)
MONOCYTES NFR BLD: 3.4 % (ref 4–15)
N GONORRHOEA DNA SPEC QL NAA+PROBE: NOT DETECTED
NEUTROPHILS # BLD AUTO: 5.6 K/UL (ref 1.8–7.7)
NEUTROPHILS NFR BLD: 64.6 % (ref 38–73)
NITRITE UR QL STRIP: NEGATIVE
NRBC BLD-RTO: 0 /100 WBC
PH UR STRIP: 6 [PH] (ref 5–8)
PLATELET # BLD AUTO: 292 K/UL (ref 150–450)
PMV BLD AUTO: 8.9 FL (ref 9.2–12.9)
POC IONIZED CALCIUM: 1.22 MMOL/L (ref 1.06–1.42)
POC TCO2 (MEASURED): 26 MMOL/L (ref 23–29)
POTASSIUM BLD-SCNC: 3.8 MMOL/L (ref 3.5–5.1)
POTASSIUM SERPL-SCNC: 3.9 MMOL/L (ref 3.5–5.1)
PROT SERPL-MCNC: 7.2 G/DL (ref 6–8.4)
PROT UR QL STRIP: NEGATIVE
RBC # BLD AUTO: 4.59 M/UL (ref 4–5.4)
SAMPLE: NORMAL
SODIUM BLD-SCNC: 141 MMOL/L (ref 136–145)
SODIUM SERPL-SCNC: 141 MMOL/L (ref 136–145)
SP GR UR STRIP: 1.01 (ref 1–1.03)
URN SPEC COLLECT METH UR: NORMAL
WBC # BLD AUTO: 8.6 K/UL (ref 3.9–12.7)

## 2024-06-11 PROCEDURE — G0378 HOSPITAL OBSERVATION PER HR: HCPCS

## 2024-06-11 PROCEDURE — 96361 HYDRATE IV INFUSION ADD-ON: CPT

## 2024-06-11 PROCEDURE — 83690 ASSAY OF LIPASE: CPT | Performed by: EMERGENCY MEDICINE

## 2024-06-11 PROCEDURE — 63600175 PHARM REV CODE 636 W HCPCS: Performed by: PHYSICIAN ASSISTANT

## 2024-06-11 PROCEDURE — 25000003 PHARM REV CODE 250

## 2024-06-11 PROCEDURE — 96375 TX/PRO/DX INJ NEW DRUG ADDON: CPT

## 2024-06-11 PROCEDURE — 80047 BASIC METABLC PNL IONIZED CA: CPT

## 2024-06-11 PROCEDURE — 96365 THER/PROPH/DIAG IV INF INIT: CPT

## 2024-06-11 PROCEDURE — 96366 THER/PROPH/DIAG IV INF ADDON: CPT

## 2024-06-11 PROCEDURE — 81003 URINALYSIS AUTO W/O SCOPE: CPT | Performed by: EMERGENCY MEDICINE

## 2024-06-11 PROCEDURE — 25000003 PHARM REV CODE 250: Performed by: EMERGENCY MEDICINE

## 2024-06-11 PROCEDURE — 99285 EMERGENCY DEPT VISIT HI MDM: CPT | Mod: 25

## 2024-06-11 PROCEDURE — 87389 HIV-1 AG W/HIV-1&-2 AB AG IA: CPT | Performed by: PHYSICIAN ASSISTANT

## 2024-06-11 PROCEDURE — 96376 TX/PRO/DX INJ SAME DRUG ADON: CPT

## 2024-06-11 PROCEDURE — 85025 COMPLETE CBC W/AUTO DIFF WBC: CPT | Performed by: EMERGENCY MEDICINE

## 2024-06-11 PROCEDURE — 63600175 PHARM REV CODE 636 W HCPCS: Performed by: EMERGENCY MEDICINE

## 2024-06-11 PROCEDURE — 80053 COMPREHEN METABOLIC PANEL: CPT | Performed by: EMERGENCY MEDICINE

## 2024-06-11 PROCEDURE — 25000003 PHARM REV CODE 250: Performed by: PHYSICIAN ASSISTANT

## 2024-06-11 PROCEDURE — 86803 HEPATITIS C AB TEST: CPT | Performed by: PHYSICIAN ASSISTANT

## 2024-06-11 RX ORDER — DICYCLOMINE HYDROCHLORIDE 10 MG/1
10 CAPSULE ORAL 4 TIMES DAILY
Status: DISCONTINUED | OUTPATIENT
Start: 2024-06-11 | End: 2024-06-13 | Stop reason: HOSPADM

## 2024-06-11 RX ORDER — METHOCARBAMOL 750 MG/1
750 TABLET, FILM COATED ORAL EVERY 12 HOURS
Status: DISCONTINUED | OUTPATIENT
Start: 2024-06-11 | End: 2024-06-13 | Stop reason: HOSPADM

## 2024-06-11 RX ORDER — TALC
6 POWDER (GRAM) TOPICAL NIGHTLY PRN
Status: DISCONTINUED | OUTPATIENT
Start: 2024-06-11 | End: 2024-06-13 | Stop reason: HOSPADM

## 2024-06-11 RX ORDER — MORPHINE SULFATE 4 MG/ML
4 INJECTION, SOLUTION INTRAMUSCULAR; INTRAVENOUS EVERY 4 HOURS PRN
Status: DISCONTINUED | OUTPATIENT
Start: 2024-06-11 | End: 2024-06-11

## 2024-06-11 RX ORDER — SODIUM CHLORIDE 0.9 % (FLUSH) 0.9 %
10 SYRINGE (ML) INJECTION
Status: DISCONTINUED | OUTPATIENT
Start: 2024-06-11 | End: 2024-06-13 | Stop reason: HOSPADM

## 2024-06-11 RX ORDER — FAMOTIDINE 20 MG/1
20 TABLET, FILM COATED ORAL 2 TIMES DAILY
Qty: 40 TABLET | Refills: 0 | Status: SHIPPED | OUTPATIENT
Start: 2024-06-11 | End: 2024-06-13 | Stop reason: HOSPADM

## 2024-06-11 RX ORDER — DULOXETIN HYDROCHLORIDE 60 MG/1
60 CAPSULE, DELAYED RELEASE ORAL DAILY
Status: DISCONTINUED | OUTPATIENT
Start: 2024-06-12 | End: 2024-06-13 | Stop reason: HOSPADM

## 2024-06-11 RX ORDER — BUTALBITAL, ACETAMINOPHEN AND CAFFEINE 50; 325; 40 MG/1; MG/1; MG/1
1 TABLET ORAL EVERY 4 HOURS PRN
Status: DISCONTINUED | OUTPATIENT
Start: 2024-06-11 | End: 2024-06-13 | Stop reason: HOSPADM

## 2024-06-11 RX ORDER — HYDROCHLOROTHIAZIDE 12.5 MG/1
12.5 TABLET ORAL DAILY
Status: DISCONTINUED | OUTPATIENT
Start: 2024-06-12 | End: 2024-06-12

## 2024-06-11 RX ORDER — FAMOTIDINE 10 MG/ML
20 INJECTION INTRAVENOUS
Status: COMPLETED | OUTPATIENT
Start: 2024-06-11 | End: 2024-06-11

## 2024-06-11 RX ORDER — MORPHINE SULFATE 2 MG/ML
2 INJECTION, SOLUTION INTRAMUSCULAR; INTRAVENOUS
Status: COMPLETED | OUTPATIENT
Start: 2024-06-11 | End: 2024-06-11

## 2024-06-11 RX ORDER — SODIUM CHLORIDE, SODIUM LACTATE, POTASSIUM CHLORIDE, CALCIUM CHLORIDE 600; 310; 30; 20 MG/100ML; MG/100ML; MG/100ML; MG/100ML
INJECTION, SOLUTION INTRAVENOUS CONTINUOUS
Status: DISCONTINUED | OUTPATIENT
Start: 2024-06-11 | End: 2024-06-12

## 2024-06-11 RX ORDER — TRAZODONE HYDROCHLORIDE 100 MG/1
100 TABLET ORAL NIGHTLY
COMMUNITY
Start: 2024-05-01

## 2024-06-11 RX ORDER — LIDOCAINE HYDROCHLORIDE 20 MG/ML
15 SOLUTION OROPHARYNGEAL ONCE
Status: COMPLETED | OUTPATIENT
Start: 2024-06-11 | End: 2024-06-11

## 2024-06-11 RX ORDER — PREGABALIN 75 MG/1
75 CAPSULE ORAL 2 TIMES DAILY
Status: DISCONTINUED | OUTPATIENT
Start: 2024-06-11 | End: 2024-06-13 | Stop reason: HOSPADM

## 2024-06-11 RX ORDER — SUCRALFATE 1 G/1
1 TABLET ORAL
Qty: 40 TABLET | Refills: 0 | Status: SHIPPED | OUTPATIENT
Start: 2024-06-11 | End: 2024-06-13

## 2024-06-11 RX ORDER — OXYCODONE HYDROCHLORIDE 5 MG/1
5 TABLET ORAL EVERY 4 HOURS PRN
Status: DISCONTINUED | OUTPATIENT
Start: 2024-06-11 | End: 2024-06-11

## 2024-06-11 RX ORDER — DROPERIDOL 2.5 MG/ML
1.25 INJECTION, SOLUTION INTRAMUSCULAR; INTRAVENOUS EVERY 6 HOURS PRN
Status: DISCONTINUED | OUTPATIENT
Start: 2024-06-11 | End: 2024-06-11

## 2024-06-11 RX ORDER — POLYETHYLENE GLYCOL 3350 17 G/17G
17 POWDER, FOR SOLUTION ORAL DAILY
Status: DISCONTINUED | OUTPATIENT
Start: 2024-06-11 | End: 2024-06-12

## 2024-06-11 RX ORDER — ONDANSETRON 4 MG/1
4 TABLET, ORALLY DISINTEGRATING ORAL EVERY 8 HOURS PRN
Status: DISCONTINUED | OUTPATIENT
Start: 2024-06-11 | End: 2024-06-12

## 2024-06-11 RX ORDER — QUETIAPINE 150 MG/1
150 TABLET, FILM COATED, EXTENDED RELEASE ORAL NIGHTLY
COMMUNITY
Start: 2024-05-03

## 2024-06-11 RX ORDER — ALUMINUM HYDROXIDE, MAGNESIUM HYDROXIDE, AND SIMETHICONE 1200; 120; 1200 MG/30ML; MG/30ML; MG/30ML
30 SUSPENSION ORAL ONCE
Status: COMPLETED | OUTPATIENT
Start: 2024-06-11 | End: 2024-06-11

## 2024-06-11 RX ORDER — QUETIAPINE 150 MG/1
150 TABLET, FILM COATED, EXTENDED RELEASE ORAL NIGHTLY
Status: DISCONTINUED | OUTPATIENT
Start: 2024-06-11 | End: 2024-06-13 | Stop reason: HOSPADM

## 2024-06-11 RX ORDER — ALBUTEROL SULFATE 90 UG/1
2 AEROSOL, METERED RESPIRATORY (INHALATION) EVERY 4 HOURS PRN
Status: DISCONTINUED | OUTPATIENT
Start: 2024-06-11 | End: 2024-06-13 | Stop reason: HOSPADM

## 2024-06-11 RX ORDER — FAMOTIDINE 10 MG/ML
20 INJECTION INTRAVENOUS 2 TIMES DAILY
Status: COMPLETED | OUTPATIENT
Start: 2024-06-12 | End: 2024-06-11

## 2024-06-11 RX ORDER — SUCRALFATE 1 G/10ML
1 SUSPENSION ORAL EVERY 6 HOURS
Status: DISCONTINUED | OUTPATIENT
Start: 2024-06-11 | End: 2024-06-13 | Stop reason: HOSPADM

## 2024-06-11 RX ORDER — ONDANSETRON HYDROCHLORIDE 2 MG/ML
4 INJECTION, SOLUTION INTRAVENOUS
Status: COMPLETED | OUTPATIENT
Start: 2024-06-11 | End: 2024-06-11

## 2024-06-11 RX ADMIN — DICYCLOMINE HYDROCHLORIDE 10 MG: 10 CAPSULE ORAL at 09:06

## 2024-06-11 RX ADMIN — POLYETHYLENE GLYCOL 3350 17 G: 17 POWDER, FOR SOLUTION ORAL at 06:06

## 2024-06-11 RX ADMIN — PROMETHAZINE HYDROCHLORIDE 12.5 MG: 25 INJECTION INTRAMUSCULAR; INTRAVENOUS at 09:06

## 2024-06-11 RX ADMIN — FAMOTIDINE 20 MG: 10 INJECTION, SOLUTION INTRAVENOUS at 12:06

## 2024-06-11 RX ADMIN — ONDANSETRON 4 MG: 2 INJECTION INTRAMUSCULAR; INTRAVENOUS at 09:06

## 2024-06-11 RX ADMIN — PREGABALIN 75 MG: 75 CAPSULE ORAL at 09:06

## 2024-06-11 RX ADMIN — METHOCARBAMOL 750 MG: 750 TABLET ORAL at 09:06

## 2024-06-11 RX ADMIN — SUCRALFATE 1 G: 1 SUSPENSION ORAL at 06:06

## 2024-06-11 RX ADMIN — PROMETHAZINE HYDROCHLORIDE 12.5 MG: 25 INJECTION INTRAMUSCULAR; INTRAVENOUS at 02:06

## 2024-06-11 RX ADMIN — LIDOCAINE HYDROCHLORIDE 15 ML: 20 SOLUTION ORAL at 09:06

## 2024-06-11 RX ADMIN — MORPHINE SULFATE 2 MG: 2 INJECTION, SOLUTION INTRAMUSCULAR; INTRAVENOUS at 12:06

## 2024-06-11 RX ADMIN — QUETIAPINE FUMARATE 150 MG: 150 TABLET, EXTENDED RELEASE ORAL at 09:06

## 2024-06-11 RX ADMIN — ALUMINUM HYDROXIDE, MAGNESIUM HYDROXIDE, AND SIMETHICONE 30 ML: 1200; 120; 1200 SUSPENSION ORAL at 09:06

## 2024-06-11 RX ADMIN — ONDANSETRON 4 MG: 4 TABLET, ORALLY DISINTEGRATING ORAL at 11:06

## 2024-06-11 RX ADMIN — SODIUM CHLORIDE, POTASSIUM CHLORIDE, SODIUM LACTATE AND CALCIUM CHLORIDE: 600; 310; 30; 20 INJECTION, SOLUTION INTRAVENOUS at 04:06

## 2024-06-11 RX ADMIN — BUTALBITAL, ACETAMINOPHEN, AND CAFFEINE 1 TABLET: 50; 325; 40 TABLET ORAL at 11:06

## 2024-06-11 RX ADMIN — SUCRALFATE 1 G: 1 SUSPENSION ORAL at 11:06

## 2024-06-11 RX ADMIN — FAMOTIDINE 20 MG: 10 INJECTION, SOLUTION INTRAVENOUS at 11:06

## 2024-06-11 NOTE — ED TRIAGE NOTES
Patient identifiers verified verbally with patient and correct for Laz Quintero.    Laz Quintero, a 53 y.o. female presents to the ED via personal transportation with CC abdominal pain onset x2 weeks, also reports N/V. Was seen at another ED and prescribed medications for symptoms but not improving.

## 2024-06-11 NOTE — PLAN OF CARE
Received pt from [facility]. Souleymane. SO at bedside. Dx intractable N/V. Stable. Reviewed current plan of care, voiced understanding. Questions answered. Callbell within reach.

## 2024-06-11 NOTE — Clinical Note
Diagnosis: Intractable vomiting with nausea [[id number]]   Future Attending Provider: ANDREIA LOPEZ [[id number]]   Is the patient being sent to ED Observation?: Yes

## 2024-06-11 NOTE — ED PROVIDER NOTES
Source of History:  The patient    Chief complaint:  Abdominal Pain (States had ultrasound in [place] and not able to see ovaries, states had hysterectomy in 2015 but kept ovaries)      HPI:  Laz Quintero is a 53 y.o. female with history of cholecystectomy as well as acid reflux presents with abdominal pain that has been going on waxing and waning for the last 2 weeks and more consistent over last 4 days.  She states it has in the epigastric region and down in the right lower quadrant.  Describes as burning and severe radiating to the back.  She states it is worse with food including water and has been unable to eat or drink.  Has had multiple episodes of vomiting.  Had 1 episode of loose stools a few days ago but none since.  Denies any dysuria.    Was seen at local emergency department yesterday for the same presentation.  Had blood work including an ultrasound as well as a CT abdomen pelvis showing no acute process and specifically no appendicitis.  There did show some mild bilateral hydronephrosis but no stones or any other factors.  Was discharged with Phenergan to follow up with GI and primary care.    This is the extent to the patients complaints today here in the emergency department.    ROS:   See HPI.    Review of patient's allergies indicates:   Allergen Reactions    Clindamycin     Sulfa (sulfonamide antibiotics) Anaphylaxis    Sulfa dyne     Sulfamethoxazole-trimethoprim      Other reaction(s): Anaphylaxis    Dermabond [tissue glues] Rash       PMH:  As per HPI and below:  Past Medical History:   Diagnosis Date    Abnormal Pap smear     Abnormal Pap smear of cervix     Annular tear of lumbar disc, L5-S1. 07/26/2012    Chronic LBP     HPTH (hyperparathyroidism)     Hyperlipidemia     Menorrhagia     PONV (postoperative nausea and vomiting)     Primary hypertension 01/31/2023    Right lumbar radiculopathy 07/26/2012    Seasonal allergies      Past Surgical History:   Procedure Laterality Date     "BACK SURGERY  2018    CHOLECYSTECTOMY      COLONOSCOPY N/A 01/19/2018    Procedure: COLONOSCOPY;  Surgeon: Malachi Olmedo MD;  Location: [facility] ENDO (4TH FLR);  Service: Endoscopy;  Laterality: N/A;    COLONOSCOPY N/A 12/28/2022    Procedure: COLONOSCOPY;  Surgeon: Ezekiel Alanis MD;  Location: [facility] ENDO (4TH FLR);  Service: Endoscopy;  Laterality: N/A;  inst via portal  pre call complete [facility]    ESOPHAGOGASTRODUODENOSCOPY N/A 09/06/2019    Procedure: EGD (ESOPHAGOGASTRODUODENOSCOPY);  Surgeon: Jose Carlos Ventura MD;  Location: [facility] ENDO (4TH FLR);  Service: Endoscopy;  Laterality: N/A;  pt requesting ASAP    HYSTERECTOMY, TOTAL, LAPAROSCOPIC, WITH SALPINGECTOMY  06/16/2015    INJECTION OF ANESTHETIC AGENT AROUND MULTIPLE INTERCOSTAL NERVES Right 03/21/2023    Procedure: BLOCK, NERVE, INTERCOSTAL, 2 OR MORE;  Surgeon: Anton Evangelista MD;  [facility];  Service: Cardiothoracic;  Laterality: Right;    TUBAL LIGATION      Essure    WISDOM TOOTH EXTRACTION      2005    XI ROBOTIC RATS Right 03/21/2023    Procedure: XI ROBOTIC THYMECTOMY;  Surgeon: Anton Evangelista MD;  [facility];  Service: Cardiothoracic;  Laterality: Right;       Social History     Tobacco Use    Smoking status: Never     Passive exposure: Never    Smokeless tobacco: Never   Substance Use Topics    Alcohol use: No    Drug use: No       Physical Exam:    /78   Pulse 64   Temp 98.3 °F (36.8 °C) (Oral)   Resp 20   Ht 5' 6" (1.676 m)   Wt 71.7 kg (158 lb)   LMP 06/07/2015 (Exact Date)   SpO2 99%   Breastfeeding No   BMI 25.50 kg/m²   Nursing note and vital signs reviewed.  Constitutional:  Uncomfortable appearing.  Nontoxic  Cardiovascular: Regular rate and rhythm.  No murmurs. No gallops. No rubs  Respiratory: Clear to auscultation bilaterally.  Good air movement.  No wheezes.  No rhonchi. No rales. No accessory muscle use.  Abdomen/:  Tenderness to palpation in the epigastric region with some guarding. "  Negative Llanes sign.  Does have tenderness to palpation in the right lower quadrant.  Non peritoneal.  The remaining of the abdomen soft nontender nondistended bowel sounds normal.  Neuro: Alert. No focal deficits.  Skin: No rashes seen.     I decided to obtain the patient's medical records.  Summary of Medical Records:  Seen yesterday.  Had a CBC, CMP as well as lipase all which were normal.  Ultrasound was limited but showed nonvisualization of the uterus and bilateral ovaries.  CT abdomen pelvis showed bilateral hydronephrosis but no findings to suggest ureteral obstruction.  The urinary bladder was mildly prominent showing findings potentially consistent with urinary retention or outlet obstruction.    Differential Dx considered but not limited to:    Based on the patient's history and physical exam as well as reviewing yesterday's records and workup I suspect most likely severe gastritis or peptic ulcer disease.  Also considered but less likely to be pancreatitis.  I have considered but have a low suspicion for acute coronary syndrome.  She has had a previous cholecystectomy and had an ultrasound showing no choledocholithiasis yesterday.  Based on longevity I do not suspect appendicitis or diverticulitis.      MDM/ Workup:  Will get labs give IV fluids antiemetics as well as GI cocktail.  Do not feel repeat imaging with CT scan is indicated.  Will get an upright x-ray.  I have also considered but see low value in a repeat ultrasound at this time.      ED Course as of 06/11/24 1311   Tue Jun 11, 2024   1038 Lipase: 12 [SM]   1038 Comp. Metabolic Panel  normal [SM]   1038 WBC: 8.60 [SM]   1038 Hemoglobin: 13.1 [SM]   1038 Platelet Count: 292 [SM]   1208 Upon re-evaluation patient is feeling better.  She states she felt improvement after the GI cocktail.  Based on this I do suspect her symptoms are most likely due to severe gastritis or peptic ulcer disease.  Pending imaging studies will plan to discharge with  referral to Gastroenterology as well as place on Carafate and Pepcid. [SM]   1242 X-Ray Abdomen Flat And Erect  KUB independently interpreted by myself shows normal bowel gas pattern.  No acute abnormality. [SM]   1243 US Pelvis Comp with Transvag NON-OB (xpd)  Ultrasound of the pelvis shows hysterectomy.  No other acute significant sonographic abnormality seen.  Ovaries are both visualized with good flow demonstrated to both ovaries.   [SM]   1256 On re-evaluation the patient's pain has increased significantly.  She is very uncomfortable appearing.  Also had a couple episodes of vomiting.  Will give more antiemetics as well as analgesia.  At this point I feel it is reasonable to bring the patient into observation for intractable nausea vomiting abdominal pain for GI consultation.  I discussed this with the location she agrees with the plan of care. [SM]   1310 Will admit to ED obs unit. [SM]      ED Course User Index  [SM] Antonio Garcia,                Diagnostic Impression:    1. Intractable vomiting with nausea    2. Epigastric pain         ED Disposition Condition    Observation Stable                  Antonio Garcia,   06/11/24 1311

## 2024-06-11 NOTE — PROGRESS NOTES
"ED Observation Unit  Progress Note      HPI   Laz Quintero is a 53 y.o. female with history of cholecystectomy as well as acid reflux presents with abdominal pain that has been going on waxing and waning for the last 2 weeks and more consistent over last 4 days.  She states it has in the epigastric region and down in the right lower quadrant.  Describes as burning and severe radiating to the back.  She states it is worse with food including water and has been unable to eat or drink.  Has had multiple episodes of vomiting.  Had 1 episode of loose stools a few days ago but none since.  Denies any dysuria.     Was seen at local emergency department yesterday for the same presentation.  Had blood work including an ultrasound as well as a CT abdomen pelvis showing no acute process and specifically no appendicitis.  There did show some mild bilateral hydronephrosis but no stones or any other factors.  Was discharged with Phenergan to follow up with GI and primary care.     This is the extent to the patients complaints today here in the emergency department.    Interval History   Patient endorsing a "burning, sour" pain in abdomen after trying CLD. Had some associated nausea relieved with phenergan. Will trial Carafate for relief. Abdominal xray reviewed with patient. Last bowel movement Sunday however stool burden noted. Patient would like to trial mirilax first and see response. Continue IVFs overnight, decrease rate in setting of hypertension.     PMHx   Past Medical History:   Diagnosis Date    Abnormal Pap smear     Abnormal Pap smear of cervix     Annular tear of lumbar disc, L5-S1. 07/26/2012    Chronic LBP     HPTH (hyperparathyroidism)     Hyperlipidemia     Menorrhagia     PONV (postoperative nausea and vomiting)     Primary hypertension 01/31/2023    Right lumbar radiculopathy 07/26/2012    Seasonal allergies       Past Surgical History:   Procedure Laterality Date    BACK SURGERY  2018    CHOLECYSTECTOMY  "     COLONOSCOPY N/A 01/19/2018    Procedure: COLONOSCOPY;  Surgeon: Malachi Olmedo MD;  Location: [facility] ENDO (4TH FLR);  Service: Endoscopy;  Laterality: N/A;    COLONOSCOPY N/A 12/28/2022    Procedure: COLONOSCOPY;  Surgeon: Ezekiel Alanis MD;  Location: [facility] ENDO (4TH FLR);  Service: Endoscopy;  Laterality: N/A;  inst via portal  pre call complete [facility]    ESOPHAGOGASTRODUODENOSCOPY N/A 09/06/2019    Procedure: EGD (ESOPHAGOGASTRODUODENOSCOPY);  Surgeon: Jose Carlos Ventura MD;  Location: [facility] ENDO (4TH FLR);  Service: Endoscopy;  Laterality: N/A;  pt requesting ASAP    HYSTERECTOMY, TOTAL, LAPAROSCOPIC, WITH SALPINGECTOMY  06/16/2015    INJECTION OF ANESTHETIC AGENT AROUND MULTIPLE INTERCOSTAL NERVES Right 03/21/2023    Procedure: BLOCK, NERVE, INTERCOSTAL, 2 OR MORE;  Surgeon: Anton Evangelista MD;  [facility];  Service: Cardiothoracic;  Laterality: Right;    TUBAL LIGATION      [facility]    WISDOM TOOTH EXTRACTION      2005    XI ROBOTIC RATS Right 03/21/2023    Procedure: XI ROBOTIC THYMECTOMY;  Surgeon: Anton Evangelista MD;  Location: [facility];  Service: Cardiothoracic;  Laterality: Right;        Family Hx   Family History   Problem Relation Name Age of Onset    Stroke Paternal Grandmother  68    Hypertension Maternal Grandmother      Multiple myeloma Maternal Grandmother      Diabetes Father      Colon polyps Father      Hypertension Mother      Breast cancer Neg Hx      Ovarian cancer Neg Hx      Melanoma Neg Hx      Colon cancer Neg Hx          Social Hx   Social History     Socioeconomic History    Marital status: Single   Occupational History    Occupation: nurse     Employer: [employer]   Tobacco Use    Smoking status: Never     Passive exposure: Never    Smokeless tobacco: Never   Substance and Sexual Activity    Alcohol use: No    Drug use: No    Sexual activity: Yes     Partners: Male     Birth control/protection: See Surgical Hx     Comment:  ; new  partner since 2018   Other Topics Concern    Are you pregnant or think you may be? No    Breast-feeding No     Social Determinants of Health     Financial Resource Strain: Low Risk  (3/22/2023)    Overall Financial Resource Strain (CARDIA)     Difficulty of Paying Living Expenses: Not very hard   Food Insecurity: No Food Insecurity (3/22/2023)    Hunger Vital Sign     Worried About Running Out of Food in the Last Year: Never true     Ran Out of Food in the Last Year: Never true   Transportation Needs: No Transportation Needs (3/22/2023)    PRAPARE - Transportation     Lack of Transportation (Medical): No     Lack of Transportation (Non-Medical): No   Physical Activity: Insufficiently Active (3/22/2023)    Exercise Vital Sign     Days of Exercise per Week: 2 days     Minutes of Exercise per Session: 20 min   Housing Stability: Low Risk  (3/22/2023)    Housing Stability Vital Sign     Unable to Pay for Housing in the Last Year: No     Number of Places Lived in the Last Year: 1     Unstable Housing in the Last Year: No        Vital Signs   Vitals:    06/11/24 1245 06/11/24 1251 06/11/24 1613 06/11/24 1815   BP: 134/78  135/78 (!) 176/82   Pulse: 64  62 68   Resp: 18 20 16 16   Temp: 98.3 °F (36.8 °C)  98.1 °F (36.7 °C) 98.3 °F (36.8 °C)   TempSrc: Oral  Oral Oral   SpO2: 99%  98% 98%   Weight:       Height:            Review of Systems  Review of Systems   Gastrointestinal:  Positive for abdominal pain, nausea and vomiting.       Brief Physical Exam/Reassessment   Physical Exam  Constitutional:       General: She is not in acute distress.     Appearance: Normal appearance. She is not ill-appearing.   HENT:      Head: Normocephalic and atraumatic.   Cardiovascular:      Rate and Rhythm: Normal rate and regular rhythm.   Pulmonary:      Effort: Pulmonary effort is normal.      Breath sounds: Normal breath sounds.   Abdominal:      Tenderness: There is abdominal tenderness (generalized abdominal pain, mostly in epigastric  area).      Comments: Hyperactive bowel sounds   Neurological:      Mental Status: She is alert.         Labs/Imaging   Labs Reviewed   CBC W/ AUTO DIFFERENTIAL - Abnormal; Notable for the following components:       Result Value    MPV 8.9 (*)     Mono % 3.4 (*)     All other components within normal limits   HIV 1 / 2 ANTIBODY    Narrative:     Release to patient->Immediate   HEPATITIS C ANTIBODY    Narrative:     Release to patient->Immediate   COMPREHENSIVE METABOLIC PANEL   LIPASE   URINALYSIS, REFLEX TO URINE CULTURE    Narrative:     Specimen Source->Urine   ISTAT PROCEDURE   ISTAT CHEM8      Imaging Results              X-Ray Abdomen Flat And Erect (Final result)  Result time 06/11/24 12:44:19      Final result by Noé Friedman MD (06/11/24 12:44:19)                   Impression:      Nonspecific, nonobstructive bowel gas pattern    Moderate to large volume colonic stool.      Electronically signed by: Noé Friedman  Date:    06/11/2024  Time:    12:44               Narrative:    EXAMINATION:  XR ABDOMEN FLAT AND ERECT    CLINICAL HISTORY:  Abdominal Pain;    TECHNIQUE:  Flat and erect AP views of the abdomen were performed.    COMPARISON:  Abdominal radiograph 03/22/2023.    FINDINGS:  No definite dilated gas-filled loops of small large bowel appreciated.  Moderate to large volume colonic stool.  No findings to suggest free air.  Air appears to be present the distal colon rectum.    No acute osseous or soft tissue abnormality.    Postoperative changes noted at L5-S1.                                       US Pelvis Comp with Transvag NON-OB (xpd) (Final result)  Result time 06/11/24 12:28:25      Final result by Sami Shannon MD (06/11/24 12:28:25)                   Impression:      Hysterectomy.    No acute/significant sonographic abnormality.      Electronically signed by: Sami Shannon MD  Date:    06/11/2024  Time:    12:28               Narrative:    EXAMINATION:  US PELVIS COMP WITH TRANSVAG  NON-OB (XPD)    CLINICAL HISTORY:  US yesterday was limited. h/o hysterectomy but ovaries remained. please eval;    TECHNIQUE:  Transabdominal sonography of the pelvis was performed, followed by transvaginal sonography to better evaluate the uterus and ovaries.    COMPARISON:  Pelvic ultrasound and CT abdomen and pelvis 06/10/2024, pelvic ultrasound 10/22/2019, MRI pelvis 05/29/2012    FINDINGS:  Uterus:    Surgically absent.    Ovaries:    The right ovary measures 1.6 x 2.4 x 1.5 cm.  The left ovary measures 1.5 x 1.8 x 1.3 cm. Within the left ovary there is a 1 cm simple appearing dominant follicle.  Doppler flow demonstrated to both ovaries.    Free Fluid:    None.                                       I reviewed all labs, imaging, EKGs.     Plan   Epigastric pain  Intractable nausea/vomting  -  no acute electrolyte abnormalities, lipase WNL  -  H. Pylori pending  -  CT A/P 6/10/24 revealed mild hydronephrosis without obvious obstruction.  No other acute findings.   - Flat and erect x-ray today revealed no evidence of bowel obstruction. Moderate to large volume stool noted in colon.  - patient received Zofran, Pepcid,  in the ED with persistent symptoms  - PRN analgesics, antiemetics ordered: pepcid, phenergan, and droperidol  - will trial Carafate and bentyl  - mirilax ordered for stool burden  - education given on use of pain medications with constipation  - GI consult if pain/vomiting is intractable with parenteral meds     I have discussed this case with Angela Guerrero PA-C.

## 2024-06-11 NOTE — H&P
ED Observation Unit  History and Physical      I assumed care of this patient from the Main ED at onset of observation time, 1309 on 06/11/2024.       History of Present Illness:    Laz Quintero is a 53 y.o. female with history of cholecystectomy as well as acid reflux presents with abdominal pain that has been going on waxing and waning for the last 2 weeks and more consistent over last 4 days.  She states it has in the epigastric region and down in the right lower quadrant.  Describes as burning and severe radiating to the back.  She states it is worse with food including water and has been unable to eat or drink.  Has had multiple episodes of vomiting.  Had 1 episode of loose stools a few days ago but none since.  Denies any dysuria.     Was seen at local emergency department yesterday for the same presentation.  Had blood work including an ultrasound as well as a CT abdomen pelvis showing no acute process and specifically no appendicitis.  There did show some mild bilateral hydronephrosis but no stones or any other factors.  Was discharged with Phenergan to follow up with GI and primary care.     This is the extent to the patients complaints today here in the emergency department.    I reviewed the ED Provider Note dated today prior to my evaluation of this patient.  I reviewed all labs and imaging performed in the Main ED, prior to patient being placed in Observation. Patient was placed in the ED Observation Unit for Intractable vomiting with nausea.    PMHx   Past Medical History:   Diagnosis Date    Abnormal Pap smear     Abnormal Pap smear of cervix     Annular tear of lumbar disc, L5-S1. 07/26/2012    Chronic LBP     HPTH (hyperparathyroidism)     Hyperlipidemia     Menorrhagia     PONV (postoperative nausea and vomiting)     Primary hypertension 01/31/2023    Right lumbar radiculopathy 07/26/2012    Seasonal allergies       Past Surgical History:   Procedure Laterality Date    BACK SURGERY  2018     CHOLECYSTECTOMY      COLONOSCOPY N/A 01/19/2018    Procedure: COLONOSCOPY;  Surgeon: Malachi Olmedo MD;  Location: [facility] ENDO (4TH FLR);  Service: Endoscopy;  Laterality: N/A;    COLONOSCOPY N/A 12/28/2022    Procedure: COLONOSCOPY;  Surgeon: Ezekiel Alanis MD;  Location: [facility] ENDO (4TH FLR);  Service: Endoscopy;  Laterality: N/A;  inst via portal  pre call complete [facility]    ESOPHAGOGASTRODUODENOSCOPY N/A 09/06/2019    Procedure: EGD (ESOPHAGOGASTRODUODENOSCOPY);  Surgeon: Jose Carlos Ventura MD;  Location: [facility] ENDO (4TH FLR);  Service: Endoscopy;  Laterality: N/A;  pt requesting ASAP    HYSTERECTOMY, TOTAL, LAPAROSCOPIC, WITH SALPINGECTOMY  06/16/2015    INJECTION OF ANESTHETIC AGENT AROUND MULTIPLE INTERCOSTAL NERVES Right 03/21/2023    Procedure: BLOCK, NERVE, INTERCOSTAL, 2 OR MORE;  Surgeon: Anton Evangelista MD;  [facility];  Service: Cardiothoracic;  Laterality: Right;    TUBAL LIGATION      [facility]ure    WISDOM TOOTH EXTRACTION      2005    XI ROBOTIC RATS Right 03/21/2023    Procedure: XI ROBOTIC THYMECTOMY;  Surgeon: Anton Evangelista MD;  [facility];  Service: Cardiothoracic;  Laterality: Right;        Family Hx   Family History   Problem Relation Name Age of Onset    Stroke Paternal Grandmother  68    Hypertension Maternal Grandmother      Multiple myeloma Maternal Grandmother      Diabetes Father      Colon polyps Father      Hypertension Mother      Breast cancer Neg Hx      Ovarian cancer Neg Hx      Melanoma Neg Hx      Colon cancer Neg Hx          Social Hx   Social History     Socioeconomic History    Marital status: Single   Occupational History    Occupation: nurse     Employer: [employer]   Tobacco Use    Smoking status: Never     Passive exposure: Never    Smokeless tobacco: Never   Substance and Sexual Activity    Alcohol use: No    Drug use: No    Sexual activity: Yes     Partners: Male     Birth control/protection: See Surgical Hx     Comment:  " ; new partner since 2018   Other Topics Concern    Are you pregnant or think you may be? No    Breast-feeding No     Social Determinants of Health     Financial Resource Strain: Low Risk  (3/22/2023)    Overall Financial Resource Strain (CARDIA)     Difficulty of Paying Living Expenses: Not very hard   Food Insecurity: No Food Insecurity (3/22/2023)    Hunger Vital Sign     Worried About Running Out of Food in the Last Year: Never true     Ran Out of Food in the Last Year: Never true   Transportation Needs: No Transportation Needs (3/22/2023)    PRAPARE - Transportation     Lack of Transportation (Medical): No     Lack of Transportation (Non-Medical): No   Physical Activity: Insufficiently Active (3/22/2023)    Exercise Vital Sign     Days of Exercise per Week: 2 days     Minutes of Exercise per Session: 20 min   Housing Stability: Low Risk  (3/22/2023)    Housing Stability Vital Sign     Unable to Pay for Housing in the Last Year: No     Number of Places Lived in the Last Year: 1     Unstable Housing in the Last Year: No        Vital Signs   Vitals:    06/11/24 0851 06/11/24 1245 06/11/24 1251   BP: (!) 164/107 134/78    Pulse: 91 64    Resp: 18 18 20   Temp: 98.5 °F (36.9 °C) 98.3 °F (36.8 °C)    TempSrc: Oral Oral    SpO2: 96% 99%    Weight: 71.7 kg (158 lb)     Height: 5' 6" (1.676 m)          Review of Systems  Review of Systems   Gastrointestinal:  Positive for abdominal pain, nausea and vomiting.       Physical Exam  Physical Exam  Vitals reviewed.   Constitutional:       General: She is not in acute distress.     Appearance: She is not diaphoretic.   HENT:      Head: Normocephalic and atraumatic.   Cardiovascular:      Rate and Rhythm: Normal rate and regular rhythm.      Heart sounds: Heart sounds not distant. No murmur heard.     No friction rub. No gallop.   Pulmonary:      Effort: Pulmonary effort is normal. No respiratory distress.      Breath sounds: Normal breath sounds. No decreased breath " sounds, wheezing, rhonchi or rales.   Abdominal:      Palpations: Abdomen is soft. There is no mass.      Tenderness: There is abdominal tenderness in the right lower quadrant and epigastric area. There is guarding (voluntary).   Musculoskeletal:      Cervical back: Neck supple.   Skin:     General: Skin is warm and dry.   Neurological:      Mental Status: She is alert.   Psychiatric:         Mood and Affect: Mood and affect normal.         Medications:   Scheduled Meds:   [START ON 6/12/2024] DULoxetine  60 mg Oral Daily    [START ON 6/12/2024] hydroCHLOROthiazide  12.5 mg Oral Daily    methocarbamoL  750 mg Oral Q12H    pregabalin  75 mg Oral BID    QUEtiapine  150 mg Oral QHS     Continuous Infusions:   lactated ringers   Intravenous Continuous         PRN Meds:.  Current Facility-Administered Medications:     albuterol, 2 puff, Inhalation, Q4H PRN    butalbital-acetaminophen-caffeine -40 mg, 1 tablet, Oral, Q4H PRN    droPERidol, 1.25 mg, Intravenous, Q6H PRN    melatonin, 6 mg, Oral, Nightly PRN    morphine, 4 mg, Intravenous, Q4H PRN    oxyCODONE, 5 mg, Oral, Q4H PRN    promethazine (PHENERGAN) 12.5 mg in sodium chloride 0.9% 50 mL IVPB, 12.5 mg, Intravenous, Q6H PRN    sodium chloride 0.9%, 10 mL, Intravenous, PRN      Assessment/Plan:  Epigastric pain  Intractable nausea/vomting  -   No concerning metabolic derangements  -  lipase when within normal limits  -  CT A/P from ED visit yesterday revealed mild hydronephrosis without obvious obstruction.  No other acute findings.   - Flat and erect x-ray today revealed no evidence of bowel obstruction. Moderate to large volume stool noted in colon.  -  patient received Zofran, Pepcid,  in the ED with persistent symptoms  - PRN analgesics, antiemetics ordered: morphine, pepcid, phenergan, droperidol  - GI consult if pain, vomiting is intractable with parenteral meds    Case was discussed with the ED provider, Dr. Garcia

## 2024-06-11 NOTE — DISCHARGE INSTRUCTIONS
Use Maalox or Mylanta over-the-counter. Take 30 mL, 30 minutes prior to meals and 30 minutes prior to bedtime.    It has been a pleasure caring for you, and I hope you continue to feel better and stronger every day! Please do not hesitate to reach out to me with any questions or concerns.     Stephanie Hartley PA-C  [facility]  [phone]

## 2024-06-12 ENCOUNTER — ANESTHESIA EVENT (OUTPATIENT)
Dept: ENDOSCOPY | Facility: HOSPITAL | Age: 54
End: 2024-06-12

## 2024-06-12 PROBLEM — K29.50 CHRONIC GASTRITIS WITHOUT BLEEDING: Status: ACTIVE | Noted: 2024-06-12

## 2024-06-12 PROCEDURE — 96375 TX/PRO/DX INJ NEW DRUG ADDON: CPT

## 2024-06-12 PROCEDURE — C9113 INJ PANTOPRAZOLE SODIUM, VIA: HCPCS | Performed by: NURSE PRACTITIONER

## 2024-06-12 PROCEDURE — 25000003 PHARM REV CODE 250: Performed by: NURSE PRACTITIONER

## 2024-06-12 PROCEDURE — 93005 ELECTROCARDIOGRAM TRACING: CPT

## 2024-06-12 PROCEDURE — 63600175 PHARM REV CODE 636 W HCPCS: Performed by: EMERGENCY MEDICINE

## 2024-06-12 PROCEDURE — 99284 EMERGENCY DEPT VISIT MOD MDM: CPT | Mod: ,,, | Performed by: INTERNAL MEDICINE

## 2024-06-12 PROCEDURE — 25000003 PHARM REV CODE 250

## 2024-06-12 PROCEDURE — 63600175 PHARM REV CODE 636 W HCPCS

## 2024-06-12 PROCEDURE — 93010 ELECTROCARDIOGRAM REPORT: CPT | Mod: 76,,, | Performed by: INTERNAL MEDICINE

## 2024-06-12 PROCEDURE — 25000003 PHARM REV CODE 250: Performed by: PHYSICIAN ASSISTANT

## 2024-06-12 PROCEDURE — 96361 HYDRATE IV INFUSION ADD-ON: CPT

## 2024-06-12 PROCEDURE — 87338 HPYLORI STOOL AG IA: CPT

## 2024-06-12 PROCEDURE — 63600175 PHARM REV CODE 636 W HCPCS: Performed by: NURSE PRACTITIONER

## 2024-06-12 PROCEDURE — G0378 HOSPITAL OBSERVATION PER HR: HCPCS

## 2024-06-12 PROCEDURE — 96376 TX/PRO/DX INJ SAME DRUG ADON: CPT

## 2024-06-12 RX ORDER — MORPHINE SULFATE 2 MG/ML
2 INJECTION, SOLUTION INTRAMUSCULAR; INTRAVENOUS
Status: DISCONTINUED | OUTPATIENT
Start: 2024-06-12 | End: 2024-06-12

## 2024-06-12 RX ORDER — OXYCODONE HYDROCHLORIDE 5 MG/1
5 TABLET ORAL EVERY 4 HOURS PRN
Status: DISCONTINUED | OUTPATIENT
Start: 2024-06-12 | End: 2024-06-13

## 2024-06-12 RX ORDER — GLUCAGON 1 MG
1 KIT INJECTION
Status: DISCONTINUED | OUTPATIENT
Start: 2024-06-12 | End: 2024-06-13 | Stop reason: HOSPADM

## 2024-06-12 RX ORDER — MORPHINE SULFATE 2 MG/ML
2 INJECTION, SOLUTION INTRAMUSCULAR; INTRAVENOUS
Status: COMPLETED | OUTPATIENT
Start: 2024-06-12 | End: 2024-06-12

## 2024-06-12 RX ORDER — PANTOPRAZOLE SODIUM 40 MG/10ML
40 INJECTION, POWDER, LYOPHILIZED, FOR SOLUTION INTRAVENOUS DAILY
Status: DISCONTINUED | OUTPATIENT
Start: 2024-06-13 | End: 2024-06-12

## 2024-06-12 RX ORDER — FLUTICASONE PROPIONATE 50 MCG
1 SPRAY, SUSPENSION (ML) NASAL DAILY
Status: DISCONTINUED | OUTPATIENT
Start: 2024-06-13 | End: 2024-06-13 | Stop reason: HOSPADM

## 2024-06-12 RX ORDER — POLYETHYLENE GLYCOL 3350 17 G/17G
17 POWDER, FOR SOLUTION ORAL 2 TIMES DAILY
Status: DISCONTINUED | OUTPATIENT
Start: 2024-06-12 | End: 2024-06-13

## 2024-06-12 RX ORDER — ACETAMINOPHEN 325 MG/1
650 TABLET ORAL EVERY 6 HOURS PRN
Status: DISCONTINUED | OUTPATIENT
Start: 2024-06-12 | End: 2024-06-13 | Stop reason: HOSPADM

## 2024-06-12 RX ORDER — PANTOPRAZOLE SODIUM 40 MG/10ML
40 INJECTION, POWDER, LYOPHILIZED, FOR SOLUTION INTRAVENOUS 2 TIMES DAILY
Status: DISCONTINUED | OUTPATIENT
Start: 2024-06-12 | End: 2024-06-13

## 2024-06-12 RX ORDER — MORPHINE SULFATE 4 MG/ML
2 INJECTION, SOLUTION INTRAMUSCULAR; INTRAVENOUS
Status: DISCONTINUED | OUTPATIENT
Start: 2024-06-12 | End: 2024-06-13

## 2024-06-12 RX ORDER — LACTULOSE 10 G/15ML
20 SOLUTION ORAL EVERY 6 HOURS PRN
Status: DISCONTINUED | OUTPATIENT
Start: 2024-06-12 | End: 2024-06-13 | Stop reason: HOSPADM

## 2024-06-12 RX ORDER — IBUPROFEN 200 MG
24 TABLET ORAL
Status: DISCONTINUED | OUTPATIENT
Start: 2024-06-12 | End: 2024-06-13 | Stop reason: HOSPADM

## 2024-06-12 RX ORDER — SYRING-NEEDL,DISP,INSUL,0.3 ML 29 G X1/2"
296 SYRINGE, EMPTY DISPOSABLE MISCELLANEOUS
Status: COMPLETED | OUTPATIENT
Start: 2024-06-12 | End: 2024-06-12

## 2024-06-12 RX ORDER — MECLIZINE HCL 12.5 MG 12.5 MG/1
25 TABLET ORAL 3 TIMES DAILY PRN
Status: DISCONTINUED | OUTPATIENT
Start: 2024-06-12 | End: 2024-06-13 | Stop reason: HOSPADM

## 2024-06-12 RX ORDER — ONDANSETRON HYDROCHLORIDE 2 MG/ML
4 INJECTION, SOLUTION INTRAVENOUS
Status: COMPLETED | OUTPATIENT
Start: 2024-06-12 | End: 2024-06-12

## 2024-06-12 RX ORDER — PROMETHAZINE HYDROCHLORIDE 25 MG/1
25 TABLET ORAL EVERY 6 HOURS PRN
Status: DISCONTINUED | OUTPATIENT
Start: 2024-06-12 | End: 2024-06-13 | Stop reason: HOSPADM

## 2024-06-12 RX ORDER — SENNOSIDES 8.6 MG/1
8.6 TABLET ORAL 2 TIMES DAILY
Status: DISCONTINUED | OUTPATIENT
Start: 2024-06-12 | End: 2024-06-13 | Stop reason: HOSPADM

## 2024-06-12 RX ORDER — SODIUM CHLORIDE, SODIUM LACTATE, POTASSIUM CHLORIDE, CALCIUM CHLORIDE 600; 310; 30; 20 MG/100ML; MG/100ML; MG/100ML; MG/100ML
INJECTION, SOLUTION INTRAVENOUS CONTINUOUS
Status: ACTIVE | OUTPATIENT
Start: 2024-06-12 | End: 2024-06-13

## 2024-06-12 RX ORDER — PSEUDOEPHEDRINE/ACETAMINOPHEN 30MG-500MG
100 TABLET ORAL
Status: COMPLETED | OUTPATIENT
Start: 2024-06-12 | End: 2024-06-12

## 2024-06-12 RX ORDER — IBUPROFEN 200 MG
16 TABLET ORAL
Status: DISCONTINUED | OUTPATIENT
Start: 2024-06-12 | End: 2024-06-13 | Stop reason: HOSPADM

## 2024-06-12 RX ADMIN — Medication 100 ML: at 12:06

## 2024-06-12 RX ADMIN — DICYCLOMINE HYDROCHLORIDE 10 MG: 10 CAPSULE ORAL at 12:06

## 2024-06-12 RX ADMIN — DICYCLOMINE HYDROCHLORIDE 10 MG: 10 CAPSULE ORAL at 08:06

## 2024-06-12 RX ADMIN — QUETIAPINE FUMARATE 150 MG: 150 TABLET, EXTENDED RELEASE ORAL at 08:06

## 2024-06-12 RX ADMIN — SUCRALFATE 1 G: 1 SUSPENSION ORAL at 04:06

## 2024-06-12 RX ADMIN — SUCRALFATE 1 G: 1 SUSPENSION ORAL at 05:06

## 2024-06-12 RX ADMIN — SODIUM CHLORIDE, POTASSIUM CHLORIDE, SODIUM LACTATE AND CALCIUM CHLORIDE: 600; 310; 30; 20 INJECTION, SOLUTION INTRAVENOUS at 12:06

## 2024-06-12 RX ADMIN — PREGABALIN 75 MG: 75 CAPSULE ORAL at 08:06

## 2024-06-12 RX ADMIN — SUCRALFATE 1 G: 1 SUSPENSION ORAL at 12:06

## 2024-06-12 RX ADMIN — ONDANSETRON 4 MG: 2 INJECTION INTRAMUSCULAR; INTRAVENOUS at 03:06

## 2024-06-12 RX ADMIN — MORPHINE SULFATE 2 MG: 2 INJECTION, SOLUTION INTRAMUSCULAR; INTRAVENOUS at 04:06

## 2024-06-12 RX ADMIN — PANTOPRAZOLE SODIUM 40 MG: 40 INJECTION, POWDER, FOR SOLUTION INTRAVENOUS at 08:06

## 2024-06-12 RX ADMIN — SENNOSIDES 8.6 MG: 8.6 TABLET, FILM COATED ORAL at 08:06

## 2024-06-12 RX ADMIN — DICYCLOMINE HYDROCHLORIDE 10 MG: 10 CAPSULE ORAL at 05:06

## 2024-06-12 RX ADMIN — ONDANSETRON 4 MG: 4 TABLET, ORALLY DISINTEGRATING ORAL at 05:06

## 2024-06-12 RX ADMIN — METHOCARBAMOL 750 MG: 750 TABLET ORAL at 08:06

## 2024-06-12 RX ADMIN — DULOXETINE HYDROCHLORIDE 60 MG: 60 CAPSULE, DELAYED RELEASE ORAL at 08:06

## 2024-06-12 RX ADMIN — POLYETHYLENE GLYCOL 3350 17 G: 17 POWDER, FOR SOLUTION ORAL at 08:06

## 2024-06-12 RX ADMIN — SODIUM CHLORIDE, POTASSIUM CHLORIDE, SODIUM LACTATE AND CALCIUM CHLORIDE: 600; 310; 30; 20 INJECTION, SOLUTION INTRAVENOUS at 08:06

## 2024-06-12 RX ADMIN — BUTALBITAL, ACETAMINOPHEN, AND CAFFEINE 1 TABLET: 50; 325; 40 TABLET ORAL at 08:06

## 2024-06-12 RX ADMIN — HYDROCHLOROTHIAZIDE 12.5 MG: 12.5 TABLET ORAL at 08:06

## 2024-06-12 RX ADMIN — MAGNESIUM CITRATE 296 ML: 1.75 LIQUID ORAL at 12:06

## 2024-06-12 RX ADMIN — SUCRALFATE 1 G: 1 SUSPENSION ORAL at 11:06

## 2024-06-12 RX ADMIN — SODIUM CHLORIDE 500 ML: 9 INJECTION, SOLUTION INTRAVENOUS at 12:06

## 2024-06-12 RX ADMIN — MECLIZINE 25 MG: 12.5 TABLET ORAL at 11:06

## 2024-06-12 RX ADMIN — MORPHINE SULFATE 2 MG: 2 INJECTION, SOLUTION INTRAMUSCULAR; INTRAVENOUS at 10:06

## 2024-06-12 RX ADMIN — MORPHINE SULFATE 2 MG: 2 INJECTION, SOLUTION INTRAMUSCULAR; INTRAVENOUS at 05:06

## 2024-06-12 RX ADMIN — PROMETHAZINE HYDROCHLORIDE 25 MG: 25 TABLET ORAL at 11:06

## 2024-06-12 NOTE — HPI
"Laz Quintero is a 53 y.o. female with a PMHx of cholecystectomy, GERD, s/p hysterectomy, HTN, HPTH, chronic low back pain who initially presented with 2 weeks of abdominal pain that has been worse for the 4 days before admission. It is associated with nausea, vomiting, poor appetite, and bloating. The pain is located in both lower quadrants and the epigastric region. It's burning, and "sharp, electric" in nature. Last week, it was waxing and waning, but it has been constant for the past few days. She visited the [facility] ED on 6/10 and was discharged with PO phenergan and GI referral after getting IV morphine, dilaudid, IV zofran and phenergan. She presented again on 6/11 to the ED with more consistent abdominal pain, described as burning and severe radiating to her back. The pain is worse with water and food. Her last episode of vomiting was on 6/10. Her last BM was on 6/9. She denies bright red blood per rectum, but noticed that her BM on 6/9 was black/darker. Usually her stools are brown. She denies constipation, diarrhea, or dysphagia. Her weight loss is intentional. She takes home Nexium as needed. She takes Mobic and up to 1600 mg of ibuprofen 4x/week for chronic back pain. Denies personal hx of IBS/IBD. She was placed in EDOU for observation. Thus far, she has been treated with GI cocktail, bentyl, sucralfate, daily protonix, daily miralax, IVF, and prn morphine and zofran. She has continued abdominal pain but N/V has improved. She received a brown bomb enema and was able to have a bowel movement. GI was consulted for abdominal pain, and believe pain is multifactorial and recommend increasing bowel regimen and protonix frequency. GI plans to perform EGD tomorrow. The patient has remained HDS and afebrile. Patient upgraded to hospital medicine due to patient requiring hospital observation for >24 hrs .     She recently traveled to [place] 2 weeks ago and was not having these problems there; her " family members (son, mother, sister) have had diarrhea predominant symptoms since returning from trip rather than vomiting. She mentions that she did have Chinese food on 6/7 prior to the pain worsening, but the pain did precede that meal. Otherwise, she avoids spicy food, coffee, fried foods and consumes mostly fish, vegetables and tea.      Imaging done since 6/10 below:   CT A/P: gaseous distension of the rectum, normal appendix. Diverticula. No fluid collection. Bladder distension w/o wall thickening. Bilateral mild hydronephrosis, no findings to suggest ureteral obstruction.  The urinary bladder is mildly prominent, correlation with any history of urinary retention or outlet obstruction. Possible hepatic steatosis. XR abdomen: moderate/large colonic stool, non obstructive bowel gas pattern. Pelvic US: small simple cystic lesion arising from the posterior aspect of the urinary bladder wall measuring 0.8 x 0.7 x 0.9 cm, compatible with a bladder diverticulum.

## 2024-06-12 NOTE — H&P
"[facility]an - Emergency Dept  [facility] Medicine  History & Physical    Patient Name: Laz Quintero  MRN: [id number]  Patient Class: OP- Observation  Admission Date: 6/11/2024  Attending Physician: Tasia Jeffrey MD   Primary Care Provider: Charlotte Jacinto MD         Patient information was obtained from patient, past medical records, and ER records.     Subjective:     Principal Problem:Abdominal pain    Chief Complaint:   Chief Complaint   Patient presents with    Abdominal Pain     States had ultrasound in [place] and not able to see ovaries, states had hysterectomy in 2015 but kept ovaries        HPI: Laz Quintero is a 53 y.o. female with a PMHx of cholecystectomy, GERD, s/p hysterectomy, HTN, HPTH, chronic low back pain who initially presented with 2 weeks of abdominal pain that has been worse for the 4 days before admission. It is associated with nausea, vomiting, poor appetite, and bloating. The pain is located in both lower quadrants and the epigastric region. It's burning, and "sharp, electric" in nature. Last week, it was waxing and waning, but it has been constant for the past few days. She visited the [facility] ED on 6/10 and was discharged with PO phenergan and GI referral after getting IV morphine, dilaudid, IV zofran and phenergan. She presented again on 6/11 to the ED with more consistent abdominal pain, described as burning and severe radiating to her back. The pain is worse with water and food. Her last episode of vomiting was on 6/10. Her last BM was on 6/9. She denies bright red blood per rectum, but noticed that her BM on 6/9 was black/darker. Usually her stools are brown. She denies constipation, diarrhea, or dysphagia. Her weight loss is intentional. She takes home Nexium as needed. She takes Mobic and up to 1600 mg of ibuprofen 4x/week for chronic back pain. Denies personal hx of IBS/IBD. She was placed in EDOU for observation. Thus far, she has been treated with GI cocktail, " bentyl, sucralfate, daily protonix, daily miralax, IVF, and prn morphine and zofran. She has continued abdominal pain but N/V has improved. She received a brown bomb enema and was able to have a bowel movement. GI was consulted for abdominal pain, and believe pain is multifactorial and recommend increasing bowel regimen and protonix frequency. GI plans to perform EGD tomorrow. The patient has remained HDS and afebrile. Patient upgraded to hospital medicine due to patient requiring hospital observation for >24 hrs .     She recently traveled to [place] 2 weeks ago and was not having these problems there; her family members (son, mother, sister) have had diarrhea predominant symptoms since returning from trip rather than vomiting. She mentions that she did have Chinese food on 6/7 prior to the pain worsening, but the pain did precede that meal. Otherwise, she avoids spicy food, coffee, fried foods and consumes mostly fish, vegetables and tea.      Imaging done since 6/10 below:   CT A/P: gaseous distension of the rectum, normal appendix. Diverticula. No fluid collection. Bladder distension w/o wall thickening. Bilateral mild hydronephrosis, no findings to suggest ureteral obstruction.  The urinary bladder is mildly prominent, correlation with any history of urinary retention or outlet obstruction. Possible hepatic steatosis. XR abdomen: moderate/large colonic stool, non obstructive bowel gas pattern. Pelvic US: small simple cystic lesion arising from the posterior aspect of the urinary bladder wall measuring 0.8 x 0.7 x 0.9 cm, compatible with a bladder diverticulum.    Past Medical History:   Diagnosis Date    Abnormal Pap smear     Abnormal Pap smear of cervix     Annular tear of lumbar disc, L5-S1. 07/26/2012    Chronic LBP     HPTH (hyperparathyroidism)     Hyperlipidemia     Menorrhagia     PONV (postoperative nausea and vomiting)     Primary hypertension 01/31/2023    Right lumbar radiculopathy 07/26/2012     Seasonal allergies        Past Surgical History:   Procedure Laterality Date    BACK SURGERY  2018    CHOLECYSTECTOMY      COLONOSCOPY N/A 01/19/2018    Procedure: COLONOSCOPY;  Surgeon: Malachi Olmedo MD;  Location: [facility] ENDO (4TH FLR);  Service: Endoscopy;  Laterality: N/A;    COLONOSCOPY N/A 12/28/2022    Procedure: COLONOSCOPY;  Surgeon: Ezekiel Alanis MD;  Location: [facility] ENDO (4TH FLR);  Service: Endoscopy;  Laterality: N/A;  inst via portal  pre call complete [facility]    ESOPHAGOGASTRODUODENOSCOPY N/A 09/06/2019    Procedure: EGD (ESOPHAGOGASTRODUODENOSCOPY);  Surgeon: Jose Carlos Ventura MD;  Location: [facility] ENDO (4TH FLR);  Service: Endoscopy;  Laterality: N/A;  pt requesting ASAP    HYSTERECTOMY, TOTAL, LAPAROSCOPIC, WITH SALPINGECTOMY  06/16/2015    INJECTION OF ANESTHETIC AGENT AROUND MULTIPLE INTERCOSTAL NERVES Right 03/21/2023    Procedure: BLOCK, NERVE, INTERCOSTAL, 2 OR MORE;  Surgeon: Anton Evangelista MD;  Location: [facility] OR [address] [place] [address];  Service: Cardiothoracic;  Laterality: Right;    TUBAL LIGATION      [place]    WISDOM TOOTH EXTRACTION      2005    XI ROBOTIC RATS Right 03/21/2023    Procedure: XI ROBOTIC THYMECTOMY;  Surgeon: Anton Evangelista MD;  Location: [facility] OR [address] [place] [address];  Service: Cardiothoracic;  Laterality: Right;       Review of patient's allergies indicates:   Allergen Reactions    Clindamycin     Sulfa (sulfonamide antibiotics) Anaphylaxis    Sulfa dyne     Sulfamethoxazole-trimethoprim      Other reaction(s): Anaphylaxis    Dermabond [tissue glues] Rash       No current facility-administered medications on file prior to encounter.     Current Outpatient Medications on File Prior to Encounter   Medication Sig    butalbital-acetaminophen-caffeine -40 mg (FIORICET, ESGIC) -40 mg per tablet Take 1 tablet by mouth every 4 (four) hours as needed.    CYMBALTA 60 mg capsule Take 60 mg by mouth.    fluticasone propionate (FLONASE) 50 mcg/actuation nasal spray 1 spray (50 mcg total) by  Each Nostril route once daily.    hydroCHLOROthiazide (HYDRODIURIL) 12.5 MG Tab Take 1 tablet (12.5 mg total) by mouth once daily.    ketorolac (TORADOL) 10 mg tablet Take one po q 8 hrs prn pain    oxyCODONE (ROXICODONE) 5 MG immediate release tablet Take 1 tablet (5 mg total) by mouth every 4 (four) hours as needed for Pain.    promethazine (PHENERGAN) 25 MG tablet Take 1 tablet (25 mg total) by mouth every 6 (six) hours as needed for Nausea.    QUEtiapine (SEROQUEL XR) 150 mg Tb24 Take 150 mg by mouth every evening.    traZODone (DESYREL) 100 MG tablet Take 100 mg by mouth every evening.    albuterol (PROVENTIL/VENTOLIN HFA) 90 mcg/actuation inhaler INHALE 2 PUFFS INTO THE LUNGS BY MOUTH EVERY 6 HOURS AS NEEDED FOR WHEEZING. RESCUE.    ALPRAZolam (XANAX) 0.25 MG tablet Take 1 tablet (0.25 mg total) by mouth daily as needed for Anxiety.    benzonatate (TESSALON) 200 MG capsule Take 1 capsule (200 mg total) by mouth 3 (three) times daily as needed for Cough.    diclofenac sodium (VOLTAREN) 1 % Gel Apply 2 g topically once daily. Use gloves to apply    EScitalopram oxalate (LEXAPRO) 10 MG tablet Take 1 tablet (10 mg total) by mouth once daily. (Patient not taking: Reported on 1/5/2024)    estradioL (DIVIGEL) 1 mg/gram (0.1 %) topical gel Place 1 g onto the skin once daily.    gabapentin (NEURONTIN) 300 MG capsule Take 1 capsule (300 mg total) by mouth 2 (two) times daily AND 2 capsules (600 mg total) every evening.    meloxicam (MOBIC) 15 MG tablet Take 15 mg by mouth.    methocarbamoL (ROBAXIN) 750 MG Tab Take 750 mg by mouth every 12 (twelve) hours.    pregabalin (LYRICA) 75 MG capsule Take 75 mg by mouth 2 (two) times daily.     Family History       Problem Relation (Age of Onset)    Colon polyps Father    Diabetes Father    Hypertension Maternal Grandmother, Mother    Multiple myeloma Maternal Grandmother    Stroke Paternal Grandmother (68)          Tobacco Use    Smoking status: Never     Passive exposure:  Never    Smokeless tobacco: Never   Substance and Sexual Activity    Alcohol use: No    Drug use: No    Sexual activity: Yes     Partners: Male     Birth control/protection: See Surgical Hx     Comment:  ; new partner since 2018     Review of Systems   Constitutional:  Positive for appetite change (decreased). Negative for chills, diaphoresis, fatigue and fever.   HENT:  Negative for congestion, rhinorrhea and sore throat.    Eyes:  Negative for photophobia and visual disturbance.   Respiratory:  Negative for cough, shortness of breath and wheezing.    Cardiovascular:  Negative for chest pain, palpitations and leg swelling.   Gastrointestinal:  Positive for abdominal pain, nausea and vomiting. Negative for abdominal distention, blood in stool, constipation and diarrhea.   Genitourinary:  Negative for dysuria, frequency and hematuria.   Musculoskeletal:  Positive for back pain (chronic). Negative for joint swelling and myalgias.   Skin:  Negative for color change, pallor, rash and wound.   Neurological:  Positive for dizziness (intermittent). Negative for syncope, weakness, light-headedness and numbness.   Psychiatric/Behavioral:  Negative for confusion and hallucinations. The patient is not nervous/anxious.      Objective:     Vital Signs (Most Recent):  Temp: 97.8 °F (36.6 °C) (06/12/24 1522)  Pulse: 62 (06/12/24 1522)  Resp: 18 (06/12/24 1707)  BP: (!) 142/89 (06/12/24 1522)  SpO2: 98 % (06/12/24 1522) Vital Signs (24h Range):  Temp:  [97.7 °F (36.5 °C)-98.9 °F (37.2 °C)] 97.8 °F (36.6 °C)  Pulse:  [62-70] 62  Resp:  [16-19] 18  SpO2:  [96 %-100 %] 98 %  BP: (121-176)/(81-90) 142/89     Weight: 71.7 kg (158 lb)  Body mass index is 25.5 kg/m².     Physical Exam  Vitals and nursing note reviewed.   Constitutional:       General: She is not in acute distress.     Appearance: She is not toxic-appearing or diaphoretic.   HENT:      Head: Normocephalic and atraumatic.      Nose: Nose normal.      Mouth/Throat:       Mouth: Mucous membranes are dry.   Eyes:      Pupils: Pupils are equal, round, and reactive to light.   Cardiovascular:      Rate and Rhythm: Normal rate and regular rhythm.      Pulses: Normal pulses.   Pulmonary:      Effort: Pulmonary effort is normal. No respiratory distress.      Breath sounds: No wheezing, rhonchi or rales.   Abdominal:      General: Bowel sounds are increased. There is no distension.      Palpations: Abdomen is soft.      Tenderness: There is abdominal tenderness in the right lower quadrant and epigastric area. There is no guarding or rebound.   Musculoskeletal:         General: Normal range of motion.      Cervical back: Normal range of motion.   Skin:     General: Skin is warm and dry.      Capillary Refill: Capillary refill takes less than 2 seconds.   Neurological:      General: No focal deficit present.      Mental Status: She is alert and oriented to person, place, and time.      Motor: No weakness.   Psychiatric:         Mood and Affect: Mood normal.         Behavior: Behavior normal.              CRANIAL NERVES     CN III, IV, VI   Pupils are equal, round, and reactive to light.       Significant Labs: All pertinent labs within the past 24 hours have been reviewed.  CBC:   Recent Labs   Lab 06/11/24  0951 06/11/24  0956   WBC 8.60  --    HGB 13.1  --    HCT 39.7 38     --      CMP:   Recent Labs   Lab 06/11/24  0951      K 3.9      CO2 25   GLU 98   BUN 7   CREATININE 0.7   CALCIUM 9.7   PROT 7.2   ALBUMIN 3.8   BILITOT 0.4   ALKPHOS 64   AST 19   ALT 18   ANIONGAP 10     Urine Studies:   Recent Labs   Lab 06/11/24  1208   COLORU Yellow   APPEARANCEUA Clear   PHUR 6.0   SPECGRAV 1.015   PROTEINUA Negative   GLUCUA Negative   KETONESU Negative   BILIRUBINUA Negative   OCCULTUA Negative   NITRITE Negative   LEUKOCYTESUR Negative       Significant Imaging: I have reviewed all pertinent imaging results/findings within the past 24 hours.  Imaging Results               X-Ray Abdomen Flat And Erect (Final result)  Result time 06/11/24 12:44:19      Final result by Noé Friedman MD (06/11/24 12:44:19)                   Impression:      Nonspecific, nonobstructive bowel gas pattern    Moderate to large volume colonic stool.      Electronically signed by: Noé Friedman  Date:    06/11/2024  Time:    12:44               Narrative:    EXAMINATION:  XR ABDOMEN FLAT AND ERECT    CLINICAL HISTORY:  Abdominal Pain;    TECHNIQUE:  Flat and erect AP views of the abdomen were performed.    COMPARISON:  Abdominal radiograph 03/22/2023.    FINDINGS:  No definite dilated gas-filled loops of small large bowel appreciated.  Moderate to large volume colonic stool.  No findings to suggest free air.  Air appears to be present the distal colon rectum.    No acute osseous or soft tissue abnormality.    Postoperative changes noted at L5-S1.                                       US Pelvis Comp with Transvag NON-OB (xpd) (Final result)  Result time 06/11/24 12:28:25      Final result by Sami Shannon MD (06/11/24 12:28:25)                   Impression:      Hysterectomy.    No acute/significant sonographic abnormality.      Electronically signed by: Sami Shannon MD  Date:    06/11/2024  Time:    12:28               Narrative:    EXAMINATION:  US PELVIS COMP WITH TRANSVAG NON-OB (XPD)    CLINICAL HISTORY:  US yesterday was limited. h/o hysterectomy but ovaries remained. please eval;    TECHNIQUE:  Transabdominal sonography of the pelvis was performed, followed by transvaginal sonography to better evaluate the uterus and ovaries.    COMPARISON:  Pelvic ultrasound and CT abdomen and pelvis 06/10/2024, pelvic ultrasound 10/22/2019, MRI pelvis 05/29/2012    FINDINGS:  Uterus:    Surgically absent.    Ovaries:    The right ovary measures 1.6 x 2.4 x 1.5 cm.  The left ovary measures 1.5 x 1.8 x 1.3 cm. Within the left ovary there is a 1 cm simple appearing dominant follicle.  Doppler flow  demonstrated to both ovaries.    Free Fluid:    None.                                      Assessment/Plan:     * Abdominal pain  Intractable vomiting with nausea   52 yo F with hx of treated H. Pylori, chronic active gastritis on previous EGD, low back pain who presents with 2 weeks of worsening back pain, nausea, vomiting, poor appetite. Her last BM was on 6/9. Abdominal XR shows moderate to large stool burden. She also has burning epigastric pain, hx of chronic NSAID (mobic and ibuprofen) use and intermittent PPI use. Lipase negative, labs wnl. GI consulted in the EDOU and suspect that lower abdominal pain and distended bladder are is primarily driven by constipation. There is also likely a component of GERD/gastritis and/or possible H. Pylori recurrence given her burning epigastric pain. GI recs are as follows:     - escalate bowel regimen to miralax BID and senna BID  - consider suppository or enema if still no BM  - start IV pantoprazole 40 BID  - will plan for EGD tomorrow  - keep on CLD, NPO after midnight  - H. Pylori stool in process  - Hold all NSAIDs and anticoagulants, unless contraindicated    Chronic gastritis without bleeding  Previous EGD in 2018 revealed chronic active gastritis and +H.pylori    - increase pantoprazole IV 40mg BID per GI recs  - continue carafate and bentyl    Chronic left-sided low back pain with sciatica  -Chronic, stable.  -Continue lyrica and robaxin.  -PRN tylenol.    Anxiety and depression  Patient has single episode of depression which is unknown and is currently controlled. We will not consult psychiatry at this time. Patient does not display psychosis at this time. Continue to monitor closely and adjust plan of care as needed.  -Not currently on medication.      VTE Risk Mitigation (From admission, onward)           Ordered     Place sequential compression device  Until discontinued         06/11/24 9054     Place JHONNY hose  Until discontinued         06/11/24 1454     IP  VTE LOW RISK PATIENT  Once         06/11/24 1454                         On 06/12/2024, patient should be placed in hospital observation services under my care in collaboration with Nahun Quintero MD.          Deloris Hodges NP  Department of Hospital Medicine  [facility] - Emergency Dept

## 2024-06-12 NOTE — ASSESSMENT & PLAN NOTE
Previous EGD in 2018 revealed chronic active gastritis and +H.pylori     - start pantoprazole IV 40mg BID   - ok w/ carafate and bentyl if helping, but patient's pain would likely improve with bowel movement

## 2024-06-12 NOTE — ASSESSMENT & PLAN NOTE
Previous EGD in 2018 revealed chronic active gastritis and +H.pylori    - increase pantoprazole IV 40mg BID per GI recs  - continue carafate and bentyl

## 2024-06-12 NOTE — ASSESSMENT & PLAN NOTE
Intractable vomiting with nausea   52 yo F with hx of treated H. Pylori, chronic active gastritis on previous EGD, low back pain who presents with 2 weeks of worsening back pain, nausea, vomiting, poor appetite. Her last BM was on 6/9. Abdominal XR shows moderate to large stool burden. She also has burning epigastric pain, hx of chronic NSAID (mobic and ibuprofen) use and intermittent PPI use. Lipase negative, labs wnl. GI consulted in the EDOU and suspect that lower abdominal pain and distended bladder are is primarily driven by constipation. There is also likely a component of GERD/gastritis and/or possible H. Pylori recurrence given her burning epigastric pain. GI recs are as follows:     - escalate bowel regimen to miralax BID and senna BID  - consider suppository or enema if still no BM  - start IV pantoprazole 40 BID  - will plan for EGD tomorrow  - keep on CLD, NPO after midnight  - H. Pylori stool in process  - Hold all NSAIDs and anticoagulants, unless contraindicated

## 2024-06-12 NOTE — HPI
"Ms. Quintero is a 53 y.o. female with history of cholecystectomy, GERD, s/p hysterectomy, HTN, HPTH, chronic low back pain who presents with 2 weeks of abdominal pain that has been worse for the 4 days before admission. It is associated with nausea, vomiting, poor appetite. GI is consulted for abdominal pain. The pain is located in both lower quadrants and the epigastric region. It's burning, and "sharp, electric" in nature. Last week, it was waxing and waning, but it has been constant for the past few days. She visited the [facility] ED on 6/10 and was discharged with PO phenergan and GI referral after getting IV morphine, dilaudid, IV zofran and phenergan. She presented again on 6/11 to the ED with more consistent abdominal pain, described as burning and severe radiating to her back. The pain is worse with water and food. Her last episode of vomiting was on 6/10. Her last BM was on 6/9. She denies bright red blood per rectum, but noticed that her BM on 6/9 was black/darker. Usually her stools are brown. She denies constipation, diarrhea, or dysphagia. Her weight loss is intentional. She takes home Nexium as needed. She takes Mobic and up to 1600 mg of ibuprofen 4x/week for chronic back pain. She is not on blood thinners. Denies personal hx of IBS/IBD.    She has a history of H. Pylori in 2018 (diagnosed via path from EGD) and was treated; she feels that the pain is similar to that pain. Her EGD from 2019 was normal (negative H. Pylori) and subequent H. Pylori stool testing has been negative in 2019 and 2022. Her colonoscopy reveals sigmoid diverticulosis and tubular adenomas.     She recently traveled to [place] 2 weeks ago and was not having these problems there; her family members (son, mother, sister) have had diarrhea predominant symptoms since returning from trip trip rather than vomiting. She mentions that she did have Chinese food on 6/7 prior to the pain worsening, but the pain did precede that meal. " Otherwise, she avoids spicy food, coffee, fried foods and consumes mostly fish, vegetables and tea.     Thus far, she has been treated with bentyl, sucralfate, and daily miralax by the primary team.     Imaging done since 6/10 below:   CT A/P: gaseous distension of the rectum, normal appendix. Diverticula. No fluid collection. Bladder distension w/o wall thickening. Bilateral mild hydronephrosis, no findings to suggest ureteral obstruction.  The urinary bladder is mildly prominent, correlation with any history of urinary retention or outlet obstruction. Possible hepatic steatosis.     XR abdomen: moderate/large colonic stool, non obstructive bowel gas pattern    Pelvic US: small simple cystic lesion arising from the posterior aspect of the urinary bladder wall measuring 0.8 x 0.7 x 0.9 cm, compatible with a bladder diverticulum.

## 2024-06-12 NOTE — ASSESSMENT & PLAN NOTE
Patient has single episode of depression which is unknown and is currently controlled. We will not consult psychiatry at this time. Patient does not display psychosis at this time. Continue to monitor closely and adjust plan of care as needed.  -Not currently on medication.

## 2024-06-12 NOTE — SUBJECTIVE & OBJECTIVE
Past Medical History:   Diagnosis Date    Abnormal Pap smear     Abnormal Pap smear of cervix     Annular tear of lumbar disc, L5-S1. 07/26/2012    Chronic LBP     HPTH (hyperparathyroidism)     Hyperlipidemia     Menorrhagia     PONV (postoperative nausea and vomiting)     Primary hypertension 01/31/2023    Right lumbar radiculopathy 07/26/2012    Seasonal allergies        Past Surgical History:   Procedure Laterality Date    BACK SURGERY  2018    CHOLECYSTECTOMY      COLONOSCOPY N/A 01/19/2018    Procedure: COLONOSCOPY;  Surgeon: Malachi Olmedo MD;  [address]  Service: Endoscopy;  Laterality: N/A;    COLONOSCOPY N/A 12/28/2022    Procedure: COLONOSCOPY;  Surgeon: Ezekiel Alanis MD;  [facility]  Service: Endoscopy;  Laterality: N/A;  inst via portal  pre call complete [facility]    ESOPHAGOGASTRODUODENOSCOPY N/A 09/06/2019    Procedure: EGD (ESOPHAGOGASTRODUODENOSCOPY);  Surgeon: Jose Carlos Ventura MD;  [address]  Service: Endoscopy;  Laterality: N/A;  pt requesting ASAP    HYSTERECTOMY, TOTAL, LAPAROSCOPIC, WITH SALPINGECTOMY  06/16/2015    INJECTION OF ANESTHETIC AGENT AROUND MULTIPLE INTERCOSTAL NERVES Right 03/21/2023    Procedure: BLOCK, NERVE, INTERCOSTAL, 2 OR MORE;  Surgeon: Anton Evangelista MD;  Location: [facility] OR [address] [place] [address];  Service: Cardiothoracic;  Laterality: Right;    TUBAL LIGATION      Essure    WISDOM TOOTH EXTRACTION      2005    XI ROBOTIC RATS Right 03/21/2023    Procedure: XI ROBOTIC THYMECTOMY;  Surgeon: Anton Evangelista MD;  Location: [facility] OR [address] [place] [address];  Service: Cardiothoracic;  Laterality: Right;       Review of patient's allergies indicates:   Allergen Reactions    Clindamycin     Sulfa (sulfonamide antibiotics) Anaphylaxis    Sulfa dyne     Sulfamethoxazole-trimethoprim      Other reaction(s): Anaphylaxis    Dermabond [tissue glues] Rash     Family History       Problem Relation (Age of Onset)    Colon polyps Father    Diabetes  Father    Hypertension Maternal Grandmother, Mother    Multiple myeloma Maternal Grandmother    Stroke Paternal Grandmother (68)          Tobacco Use    Smoking status: Never     Passive exposure: Never    Smokeless tobacco: Never   Substance and Sexual Activity    Alcohol use: No    Drug use: No    Sexual activity: Yes     Partners: Male     Birth control/protection: See Surgical Hx     Comment:  ; new partner since 2018     Review of Systems   Constitutional:  Positive for appetite change. Negative for unexpected weight change.   Gastrointestinal:  Positive for abdominal pain, nausea and vomiting. Negative for blood in stool, constipation, diarrhea and rectal pain.   Musculoskeletal:  Positive for back pain.   Psychiatric/Behavioral:  Negative for agitation and behavioral problems.      Objective:     Vital Signs (Most Recent):  Temp: 98.6 °F (37 °C) (06/12/24 1132)  Pulse: 70 (06/12/24 1132)  Resp: 19 (06/12/24 1132)  BP: 121/81 (06/12/24 1132)  SpO2: 96 % (06/12/24 1132) Vital Signs (24h Range):  Temp:  [97.7 °F (36.5 °C)-98.9 °F (37.2 °C)] 98.6 °F (37 °C)  Pulse:  [62-70] 70  Resp:  [16-19] 19  SpO2:  [96 %-100 %] 96 %  BP: (121-176)/(78-90) 121/81     Weight: 71.7 kg (158 lb) (06/11/24 0851)  Body mass index is 25.5 kg/m².    No intake or output data in the 24 hours ending 06/12/24 1352    Lines/Drains/Airways       Peripheral Intravenous Line  Duration                  Peripheral IV - Single Lumen 06/11/24 0951 20 G Left Antecubital 1 day                     Physical Exam  Constitutional:       General: She is not in acute distress.     Appearance: She is normal weight. She is not ill-appearing.   HENT:      Head: Normocephalic and atraumatic.      Nose: Nose normal.      Mouth/Throat:      Mouth: Mucous membranes are dry.   Eyes:      General: No scleral icterus.     Conjunctiva/sclera: Conjunctivae normal.   Cardiovascular:      Rate and Rhythm: Normal rate and regular rhythm.      Pulses: Normal  pulses.      Heart sounds: No murmur heard.     No gallop.   Pulmonary:      Effort: Pulmonary effort is normal.      Breath sounds: Normal breath sounds.   Abdominal:      General: Abdomen is flat. There is no distension.      Palpations: Abdomen is soft.      Tenderness: There is abdominal tenderness in the right lower quadrant, epigastric area, suprapubic area and left lower quadrant. There is no guarding or rebound.      Comments: Hyperactive bowel sounds   Musculoskeletal:      Cervical back: Normal range of motion.      Right lower leg: No edema.      Left lower leg: No edema.   Skin:     General: Skin is warm and dry.      Coloration: Skin is not jaundiced.   Neurological:      General: No focal deficit present.      Mental Status: She is alert and oriented to person, place, and time.   Psychiatric:         Mood and Affect: Mood normal.         Behavior: Behavior normal.          Significant Labs:  CBC:   Recent Labs   Lab 06/11/24  0951 06/11/24  0956   WBC 8.60  --    HGB 13.1  --    HCT 39.7 38     --      CMP:   Recent Labs   Lab 06/11/24  0951   GLU 98   CALCIUM 9.7   ALBUMIN 3.8   PROT 7.2      K 3.9   CO2 25      BUN 7   CREATININE 0.7   ALKPHOS 64   ALT 18   AST 19   BILITOT 0.4     Lipase:   Recent Labs   Lab 06/11/24  0951   LIPASE 12       Significant Imaging:  Imaging results within the past 24 hours have been reviewed.

## 2024-06-12 NOTE — ASSESSMENT & PLAN NOTE
52 yo F with hx of treated H. Pylori, chronic active gastritis on previous EGD, low back pain who presents with 2 weeks of worsening back pain, nausea, vomiting, poor appetite. Her last BM was on 6/9. Abdominal XR shows moderate to large stool burden. She also has burning epigastric pain, hx of chronic NSAID (mobic and ibuprofen) use and intermittent PPI use. Lipase negative, labs wnl.     Suspect that lower abdominal pain and distended bladder are is primarily driven by constipation. There is also likely a component of GERD/gastritis and/or possible H. Pylori recurrence given her burning epigastric pain.     - escalate bowel regimen to miralax BID and senna BID  - consider suppository or enema if still no BM  - start IV pantoprazole 40 BID  - will plan for EGD tomorrow  - keep on CLD  - H. Pylori stool ordered - has not produced BM yet  - Hold all NSAIDs and anticoagulants, unless contraindicated

## 2024-06-12 NOTE — ANESTHESIA PREPROCEDURE EVALUATION
[facility]  Anesthesia Pre-Operative Evaluation         Patient Name: Laz Quintero  YOB: 1970  MRN: [id number]    SUBJECTIVE:     Pre-operative evaluation for Procedure(s) (LRB):  EGD (ESOPHAGOGASTRODUODENOSCOPY) (N/A)     06/12/2024    Laz Quintero is a 53 y.o. female w/ a significant PMHx of HTN, hyperparathyroidism, chronic low back pain, mediastinal mass, chronic gastritis, and PONV.    Patient now presents for the above procedure(s).    Results for orders placed during the hospital encounter of 12/19/22    Echo    Interpretation Summary  · The left ventricle is normal in size with concentric remodeling and low normal systolic function.  · The estimated ejection fraction is 50%.  · Grade I left ventricular diastolic dysfunction.  · Normal right ventricular size with normal right ventricular systolic function.  · Mild pulmonic regurgitation.  · The quantitatively derived ejection fraction is 46%.  · Mild tricuspid regurgitation.  · Normal central venous pressure (3 mmHg).  · The estimated PA systolic pressure is 22 mmHg.      LDA:        Peripheral IV - Single Lumen 06/11/24 0951 20 G Left Antecubital (Active)   Site Assessment Clean;Dry;Intact;No redness 06/12/24 0000   Extremity Assessment Distal to IV No abnormal discoloration 06/11/24 2000   Line Status Infusing 06/12/24 0000   Dressing Status Clean;Dry;Intact 06/12/24 0000   Dressing Intervention Integrity maintained 06/12/24 0000   Dressing Change Due 06/15/24 06/11/24 1828   Site Change Due 06/15/24 06/11/24 1828   Reason Not Rotated Not due 06/11/24 1828   Number of days: 1       Prev airway:   Intubation     Date/Time: 3/21/2023 7:50 AM  Performed by: Teo Chawla MD  Authorized by: Maciel Davila MD      Intubation:     Induction:  Intravenous    Intubated:  Postinduction    Mask Ventilation:  Easy with oral airway    Attempts:  1    Attempted By:  Resident anesthesiologist    Method of  Intubation:  Video laryngoscopy    Blade:  Martinez 3    Laryngeal View Grade: Grade I - full view of cords      Difficult Airway Encountered?: No      Complications:  None    Airway Device:  Double lumen tube left    Airway Device Size:  37F    Style/Cuff Inflation:  Cuffed (inflated to minimal occlusive pressure)    Secured at:  The lips    Placement Verified By:  Capnometry and Revisualization with laryngoscopy    Complicating Factors:  None    Findings Post-Intubation:  BS equal bilateral and atraumatic/condition of teeth unchanged       Drips:    lactated ringers   Intravenous Continuous 100 mL/hr at 06/12/24 1218 New Bag at 06/12/24 1218       Patient Active Problem List   Diagnosis    Low back pain radiating to right leg    Seasonal allergies    HPTH (hyperparathyroidism)    Chronic LBP    Right lumbar radiculopathy    Anxiety and depression    S/P laparoscopic hysterectomy    Left lumbar radiculitis    Abdominal pain    Herniated nucleus pulposus, L5-S1, left    Chronic left-sided low back pain with sciatica    Weakness of both lower extremities    Decreased range of motion of trunk and back    Pre-op exam    Palpitations    Primary hypertension    Mediastinal mass    Weakness    Pain    Left lumbar radiculopathy    Intractable vomiting with nausea    Chronic gastritis without bleeding       Review of patient's allergies indicates:   Allergen Reactions    Clindamycin     Sulfa (sulfonamide antibiotics) Anaphylaxis    Sulfa dyne     Sulfamethoxazole-trimethoprim      Other reaction(s): Anaphylaxis    Dermabond [tissue glues] Rash       Current Inpatient Medications:   dicyclomine  10 mg Oral QID    DULoxetine  60 mg Oral Daily    hydroCHLOROthiazide  12.5 mg Oral Daily    methocarbamoL  750 mg Oral Q12H    [START ON 6/13/2024] pantoprazole  40 mg Intravenous Daily    polyethylene glycol  17 g Oral Daily    pregabalin  75 mg Oral BID    QUEtiapine  150 mg Oral QHS    sucralfate  1 g Oral Q6H       No current  facility-administered medications on file prior to encounter.     Current Outpatient Medications on File Prior to Encounter   Medication Sig Dispense Refill    butalbital-acetaminophen-caffeine -40 mg (FIORICET, ESGIC) -40 mg per tablet Take 1 tablet by mouth every 4 (four) hours as needed.      CYMBALTA 60 mg capsule Take 60 mg by mouth.      fluticasone propionate (FLONASE) 50 mcg/actuation nasal spray 1 spray (50 mcg total) by Each Nostril route once daily. 11.1 mL 0    hydroCHLOROthiazide (HYDRODIURIL) 12.5 MG Tab Take 1 tablet (12.5 mg total) by mouth once daily. 30 tablet 0    ketorolac (TORADOL) 10 mg tablet Take one po q 8 hrs prn pain 20 tablet 0    oxyCODONE (ROXICODONE) 5 MG immediate release tablet Take 1 tablet (5 mg total) by mouth every 4 (four) hours as needed for Pain. 41 tablet 0    promethazine (PHENERGAN) 25 MG tablet Take 1 tablet (25 mg total) by mouth every 6 (six) hours as needed for Nausea. 15 tablet 0    QUEtiapine (SEROQUEL XR) 150 mg Tb24 Take 150 mg by mouth every evening.      traZODone (DESYREL) 100 MG tablet Take 100 mg by mouth every evening.      albuterol (PROVENTIL/VENTOLIN HFA) 90 mcg/actuation inhaler INHALE 2 PUFFS INTO THE LUNGS BY MOUTH EVERY 6 HOURS AS NEEDED FOR WHEEZING. RESCUE. 8.5 g 0    ALPRAZolam (XANAX) 0.25 MG tablet Take 1 tablet (0.25 mg total) by mouth daily as needed for Anxiety. 20 tablet 1    benzonatate (TESSALON) 200 MG capsule Take 1 capsule (200 mg total) by mouth 3 (three) times daily as needed for Cough. 20 capsule 0    diclofenac sodium (VOLTAREN) 1 % Gel Apply 2 g topically once daily. Use gloves to apply 100 g 1    EScitalopram oxalate (LEXAPRO) 10 MG tablet Take 1 tablet (10 mg total) by mouth once daily. (Patient not taking: Reported on 1/5/2024) 30 tablet 11    estradioL (DIVIGEL) 1 mg/gram (0.1 %) topical gel Place 1 g onto the skin once daily. 30 g 3    gabapentin (NEURONTIN) 300 MG capsule Take 1 capsule (300 mg total) by mouth 2  (two) times daily AND 2 capsules (600 mg total) every evening. 120 capsule 1    meloxicam (MOBIC) 15 MG tablet Take 15 mg by mouth.      methocarbamoL (ROBAXIN) 750 MG Tab Take 750 mg by mouth every 12 (twelve) hours.      pregabalin (LYRICA) 75 MG capsule Take 75 mg by mouth 2 (two) times daily.         Past Surgical History:   Procedure Laterality Date    BACK SURGERY  2018    CHOLECYSTECTOMY      COLONOSCOPY N/A 01/19/2018    Procedure: COLONOSCOPY;  Surgeon: Malachi Olmedo MD;  Location: [address]);  Service: Endoscopy;  Laterality: N/A;    COLONOSCOPY N/A 12/28/2022    Procedure: COLONOSCOPY;  Surgeon: Ezekiel Alanis MD;  Location: [address]);  Service: Endoscopy;  Laterality: N/A;  inst via portal  pre call complete [facility]    ESOPHAGOGASTRODUODENOSCOPY N/A 09/06/2019    Procedure: EGD (ESOPHAGOGASTRODUODENOSCOPY);  Surgeon: Jose Carlos Ventura MD;  Location: [address]);  Service: Endoscopy;  Laterality: N/A;  pt requesting ASAP    HYSTERECTOMY, TOTAL, LAPAROSCOPIC, WITH SALPINGECTOMY  06/16/2015    INJECTION OF ANESTHETIC AGENT AROUND MULTIPLE INTERCOSTAL NERVES Right 03/21/2023    Procedure: BLOCK, NERVE, INTERCOSTAL, 2 OR MORE;  Surgeon: Anton Evangelista MD;  Location: [facility] OR [address] [place] [address];  Service: Cardiothoracic;  Laterality: Right;    TUBAL LIGATION      Essure    WISDOM TOOTH EXTRACTION      2005    XI ROBOTIC RATS Right 03/21/2023    Procedure: XI ROBOTIC THYMECTOMY;  Surgeon: Anton Evangelista MD;  Location: [facility] OR [address] [place] [address];  Service: Cardiothoracic;  Laterality: Right;       Social History     Socioeconomic History    Marital status: Single   Occupational History    Occupation: nurse     Employer: [employer]   Tobacco Use    Smoking status: Never     Passive exposure: Never    Smokeless tobacco: Never   Substance and Sexual Activity    Alcohol use: No    Drug use: No    Sexual activity: Yes     Partners: Male     Birth control/protection: See Surgical Hx      Comment:  ; new partner since 2018   Other Topics Concern    Are you pregnant or think you may be? No    Breast-feeding No     Social Determinants of Health     Financial Resource Strain: Low Risk  (3/22/2023)    Overall Financial Resource Strain (CARDIA)     Difficulty of Paying Living Expenses: Not very hard   Food Insecurity: No Food Insecurity (3/22/2023)    Hunger Vital Sign     Worried About Running Out of Food in the Last Year: Never true     Ran Out of Food in the Last Year: Never true   Transportation Needs: No Transportation Needs (3/22/2023)    PRAPARE - Transportation     Lack of Transportation (Medical): No     Lack of Transportation (Non-Medical): No   Physical Activity: Insufficiently Active (3/22/2023)    Exercise Vital Sign     Days of Exercise per Week: 2 days     Minutes of Exercise per Session: 20 min   Housing Stability: Low Risk  (3/22/2023)    Housing Stability Vital Sign     Unable to Pay for Housing in the Last Year: No     Number of Places Lived in the Last Year: 1     Unstable Housing in the Last Year: No       OBJECTIVE:     Vital Signs Range (Last 24H):  Temp:  [36.5 °C (97.7 °F)-37.2 °C (98.9 °F)]   Pulse:  [62-70]   Resp:  [16-19]   BP: (121-176)/(81-90)   SpO2:  [96 %-100 %]       Significant Labs:  Lab Results   Component Value Date    WBC 8.60 06/11/2024    HGB 13.1 06/11/2024    HCT 38 06/11/2024     06/11/2024    CHOL 277 (H) 08/22/2023    TRIG 111 08/22/2023    HDL 78 (H) 08/22/2023    ALT 18 06/11/2024    AST 19 06/11/2024     06/11/2024    K 3.9 06/11/2024     06/11/2024    CREATININE 0.7 06/11/2024    BUN 7 06/11/2024    CO2 25 06/11/2024    TSH 1.292 01/11/2023    INR 1.0 04/12/2015    HGBA1C 5.4 11/18/2022       Diagnostic Studies: No relevant studies.    EKG:   Results for orders placed or performed during the hospital encounter of 01/11/23   EKG 12-lead    Collection Time: 01/11/23  8:07 PM    Narrative    Test Reason : R07.9,    Vent. Rate : 098  "BPM     Atrial Rate : 098 BPM     P-R Int : 122 ms          QRS Dur : 082 ms      QT Int : 332 ms       P-R-T Axes : 071 011 078 degrees     QTc Int : 423 ms    Normal sinus rhythm  Biatrial enlargement  Voltage criteria for left ventricular hypertrophy ( [place] product ) Now  present  Nonspecific ST-t wave abnormality More prominent than previously  Abnormal ECG  When compared with ECG of 29-DEC-2022 15:48,    Confirmed by NICOLLE MANE MD ([id number]) on 1/12/2023 2:37:28 PM    Referred By: [email or web address]   SELF           Confirmed By:NICOLLE MANE MD       2D ECHO:  TTE:  Results for orders placed or performed during the hospital encounter of 12/19/22   Echo   Result Value Ref Range    BSA 1.88 m2    TDI SEPTAL 0.04 m/s    LV LATERAL E/E' RATIO 7.00 m/s    LV SEPTAL E/E' RATIO 10.50 m/s    LA WIDTH 3.36 cm    TDI LATERAL 0.06 m/s    LVIDd 3.95 3.5 - 6.0 cm    IVS 1.03 0.6 - 1.1 cm    Posterior Wall 0.88 0.6 - 1.1 cm    LVIDs 2.61 2.1 - 4.0 cm    FS 34 28 - 44 %    LA volume 34.55 cm3    Sinus 2.78 cm    STJ 2.54 cm    Ascending aorta 2.66 cm    LV mass 116.75 g    LA size 3.01 cm    RVDD 3.52 cm    TAPSE 1.43 cm    RV S' 11.17 cm/s    Left Ventricle Relative Wall Thickness 0.45 cm    AV mean gradient 3 mmHg    AV valve area 2.51 cm2    AV Velocity Ratio 0.67     AV index (prosthetic) 0.68     MV valve area p 1/2 method 5.83 cm2    E/A ratio 0.67     Mean e' 0.05 m/s    E wave deceleration time 130.09 msec    MV "A" wave duration 9.99 msec    Pulm vein S/D ratio 2.26     LVOT diameter 2.17 cm    LVOT area 3.7 cm2    LVOT peak jemal 0.73 m/s    LVOT peak VTI 13.47 cm    Ao peak jemal 1.09 m/s    Ao VTI 19.85 cm    LVOT stroke volume 49.79 cm3    AV peak gradient 5 mmHg    E/E' ratio 8.40 m/s    MV Peak E Jemal 0.42 m/s    TR Max Jemal 2.16 m/s    MV stenosis pressure 1/2 time 37.73 ms    MV Peak A Jemal 0.63 m/s    PV Peak S Jemal 0.61 m/s    PV Peak D Jemal 0.27 m/s    LV Systolic Volume 24.89 mL    LV Systolic Volume Index 13.4 mL/m2 "    LV Diastolic Volume 67.93 mL    LV Diastolic Volume Index 36.52 mL/m2    LA Volume Index 18.6 mL/m2    LV Mass Index 63 g/m2    RA Major Axis 3.79 cm    Left Atrium Minor Axis 4.18 cm    Left Atrium Major Axis 3.87 cm    Triscuspid Valve Regurgitation Peak Gradient 19 mmHg    LA Volume Index (Mod) 15.8 mL/m2    LA volume (mod) 29.39 cm3    RA Width 2.44 cm    Right Atrial Pressure (from IVC) 3 mmHg    QEF 46 %    EF 50 %    TV resting pulmonary artery pressure 22 mmHg    Narrative    · The left ventricle is normal in size with concentric remodeling and low   normal systolic function.  · The estimated ejection fraction is 50%.  · Grade I left ventricular diastolic dysfunction.  · Normal right ventricular size with normal right ventricular systolic   function.  · Mild pulmonic regurgitation.  · The quantitatively derived ejection fraction is 46%.  · Mild tricuspid regurgitation.  · Normal central venous pressure (3 mmHg).  · The estimated PA systolic pressure is 22 mmHg.          DOMINICK:  No results found for this or any previous visit.    ASSESSMENT/PLAN:           Pre-op Assessment    I have reviewed the Patient Summary Reports.     I have reviewed the Nursing Notes. I have reviewed the NPO Status.   I have reviewed the Medications.     Review of Systems  Anesthesia Hx:  No problems with previous Anesthesia   History of prior surgery of interest to airway management or planning:          Denies Family Hx of Anesthesia complications.    Denies Personal Hx of Anesthesia complications.                    Hematology/Oncology:  Hematology Normal                                     EENT/Dental:  EENT/Dental Normal           Cardiovascular:     Hypertension                                        Pulmonary:  Pulmonary Normal                       Renal/:  Renal/ Normal                 Hepatic/GI:        Chronic gastritis          Musculoskeletal:  Musculoskeletal Normal                Neurological:    Neuromuscular  Disease,             Chronic Pain Syndrome                         Psych:   anxiety depression                Physical Exam  General: Well nourished, Cooperative, Alert and Oriented    Airway:  Mallampati: I   Mouth Opening: Normal  Tongue: Normal  Neck ROM: Normal ROM    Dental:  Intact        Anesthesia Plan  Type of Anesthesia, risks & benefits discussed:    Anesthesia Type: Gen ETT, Gen Natural Airway  Intra-op Monitoring Plan: Standard ASA Monitors  Post Op Pain Control Plan: multimodal analgesia and IV/PO Opioids PRN  Induction:  IV  Airway Plan: Direct and Video, Post-Induction  Informed Consent: Informed consent signed with the Patient and all parties understand the risks and agree with anesthesia plan.  All questions answered.   ASA Score: 2  Day of Surgery Review of History & Physical: H&P Update referred to the surgeon/provider.    Ready For Surgery From Anesthesia Perspective.     .

## 2024-06-12 NOTE — H&P (VIEW-ONLY)
"[facility] - Emergency Dept  Gastroenterology  Consult Note    Patient Name: Laz Quintero  MRN: [id number]  Admission Date: 6/11/2024  Hospital Length of Stay: 0 days  Code Status: Full Code   Attending Provider: No att. providers found   Consulting Provider: Philipp Brennan MD  Primary Care Physician: Charlotte Jacinto MD  Principal Problem:Intractable vomiting with nausea    Inpatient consult to Gastroenterology  Consult performed by: Philipp Brennan MD  Consult ordered by: Florencia Galdamez NP        Subjective:     HPI:  Ms. Quintero is a 53 y.o. female with history of cholecystectomy, GERD, s/p hysterectomy, HTN, HPTH, chronic low back pain who presents with 2 weeks of abdominal pain that has been worse for the 4 days before admission. It is associated with nausea, vomiting, poor appetite, and bloating. GI is consulted for abdominal pain. The pain is located in both lower quadrants and the epigastric region. It's burning, and "sharp, electric" in nature. Last week, it was waxing and waning, but it has been constant for the past few days. She visited the [facility] ED on 6/10 and was discharged with PO phenergan and GI referral after getting IV morphine, dilaudid, IV zofran and phenergan. She presented again on 6/11 to the ED with more consistent abdominal pain, described as burning and severe radiating to her back. The pain is worse with water and food. Her last episode of vomiting was on 6/10. Her last BM was on 6/9. She denies bright red blood per rectum, but noticed that her BM on 6/9 was black/darker. Usually her stools are brown. She denies constipation, diarrhea, or dysphagia. Her weight loss is intentional. She takes home Nexium as needed. She takes Mobic and up to 1600 mg of ibuprofen 4x/week for chronic back pain. She is not on blood thinners. Denies personal hx of IBS/IBD.    She has a history of H. Pylori in 2018 (diagnosed via path from EGD) and was treated; she feels that the pain is similar to that " pain. Her EGD from 2019 was normal (negative H. Pylori) and subequent H. Pylori stool testing has been negative in 2019 and 2022. Her colonoscopy reveals sigmoid diverticulosis and tubular adenomas.     She recently traveled to [place] 2 weeks ago and was not having these problems there; her family members (son, mother, sister) have had diarrhea predominant symptoms since returning from trip trip rather than vomiting. She mentions that she did have Chinese food on 6/7 prior to the pain worsening, but the pain did precede that meal. Otherwise, she avoids spicy food, coffee, fried foods and consumes mostly fish, vegetables and tea.     Thus far, she has been treated with bentyl, sucralfate, and daily miralax by the primary team.     Imaging done since 6/10 below:   CT A/P: gaseous distension of the rectum, normal appendix. Diverticula. No fluid collection. Bladder distension w/o wall thickening. Bilateral mild hydronephrosis, no findings to suggest ureteral obstruction.  The urinary bladder is mildly prominent, correlation with any history of urinary retention or outlet obstruction. Possible hepatic steatosis.     XR abdomen: moderate/large colonic stool, non obstructive bowel gas pattern    Pelvic US: small simple cystic lesion arising from the posterior aspect of the urinary bladder wall measuring 0.8 x 0.7 x 0.9 cm, compatible with a bladder diverticulum.        Past Medical History:   Diagnosis Date    Abnormal Pap smear     Abnormal Pap smear of cervix     Annular tear of lumbar disc, L5-S1. 07/26/2012    Chronic LBP     HPTH (hyperparathyroidism)     Hyperlipidemia     Menorrhagia     PONV (postoperative nausea and vomiting)     Primary hypertension 01/31/2023    Right lumbar radiculopathy 07/26/2012    Seasonal allergies        Past Surgical History:   Procedure Laterality Date    BACK SURGERY  2018    CHOLECYSTECTOMY      COLONOSCOPY N/A 01/19/2018    Procedure: COLONOSCOPY;  Surgeon: Malachi Olmedo MD;   Location: [facility] ENDO (4TH FLR);  Service: Endoscopy;  Laterality: N/A;    COLONOSCOPY N/A 12/28/2022    Procedure: COLONOSCOPY;  Surgeon: Ezekiel Alanis MD;  Location: [facility] ENDO (4TH FLR);  Service: Endoscopy;  Laterality: N/A;  inst via portal  pre call complete [facility]    ESOPHAGOGASTRODUODENOSCOPY N/A 09/06/2019    Procedure: EGD (ESOPHAGOGASTRODUODENOSCOPY);  Surgeon: Jose Carlos Ventura MD;  Location: [facility] ENDO (4TH FLR);  Service: Endoscopy;  Laterality: N/A;  pt requesting ASAP    HYSTERECTOMY, TOTAL, LAPAROSCOPIC, WITH SALPINGECTOMY  06/16/2015    INJECTION OF ANESTHETIC AGENT AROUND MULTIPLE INTERCOSTAL NERVES Right 03/21/2023    Procedure: BLOCK, NERVE, INTERCOSTAL, 2 OR MORE;  Surgeon: Anton Evangelista MD;  Location: [facility] OR 2ND FLR;  Service: Cardiothoracic;  Laterality: Right;    TUBAL LIGATION      Essure    WISDOM TOOTH EXTRACTION      2005    XI ROBOTIC RATS Right 03/21/2023    Procedure: XI ROBOTIC THYMECTOMY;  Surgeon: Anton Evangelista MD;  [facility];  Service: Cardiothoracic;  Laterality: Right;       Review of patient's allergies indicates:   Allergen Reactions    Clindamycin     Sulfa (sulfonamide antibiotics) Anaphylaxis    Sulfa dyne     Sulfamethoxazole-trimethoprim      Other reaction(s): Anaphylaxis    Dermabond [tissue glues] Rash     Family History       Problem Relation (Age of Onset)    Colon polyps Father    Diabetes Father    Hypertension Maternal Grandmother, Mother    Multiple myeloma Maternal Grandmother    Stroke Paternal Grandmother (68)          Tobacco Use    Smoking status: Never     Passive exposure: Never    Smokeless tobacco: Never   Substance and Sexual Activity    Alcohol use: No    Drug use: No    Sexual activity: Yes     Partners: Male     Birth control/protection: See Surgical Hx     Comment:  ; new partner since 2018     Review of Systems   Constitutional:  Positive for appetite change. Negative for unexpected weight change.   Gastrointestinal:   Positive for abdominal pain, nausea and vomiting. Negative for blood in stool, constipation, diarrhea and rectal pain.   Musculoskeletal:  Positive for back pain.   Psychiatric/Behavioral:  Negative for agitation and behavioral problems.      Objective:     Vital Signs (Most Recent):  Temp: 98.6 °F (37 °C) (06/12/24 1132)  Pulse: 70 (06/12/24 1132)  Resp: 19 (06/12/24 1132)  BP: 121/81 (06/12/24 1132)  SpO2: 96 % (06/12/24 1132) Vital Signs (24h Range):  Temp:  [97.7 °F (36.5 °C)-98.9 °F (37.2 °C)] 98.6 °F (37 °C)  Pulse:  [62-70] 70  Resp:  [16-19] 19  SpO2:  [96 %-100 %] 96 %  BP: (121-176)/(78-90) 121/81     Weight: 71.7 kg (158 lb) (06/11/24 0851)  Body mass index is 25.5 kg/m².    No intake or output data in the 24 hours ending 06/12/24 1352    Lines/Drains/Airways       Peripheral Intravenous Line  Duration                  Peripheral IV - Single Lumen 06/11/24 0951 20 G Left Antecubital 1 day                     Physical Exam  Constitutional:       General: She is not in acute distress.     Appearance: She is normal weight. She is not ill-appearing.   HENT:      Head: Normocephalic and atraumatic.      Nose: Nose normal.      Mouth/Throat:      Mouth: Mucous membranes are dry.   Eyes:      General: No scleral icterus.     Conjunctiva/sclera: Conjunctivae normal.   Cardiovascular:      Rate and Rhythm: Normal rate and regular rhythm.      Pulses: Normal pulses.      Heart sounds: No murmur heard.     No gallop.   Pulmonary:      Effort: Pulmonary effort is normal.      Breath sounds: Normal breath sounds.   Abdominal:      General: Abdomen is flat. There is no distension.      Palpations: Abdomen is soft.      Tenderness: There is abdominal tenderness. There is no guarding or rebound.      Comments: Hyperactive bowel sounds   Musculoskeletal:      Cervical back: Normal range of motion.      Right lower leg: No edema.      Left lower leg: No edema.   Skin:     General: Skin is warm and dry.      Coloration:  Skin is not jaundiced.   Neurological:      General: No focal deficit present.      Mental Status: She is alert and oriented to person, place, and time.   Psychiatric:         Mood and Affect: Mood normal.         Behavior: Behavior normal.          Significant Labs:  CBC:   Recent Labs   Lab 06/11/24  0951 06/11/24  0956   WBC 8.60  --    HGB 13.1  --    HCT 39.7 38     --      CMP:   Recent Labs   Lab 06/11/24  0951   GLU 98   CALCIUM 9.7   ALBUMIN 3.8   PROT 7.2      K 3.9   CO2 25      BUN 7   CREATININE 0.7   ALKPHOS 64   ALT 18   AST 19   BILITOT 0.4     Lipase:   Recent Labs   Lab 06/11/24  0951   LIPASE 12       Significant Imaging:  Imaging results within the past 24 hours have been reviewed.  Assessment/Plan:     GI  Chronic gastritis without bleeding  Previous EGD in 2018 revealed chronic active gastritis and +H.pylori     - start pantoprazole IV 40mg BID   - ok w/ carafate and bentyl if helping, but patient's pain would likely improve with bowel movement    Abdominal pain  54 yo F with hx of treated H. Pylori, chronic active gastritis on previous EGD, low back pain who presents with 2 weeks of worsening back pain, nausea, vomiting, poor appetite. Her last BM was on 6/9. Abdominal XR shows moderate to large stool burden. She also has burning epigastric pain, hx of chronic NSAID (mobic and ibuprofen) use and intermittent PPI use. Lipase negative, labs wnl.     Suspect that lower abdominal pain and distended bladder are is primarily driven by constipation. There is also likely a component of GERD/gastritis and/or possible H. Pylori recurrence given her burning epigastric pain.     - escalate bowel regimen to miralax BID and senna BID  - consider suppository or enema if still no BM  - start IV pantoprazole 40 BID  - will plan for EGD tomorrow  - keep on CLD. NPO after midnight.  - H. Pylori stool ordered - has not produced BM yet  - Hold all NSAIDs and anticoagulants, unless  contraindicated            Thank you for your consult. I will follow-up with patient. Please contact us if you have any additional questions.    Philipp Brennan MD  Gastroenterology  [facility] - Emergency Dept

## 2024-06-12 NOTE — PROGRESS NOTES
ED Observation Unit  Progress Note      HPI   Laz Quintero is a 53 y.o. female with history of cholecystectomy as well as acid reflux presents with abdominal pain that has been going on waxing and waning for the last 2 weeks and more consistent over last 4 days.  She states it has in the epigastric region and down in the right lower quadrant.  Describes as burning and severe radiating to the back.  She states it is worse with food including water and has been unable to eat or drink.  Has had multiple episodes of vomiting.  Had 1 episode of loose stools a few days ago but none since.  Denies any dysuria.     Was seen at local emergency department yesterday for the same presentation.  Had blood work including an ultrasound as well as a CT abdomen pelvis showing no acute process and specifically no appendicitis.  There did show some mild bilateral hydronephrosis but no stones or any other factors.  Was discharged with Phenergan to follow up with GI and primary care.     This is the extent to the patients complaints today here in the emergency department.    Interval History   Pt resting in bed. Reports continued abdominal pain which she describes as constant. Originates in the epigastrium and radiates to the RLQ. Tender on exam but without peritoneal signs. VSS. She is afebrile, non toxic and non distressed    PMHx   Past Medical History:   Diagnosis Date    Abnormal Pap smear     Abnormal Pap smear of cervix     Annular tear of lumbar disc, L5-S1. 07/26/2012    Chronic LBP     HPTH (hyperparathyroidism)     Hyperlipidemia     Menorrhagia     PONV (postoperative nausea and vomiting)     Primary hypertension 01/31/2023    Right lumbar radiculopathy 07/26/2012    Seasonal allergies       Past Surgical History:   Procedure Laterality Date    BACK SURGERY  2018    CHOLECYSTECTOMY      COLONOSCOPY N/A 01/19/2018    Procedure: COLONOSCOPY;  Surgeon: Malachi Olmedo MD;  Location: [facility] ([address] [place] [address]);  Service:  Endoscopy;  Laterality: N/A;    COLONOSCOPY N/A 12/28/2022    Procedure: COLONOSCOPY;  Surgeon: Ezekiel Alanis MD;  Location: [facility] (4TH FLR);  Service: Endoscopy;  Laterality: N/A;  inst via portal  pre call complete [facility]    ESOPHAGOGASTRODUODENOSCOPY N/A 09/06/2019    Procedure: EGD (ESOPHAGOGASTRODUODENOSCOPY);  Surgeon: Jose Carlos Ventura MD;  Location: [facility] (4TH FLR);  Service: Endoscopy;  Laterality: N/A;  pt requesting ASAP    HYSTERECTOMY, TOTAL, LAPAROSCOPIC, WITH SALPINGECTOMY  06/16/2015    INJECTION OF ANESTHETIC AGENT AROUND MULTIPLE INTERCOSTAL NERVES Right 03/21/2023    Procedure: BLOCK, NERVE, INTERCOSTAL, 2 OR MORE;  Surgeon: Anton Evangelista MD;  [facility];  Service: Cardiothoracic;  Laterality: Right;    TUBAL LIGATION      Essure    WISDOM TOOTH EXTRACTION      2005    XI ROBOTIC RATS Right 03/21/2023    Procedure: XI ROBOTIC THYMECTOMY;  Surgeon: Anton Evangelista MD;  Location: [facility] OR [address] [place] [address];  Service: Cardiothoracic;  Laterality: Right;        Family Hx   Family History   Problem Relation Name Age of Onset    Stroke Paternal Grandmother  68    Hypertension Maternal Grandmother      Multiple myeloma Maternal Grandmother      Diabetes Father      Colon polyps Father      Hypertension Mother      Breast cancer Neg Hx      Ovarian cancer Neg Hx      Melanoma Neg Hx      Colon cancer Neg Hx          Social Hx   Social History     Socioeconomic History    Marital status: Single   Occupational History    Occupation: nurse     Employer: [employer]   Tobacco Use    Smoking status: Never     Passive exposure: Never    Smokeless tobacco: Never   Substance and Sexual Activity    Alcohol use: No    Drug use: No    Sexual activity: Yes     Partners: Male     Birth control/protection: See Surgical Hx     Comment:  ; new partner since 2018   Other Topics Concern    Are you pregnant or think you may be? No    Breast-feeding No     Social Determinants of  Health     Financial Resource Strain: Low Risk  (3/22/2023)    Overall Financial Resource Strain (CARDIA)     Difficulty of Paying Living Expenses: Not very hard   Food Insecurity: No Food Insecurity (3/22/2023)    Hunger Vital Sign     Worried About Running Out of Food in the Last Year: Never true     Ran Out of Food in the Last Year: Never true   Transportation Needs: No Transportation Needs (3/22/2023)    PRAPARE - Transportation     Lack of Transportation (Medical): No     Lack of Transportation (Non-Medical): No   Physical Activity: Insufficiently Active (3/22/2023)    Exercise Vital Sign     Days of Exercise per Week: 2 days     Minutes of Exercise per Session: 20 min   Housing Stability: Low Risk  (3/22/2023)    Housing Stability Vital Sign     Unable to Pay for Housing in the Last Year: No     Number of Places Lived in the Last Year: 1     Unstable Housing in the Last Year: No        Vital Signs   Vitals:    06/12/24 0442 06/12/24 0445 06/12/24 0727 06/12/24 1033   BP:  (!) 154/90 139/85    Pulse:  62 66    Resp: 18  19 18   Temp:  97.7 °F (36.5 °C) 97.9 °F (36.6 °C)    TempSrc:  Oral Oral    SpO2:  100% 100%    Weight:       Height:            Review of Systems  Per HPI and interval history.    Brief Physical Exam/Reassessment   Constitutional:       General: She is not in acute distress.     Appearance: Normal appearance. She is not ill-appearing.   HENT:      Head: Normocephalic and atraumatic.   Cardiovascular:      Rate and Rhythm: Normal rate and regular rhythm.   Pulmonary:      Effort: Pulmonary effort is normal.      Breath sounds: Normal breath sounds.   Abdominal:      Tenderness: There is abdominal tenderness (generalized abdominal pain, mostly in epigastric area).      Comments: Hyperactive bowel sounds   Neurological:      Mental Status: She is alert.     Labs/Imaging   Labs Reviewed   CBC W/ AUTO DIFFERENTIAL - Abnormal; Notable for the following components:       Result Value    MPV 8.9 (*)      Mono % 3.4 (*)     All other components within normal limits   HIV 1 / 2 ANTIBODY    Narrative:     Release to patient->Immediate   HEPATITIS C ANTIBODY    Narrative:     Release to patient->Immediate   COMPREHENSIVE METABOLIC PANEL   LIPASE   URINALYSIS, REFLEX TO URINE CULTURE    Narrative:     Specimen Source->Urine   H. PYLORI ANTIGEN, STOOL   ISTAT PROCEDURE   ISTAT CHEM8      Imaging Results              X-Ray Abdomen Flat And Erect (Final result)  Result time 06/11/24 12:44:19      Final result by Noé Friedman MD (06/11/24 12:44:19)                   Impression:      Nonspecific, nonobstructive bowel gas pattern    Moderate to large volume colonic stool.      Electronically signed by: Noé Friedman  Date:    06/11/2024  Time:    12:44               Narrative:    EXAMINATION:  XR ABDOMEN FLAT AND ERECT    CLINICAL HISTORY:  Abdominal Pain;    TECHNIQUE:  Flat and erect AP views of the abdomen were performed.    COMPARISON:  Abdominal radiograph 03/22/2023.    FINDINGS:  No definite dilated gas-filled loops of small large bowel appreciated.  Moderate to large volume colonic stool.  No findings to suggest free air.  Air appears to be present the distal colon rectum.    No acute osseous or soft tissue abnormality.    Postoperative changes noted at L5-S1.                                       US Pelvis Comp with Transvag NON-OB (xpd) (Final result)  Result time 06/11/24 12:28:25      Final result by Sami Shannon MD (06/11/24 12:28:25)                   Impression:      Hysterectomy.    No acute/significant sonographic abnormality.      Electronically signed by: Sami Shannon MD  Date:    06/11/2024  Time:    12:28               Narrative:    EXAMINATION:  US PELVIS COMP WITH TRANSVAG NON-OB (XPD)    CLINICAL HISTORY:  US yesterday was limited. h/o hysterectomy but ovaries remained. please eval;    TECHNIQUE:  Transabdominal sonography of the pelvis was performed, followed by transvaginal sonography  to better evaluate the uterus and ovaries.    COMPARISON:  Pelvic ultrasound and CT abdomen and pelvis 06/10/2024, pelvic ultrasound 10/22/2019, MRI pelvis 05/29/2012    FINDINGS:  Uterus:    Surgically absent.    Ovaries:    The right ovary measures 1.6 x 2.4 x 1.5 cm.  The left ovary measures 1.5 x 1.8 x 1.3 cm. Within the left ovary there is a 1 cm simple appearing dominant follicle.  Doppler flow demonstrated to both ovaries.    Free Fluid:    None.                                       I reviewed all labs, imaging reports and EKGs (if performed) associated with this ED/EDOU visit.    Plan   Epigastric pain  Intractable nausea/vomting  -  no acute electrolyte abnormalities, lipase WNL  -  H. Pylori pending  -  CT A/P 6/10/24 revealed mild hydronephrosis without obvious obstruction.  No other acute findings.   - Flat and erect x-ray today revealed no evidence of bowel obstruction. Moderate to large volume stool noted in colon.  - patient received Zofran, Pepcid,  in the ED with persistent symptoms  - PRN analgesics, antiemetics ordered: pepcid, phenergan, and droperidol  - will trial Carafate and bentyl  - Morphine 2 mg IV Q 3 hr PRN.  - Brown bomb enema with relief of constipation but continued pain.  - mirilax ordered for stool burden  - education given on use of pain medications with constipation  - GI consulted. Plan for EGD tomorrow. Also recommend pantoprazole 40mg IV BID. See their note for complete recs.  - will transfer care to  obs for continuation of care.      I have discussed this case with Citlali Zaidi PA-C.

## 2024-06-12 NOTE — SUBJECTIVE & OBJECTIVE
Past Medical History:   Diagnosis Date    Abnormal Pap smear     Abnormal Pap smear of cervix     Annular tear of lumbar disc, L5-S1. 07/26/2012    Chronic LBP     HPTH (hyperparathyroidism)     Hyperlipidemia     Menorrhagia     PONV (postoperative nausea and vomiting)     Primary hypertension 01/31/2023    Right lumbar radiculopathy 07/26/2012    Seasonal allergies        Past Surgical History:   Procedure Laterality Date    BACK SURGERY  2018    CHOLECYSTECTOMY      COLONOSCOPY N/A 01/19/2018    Procedure: COLONOSCOPY;  Surgeon: Malachi Olmedo MD;  [facility]  Service: Endoscopy;  Laterality: N/A;    COLONOSCOPY N/A 12/28/2022    Procedure: COLONOSCOPY;  Surgeon: Ezekiel Alanis MD;  [facility]  Service: Endoscopy;  Laterality: N/A;  inst via portal  pre call complete [facility]    ESOPHAGOGASTRODUODENOSCOPY N/A 09/06/2019    Procedure: EGD (ESOPHAGOGASTRODUODENOSCOPY);  Surgeon: Jose Carlos Ventura MD;  [address]  Service: Endoscopy;  Laterality: N/A;  pt requesting ASAP    HYSTERECTOMY, TOTAL, LAPAROSCOPIC, WITH SALPINGECTOMY  06/16/2015    INJECTION OF ANESTHETIC AGENT AROUND MULTIPLE INTERCOSTAL NERVES Right 03/21/2023    Procedure: BLOCK, NERVE, INTERCOSTAL, 2 OR MORE;  Surgeon: Anton Evangelista MD;  Location: [facility] OR [address] [place] [address];  Service: Cardiothoracic;  Laterality: Right;    TUBAL LIGATION      Essure    WISDOM TOOTH EXTRACTION      2005    XI ROBOTIC RATS Right 03/21/2023    Procedure: XI ROBOTIC THYMECTOMY;  Surgeon: Anton Evangelista MD;  Location: [facility] OR [address] [place] [address];  Service: Cardiothoracic;  Laterality: Right;       Review of patient's allergies indicates:   Allergen Reactions    Clindamycin     Sulfa (sulfonamide antibiotics) Anaphylaxis    Sulfa dyne     Sulfamethoxazole-trimethoprim      Other reaction(s): Anaphylaxis    Dermabond [tissue glues] Rash       No current facility-administered medications on file prior to encounter.     Current  Outpatient Medications on File Prior to Encounter   Medication Sig    butalbital-acetaminophen-caffeine -40 mg (FIORICET, ESGIC) -40 mg per tablet Take 1 tablet by mouth every 4 (four) hours as needed.    CYMBALTA 60 mg capsule Take 60 mg by mouth.    fluticasone propionate (FLONASE) 50 mcg/actuation nasal spray 1 spray (50 mcg total) by Each Nostril route once daily.    hydroCHLOROthiazide (HYDRODIURIL) 12.5 MG Tab Take 1 tablet (12.5 mg total) by mouth once daily.    ketorolac (TORADOL) 10 mg tablet Take one po q 8 hrs prn pain    oxyCODONE (ROXICODONE) 5 MG immediate release tablet Take 1 tablet (5 mg total) by mouth every 4 (four) hours as needed for Pain.    promethazine (PHENERGAN) 25 MG tablet Take 1 tablet (25 mg total) by mouth every 6 (six) hours as needed for Nausea.    QUEtiapine (SEROQUEL XR) 150 mg Tb24 Take 150 mg by mouth every evening.    traZODone (DESYREL) 100 MG tablet Take 100 mg by mouth every evening.    albuterol (PROVENTIL/VENTOLIN HFA) 90 mcg/actuation inhaler INHALE 2 PUFFS INTO THE LUNGS BY MOUTH EVERY 6 HOURS AS NEEDED FOR WHEEZING. RESCUE.    ALPRAZolam (XANAX) 0.25 MG tablet Take 1 tablet (0.25 mg total) by mouth daily as needed for Anxiety.    benzonatate (TESSALON) 200 MG capsule Take 1 capsule (200 mg total) by mouth 3 (three) times daily as needed for Cough.    diclofenac sodium (VOLTAREN) 1 % Gel Apply 2 g topically once daily. Use gloves to apply    EScitalopram oxalate (LEXAPRO) 10 MG tablet Take 1 tablet (10 mg total) by mouth once daily. (Patient not taking: Reported on 1/5/2024)    estradioL (DIVIGEL) 1 mg/gram (0.1 %) topical gel Place 1 g onto the skin once daily.    gabapentin (NEURONTIN) 300 MG capsule Take 1 capsule (300 mg total) by mouth 2 (two) times daily AND 2 capsules (600 mg total) every evening.    meloxicam (MOBIC) 15 MG tablet Take 15 mg by mouth.    methocarbamoL (ROBAXIN) 750 MG Tab Take 750 mg by mouth every 12 (twelve) hours.    pregabalin  (LYRICA) 75 MG capsule Take 75 mg by mouth 2 (two) times daily.     Family History       Problem Relation (Age of Onset)    Colon polyps Father    Diabetes Father    Hypertension Maternal Grandmother, Mother    Multiple myeloma Maternal Grandmother    Stroke Paternal Grandmother (68)          Tobacco Use    Smoking status: Never     Passive exposure: Never    Smokeless tobacco: Never   Substance and Sexual Activity    Alcohol use: No    Drug use: No    Sexual activity: Yes     Partners: Male     Birth control/protection: See Surgical Hx     Comment:  ; new partner since 2018     Review of Systems   Constitutional:  Positive for appetite change (decreased). Negative for chills, diaphoresis, fatigue and fever.   HENT:  Negative for congestion, rhinorrhea and sore throat.    Eyes:  Negative for photophobia and visual disturbance.   Respiratory:  Negative for cough, shortness of breath and wheezing.    Cardiovascular:  Negative for chest pain, palpitations and leg swelling.   Gastrointestinal:  Positive for abdominal pain, nausea and vomiting. Negative for abdominal distention, blood in stool, constipation and diarrhea.   Genitourinary:  Negative for dysuria, frequency and hematuria.   Musculoskeletal:  Positive for back pain (chronic). Negative for joint swelling and myalgias.   Skin:  Negative for color change, pallor, rash and wound.   Neurological:  Positive for dizziness (intermittent). Negative for syncope, weakness, light-headedness and numbness.   Psychiatric/Behavioral:  Negative for confusion and hallucinations. The patient is not nervous/anxious.      Objective:     Vital Signs (Most Recent):  Temp: 97.8 °F (36.6 °C) (06/12/24 1522)  Pulse: 62 (06/12/24 1522)  Resp: 18 (06/12/24 1707)  BP: (!) 142/89 (06/12/24 1522)  SpO2: 98 % (06/12/24 1522) Vital Signs (24h Range):  Temp:  [97.7 °F (36.5 °C)-98.9 °F (37.2 °C)] 97.8 °F (36.6 °C)  Pulse:  [62-70] 62  Resp:  [16-19] 18  SpO2:  [96 %-100 %] 98 %  BP:  (121-176)/(81-90) 142/89     Weight: 71.7 kg (158 lb)  Body mass index is 25.5 kg/m².     Physical Exam  Vitals and nursing note reviewed.   Constitutional:       General: She is not in acute distress.     Appearance: She is not toxic-appearing or diaphoretic.   HENT:      Head: Normocephalic and atraumatic.      Nose: Nose normal.      Mouth/Throat:      Mouth: Mucous membranes are dry.   Eyes:      Pupils: Pupils are equal, round, and reactive to light.   Cardiovascular:      Rate and Rhythm: Normal rate and regular rhythm.      Pulses: Normal pulses.   Pulmonary:      Effort: Pulmonary effort is normal. No respiratory distress.      Breath sounds: No wheezing, rhonchi or rales.   Abdominal:      General: Bowel sounds are increased. There is no distension.      Palpations: Abdomen is soft.      Tenderness: There is abdominal tenderness in the right lower quadrant and epigastric area. There is no guarding or rebound.   Musculoskeletal:         General: Normal range of motion.      Cervical back: Normal range of motion.   Skin:     General: Skin is warm and dry.      Capillary Refill: Capillary refill takes less than 2 seconds.   Neurological:      General: No focal deficit present.      Mental Status: She is alert and oriented to person, place, and time.      Motor: No weakness.   Psychiatric:         Mood and Affect: Mood normal.         Behavior: Behavior normal.              CRANIAL NERVES     CN III, IV, VI   Pupils are equal, round, and reactive to light.       Significant Labs: All pertinent labs within the past 24 hours have been reviewed.  CBC:   Recent Labs   Lab 06/11/24  0951 06/11/24  0956   WBC 8.60  --    HGB 13.1  --    HCT 39.7 38     --      CMP:   Recent Labs   Lab 06/11/24  0951      K 3.9      CO2 25   GLU 98   BUN 7   CREATININE 0.7   CALCIUM 9.7   PROT 7.2   ALBUMIN 3.8   BILITOT 0.4   ALKPHOS 64   AST 19   ALT 18   ANIONGAP 10     Urine Studies:   Recent Labs   Lab  06/11/24  1208   COLORU Yellow   APPEARANCEUA Clear   PHUR 6.0   SPECGRAV 1.015   PROTEINUA Negative   GLUCUA Negative   KETONESU Negative   BILIRUBINUA Negative   OCCULTUA Negative   NITRITE Negative   LEUKOCYTESUR Negative       Significant Imaging: I have reviewed all pertinent imaging results/findings within the past 24 hours.  Imaging Results              X-Ray Abdomen Flat And Erect (Final result)  Result time 06/11/24 12:44:19      Final result by Noé Friedman MD (06/11/24 12:44:19)                   Impression:      Nonspecific, nonobstructive bowel gas pattern    Moderate to large volume colonic stool.      Electronically signed by: Noé Friedman  Date:    06/11/2024  Time:    12:44               Narrative:    EXAMINATION:  XR ABDOMEN FLAT AND ERECT    CLINICAL HISTORY:  Abdominal Pain;    TECHNIQUE:  Flat and erect AP views of the abdomen were performed.    COMPARISON:  Abdominal radiograph 03/22/2023.    FINDINGS:  No definite dilated gas-filled loops of small large bowel appreciated.  Moderate to large volume colonic stool.  No findings to suggest free air.  Air appears to be present the distal colon rectum.    No acute osseous or soft tissue abnormality.    Postoperative changes noted at L5-S1.                                       US Pelvis Comp with Transvag NON-OB (xpd) (Final result)  Result time 06/11/24 12:28:25      Final result by Sami Shannon MD (06/11/24 12:28:25)                   Impression:      Hysterectomy.    No acute/significant sonographic abnormality.      Electronically signed by: Sami Shannon MD  Date:    06/11/2024  Time:    12:28               Narrative:    EXAMINATION:  US PELVIS COMP WITH TRANSVAG NON-OB (XPD)    CLINICAL HISTORY:  US yesterday was limited. h/o hysterectomy but ovaries remained. please eval;    TECHNIQUE:  Transabdominal sonography of the pelvis was performed, followed by transvaginal sonography to better evaluate the uterus and  ovaries.    COMPARISON:  Pelvic ultrasound and CT abdomen and pelvis 06/10/2024, pelvic ultrasound 10/22/2019, MRI pelvis 05/29/2012    FINDINGS:  Uterus:    Surgically absent.    Ovaries:    The right ovary measures 1.6 x 2.4 x 1.5 cm.  The left ovary measures 1.5 x 1.8 x 1.3 cm. Within the left ovary there is a 1 cm simple appearing dominant follicle.  Doppler flow demonstrated to both ovaries.    Free Fluid:    None.

## 2024-06-12 NOTE — CONSULTS
"[facility] - Emergency Dept  Gastroenterology  Consult Note    Patient Name: Laz Quintero  MRN: [id number]  Admission Date: 6/11/2024  Hospital Length of Stay: 0 days  Code Status: Full Code   Attending Provider: No att. providers found   Consulting Provider: Philipp Brennan MD  Primary Care Physician: Charlotte Jacinto MD  Principal Problem:Intractable vomiting with nausea    Inpatient consult to Gastroenterology  Consult performed by: Philipp Brennan MD  Consult ordered by: Florencia Galdamez NP        Subjective:     HPI:  Ms. Quintero is a 53 y.o. female with history of cholecystectomy, GERD, s/p hysterectomy, HTN, HPTH, chronic low back pain who presents with 2 weeks of abdominal pain that has been worse for the 4 days before admission. It is associated with nausea, vomiting, poor appetite, and bloating. GI is consulted for abdominal pain. The pain is located in both lower quadrants and the epigastric region. It's burning, and "sharp, electric" in nature. Last week, it was waxing and waning, but it has been constant for the past few days. She visited the [facility] ED on 6/10 and was discharged with PO phenergan and GI referral after getting IV morphine, dilaudid, IV zofran and phenergan. She presented again on 6/11 to the ED with more consistent abdominal pain, described as burning and severe radiating to her back. The pain is worse with water and food. Her last episode of vomiting was on 6/10. Her last BM was on 6/9. She denies bright red blood per rectum, but noticed that her BM on 6/9 was black/darker. Usually her stools are brown. She denies constipation, diarrhea, or dysphagia. Her weight loss is intentional. She takes home Nexium as needed. She takes Mobic and up to 1600 mg of ibuprofen 4x/week for chronic back pain. She is not on blood thinners. Denies personal hx of IBS/IBD.    She has a history of H. Pylori in 2018 (diagnosed via path from EGD) and was treated; she feels that the pain is similar to that " pain. Her EGD from 2019 was normal (negative H. Pylori) and subequent H. Pylori stool testing has been negative in 2019 and 2022. Her colonoscopy reveals sigmoid diverticulosis and tubular adenomas.     She recently traveled to [place] 2 weeks ago and was not having these problems there; her family members (son, mother, sister) have had diarrhea predominant symptoms since returning from trip trip rather than vomiting. She mentions that she did have Chinese food on 6/7 prior to the pain worsening, but the pain did precede that meal. Otherwise, she avoids spicy food, coffee, fried foods and consumes mostly fish, vegetables and tea.     Thus far, she has been treated with bentyl, sucralfate, and daily miralax by the primary team.     Imaging done since 6/10 below:   CT A/P: gaseous distension of the rectum, normal appendix. Diverticula. No fluid collection. Bladder distension w/o wall thickening. Bilateral mild hydronephrosis, no findings to suggest ureteral obstruction.  The urinary bladder is mildly prominent, correlation with any history of urinary retention or outlet obstruction. Possible hepatic steatosis.     XR abdomen: moderate/large colonic stool, non obstructive bowel gas pattern    Pelvic US: small simple cystic lesion arising from the posterior aspect of the urinary bladder wall measuring 0.8 x 0.7 x 0.9 cm, compatible with a bladder diverticulum.        Past Medical History:   Diagnosis Date    Abnormal Pap smear     Abnormal Pap smear of cervix     Annular tear of lumbar disc, L5-S1. 07/26/2012    Chronic LBP     HPTH (hyperparathyroidism)     Hyperlipidemia     Menorrhagia     PONV (postoperative nausea and vomiting)     Primary hypertension 01/31/2023    Right lumbar radiculopathy 07/26/2012    Seasonal allergies        Past Surgical History:   Procedure Laterality Date    BACK SURGERY  2018    CHOLECYSTECTOMY      COLONOSCOPY N/A 01/19/2018    Procedure: COLONOSCOPY;  Surgeon: Malachi Olmedo MD;   Location: [facility] ENDO (4TH FLR);  Service: Endoscopy;  Laterality: N/A;    COLONOSCOPY N/A 12/28/2022    Procedure: COLONOSCOPY;  Surgeon: Ezekiel Alanis MD;  Location: [facility] ENDO (4TH FLR);  Service: Endoscopy;  Laterality: N/A;  inst via portal  pre call complete [facility]    ESOPHAGOGASTRODUODENOSCOPY N/A 09/06/2019    Procedure: EGD (ESOPHAGOGASTRODUODENOSCOPY);  Surgeon: Jose Carlos Ventura MD;  Location: [facility] ENDO (4TH FLR);  Service: Endoscopy;  Laterality: N/A;  pt requesting ASAP    HYSTERECTOMY, TOTAL, LAPAROSCOPIC, WITH SALPINGECTOMY  06/16/2015    INJECTION OF ANESTHETIC AGENT AROUND MULTIPLE INTERCOSTAL NERVES Right 03/21/2023    Procedure: BLOCK, NERVE, INTERCOSTAL, 2 OR MORE;  Surgeon: Anton Evangelista MD;  Location: [facility] OR 2ND FLR;  Service: Cardiothoracic;  Laterality: Right;    TUBAL LIGATION      Essure    WISDOM TOOTH EXTRACTION      2005    XI ROBOTIC RATS Right 03/21/2023    Procedure: XI ROBOTIC THYMECTOMY;  Surgeon: Anton Evangelista MD;  [facility]  Service: Cardiothoracic;  Laterality: Right;       Review of patient's allergies indicates:   Allergen Reactions    Clindamycin     Sulfa (sulfonamide antibiotics) Anaphylaxis    Sulfa dyne     Sulfamethoxazole-trimethoprim      Other reaction(s): Anaphylaxis    Dermabond [tissue glues] Rash     Family History       Problem Relation (Age of Onset)    Colon polyps Father    Diabetes Father    Hypertension Maternal Grandmother, Mother    Multiple myeloma Maternal Grandmother    Stroke Paternal Grandmother (68)          Tobacco Use    Smoking status: Never     Passive exposure: Never    Smokeless tobacco: Never   Substance and Sexual Activity    Alcohol use: No    Drug use: No    Sexual activity: Yes     Partners: Male     Birth control/protection: See Surgical Hx     Comment:  ; new partner since 2018     Review of Systems   Constitutional:  Positive for appetite change. Negative for unexpected weight change.   Gastrointestinal:   Positive for abdominal pain, nausea and vomiting. Negative for blood in stool, constipation, diarrhea and rectal pain.   Musculoskeletal:  Positive for back pain.   Psychiatric/Behavioral:  Negative for agitation and behavioral problems.      Objective:     Vital Signs (Most Recent):  Temp: 98.6 °F (37 °C) (06/12/24 1132)  Pulse: 70 (06/12/24 1132)  Resp: 19 (06/12/24 1132)  BP: 121/81 (06/12/24 1132)  SpO2: 96 % (06/12/24 1132) Vital Signs (24h Range):  Temp:  [97.7 °F (36.5 °C)-98.9 °F (37.2 °C)] 98.6 °F (37 °C)  Pulse:  [62-70] 70  Resp:  [16-19] 19  SpO2:  [96 %-100 %] 96 %  BP: (121-176)/(78-90) 121/81     Weight: 71.7 kg (158 lb) (06/11/24 0851)  Body mass index is 25.5 kg/m².    No intake or output data in the 24 hours ending 06/12/24 1352    Lines/Drains/Airways       Peripheral Intravenous Line  Duration                  Peripheral IV - Single Lumen 06/11/24 0951 20 G Left Antecubital 1 day                     Physical Exam  Constitutional:       General: She is not in acute distress.     Appearance: She is normal weight. She is not ill-appearing.   HENT:      Head: Normocephalic and atraumatic.      Nose: Nose normal.      Mouth/Throat:      Mouth: Mucous membranes are dry.   Eyes:      General: No scleral icterus.     Conjunctiva/sclera: Conjunctivae normal.   Cardiovascular:      Rate and Rhythm: Normal rate and regular rhythm.      Pulses: Normal pulses.      Heart sounds: No murmur heard.     No gallop.   Pulmonary:      Effort: Pulmonary effort is normal.      Breath sounds: Normal breath sounds.   Abdominal:      General: Abdomen is flat. There is no distension.      Palpations: Abdomen is soft.      Tenderness: There is abdominal tenderness. There is no guarding or rebound.      Comments: Hyperactive bowel sounds   Musculoskeletal:      Cervical back: Normal range of motion.      Right lower leg: No edema.      Left lower leg: No edema.   Skin:     General: Skin is warm and dry.      Coloration:  Skin is not jaundiced.   Neurological:      General: No focal deficit present.      Mental Status: She is alert and oriented to person, place, and time.   Psychiatric:         Mood and Affect: Mood normal.         Behavior: Behavior normal.          Significant Labs:  CBC:   Recent Labs   Lab 06/11/24  0951 06/11/24  0956   WBC 8.60  --    HGB 13.1  --    HCT 39.7 38     --      CMP:   Recent Labs   Lab 06/11/24  0951   GLU 98   CALCIUM 9.7   ALBUMIN 3.8   PROT 7.2      K 3.9   CO2 25      BUN 7   CREATININE 0.7   ALKPHOS 64   ALT 18   AST 19   BILITOT 0.4     Lipase:   Recent Labs   Lab 06/11/24  0951   LIPASE 12       Significant Imaging:  Imaging results within the past 24 hours have been reviewed.  Assessment/Plan:     GI  Chronic gastritis without bleeding  Previous EGD in 2018 revealed chronic active gastritis and +H.pylori     - start pantoprazole IV 40mg BID   - ok w/ carafate and bentyl if helping, but patient's pain would likely improve with bowel movement    Abdominal pain  54 yo F with hx of treated H. Pylori, chronic active gastritis on previous EGD, low back pain who presents with 2 weeks of worsening back pain, nausea, vomiting, poor appetite. Her last BM was on 6/9. Abdominal XR shows moderate to large stool burden. She also has burning epigastric pain, hx of chronic NSAID (mobic and ibuprofen) use and intermittent PPI use. Lipase negative, labs wnl.     Suspect that lower abdominal pain and distended bladder are is primarily driven by constipation. There is also likely a component of GERD/gastritis and/or possible H. Pylori recurrence given her burning epigastric pain.     - escalate bowel regimen to miralax BID and senna BID  - consider suppository or enema if still no BM  - start IV pantoprazole 40 BID  - will plan for EGD tomorrow  - keep on CLD. NPO after midnight.  - H. Pylori stool ordered - has not produced BM yet  - Hold all NSAIDs and anticoagulants, unless  contraindicated            Thank you for your consult. I will follow-up with patient. Please contact us if you have any additional questions.    Philipp Brennan MD  Gastroenterology  [facility] - Emergency Dept

## 2024-06-12 NOTE — ASSESSMENT & PLAN NOTE
54 yo F with hx of treated H. Pylori, chronic active gastritis on previous EGD, low back pain who presents with 2 weeks of worsening back pain, nausea, vomiting, poor appetite. Her last BM was on 6/9. Abdominal XR shows moderate to large stool burden. She also has burning epigastric pain, hx of chronic NSAID (mobic and ibuprofen) use and intermittent PPI use. Lipase negative, labs wnl.     Suspect that lower abdominal pain and distended bladder are is primarily driven by constipation. There is also likely a component of GERD/gastritis and/or possible H. Pylori recurrence given her burning epigastric pain.     - escalate bowel regimen to miralax BID and senna BID  - consider suppository or enema if still no BM  - start IV pantoprazole 40 BID  - will plan for EGD tomorrow  - keep on CLD  - H. Pylori stool ordered - has not produced BM yet  - Hold all NSAIDs and anticoagulants, unless contraindicated

## 2024-06-13 ENCOUNTER — ANESTHESIA (OUTPATIENT)
Dept: ENDOSCOPY | Facility: HOSPITAL | Age: 54
End: 2024-06-13

## 2024-06-13 VITALS
HEART RATE: 62 BPM | WEIGHT: 158.06 LBS | BODY MASS INDEX: 25.4 KG/M2 | DIASTOLIC BLOOD PRESSURE: 76 MMHG | HEIGHT: 66 IN | TEMPERATURE: 98 F | RESPIRATION RATE: 18 BRPM | SYSTOLIC BLOOD PRESSURE: 111 MMHG | OXYGEN SATURATION: 98 %

## 2024-06-13 LAB
ALBUMIN SERPL BCP-MCNC: 3.1 G/DL (ref 3.5–5.2)
ALP SERPL-CCNC: 51 U/L (ref 55–135)
ALT SERPL W/O P-5'-P-CCNC: 15 U/L (ref 10–44)
ANION GAP SERPL CALC-SCNC: 7 MMOL/L (ref 8–16)
AST SERPL-CCNC: 18 U/L (ref 10–40)
BASOPHILS # BLD AUTO: 0.01 K/UL (ref 0–0.2)
BASOPHILS NFR BLD: 0.1 % (ref 0–1.9)
BILIRUB SERPL-MCNC: 0.5 MG/DL (ref 0.1–1)
BUN SERPL-MCNC: 5 MG/DL (ref 6–20)
CALCIUM SERPL-MCNC: 9.5 MG/DL (ref 8.7–10.5)
CHLORIDE SERPL-SCNC: 102 MMOL/L (ref 95–110)
CO2 SERPL-SCNC: 28 MMOL/L (ref 23–29)
CREAT SERPL-MCNC: 0.8 MG/DL (ref 0.5–1.4)
DIFFERENTIAL METHOD BLD: ABNORMAL
EOSINOPHIL # BLD AUTO: 0.1 K/UL (ref 0–0.5)
EOSINOPHIL NFR BLD: 1.7 % (ref 0–8)
ERYTHROCYTE [DISTWIDTH] IN BLOOD BY AUTOMATED COUNT: 12.2 % (ref 11.5–14.5)
EST. GFR  (NO RACE VARIABLE): >60 ML/MIN/1.73 M^2
GLUCOSE SERPL-MCNC: 92 MG/DL (ref 70–110)
HCT VFR BLD AUTO: 33.6 % (ref 37–48.5)
HGB BLD-MCNC: 11.2 G/DL (ref 12–16)
IMM GRANULOCYTES # BLD AUTO: 0 K/UL (ref 0–0.04)
IMM GRANULOCYTES NFR BLD AUTO: 0 % (ref 0–0.5)
LYMPHOCYTES # BLD AUTO: 2.1 K/UL (ref 1–4.8)
LYMPHOCYTES NFR BLD: 29.4 % (ref 18–48)
MAGNESIUM SERPL-MCNC: 1.7 MG/DL (ref 1.6–2.6)
MCH RBC QN AUTO: 28.5 PG (ref 27–31)
MCHC RBC AUTO-ENTMCNC: 33.3 G/DL (ref 32–36)
MCV RBC AUTO: 86 FL (ref 82–98)
MONOCYTES # BLD AUTO: 0.4 K/UL (ref 0.3–1)
MONOCYTES NFR BLD: 5 % (ref 4–15)
NEUTROPHILS # BLD AUTO: 4.6 K/UL (ref 1.8–7.7)
NEUTROPHILS NFR BLD: 63.8 % (ref 38–73)
NRBC BLD-RTO: 0 /100 WBC
OHS QRS DURATION: 66 MS
OHS QRS DURATION: 70 MS
OHS QTC CALCULATION: 426 MS
OHS QTC CALCULATION: 434 MS
PLATELET # BLD AUTO: 240 K/UL (ref 150–450)
PMV BLD AUTO: 9.5 FL (ref 9.2–12.9)
POTASSIUM SERPL-SCNC: 3.4 MMOL/L (ref 3.5–5.1)
PROT SERPL-MCNC: 5.8 G/DL (ref 6–8.4)
RBC # BLD AUTO: 3.93 M/UL (ref 4–5.4)
SODIUM SERPL-SCNC: 137 MMOL/L (ref 136–145)
WBC # BLD AUTO: 7.27 K/UL (ref 3.9–12.7)

## 2024-06-13 PROCEDURE — 88305 TISSUE EXAM BY PATHOLOGIST: CPT | Performed by: STUDENT IN AN ORGANIZED HEALTH CARE EDUCATION/TRAINING PROGRAM

## 2024-06-13 PROCEDURE — 25000003 PHARM REV CODE 250: Performed by: PHYSICIAN ASSISTANT

## 2024-06-13 PROCEDURE — 96367 TX/PROPH/DG ADDL SEQ IV INF: CPT

## 2024-06-13 PROCEDURE — 25000003 PHARM REV CODE 250

## 2024-06-13 PROCEDURE — G0378 HOSPITAL OBSERVATION PER HR: HCPCS

## 2024-06-13 PROCEDURE — 96376 TX/PRO/DX INJ SAME DRUG ADON: CPT

## 2024-06-13 PROCEDURE — 25000003 PHARM REV CODE 250: Performed by: NURSE PRACTITIONER

## 2024-06-13 PROCEDURE — 83735 ASSAY OF MAGNESIUM: CPT | Performed by: NURSE PRACTITIONER

## 2024-06-13 PROCEDURE — 88342 IMHCHEM/IMCYTCHM 1ST ANTB: CPT | Mod: 26,,, | Performed by: STUDENT IN AN ORGANIZED HEALTH CARE EDUCATION/TRAINING PROGRAM

## 2024-06-13 PROCEDURE — 88305 TISSUE EXAM BY PATHOLOGIST: CPT | Mod: 26,,, | Performed by: STUDENT IN AN ORGANIZED HEALTH CARE EDUCATION/TRAINING PROGRAM

## 2024-06-13 PROCEDURE — 63600175 PHARM REV CODE 636 W HCPCS: Performed by: NURSE ANESTHETIST, CERTIFIED REGISTERED

## 2024-06-13 PROCEDURE — 88342 IMHCHEM/IMCYTCHM 1ST ANTB: CPT | Performed by: STUDENT IN AN ORGANIZED HEALTH CARE EDUCATION/TRAINING PROGRAM

## 2024-06-13 PROCEDURE — 27201012 HC FORCEPS, HOT/COLD, DISP: Performed by: INTERNAL MEDICINE

## 2024-06-13 PROCEDURE — 43239 EGD BIOPSY SINGLE/MULTIPLE: CPT | Mod: ,,, | Performed by: INTERNAL MEDICINE

## 2024-06-13 PROCEDURE — C9113 INJ PANTOPRAZOLE SODIUM, VIA: HCPCS | Performed by: NURSE PRACTITIONER

## 2024-06-13 PROCEDURE — 36415 COLL VENOUS BLD VENIPUNCTURE: CPT | Performed by: NURSE PRACTITIONER

## 2024-06-13 PROCEDURE — 25000003 PHARM REV CODE 250: Performed by: NURSE ANESTHETIST, CERTIFIED REGISTERED

## 2024-06-13 PROCEDURE — 94761 N-INVAS EAR/PLS OXIMETRY MLT: CPT

## 2024-06-13 PROCEDURE — 43239 EGD BIOPSY SINGLE/MULTIPLE: CPT | Performed by: INTERNAL MEDICINE

## 2024-06-13 PROCEDURE — 37000008 HC ANESTHESIA 1ST 15 MINUTES: Performed by: INTERNAL MEDICINE

## 2024-06-13 PROCEDURE — 80053 COMPREHEN METABOLIC PANEL: CPT | Performed by: NURSE PRACTITIONER

## 2024-06-13 PROCEDURE — 63600175 PHARM REV CODE 636 W HCPCS: Performed by: HOSPITALIST

## 2024-06-13 PROCEDURE — 63600175 PHARM REV CODE 636 W HCPCS: Performed by: ANESTHESIOLOGY

## 2024-06-13 PROCEDURE — 85025 COMPLETE CBC W/AUTO DIFF WBC: CPT | Performed by: NURSE PRACTITIONER

## 2024-06-13 PROCEDURE — 37000009 HC ANESTHESIA EA ADD 15 MINS: Performed by: INTERNAL MEDICINE

## 2024-06-13 PROCEDURE — 63600175 PHARM REV CODE 636 W HCPCS: Performed by: NURSE PRACTITIONER

## 2024-06-13 PROCEDURE — 63600175 PHARM REV CODE 636 W HCPCS

## 2024-06-13 PROCEDURE — 25000242 PHARM REV CODE 250 ALT 637 W/ HCPCS: Performed by: NURSE PRACTITIONER

## 2024-06-13 RX ORDER — MAGNESIUM SULFATE HEPTAHYDRATE 40 MG/ML
2 INJECTION, SOLUTION INTRAVENOUS ONCE
Status: COMPLETED | OUTPATIENT
Start: 2024-06-13 | End: 2024-06-13

## 2024-06-13 RX ORDER — FENTANYL CITRATE 50 UG/ML
INJECTION, SOLUTION INTRAMUSCULAR; INTRAVENOUS
Status: DISCONTINUED
Start: 2024-06-13 | End: 2024-06-13 | Stop reason: HOSPADM

## 2024-06-13 RX ORDER — MECLIZINE HYDROCHLORIDE 25 MG/1
25 TABLET ORAL 3 TIMES DAILY PRN
Qty: 30 TABLET | Refills: 0 | Status: SHIPPED | OUTPATIENT
Start: 2024-06-13 | End: 2024-06-23

## 2024-06-13 RX ORDER — PROPOFOL 10 MG/ML
VIAL (ML) INTRAVENOUS
Status: DISCONTINUED | OUTPATIENT
Start: 2024-06-13 | End: 2024-06-13

## 2024-06-13 RX ORDER — MORPHINE SULFATE 4 MG/ML
2 INJECTION, SOLUTION INTRAMUSCULAR; INTRAVENOUS EVERY 4 HOURS PRN
Status: DISCONTINUED | OUTPATIENT
Start: 2024-06-13 | End: 2024-06-13

## 2024-06-13 RX ORDER — POTASSIUM CHLORIDE 750 MG/1
40 CAPSULE, EXTENDED RELEASE ORAL ONCE
Status: COMPLETED | OUTPATIENT
Start: 2024-06-13 | End: 2024-06-13

## 2024-06-13 RX ORDER — OXYCODONE HYDROCHLORIDE 5 MG/1
5 TABLET ORAL EVERY 4 HOURS PRN
Status: DISCONTINUED | OUTPATIENT
Start: 2024-06-13 | End: 2024-06-13

## 2024-06-13 RX ORDER — PROPOFOL 10 MG/ML
VIAL (ML) INTRAVENOUS CONTINUOUS PRN
Status: DISCONTINUED | OUTPATIENT
Start: 2024-06-13 | End: 2024-06-13

## 2024-06-13 RX ORDER — PROCHLORPERAZINE EDISYLATE 5 MG/ML
5 INJECTION INTRAMUSCULAR; INTRAVENOUS EVERY 30 MIN PRN
Status: DISCONTINUED | OUTPATIENT
Start: 2024-06-13 | End: 2024-06-13 | Stop reason: HOSPADM

## 2024-06-13 RX ORDER — MORPHINE SULFATE 2 MG/ML
2 INJECTION, SOLUTION INTRAMUSCULAR; INTRAVENOUS EVERY 6 HOURS PRN
Status: DISCONTINUED | OUTPATIENT
Start: 2024-06-13 | End: 2024-06-13

## 2024-06-13 RX ORDER — PROMETHAZINE HYDROCHLORIDE 25 MG/1
25 TABLET ORAL EVERY 6 HOURS PRN
Qty: 20 TABLET | Refills: 0 | Status: SHIPPED | OUTPATIENT
Start: 2024-06-13

## 2024-06-13 RX ORDER — OXYCODONE HYDROCHLORIDE 5 MG/1
5 TABLET ORAL EVERY 6 HOURS PRN
Status: DISCONTINUED | OUTPATIENT
Start: 2024-06-13 | End: 2024-06-13

## 2024-06-13 RX ORDER — ONDANSETRON HYDROCHLORIDE 2 MG/ML
4 INJECTION, SOLUTION INTRAVENOUS DAILY PRN
Status: DISCONTINUED | OUTPATIENT
Start: 2024-06-13 | End: 2024-06-13 | Stop reason: HOSPADM

## 2024-06-13 RX ORDER — POLYETHYLENE GLYCOL 3350 17 G/17G
17 POWDER, FOR SOLUTION ORAL 3 TIMES DAILY
Status: DISCONTINUED | OUTPATIENT
Start: 2024-06-13 | End: 2024-06-13 | Stop reason: HOSPADM

## 2024-06-13 RX ORDER — FENTANYL CITRATE 50 UG/ML
25 INJECTION, SOLUTION INTRAMUSCULAR; INTRAVENOUS EVERY 10 MIN PRN
Status: COMPLETED | OUTPATIENT
Start: 2024-06-13 | End: 2024-06-13

## 2024-06-13 RX ORDER — PANTOPRAZOLE SODIUM 40 MG/1
TABLET, DELAYED RELEASE ORAL
Qty: 540 TABLET | Refills: 0 | Status: SHIPPED | OUTPATIENT
Start: 2024-06-13 | End: 2024-06-17 | Stop reason: SDUPTHER

## 2024-06-13 RX ORDER — SUCRALFATE 1 G/1
1 TABLET ORAL
Qty: 40 TABLET | Refills: 0 | Status: SHIPPED | OUTPATIENT
Start: 2024-06-13 | End: 2024-06-23

## 2024-06-13 RX ORDER — LIDOCAINE HYDROCHLORIDE 20 MG/ML
INJECTION INTRAVENOUS
Status: DISCONTINUED | OUTPATIENT
Start: 2024-06-13 | End: 2024-06-13

## 2024-06-13 RX ORDER — PANTOPRAZOLE SODIUM 40 MG/1
40 TABLET, DELAYED RELEASE ORAL
Status: DISCONTINUED | OUTPATIENT
Start: 2024-06-13 | End: 2024-06-13 | Stop reason: HOSPADM

## 2024-06-13 RX ADMIN — METHOCARBAMOL 750 MG: 750 TABLET ORAL at 09:06

## 2024-06-13 RX ADMIN — FENTANYL CITRATE 25 MCG: 50 INJECTION INTRAMUSCULAR; INTRAVENOUS at 10:06

## 2024-06-13 RX ADMIN — MAGNESIUM SULFATE HEPTAHYDRATE 2 G: 40 INJECTION, SOLUTION INTRAVENOUS at 09:06

## 2024-06-13 RX ADMIN — DICYCLOMINE HYDROCHLORIDE 10 MG: 10 CAPSULE ORAL at 12:06

## 2024-06-13 RX ADMIN — SENNOSIDES 8.6 MG: 8.6 TABLET, FILM COATED ORAL at 10:06

## 2024-06-13 RX ADMIN — DULOXETINE HYDROCHLORIDE 60 MG: 60 CAPSULE, DELAYED RELEASE ORAL at 10:06

## 2024-06-13 RX ADMIN — PROPOFOL 175 MCG/KG/MIN: 10 INJECTION, EMULSION INTRAVENOUS at 09:06

## 2024-06-13 RX ADMIN — PANTOPRAZOLE SODIUM 40 MG: 40 INJECTION, POWDER, FOR SOLUTION INTRAVENOUS at 09:06

## 2024-06-13 RX ADMIN — PROCHLORPERAZINE EDISYLATE 5 MG: 5 INJECTION INTRAMUSCULAR; INTRAVENOUS at 09:06

## 2024-06-13 RX ADMIN — SUCRALFATE 1 G: 1 SUSPENSION ORAL at 12:06

## 2024-06-13 RX ADMIN — POTASSIUM CHLORIDE 40 MEQ: 10 CAPSULE, COATED, EXTENDED RELEASE ORAL at 09:06

## 2024-06-13 RX ADMIN — ONDANSETRON 4 MG: 2 INJECTION INTRAMUSCULAR; INTRAVENOUS at 10:06

## 2024-06-13 RX ADMIN — DICYCLOMINE HYDROCHLORIDE 10 MG: 10 CAPSULE ORAL at 10:06

## 2024-06-13 RX ADMIN — FLUTICASONE PROPIONATE 50 MCG: 50 SPRAY, METERED NASAL at 10:06

## 2024-06-13 RX ADMIN — PROPOFOL 60 MG: 10 INJECTION, EMULSION INTRAVENOUS at 09:06

## 2024-06-13 RX ADMIN — MORPHINE SULFATE 2 MG: 4 INJECTION INTRAVENOUS at 12:06

## 2024-06-13 RX ADMIN — LIDOCAINE HYDROCHLORIDE 60 MG: 20 INJECTION INTRAVENOUS at 09:06

## 2024-06-13 RX ADMIN — PREGABALIN 75 MG: 75 CAPSULE ORAL at 09:06

## 2024-06-13 RX ADMIN — SODIUM CHLORIDE: 9 INJECTION, SOLUTION INTRAVENOUS at 08:06

## 2024-06-13 RX ADMIN — POLYETHYLENE GLYCOL 3350 17 G: 17 POWDER, FOR SOLUTION ORAL at 10:06

## 2024-06-13 RX ADMIN — SUCRALFATE 1 G: 1 SUSPENSION ORAL at 05:06

## 2024-06-13 NOTE — NURSING
Nurses Note -- 4 Eyes      6/12/2024   9:36 PM      Skin assessed during: Admit      [x] No Altered Skin Integrity Present    [x]Prevention Measures Documented      [] Yes- Altered Skin Integrity Present or Discovered   [] LDA Added if Not in Epic (Describe Wound)   [] New Altered Skin Integrity was Present on Admit and Documented in LDA   [] Wound Image Taken    Wound Care Consulted? No    Attending Nurse:  Estee Sneed RN/Staff Member:  jeremiah

## 2024-06-13 NOTE — ANESTHESIA POSTPROCEDURE EVALUATION
Anesthesia Post Evaluation    Patient: Laz Quintero    Procedure(s) Performed: Procedure(s) (LRB):  EGD (ESOPHAGOGASTRODUODENOSCOPY) (N/A)    Final Anesthesia Type: general      Patient location during evaluation: PACU  Patient participation: Yes- Able to Participate  Level of consciousness: awake and alert  Post-procedure vital signs: reviewed and stable  Pain management: adequate  Airway patency: patent  OMAIRA mitigation strategies: Multimodal analgesia  PONV status at discharge: No PONV  Anesthetic complications: no      Cardiovascular status: stable  Respiratory status: unassisted and spontaneous ventilation  Hydration status: euvolemic  Follow-up not needed.              Vitals Value Taken Time   /65 06/13/24 1001   Temp 37.1 °C (98.8 °F) 06/13/24 0930   Pulse 66 06/13/24 1009   Resp 36 06/13/24 1009   SpO2 97 % 06/13/24 1009   Vitals shown include unfiled device data.      No case tracking events are documented in the log.      Pain/Maria Guadalupe Score: Pain Rating Prior to Med Admin: 7 (6/13/2024 12:00 AM)  Pain Rating Post Med Admin: 4 (6/13/2024  4:48 AM)  Maria Guadalupe Score: 10 (6/13/2024  9:30 AM)

## 2024-06-13 NOTE — PROVATION PATIENT INSTRUCTIONS
Discharge Summary/Instructions after an Endoscopic Procedure  Patient Name: Laz Quintero  Patient MRN: [id number]  Patient YOB: 1970 Thursday, June 13, 2024  Malachi Olmedo MD  Dear patient,  As a result of recent federal legislation (The Federal Cures Act), you may   receive lab or pathology results from your procedure in your [facility]   account before your physician is able to contact you. Your physician or   their representative will relay the results to you with their   recommendations at their soonest availability.  Thank you,  RESTRICTIONS:  During your procedure today, you received medications for sedation.  These   medications may affect your judgment, balance and coordination.  Therefore,   for 24 hours, you have the following restrictions:   - DO NOT drive a car, operate machinery, make legal/financial decisions,   sign important papers or drink alcohol.    ACTIVITY:  Today: no heavy lifting, straining or running due to procedural   sedation/anesthesia.  The following day: return to full activity including work.  DIET:  Eat and drink normally unless instructed otherwise.     TREATMENT FOR COMMON SIDE EFFECTS:  - Mild abdominal pain, nausea, belching, bloating or excessive gas:  rest,   eat lightly and use a heating pad.  - Sore Throat: treat with throat lozenges and/or gargle with warm salt   water.  - Because air was used during the procedure, expelling large amounts of air   from your rectum or belching is normal.  - If a bowel prep was taken, you may not have a bowel movement for 1-3 days.    This is normal.  SYMPTOMS TO WATCH FOR AND REPORT TO YOUR PHYSICIAN:  1. Abdominal pain or bloating, other than gas cramps.  2. Chest pain.  3. Back pain.  4. Signs of infection such as: chills or fever occurring within 24 hours   after the procedure.  5. Rectal bleeding, which would show as bright red, maroon, or black stools.   (A tablespoon of blood from the rectum is not serious, especially if    hemorrhoids are present.)  6. Vomiting.  7. Weakness or dizziness.  GO DIRECTLY TO THE NEAREST EMERGENCY ROOM IF YOU HAVE ANY OF THE FOLLOWING:      Difficulty breathing              Chills and/or fever over 101 F   Persistent vomiting and/or vomiting blood   Severe abdominal pain   Severe chest pain   Black, tarry stools   Bleeding- more than one tablespoon   Any other symptom or condition that you feel may need urgent attention  Your doctor recommends these additional instructions:  If any biopsies were taken, your doctors clinic will contact you in 1 to 2   weeks with any results.  - [facility].   - Advance diet as tolerated.   - Continue present medications. Continue BID PPI PO, and Carafate.   - Await pathology results.   - Repeat upper endoscopy in 12 weeks to check healing.  For questions, problems or results please call your physician - Malachi Olmedo MD at Work:  [phone].  [facility], EMERGENCY ROOM PHONE NUMBER: [phone]  IF A COMPLICATION OR EMERGENCY SITUATION ARISES AND YOU ARE UNABLE TO REACH   YOUR PHYSICIAN - GO DIRECTLY TO THE EMERGENCY ROOM.  Malachi Olmedo MD  6/13/2024 9:51:07 AM  This report has been verified and signed electronically.  Dear patient,  As a result of recent federal legislation (The Federal Cures Act), you may   receive lab or pathology results from your procedure in your [facility]   account before your physician is able to contact you. Your physician or   their representative will relay the results to you with their   recommendations at their soonest availability.  Thank you,  PROVATION

## 2024-06-13 NOTE — NURSING TRANSFER
Nursing Transfer Note      6/13/2024   10:17 AM    Nurse giving handoff:milady mendez   Nurse receiving handoff:lance mendez     Reason patient is being transferred: post procedure    Transfer To: 1147    Transfer via stretcher    Transfer with na    Transported by transport      Any special needs or follow-up needed: attempted new iv with no success. Current iv is painful. Floor nurse aware.     Patient belongings transferred with patient: No    Chart send with patient: Yes    Notified: mother    Patient reassessed at: 6/13/24 @ 1028

## 2024-06-13 NOTE — NURSING
Report called to room 1147. Pt AAOx4. RA. LR infusing. EKG completed. Clear liquid diet. Pt to be NPO at midnight for EGD in AM. All belongings sent with pt. Pt ambulates independently. Safety maintained. Skin intact.

## 2024-06-13 NOTE — PLAN OF CARE
06/13/24 1319   Final Note   Assessment Type Final Discharge Note   Anticipated Discharge Disposition Home   Hospital Resources/Appts/Education Provided Provided patient/caregiver with written discharge plan information   Post-Acute Status   Patient choice form signed by patient/caregiver List with quality metrics by geographic area provided   Discharge Delays None known at this time     [facility]  Discharge Final Note    Primary Care Provider: Charlotte Jacinto MD    Expected Discharge Date: 6/13/2024    Patient cleared for discharge from case management standpoint.     Discharge Plan A and Plan B have been determined by review of patient's clinical status, future medical and therapeutic needs, and coverage/benefits for post-acute care in coordination with multidisciplinary team members.        Final Discharge Note (most recent)       Final Note - 06/13/24 1319          Final Note    Assessment Type Final Discharge Note (P)      Anticipated Discharge Disposition Home or Self Care (P)      Hospital Resources/Appts/Education Provided Provided patient/caregiver with written discharge plan information (P)         Post-Acute Status    Patient choice form signed by patient/caregiver List with quality metrics by geographic area provided (P)      Discharge Delays None known at this time (P)                      Important Message from Medicare                 Future Appointments   Date Time Provider Department Center   6/17/2024 10:30 AM Charlotte Jacinto MD [facility] [address] [facility]   6/18/2024  9:30 AM Frances Reddy, PT [facility] [place] [facility]   6/19/2024  9:30 AM Frances Reddy, PT [facility] [place] [facility]   4/10/2025  9:30 AM [facility] CHANELLE Kwon   4/10/2025 10:00 AM Anton Evangelista MD [facility] LILLY Kwon        scheduled post-discharge follow-up appointment and information added to AVS.     Perla Villanueva, MSW  [facility]  Ext. [phone]

## 2024-06-13 NOTE — TREATMENT PLAN
GI Post Procedure Treatment Plan  Procedure Performed: EGD    Impression:              - Normal esophageal anastomosis.   - Esophagogastric landmarks identified.   - Erosive gastritis. Biopsied.   - Non-bleeding gastric ulcer with a clean ulcer base (Dayron Class III).   - Gastric ulcers with a clean ulcer base (Dayron Class III).     Recommendations:          - [facility].   - Advance diet as tolerated.   - Continue present medications. Continue BID PPI PO, and Carafate.   - Await pathology results.   - Repeat upper endoscopy in 12 weeks to check healing.   - We will sign-off.    Kathryn Mccarty MD  Gastroenterology, PGY-4

## 2024-06-13 NOTE — PLAN OF CARE
Problem: Adult Inpatient Plan of Care  Goal: Plan of Care Review  Outcome: Met  Goal: Patient-Specific Goal (Individualized)  Outcome: Met  Goal: Absence of Hospital-Acquired Illness or Injury  Outcome: Met  Goal: Optimal Comfort and Wellbeing  Outcome: Met  Goal: Readiness for Transition of Care  Outcome: Met     Problem: Pain Acute  Goal: Optimal Pain Control and Function  Outcome: Met     Problem: Fall Injury Risk  Goal: Absence of Fall and Fall-Related Injury  Outcome: Met   Patient Ready for discharge. No distress noted. Discharge documentation given. Patient escort notified

## 2024-06-13 NOTE — DISCHARGE SUMMARY
"[facility] [address] [place] [address] Medicine  Discharge Summary      Patient Name: Laz Quintero  MRN: [id number]  JOE: [id number]  Patient Class: OP- Observation  Admission Date: 6/11/2024  Hospital Length of Stay: 0 days  Discharge Date and Time: 6/13/2024  2:37 PM  Attending Physician: Muna att. providers found   Discharging Provider: Stephanie Hartley PA-C  Primary Care Provider: Charlotte Jacinto MD  [facility] Medicine Team: [facility] MED  Stephanie Hartley PA-C  Primary Care Team: [facility]    HPI:   Laz Quintero is a 53 y.o. female with a PMHx of cholecystectomy, GERD, s/p hysterectomy, HTN, HPTH, chronic low back pain who initially presented with 2 weeks of abdominal pain that has been worse for the 4 days before admission. It is associated with nausea, vomiting, poor appetite, and bloating. The pain is located in both lower quadrants and the epigastric region. It's burning, and "sharp, electric" in nature. Last week, it was waxing and waning, but it has been constant for the past few days. She visited the [facility] ED on 6/10 and was discharged with PO phenergan and GI referral after getting IV morphine, dilaudid, IV zofran and phenergan. She presented again on 6/11 to the ED with more consistent abdominal pain, described as burning and severe radiating to her back. The pain is worse with water and food. Her last episode of vomiting was on 6/10. Her last BM was on 6/9. She denies bright red blood per rectum, but noticed that her BM on 6/9 was black/darker. Usually her stools are brown. She denies constipation, diarrhea, or dysphagia. Her weight loss is intentional. She takes home Nexium as needed. She takes Mobic and up to 1600 mg of ibuprofen 4x/week for chronic back pain. Denies personal hx of IBS/IBD. She was placed in [facility]OU for observation. Thus far, she has been treated with GI cocktail, bentyl, sucralfate, daily protonix, daily miralax, IVF, and prn morphine and zofran. She has continued " abdominal pain but N/V has improved. She received a brown bomb enema and was able to have a bowel movement. GI was consulted for abdominal pain, and believe pain is multifactorial and recommend increasing bowel regimen and protonix frequency. GI plans to perform EGD tomorrow. The patient has remained HDS and afebrile. Patient upgraded to hospital medicine due to patient requiring hospital observation for >24 hrs .     She recently traveled to [place] 2 weeks ago and was not having these problems there; her family members (son, mother, sister) have had diarrhea predominant symptoms since returning from trip rather than vomiting. She mentions that she did have Chinese food on 6/7 prior to the pain worsening, but the pain did precede that meal. Otherwise, she avoids spicy food, coffee, fried foods and consumes mostly fish, vegetables and tea.      Imaging done since 6/10 below:   CT A/P: gaseous distension of the rectum, normal appendix. Diverticula. No fluid collection. Bladder distension w/o wall thickening. Bilateral mild hydronephrosis, no findings to suggest ureteral obstruction.  The urinary bladder is mildly prominent, correlation with any history of urinary retention or outlet obstruction. Possible hepatic steatosis. XR abdomen: moderate/large colonic stool, non obstructive bowel gas pattern. Pelvic US: small simple cystic lesion arising from the posterior aspect of the urinary bladder wall measuring 0.8 x 0.7 x 0.9 cm, compatible with a bladder diverticulum.    Procedure(s) (LRB):  EGD (ESOPHAGOGASTRODUODENOSCOPY) (N/A)      Hospital Course:   Ms. Quintero was placed in observation for abdominal pain. GI consulted and performed EGD on 6/13, which revealed erosive gastritis and non-bleeding gastric ulcers with clean bases. Pt was started on IV protonix at admission and subsequently transitioned to PO protonix and carafate. Pt was seen and evaluated by me this morning, reports feeling well, is now tolerating PO  intake, and is eager to discharge home. All questions were answered. Patient acknowledged understanding of discharge instructions and feels safe to discharge home. Patient was discharged on 6/13/2024 in stable condition with PCP & GI follow-up. Education regarding condition provided and return precautions given.     Physical Exam  Gen: in NAD, appears stated age  Neuro: AAOx3, motor, sensory, and strength grossly intact BL  HEENT: EOMI, PERRLA; no JVD appreciated  CVS: RRR, no m/r/g  Resp: lungs CTAB, no w/r/r; no belabored breathing or accessory muscle use appreciated   Abd: NTND, soft to palpation  Extrem: no UE or LE edema BL     Goals of Care Treatment Preferences:  Code Status: Full Code      Consults:   Consults (From admission, onward)          Status Ordering Provider     Inpatient consult to Gastroenterology  Once        Provider:  (Not yet assigned)    Completed KIM MULLINS            No new Assessment & Plan notes have been filed under this hospital service since the last note was generated.  Service: Hospital Medicine    Final Active Diagnoses:    Diagnosis Date Noted POA    PRINCIPAL PROBLEM:  Abdominal pain [R10.9] 01/19/2018 Yes    Chronic gastritis without bleeding [K29.50] 06/12/2024 Yes    Intractable vomiting with nausea [R11.2] 06/11/2024 Yes    Chronic left-sided low back pain with sciatica [M54.40, G89.29] 01/22/2018 Yes    Anxiety and depression [F41.9, F32.A] 06/23/2015 Yes      Problems Resolved During this Admission:       Discharged Condition: good    Disposition: Home or Self Care    Follow Up:    Patient Instructions:      Ambulatory referral/consult to Gastroenterology   Standing Status: Future   Referral Priority: Urgent Referral Type: Consultation   Referral Reason: Specialty Services Required   Requested Specialty: Gastroenterology   Number of Visits Requested: 1     Ambulatory referral/consult to Internal Medicine   Standing Status: Future   Referral Priority: Urgent Referral  Type: Consultation   Referral Reason: Specialty Services Required   Requested Specialty: Internal Medicine   Number of Visits Requested: 1     Notify your health care provider if you experience any of the following:  temperature >100.4     Notify your health care provider if you experience any of the following:  persistent nausea and vomiting or diarrhea     Notify your health care provider if you experience any of the following:  severe uncontrolled pain     Notify your health care provider if you experience any of the following:  persistent dizziness, light-headedness, or visual disturbances     Notify your health care provider if you experience any of the following:  increased confusion or weakness     Activity as tolerated       Significant Diagnostic Studies: Labs: BMP:   Recent Labs   Lab 06/13/24 0444   GLU 92      K 3.4*      CO2 28   BUN 5*   CREATININE 0.8   CALCIUM 9.5   MG 1.7   , CBC   Recent Labs   Lab 06/13/24 0444   WBC 7.27   HGB 11.2*   HCT 33.6*      , and All labs within the past 24 hours have been reviewed    Pending Diagnostic Studies:       Procedure Component Value Units Date/Time    H. pylori antigen, stool [[id number]] Collected: 06/12/24 1401    Order Status: Sent Lab Status: In process Updated: 06/12/24 1426    Specimen: Stool     Specimen to Pathology, Surgery Gastrointestinal tract [[id number]] Collected: 06/13/24 0923    Order Status: Sent Lab Status: In process Updated: 06/13/24 1340    Specimen: Tissue     Specimen to Pathology, Surgery Gastrointestinal tract [[id number]] Collected: 06/13/24 0920    Order Status: Sent Lab Status: In process Updated: 06/13/24 0921    Specimen: Tissue            X-Ray Abdomen Flat And Erect  Narrative: EXAMINATION:  XR ABDOMEN FLAT AND ERECT    CLINICAL HISTORY:  Abdominal Pain;    TECHNIQUE:  Flat and erect AP views of the abdomen were performed.    COMPARISON:  Abdominal radiograph 03/22/2023.    FINDINGS:  No definite dilated  gas-filled loops of small large bowel appreciated.  Moderate to large volume colonic stool.  No findings to suggest free air.  Air appears to be present the distal colon rectum.    No acute osseous or soft tissue abnormality.    Postoperative changes noted at L5-S1.  Impression: Nonspecific, nonobstructive bowel gas pattern    Moderate to large volume colonic stool.    Electronically signed by: Noé Friedman  Date:    06/11/2024  Time:    12:44  US Pelvis Comp with Transvag NON-OB (xpd)  Narrative: EXAMINATION:  US PELVIS COMP WITH TRANSVAG NON-OB (XPD)    CLINICAL HISTORY:  US yesterday was limited. h/o hysterectomy but ovaries remained. please eval;    TECHNIQUE:  Transabdominal sonography of the pelvis was performed, followed by transvaginal sonography to better evaluate the uterus and ovaries.    COMPARISON:  Pelvic ultrasound and CT abdomen and pelvis 06/10/2024, pelvic ultrasound 10/22/2019, MRI pelvis 05/29/2012    FINDINGS:  Uterus:    Surgically absent.    Ovaries:    The right ovary measures 1.6 x 2.4 x 1.5 cm.  The left ovary measures 1.5 x 1.8 x 1.3 cm. Within the left ovary there is a 1 cm simple appearing dominant follicle.  Doppler flow demonstrated to both ovaries.    Free Fluid:    None.  Impression: Hysterectomy.    No acute/significant sonographic abnormality.    Electronically signed by: Sami Shannon MD  Date:    06/11/2024  Time:    12:28        Medications:  Reconciled Home Medications:      Medication List        START taking these medications      meclizine 25 mg tablet  Commonly known as: ANTIVERT  Take 1 tablet (25 mg total) by mouth 3 (three) times daily as needed for Dizziness.     pantoprazole 40 MG tablet  Commonly known as: PROTONIX  Take 1 tablet (40 mg total) by mouth 2 (two) times daily before meals for 90 days, THEN 1 tablet (40 mg total) once daily.  Start taking on: June 13, 2024     sucralfate 1 gram tablet  Commonly known as: CARAFATE  Take 1 tablet (1 g total) by mouth 4  (four) times daily before meals and nightly. for 10 days            CHANGE how you take these medications      * promethazine 25 MG tablet  Commonly known as: PHENERGAN  Take 1 tablet (25 mg total) by mouth every 6 (six) hours as needed for Nausea.  What changed: Another medication with the same name was added. Make sure you understand how and when to take each.          CONTINUE taking these medications      albuterol 90 mcg/actuation inhaler  Commonly known as: PROVENTIL/VENTOLIN HFA  INHALE 2 PUFFS INTO THE LUNGS BY MOUTH EVERY 6 HOURS AS NEEDED FOR WHEEZING. RESCUE.     ALPRAZolam 0.25 MG tablet  Commonly known as: XANAX  Take 1 tablet (0.25 mg total) by mouth daily as needed for Anxiety.     benzonatate 200 MG capsule  Commonly known as: TESSALON  Take 1 capsule (200 mg total) by mouth 3 (three) times daily as needed for Cough.     butalbital-acetaminophen-caffeine -40 mg -40 mg per tablet  Commonly known as: FIORICET, ESGIC  Take 1 tablet by mouth every 4 (four) hours as needed.     CYMBALTA 60 mg capsule  Generic drug: DULoxetine  Take 60 mg by mouth.     diclofenac sodium 1 % Gel  Commonly known as: VOLTAREN  Apply 2 g topically once daily. Use gloves to apply     estradioL 1 mg/gram (0.1 %) topical gel  Commonly known as: DivigeL  Place 1 g onto the skin once daily.     fluticasone propionate 50 mcg/actuation nasal spray  Commonly known as: FLONASE  1 spray (50 mcg total) by Each Nostril route once daily.     gabapentin 300 MG capsule  Commonly known as: NEURONTIN  Take 1 capsule (300 mg total) by mouth 2 (two) times daily AND 2 capsules (600 mg total) every evening.     hydroCHLOROthiazide 12.5 MG Tab  Commonly known as: HYDRODIURIL  Take 1 tablet (12.5 mg total) by mouth once daily.     methocarbamoL 750 MG Tab  Commonly known as: ROBAXIN  Take 750 mg by mouth every 12 (twelve) hours.     pregabalin 75 MG capsule  Commonly known as: LYRICA  Take 75 mg by mouth 2 (two) times daily.      QUEtiapine 150 mg Tb24  Commonly known as: SEROQUEL XR  Take 150 mg by mouth every evening.     traZODone 100 MG tablet  Commonly known as: DESYREL  Take 100 mg by mouth every evening.            STOP taking these medications      EScitalopram oxalate 10 MG tablet  Commonly known as: LEXAPRO     ketorolac 10 mg tablet  Commonly known as: TORADOL     meloxicam 15 MG tablet  Commonly known as: MOBIC     oxyCODONE 5 MG immediate release tablet  Commonly known as: ROXICODONE              Indwelling Lines/Drains at time of discharge:   Lines/Drains/Airways       None                   Time spent on the discharge of patient: 36 minutes         Stephanie Hartley PA-C  Department of Hospital Medicine  [facility] - Observation 11H

## 2024-06-13 NOTE — TRANSFER OF CARE
"Anesthesia Transfer of Care Note    Patient: Laz Quintero    Procedure(s) Performed: Procedure(s) (LRB):  EGD (ESOPHAGOGASTRODUODENOSCOPY) (N/A)    Patient location: PACU    Anesthesia Type: general    Transport from OR: Transported from OR on room air with adequate spontaneous ventilation    Post pain: adequate analgesia    Post assessment: no apparent anesthetic complications and tolerated procedure well    Post vital signs: stable    Level of consciousness: awake, alert and oriented    Nausea/Vomiting: no nausea/vomiting    Complications: none    Transfer of care protocol was followed      Last vitals: Visit Vitals  BP (!) 126/76 (Patient Position: Lying)   Pulse 74   Temp 36.7 °C (98 °F) (Temporal)   Resp 16   Ht 5' 6" (1.676 m)   Wt 71.7 kg (158 lb 1.1 oz)   LMP 06/07/2015 (Exact Date)   SpO2 97%   Breastfeeding No   BMI 25.51 kg/m²     "

## 2024-06-13 NOTE — INTERVAL H&P NOTE
The patient has been examined and the H&P has been reviewed:    Pre-Procedure H and P Addendum    Patient seen and examined.  History and exam unchanged from prior history and physical.      Procedure: EGD  Indication: Abdominal pain  ASA Class: per anesthesiology  Airway: normal  Neck Mobility: full range of motion  Mallampatti score: per anesthesia  History of anesthesia problems: no  Family history of anesthesia problems: no  Anesthesia Plan: MAC      Active Hospital Problems    Diagnosis  POA    *Abdominal pain [R10.9]  Yes    Chronic gastritis without bleeding [K29.50]  Yes    Intractable vomiting with nausea [R11.2]  Yes    Chronic left-sided low back pain with sciatica [M54.40, G89.29]  Yes    Anxiety and depression [F41.9, F32.A]  Yes      Resolved Hospital Problems   No resolved problems to display.

## 2024-06-13 NOTE — HOSPITAL COURSE
Ms. Quintero was placed in observation for abdominal pain. GI consulted and performed EGD on 6/13, which revealed erosive gastritis and non-bleeding gastric ulcers with clean bases. Pt was started on IV protonix at admission and subsequently transitioned to PO protonix and carafate. Pt was seen and evaluated by me this morning, reports feeling well, is now tolerating PO intake, and is eager to discharge home. All questions were answered. Patient acknowledged understanding of discharge instructions and feels safe to discharge home. Patient was discharged on 6/13/2024 in stable condition with PCP & GI follow-up. Education regarding condition provided and return precautions given.     Physical Exam  Gen: in NAD, appears stated age  Neuro: AAOx3, motor, sensory, and strength grossly intact BL  HEENT: EOMI, PERRLA; no JVD appreciated  CVS: RRR, no m/r/g  Resp: lungs CTAB, no w/r/r; no belabored breathing or accessory muscle use appreciated   Abd: NTND, soft to palpation  Extrem: no UE or LE edema BL

## 2024-06-13 NOTE — PLAN OF CARE
[address] [facility] Observation [facility]  Initial Discharge Assessment       Primary Care Provider: Charlotte Jacinto MD    Admission Diagnosis: Epigastric pain [R10.13]  Intractable vomiting with nausea [R11.2]  At risk for long QT syndrome [Z91.89]    Admission Date: 6/11/2024  Expected Discharge Date: 6/13/2024    Transition of Care Barriers: None    Payor: /     Extended Emergency Contact Information  Primary Emergency Contact: Gracy Garza  Address: [address] [place] [address] [facility]  Home Phone: [phone]  Mobile Phone: [phone]  Relation: Mother    Discharge Plan A: Home with family  Discharge Plan B: Home      [id number] - [place], [place] - [address] AT [facility] [address]  [address]  [place] [address]  Phone: [phone] Fax: [phone]    [facility] - [place], [place] - [address] [address]  Phone: [phone] Fax: [phone]    [id number] - [place], [place] - [address]  [address]  [place] [address]  Phone: [phone] Fax: [phone]      Initial Assessment (most recent)       Adult Discharge Assessment - 06/13/24 1153          Discharge Assessment    Assessment Type Discharge Planning Assessment     Confirmed/corrected address, phone number and insurance Yes     Confirmed Demographics Correct on Facesheet     Source of Information patient     If unable to respond/provide information was family/caregiver contacted? No Contact Information Available     Communicated JOHNIE with patient/caregiver Date not available/Unable to determine     Reason For Admission Abdominal pain     People in Home child(jeanine), adult;child(jeanine), dependent     Do you expect to return to your current living situation? Yes     Do you have help at home or someone to help you manage your care at home? Yes     Who are your caregiver(s) and their phone number(s)? Mother: Gracy Garza [phone]     Prior to  hospitilization cognitive status: Alert/Oriented     Current cognitive status: Alert/Oriented     Walking or Climbing Stairs Difficulty no     Dressing/Bathing Difficulty no     Equipment Currently Used at Home none     Patient currently being followed by outpatient case management? No     Do you currently have service(s) that help you manage your care at home? No     Do you take prescription medications? Yes     Do you have prescription coverage? No     Do you have any problems affording any of your prescribed medications? TBD     Is the patient taking medications as prescribed? yes     Who is going to help you get home at discharge? Family     How do you get to doctors appointments? car, drives self;family or friend will provide     Are you on dialysis? No     Do you take coumadin? No     Discharge Plan A Home with family     Discharge Plan B Home     DME Needed Upon Discharge  none     Discharge Plan discussed with: Patient     Transition of Care Barriers None                   SW met with the patient at the bedside and discussed the discharge plan. Gave her the discharge booklet and placed contact numbers on the white board in the room. Patient alert and lying in bed.  Patient verified her name , , PCP, Insurance and Pharmacy . Stated she lives with her children and has 4 steps to point of entry . Prior to admission patient was independent with all ADLS and wasn't receiving any HH services. She is not on coumadin nor is she a dialysis patient . DME's include:none reported.  She takes  medication as prescribed and has no problems getting  medication. Family will provide transportation at [place].      GLENN Yin  Department of Case Management   [facility]  [address] [place] [address]  Phone : [phone]      Discharge Plan A and Plan B have been determined by review of patient's clinical status, future medical and therapeutic needs, and coverage/benefits for post-acute  care in coordination with multidisciplinary team members.

## 2024-06-14 ENCOUNTER — TELEPHONE (OUTPATIENT)
Dept: ENDOSCOPY | Facility: HOSPITAL | Age: 54
End: 2024-06-14
Payer: COMMERCIAL

## 2024-06-14 DIAGNOSIS — K27.9 PEPTIC ULCER DISEASE: Primary | ICD-10-CM

## 2024-06-14 NOTE — TELEPHONE ENCOUNTER
"----- Message from Vianney Carvajal sent at 2024  9:54 AM CDT -----    ----- Message -----  From: Kathryn Mccarty MD  Sent: 2024  10:48 AM CDT  To: [facility]    Procedure: EGD    Diagnosis: Peptic ulcer disease    Procedure Timin weeks    #If within 4 weeks selected, please clinton as high priority#    #If greater than 12 weeks, please select "5-12 weeks" and delay sending until 2 months prior to requested date#     Provider: Any GI provider    Location: Any Site    Additional Scheduling Information: No scheduling concerns    Prep Specifications:N/A    Have you attached a patient to this message: yes  "

## 2024-06-17 ENCOUNTER — LAB VISIT (OUTPATIENT)
Dept: LAB | Facility: HOSPITAL | Age: 54
End: 2024-06-17
Attending: INTERNAL MEDICINE

## 2024-06-17 ENCOUNTER — OFFICE VISIT (OUTPATIENT)
Dept: INTERNAL MEDICINE | Facility: CLINIC | Age: 54
End: 2024-06-17

## 2024-06-17 DIAGNOSIS — I10 HYPERTENSION, UNSPECIFIED TYPE: ICD-10-CM

## 2024-06-17 DIAGNOSIS — K29.60 EROSIVE GASTRITIS: ICD-10-CM

## 2024-06-17 DIAGNOSIS — E21.3 HYPERPARATHYROIDISM: ICD-10-CM

## 2024-06-17 DIAGNOSIS — E04.1 THYROID NODULE: ICD-10-CM

## 2024-06-17 DIAGNOSIS — Z12.31 SCREENING MAMMOGRAM, ENCOUNTER FOR: ICD-10-CM

## 2024-06-17 DIAGNOSIS — Z01.419 WELL WOMAN EXAM: ICD-10-CM

## 2024-06-17 DIAGNOSIS — K27.9 PEPTIC ULCER DISEASE: ICD-10-CM

## 2024-06-17 DIAGNOSIS — H02.9 EYELID LESION: Primary | ICD-10-CM

## 2024-06-17 LAB
ALBUMIN SERPL BCP-MCNC: 3.9 G/DL (ref 3.5–5.2)
ALP SERPL-CCNC: 70 U/L (ref 55–135)
ALT SERPL W/O P-5'-P-CCNC: 22 U/L (ref 10–44)
ANION GAP SERPL CALC-SCNC: 8 MMOL/L (ref 8–16)
AST SERPL-CCNC: 19 U/L (ref 10–40)
BILIRUB SERPL-MCNC: 0.3 MG/DL (ref 0.1–1)
BUN SERPL-MCNC: 11 MG/DL (ref 6–20)
CALCIUM SERPL-MCNC: 9.7 MG/DL (ref 8.7–10.5)
CHLORIDE SERPL-SCNC: 107 MMOL/L (ref 95–110)
CHOLEST SERPL-MCNC: 237 MG/DL (ref 120–199)
CHOLEST/HDLC SERPL: 3.8 {RATIO} (ref 2–5)
CO2 SERPL-SCNC: 25 MMOL/L (ref 23–29)
CREAT SERPL-MCNC: 0.8 MG/DL (ref 0.5–1.4)
EST. GFR  (NO RACE VARIABLE): >60 ML/MIN/1.73 M^2
ESTIMATED AVG GLUCOSE: 105 MG/DL (ref 68–131)
GLUCOSE SERPL-MCNC: 98 MG/DL (ref 70–110)
HBA1C MFR BLD: 5.3 % (ref 4–5.6)
HDLC SERPL-MCNC: 63 MG/DL (ref 40–75)
HDLC SERPL: 26.6 % (ref 20–50)
LDLC SERPL CALC-MCNC: 156 MG/DL (ref 63–159)
NONHDLC SERPL-MCNC: 174 MG/DL
POTASSIUM SERPL-SCNC: 4 MMOL/L (ref 3.5–5.1)
PROT SERPL-MCNC: 7.3 G/DL (ref 6–8.4)
PTH-INTACT SERPL-MCNC: 61.8 PG/ML (ref 9–77)
SODIUM SERPL-SCNC: 140 MMOL/L (ref 136–145)
TRIGL SERPL-MCNC: 90 MG/DL (ref 30–150)
TSH SERPL DL<=0.005 MIU/L-ACNC: 0.97 UIU/ML (ref 0.4–4)

## 2024-06-17 PROCEDURE — 99214 OFFICE O/P EST MOD 30 MIN: CPT | Mod: S$PBB,,, | Performed by: INTERNAL MEDICINE

## 2024-06-17 PROCEDURE — 99215 OFFICE O/P EST HI 40 MIN: CPT | Mod: PBBFAC | Performed by: INTERNAL MEDICINE

## 2024-06-17 PROCEDURE — 83970 ASSAY OF PARATHORMONE: CPT | Performed by: INTERNAL MEDICINE

## 2024-06-17 PROCEDURE — G2211 COMPLEX E/M VISIT ADD ON: HCPCS | Mod: S$PBB,,, | Performed by: INTERNAL MEDICINE

## 2024-06-17 PROCEDURE — 83036 HEMOGLOBIN GLYCOSYLATED A1C: CPT | Performed by: INTERNAL MEDICINE

## 2024-06-17 PROCEDURE — 80053 COMPREHEN METABOLIC PANEL: CPT | Performed by: INTERNAL MEDICINE

## 2024-06-17 PROCEDURE — 80061 LIPID PANEL: CPT | Performed by: INTERNAL MEDICINE

## 2024-06-17 PROCEDURE — 36415 COLL VENOUS BLD VENIPUNCTURE: CPT | Performed by: INTERNAL MEDICINE

## 2024-06-17 PROCEDURE — 99999 PR PBB SHADOW E&M-EST. PATIENT-LVL V: CPT | Mod: PBBFAC,,, | Performed by: INTERNAL MEDICINE

## 2024-06-17 PROCEDURE — 84443 ASSAY THYROID STIM HORMONE: CPT | Performed by: INTERNAL MEDICINE

## 2024-06-17 RX ORDER — PANTOPRAZOLE SODIUM 40 MG/1
TABLET, DELAYED RELEASE ORAL
Qty: 540 TABLET | Refills: 0 | Status: SHIPPED | OUTPATIENT
Start: 2024-06-17 | End: 2025-09-14

## 2024-06-18 ENCOUNTER — CLINICAL SUPPORT (OUTPATIENT)
Dept: REHABILITATION | Facility: HOSPITAL | Age: 54
End: 2024-06-18
Payer: COMMERCIAL

## 2024-06-18 VITALS
SYSTOLIC BLOOD PRESSURE: 116 MMHG | DIASTOLIC BLOOD PRESSURE: 84 MMHG | BODY MASS INDEX: 25.26 KG/M2 | HEART RATE: 68 BPM | OXYGEN SATURATION: 98 % | WEIGHT: 157.19 LBS | HEIGHT: 66 IN | TEMPERATURE: 99 F

## 2024-06-18 DIAGNOSIS — M54.16 LEFT LUMBAR RADICULOPATHY: Primary | ICD-10-CM

## 2024-06-18 PROCEDURE — 97113 AQUATIC THERAPY/EXERCISES: CPT

## 2024-06-18 NOTE — PROGRESS NOTES
Subjective:       Patient ID: Laz Quintero is a 53 y.o. female.    Chief Complaint: Hypertension    HPI  She returns for management of hypertension.  She has had hypertension for over a year.  Current treatment has included medications outlined in medication list.  She denies chest pain or shortness of breath.  No palpitations.  Denies left arm or neck pain.  She has a thyroid nodule.  Denies fatigue     Past medical history: Mediastinal mass, status post thymectomy, hypertension, peptic ulcer disease, hyperlipidemia, thyroid nodule, status post hysterectomy, hyperparathyroidism, lumbar fusion, status post cholecystectomy.  Positive family history of colon cancer-father, diagnosed at age 50, colon adenoma.  She had a colonoscopy December 2022     Medications:  Lipitor 10 mg daily, hydrochlorothiazide     ALLERGIES: Clindamycin, sulfa       Review of Systems   Constitutional:  Negative for chills, fatigue, fever and unexpected weight change.   Respiratory:  Negative for chest tightness and shortness of breath.    Cardiovascular:  Negative for chest pain and palpitations.   Gastrointestinal:  Negative for abdominal pain and blood in stool.   Neurological:  Negative for dizziness, syncope, numbness and headaches.       Objective:      Physical Exam  HENT:      Right Ear: External ear normal.      Left Ear: External ear normal.      Nose: Nose normal.      Mouth/Throat:      Mouth: Mucous membranes are moist.      Pharynx: Oropharynx is clear.   Eyes:      Pupils: Pupils are equal, round, and reactive to light.   Cardiovascular:      Rate and Rhythm: Normal rate and regular rhythm.      Heart sounds: No murmur heard.  Pulmonary:      Breath sounds: Normal breath sounds.   Abdominal:      General: There is no distension.      Palpations: There is no hepatomegaly or splenomegaly.      Tenderness: There is no abdominal tenderness.   Musculoskeletal:      Cervical back: Normal range of motion.   Lymphadenopathy:       Cervical: No cervical adenopathy.      Upper Body:      Right upper body: No axillary adenopathy.      Left upper body: No axillary adenopathy.   Neurological:      Cranial Nerves: No cranial nerve deficit.      Sensory: No sensory deficit.      Motor: Motor function is intact.      Deep Tendon Reflexes: Reflexes are normal and symmetric.         Assessment/Plan       Assessment and plan: 1.  Hypertension: Controlled.  Continue hydrochlorothiazide.  Check CMP, lipid panel, TSH, A1c, PTH  2.  Thyroid nodule:  Check thyroid ultrasound  Visit today included increased complexity associated with the care of the episodic problem hypertension addressed and managing the longitudinal care of the patient due to the serious and/or complex managed problem(s) hypertension, thyroid nodule.

## 2024-06-18 NOTE — PROGRESS NOTES
[facility]   Physical Therapy Treatment Note     Name: Laz Quintero  Clinic Number: [id number]    Therapy Diagnosis:   Encounter Diagnosis   Name Primary?    Left lumbar radiculopathy Yes     Physician: Ramin Sloan MD    Visit Date: 6/18/2024    Physician Orders: Aquatic Therapy   Medical Diagnosis from Referral: LBP with radicular symptoms  Evaluation Date: 5/17/2024  Authorization Period Expiration: 5/6/25  Plan of Care Expiration: 7/17/24  Visit # / Visits authorized: 8/12     Precautions: Standard and Fall    PTA Visit #: 0/5     Time In: 9:15 am   Time Out: 10:20 am   Total Billable Time: 65 minutes    SUBJECTIVE     Pt reports: since her last visit she was diagnosed with a stomach ulcer. She stated she was having a lot of stomach pain and nausea that progressed and led her to the ED where they did an upper GI and saw a couple of ulcers so she is taking some medications. She is going to the referring MD today and has an injection scheduled Friday.  She leaves [facility] in 10 days for a [employer] vacation for 10 days.     She was compliant with home exercise program.    Response to previous treatment: soreness, but less than previous visits    Functional change: none at this time    Pain: 5.5-6/10  Location: left back and hip      OBJECTIVE     Objective Measures updated at progress report unless specified.     Treatment     Laz received aquatic therapeutic exercises to develop strength, endurance, ROM, flexibility, posture, and core stabilization for 65 minutes including:    FUNCTIONAL MOBILITY TRAINING x 2 laps each at beginning and 1 lap each at end of session  Walk forward/backward/lateral with slow controlled steps    STRETCHES 2 x 30sec  HS--add as tolerated    LE EX x 22 reps  Squat  Heel raise with gluteal set  Hip abduction  Hip flex  HS curl    Hip Ext--hold    Seated on Pool stool  Sit to stand   LAQ  Hip flex  Clam    Walking marches x 2 laps in // bars   Tandem  walking x 2 laps in // bars     UE EX/CORE  x 20  Shoulder abd/add TA activation paddles CLOSED  Shoulder horizontal abd/add TA activation paddles CLOSED  Shoulder flex/ext with TA activation and paddles CLOSED     ENDURANCE  LTR x 3'--performed 2 times throughout the session  Bicycle in // bars x 3'--add next       Patient Education and Home Exercises     Home Exercises Provided and Patient Education Provided     Education provided:   Role of aquatic therapy  Hydration post therapy      Written Home Exercises Provided: Patient instructed to cont prior HEP.    ASSESSMENT   The patient was able to perform full treatment that was performed at the previous visit without modifications required even despite hospitalization and gap in treatment.     Laz Is progressing well towards her goals.     Pt prognosis is Good.   thn  Pt will continue to benefit from skilled outpatient physical therapy to address the deficits listed in the problem list box on initial evaluation, provide pt/family education and to maximize pt's level of independence in the home and community environment.     Pt's spiritual, cultural and educational needs considered and pt agreeable to plan of care and goals.     Anticipated barriers to physical therapy: chronicity and multi body part involvement    Goals:   Short Term Goals: 6 weeks   1. Patient will be independent in HEP & progressions.  2. Patient will achieve TUG score of 11 sec to demonstrate improved mobility  3. The patient will achieve 5 sit to stand score of 20 seconds to demonstrate improved transfers and endurance.     Long Term Goals: 12 weeks   1. The patient will demonstrate independence with extensive HEP.  2. Patient will achieve 5 sit to stand score of 19 seconds to demonstrate improved transfers & endurance.  3. Patent is able to demonstrate MMT 4/5 L LE and 4+/5 R LE without pain reports during testing.      PLAN   Plan of care Certification: 5/17/2024 to 7/17/24.    Outpatient  Physical Therapy 2 times weekly for 8 weeks to include the following interventions: manual therapy, aquatic therapy, patient education, therapeutic exercise, therapeutic activities.    Patient may be seen by PTA as part of rehabilitation team.      Frances Reddy, PT

## 2024-06-19 ENCOUNTER — CLINICAL SUPPORT (OUTPATIENT)
Dept: REHABILITATION | Facility: HOSPITAL | Age: 54
End: 2024-06-19
Payer: COMMERCIAL

## 2024-06-19 DIAGNOSIS — M54.16 LEFT LUMBAR RADICULOPATHY: Primary | ICD-10-CM

## 2024-06-19 LAB
FINAL PATHOLOGIC DIAGNOSIS: NORMAL
GROSS: NORMAL
Lab: NORMAL
SUPPLEMENTAL DIAGNOSIS: NORMAL

## 2024-06-19 PROCEDURE — 97113 AQUATIC THERAPY/EXERCISES: CPT

## 2024-06-19 NOTE — PROGRESS NOTES
[facility]   Physical Therapy Treatment Note      Name: Laz Quintero  Clinic Number: [id number]     Therapy Diagnosis:        Encounter Diagnosis   Name Primary?    Left lumbar radiculopathy Yes      Physician: Ramin Sloan MD     Visit Date: 6/18/2024     Physician Orders: Aquatic Therapy   Medical Diagnosis from Referral: LBP with radicular symptoms  Evaluation Date: 5/17/2024  Authorization Period Expiration: 5/6/25  Plan of Care Expiration: 7/17/24  Visit # / Visits authorized: 8/12      Precautions: Standard and Fall     PTA Visit #: 0/5      Time In: 9:15 am   Time Out: 10:20 am   Total Billable Time: 65 minutes     SUBJECTIVE      Pt reports: since her last visit she was diagnosed with a stomach ulcer. She stated she was having a lot of stomach pain and nausea that progressed and led her to the ED where they did an upper GI and saw a couple of ulcers so she is taking some medications. She is going to the referring MD today and has an injection scheduled Friday.  She leaves [facility] in 10 days for a [employer] vacation for 10 days.      She was compliant with home exercise program.     Response to previous treatment: soreness, but less than previous visits     Functional change: none at this time     Pain: 5.5-6/10  Location: left back and hip       OBJECTIVE      Objective Measures updated at progress report unless specified.      Treatment      Laz received aquatic therapeutic exercises to develop strength, endurance, ROM, flexibility, posture, and core stabilization for 65 minutes including:     FUNCTIONAL MOBILITY TRAINING x 2 laps each at beginning and 1 lap each at end of session  Walk forward/backward/lateral with slow controlled steps     STRETCHES 2 x 30sec  HS--add as tolerated     LE EX x 22 reps  Squat  Heel raise with gluteal set  Hip abduction  Hip flex  HS curl     Hip Ext--hold     Seated on Pool stool  Sit to stand   LAQ  Hip flex  Clam     Walking marches x 2  laps in // bars   Tandem walking x 2 laps in // bars      UE EX/CORE  x 20  Shoulder abd/add TA activation paddles CLOSED  Shoulder horizontal abd/add TA activation paddles CLOSED  Shoulder flex/ext with TA activation and paddles CLOSED      ENDURANCE  LTR x 3'--performed 2 times throughout the session  Bicycle in // bars x 3'--add next         Patient Education and Home Exercises      Home Exercises Provided and Patient Education Provided      Education provided:   Role of aquatic therapy  Hydration post therapy        Written Home Exercises Provided: Patient instructed to cont prior HEP.     ASSESSMENT   The patient was able to perform full treatment that was performed at the previous visit without modifications required even despite hospitalization and gap in treatment.      Laz Is progressing well towards her goals.      Pt prognosis is Good.   thn  Pt will continue to benefit from skilled outpatient physical therapy to address the deficits listed in the problem list box on initial evaluation, provide pt/family education and to maximize pt's level of independence in the home and community environment.      Pt's spiritual, cultural and educational needs considered and pt agreeable to plan of care and goals.     Anticipated barriers to physical therapy: chronicity and multi body part involvement     Goals:   Short Term Goals: 6 weeks   1. Patient will be independent in HEP & progressions.  2. Patient will achieve TUG score of 11 sec to demonstrate improved mobility  3. The patient will achieve 5 sit to stand score of 20 seconds to demonstrate improved transfers and endurance.      Long Term Goals: 12 weeks   1. The patient will demonstrate independence with extensive HEP.  2. Patient will achieve 5 sit to stand score of 19 seconds to demonstrate improved transfers & endurance.  3. Patent is able to demonstrate MMT 4/5 L LE and 4+/5 R LE without pain reports during testing.       PLAN   Plan of care Certification:  5/17/2024 to 7/17/24.     Outpatient Physical Therapy 2 times weekly for 8 weeks to include the following interventions: manual therapy, aquatic therapy, patient education, therapeutic exercise, therapeutic activities.     Patient may be seen by PTA as part of rehabilitation team.        Frances Reddy, PT

## 2024-06-19 NOTE — PROGRESS NOTES
[facility]   Physical Therapy Treatment Note       Name: Laz Quintero  Clinic Number: [id number]    Therapy Diagnosis:   Encounter Diagnosis   Name Primary?    Left lumbar radiculopathy Yes     Physician: Ramin Sloan MD    Visit Date: 6/19/2024    Physician Orders: Aquatic Therapy   Medical Diagnosis from Referral: LBP with radicular symptoms  Evaluation Date: 5/17/2024  Authorization Period Expiration: 5/6/25  Plan of Care Expiration: 7/17/24  Visit # / Visits authorized: 9/12       Precautions: Standard and Fall    PTA Visit #: 0/5     Time In: 9:15 am   Time Out: 10:20 am   Total Billable Time: 65 minutes    SUBJECTIVE     Pt reports: she went to the MD yesterday where he took her off all of her NSAIDS due to her ulcer and put her on Tramadol.  He is requesting additional Aquatic PT also.     She was compliant with home exercise program.    Response to previous treatment: soreness, but less than previous visits    Functional change: none at this time    Pain: 6/10  Location: left back and hip      OBJECTIVE     Objective Measures updated at progress report unless specified.     Treatment     Laz received aquatic therapeutic exercises to develop strength, endurance, ROM, flexibility, posture, and core stabilization for 65 minutes including:    FUNCTIONAL MOBILITY TRAINING x 2 laps each at beginning and 1 lap each at end of session  Walk forward/backward/lateral with slow controlled steps    STRETCHES 2 x 30sec  HS--add as tolerated    LE EX x 25 reps  Squat  Heel raise with gluteal set  Hip abduction  Hip flex  HS curl    Hip Ext--hold    Seated on Pool stool  Sit to stand   LAQ  Hip flex  Clam    Walking marches x 2 laps in // bars   Tandem walking x 2 laps in // bars     UE EX/CORE  x 20  Shoulder abd/add TA activation paddles CLOSED  Shoulder horizontal abd/add TA activation paddles CLOSED  Shoulder flex/ext with TA activation and paddles CLOSED     ENDURANCE  LTR x  3'--performed 2 times throughout the session  Bicycle in // bars x 3'--add next       Patient Education and Home Exercises     Home Exercises Provided and Patient Education Provided     Education provided:   Role of aquatic therapy  Hydration post therapy      Written Home Exercises Provided: Patient instructed to cont prior HEP.    ASSESSMENT   The patient continues to demonstrate improved core strength and stability with good ability to maintain balance with UE exercises despite increased movement in the water.     Laz Is progressing well towards her goals.     Pt prognosis is Good.   thn  Pt will continue to benefit from skilled outpatient physical therapy to address the deficits listed in the problem list box on initial evaluation, provide pt/family education and to maximize pt's level of independence in the home and community environment.     Pt's spiritual, cultural and educational needs considered and pt agreeable to plan of care and goals.     Anticipated barriers to physical therapy: chronicity and multi body part involvement    Goals:   Short Term Goals: 6 weeks   1. Patient will be independent in HEP & progressions.  2. Patient will achieve TUG score of 11 sec to demonstrate improved mobility  3. The patient will achieve 5 sit to stand score of 20 seconds to demonstrate improved transfers and endurance.     Long Term Goals: 12 weeks   1. The patient will demonstrate independence with extensive HEP.  2. Patient will achieve 5 sit to stand score of 19 seconds to demonstrate improved transfers & endurance.  3. Patent is able to demonstrate MMT 4/5 L LE and 4+/5 R LE without pain reports during testing.      PLAN   Plan of care Certification: 5/17/2024 to 7/17/24.    Outpatient Physical Therapy 2 times weekly for 8 weeks to include the following interventions: manual therapy, aquatic therapy, patient education, therapeutic exercise, therapeutic activities.    Patient may be seen by PTA as part of  rehabilitation team.      Frances Reddy, PT

## 2024-06-20 LAB — H PYLORI AG STL QL IA: NOT DETECTED

## 2024-06-22 ENCOUNTER — TELEPHONE (OUTPATIENT)
Dept: INTERNAL MEDICINE | Facility: CLINIC | Age: 54
End: 2024-06-22
Payer: COMMERCIAL

## 2024-06-25 ENCOUNTER — CLINICAL SUPPORT (OUTPATIENT)
Dept: REHABILITATION | Facility: HOSPITAL | Age: 54
End: 2024-06-25
Payer: COMMERCIAL

## 2024-06-25 DIAGNOSIS — M54.16 LEFT LUMBAR RADICULOPATHY: Primary | ICD-10-CM

## 2024-06-25 PROCEDURE — 97164 PT RE-EVAL EST PLAN CARE: CPT

## 2024-06-25 PROCEDURE — 97113 AQUATIC THERAPY/EXERCISES: CPT

## 2024-06-25 NOTE — PROGRESS NOTES
[facility]   Physical Therapy Updated  POC Note      Name: Laz Quintero  Clinic Number: [id number]    Therapy Diagnosis:   Encounter Diagnosis   Name Primary?    Left lumbar radiculopathy Yes     Physician: Ramin Sloan MD    Visit Date: 2024      Physician Orders: Aquatic Therapy   Medical Diagnosis from Referral: LBP with radicular symptoms  Evaluation Date: 2024  Authorization Period Expiration: 25  Plan of Care Expiration: 24  Visit # / Visits authorized: 10/12       Precautions: Standard and Fall    PTA Visit #: 0/5     Time In: 9:30 am   Time Out: 10:30 am   Total Billable Time: 30 minutes       SUBJECTIVE      Update: The patient reports that she feels like her stability has improved since starting Aquatic PT. She reports she is able to carry the grocery bags with greater ease.   She stated that she had her injection Friday which, she feels each day is better after her injection.  She leaves town Friday for a trip to [place].         Pain:  Current 5/10, worst 9/10, best 6/10   Location: L Lumbosacral and posterior hip   Description: Throbbing, burning, numbness, tingling  Aggravating Factors: bending forward, sitting greater than 15-20 minutes, driving greater than 15-20 minutes, standing greater than 10-15 minutes, cooking, stairs   Easing Factors: stretching, morning walks to the corner and around the block when able.     OBJECTIVE      Update 24    TU24 13 seconds with moderate antalgic gait and difficulty with initiation of walking from sit to stand.   24 11 seconds with improved gait pattern including reduced antalgic gait to minimal.      5 Times Sit to Stand:   24 22 seconds without use of hands with reported pain L hip and lumbar on the L.   24 16 seconds without use of hands. She had increased speed with descent to chair with first rep which she stated bothered her pain.        Gait: The patient ambulates with  moderate antalgic gait, reduced stance phase on the L and reduced hip extension on the L.       Range of Motion:     AROM Pain   Flexion Limited ~75% Pain L lumbar and posterior hip      Extension 25% past neutral Pain L lumbar and posterior hip      SB R Limited ~75% Pain L Lumbar and posterior hip   SB L  Limited ~75% Pain L Lumbar and posterior hip     Lower Extremity Strength  Right LE   Left LE     Knee extension: 4+/5 Knee extension: 4/5   Knee flexion: 4/5 Knee flexion: 4/5   Hip flexion: 4/5 Hip flexion: 3+/5   Hip abduction: 4-/5 Hip abduction: 3-/5   Ankle dorsiflexion: 4+/5 Ankle dorsiflexion: 4/5   Ankle plantarflexion: 4+/5 Ankle plantarflexion: 4/5   The patient reported pain with all L MMT testing      Palpation: (+) TTP noted L Lumbar Paraspinals, PSIS, Piriformis, Gluteals.    TREATMENT      Laz received aquatic therapeutic exercises to develop strength, endurance, ROM, flexibility, posture, and core stabilization for 65 minutes including:    FUNCTIONAL MOBILITY TRAINING x 2 laps each at beginning and 1 lap each at end of session  Walk forward/backward/lateral with slow controlled steps    STRETCHES 2 x 30sec  HS--add as tolerated    LE EX x 25 reps  Squat  Heel raise with gluteal set  Hip abduction  Hip flex  HS curl    Hip Ext--hold    Seated on Pool stool  Sit to stand   LAQ  Hip flex  Clam    Walking marches x 2 laps in // bars   Tandem walking x 2 laps in // bars     UE EX/CORE  x 25  Shoulder abd/add TA activation paddles CLOSED  Shoulder horizontal abd/add TA activation paddles CLOSED  Shoulder flex/ext with TA activation and paddles CLOSED     ENDURANCE  LTR x 3'--performed 2 times throughout the session  Bicycle in // bars x 3'--add next       Patient Education and Home Exercises     Home Exercises Provided and Patient Education Provided     Education provided:   Role of aquatic therapy  Hydration post therapy      Written Home Exercises Provided: Patient instructed to cont prior  HEP.    ASSESSMENT      Update:   The patient has attended 10 Aquatic PT visits since the start of care. Subjectively the patient reports improved pain levels and improved functional ability to carry in the groceries.   Objectively the patient has improved L knee and hip flexion MMT, improved but still limited lumbar AROM with pain noted L posterior hip and lumbar spine at end range.  This has led to improved TUG and 5 times sit to stand scores demonstrating improved functional mobility.  She has tolerated a slow progression of her course of care due to chronicity and severity of her pain levels.  She at this time the patient has met 2 of 3 STG and and 1 LTG and is progressing towards achievement of LTG's. She could benefit from additional skilled PT interventions to improve function and reduce pain levels.       Previous Short Term Goals Status:     Goals:   Short Term Goals: 6 weeks   1. Patient will be independent in HEP & progressions.--Ongoing as of 6/25/24  2. Patient will achieve TUG score of 11 sec to demonstrate improved mobility--MET as of 6/25/24  3. The patient will achieve 5 sit to stand score of 20 seconds to demonstrate improved transfers and endurance. --MET as of 6/25/24    Long Term Goals: 12 weeks   1. The patient will demonstrate independence with extensive HEP.  2. Patient will achieve 5 sit to stand score of 19 seconds to demonstrate improved transfers & endurance.--MET as of 6/25/24  3. Patent is able to demonstrate MMT 4/5 L LE and 4+/5 R LE without pain reports during testing.      Long Term Goal Status: continue per initial plan of care.   New LTG  Patient will achieve 5 sit to stand score of 14 seconds to demonstrate improved transfers & endurance.    Reasons for Recertification of Therapy:   To continue to improve functional strength and reduce pain.    PLAN      Updated Certification Period: 6/25/24 to 7/25/24   Recommended Treatment Plan: 2 times per week for 4 weeks:  Aquatic  Therapy      Frances Reddy, PT

## 2024-06-26 ENCOUNTER — CLINICAL SUPPORT (OUTPATIENT)
Dept: REHABILITATION | Facility: HOSPITAL | Age: 54
End: 2024-06-26
Payer: COMMERCIAL

## 2024-06-26 DIAGNOSIS — M54.16 LEFT LUMBAR RADICULOPATHY: Primary | ICD-10-CM

## 2024-06-26 PROCEDURE — 97113 AQUATIC THERAPY/EXERCISES: CPT

## 2024-06-26 NOTE — PLAN OF CARE
[facility]   Physical Therapy Updated  POC Note      Name: Laz Quintero  Clinic Number: [id number]    Therapy Diagnosis:   Encounter Diagnosis   Name Primary?    Left lumbar radiculopathy Yes     Physician: Ramin Sloan MD    Visit Date: 2024      Physician Orders: Aquatic Therapy   Medical Diagnosis from Referral: LBP with radicular symptoms  Evaluation Date: 2024  Authorization Period Expiration: 25  Plan of Care Expiration: 24  Visit # / Visits authorized: 10/12       Precautions: Standard and Fall    PTA Visit #: 0/5     Time In: 9:30 am   Time Out: 10:30 am   Total Billable Time: 30 minutes       SUBJECTIVE      Update: The patient reports that she feels like her stability has improved since starting Aquatic PT. She reports she is able to carry the grocery bags with greater ease.   She stated that she had her injection Friday which, she feels each day is better after her injection.  She leaves town Friday for a trip to [place].         Pain:  Current 5/10, worst 9/10, best 6/10   Location: L Lumbosacral and posterior hip   Description: Throbbing, burning, numbness, tingling  Aggravating Factors: bending forward, sitting greater than 15-20 minutes, driving greater than 15-20 minutes, standing greater than 10-15 minutes, cooking, stairs   Easing Factors: stretching, morning walks to the corner and around the block when able.     OBJECTIVE      Update 24    TU24 13 seconds with moderate antalgic gait and difficulty with initiation of walking from sit to stand.   24 11 seconds with improved gait pattern including reduced antalgic gait to minimal.      5 Times Sit to Stand:   24 22 seconds without use of hands with reported pain L hip and lumbar on the L.   24 16 seconds without use of hands. She had increased speed with descent to chair with first rep which she stated bothered her pain.        Gait: The patient ambulates with  moderate antalgic gait, reduced stance phase on the L and reduced hip extension on the L.       Range of Motion:     AROM Pain   Flexion Limited ~75% Pain L lumbar and posterior hip      Extension 25% past neutral Pain L lumbar and posterior hip      SB R Limited ~75% Pain L Lumbar and posterior hip   SB L  Limited ~75% Pain L Lumbar and posterior hip     Lower Extremity Strength  Right LE   Left LE     Knee extension: 4+/5 Knee extension: 4/5   Knee flexion: 4/5 Knee flexion: 4/5   Hip flexion: 4/5 Hip flexion: 3+/5   Hip abduction: 4-/5 Hip abduction: 3-/5   Ankle dorsiflexion: 4+/5 Ankle dorsiflexion: 4/5   Ankle plantarflexion: 4+/5 Ankle plantarflexion: 4/5   The patient reported pain with all L MMT testing      Palpation: (+) TTP noted L Lumbar Paraspinals, PSIS, Piriformis, Gluteals.    TREATMENT      Laz received aquatic therapeutic exercises to develop strength, endurance, ROM, flexibility, posture, and core stabilization for 65 minutes including:    FUNCTIONAL MOBILITY TRAINING x 2 laps each at beginning and 1 lap each at end of session  Walk forward/backward/lateral with slow controlled steps    STRETCHES 2 x 30sec  HS--add as tolerated    LE EX x 25 reps  Squat  Heel raise with gluteal set  Hip abduction  Hip flex  HS curl    Hip Ext--hold    Seated on Pool stool  Sit to stand   LAQ  Hip flex  Clam    Walking marches x 2 laps in // bars   Tandem walking x 2 laps in // bars     UE EX/CORE  x 25  Shoulder abd/add TA activation paddles CLOSED  Shoulder horizontal abd/add TA activation paddles CLOSED  Shoulder flex/ext with TA activation and paddles CLOSED     ENDURANCE  LTR x 3'--performed 2 times throughout the session  Bicycle in // bars x 3'--add next       Patient Education and Home Exercises     Home Exercises Provided and Patient Education Provided     Education provided:   Role of aquatic therapy  Hydration post therapy      Written Home Exercises Provided: Patient instructed to cont prior  HEP.    ASSESSMENT      Update:   The patient has attended 10 Aquatic PT visits since the start of care. Subjectively the patient reports improved pain levels and improved functional ability to carry in the groceries.   Objectively the patient has improved L knee and hip flexion MMT, improved but still limited lumbar AROM with pain noted L posterior hip and lumbar spine at end range.  This has led to improved TUG and 5 times sit to stand scores demonstrating improved functional mobility.  She has tolerated a slow progression of her course of care due to chronicity and severity of her pain levels.  She at this time the patient has met 2 of 3 STG and and 1 LTG and is progressing towards achievement of LTG's. She could benefit from additional skilled PT interventions to improve function and reduce pain levels.       Previous Short Term Goals Status:     Goals:   Short Term Goals: 6 weeks   1. Patient will be independent in HEP & progressions.--Ongoing as of 6/25/24  2. Patient will achieve TUG score of 11 sec to demonstrate improved mobility--MET as of 6/25/24  3. The patient will achieve 5 sit to stand score of 20 seconds to demonstrate improved transfers and endurance. --MET as of 6/25/24    Long Term Goals: 12 weeks   1. The patient will demonstrate independence with extensive HEP.  2. Patient will achieve 5 sit to stand score of 19 seconds to demonstrate improved transfers & endurance.--MET as of 6/25/24  3. Patent is able to demonstrate MMT 4/5 L LE and 4+/5 R LE without pain reports during testing.      Long Term Goal Status: continue per initial plan of care.   New LTG  Patient will achieve 5 sit to stand score of 14 seconds to demonstrate improved transfers & endurance.    Reasons for Recertification of Therapy:   To continue to improve functional strength and reduce pain.    PLAN      Updated Certification Period: 6/25/24 to 7/25/24   Recommended Treatment Plan: 2 times per week for 4 weeks:  Aquatic  Therapy      Frances Reddy, PT

## 2024-06-26 NOTE — PROGRESS NOTES
"[facility]   Physical Therapy Updated  POC Note    Name: Laz Quintero  Clinic Number: [id number]    Therapy Diagnosis:   Encounter Diagnosis   Name Primary?    Left lumbar radiculopathy Yes     Physician: Ramin Sloan MD    Visit Date: 6/26/2024    Physician Orders: Aquatic Therapy   Medical Diagnosis from Referral: LBP with radicular symptoms  Evaluation Date: 5/17/2024  Authorization Period Expiration: 5/6/25  Plan of Care Expiration: 7/17/24  Visit # / Visits authorized: 10/12       Precautions: Standard and Fall    PTA Visit #: 0/5     Time In: 7:30  am   Time Out: 8:33 am  Total Billable Time: 30 minutes     SUBJECTIVE      Patient reports: Her pain is "up there today", so she had to take a Tramadol today. She says that she was "worked" yesterday from PT. She didn't have a good night sleeping.     Pain:  Current 6/10, worst 9/10, best 6/10   Location: L Lumbosacral and posterior hip   Description: Throbbing, burning, numbness, tingling  Aggravating Factors: bending forward, sitting greater than 15-20 minutes, driving greater than 15-20 minutes, standing greater than 10-15 minutes, cooking, stairs   Easing Factors: stretching, morning walks to the corner and around the block when able.     OBJECTIVE   Objective Measures updated at progress report unless specified.     TREATMENT      Laz received aquatic therapeutic exercises to develop strength, endurance, ROM, flexibility, posture, and core stabilization for 62 minutes including:    FUNCTIONAL MOBILITY TRAINING x 2 laps each at beginning and 1 lap each at end of session  Walk forward/backward/lateral with slow controlled steps    STRETCHES 2 x 30sec  HS--add as tolerated    LE EX x 25 reps w/RTB  Squat  Heel raise with gluteal set  Hip abduction  Hip flex  HS curl  Hip Ext    Seated on Pool stool  Sit to stand   LAQ  Hip flex  Clam    Walking marches x 2 laps   Tandem walking x 2 laps     UE EX/CORE  x 25  Shoulder " abd/add TA activation paddles CLOSED  Shoulder horizontal abd/add TA activation paddles CLOSED  Shoulder flex/ext with TA activation and paddles CLOSED     ENDURANCE  LTR x 3'--performed 2 times throughout the session  Bicycle in // bars x 3'--add next     Patient Education and Home Exercises     Home Exercises Provided and Patient Education Provided     Education provided:   Role of aquatic therapy  Hydration post therapy    Written Home Exercises Provided: Patient instructed to cont prior HEP.    ASSESSMENT   Patient presents reporting soreness from previous day's aquatic therapy, but acknowledges that it is primarily muscle soreness. She was able to tolerate advancement of squats and standing hip strengthening with moderate resistance today and maintained higher dosage. Patient displaying good global eccentric hip control with ability to perform single leg stance hip strengthening without pause in between repetitions and isotonic eccentric contraction. She demonstrates gradual improvement in dynamic gait stability with minimal losses of balance when performing marches and tandem walking outside of parallel bars with unstable upper extremity support today. Progress patient as tolerated.    Previous Short Term Goals Status:     Goals:   Short Term Goals: 6 weeks   1. Patient will be independent in HEP & progressions.  2. Patient will achieve TUG score of 11 sec to demonstrate improved mobility  3. The patient will achieve 5 sit to stand score of 20 seconds to demonstrate improved transfers and endurance.     Long Term Goals: 12 weeks   1. The patient will demonstrate independence with extensive HEP.  2. Patient will achieve 5 sit to stand score of 19 seconds to demonstrate improved transfers & endurance.  3. Patent is able to demonstrate MMT 4/5 L LE and 4+/5 R LE without pain reports during testing.      PLAN   Plan of care Certification: 5/17/2024 to 7/17/24.     Outpatient Physical Therapy 2 times weekly for 8  weeks to include the following interventions: manual therapy, aquatic therapy, patient education, therapeutic exercise, therapeutic activities.     Patient may be seen by PTA as part of rehabilitation team.    Zora Liu, PT

## 2024-07-01 DIAGNOSIS — M54.16 LUMBAR RADICULOPATHY: Primary | ICD-10-CM

## 2024-07-02 ENCOUNTER — TELEPHONE (OUTPATIENT)
Dept: INTERNAL MEDICINE | Facility: CLINIC | Age: 54
End: 2024-07-02
Payer: COMMERCIAL

## 2024-07-09 ENCOUNTER — CLINICAL SUPPORT (OUTPATIENT)
Dept: REHABILITATION | Facility: HOSPITAL | Age: 54
End: 2024-07-09
Payer: COMMERCIAL

## 2024-07-09 DIAGNOSIS — M54.16 LEFT LUMBAR RADICULOPATHY: Primary | ICD-10-CM

## 2024-07-09 PROCEDURE — 97113 AQUATIC THERAPY/EXERCISES: CPT

## 2024-07-09 NOTE — PROGRESS NOTES
[facility]   Physical Therapy Updated  POC Note    Name: Laz Quintero  Clinic Number: [id number]    Therapy Diagnosis:   Encounter Diagnosis   Name Primary?    Left lumbar radiculopathy Yes     Physician: Ramin Sloan MD    Visit Date: 7/9/2024    Physician Orders: Aquatic Therapy   Medical Diagnosis from Referral: LBP with radicular symptoms  Evaluation Date: 5/17/2024  Authorization Period Expiration: 5/6/25  Plan of Care Expiration: 7/17/24  Visit # / Visits authorized: 11/12       Precautions: Standard and Fall    PTA Visit #: 0/5     Time In: 1:26 PM  Time Out: 2:24 PM  Total Billable Time: 33 minutes     SUBJECTIVE      Patient reports: She did a lot of walking for a [employer] gathering and a lot of stairs which increased her back pain.      Pain:  Current 5/10, worst 9/10, best 6/10   Location: L Lumbosacral and posterior hip   Description: Throbbing, burning, numbness, tingling  Aggravating Factors: bending forward, sitting greater than 15-20 minutes, driving greater than 15-20 minutes, standing greater than 10-15 minutes, cooking, stairs   Easing Factors: stretching, morning walks to the corner and around the block when able.     OBJECTIVE   Objective Measures updated at progress report unless specified.     TREATMENT      Laz received aquatic therapeutic exercises to develop strength, endurance, ROM, flexibility, posture, and core stabilization for 55 minutes including:    FUNCTIONAL MOBILITY TRAINING x 2 laps each at beginning and 1 lap each at end of session  Walk forward/backward/lateral with slow controlled steps    STRETCHES 2 x 30sec  HS--add as tolerated    LE EX x 25 reps w/RTB  Squat  Heel raise with gluteal set  Hip abduction  Hip flex  HS curl  Hip Ext    Seated on Pool stool w/RTB  Sit to stand   LAQ  Hip flex  Clam    Walking marches x 2 laps   Tandem walking x 2 laps     UE EX/CORE  x 25  Shoulder abd/add TA activation paddles CLOSED  Shoulder horizontal  abd/add TA activation paddles CLOSED  Shoulder flex/ext with TA activation and paddles CLOSED     ENDURANCE  LTR x 2'  Bicycle in // bars x 2'    Patient Education and Home Exercises     Home Exercises Provided and Patient Education Provided     Education provided:   Role of aquatic therapy  Hydration post therapy    Written Home Exercises Provided: Patient instructed to cont prior HEP.    ASSESSMENT   Patient showing increased fatigue during resisted standing hip extension. Weak bilateral gluteus reed likely contributing to impaired lumbopelvic stability. To further address this deficit moderate banded resistance was applied to patient's sit to stands, bilateral hip external rotation and quadriceps strengthening. Progress patient as tolerated.    Previous Short Term Goals Status:     Goals:   Short Term Goals: 6 weeks   1. Patient will be independent in HEP & progressions.  2. Patient will achieve TUG score of 11 sec to demonstrate improved mobility  3. The patient will achieve 5 sit to stand score of 20 seconds to demonstrate improved transfers and endurance.     Long Term Goals: 12 weeks   1. The patient will demonstrate independence with extensive HEP.  2. Patient will achieve 5 sit to stand score of 19 seconds to demonstrate improved transfers & endurance.  3. Patent is able to demonstrate MMT 4/5 L LE and 4+/5 R LE without pain reports during testing.      PLAN   Plan of care Certification: 5/17/2024 to 7/17/24.     Outpatient Physical Therapy 2 times weekly for 8 weeks to include the following interventions: manual therapy, aquatic therapy, patient education, therapeutic exercise, therapeutic activities.     Patient may be seen by PTA as part of rehabilitation team.    Zora Liu, PT

## 2024-07-11 ENCOUNTER — HOSPITAL ENCOUNTER (OUTPATIENT)
Dept: RADIOLOGY | Facility: HOSPITAL | Age: 54
Discharge: HOME | End: 2024-07-11
Attending: INTERNAL MEDICINE

## 2024-07-11 DIAGNOSIS — Z12.31 SCREENING MAMMOGRAM, ENCOUNTER FOR: ICD-10-CM

## 2024-07-11 PROCEDURE — 77067 SCR MAMMO BI INCL CAD: CPT | Mod: TC

## 2024-07-15 ENCOUNTER — CLINICAL SUPPORT (OUTPATIENT)
Dept: REHABILITATION | Facility: HOSPITAL | Age: 54
End: 2024-07-15
Payer: COMMERCIAL

## 2024-07-15 ENCOUNTER — TELEPHONE (OUTPATIENT)
Dept: OPHTHALMOLOGY | Facility: CLINIC | Age: 54
End: 2024-07-15
Payer: COMMERCIAL

## 2024-07-15 DIAGNOSIS — M54.16 LEFT LUMBAR RADICULOPATHY: Primary | ICD-10-CM

## 2024-07-15 PROCEDURE — 97113 AQUATIC THERAPY/EXERCISES: CPT

## 2024-07-15 NOTE — PROGRESS NOTES
[facility]   Physical Therapy Daily Treatment Note    Name: Laz Quintero  Clinic Number: [id number]    Therapy Diagnosis:   Encounter Diagnosis   Name Primary?    Left lumbar radiculopathy Yes     Physician: Ramin Sloan MD    Visit Date: 7/15/2024    Physician Orders: Aquatic Therapy   Medical Diagnosis from Referral: LBP with radicular symptoms  Evaluation Date: 5/17/2024  Authorization Period Expiration: 5/6/25  Plan of Care Expiration: 7/25/24  Visit # / Visits authorized: 1/8  13 visits total.    Precautions: Standard and Fall    PTA Visit #: 0/5     Time In: 10:22 am   Time Out: 11:25 am   Total Billable Time: 60 minutes     SUBJECTIVE      Patient reports: she has been in pain all weekend. She stated she has been having a lot of spasms which started Thursday night.  She stated the spasms were in her lower back and shooting down.     Pain:  Current 8/10, worst 9/10, best 6/10   Location: L Lumbosacral and posterior hip   Description: Throbbing, burning, numbness, tingling  Aggravating Factors: bending forward, sitting greater than 15-20 minutes, driving greater than 15-20 minutes, standing greater than 10-15 minutes, cooking, stairs   Easing Factors: stretching, morning walks to the corner and around the block when able.     OBJECTIVE   Objective Measures updated at progress report unless specified.     TREATMENT      Laz received aquatic therapeutic exercises to develop strength, endurance, ROM, flexibility, posture, and core stabilization for 60 minutes including:    FUNCTIONAL MOBILITY TRAINING x 2 laps each at beginning and 1 lap each at end of session  Walk forward/backward/lateral with slow controlled steps    STRETCHES 2 x 30sec  HS--add as tolerated    LE EX x 25 reps w/RTB  Squat  Heel raise with gluteal set  Hip abduction  Hip flex  HS curl  Hip Ext    Seated on Pool stool w/RTB  Sit to stand   LAQ  Hip flex  Clam    Walking marches x 2 laps   Tandem walking x  2 laps     UE EX/CORE  x 25  Shoulder abd/add TA activation paddles CLOSED  Shoulder horizontal abd/add TA activation paddles CLOSED  Shoulder flex/ext with TA activation and paddles CLOSED     ENDURANCE  LTR x 2'  Bicycle in // bars x 2'    Patient Education and Home Exercises     Home Exercises Provided and Patient Education Provided     Education provided:   Role of aquatic therapy  Hydration post therapy    Written Home Exercises Provided: Patient instructed to cont prior HEP.    ASSESSMENT   The patient reported reduction of her back spasms and tightness from an 8/10 to 6/10 upon completion of the session today.       Short Term Goals: 6 weeks   1. Patient will be independent in HEP & progressions.--Ongoing as of 6/25/24  2. Patient will achieve TUG score of 11 sec to demonstrate improved mobility--MET as of 6/25/24  3. The patient will achieve 5 sit to stand score of 20 seconds to demonstrate improved transfers and endurance. --MET as of 6/25/24    Long Term Goals: 12 weeks   1. The patient will demonstrate independence with extensive HEP.  2. Patient will achieve 5 sit to stand score of 19 seconds to demonstrate improved transfers & endurance.--MET as of 6/25/24  3. Patent is able to demonstrate MMT 4/5 L LE and 4+/5 R LE without pain reports during testing.      Long Term Goal Status: continue per initial plan of care.   New LTG  Patient will achieve 5 sit to stand score of 14 seconds to demonstrate improved transfers & endurance.    Reasons for Recertification of Therapy:   To continue to improve functional strength and reduce pain.    PLAN        Updated Certification Period: 6/25/24 to 7/25/24   Recommended Treatment Plan: 2 times per week for 4 weeks:  Aquatic Therapy      Outpatient Physical Therapy 2 times weekly for 8 weeks to include the following interventions: manual therapy, aquatic therapy, patient education, therapeutic exercise, therapeutic activities.     Patient may be seen by PTA as part of  rehabilitation team.    Frances Reddy, PT

## 2024-07-15 NOTE — TELEPHONE ENCOUNTER
----- Message from Samantha Santillan sent at 7/15/2024 11:53 AM CDT -----  Contact: [phone]  Patient is calling to reschedule appointment. Please call to assist. Patient is requesting an appointment for 7/31/2024 or after

## 2024-07-15 NOTE — TELEPHONE ENCOUNTER
Clinic spoke to patient regarding request to reschedule 07/24/2024 [facility] appointment with Dr. Oshea. Clinic offered next available [facility] appointment on 08/21/2024 but patient declined to reschedule. Appointment confirmed with patient for 07/24/2024 with Dr. Oshea.

## 2024-07-17 ENCOUNTER — CLINICAL SUPPORT (OUTPATIENT)
Dept: REHABILITATION | Facility: HOSPITAL | Age: 54
End: 2024-07-17
Payer: COMMERCIAL

## 2024-07-17 DIAGNOSIS — M54.16 LEFT LUMBAR RADICULOPATHY: Primary | ICD-10-CM

## 2024-07-17 PROCEDURE — 97113 AQUATIC THERAPY/EXERCISES: CPT

## 2024-07-17 NOTE — PROGRESS NOTES
[facility]   Physical Therapy Daily Treatment Note    Name: Laz Quintero  Clinic Number: [id number]    Therapy Diagnosis:   Encounter Diagnosis   Name Primary?    Left lumbar radiculopathy Yes     Physician: Ramin Sloan MD    Visit Date: 7/17/2024    Physician Orders: Aquatic Therapy   Medical Diagnosis from Referral: LBP with radicular symptoms  Evaluation Date: 5/17/2024  Authorization Period Expiration: 5/6/25  Plan of Care Expiration: 7/25/24  Visit # / Visits authorized: 2/8  14 visits total.    Precautions: Standard and Fall    PTA Visit #: 0/5     Time In: 10:20 am   Time Out: 11:20 am   Total Billable Time: 30 minutes     SUBJECTIVE      Patient reports: she is feeling better today than the other day.     Pain:  Current 8/10, worst 9/10, best 6/10   Location: L Lumbosacral and posterior hip   Description: Throbbing, burning, numbness, tingling  Aggravating Factors: bending forward, sitting greater than 15-20 minutes, driving greater than 15-20 minutes, standing greater than 10-15 minutes, cooking, stairs   Easing Factors: stretching, morning walks to the corner and around the block when able.     OBJECTIVE   Objective Measures updated at progress report unless specified.     TREATMENT      Laz received aquatic therapeutic exercises to develop strength, endurance, ROM, flexibility, posture, and core stabilization for 60 minutes including:    FUNCTIONAL MOBILITY TRAINING x 2 laps each at beginning and 1 lap each at end of session  Walk forward/backward/lateral with slow controlled steps    STRETCHES 2 x 30sec  HS--add as tolerated    LE EX x 25 reps w/RTB  Squat  Heel raise with gluteal set  Hip abduction  Hip flex  HS curl  Hip Ext    Seated on Pool stool w/RTB  Sit to stand with 4.4# yuk ball held out front  LAQ  Hip flex  Clam    Walking marches x 2 laps with 3.3 yuk ball held out front  Tandem walking x 2 laps     UE EX/CORE  x 20  Shoulder abd/add TA activation  with yellow slightly weighted paddles  Shoulder horizontal abd/add TA activation with yellow slightly weighted paddles  Shoulder flex/ext with TA activation with yellow slightly weighted paddles    ENDURANCE  LTR x 2'  Bicycle in // bars x 2'    Patient Education and Home Exercises     Home Exercises Provided and Patient Education Provided     Education provided:   Role of aquatic therapy  Hydration post therapy    Written Home Exercises Provided: Patient instructed to cont prior HEP.    ASSESSMENT   The patient was able to perform advanced  resistance for UE exercises with good core and TA noted with the added resistance.   She continues to tolerate the treatment progressions well.     Short Term Goals: 6 weeks   1. Patient will be independent in HEP & progressions.--Ongoing as of 6/25/24  2. Patient will achieve TUG score of 11 sec to demonstrate improved mobility--MET as of 6/25/24  3. The patient will achieve 5 sit to stand score of 20 seconds to demonstrate improved transfers and endurance. --MET as of 6/25/24    Long Term Goals: 12 weeks   1. The patient will demonstrate independence with extensive HEP.  2. Patient will achieve 5 sit to stand score of 19 seconds to demonstrate improved transfers & endurance.--MET as of 6/25/24  3. Patent is able to demonstrate MMT 4/5 L LE and 4+/5 R LE without pain reports during testing.      Long Term Goal Status: continue per initial plan of care.   New LTG  Patient will achieve 5 sit to stand score of 14 seconds to demonstrate improved transfers & endurance.    Reasons for Recertification of Therapy:   To continue to improve functional strength and reduce pain.    PLAN        Updated Certification Period: 6/25/24 to 7/25/24   Recommended Treatment Plan: 2 times per week for 4 weeks:  Aquatic Therapy      Outpatient Physical Therapy 2 times weekly for 8 weeks to include the following interventions: manual therapy, aquatic therapy, patient education, therapeutic exercise,  therapeutic activities.     Patient may be seen by PTA as part of rehabilitation team.    Frances Reddy, PT

## 2024-07-24 ENCOUNTER — OFFICE VISIT (OUTPATIENT)
Dept: OPHTHALMOLOGY | Facility: CLINIC | Age: 54
End: 2024-07-24

## 2024-07-24 DIAGNOSIS — H02.822 CYST OF RIGHT LOWER EYELID: Primary | ICD-10-CM

## 2024-07-24 DIAGNOSIS — H02.825 CYST OF LEFT LOWER EYELID: ICD-10-CM

## 2024-07-24 DIAGNOSIS — H02.9 EYELID LESION: ICD-10-CM

## 2024-07-24 PROCEDURE — 99213 OFFICE O/P EST LOW 20 MIN: CPT | Mod: PBBFAC | Performed by: OPHTHALMOLOGY

## 2024-07-24 PROCEDURE — 99999 PR PBB SHADOW E&M-EST. PATIENT-LVL III: CPT | Mod: PBBFAC,,, | Performed by: OPHTHALMOLOGY

## 2024-07-24 PROCEDURE — 92002 INTRM OPH EXAM NEW PATIENT: CPT | Mod: S$PBB,,, | Performed by: OPHTHALMOLOGY

## 2024-07-24 RX ORDER — TRAMADOL HYDROCHLORIDE 50 MG/1
50 TABLET ORAL
COMMUNITY
Start: 2024-06-19

## 2024-07-24 NOTE — PROGRESS NOTES
Subjective:       Patient ID: Laz Quintero is a 53 y.o. female.    Chief Complaint: Lesion    HPI    Patient here today with c/o lid lesions OS > OD, pain and itching OS,   mostly on hot days. Lesions OD x 6 mos and OS 3 mos. No previous sx or   injury ou. No fmhx of eye disease.    No gtts  Last edited by Aixa [address] on 7/24/2024  4:01 PM.             Assessment:       1. Cyst of right lower eyelid    2. Cyst of left lower eyelid    3. Eyelid lesion        Plan:       Multiple eyelid cysts-Needs oculoplastics consult.      Refer to Dr Bucio or [facility].

## 2024-08-01 ENCOUNTER — CLINICAL SUPPORT (OUTPATIENT)
Dept: REHABILITATION | Facility: HOSPITAL | Age: 54
End: 2024-08-01
Payer: COMMERCIAL

## 2024-08-01 DIAGNOSIS — M54.16 LEFT LUMBAR RADICULOPATHY: Primary | ICD-10-CM

## 2024-08-01 PROCEDURE — 97113 AQUATIC THERAPY/EXERCISES: CPT

## 2024-08-01 NOTE — PROGRESS NOTES
[facility]   Physical Therapy Daily Treatment Note    Name: Laz Quintero  Clinic Number: [id number]    Therapy Diagnosis:   Encounter Diagnosis   Name Primary?    Left lumbar radiculopathy Yes       Physician: Ramin Sloan MD    Visit Date: 8/1/2024    Physician Orders: Aquatic Therapy   Medical Diagnosis from Referral: LBP with radicular symptoms  Evaluation Date: 5/17/2024  Authorization Period Expiration: 5/6/25  Plan of Care Expiration: 7/25/24  Visit # / Visits authorized: 3/8  15 visits total.    Precautions: Standard and Fall    PTA Visit #: 0/5     Time In: 10:25 am   Time Out: 11:30 am    Total Billable Time: 30 minutes     SUBJECTIVE      Patient reports: her back is ok but her hip is hurting her more today. She reports the pain 7/10 in the L hip. She stated she can not even lay on her hip today. She reports her back is a 4/10.    Pain:  Current 4/10, worst 9/10, best 6/10   Location: L Lumbosacral and posterior hip   Description: Throbbing, burning, numbness, tingling  Aggravating Factors: bending forward, sitting greater than 15-20 minutes, driving greater than 15-20 minutes, standing greater than 10-15 minutes, cooking, stairs   Easing Factors: stretching, morning walks to the corner and around the block when able.     OBJECTIVE   Objective Measures updated at progress report unless specified.     TREATMENT      Laz received aquatic therapeutic exercises to develop strength, endurance, ROM, flexibility, posture, and core stabilization for 60 minutes including:    FUNCTIONAL MOBILITY TRAINING x 2 laps each at beginning and 1 lap each at end of session  Walk forward/backward/lateral with slow controlled steps    STRETCHES 2 x 30sec  HS--add as tolerated    LE EX x 25 reps w/RTB  Squat  Heel raise with gluteal set  Hip abduction--slight pain with L hip abd  Hip flex  HS curl  Hip Ext    Seated on Pool stool w/RTB  Sit to stand with 4.4# yuk ball held out  front  LAQ  Hip flex  Clam    Walking marches x 2 laps with 3.3 yuk ball held out front  Tandem walking x 2 laps  with 3.3 yuk ball in front    Step up on 4 inch step x 10   Lateral step up on 4 inch step x 20--add next    UE EX/CORE  x 20 with RTB around knees  Shoulder abd/add TA activation with yellow slightly weighted paddles  Shoulder horizontal abd/add TA activation with yellow slightly weighted paddles  Shoulder flex/ext with TA activation with yellow slightly weighted paddles    ENDURANCE  LTR x 2'  Bicycle in // bars x 2'    Patient Education and Home Exercises     Home Exercises Provided and Patient Education Provided     Education provided:   Role of aquatic therapy  Hydration post therapy    Written Home Exercises Provided: Patient instructed to cont prior HEP.    ASSESSMENT   The patient reported overall reduction of her L hip pain upon completion of the session today.   She did report feeling it in her L hip with hip abduction and seated clams which improved with a slightly reduced ROM.      Short Term Goals: 6 weeks   1. Patient will be independent in HEP & progressions.--Ongoing as of 6/25/24  2. Patient will achieve TUG score of 11 sec to demonstrate improved mobility--MET as of 6/25/24  3. The patient will achieve 5 sit to stand score of 20 seconds to demonstrate improved transfers and endurance. --MET as of 6/25/24    Long Term Goals: 12 weeks   1. The patient will demonstrate independence with extensive HEP.  2. Patient will achieve 5 sit to stand score of 19 seconds to demonstrate improved transfers & endurance.--MET as of 6/25/24  3. Patent is able to demonstrate MMT 4/5 L LE and 4+/5 R LE without pain reports during testing.      Long Term Goal Status: continue per initial plan of care.   New LTG  Patient will achieve 5 sit to stand score of 14 seconds to demonstrate improved transfers & endurance.    Reasons for Recertification of Therapy:   To continue to improve functional strength and  reduce pain.    PLAN        Updated Certification Period: 6/25/24 to 7/25/24   Recommended Treatment Plan: 2 times per week for 4 weeks:  Aquatic Therapy      Outpatient Physical Therapy 2 times weekly for 8 weeks to include the following interventions: manual therapy, aquatic therapy, patient education, therapeutic exercise, therapeutic activities.     Patient may be seen by PTA as part of rehabilitation team.    Frances Reddy, PT

## 2024-08-02 ENCOUNTER — PATIENT MESSAGE (OUTPATIENT)
Dept: INTERNAL MEDICINE | Facility: CLINIC | Age: 54
End: 2024-08-02
Payer: COMMERCIAL

## 2024-08-02 ENCOUNTER — CLINICAL SUPPORT (OUTPATIENT)
Dept: REHABILITATION | Facility: HOSPITAL | Age: 54
End: 2024-08-02
Payer: COMMERCIAL

## 2024-08-02 DIAGNOSIS — M54.16 LEFT LUMBAR RADICULOPATHY: Primary | ICD-10-CM

## 2024-08-02 PROCEDURE — 97113 AQUATIC THERAPY/EXERCISES: CPT

## 2024-08-02 NOTE — PROGRESS NOTES
[facility]   Physical Therapy Daily Treatment Note    Name: Laz Quintero  Clinic Number: [id number]    Therapy Diagnosis:   Encounter Diagnosis   Name Primary?    Left lumbar radiculopathy Yes       Physician: Ramin Sloan MD    Visit Date: 8/2/2024    Physician Orders: Aquatic Therapy   Medical Diagnosis from Referral: LBP with radicular symptoms  Evaluation Date: 5/17/2024  Authorization Period Expiration: 5/6/25  Plan of Care Expiration: 7/25/24  Visit # / Visits authorized: 4/8  15 visits total.    Precautions: Standard and Fall    PTA Visit #: 0/5     Time In: 10:29 am   Time Out: 11:30 am    Total Billable Time: 45 minutes     SUBJECTIVE      Patient reports: that she's still in an episode of exacerbated hip pain and has a follow-up with her doctor next week. She says that she has had 3 exacerbations since her last injection about a year ago.    Pain:  Current 8/10, worst 9/10, best 6/10   Location: L Lumbosacral and posterior L hip   Description: Throbbing, burning, numbness, tingling  Aggravating Factors: bending forward, sitting greater than 15-20 minutes, driving greater than 15-20 minutes, standing greater than 10-15 minutes, cooking, stairs   Easing Factors: stretching, morning walks to the corner and around the block when able.     OBJECTIVE   Objective Measures updated at progress report unless specified.     TREATMENT      Laz received aquatic therapeutic exercises to develop strength, endurance, ROM, flexibility, posture, and core stabilization for 59 minutes including:    FUNCTIONAL MOBILITY TRAINING x 2 laps each at beginning and 1 lap each at end of session  Walk forward/backward/lateral with slow controlled steps    STRETCHES 2 x 30sec  HS--add as tolerated    LE EX x 25 reps w/RTB only RTB right side)  Squat  Heel raise with gluteal set  Hip abduction--slight pain with L hip abd  Hip flex  HS curl  Hip Ext    Seated on Pool stool w/RTB  Sit to stand with  4.4# yuk ball held out front  LAQ  Hip flex  Clam (only RTB right side)    Walking marches x 2 laps with 3.3 yuk ball held out front + hip abduction  Tandem walking x 2 laps  with 3.3 yuk ball in front    Step up on 4 inch step x 10   Lateral step up on 4 inch step x 20--add next    UE EX/CORE  x 20 with RTB around knees  Shoulder abd/add TA activation with yellow slightly weighted paddles  Shoulder horizontal abd/add TA activation with yellow slightly weighted paddles  Shoulder flex/ext with TA activation with yellow slightly weighted paddles    ENDURANCE  LTR x 2'  Bicycle in // bars x 2'    Patient Education and Home Exercises     Home Exercises Provided and Patient Education Provided     Education provided:   Role of aquatic therapy  Hydration post therapy  SP Cane recommendations/fitting    Written Home Exercises Provided: Patient instructed to cont prior HEP.    ASSESSMENT   Patient presents with an antalgic gait pattern due to high pain in left hip. Patient reports upcoming follow-up with doctor to explore surgical or other options. Instructed patient to ambulate with a cane to during episodes of exacerbation in order to mitigate inflammation. Patient's right single leg stance, left hip strengthening and left seated clamshell performed with no resistance to remain in pain free range. Dynamic hip abduction added to patient's marches in order to increase left hip abduction range of motion for exiting her vehicle and transferring out of chairs. Progress patient as tolerated.     Short Term Goals: 6 weeks   1. Patient will be independent in HEP & progressions.--Ongoing as of 6/25/24  2. Patient will achieve TUG score of 11 sec to demonstrate improved mobility--MET as of 6/25/24  3. The patient will achieve 5 sit to stand score of 20 seconds to demonstrate improved transfers and endurance. --MET as of 6/25/24    Long Term Goals: 12 weeks   1. The patient will demonstrate independence with extensive HEP.  2.  Patient will achieve 5 sit to stand score of 19 seconds to demonstrate improved transfers & endurance.--MET as of 6/25/24  3. Patent is able to demonstrate MMT 4/5 L LE and 4+/5 R LE without pain reports during testing.      Long Term Goal Status: continue per initial plan of care.   New LTG  Patient will achieve 5 sit to stand score of 14 seconds to demonstrate improved transfers & endurance.    Reasons for Recertification of Therapy:   To continue to improve functional strength and reduce pain.    PLAN        Updated Certification Period: 6/25/24 to 7/25/24   Recommended Treatment Plan: 2 times per week for 4 weeks:  Aquatic Therapy      Outpatient Physical Therapy 2 times weekly for 8 weeks to include the following interventions: manual therapy, aquatic therapy, patient education, therapeutic exercise, therapeutic activities.     Patient may be seen by PTA as part of rehabilitation team.    Zora Liu, PT

## 2024-08-05 ENCOUNTER — CLINICAL SUPPORT (OUTPATIENT)
Dept: REHABILITATION | Facility: HOSPITAL | Age: 54
End: 2024-08-05
Payer: COMMERCIAL

## 2024-08-05 DIAGNOSIS — M54.16 LEFT LUMBAR RADICULOPATHY: Primary | ICD-10-CM

## 2024-08-05 PROCEDURE — 97113 AQUATIC THERAPY/EXERCISES: CPT

## 2024-08-06 ENCOUNTER — CLINICAL SUPPORT (OUTPATIENT)
Dept: REHABILITATION | Facility: HOSPITAL | Age: 54
End: 2024-08-06
Payer: COMMERCIAL

## 2024-08-06 DIAGNOSIS — M54.16 LEFT LUMBAR RADICULOPATHY: Primary | ICD-10-CM

## 2024-08-06 PROCEDURE — 97113 AQUATIC THERAPY/EXERCISES: CPT

## 2024-08-06 PROCEDURE — 97164 PT RE-EVAL EST PLAN CARE: CPT

## 2024-08-13 ENCOUNTER — CLINICAL SUPPORT (OUTPATIENT)
Dept: REHABILITATION | Facility: HOSPITAL | Age: 54
End: 2024-08-13
Payer: COMMERCIAL

## 2024-08-13 DIAGNOSIS — M54.16 LEFT LUMBAR RADICULOPATHY: Primary | ICD-10-CM

## 2024-08-13 PROCEDURE — 97113 AQUATIC THERAPY/EXERCISES: CPT

## 2024-08-13 NOTE — PROGRESS NOTES
[facility]   Physical Therapy Daily Treatment Note      Name: Laz Quintero  Clinic Number: [id number]    Therapy Diagnosis:   Encounter Diagnosis   Name Primary?    Left lumbar radiculopathy Yes     Physician: Ramin Sloan MD    Visit Date: 8/13/2024    Physician Orders: Aquatic Therapy   Medical Diagnosis from Referral: LBP with radicular symptoms  Evaluation Date: 5/17/2024  Authorization Period Expiration: 5/6/25  Plan of Care Expiration: 9/7/24  Visit # / Visits authorized: 7/8  18 visits total.    Precautions: Standard and Fall    Time In: 11:30 am  Time Out: 12:30 pm   Total Billable Time: 55 minutes      SUBJECTIVE     The patient reports:   her hip and back pain have been pretty painful still with this most recent flare up.  She stated and saw Dr. Sloan who wanted her to continue with her Aquatic PT but also see Dr. Belle to discuss hip surgery for the labral tears in her hip.     Pain:  Current: 7/10, worst 8/10, best 4/10   Location: L Lumbosacral and posterior hip   Description: Throbbing, burning, numbness, tingling  Aggravating Factors: bending forward, sitting greater than 15-20 minutes, driving greater than 15-20 minutes, standing greater than 10-15 minutes, cooking, stairs   Easing Factors: stretching, morning walks to the corner and around the block when able.     OBJECTIVE      Update 8/6/24    TREATMENT      Laz received aquatic therapeutic exercises to develop strength, endurance, ROM, flexibility, posture, and core stabilization for 60 minutes including:    FUNCTIONAL MOBILITY TRAINING x 2 laps each at beginning and 1 lap each at end of session  Walk forward/backward/lateral with slow controlled steps    STRETCHES 2 x 30sec  HS--add as tolerated    LE EX x 25 reps w/RTB   only RTB right side  Squat  Heel raise with gluteal set  Hip abduction--slight pain with L hip abd  Hip flex  HS curl  Hip Ext    Seated on Pool stool w/RTB  Sit to stand with 4.4#  yuk ball held out front  LAQ  Hip flex  Clam (only RTB right side)    Walking marches x 2 laps with 3.3 yuk ball held out front + hip abduction  Tandem walking x 2 laps  with 3.3 yuk ball in front    Step up on 4 inch step x 10   Lateral step up on 4 inch step x 10     UE EX/CORE  x 20 with RTB around knees  Shoulder abd/add TA activation with yellow slightly weighted paddles  Shoulder horizontal abd/add TA activation with yellow slightly weighted paddles  Shoulder flex/ext with TA activation with yellow slightly weighted paddles    ENDURANCE  LTR x 2'  Bicycle in // bars x 2'    Patient Education and Home Exercises     Home Exercises Provided and Patient Education Provided     Education provided:   Role of aquatic therapy  Hydration post therapy  SP Cane recommendations/fitting    Written Home Exercises Provided: Patient instructed to cont prior HEP.    ASSESSMENT      The patient overall tolerated her Aquatic PT session well today with some reported mild increase in the pain in her L hip with step ups but this did not limit her ability to complete the task.      Previous Short Term Goals Status:       Short Term Goals: 6 weeks   1. Patient will be independent in HEP & progressions.--Ongoing as of 6/25/24  2. Patient will achieve TUG score of 11 sec to demonstrate improved mobility--MET as of 6/25/24  3. The patient will achieve 5 sit to stand score of 20 seconds to demonstrate improved transfers and endurance. --MET as of 6/25/24    Long Term Goals: 12 weeks   1. The patient will demonstrate independence with extensive HEP.--Ongoing as of 8/6/24  2. Patient will achieve 5 sit to stand score of 19 seconds to demonstrate improved transfers & endurance.--MET as of 6/25/24  3. Patent is able to demonstrate MMT 4/5 L LE and 4+/5 R LE without pain reports during testing.  --No change in B LE MMT as of 8/6/24       New LTG  Patient will achieve 5 sit to stand score of 14 seconds to demonstrate improved transfers &  endurance.--Progressing towards achievement as of 8/6/24    PLAN        Updated Certification Period: 8/6/24 to 9/6/24   Recommended Treatment Plan: 2 times per week for 4 weeks:  Aquatic Therapy      Frances Reddy, PT

## 2024-08-19 ENCOUNTER — CLINICAL SUPPORT (OUTPATIENT)
Dept: REHABILITATION | Facility: HOSPITAL | Age: 54
End: 2024-08-19
Payer: COMMERCIAL

## 2024-08-19 DIAGNOSIS — M54.16 LEFT LUMBAR RADICULOPATHY: Primary | ICD-10-CM

## 2024-08-19 PROCEDURE — 97113 AQUATIC THERAPY/EXERCISES: CPT

## 2024-08-19 NOTE — PROGRESS NOTES
[facility]   Physical Therapy Daily Treatment Note      Name: Laz Quintero  Clinic Number: [id number]    Therapy Diagnosis:   Encounter Diagnosis   Name Primary?    Left lumbar radiculopathy Yes     Physician: Ramin Sloan MD    Visit Date: 8/19/2024    Physician Orders: Aquatic Therapy   Medical Diagnosis from Referral: LBP with radicular symptoms  Evaluation Date: 5/17/2024  Authorization Period Expiration: 5/6/25  Plan of Care Expiration: 9/7/24  Visit # / Visits authorized: 8/8  20 visits total.    Precautions: Standard and Fall    Time In: 12:25 pm  Time Out: 1:30 pm  Total Billable Time: 30 minutes      SUBJECTIVE     The patient reports:   she is waiting for the approval from  to see Dr. Belle for her hip.  She stated her hip is about a 6/10 today. She is leaning towards having the hip surgery.     Pain:  Current: 7/10, worst 8/10, best 4/10   Location: L Lumbosacral and posterior hip   Description: Throbbing, burning, numbness, tingling  Aggravating Factors: bending forward, sitting greater than 15-20 minutes, driving greater than 15-20 minutes, standing greater than 10-15 minutes, cooking, stairs   Easing Factors: stretching, morning walks to the corner and around the block when able.     OBJECTIVE      Update 8/6/24    TREATMENT      Laz received aquatic therapeutic exercises to develop strength, endurance, ROM, flexibility, posture, and core stabilization for 60 minutes including:    FUNCTIONAL MOBILITY TRAINING x 2 laps each at beginning and 1 lap each at end of session  Walk forward/backward/lateral with slow controlled steps    STRETCHES 2 x 30sec  HS--add as tolerated    LE EX x 25 reps w/RTB   only RTB right side  Squat  Heel raise with gluteal set  Hip abduction--slight pain with L hip abd  Hip flex  HS curl  Hip Ext    Seated on Pool stool w/RTB  Sit to stand with 4.4# yuk ball held out front  LAQ  Hip flex  Clam (only RTB right side)    Walking marches x  2 laps with 3.3 yuk ball held out front + hip abduction  Tandem walking x 2 laps  with 3.3 yuk ball in front    Step up on 4 inch step x 10   Lateral step up on 4 inch step x 10     UE EX/CORE  x 20 with RTB around knees  Shoulder abd/add TA activation with yellow slightly weighted paddles  Shoulder horizontal abd/add TA activation with yellow slightly weighted paddles  Shoulder flex/ext with TA activation with yellow slightly weighted paddles    ENDURANCE  LTR x 2'  Bicycle in // bars x 2'    Patient Education and Home Exercises     Home Exercises Provided and Patient Education Provided     Education provided:   Role of aquatic therapy  Hydration post therapy  SP Cane recommendations/fitting    Written Home Exercises Provided: Patient instructed to cont prior HEP.    ASSESSMENT      The patient continues to have good tolerance to the treatment sessions. The use of GTB has been limited due to her L hip pain but overall she demonstrates improved core strength with ability to perform UE exercises with minimal to no sway noted.    Previous Short Term Goals Status:       Short Term Goals: 6 weeks   1. Patient will be independent in HEP & progressions.--Ongoing as of 6/25/24  2. Patient will achieve TUG score of 11 sec to demonstrate improved mobility--MET as of 6/25/24  3. The patient will achieve 5 sit to stand score of 20 seconds to demonstrate improved transfers and endurance. --MET as of 6/25/24    Long Term Goals: 12 weeks   1. The patient will demonstrate independence with extensive HEP.--Ongoing as of 8/6/24  2. Patient will achieve 5 sit to stand score of 19 seconds to demonstrate improved transfers & endurance.--MET as of 6/25/24  3. Patent is able to demonstrate MMT 4/5 L LE and 4+/5 R LE without pain reports during testing.  --No change in B LE MMT as of 8/6/24       New LTG  Patient will achieve 5 sit to stand score of 14 seconds to demonstrate improved transfers & endurance.--Progressing towards achievement  as of 8/6/24    PLAN        Updated Certification Period: 8/6/24 to 9/6/24   Recommended Treatment Plan: 2 times per week for 4 weeks:  Aquatic Therapy      Frances Reddy, PT

## 2024-08-21 ENCOUNTER — PATIENT MESSAGE (OUTPATIENT)
Dept: INTERNAL MEDICINE | Facility: CLINIC | Age: 54
End: 2024-08-21
Payer: COMMERCIAL

## 2024-08-23 ENCOUNTER — PATIENT MESSAGE (OUTPATIENT)
Dept: ENDOSCOPY | Facility: HOSPITAL | Age: 54
End: 2024-08-23
Payer: COMMERCIAL

## 2024-08-23 ENCOUNTER — TELEPHONE (OUTPATIENT)
Dept: ENDOSCOPY | Facility: HOSPITAL | Age: 54
End: 2024-08-23
Payer: COMMERCIAL

## 2024-08-23 NOTE — TELEPHONE ENCOUNTER
Contacted Pt for Endoscopy precall to confirm scheduled procedure(s) Upper Endoscopy (EGD) on 9/3/24. Pt did not answer. Left voicemail for pt to call Endoscopy Scheduling to confirm.

## 2024-08-30 ENCOUNTER — TELEPHONE (OUTPATIENT)
Dept: ENDOSCOPY | Facility: HOSPITAL | Age: 54
End: 2024-08-30
Payer: COMMERCIAL

## 2024-08-30 NOTE — TELEPHONE ENCOUNTER
Spoke to patient for pre-call to confirm scheduled Upper Endoscopy (EGD) and patient verbalized understanding of the following:       Date of Procedure (s)  verified 9/3/24  Arrival Time 9:00 AM verified.  Location of Procedure(s) [place] [address] Floor verified.  NPO status reinforced. Ok to continue clear liquids up until 4 hours prior to the Endoscopy procedure.     Pt confirmed receipt of prep instructions and Rx prep (if applicable).  Instructions provided to patient via [facility]  Pt confirmed ride home after procedure if procedure requires anesthesia.   Pre-call screening questionnaire reviewed and completed with patient.   Appointment details are tentative, including check-in time.  If the patient begins taking any blood thinning medications, injectable weight loss/diabetes medications (other than insulin), or Adipex (phentermine) patient was instructed to contact the endoscopy scheduling department as soon as possible.  Patient was advised to call the endoscopy scheduling department if any questions or concerns arise.       [facility]oscopy Scheduling Department

## 2024-09-03 ENCOUNTER — ANESTHESIA (OUTPATIENT)
Dept: ENDOSCOPY | Facility: HOSPITAL | Age: 54
End: 2024-09-03

## 2024-09-03 ENCOUNTER — HOSPITAL ENCOUNTER (OUTPATIENT)
Facility: HOSPITAL | Age: 54
Discharge: HOME | End: 2024-09-03
Attending: INTERNAL MEDICINE | Admitting: INTERNAL MEDICINE

## 2024-09-03 ENCOUNTER — ANESTHESIA EVENT (OUTPATIENT)
Dept: ENDOSCOPY | Facility: HOSPITAL | Age: 54
End: 2024-09-03

## 2024-09-03 VITALS
DIASTOLIC BLOOD PRESSURE: 77 MMHG | HEIGHT: 66 IN | OXYGEN SATURATION: 100 % | SYSTOLIC BLOOD PRESSURE: 129 MMHG | HEART RATE: 57 BPM | WEIGHT: 153 LBS | BODY MASS INDEX: 24.59 KG/M2 | TEMPERATURE: 98 F | RESPIRATION RATE: 16 BRPM

## 2024-09-03 DIAGNOSIS — K25.9 GASTRIC ULCER: ICD-10-CM

## 2024-09-03 DIAGNOSIS — Z87.11 HISTORY OF GASTRIC ULCER: Primary | ICD-10-CM

## 2024-09-03 PROCEDURE — 37000008 HC ANESTHESIA 1ST 15 MINUTES: Performed by: INTERNAL MEDICINE

## 2024-09-03 PROCEDURE — 37000009 HC ANESTHESIA EA ADD 15 MINS: Performed by: INTERNAL MEDICINE

## 2024-09-03 PROCEDURE — 25000003 PHARM REV CODE 250: Performed by: NURSE ANESTHETIST, CERTIFIED REGISTERED

## 2024-09-03 PROCEDURE — 63600175 PHARM REV CODE 636 W HCPCS: Performed by: NURSE ANESTHETIST, CERTIFIED REGISTERED

## 2024-09-03 PROCEDURE — 43235 EGD DIAGNOSTIC BRUSH WASH: CPT | Performed by: INTERNAL MEDICINE

## 2024-09-03 RX ORDER — SODIUM CHLORIDE 9 MG/ML
INJECTION, SOLUTION INTRAVENOUS CONTINUOUS
Status: DISCONTINUED | OUTPATIENT
Start: 2024-09-03 | End: 2024-09-03 | Stop reason: HOSPADM

## 2024-09-03 RX ORDER — SODIUM CHLORIDE 0.9 % (FLUSH) 0.9 %
10 SYRINGE (ML) INJECTION
Status: DISCONTINUED | OUTPATIENT
Start: 2024-09-03 | End: 2024-09-03 | Stop reason: HOSPADM

## 2024-09-03 RX ORDER — LIDOCAINE HYDROCHLORIDE 20 MG/ML
INJECTION INTRAVENOUS
Status: DISCONTINUED | OUTPATIENT
Start: 2024-09-03 | End: 2024-09-03

## 2024-09-03 RX ORDER — PROPOFOL 10 MG/ML
VIAL (ML) INTRAVENOUS
Status: DISCONTINUED | OUTPATIENT
Start: 2024-09-03 | End: 2024-09-03

## 2024-09-03 RX ORDER — PROPOFOL 10 MG/ML
VIAL (ML) INTRAVENOUS CONTINUOUS PRN
Status: DISCONTINUED | OUTPATIENT
Start: 2024-09-03 | End: 2024-09-03

## 2024-09-03 RX ORDER — PANTOPRAZOLE SODIUM 40 MG/1
40 TABLET, DELAYED RELEASE ORAL DAILY
Qty: 90 TABLET | Refills: 0 | Status: SHIPPED | OUTPATIENT
Start: 2024-09-03 | End: 2024-12-02

## 2024-09-03 RX ADMIN — PROPOFOL 70 MG: 10 INJECTION, EMULSION INTRAVENOUS at 10:09

## 2024-09-03 RX ADMIN — LIDOCAINE HYDROCHLORIDE 70 MG: 20 INJECTION INTRAVENOUS at 10:09

## 2024-09-03 RX ADMIN — PROPOFOL 175 MCG/KG/MIN: 10 INJECTION, EMULSION INTRAVENOUS at 10:09

## 2024-09-03 RX ADMIN — SODIUM CHLORIDE: 9 INJECTION, SOLUTION INTRAVENOUS at 10:09

## 2024-09-03 NOTE — TRANSFER OF CARE
"Anesthesia Transfer of Care Note    Patient: Laz Quintero    Procedure(s) Performed: Procedure(s) (LRB):  EGD (ESOPHAGOGASTRODUODENOSCOPY) (N/A)    Patient location: PACU    Anesthesia Type: general    Transport from OR: Transported from OR on room air with adequate spontaneous ventilation    Post pain: adequate analgesia    Post assessment: no apparent anesthetic complications and tolerated procedure well    Post vital signs: stable    Level of consciousness: awake, alert and oriented    Nausea/Vomiting: no nausea/vomiting    Complications: none    Transfer of care protocol was followed      Last vitals: Visit Vitals  BP (!) 141/71 (BP Location: Left arm, Patient Position: Lying)   Pulse 77   Temp 36.7 °C (98.1 °F) (Oral)   Resp 16   Ht 5' 6" (1.676 m)   Wt 69.4 kg (153 lb)   LMP 06/07/2015 (Exact Date)   SpO2 100%   Breastfeeding No   BMI 24.69 kg/m²     "

## 2024-09-03 NOTE — PROVATION PATIENT INSTRUCTIONS
Discharge Summary/Instructions after an Endoscopic Procedure  Patient Name: Laz Quintero  Patient MRN: [id number]  Patient YOB: 1970  Tuesday, September 3, 2024  Saurav Draper MD  Dear patient,  As a result of recent federal legislation (The Federal Cures Act), you may   receive lab or pathology results from your procedure in your [facility]   account before your physician is able to contact you. Your physician or   their representative will relay the results to you with their   recommendations at their soonest availability.  Thank you,  RESTRICTIONS:  During your procedure today, you received medications for sedation.  These   medications may affect your judgment, balance and coordination.  Therefore,   for 24 hours, you have the following restrictions:   - DO NOT drive a car, operate machinery, make legal/financial decisions,   sign important papers or drink alcohol.    ACTIVITY:  Today: no heavy lifting, straining or running due to procedural   sedation/anesthesia.  The following day: return to full activity including work.  DIET:  Eat and drink normally unless instructed otherwise.     TREATMENT FOR COMMON SIDE EFFECTS:  - Mild abdominal pain, nausea, belching, bloating or excessive gas:  rest,   eat lightly and use a heating pad.  - Sore Throat: treat with throat lozenges and/or gargle with warm salt   water.  - Because air was used during the procedure, expelling large amounts of air   from your rectum or belching is normal.  - If a bowel prep was taken, you may not have a bowel movement for 1-3 days.    This is normal.  SYMPTOMS TO WATCH FOR AND REPORT TO YOUR PHYSICIAN:  1. Abdominal pain or bloating, other than gas cramps.  2. Chest pain.  3. Back pain.  4. Signs of infection such as: chills or fever occurring within 24 hours   after the procedure.  5. Rectal bleeding, which would show as bright red, maroon, or black stools.   (A tablespoon of blood from the rectum is not serious, especially if    hemorrhoids are present.)  6. Vomiting.  7. Weakness or dizziness.  GO DIRECTLY TO THE NEAREST EMERGENCY ROOM IF YOU HAVE ANY OF THE FOLLOWING:      Difficulty breathing              Chills and/or fever over 101 F   Persistent vomiting and/or vomiting blood   Severe abdominal pain   Severe chest pain   Black, tarry stools   Bleeding- more than one tablespoon   Any other symptom or condition that you feel may need urgent attention  Your doctor recommends these additional instructions:  If any biopsies were taken, your doctors clinic will contact you in 1 to 2   weeks with any results.  - Discharge patient to home (ambulatory).   - Resume previous diet; Discharge to home (ambulatory); Resume outpatient   medications - decrease PPI to once daily.  - Return to primary care physician as previously scheduled.   - Return to GI clinic at appointment to be scheduled.  For questions, problems or results please call your physician - Saurav Draper MD at Work:  [phone].  [facility], EMERGENCY ROOM PHONE NUMBER: [phone]  IF A COMPLICATION OR EMERGENCY SITUATION ARISES AND YOU ARE UNABLE TO REACH   YOUR PHYSICIAN - GO DIRECTLY TO THE EMERGENCY ROOM.  Saurav Draper MD  9/3/2024 10:43:23 AM  This report has been verified and signed electronically.  Dear patient,  As a result of recent federal legislation (The Federal Cures Act), you may   receive lab or pathology results from your procedure in your [facility]   account before your physician is able to contact you. Your physician or   their representative will relay the results to you with their   recommendations at their soonest availability.  Thank you,  PROVATION

## 2024-09-03 NOTE — ANESTHESIA PREPROCEDURE EVALUATION
09/03/2024  Laz Quintero is a 53 y.o., female.      Patient Name: Laz Quintero  YOB: 1970  MRN: [id number]  [facility]: [id number]      Code Status: Full Code   Date of Procedure: 9/3/2024  Anesthesia: Choice Procedure: Procedure(s) (LRB):  EGD (ESOPHAGOGASTRODUODENOSCOPY) (N/A)  Pre-Operative Diagnosis: Peptic ulcer disease [K27.9]  Proceduralist: Surgeons and Role:     * Suarav Draper MD - Primary        SUBJECTIVE:   Laz Quintero is a 53 y.o. female who  has a past medical history of Abnormal Pap smear, Abnormal Pap smear of cervix, Annular tear of lumbar disc, L5-S1. (07/26/2012), Chronic LBP, HPTH (hyperparathyroidism), Hyperlipidemia, Menorrhagia, PONV (postoperative nausea and vomiting), Primary hypertension (01/31/2023), Right lumbar radiculopathy (07/26/2012), and Seasonal allergies. No notes on file    ALLERGIES:     Review of patient's allergies indicates:   Allergen Reactions    Clindamycin     Sulfa (sulfonamide antibiotics) Anaphylaxis    Sulfa dyne     Sulfamethoxazole-trimethoprim      Other reaction(s): Anaphylaxis    Dermabond [tissue glues] Rash     MEDICATIONS:     Current Facility-Administered Medications   Medication Dose Route Frequency Provider Last Rate Last Admin    0.9%  NaCl infusion   Intravenous Continuous Saurav Draper MD        0.9%  NaCl infusion   Intravenous Continuous Saurav Draper MD        sodium chloride 0.9% flush 10 mL  10 mL Intravenous PRN Saurav Draper MD              History:     Patient Active Problem List   Diagnosis    Low back pain radiating to right leg    Seasonal allergies    HPTH (hyperparathyroidism)    Chronic LBP    Right lumbar radiculopathy    Anxiety and depression    S/P laparoscopic hysterectomy    Left lumbar radiculitis    Abdominal pain    Herniated nucleus pulposus, L5-S1, left    Chronic left-sided low back pain  with sciatica    Weakness of both lower extremities    Decreased range of motion of trunk and back    Pre-op exam    Palpitations    Primary hypertension    Mediastinal mass    Weakness    Pain    Left lumbar radiculopathy    Intractable vomiting with nausea    Chronic gastritis without bleeding     Past Medical History:   Diagnosis Date    Abnormal Pap smear     Abnormal Pap smear of cervix     Annular tear of lumbar disc, L5-S1. 07/26/2012    Chronic LBP     HPTH (hyperparathyroidism)     Hyperlipidemia     Menorrhagia     PONV (postoperative nausea and vomiting)     Primary hypertension 01/31/2023    Right lumbar radiculopathy 07/26/2012    Seasonal allergies      Surgical History:    has a past surgical history that includes [place] tooth extraction; Cholecystectomy; Tubal ligation; hysterectomy, total, laparoscopic, with salpingectomy (06/16/2015); Colonoscopy (N/A, 01/19/2018); Esophagogastroduodenoscopy (N/A, 09/06/2019); Back surgery (2018); Colonoscopy (N/A, 12/28/2022); xi robotic rats (Right, 03/21/2023); Injection of anesthetic agent around multiple intercostal nerves (Right, 03/21/2023); and Esophagogastroduodenoscopy (N/A, 6/13/2024).   Social History:    reports being sexually active and has had partner(s) who are male. She reports using the following method of birth control/protection: See Surgical Hx.  reports that she has never smoked. She has never been exposed to tobacco smoke. She has never used smokeless tobacco. She reports that she does not drink alcohol and does not use drugs.       Pre-op Assessment    I have reviewed the Patient Summary Reports.     I have reviewed the Nursing Notes. I have reviewed the NPO Status.   I have reviewed the Medications.     Review of Systems  Anesthesia Hx:  No problems with previous Anesthesia             Denies Family Hx of Anesthesia complications.   Personal Hx of Anesthesia complications, Post-Operative Nausea/Vomiting                     Hematology/Oncology:  Hematology Normal                                     Cardiovascular:     Hypertension                                        Musculoskeletal:  Musculoskeletal Normal                Neurological:  Neurology Normal                                      Dermatological:  Skin Normal        Physical Exam  General: Cooperative, Alert and Oriented    Airway:  Mallampati: II   Mouth Opening: Normal  TM Distance: Normal  Tongue: Normal  Neck ROM: Normal ROM    Dental:  Intact        Anesthesia Plan  Type of Anesthesia, risks & benefits discussed:    Anesthesia Type: Gen Natural Airway  Intra-op Monitoring Plan: Standard ASA Monitors  Induction:  IV  Informed Consent: Informed consent signed with the Patient and all parties understand the risks and agree with anesthesia plan.  All questions answered.   ASA Score: 2  Day of Surgery Review of History & Physical: H&P Update referred to the surgeon/provider.I have interviewed and examined the patient. I have reviewed the patient's H&P dated: There are no significant changes.     Ready For Surgery From Anesthesia Perspective.     .

## 2024-09-03 NOTE — H&P
Short Stay Endoscopy History and Physical    PCP - Charlotte Jacinto MD  Referring Physician - Kathryn Mccarty MD  [address] [place] [address]    Procedure - esophagogastroduodenoscopy  ASA - per anesthesia  Mallampati - per anesthesia  History of Anesthesia problems - per anesthesia  Family history Anesthesia problems -  per anesthesia   Plan of anesthesia - per anesthesia    HPI:  This is a 53 y.o. female here for evaluation of: peptic ulcer disease    Reflux - no  Dysphagia - no  Abdominal pain - no  Diarrhea - no    ROS:  Constitutional: No fevers, chills, No weight loss  CV: No chest pain  Pulm: No cough, No shortness of breath  GI: see HPI      Medical History:  has a past medical history of Abnormal Pap smear, Abnormal Pap smear of cervix, Annular tear of lumbar disc, L5-S1. (07/26/2012), Chronic LBP, HPTH (hyperparathyroidism), Hyperlipidemia, Menorrhagia, PONV (postoperative nausea and vomiting), Primary hypertension (01/31/2023), Right lumbar radiculopathy (07/26/2012), and Seasonal allergies.    Surgical History:  has a past surgical history that includes [place] tooth extraction; Cholecystectomy; Tubal ligation; hysterectomy, total, laparoscopic, with salpingectomy (06/16/2015); Colonoscopy (N/A, 01/19/2018); Esophagogastroduodenoscopy (N/A, 09/06/2019); Back surgery (2018); Colonoscopy (N/A, 12/28/2022); xi robotic rats (Right, 03/21/2023); Injection of anesthetic agent around multiple intercostal nerves (Right, 03/21/2023); and Esophagogastroduodenoscopy (N/A, 6/13/2024).    Family History: family history includes Colon polyps in her father; Diabetes in her father; Hypertension in her maternal grandmother and mother; Multiple myeloma in her maternal grandmother; Stroke (age of onset: 68) in her paternal grandmother..    Social History:  reports that she has never smoked. She has never been exposed to tobacco smoke. She has never used smokeless tobacco. She reports that she does  not drink alcohol and does not use drugs.    Review of patient's allergies indicates:   Allergen Reactions    Clindamycin     Sulfa (sulfonamide antibiotics) Anaphylaxis    Sulfa dyne     Sulfamethoxazole-trimethoprim      Other reaction(s): Anaphylaxis    Dermabond [tissue glues] Rash       Medications:   Medications Prior to Admission   Medication Sig Dispense Refill Last Dose    albuterol (PROVENTIL/VENTOLIN HFA) 90 mcg/actuation inhaler INHALE 2 PUFFS INTO THE LUNGS BY MOUTH EVERY 6 HOURS AS NEEDED FOR WHEEZING. RESCUE. 8.5 g 0 Past Month    CYMBALTA 60 mg capsule Take 60 mg by mouth.   Past Week    gabapentin (NEURONTIN) 300 MG capsule Take 1 capsule (300 mg total) by mouth 2 (two) times daily AND 2 capsules (600 mg total) every evening. 120 capsule 1 Past Week    methocarbamoL (ROBAXIN) 750 MG Tab Take 750 mg by mouth every 12 (twelve) hours.   Past Week    pantoprazole (PROTONIX) 40 MG tablet Take 1 tablet (40 mg total) by mouth 2 (two) times daily before meals for 90 days, THEN 1 tablet (40 mg total) once daily. 540 tablet 0 9/2/2024    pregabalin (LYRICA) 75 MG capsule Take 75 mg by mouth 2 (two) times daily.   Past Week    QUEtiapine (SEROQUEL XR) 150 mg Tb24 Take 150 mg by mouth every evening.   Past Week    traMADoL (ULTRAM) 50 mg tablet Take 50 mg by mouth.   Past Week    traZODone (DESYREL) 100 MG tablet Take 100 mg by mouth every evening.   Past Week    ALPRAZolam (XANAX) 0.25 MG tablet Take 1 tablet (0.25 mg total) by mouth daily as needed for Anxiety. 20 tablet 1     benzonatate (TESSALON) 200 MG capsule Take 1 capsule (200 mg total) by mouth 3 (three) times daily as needed for Cough. 20 capsule 0     butalbital-acetaminophen-caffeine -40 mg (FIORICET, ESGIC) -40 mg per tablet Take 1 tablet by mouth every 4 (four) hours as needed.       diclofenac sodium (VOLTAREN) 1 % Gel Apply 2 g topically once daily. Use gloves to apply 100 g 1     estradioL (DIVIGEL) 1 mg/gram (0.1 %) topical gel  Place 1 g onto the skin once daily. 30 g 3     fluticasone propionate (FLONASE) 50 mcg/actuation nasal spray 1 spray (50 mcg total) by Each Nostril route once daily. 11.1 mL 0     hydroCHLOROthiazide (HYDRODIURIL) 12.5 MG Tab Take 1 tablet (12.5 mg total) by mouth once daily. 30 tablet 0     meclizine (ANTIVERT) 25 mg tablet Take 1 tablet (25 mg total) by mouth 3 (three) times daily as needed for Dizziness. 30 tablet 0     promethazine (PHENERGAN) 25 MG tablet Take 1 tablet (25 mg total) by mouth every 6 (six) hours as needed for Nausea. 20 tablet 0     promethazine (PHENERGAN) 25 MG tablet Take 1 tablet (25 mg total) by mouth every 6 (six) hours as needed for Nausea. 20 tablet 0        Physical Exam:    Vital Signs:   Vitals:    09/03/24 0915   BP: (!) 141/71   Pulse: 77   Resp: 16   Temp: 98.1 °F (36.7 °C)       General Appearance: Well appearing in no acute distress    Labs:  Lab Results   Component Value Date    WBC 7.27 06/13/2024    HGB 11.2 (L) 06/13/2024    HCT 33.6 (L) 06/13/2024     06/13/2024    CHOL 237 (H) 06/17/2024    TRIG 90 06/17/2024    HDL 63 06/17/2024    ALT 22 06/17/2024    AST 19 06/17/2024     06/17/2024    K 4.0 06/17/2024     06/17/2024    CREATININE 0.8 06/17/2024    BUN 11 06/17/2024    CO2 25 06/17/2024    TSH 0.972 06/17/2024    INR 1.0 04/12/2015    HGBA1C 5.3 06/17/2024       I have explained the risks and benefits of this endoscopic procedure to the patient including but not limited to bleeding, inflammation, infection, perforation, missing a lesion and death.      Estee Harkins MD

## 2024-09-05 NOTE — ANESTHESIA POSTPROCEDURE EVALUATION
Anesthesia Post Evaluation    Patient: Laz Quintero    Procedure(s) Performed: Procedure(s) (LRB):  EGD (ESOPHAGOGASTRODUODENOSCOPY) (N/A)    Final Anesthesia Type: general      Patient location during evaluation: GI PACU  Patient participation: Yes- Able to Participate  Level of consciousness: awake and alert  Post-procedure vital signs: reviewed and stable  Pain management: adequate  Airway patency: patent    PONV status at discharge: No PONV  Anesthetic complications: no      Cardiovascular status: blood pressure returned to baseline and stable  Respiratory status: unassisted, spontaneous ventilation and room air  Hydration status: euvolemic  Follow-up not needed.              Vitals Value Taken Time   /77 09/03/24 1119   Temp 36.5 °C (97.7 °F) 09/03/24 1046   Pulse 57 09/03/24 1119   Resp 16 09/03/24 1119   SpO2 100 % 09/03/24 1119         Event Time   Out of Recovery 11:39:53         Pain/Maria Guadalupe Score: No data recorded

## 2024-09-12 ENCOUNTER — PATIENT MESSAGE (OUTPATIENT)
Dept: GASTROENTEROLOGY | Facility: HOSPITAL | Age: 54
End: 2024-09-12
Payer: COMMERCIAL

## 2024-10-02 DIAGNOSIS — M54.16 LUMBAR RADICULOPATHY: Primary | ICD-10-CM

## 2024-10-08 ENCOUNTER — CLINICAL SUPPORT (OUTPATIENT)
Dept: REHABILITATION | Facility: HOSPITAL | Age: 54
End: 2024-10-08
Payer: COMMERCIAL

## 2024-10-08 DIAGNOSIS — M54.16 LEFT LUMBAR RADICULOPATHY: Primary | ICD-10-CM

## 2024-10-08 PROCEDURE — 97164 PT RE-EVAL EST PLAN CARE: CPT

## 2024-10-08 PROCEDURE — 97113 AQUATIC THERAPY/EXERCISES: CPT

## 2024-10-08 NOTE — PROGRESS NOTES
[facility]   Physical Therapy Updated  POC Note      Name: Laz Quintero  Clinic Number: [id number]    Therapy Diagnosis:   Encounter Diagnosis   Name Primary?    Left lumbar radiculopathy Yes     Physician: Ramin Sloan MD    Visit Date: 10/8/2024      Physician Orders: Aquatic Therapy   Medical Diagnosis from Referral: LBP with radicular symptoms  Evaluation Date: 2024  Authorization Period Expiration: 25  Plan of Care Expiration: 24  Visit # / Visits authorized:   21 visits total.    Precautions: Standard and Fall    Time In: 11:24 am   Time Out: 12:30 pm  Total Billable Time: 60 minutes       SUBJECTIVE      Update: she has been waiting for more PT authorization since her last visit dated 24.   She stated overall her pain levels have been a lot worse and her leg continues to feel like it is going to give out when she is walking.  She stated she is also waiting for authorization to see her Ortho MD to schedule surgery for her left hip (Labral tear).  She has been having a lot of trouble sleeping at night due to the pain and has needed to put a pillow between her knees to try to get comfortable. She also reports her L LE is beginning to swell more and has needed to elevate her legs.     Pain:  Current: 8/10, worst 10/10, best 7/10   Location: L Lumbosacral and posterior hip into L anterior thigh   Description: Throbbing, burning, numbness, tingling  Aggravating Factors: bending forward, sitting greater than 15-20 minutes, driving greater than 15-20 minutes, standing greater than 10-15 minutes, cooking, stairs, sleeping    Easing Factors: stretching, morning walks to the corner and around the block when able.       OBJECTIVE      Update 10/8/24    TU24 13 seconds with moderate antalgic gait and difficulty with initiation of walking from sit to stand.   24 11 seconds with improved gait pattern including reduced antalgic gait to minimal.    8/6/24  9 seconds with minimal antalgic gait.   10/8/24 12 seconds with severe antalgic gait and difficulty initiating walking and increased effort and pain noted with stand to sit.      5 Times Sit to Stand:   5/17/24 22 seconds without use of hands with reported pain L hip and lumbar on the L.   6/25/24 16 seconds without use of hands. She had increased speed with descent to chair with first rep which she stated bothered her pain.   8/6/24  15 seconds without use of hands for support. She performed the task with improved quality of eccentric descent to chair.   10/8/24 20 seconds with use of R hand on handrail for support.     Gait: The patient ambulates with moderate antalgic gait, reduced stance phase on the L and reduced hip extension on the L.       Range of Motion:     AROM Pain   Flexion Limited ~75% Pain L lumbar and posterior hip      Extension 25% past neutral Pain L lumbar and posterior hip      SB R Limited ~50% Pain L Lumbar and posterior hip   SB L  Limited ~50% Pain L Lumbar and posterior hip     Lower Extremity Strength     10/8/24 L MMT not tested due to pain with attempted AROM    Right LE   Left LE     Knee extension: 4/5 Knee extension: 4/5   Knee flexion: 4/5 Knee flexion: 4/5   Hip flexion: 4-/5 Hip flexion: 3+/5   Hip abduction: 4-/5 Hip abduction: 3-/5   Ankle dorsiflexion: 4+/5 Ankle dorsiflexion: 4/5   Ankle plantarflexion: 4+/5 Ankle plantarflexion: 4/5   The patient reported pain with all L MMT testing      Palpation: (+) TTP noted L Lumbar Paraspinals, PSIS, Piriformis, Gluteals.      TREATMENT      Laz received aquatic therapeutic exercises to develop strength, endurance, ROM, flexibility, posture, and core stabilization for 60 minutes including:    FUNCTIONAL MOBILITY TRAINING x 2 laps each at beginning and 1 lap each at end of session  Walk forward/backward/lateral with slow controlled steps    STRETCHES 2 x 30sec  HS--add as tolerated    LE EX x 25 reps  HELD RTB --re-add when  able  Squat  Heel raise with gluteal set  Hip abduction  Hip flex  HS curl  Hip Ext    Seated on Pool stool  Sit to stand   HELD--.4# yuk ball held out front  LAQ  Hip flex  Clam     Walking marches x 2 laps  Tandem walking x 1 lap     UE EX/CORE  x 20    HELD-RTB around knees  Shoulder abd/add TA activation with yellow slightly weighted paddles  Shoulder horizontal abd/add TA activation with yellow slightly weighted paddles  Shoulder flex/ext with TA activation with yellow slightly weighted paddles    ENDURANCE  Lumbar decompression x 2'   LTR x 2'  Bicycle in // bars x 2'    Held LE exercises below  Step up on 4 inch step x 10   Lateral step up on 4 inch step x 10     Patient Education and Home Exercises     Home Exercises Provided and Patient Education Provided     Education provided:   Role of aquatic therapy  Hydration post therapy  SP Cane recommendations/fitting    Written Home Exercises Provided: Patient instructed to cont prior HEP.    ASSESSMENT      Update:   The patient has attended 21 Aquatic PT visits since the start of care but has not attended PT since 8/19/24 due to gap in treatment waiting for additional Work Comp authorization.  Subjectively the patient reports that her pain levels have been getting progressively worse with greater difficulty sleeping and applying weight through her left lower extremity. Objectively she has had a regression of her R knee and hip flexion MMT compared to the previous re-evaluation and L MMT was not assessed due to elevated pain levels and pain with AROM.  She had a regression of TUG and 5 times sit to stand scores with greater difficulty and pain with execution of both.  At this time she could benefit from additional skilled PT interventions to work on improving mobility and function while awaiting Ortho appt for surgical consult.     Previous Short Term Goals Status:       Short Term Goals: 6 weeks   1. Patient will be independent in HEP & progressions.--Ongoing as  of 10/8/24  2. Patient will achieve TUG score of 11 sec to demonstrate improved mobility--MET as of 6/25/24, regression due to gap in treatment as of 10/8/24  3. The patient will achieve 5 sit to stand score of 20 seconds to demonstrate improved transfers and endurance. --MET as of 6/25/24, regression due to gap in treatment as of 10/8/24    Long Term Goals: 12 weeks   1. The patient will demonstrate independence with extensive HEP.--Ongoing as of 8/6/24  2. Patient will achieve 5 sit to stand score of 19 seconds to demonstrate improved transfers & endurance.--MET as of 6/25/24, regression due to gap in treatment as of 10/8/24  3. Patent is able to demonstrate MMT 4/5 L LE and 4+/5 R LE without pain reports during testing.  --No change in B LE MMT as of 8/6/24, regression due to gap in treatment as of 10/8/24     New LTG  Patient will achieve 5 sit to stand score of 14 seconds to demonstrate improved transfers & endurance.--Progressing towards achievement as of 8/6/24, regression due to gap in treatment as of 10/8/24    Long Term Goal Status: continue per initial plan of care.    Reasons for Recertification of Therapy:   To work on improving functional strength and mobility       PLAN        Updated Certification Period: 10/8/24 to 11/30/24   Recommended Treatment Plan: 2 times per week for 8 weeks:  Aquatic Therapy      Frances Reddy, PT

## 2024-10-09 ENCOUNTER — CLINICAL SUPPORT (OUTPATIENT)
Dept: REHABILITATION | Facility: HOSPITAL | Age: 54
End: 2024-10-09
Payer: COMMERCIAL

## 2024-10-09 DIAGNOSIS — M54.16 LEFT LUMBAR RADICULOPATHY: Primary | ICD-10-CM

## 2024-10-09 PROCEDURE — 97113 AQUATIC THERAPY/EXERCISES: CPT

## 2024-10-09 NOTE — PROGRESS NOTES
[facility]   Physical Therapy Updated  POC Note      Name: Laz Quintero  Clinic Number: [id number]    Therapy Diagnosis:   Encounter Diagnosis   Name Primary?    Left lumbar radiculopathy Yes     Physician: Ramin Sloan MD    Visit Date: 10/9/2024      Physician Orders: Aquatic Therapy   Medical Diagnosis from Referral: LBP with radicular symptoms  Evaluation Date: 5/17/2024  Authorization Period Expiration: 5/6/25  Plan of Care Expiration: 11/30/24  Visit # / Visits authorized: 1/8  21 visits total.    Precautions: Standard and Fall    Time In: 10:30  am   Time Out: 11:30 am  Total Billable Time: 60 minutes       SUBJECTIVE      The patient reports: she felt sore after yesterday but no worse than expected.     Pain:  Current: 8/10, worst 10/10, best 7/10   Location: L Lumbosacral and posterior hip into L anterior thigh   Description: Throbbing, burning, numbness, tingling  Aggravating Factors: bending forward, sitting greater than 15-20 minutes, driving greater than 15-20 minutes, standing greater than 10-15 minutes, cooking, stairs, sleeping    Easing Factors: stretching, morning walks to the corner and around the block when able.       OBJECTIVE      Update 10/8/24      TREATMENT      Laz received aquatic therapeutic exercises to develop strength, endurance, ROM, flexibility, posture, and core stabilization for 60 minutes including:    FUNCTIONAL MOBILITY TRAINING x 2 laps each at beginning and 1 lap each at end of session  Walk forward/backward/lateral with slow controlled steps    STRETCHES 2 x 30sec  HS--add as tolerated    LE EX x 25 reps  HELD RTB --re-add when able  Squat  Heel raise with gluteal set  Hip abduction  Hip flex  HS curl  Hip Ext    Seated on Pool stool  Sit to stand   HELD--.4# yuk ball held out front  LAQ  Hip flex  Clam     Walking marches x 2 laps  Tandem walking x 1 lap     UE EX/CORE  x 20    HELD-RTB around knees  Shoulder abd/add TA activation  with yellow slightly weighted paddles  Shoulder horizontal abd/add TA activation with yellow slightly weighted paddles  Shoulder flex/ext with TA activation with yellow slightly weighted paddles    ENDURANCE  Lumbar decompression x 2'   LTR x 2'  Bicycle in // bars x 2'    Held LE exercises below  Step up on 4 inch step x 10   Lateral step up on 4 inch step x 10     Patient Education and Home Exercises     Home Exercises Provided and Patient Education Provided     Education provided:   Role of aquatic therapy  Hydration post therapy  SP Cane recommendations/fitting    Written Home Exercises Provided: Patient instructed to cont prior HEP.    ASSESSMENT      The patient was able to re-add the UE/core exercises and tandem walking today. She reported feeling it in her left hip with the UE exercises but performed with good quality and posture.     Previous Short Term Goals Status:       Short Term Goals: 6 weeks   1. Patient will be independent in HEP & progressions.--Ongoing as of 10/8/24  2. Patient will achieve TUG score of 11 sec to demonstrate improved mobility--MET as of 6/25/24, regression due to gap in treatment as of 10/8/24  3. The patient will achieve 5 sit to stand score of 20 seconds to demonstrate improved transfers and endurance. --MET as of 6/25/24, regression due to gap in treatment as of 10/8/24    Long Term Goals: 12 weeks   1. The patient will demonstrate independence with extensive HEP.--Ongoing as of 8/6/24  2. Patient will achieve 5 sit to stand score of 19 seconds to demonstrate improved transfers & endurance.--MET as of 6/25/24, regression due to gap in treatment as of 10/8/24  3. Patent is able to demonstrate MMT 4/5 L LE and 4+/5 R LE without pain reports during testing.  --No change in B LE MMT as of 8/6/24, regression due to gap in treatment as of 10/8/24     New LTG  Patient will achieve 5 sit to stand score of 14 seconds to demonstrate improved transfers & endurance.--Progressing towards  achievement as of 8/6/24, regression due to gap in treatment as of 10/8/24    PLAN        Updated Certification Period: 10/8/24 to 11/30/24   Recommended Treatment Plan: 2 times per week for 8 weeks:  Aquatic Therapy      Frances Reddy, PT

## 2024-10-09 NOTE — PLAN OF CARE
[facility]   Physical Therapy Updated  POC Note      Name: Laz Quintero  Clinic Number: [id number]    Therapy Diagnosis:   Encounter Diagnosis   Name Primary?    Left lumbar radiculopathy Yes     Physician: Ramin Sloan MD    Visit Date: 10/8/2024      Physician Orders: Aquatic Therapy   Medical Diagnosis from Referral: LBP with radicular symptoms  Evaluation Date: 2024  Authorization Period Expiration: 25  Plan of Care Expiration: 24  Visit # / Visits authorized:   21 visits total.    Precautions: Standard and Fall    Time In: 11:24 am   Time Out: 12:30 pm  Total Billable Time: 60 minutes       SUBJECTIVE      Update: she has been waiting for more PT authorization since her last visit dated 24.   She stated overall her pain levels have been a lot worse and her leg continues to feel like it is going to give out when she is walking.  She stated she is also waiting for authorization to see her Ortho MD to schedule surgery for her left hip (Labral tear).  She has been having a lot of trouble sleeping at night due to the pain and has needed to put a pillow between her knees to try to get comfortable. She also reports her L LE is beginning to swell more and has needed to elevate her legs.     Pain:  Current: 8/10, worst 10/10, best 7/10   Location: L Lumbosacral and posterior hip into L anterior thigh   Description: Throbbing, burning, numbness, tingling  Aggravating Factors: bending forward, sitting greater than 15-20 minutes, driving greater than 15-20 minutes, standing greater than 10-15 minutes, cooking, stairs, sleeping    Easing Factors: stretching, morning walks to the corner and around the block when able.       OBJECTIVE      Update 10/8/24    TU24 13 seconds with moderate antalgic gait and difficulty with initiation of walking from sit to stand.   24 11 seconds with improved gait pattern including reduced antalgic gait to minimal.    8/6/24  9 seconds with minimal antalgic gait.   10/8/24 12 seconds with severe antalgic gait and difficulty initiating walking and increased effort and pain noted with stand to sit.      5 Times Sit to Stand:   5/17/24 22 seconds without use of hands with reported pain L hip and lumbar on the L.   6/25/24 16 seconds without use of hands. She had increased speed with descent to chair with first rep which she stated bothered her pain.   8/6/24  15 seconds without use of hands for support. She performed the task with improved quality of eccentric descent to chair.   10/8/24 20 seconds with use of R hand on handrail for support.     Gait: The patient ambulates with moderate antalgic gait, reduced stance phase on the L and reduced hip extension on the L.       Range of Motion:     AROM Pain   Flexion Limited ~75% Pain L lumbar and posterior hip      Extension 25% past neutral Pain L lumbar and posterior hip      SB R Limited ~50% Pain L Lumbar and posterior hip   SB L  Limited ~50% Pain L Lumbar and posterior hip     Lower Extremity Strength     10/8/24 L MMT not tested due to pain with attempted AROM    Right LE   Left LE     Knee extension: 4/5 Knee extension: 4/5   Knee flexion: 4/5 Knee flexion: 4/5   Hip flexion: 4-/5 Hip flexion: 3+/5   Hip abduction: 4-/5 Hip abduction: 3-/5   Ankle dorsiflexion: 4+/5 Ankle dorsiflexion: 4/5   Ankle plantarflexion: 4+/5 Ankle plantarflexion: 4/5   The patient reported pain with all L MMT testing      Palpation: (+) TTP noted L Lumbar Paraspinals, PSIS, Piriformis, Gluteals.      TREATMENT      Laz received aquatic therapeutic exercises to develop strength, endurance, ROM, flexibility, posture, and core stabilization for 60 minutes including:    FUNCTIONAL MOBILITY TRAINING x 2 laps each at beginning and 1 lap each at end of session  Walk forward/backward/lateral with slow controlled steps    STRETCHES 2 x 30sec  HS--add as tolerated    LE EX x 25 reps  HELD RTB --re-add when  able  Squat  Heel raise with gluteal set  Hip abduction  Hip flex  HS curl  Hip Ext    Seated on Pool stool  Sit to stand   HELD--.4# yuk ball held out front  LAQ  Hip flex  Clam     Walking marches x 2 laps  Tandem walking x 1 lap     UE EX/CORE  x 20    HELD-RTB around knees  Shoulder abd/add TA activation with yellow slightly weighted paddles  Shoulder horizontal abd/add TA activation with yellow slightly weighted paddles  Shoulder flex/ext with TA activation with yellow slightly weighted paddles    ENDURANCE  Lumbar decompression x 2'   LTR x 2'  Bicycle in // bars x 2'    Held LE exercises below  Step up on 4 inch step x 10   Lateral step up on 4 inch step x 10     Patient Education and Home Exercises     Home Exercises Provided and Patient Education Provided     Education provided:   Role of aquatic therapy  Hydration post therapy  SP Cane recommendations/fitting    Written Home Exercises Provided: Patient instructed to cont prior HEP.    ASSESSMENT      Update:   The patient has attended 21 Aquatic PT visits since the start of care but has not attended PT since 8/19/24 due to gap in treatment waiting for additional Work Comp authorization.  Subjectively the patient reports that her pain levels have been getting progressively worse with greater difficulty sleeping and applying weight through her left lower extremity. Objectively she has had a regression of her R knee and hip flexion MMT compared to the previous re-evaluation and L MMT was not assessed due to elevated pain levels and pain with AROM.  She had a regression of TUG and 5 times sit to stand scores with greater difficulty and pain with execution of both.  At this time she could benefit from additional skilled PT interventions to work on improving mobility and function while awaiting Ortho appt for surgical consult.     Previous Short Term Goals Status:       Short Term Goals: 6 weeks   1. Patient will be independent in HEP & progressions.--Ongoing as  of 10/8/24  2. Patient will achieve TUG score of 11 sec to demonstrate improved mobility--MET as of 6/25/24, regression due to gap in treatment as of 10/8/24  3. The patient will achieve 5 sit to stand score of 20 seconds to demonstrate improved transfers and endurance. --MET as of 6/25/24, regression due to gap in treatment as of 10/8/24    Long Term Goals: 12 weeks   1. The patient will demonstrate independence with extensive HEP.--Ongoing as of 8/6/24  2. Patient will achieve 5 sit to stand score of 19 seconds to demonstrate improved transfers & endurance.--MET as of 6/25/24, regression due to gap in treatment as of 10/8/24  3. Patent is able to demonstrate MMT 4/5 L LE and 4+/5 R LE without pain reports during testing.  --No change in B LE MMT as of 8/6/24, regression due to gap in treatment as of 10/8/24     New LTG  Patient will achieve 5 sit to stand score of 14 seconds to demonstrate improved transfers & endurance.--Progressing towards achievement as of 8/6/24, regression due to gap in treatment as of 10/8/24    Long Term Goal Status: continue per initial plan of care.    Reasons for Recertification of Therapy:   To work on improving functional strength and mobility       PLAN        Updated Certification Period: 10/8/24 to 11/30/24   Recommended Treatment Plan: 2 times per week for 8 weeks:  Aquatic Therapy      Frances Reddy, PT

## 2024-10-15 ENCOUNTER — CLINICAL SUPPORT (OUTPATIENT)
Dept: REHABILITATION | Facility: HOSPITAL | Age: 54
End: 2024-10-15
Payer: COMMERCIAL

## 2024-10-15 DIAGNOSIS — M54.16 LEFT LUMBAR RADICULOPATHY: Primary | ICD-10-CM

## 2024-10-15 PROCEDURE — 97113 AQUATIC THERAPY/EXERCISES: CPT

## 2024-10-15 NOTE — PROGRESS NOTES
[facility]   Physical Therapy Updated  POC Note      Name: Laz Quintero  Clinic Number: [id number]    Therapy Diagnosis:   Encounter Diagnosis   Name Primary?    Left lumbar radiculopathy Yes     Physician: Ramin Sloan MD    Visit Date: 10/15/2024      Physician Orders: Aquatic Therapy   Medical Diagnosis from Referral: LBP with radicular symptoms  Evaluation Date: 5/17/2024  Authorization Period Expiration: 5/6/25  Plan of Care Expiration: 11/30/24  Visit # / Visits authorized: 3/8  23 visits total.    Precautions: Standard and Fall    Time In: 8:30 am   Time Out: 9:30 am   Total Billable Time: 60 minutes       SUBJECTIVE      The patient reports: she is stiff this morning after the ride taking a little longer. She stated she took about 2-3 days after last visit to reduce the soreness.    Pain:  Current: 7/10, worst 10/10, best 7/10   Location: L Lumbosacral and posterior hip into L anterior thigh   Description: Throbbing, burning, numbness, tingling  Aggravating Factors: bending forward, sitting greater than 15-20 minutes, driving greater than 15-20 minutes, standing greater than 10-15 minutes, cooking, stairs, sleeping    Easing Factors: stretching, morning walks to the corner and around the block when able.       OBJECTIVE      Update 10/8/24      TREATMENT      Laz received aquatic therapeutic exercises to develop strength, endurance, ROM, flexibility, posture, and core stabilization for 60 minutes including:    FUNCTIONAL MOBILITY TRAINING x 2 laps each at beginning and 1 lap each at end of session  Walk forward/backward/lateral with slow controlled steps    STRETCHES 2 x 30sec  HS--add as tolerated    LE EX x 30 reps  HELD RTB --re-add when able  Squat  Heel raise with gluteal set  Hip abduction  Hip flex  HS curl  Hip Ext    Seated on Pool stool  Sit to stand   HELD--.4# yuk ball held out front  LAQ  Hip flex  Clam     Walking marches x 2 laps  Tandem walking x 1 lap      UE EX/CORE  x 30    HELD-RTB around knees  Shoulder abd/add TA activation with yellow slightly weighted paddles  Shoulder horizontal abd/add TA activation with yellow slightly weighted paddles  Shoulder flex/ext with TA activation with yellow slightly weighted paddles    ENDURANCE  Lumbar decompression x 2'   LTR x 2'  Bicycle in // bars x 2'    Held LE exercises below  Step up on 4 inch step x 10   Lateral step up on 4 inch step x 10     Patient Education and Home Exercises     Home Exercises Provided and Patient Education Provided     Education provided:   Role of aquatic therapy  Hydration post therapy  SP Cane recommendations/fitting    Written Home Exercises Provided: Patient instructed to cont prior HEP.    ASSESSMENT      The patient performed advanced resistance with UE exercises and she was able to perform with good core stabilization and minimal sway noted.  The resistance band was held again due to high pain levels and ambulating with visible antalgic gait entering the clinic today. Will attempt to add next week.     Due to the nature of the clinical environment an extension of care was utilized throughout the treatment.     Previous Short Term Goals Status:       Short Term Goals: 6 weeks   1. Patient will be independent in HEP & progressions.--Ongoing as of 10/8/24  2. Patient will achieve TUG score of 11 sec to demonstrate improved mobility--MET as of 6/25/24, regression due to gap in treatment as of 10/8/24  3. The patient will achieve 5 sit to stand score of 20 seconds to demonstrate improved transfers and endurance. --MET as of 6/25/24, regression due to gap in treatment as of 10/8/24    Long Term Goals: 12 weeks   1. The patient will demonstrate independence with extensive HEP.--Ongoing as of 8/6/24  2. Patient will achieve 5 sit to stand score of 19 seconds to demonstrate improved transfers & endurance.--MET as of 6/25/24, regression due to gap in treatment as of 10/8/24  3. Patent is able to  demonstrate MMT 4/5 L LE and 4+/5 R LE without pain reports during testing.  --No change in B LE MMT as of 8/6/24, regression due to gap in treatment as of 10/8/24     New LTG  Patient will achieve 5 sit to stand score of 14 seconds to demonstrate improved transfers & endurance.--Progressing towards achievement as of 8/6/24, regression due to gap in treatment as of 10/8/24    PLAN        Updated Certification Period: 10/8/24 to 11/30/24   Recommended Treatment Plan: 2 times per week for 8 weeks:  Aquatic Therapy      Frances Reddy, PT

## 2024-10-16 ENCOUNTER — CLINICAL SUPPORT (OUTPATIENT)
Dept: REHABILITATION | Facility: HOSPITAL | Age: 54
End: 2024-10-16
Payer: COMMERCIAL

## 2024-10-16 DIAGNOSIS — M54.16 LEFT LUMBAR RADICULOPATHY: Primary | ICD-10-CM

## 2024-10-16 PROCEDURE — 97113 AQUATIC THERAPY/EXERCISES: CPT

## 2024-10-16 NOTE — PROGRESS NOTES
[facility]   Physical Therapy Daily Treatment Note      Name: Laz Quintero  Clinic Number: [id number]    Therapy Diagnosis:   Encounter Diagnosis   Name Primary?    Left lumbar radiculopathy Yes       Physician: Ramin Sloan MD    Visit Date: 10/16/2024      Physician Orders: Aquatic Therapy   Medical Diagnosis from Referral: LBP with radicular symptoms  Evaluation Date: 5/17/2024  Authorization Period Expiration: 5/6/25  Plan of Care Expiration: 11/30/24  Visit # / Visits authorized: 3/8  23 visits total.    Precautions: Standard and Fall    Time In: 8:30 am   Time Out: 9:30 am   Total Billable Time: 60 minutes       SUBJECTIVE      The patient reports: she is a little sore after coming in yesterday but overall not anymore than she expected.     Pain:  Current: 7/10, worst 10/10, best 7/10   Location: L Lumbosacral and posterior hip into L anterior thigh   Description: Throbbing, burning, numbness, tingling  Aggravating Factors: bending forward, sitting greater than 15-20 minutes, driving greater than 15-20 minutes, standing greater than 10-15 minutes, cooking, stairs, sleeping    Easing Factors: stretching, morning walks to the corner and around the block when able.       OBJECTIVE      Update 10/8/24      TREATMENT      Laz received aquatic therapeutic exercises to develop strength, endurance, ROM, flexibility, posture, and core stabilization for 60 minutes including:    FUNCTIONAL MOBILITY TRAINING x 2 laps each at beginning and 1 lap each at end of session  Walk forward/backward/lateral with slow controlled steps    STRETCHES 2 x 30sec  HS--add as tolerated    LE EX x 30 reps  HELD RTB --re-add when able  Squat  Heel raise with gluteal set  Hip abduction  Hip flex  HS curl  Hip Ext    Seated on Pool stool with RTB  Sit to stand   4# yuk ball held out front  LAQ  Hip flex  Clam     Walking marches x 2 laps with 4# yuk ball   Tandem walking x 1 lap with 4# yuk ball     UE  EX/CORE  x 30   RTB around knees  Shoulder abd/add TA activation with yellow dumbbells  Shoulder horizontal abd/add TA activation with yellow dumbbells  Shoulder flex/ext with TA activation with yellow dumbbells     ENDURANCE  Lumbar decompression x 2'   LTR x 2'  Bicycle in // bars x 2'    Held LE exercises below  Step up on 4 inch step x 10   Lateral step up on 4 inch step x 10     Patient Education and Home Exercises     Home Exercises Provided and Patient Education Provided     Education provided:   Role of aquatic therapy  Hydration post therapy  SP Cane recommendations/fitting    Written Home Exercises Provided: Patient instructed to cont prior HEP.    ASSESSMENT      The patient was able to re-incorporate the resistance band for seated exercises in addition to UE/core exercises with good quality of execution.  She reported some overall soreness with the treatment but this did limit her ability perform exercises at full reps and sets.     Previous Short Term Goals Status:       Short Term Goals: 6 weeks   1. Patient will be independent in HEP & progressions.--Ongoing as of 10/8/24  2. Patient will achieve TUG score of 11 sec to demonstrate improved mobility--MET as of 6/25/24, regression due to gap in treatment as of 10/8/24  3. The patient will achieve 5 sit to stand score of 20 seconds to demonstrate improved transfers and endurance. --MET as of 6/25/24, regression due to gap in treatment as of 10/8/24    Long Term Goals: 12 weeks   1. The patient will demonstrate independence with extensive HEP.--Ongoing as of 8/6/24  2. Patient will achieve 5 sit to stand score of 19 seconds to demonstrate improved transfers & endurance.--MET as of 6/25/24, regression due to gap in treatment as of 10/8/24  3. Patent is able to demonstrate MMT 4/5 L LE and 4+/5 R LE without pain reports during testing.  --No change in B LE MMT as of 8/6/24, regression due to gap in treatment as of 10/8/24     New LTG  Patient will achieve 5  sit to stand score of 14 seconds to demonstrate improved transfers & endurance.--Progressing towards achievement as of 8/6/24, regression due to gap in treatment as of 10/8/24    PLAN        Updated Certification Period: 10/8/24 to 11/30/24   Recommended Treatment Plan: 2 times per week for 8 weeks:  Aquatic Therapy      Frances Reddy, PT

## 2024-10-22 ENCOUNTER — CLINICAL SUPPORT (OUTPATIENT)
Dept: REHABILITATION | Facility: HOSPITAL | Age: 54
End: 2024-10-22
Payer: COMMERCIAL

## 2024-10-22 DIAGNOSIS — M54.16 LEFT LUMBAR RADICULOPATHY: Primary | ICD-10-CM

## 2024-10-22 PROCEDURE — 97113 AQUATIC THERAPY/EXERCISES: CPT

## 2024-10-22 NOTE — PROGRESS NOTES
[facility]   Physical Therapy Daily Treatment Note      Name: Laz Quintero  Clinic Number: [id number]    Therapy Diagnosis:   Encounter Diagnosis   Name Primary?    Left lumbar radiculopathy Yes       Physician: Ramin Sloan MD    Visit Date: 10/22/2024    Physician Orders: Aquatic Therapy   Medical Diagnosis from Referral: LBP with radicular symptoms  Evaluation Date: 5/17/2024  Authorization Period Expiration: 5/6/25  Plan of Care Expiration: 11/30/24  Visit # / Visits authorized: 4/8  23 visits total.    Precautions: Standard and Fall    Time In: 1:21 PM  Time Out: 2:25 PM   Total Billable Time: 34 minutes     SUBJECTIVE     The patient reports: That she has her usual numbness down her leg that doesn't go away. She's also getting pain into left hip and thigh.    Pain:  Current: 6/10, worst 10/10, best 7/10   Location: L Lumbosacral and posterior hip into L anterior thigh   Description: Throbbing, burning, numbness, tingling  Aggravating Factors: bending forward, sitting greater than 15-20 minutes, driving greater than 15-20 minutes, standing greater than 10-15 minutes, cooking, stairs, sleeping    Easing Factors: stretching, morning walks to the corner and around the block when able.       OBJECTIVE      Update 10/8/24      TREATMENT      Laz received aquatic therapeutic exercises to develop strength, endurance, ROM, flexibility, posture, and core stabilization for 62 minutes including:    FUNCTIONAL MOBILITY TRAINING x 2 laps each at beginning and 1 lap each at end of session  Walk forward/backward/lateral with slow controlled steps    STRETCHES 2 x 30sec  HS--add as tolerated    LE EX x 30 reps w/ RTB   Squat  Heel raise with gluteal set  Hip abduction  Hip flex  HS curl  Hip Ext    Seated on Pool stool with RTB  Sit to stand   4# yuk ball held out front  LAQ  Hip flex  Clam     Walking marches x 2 laps with 4# yuk ball   Tandem walking x 2 lap with 4# yuk ball     UE  EX/CORE  x 30     Shoulder abd/add TA activation with yellow dumbbells  Shoulder horizontal abd/add TA activation with yellow dumbbells  Shoulder flex/ext with TA activation with yellow dumbbells     ENDURANCE  Lumbar decompression x 2'   LTR x 2'  Bicycle in // bars x 2'    Held LE exercises below  Step up on 4 inch step x 10   Lateral step up on 4 inch step x 10     Patient Education and Home Exercises     Home Exercises Provided and Patient Education Provided     Education provided:   Role of aquatic therapy  Hydration post therapy  SP Cane recommendations/fitting    Written Home Exercises Provided: Patient instructed to cont prior HEP.    ASSESSMENT   Patient able to tolerate re-introduction of moderate resistance to all standing hip hip strengthening and squats. She shows good squat form with ability to maintain a neutral spine and hinge at hips. Continue to progress patient as tolerated.     Previous Short Term Goals Status:       Short Term Goals: 6 weeks   1. Patient will be independent in HEP & progressions.--Ongoing as of 10/8/24  2. Patient will achieve TUG score of 11 sec to demonstrate improved mobility--MET as of 6/25/24, regression due to gap in treatment as of 10/8/24  3. The patient will achieve 5 sit to stand score of 20 seconds to demonstrate improved transfers and endurance. --MET as of 6/25/24, regression due to gap in treatment as of 10/8/24    Long Term Goals: 12 weeks   1. The patient will demonstrate independence with extensive HEP.--Ongoing as of 8/6/24  2. Patient will achieve 5 sit to stand score of 19 seconds to demonstrate improved transfers & endurance.--MET as of 6/25/24, regression due to gap in treatment as of 10/8/24  3. Patent is able to demonstrate MMT 4/5 L LE and 4+/5 R LE without pain reports during testing.  --No change in B LE MMT as of 8/6/24, regression due to gap in treatment as of 10/8/24     New LTG  Patient will achieve 5 sit to stand score of 14 seconds to demonstrate  improved transfers & endurance.--Progressing towards achievement as of 8/6/24, regression due to gap in treatment as of 10/8/24    PLAN        Updated Certification Period: 10/8/24 to 11/30/24   Recommended Treatment Plan: 2 times per week for 8 weeks:  Aquatic Therapy      Zora Liu, PT

## 2024-10-23 ENCOUNTER — CLINICAL SUPPORT (OUTPATIENT)
Dept: REHABILITATION | Facility: HOSPITAL | Age: 54
End: 2024-10-23
Payer: COMMERCIAL

## 2024-10-23 DIAGNOSIS — M54.16 LEFT LUMBAR RADICULOPATHY: Primary | ICD-10-CM

## 2024-10-23 PROCEDURE — 97113 AQUATIC THERAPY/EXERCISES: CPT

## 2024-10-23 NOTE — PROGRESS NOTES
[facility]   Physical Therapy Daily Treatment Note      Name: Laz Quintero  Clinic Number: [id number]    Therapy Diagnosis:   Encounter Diagnosis   Name Primary?    Left lumbar radiculopathy Yes     Physician: Ramin Sloan MD    Visit Date: 10/23/2024    Physician Orders: Aquatic Therapy   Medical Diagnosis from Referral: LBP with radicular symptoms  Evaluation Date: 5/17/2024  Authorization Period Expiration: 5/6/25  Plan of Care Expiration: 11/30/24  Visit # / Visits authorized: 6/8  26 visits total.    Precautions: Standard and Fall    Time In: 8:25 am  Time Out: 9:30 am   Total Billable Time: 65 minutes     SUBJECTIVE     The patient reports: she is sore from yesterday but not too bad. She stated she has to drive to [place] tomorrow and is going to stop every couple of hours as needed to stretch her hip. She is going to bring her band and do all of her exercises also.     Pain:  Current: 6/10, worst 10/10, best 7/10   Location: L Lumbosacral and posterior hip into L anterior thigh   Description: Throbbing, burning, numbness, tingling  Aggravating Factors: bending forward, sitting greater than 15-20 minutes, driving greater than 15-20 minutes, standing greater than 10-15 minutes, cooking, stairs, sleeping    Easing Factors: stretching, morning walks to the corner and around the block when able.       OBJECTIVE      Update 10/8/24      TREATMENT      Laz received aquatic therapeutic exercises to develop strength, endurance, ROM, flexibility, posture, and core stabilization for 65 minutes including:    FUNCTIONAL MOBILITY TRAINING x 2 laps each at beginning and 1 lap each at end of session  Walk forward/backward/lateral with slow controlled steps    STRETCHES 2 x 30sec  HS--add as tolerated    LE EX x 30 reps w/ RTB   Squat  Heel raise with gluteal set  Hip abduction  Hip flex  HS curl  Hip Ext    Seated on Pool stool with RTB  Sit to stand   4# yuk ball held out  front  LAQ  Hip flex  Clam     Walking marches x 2 laps with 4# yuk ball   Tandem walking x 2 lap with 4# yuk ball     UE EX/CORE  x 30     Shoulder abd/add TA activation with yellow dumbbells  Shoulder horizontal abd/add TA activation with yellow dumbbells  Shoulder flex/ext with TA activation with yellow dumbbells     ENDURANCE  Lumbar decompression x 2'   LTR x 2'  Bicycle in // bars x 2'    Held LE exercises below  Step up on 4 inch step x 10   Lateral step up on 4 inch step x 10     Patient Education and Home Exercises     Home Exercises Provided and Patient Education Provided     Education provided:   Role of aquatic therapy  Hydration post therapy  SP Cane recommendations/fitting    Written Home Exercises Provided: Patient instructed to cont prior HEP.    ASSESSMENT   The patient reported some pain with L hip abduction against the resistance of the RTB so the ROM was reduced to make the exercise more tolerable.  She otherwise performed all exercises with some reports of UE fatigue but no increased back or hip pain.     Previous Short Term Goals Status:       Short Term Goals: 6 weeks   1. Patient will be independent in HEP & progressions.--Ongoing as of 10/8/24  2. Patient will achieve TUG score of 11 sec to demonstrate improved mobility--MET as of 6/25/24, regression due to gap in treatment as of 10/8/24  3. The patient will achieve 5 sit to stand score of 20 seconds to demonstrate improved transfers and endurance. --MET as of 6/25/24, regression due to gap in treatment as of 10/8/24    Long Term Goals: 12 weeks   1. The patient will demonstrate independence with extensive HEP.--Ongoing as of 8/6/24  2. Patient will achieve 5 sit to stand score of 19 seconds to demonstrate improved transfers & endurance.--MET as of 6/25/24, regression due to gap in treatment as of 10/8/24  3. Patent is able to demonstrate MMT 4/5 L LE and 4+/5 R LE without pain reports during testing.  --No change in B LE MMT as of 8/6/24,  regression due to gap in treatment as of 10/8/24     New LTG  Patient will achieve 5 sit to stand score of 14 seconds to demonstrate improved transfers & endurance.--Progressing towards achievement as of 8/6/24, regression due to gap in treatment as of 10/8/24    PLAN        Updated Certification Period: 10/8/24 to 11/30/24   Recommended Treatment Plan: 2 times per week for 8 weeks:  Aquatic Therapy      Frances Reddy, PT

## 2024-10-29 ENCOUNTER — CLINICAL SUPPORT (OUTPATIENT)
Dept: REHABILITATION | Facility: HOSPITAL | Age: 54
End: 2024-10-29
Payer: COMMERCIAL

## 2024-10-29 DIAGNOSIS — M54.16 LEFT LUMBAR RADICULOPATHY: Primary | ICD-10-CM

## 2024-10-29 PROCEDURE — 97164 PT RE-EVAL EST PLAN CARE: CPT

## 2024-10-29 PROCEDURE — 97113 AQUATIC THERAPY/EXERCISES: CPT

## 2024-11-04 ENCOUNTER — PATIENT MESSAGE (OUTPATIENT)
Dept: OBSTETRICS AND GYNECOLOGY | Facility: CLINIC | Age: 54
End: 2024-11-04
Payer: COMMERCIAL

## 2024-11-04 ENCOUNTER — TELEPHONE (OUTPATIENT)
Dept: OBSTETRICS AND GYNECOLOGY | Facility: CLINIC | Age: 54
End: 2024-11-04
Payer: COMMERCIAL

## 2024-11-04 ENCOUNTER — CLINICAL SUPPORT (OUTPATIENT)
Dept: REHABILITATION | Facility: HOSPITAL | Age: 54
End: 2024-11-04
Payer: COMMERCIAL

## 2024-11-04 DIAGNOSIS — M54.16 LEFT LUMBAR RADICULOPATHY: Primary | ICD-10-CM

## 2024-11-04 PROCEDURE — 97110 THERAPEUTIC EXERCISES: CPT | Mod: CQ

## 2024-11-04 NOTE — PROGRESS NOTES
[facility]   Physical Therapy Updated       Name: Laz Quintero  Clinic Number: [id number]    Therapy Diagnosis:   Encounter Diagnosis   Name Primary?    Left lumbar radiculopathy Yes     Physician: Ramin Sloan MD    Visit Date: 11/4/2024    Physician Orders: Aquatic Therapy   Medical Diagnosis from Referral: LBP with radicular symptoms  Evaluation Date: 5/17/2024  Authorization Period Expiration: 5/6/25  Plan of Care Expiration: 11/30/24  Visit # / Visits authorized: 8/8  28 visits total.    Precautions: Standard and Fall  Functional Level Prior to Evaluation:  Modified independent    Time In: 10:20 am   Time Out: 11:20 am   Total Billable Time: 60 minutes       SUBJECTIVE      Update: The patient reports that since returning to Aquatic PT she feels like her back spasms have improved and her pain is a little better. She stated her pain levels are more of a 5-7/10 vs a 8/10 on average. She continues with L hip pain greater than the back pain at this time and feels stiff today.       Pain:  Current: 5/10, worst 9/10, best 5/10   Location: L Lumbosacral and posterior hip into L anterior thigh       OBJECTIVE      Update 10/29/24      TREATMENT      Laz received aquatic therapeutic exercises to develop strength, endurance, ROM, flexibility, posture, and core stabilization for 60 minutes including:    FUNCTIONAL MOBILITY TRAINING x 2 laps each at beginning and 1 lap each at end of session  Walk forward/backward/lateral with slow controlled steps    STRETCHES 2 x 30sec  HS--add as tolerated    LE EX x 30 reps w/ RTB   Squat  Heel raise with gluteal set  Hip abduction  Hip flex  HS curl  Hip Ext    Seated on Pool stool with RTB  Sit to stand   4# yuk ball held out front  LAQ  Hip flex  Clam     Walking marches x 2 laps with 4# yuk ball   Tandem walking x 2 lap with 4# yuk ball     UE EX/CORE  x 30     Shoulder abd/add TA activation with yellow dumbbells   Shoulder horizontal  abd/add TA activation with yellow dumbbells   Shoulder flex/ext with TA activation with yellow dumbbells     ENDURANCE  Lumbar decompression x 2'   LTR x 2'  Bicycle in // bars x 2'    Held LE exercises below  Step up on 4 inch step x 10   Lateral step up on 4 inch step x 10     Patient Education and Home Exercises     Home Exercises Provided and Patient Education Provided     Education provided:   Role of aquatic therapy  Hydration post therapy  SP Cane recommendations/fitting    Written Home Exercises Provided: Patient instructed to cont prior HEP.    ASSESSMENT      She continues with L hip pain greater than the back pain at this time and feels stiff today.  Patient demonstrates good core activation with her exercises.  Patient completed her therapy as noted above with no increase in symptoms prior to leaving the clinic.      Previous Short Term Goals Status:       Short Term Goals: 6 weeks   1. Patient will be independent in HEP & progressions.--Ongoing as of 10/28/24  2. Patient will achieve TUG score of 11 sec to demonstrate improved mobility--MET as of 10/28/24  3. The patient will achieve 5 sit to stand score of 20 seconds to demonstrate improved transfers and endurance. --MET as of 10/28/24    Long Term Goals: 12 weeks   1. The patient will demonstrate independence with extensive HEP.--Ongoing as of 10/28/24  2. Patient will achieve 5 sit to stand score of 19 seconds to demonstrate improved transfers & endurance.--MET as of 10/28/24  3. Patent is able to demonstrate MMT 4/5 L LE and 4+/5 R LE without pain reports during testing.  --Not tested     New LTG  Patient will achieve 5 sit to stand score of 14 seconds to demonstrate improved transfers & endurance.--Progressing as of 10/28/24    Long Term Goal Status: continue per initial plan of care.    Reasons for Recertification of Therapy:   To continue to improve function    PLAN        Updated Certification Period: 10/28/24 to 12/1/24   Recommended Treatment  Plan: 2 times per week for 6 weeks:  Aquatic Therapy      Randy Jackson, PTA

## 2024-11-04 NOTE — TELEPHONE ENCOUNTER
Patient reports vaginal spotting with intercourse. Please schedule appointment with me.     I can see on the morning of 11/21

## 2024-12-04 ENCOUNTER — TELEPHONE (OUTPATIENT)
Dept: OBSTETRICS AND GYNECOLOGY | Facility: CLINIC | Age: 54
End: 2024-12-04
Payer: COMMERCIAL

## 2024-12-04 NOTE — TELEPHONE ENCOUNTER
Hyst done is 2015. Issues with vaginal cuff have returned. Bleeding with intercourse. Pain through stomach and back. Has been happening for about 5-6 weeks. Pt is no longer having intercourse due to this.     You are booked until February. When would you like to see pt? Ultrasound needed?

## 2024-12-04 NOTE — TELEPHONE ENCOUNTER
----- Message from Buffy sent at 12/2/2024  3:57 PM CST -----  Type:  Needs Medical Advice    Who Called:  pt     Would the patient rather a call back or a response via [facility]? Call back   Best Call Back Number:  [phone]  Additional Information:  pt is wanting to get seen for spotting ,vaginal pain  and concerns about her vaginal cuff  pt had a hysterectomy in 2015 pt was last seen in 2019

## 2024-12-17 ENCOUNTER — LAB VISIT (OUTPATIENT)
Dept: LAB | Facility: HOSPITAL | Age: 54
End: 2024-12-17
Attending: INTERNAL MEDICINE

## 2024-12-17 ENCOUNTER — OFFICE VISIT (OUTPATIENT)
Dept: INTERNAL MEDICINE | Facility: CLINIC | Age: 54
End: 2024-12-17

## 2024-12-17 DIAGNOSIS — E78.5 HYPERLIPIDEMIA, UNSPECIFIED HYPERLIPIDEMIA TYPE: ICD-10-CM

## 2024-12-17 DIAGNOSIS — E78.5 HYPERLIPIDEMIA, UNSPECIFIED HYPERLIPIDEMIA TYPE: Primary | ICD-10-CM

## 2024-12-17 DIAGNOSIS — I10 PRIMARY HYPERTENSION: ICD-10-CM

## 2024-12-17 DIAGNOSIS — E04.1 THYROID NODULE: ICD-10-CM

## 2024-12-17 LAB
ALBUMIN SERPL BCP-MCNC: 4 G/DL (ref 3.5–5.2)
ALP SERPL-CCNC: 71 U/L (ref 40–150)
ALT SERPL W/O P-5'-P-CCNC: 16 U/L (ref 10–44)
ANION GAP SERPL CALC-SCNC: 7 MMOL/L (ref 8–16)
AST SERPL-CCNC: 22 U/L (ref 10–40)
BILIRUB SERPL-MCNC: 0.4 MG/DL (ref 0.1–1)
BUN SERPL-MCNC: 11 MG/DL (ref 6–20)
CALCIUM SERPL-MCNC: 10.1 MG/DL (ref 8.7–10.5)
CHLORIDE SERPL-SCNC: 107 MMOL/L (ref 95–110)
CHOLEST SERPL-MCNC: 255 MG/DL (ref 120–199)
CHOLEST/HDLC SERPL: 3.5 {RATIO} (ref 2–5)
CO2 SERPL-SCNC: 27 MMOL/L (ref 23–29)
CREAT SERPL-MCNC: 0.8 MG/DL (ref 0.5–1.4)
EST. GFR  (NO RACE VARIABLE): >60 ML/MIN/1.73 M^2
GLUCOSE SERPL-MCNC: 90 MG/DL (ref 70–110)
HDLC SERPL-MCNC: 73 MG/DL (ref 40–75)
HDLC SERPL: 28.6 % (ref 20–50)
LDLC SERPL CALC-MCNC: 161.6 MG/DL (ref 63–159)
NONHDLC SERPL-MCNC: 182 MG/DL
POTASSIUM SERPL-SCNC: 4.5 MMOL/L (ref 3.5–5.1)
PROT SERPL-MCNC: 7.6 G/DL (ref 6–8.4)
SODIUM SERPL-SCNC: 141 MMOL/L (ref 136–145)
TRIGL SERPL-MCNC: 102 MG/DL (ref 30–150)

## 2024-12-17 PROCEDURE — 36415 COLL VENOUS BLD VENIPUNCTURE: CPT | Performed by: INTERNAL MEDICINE

## 2024-12-17 PROCEDURE — 80061 LIPID PANEL: CPT | Performed by: INTERNAL MEDICINE

## 2024-12-17 PROCEDURE — 99999 PR PBB SHADOW E&M-EST. PATIENT-LVL IV: CPT | Mod: PBBFAC,,, | Performed by: INTERNAL MEDICINE

## 2024-12-17 PROCEDURE — 80053 COMPREHEN METABOLIC PANEL: CPT | Performed by: INTERNAL MEDICINE

## 2024-12-17 PROCEDURE — 99214 OFFICE O/P EST MOD 30 MIN: CPT | Mod: PBBFAC | Performed by: INTERNAL MEDICINE

## 2024-12-17 RX ORDER — HYDROCHLOROTHIAZIDE 12.5 MG/1
12.5 TABLET ORAL DAILY
Qty: 90 TABLET | Refills: 1 | Status: SHIPPED | OUTPATIENT
Start: 2024-12-17 | End: 2025-12-17

## 2024-12-21 ENCOUNTER — OFFICE VISIT (OUTPATIENT)
Dept: URGENT CARE | Facility: CLINIC | Age: 54
End: 2024-12-21

## 2024-12-21 ENCOUNTER — TELEPHONE (OUTPATIENT)
Dept: INTERNAL MEDICINE | Facility: CLINIC | Age: 54
End: 2024-12-21
Payer: COMMERCIAL

## 2024-12-21 VITALS
HEIGHT: 66 IN | DIASTOLIC BLOOD PRESSURE: 88 MMHG | HEART RATE: 67 BPM | OXYGEN SATURATION: 98 % | WEIGHT: 159.81 LBS | SYSTOLIC BLOOD PRESSURE: 126 MMHG | BODY MASS INDEX: 25.68 KG/M2 | TEMPERATURE: 99 F

## 2024-12-21 VITALS
RESPIRATION RATE: 17 BRPM | BODY MASS INDEX: 25.55 KG/M2 | TEMPERATURE: 100 F | HEART RATE: 110 BPM | WEIGHT: 159 LBS | SYSTOLIC BLOOD PRESSURE: 118 MMHG | DIASTOLIC BLOOD PRESSURE: 79 MMHG | HEIGHT: 66 IN | OXYGEN SATURATION: 95 %

## 2024-12-21 DIAGNOSIS — R68.89 FLU-LIKE SYMPTOMS: Primary | ICD-10-CM

## 2024-12-21 DIAGNOSIS — R05.9 COUGH, UNSPECIFIED TYPE: ICD-10-CM

## 2024-12-21 PROCEDURE — 99214 OFFICE O/P EST MOD 30 MIN: CPT | Mod: S$GLB,,,

## 2024-12-21 RX ORDER — OSELTAMIVIR PHOSPHATE 75 MG/1
75 CAPSULE ORAL 2 TIMES DAILY
Qty: 10 CAPSULE | Refills: 0 | Status: SHIPPED | OUTPATIENT
Start: 2024-12-21 | End: 2024-12-26

## 2024-12-21 RX ORDER — PROMETHAZINE HYDROCHLORIDE AND DEXTROMETHORPHAN HYDROBROMIDE 6.25; 15 MG/5ML; MG/5ML
5 SYRUP ORAL EVERY 4 HOURS PRN
Qty: 118 ML | Refills: 0 | Status: SHIPPED | OUTPATIENT
Start: 2024-12-21

## 2024-12-21 NOTE — PROGRESS NOTES
"Subjective:      Patient ID: Laz Quintero is a 54 y.o. female.    Vitals:  height is 5' 6" (1.676 m) and weight is 72.1 kg (159 lb). Her oral temperature is 100.2 °F (37.9 °C). Her blood pressure is 118/79 and her pulse is 110. Her respiration is 17 and oxygen saturation is 95%.     Chief Complaint: Cough    This is a 54 y.o. female who presents today with a chief complaint of cough, body aches, chills. Sx started Thursday and patient is taking otc medication.    Provider note starts below:  Patient presents to clinic for evaluation of fever, chills, body aches, headache, fatigue, nasal congestion, sore throat, cough which onset 2 days ago.  Patient has been taking over-the-counter medications with minimal improvement of symptoms.  Denies any known sick contacts.  Denies nausea, vomiting, diarrhea, abdominal pain, dizziness, lightheadedness, chest pain, shortness for breath, wheezing.  No other complaints.    Cough  This is a new problem. The current episode started in the past 7 days. The problem has been unchanged. The problem occurs constantly. The cough is Productive of sputum. Associated symptoms include chills, ear congestion, a fever, headaches, myalgias, nasal congestion, postnasal drip and a sore throat. Pertinent negatives include no chest pain, ear pain, eye redness, heartburn, hemoptysis, rash, rhinorrhea, shortness of breath, sweats, weight loss or wheezing.     Constitution: Positive for chills, fatigue and fever.   HENT:  Positive for congestion, postnasal drip and sore throat. Negative for ear pain, ear discharge, sinus pain, sinus pressure, trouble swallowing and voice change.    Neck: Negative for neck pain and neck stiffness.   Cardiovascular:  Negative for chest pain and palpitations.   Eyes:  Negative for eye pain and eye redness.   Respiratory:  Positive for cough. Negative for bloody sputum, shortness of breath and wheezing.    Gastrointestinal:  Negative for abdominal pain, nausea, " vomiting, diarrhea and heartburn.   Musculoskeletal:  Positive for muscle ache. Negative for muscle cramps.   Skin:  Negative for pale and rash.   Allergic/Immunologic: Negative for itching and sneezing.   Neurological:  Positive for headaches. Negative for dizziness, light-headedness, disorientation and altered mental status.   Psychiatric/Behavioral:  Negative for altered mental status, disorientation and confusion.       Objective:     Physical Exam   Constitutional: She is oriented to person, place, and time.  Non-toxic appearance. She appears ill. No distress.   HENT:   Head: Normocephalic and atraumatic.   Ears:   Right Ear: Tympanic membrane, external ear and ear canal normal.   Left Ear: Tympanic membrane, external ear and ear canal normal.   Nose: Congestion present.   Mouth/Throat: Mucous membranes are moist. No oropharyngeal exudate or posterior oropharyngeal erythema. Oropharynx is clear.   Eyes: Conjunctivae are normal. Extraocular movement intact   Neck: Neck supple.   Cardiovascular: Normal rate, regular rhythm, normal heart sounds and normal pulses.   Pulmonary/Chest: Effort normal and breath sounds normal. No stridor. No respiratory distress. She has no wheezes. She has no rhonchi. She has no rales.   Musculoskeletal: Normal range of motion.         General: Normal range of motion.   Neurological: She is alert, oriented to person, place, and time and at baseline.   Skin: Skin is warm and dry.   Psychiatric: Her behavior is normal. Mood normal.   Nursing note and vitals reviewed.    Assessment:     1. Flu-like symptoms    2. Cough, unspecified type        Plan:     Flu-like symptoms  -     oseltamivir (TAMIFLU) 75 MG capsule; Take 1 capsule (75 mg total) by mouth 2 (two) times daily. for 5 days  Dispense: 10 capsule; Refill: 0  -     promethazine-dextromethorphan (PROMETHAZINE-DM) 6.25-15 mg/5 mL Syrp; Take 5 mLs by mouth every 4 (four) hours as needed (for cough).  Dispense: 118 mL; Refill:  0    Cough, unspecified type      Medical Decision Making:   Urgent Care Management:  Patient is fee for service.    Discussed influenza testing with additional charge or treatment for suspected influenza with Tamiflu.    Patient opted out of influenza testing and would like to be treated for suspected influenza.       Patient Instructions   Plan of Care  Tamiflu twice daily for 5 days. This is an antiviral medication to help your body fight the influenza virus. Please take to completion.  Promethazine DM every 4 hours as needed for cough. Do not drive if you take as it can cause drowsiness.    Recommend over the counter oral antihistamine (zyrtec, allegra, claritin) + nasal decongestant (pseudoephedrine, phenylephrine) to help with congestion and sinus pressure.  If not allergic, take Tylenol (Acetaminophen) 650 mg to  1 g every 6 hours as needed for fever and/or Motrin (Ibuprofen) 600 to 800 mg every 6 hours as needed for fever as directed for control of pain and/or fever  Please drink plenty of fluids.  Please get plenty of rest.  Nasal irrigation with a saline spray or Netti Pot like device per their directions is also recommended.  If you smoke, please stop smoking.    To help ease a sore throat, you can:  Use a sore throat spray.  Suck on hard candy or throat lozenges.  Gargle with warm saltwater a few times each day. Mix of 1/4 teaspoon (1.25 grams) salt in 8 ounces (240 mL) of warm water.  Use a cool mist humidifier to help you breathe easier.    Discussed prescriptions and over-the-counter medicines to help with patient's symptoms:  A steroid nose spray (flonase) and antihistamine nasal spray (azelastine) can help with a stuffy nose. It can also help with drainage down the back of your throat.  An antihistamine (loratadine,zyrtec,allegra, xyzal) can help with itching, sneezing, or runny nose.  An antihistamine eye drop can help with itchy eyes.  A decongestant (pseudoephedrine,  Phenylephrine, oxymetazoline  aka afrin nasal spray) can help with a stuffy nose. Take <10 days for congestion and rhinorrhea. Once symptoms improve, proceed with loratadine/zyrtec once a day. These ingredients can keep you up all night, decrease appetite, feel jittery, and raise blood pressure with long term use.  OTC Coricidin can be used for patients with hypertension and palpitations because you cannot use ingredients such as pseudoephedrine and phenylephrine for oral decongestants.  Coricidin HBP Cough & Cold (Chlorpheniramine/Dextromethorphan)  Coricidin HBP Maximum Strength Multi-Symptom Flu  (Acetaminophen,Dextromethorphan, Chlorpheniramine)  Coricidin HBP® Maximum Strength Cold & Flu Day/Night (Acetaminophen,Dextromethorphan, Doxylamine, Guaifenesin)  Coricidin HBP Chest Congestion & Cough (Dextromethorphan + Guaifenesin)  Medications that control cough are suppressants and expectorants. Suppressants are tessalon pearls and dextromethorphan. If you have a productive cough with sputum, you need an expectorant called guaifenesin. Dextromethorphan and Guaifenesin are active ingredients in many OTC cough/cold medications such as Dayquil/Nyquil, Mucinex, and Robitussin Mucus+Chest Congestion.   These OTC cold medications are safe to use if you do not have high blood pressure (hypertension) or palpitations.  DayQuil and NyQuil - Cough, Cold & Flu Relief LiquiCaps  DayQuil: Acetaminophen, Dextromethorphan, and Phenylephrine   NyQuil: Acetaminophen, Dextromethorphan, and Doxylamine  DayQuil and NyQuil SEVERE Maximum Strength Cough, Cold & Flu Relief LiquiCaps  DAYQUIL: Acetaminophen, Dextromethorphan, Guaifenesin, and Phenylephrine  NYQUIL: Acetaminophen, Dextromethorphan, Doxylamine, and Phenylephrine   Mucinex DM: Guaifenesin,Dextromethorphan  Mucinex Maximum Strength Sinus-Max® Pressure, Pain & Cough Liquid Gels: Acetaminophen/Dextromethorphan/ Guaifenesin/Phenylephrine   For children with cough/cold/flu, recommend these OTC cold  medications:  Honey products such as Zarbees Kid's Cough + Mucus Day/Night (2-6 year old)  Zarbees Kid's Cough All-In-One Day/Night (6 to 12 year old)  Mucinex Children's Multi-System Cold (Dextromethorphan/ Guaifenesin/Phenylephrine)  4 to 6 years of age: 5 mL every 4 hours  6 to 12 years of age: 10 mL every 4 hours.  Mucinex Childrens Cold & Flu (Acetaminophen/Dextromethorphan/Guaifenesin/ Phenylephrine)  6 years to under 12 years of age: 10 mL every 4 hours (day/night use)  Mucinex Childrens Cough Mini-Melts  (Dextromethorphan + Guaifenesin)   4 years to under 6 years of age: 1 packet every 4 hours.  6 years to under 12 years of age: 1 to 2 packets every 4 hours.  >12 years of age and over: 2 to 4 packets every 4 hours.           Common Cold Medicine Ingredients Cheat sheet  Acetaminophen (APAP) -pain reliever/fever reducer  Dextromethorphan - cough suppressant  Guaifenesin - expectorant/thins and loosens mucus  Phenylephrine - nasal decongestant  Diphenhydramine or Doxylamine succinate - antihistamine, helps you fall asleep  Promethazine or Brompheniramine - Prescription strength antihistamines    Please remember that you have received care at an urgent care today. Urgent cares are not emergency rooms and are not equipped to handle life threatening emergencies and cannot rule in or out certain medical conditions and you may be released before all of your medical problems are known or treated.     Please arrange follow up with your primary care physician or speciality clinic within 2-5 days if your signs and symptoms have not resolved or worsen.     Patient can call our Referral Hotline at [phone] to make an appointment.    Please return here or go to the Emergency Department for any concerns or worsening of condition.  Signs of infection. These include a fever of 100.4°F (38°C) or higher, chills, cough, more sputum or change in color of sputum.  You are having so much trouble breathing that you can only  say one or two words at a time.  You need to sit upright at all times to be able to breathe and or cannot lie down.  You have trouble breathing when talking or sitting still.  You have a fever of 100.4°F (38°C) or higher or chills.  You have chest pain when you cough, have trouble breathing but can still talk in full sentences, or cough up blood.

## 2024-12-21 NOTE — PATIENT INSTRUCTIONS
Plan of Care  Tamiflu twice daily for 5 days. This is an antiviral medication to help your body fight the influenza virus. Please take to completion.  Promethazine DM every 4 hours as needed for cough. Do not drive if you take as it can cause drowsiness.    Recommend over the counter oral antihistamine (zyrtec, allegra, claritin) + nasal decongestant (pseudoephedrine, phenylephrine) to help with congestion and sinus pressure.  If not allergic, take Tylenol (Acetaminophen) 650 mg to  1 g every 6 hours as needed for fever and/or Motrin (Ibuprofen) 600 to 800 mg every 6 hours as needed for fever as directed for control of pain and/or fever  Please drink plenty of fluids.  Please get plenty of rest.  Nasal irrigation with a saline spray or Netti Pot like device per their directions is also recommended.  If you smoke, please stop smoking.    To help ease a sore throat, you can:  Use a sore throat spray.  Suck on hard candy or throat lozenges.  Gargle with warm saltwater a few times each day. Mix of 1/4 teaspoon (1.25 grams) salt in 8 ounces (240 mL) of warm water.  Use a cool mist humidifier to help you breathe easier.    Discussed prescriptions and over-the-counter medicines to help with patient's symptoms:  A steroid nose spray (flonase) and antihistamine nasal spray (azelastine) can help with a stuffy nose. It can also help with drainage down the back of your throat.  An antihistamine (loratadine,zyrtec,allegra, xyzal) can help with itching, sneezing, or runny nose.  An antihistamine eye drop can help with itchy eyes.  A decongestant (pseudoephedrine,  Phenylephrine, oxymetazoline aka afrin nasal spray) can help with a stuffy nose. Take <10 days for congestion and rhinorrhea. Once symptoms improve, proceed with loratadine/zyrtec once a day. These ingredients can keep you up all night, decrease appetite, feel jittery, and raise blood pressure with long term use.  OTC Coricidin can be used for patients with hypertension  and palpitations because you cannot use ingredients such as pseudoephedrine and phenylephrine for oral decongestants.  Coricidin HBP Cough & Cold (Chlorpheniramine/Dextromethorphan)  Coricidin HBP Maximum Strength Multi-Symptom Flu  (Acetaminophen,Dextromethorphan, Chlorpheniramine)  Coricidin HBP® Maximum Strength Cold & Flu Day/Night (Acetaminophen,Dextromethorphan, Doxylamine, Guaifenesin)  Coricidin HBP Chest Congestion & Cough (Dextromethorphan + Guaifenesin)  Medications that control cough are suppressants and expectorants. Suppressants are tessalon pearls and dextromethorphan. If you have a productive cough with sputum, you need an expectorant called guaifenesin. Dextromethorphan and Guaifenesin are active ingredients in many OTC cough/cold medications such as Dayquil/Nyquil, Mucinex, and Robitussin Mucus+Chest Congestion.   These OTC cold medications are safe to use if you do not have high blood pressure (hypertension) or palpitations.  DayQuil and NyQuil - Cough, Cold & Flu Relief LiquiCaps  DayQuil: Acetaminophen, Dextromethorphan, and Phenylephrine   NyQuil: Acetaminophen, Dextromethorphan, and Doxylamine  DayQuil and NyQuil SEVERE Maximum Strength Cough, Cold & Flu Relief LiquiCaps  DAYQUIL: Acetaminophen, Dextromethorphan, Guaifenesin, and Phenylephrine  NYQUIL: Acetaminophen, Dextromethorphan, Doxylamine, and Phenylephrine   Mucinex DM: Guaifenesin,Dextromethorphan  Mucinex Maximum Strength Sinus-Max® Pressure, Pain & Cough Liquid Gels: Acetaminophen/Dextromethorphan/ Guaifenesin/Phenylephrine   For children with cough/cold/flu, recommend these OTC cold medications:  Honey products such as Zarbees Kid's Cough + Mucus Day/Night (2-6 year old)  Zarbees Kid's Cough All-In-One Day/Night (6 to 12 year old)  Mucinex Children's Multi-System Cold (Dextromethorphan/ Guaifenesin/Phenylephrine)  4 to 6 years of age: 5 mL every 4 hours  6 to 12 years of age: 10 mL every 4 hours.  Mucinex Childrens Cold & Flu  (Acetaminophen/Dextromethorphan/Guaifenesin/ Phenylephrine)  6 years to under 12 years of age: 10 mL every 4 hours (day/night use)  Mucinex Childrens Cough Mini-Melts  (Dextromethorphan + Guaifenesin)   4 years to under 6 years of age: 1 packet every 4 hours.  6 years to under 12 years of age: 1 to 2 packets every 4 hours.  >12 years of age and over: 2 to 4 packets every 4 hours.           Common Cold Medicine Ingredients Cheat sheet  Acetaminophen (APAP) -pain reliever/fever reducer  Dextromethorphan - cough suppressant  Guaifenesin - expectorant/thins and loosens mucus  Phenylephrine - nasal decongestant  Diphenhydramine or Doxylamine succinate - antihistamine, helps you fall asleep  Promethazine or Brompheniramine - Prescription strength antihistamines    Please remember that you have received care at an urgent care today. Urgent cares are not emergency rooms and are not equipped to handle life threatening emergencies and cannot rule in or out certain medical conditions and you may be released before all of your medical problems are known or treated.     Please arrange follow up with your primary care physician or speciality clinic within 2-5 days if your signs and symptoms have not resolved or worsen.     Patient can call our Referral Hotline at [phone] to make an appointment.    Please return here or go to the Emergency Department for any concerns or worsening of condition.  Signs of infection. These include a fever of 100.4°F (38°C) or higher, chills, cough, more sputum or change in color of sputum.  You are having so much trouble breathing that you can only say one or two words at a time.  You need to sit upright at all times to be able to breathe and or cannot lie down.  You have trouble breathing when talking or sitting still.  You have a fever of 100.4°F (38°C) or higher or chills.  You have chest pain when you cough, have trouble breathing but can still talk in full sentences, or cough up  blood.

## 2024-12-22 NOTE — PROGRESS NOTES
Subjective:       Patient ID: Laz Quintero is a 54 y.o. female.    Chief Complaint: Hypertension    HPI  She returns for management of hypertension.  She has had hypertension for over a year.  Current treatment has included medications outlined in medication list.  She denies chest pain or shortness of breath.  No palpitations.  Denies left arm or neck pain.    Past medical history: Mediastinal mass, status post thymectomy, hypertension, peptic ulcer disease, hyperlipidemia, thyroid nodule, status post hysterectomy, hyperparathyroidism, lumbar fusion, status post cholecystectomy.  Positive family history of colon cancer-father, diagnosed at age 50, colon adenoma.  She had a colonoscopy December 2022     Medications:  hydrochlorothiazide     ALLERGIES: Clindamycin, sulfa        Review of Systems   Constitutional:  Negative for chills, fatigue, fever and unexpected weight change.   Respiratory:  Negative for chest tightness and shortness of breath.    Cardiovascular:  Negative for chest pain and palpitations.   Gastrointestinal:  Negative for abdominal pain and blood in stool.   Neurological:  Negative for dizziness, syncope, numbness and headaches.       Objective:      Physical Exam  HENT:      Right Ear: External ear normal.      Left Ear: External ear normal.      Nose: Nose normal.      Mouth/Throat:      Mouth: Mucous membranes are moist.      Pharynx: Oropharynx is clear.   Eyes:      Pupils: Pupils are equal, round, and reactive to light.   Cardiovascular:      Rate and Rhythm: Normal rate and regular rhythm.      Heart sounds: No murmur heard.  Pulmonary:      Breath sounds: Normal breath sounds.   Abdominal:      General: There is no distension.      Palpations: There is no hepatomegaly or splenomegaly.      Tenderness: There is no abdominal tenderness.   Musculoskeletal:      Cervical back: Normal range of motion.   Lymphadenopathy:      Cervical: No cervical adenopathy.      Upper Body:      Right  upper body: No axillary adenopathy.      Left upper body: No axillary adenopathy.   Neurological:      Cranial Nerves: No cranial nerve deficit.      Sensory: No sensory deficit.      Motor: Motor function is intact.      Deep Tendon Reflexes: Reflexes are normal and symmetric.         Assessment/Plan       Assessment and plan: Hypertension:  Check CMP and lipid panel.  Schedule thyroid ultrasound

## 2024-12-30 ENCOUNTER — TELEPHONE (OUTPATIENT)
Dept: INTERNAL MEDICINE | Facility: CLINIC | Age: 54
End: 2024-12-30
Payer: COMMERCIAL

## 2024-12-30 NOTE — TELEPHONE ENCOUNTER
Attempted to call pt back about the pharmacy she would like new cholesterol medication sent to but no response at this time. Left VM to call office when available.

## 2025-01-03 ENCOUNTER — DOCUMENTATION ONLY (OUTPATIENT)
Dept: REHABILITATION | Facility: HOSPITAL | Age: 55
End: 2025-01-03
Payer: COMMERCIAL

## 2025-01-03 DIAGNOSIS — M54.16 LEFT LUMBAR RADICULOPATHY: Primary | ICD-10-CM

## 2025-01-03 NOTE — PROGRESS NOTES
[facility]  Physical Therapy Discharge Note    Name: Laz Quintero  Clinic Number: [id number]    Therapy Diagnosis:   Encounter Diagnosis   Name Primary?    Left lumbar radiculopathy Yes     Physician: Ramin Sloan MD    Physician Orders: Aquatic Therapy   Medical Diagnosis from Referral: LBP with radicular symptoms  Evaluation Date: 5/17/2024        Date of Last visit: 11/4/24  Total Visits Received: 28 visits    ASSESSMENT        Discharge reason: Patient has completed allowable visits authorized by insurance    Goals:   Short Term Goals: 6 weeks   1. Patient will be independent in HEP & progressions.--Ongoing as of 10/28/24  2. Patient will achieve TUG score of 11 sec to demonstrate improved mobility--MET as of 10/28/24  3. The patient will achieve 5 sit to stand score of 20 seconds to demonstrate improved transfers and endurance. --MET as of 10/28/24    Long Term Goals: 12 weeks   1. The patient will demonstrate independence with extensive HEP.--Ongoing as of 10/28/24  2. Patient will achieve 5 sit to stand score of 19 seconds to demonstrate improved transfers & endurance.--MET as of 10/28/24  3. Patent is able to demonstrate MMT 4/5 L LE and 4+/5 R LE without pain reports during testing.  --Not tested     New LTG  Patient will achieve 5 sit to stand score of 14 seconds to demonstrate improved transfers & endurance.--Progressing as of 10/28/24    PLAN   This patient is discharged from Physical Therapy      Frances Reddy, PT

## 2025-01-16 DIAGNOSIS — M54.16 LUMBAR RADICULOPATHY: Primary | ICD-10-CM

## 2025-01-29 ENCOUNTER — CLINICAL SUPPORT (OUTPATIENT)
Dept: REHABILITATION | Facility: HOSPITAL | Age: 55
End: 2025-01-29

## 2025-01-29 DIAGNOSIS — G89.29 CHRONIC LEFT-SIDED LOW BACK PAIN WITH LEFT-SIDED SCIATICA: ICD-10-CM

## 2025-01-29 DIAGNOSIS — M79.604 LOW BACK PAIN RADIATING TO RIGHT LEG: Primary | ICD-10-CM

## 2025-01-29 DIAGNOSIS — M54.50 LOW BACK PAIN RADIATING TO RIGHT LEG: Primary | ICD-10-CM

## 2025-01-29 DIAGNOSIS — M54.42 CHRONIC LEFT-SIDED LOW BACK PAIN WITH LEFT-SIDED SCIATICA: ICD-10-CM

## 2025-01-29 DIAGNOSIS — M54.16 LEFT LUMBAR RADICULOPATHY: ICD-10-CM

## 2025-01-29 PROCEDURE — 97112 NEUROMUSCULAR REEDUCATION: CPT

## 2025-01-29 PROCEDURE — 97162 PT EVAL MOD COMPLEX 30 MIN: CPT

## 2025-01-29 NOTE — PROGRESS NOTES
Outpatient Rehab    Physical Therapy Evaluation    Patient Name: Laz Quintero  MRN: [id number]  YOB: 1970  Today's Date: 1/29/2025    Therapy Diagnosis:   Encounter Diagnoses   Name Primary?    Low back pain radiating to right leg Yes    Left lumbar radiculopathy     Chronic left-sided low back pain with left-sided sciatica      Physician: Ramin Sloan MD    Physician Orders: Eval and Treat  Physician Orders: Eval and Treat  Medical Diagnosis:   M54.16 (ICD-10-CM) - Lumbar radiculopathy       Visit # / Visits Authorized:  1 / 1(pending)   Date of Evaluation:  1/29/2025   Insurance Authorization Period EXP: 1/16/2026  Plan of Care Certification:  1/29/2025 to 3/29/25      Time In:  2:30 pm   Time Out:  3:33 pm  Total Time:   61 min.  Total Billable Time: 60 min.         Subjective   History of Present Illness  Laz is a 54 y.o. female who reports to physical therapy with a chief concern of Bilateral low back pain with radiating down left upper extremity and posterior thigh/buttock. According to the patient's chart, Laz has a past medical history of Abnormal Pap smear, Abnormal Pap smear of cervix, Annular tear of lumbar disc, L5-S1., Chronic LBP, HPTH (hyperparathyroidism), Hyperlipidemia, Menorrhagia, PONV (postoperative nausea and vomiting), Primary hypertension, Right lumbar radiculopathy, and Seasonal allergies. Laz has a past surgical history that includes [place] tooth extraction; Cholecystectomy; Tubal ligation; hysterectomy, total, laparoscopic, with salpingectomy (06/16/2015); Colonoscopy (N/A, 01/19/2018); Esophagogastroduodenoscopy (N/A, 09/06/2019); Back surgery (2018); Colonoscopy (N/A, 12/28/2022); xi robotic rats (Right, 03/21/2023); Injection of anesthetic agent around multiple intercostal nerves (Right, 03/21/2023); Esophagogastroduodenoscopy (N/A, 6/13/2024); and Esophagogastroduodenoscopy (N/A, 9/3/2024).    The patient reports a medical diagnosis of M54.16 (ICD-10-CM) -  Lumbar radiculopathy.            Dominant Hand: Right  History of Present Condition/Illness: Since patient was discharged from aquatic therapy to move forward with left labral tear her pain is unchanged. She says that Worker's Comp. Did not approve her surgery, or even the follow-up with her doctor for pre-operative examination/preparation. She says she has been maintaining her HEP, almost daily. Patient reports 1 injection in her left and multiple injections in her hip. She had no relief from hip injection, however injection in her low back would offer pain relief for a short few weeks. Her low back and left hip pain is constant, and approximately one month after being discharged from aquatic therapy her pain increased. She reports that when participating in aquatic therapy both hip and back pain improved, as well as general emotional well-being from depressive state as a result of being dependent for IADLs and inability to work as a nurse. Aggravating factors for low back and left hip pain include: sitting (< 15 min.); standing (< 5 min.); walking (15-20 min.); transferring out of a chair or bed; carrying heavy items; forward bending; and lower quarter dressing.    Activities of Daily Living  Social history was obtained from Patient.          Patient Roles: Caregiver for child  Patient Responsibilities: Other (Comment), Driving, Health management, Personal ADL, Shopping  Other Patient Roles and Responsibilities: Dressing, transporting,    Previously independent with activities of daily living? Yes     Currently independent with activities of daily living? Yes          Previously independent with instrumental activities of daily living? Yes     Currently independent with instrumental activities of daily living? No  Activities currently needing assistance include: Care of others, Meal prep, Financial management, and Grocery/shopping.            Pain     Patient reports a current pain level of 8/10. Pain at best is  reported as 5/10. Pain at worst is reported as 7/10.   Location: Bilateral low back with radiating into left posterior thigh and left hip  Clinical Progression (since onset): Stable  Pain Qualities: Aching, Burning, Tenderness, Throbbing, Tightness, Radiating, Sharp         Review of Systems  Patient reports: Night Sweats/Chills, Sleep Disturbance, and Osteoarthritis        Living Arrangements  Living Situation  Living Arrangements: Family members  Support Systems: Other (Comment)  Other Support System Comment: Lives with grandchildren, children and daughter in law    Home Setup  Home Access: Level entry  Number of Levels in Home: Two level  Access to Alternate Level of Home: Stairs with rails  Primary Bedroom: 1st floor  Primary Bathroom: 1st floor  Bathroom Toilet: Raised  Kitchen: 1st floor        Employment  Patient reports: Does the patient's condition impact their ability to work?  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       Past Medical History/Physical Systems Review:   Laz Quintero  has a past medical history of Abnormal Pap smear, Abnormal Pap smear of cervix, Annular tear of lumbar disc, L5-S1., Chronic LBP, HPTH (hyperparathyroidism), Hyperlipidemia, Menorrhagia, PONV (postoperative nausea and vomiting), Primary hypertension, Right lumbar radiculopathy, and Seasonal allergies.    Laz Quintero  has a past surgical history that includes [place] tooth extraction; Cholecystectomy; Tubal ligation; hysterectomy, total, laparoscopic, with salpingectomy (06/16/2015); Colonoscopy (N/A, 01/19/2018); Esophagogastroduodenoscopy (N/A, 09/06/2019); Back surgery (2018); Colonoscopy (N/A, 12/28/2022); xi robotic rats (Right, 03/21/2023); Injection of anesthetic agent around multiple intercostal nerves (Right, 03/21/2023); Esophagogastroduodenoscopy (N/A, 6/13/2024); and Esophagogastroduodenoscopy (N/A, 9/3/2024).    Laz has a current medication list which includes the following prescription(s):  albuterol, alprazolam, benzonatate, butalbital-acetaminophen-caffeine -40 mg, cymbalta, diclofenac sodium, estradiol, fluticasone propionate, gabapentin, hydrochlorothiazide, meclizine, methocarbamol, pantoprazole, pregabalin, promethazine, promethazine, promethazine-dextromethorphan, quetiapine, tramadol, and trazodone.    Review of patient's allergies indicates:   Allergen Reactions    Clindamycin     Sulfa (sulfonamide antibiotics) Anaphylaxis    Sulfa dyne     Sulfamethoxazole-trimethoprim      Other reaction(s): Anaphylaxis    Dermabond [tissue glues] Rash        Objective   Bracing  Patient presents with a thoracic/lumbar brace type of Corset.           Posture  Patient presents with a Forward head position. Increased thoracic kyphosis is observed.   Shoulders are Depressed and Asymmetric. Left scapula is: Depressed  Bilateral scapulae are: Protracted         Right hip is: ABducted  Left hip is: ADducted       Lower Extremity Sensation  General Lumbar/Lower Extremity Sensation  Intact: Right and Left  Right Lumbar/Lower Extremity Sensation  Intact: Light Touch, Static Two Point Discrimination, Dynamic Two Point Discrimination, and Proprioception       Left Lumbar/Lower Extremity Sensation  Intact: Light Touch, Static Two Point Discrimination, Dynamic Two Point Discrimination, and Proprioception                Right Lower Extremity Reflexes  Patellar, L4: Normal (2+)         Achilles, S1: Normal (2+)    Babinski reflex Absent.    Left Lower Extremity Reflexes  Patellar, L4: Normal (2+)          Achilles, S1: Normal (2+)    Babinski reflex Absent.        Spinal Mobility  Hypomobile: Thoracic and Lumbosacral       Spinal Muscle Palpation  Right Spinal Muscle Palpation  Abnormal: Lumbar/Sacral  Unremarkable: Cervical/Thoracic          Left Spinal Muscle Palpation  Abnormal: Lumbar/Sacral          Vertebral Palpation  Vertebral Structures Palpated  Thoracic: Right Transverse Process, Left Transverse Process,  Right Facet Joint, Left Facet Joint, and Spinous Process  Lumbar: Right Transverse Process, Left Transverse Process, Right Facet Joint, Left Facet Joint, and Spinous Process  Thoracic Spine Structures Palpated  Middle Thoracic Spine: Right Transverse Process, Left Transverse Process, and Left Facet Joint  Lower Thoracic Spine: Right Transverse Process, Left Transverse Process, Right Facet Joint, Left Facet Joint, and Spinous Process  Lumbar Spine Structures Palpated  Middle Lumbar Spine: Right Transverse Process, Left Transverse Process, Right Facet Joint, Left Facet Joint, and Spinous Process  Lower Lumbar Spine: Right Transverse Process, Left Transverse Process, Right Facet Joint, Left Facet Joint, and Spinous Process            Hip Palpation  Right Hip Palpation  Abnormal: Lumbar/Sacral Muscle          Left Hip Palpation  Abnormal: Lumbar/Sacral Muscle               Lumbar Range of Motion   Active (deg) Passive (deg) Pain   Flexion 45 50 Yes   Extension 3 5 Yes   Right Lateral Flexion 15 20 Yes   Right Rotation 15 20 Yes   Left Lateral Flexion 20 25 Yes   Left Rotation 18 23 Yes                 Thoracic Trunk Strength  3+    Lumbar Strength   Strength Pain   Flexion 3- Yes   Extension 3+ Yes   Right Lateral Flexion 3+     Left Lateral Flexion 3+ Yes   Right Rotation 3+     Left Rotation 3+ Yes                Hip Strength - Planes of Motion   Right Strength Right Pain Left Strength Left  Pain   Flexion (L2) 3+ Yes       Extension 3+ Yes       ABduction 4- Yes       ADduction 3+ Yes       Internal Rotation 3+ Yes       External Rotation 3+ Yes           Knee Strength   Right Strength Right Pain Left Strength Left  Pain   Flexion (S2) 3 Yes       Prone Flexion           Extension (L3) 3 Yes              Ankle/Foot Strength - Planes of Motion   Right Strength Right Pain Left Strength Left  Pain   Dorsiflexion (L4) 4-   4-     Plantar Flexion (S1) 4-   4-     Inversion 4-   4-     Eversion 4-   4-     Great Toe  Flexion           Great Toe Extension (L5)           Lesser Toes Flexion           Lesser Toes Extension                  Lumbar/Pelvic Girdle Special Tests  Lumbar Tests - Repeated  Positive: Flexion and Extension       Lumbar Tests - SLR and Tension  Positive: Right Passive Straight Leg Raise, Left Passive Straight Leg Raise, Right Crossed Straight Leg Raise, Left Crossed Straight Leg Raise, Right Sural Nerve Bias Straight Leg Raise, Left Sural Nerve Bias Straight Leg Raise, Right Tibial Nerve Bias Straight Leg Raise, Left Tibial Nerve Bias Straight Leg Raise, Right Femoral Nerve Tension, and Left Femoral Nerve Tension       Other Lumbar Tests  Positive: Left Peroneal Nerve Tension, Right Peroneal Nerve Tension, Right Quadrant, and Left Quadrant              Hip Special Tests  Other Hip Special Tests  Positive: Right Femoral Nerve Tension and Left Femoral Nerve Tension             Transfers Assessment  Sit to Stand Assistance: Independent  Chair to Bed Assistance: Independent      Fall Risk  Functional mobility test results suggest the patient is: At Risk for Falls  Timed Up & Go (TUG)  Time: 13 seconds     An older adult who takes >=12 seconds to complete the TUG is at risk for falling.    P!  Sit to Stand Testing  The patient completed 5 sit to stand transfers in 15 sec. P!              Gait Analysis  Gait Pattern: Antalgic    Right Side Walking Observations  Increased: Stance Time  Decreased: Arm Swing       Left Side Walking Observations  Increased: Stance Time  Decreased: Arm Swing     Pelvis Observations During Gait  Right: Hip Hike  Hip Observations During Gait  Right: Hip Trendelenburg  Bilateral: Decreased Hip Extension  Ankle/Foot Observations During Gait  Left: Ankle Delayed Push Off         Intake Outcome Measure for FOTO Survey    Therapist reviewed FOTO scores for aLz Quintero on 1/29/2025.   FOTO report - see Media section or FOTO account episode details.     Intake Score:  %    Treatment:        Patient's spiritual, cultural, and educational needs considered and patient agreeable to plan of care and goals.     Assessment & Plan   Assessment  Laz presents with a condition of Moderate complexity.   Presentation of Symptoms: Stable  Will Comorbidities Impact Care: Yes  Multiple treatment areas with chronic nature affecting one another (lumbar/left hip)    Functional Limitations: Activity tolerance, Disrupted sleep pattern, Driving, Bed mobility, Carrying objects, Completing self-care activities, Decreased ambulation distance/endurance, Gait limitations, Functional mobility, Pain with ADLs/IADLs, Painful locomotion/ambulation, Participating in leisure activities, Performing household chores, Range of motion, Squatting, Transfers  Impairments: Impaired physical strength, Activity intolerance, Abnormal or restricted range of motion, Abnormal muscle tone, Abnormal muscle firing, Abnormal gait, Abnormal coordination  Personal Factors Affecting Prognosis: Emotional    Patient Goal for Therapy (PT): Decrease low back and left hip pain to be able to get back to work, at least part-time.  Prognosis: Fair  Prognosis Details: Chronicity of condition affecting patient prognosis  Assessment Details: Upon examination, patient presents with impairments of grossly limited and painful thoracolumbar AROM/PROM; painful and grossly limited bilateral lower quarter and lumbar MMT strength; tenderness to palpation of multiple bilateral thoracic/lumbar structures; an abnormal and antalgic gait pattern; positive bilateral neural tension and Quadrant tests; and both 5x Sit to Stand and TUG scores that indicate decreased mobility and increased fall risk. These impairments result in activity limitations with household/community walking, functional transfers, squatting, forward bending and prolonged standing/sitting which lead to participation restrictions in general ADLs and work/household related duties. The patient will benefit  from skilled physical therapy to address activity and participation restrictions of above mentioned deficits with a PT program focusing on thoracolumbar and sacral range of motion and strength, gait training, postural restoration, and hip and pelvic strengthening and endurance. Clinical presentation is currently stable due to consistent symptom presentation. Personal factors and comorbidities that may negatively effect plan of care include: time since injury onset. Based on today's evaluation findings and the composite of the patient's presentation, I consider the case to be of moderate complexity. Prognosis for PT intervention is fair given the chronicity of symptoms and multiple co-morbidities.     Plan  From a physical therapy perspective, the patient would benefit from: Skilled Rehab Services    Planned therapy interventions include: Manual therapy, Therapeutic activities, Therapeutic exercise, Neuromuscular re-education, Aquatic therapy, and Gait training.            Visit Frequency: 2 times Per Week for 8 Weeks.       This plan was discussed with Patient.   Discussion participants: Agreed Upon Plan of Care             Goals:   Active                   Start:  01/29/25    Expected End:  04/04/25               Ambulation/movement       Patient will ambulate with normal gait pattern with no device       Start:  01/29/25    Expected End:  04/04/25               Functional outcome       Patient stated goal: Decrease low back and left hip pain to be able to get back to work, at least part-time.        Start:  01/29/25    Expected End:  04/04/25            Patient will demonstrate independence in home program for support of progression       Start:  01/29/25    Expected End:  04/04/25               Physical Therapy          Range of Motion       Patient will achieve spinal flexion to 60 degrees       Start:  01/29/25    Expected End:  04/04/25            Patient will achieve spinal extension to 15 degrees        Start:  01/29/25    Expected End:  04/04/25            Patient will achieve bilateral spinal side bending ROM 30 degrees       Start:  01/29/25    Expected End:  04/04/25               Strength       Patient will achieve bilateral hip extension strength of 3+/5       Start:  01/29/25    Expected End:  04/04/25            Patient will achieve bilateral hip abduction strength of 4-/5       Start:  01/29/25    Expected End:  04/04/25            Patient will achieve bilateral hip external rotation strength of 4-/5 in 90 degrees hip flexion       Start:  01/29/25    Expected End:  04/04/25            Patient will achieve bilateral hip internal rotation strength of 4-/5 in 90 degrees hip flexion       Start:  01/29/25    Expected End:  04/04/25               Transferring       Patient will transfer from supine to seated when transferring out of bed in the morning with no greater than 4/10 low back pain provocation       Start:  01/29/25    Expected End:  04/04/25

## 2025-01-30 ENCOUNTER — OFFICE VISIT (OUTPATIENT)
Dept: OBSTETRICS AND GYNECOLOGY | Facility: CLINIC | Age: 55
End: 2025-01-30

## 2025-01-30 VITALS
WEIGHT: 158.75 LBS | BODY MASS INDEX: 25.62 KG/M2 | SYSTOLIC BLOOD PRESSURE: 138 MMHG | DIASTOLIC BLOOD PRESSURE: 84 MMHG

## 2025-01-30 DIAGNOSIS — N95.2 VAGINAL ATROPHY: Primary | ICD-10-CM

## 2025-01-30 PROCEDURE — 99214 OFFICE O/P EST MOD 30 MIN: CPT | Mod: S$PBB,,, | Performed by: OBSTETRICS & GYNECOLOGY

## 2025-01-30 PROCEDURE — 99214 OFFICE O/P EST MOD 30 MIN: CPT | Mod: PBBFAC,PO | Performed by: OBSTETRICS & GYNECOLOGY

## 2025-01-30 PROCEDURE — 99999 PR PBB SHADOW E&M-EST. PATIENT-LVL IV: CPT | Mod: PBBFAC,,, | Performed by: OBSTETRICS & GYNECOLOGY

## 2025-01-30 RX ORDER — PRASTERONE 6.5 MG/1
6.5 INSERT VAGINAL NIGHTLY
Qty: 30 EACH | Refills: 11 | Status: SHIPPED | OUTPATIENT
Start: 2025-01-30

## 2025-01-30 NOTE — PROGRESS NOTES
CC: Vaginal bleeding after intercourse    Laz Quintero is a 54 y.o. female  presents for a consultation for management of vaginal bleeding after intercourse.     She reports note being sexually active for some time. She reported after recently becoming sexually active again, she noticed bleeding after intercourse. After the second time, she reports bleeding and passage of tissue.     She is s/p TLH in .      Past Medical History:   Diagnosis Date    Abnormal Pap smear     Abnormal Pap smear of cervix     Annular tear of lumbar disc, L5-S1. 2012    Chronic LBP     HPTH (hyperparathyroidism)     Hyperlipidemia     Menorrhagia     PONV (postoperative nausea and vomiting)     Primary hypertension 2023    Right lumbar radiculopathy 2012    Seasonal allergies        Past Surgical History:   Procedure Laterality Date    BACK SURGERY      CHOLECYSTECTOMY      COLONOSCOPY N/A 2018    Procedure: COLONOSCOPY;  Surgeon: Malachi Olmedo MD;  Location: [facility] (4TH FLR);  Service: Endoscopy;  Laterality: N/A;    COLONOSCOPY N/A 2022    Procedure: COLONOSCOPY;  Surgeon: Ezekiel Alanis MD;  Location: [facility] (4TH FLR);  Service: Endoscopy;  Laterality: N/A;  inst via portal  pre call complete [facility]    ESOPHAGOGASTRODUODENOSCOPY N/A 2019    Procedure: EGD (ESOPHAGOGASTRODUODENOSCOPY);  Surgeon: Jose Carlos Ventura MD;  Location: [facility] (4TH FLR);  Service: Endoscopy;  Laterality: N/A;  pt requesting ASAP    ESOPHAGOGASTRODUODENOSCOPY N/A 2024    Procedure: EGD (ESOPHAGOGASTRODUODENOSCOPY);  Surgeon: Malachi Olmedo MD;  Location: [facility] (2ND FLR);  Service: Endoscopy;  Laterality: N/A;    ESOPHAGOGASTRODUODENOSCOPY N/A 9/3/2024    Procedure: EGD (ESOPHAGOGASTRODUODENOSCOPY);  Surgeon: Saurav Draper MD;  Location: [facility] (4TH FLR);  Service: Endoscopy;  Laterality: N/A;  Ref by:ana Hurley sent via portal.  -[facility] for pre  call-tb  -precall complete-KPvt    HYSTERECTOMY, TOTAL, LAPAROSCOPIC, WITH SALPINGECTOMY  2015    INJECTION OF ANESTHETIC AGENT AROUND MULTIPLE INTERCOSTAL NERVES Right 2023    Procedure: BLOCK, NERVE, INTERCOSTAL, 2 OR MORE;  Surgeon: Anton Evangelista MD;  Location: [facility] OR [address] [place] [address];  Service: Cardiothoracic;  Laterality: Right;    TUBAL LIGATION      Essure    WISDOM TOOTH EXTRACTION          XI ROBOTIC RATS Right 2023    Procedure: XI ROBOTIC THYMECTOMY;  Surgeon: Anton Evangelista MD;  Location: [facility] OR [address] [place] [address];  Service: Cardiothoracic;  Laterality: Right;       Family History   Problem Relation Name Age of Onset    Hypertension Mother      Diabetes Father      Colon polyps Father      Hypertension Maternal Grandmother      Multiple myeloma Maternal Grandmother      Stroke Paternal Grandmother  68    Breast cancer Neg Hx      Ovarian cancer Neg Hx      Melanoma Neg Hx      Colon cancer Neg Hx      Amblyopia Neg Hx      Blindness Neg Hx      Cataracts Neg Hx      Macular degeneration Neg Hx      Retinal detachment Neg Hx      Strabismus Neg Hx         Social History     Tobacco Use    Smoking status: Never     Passive exposure: Never    Smokeless tobacco: Never   Substance Use Topics    Alcohol use: No    Drug use: No       OB History    Para Term  AB Living   3 2 2 0 1 2   SAB IAB Ectopic Multiple Live Births   0 1 0 0 2      # Outcome Date GA Lbr Red/2nd Weight Sex Type Anes PTL Lv   3 Term 02    M Vag-Spont   FABIENNE   2 Term 96    M Vag-Spont   FABIENNE   1 IAB      TAB   FD       /84   Wt 72 kg (158 lb 11.7 oz)   LMP 2015 (Exact Date)   BMI 25.62 kg/m²     ROS:  GENERAL: Denies weight gain or weight loss. Feeling well overall.   SKIN: Denies rash or lesions.   HEAD: Denies head injury or headache.   NODES: Denies enlarged lymph nodes.   CHEST: Denies chest pain or shortness of breath.   CARDIOVASCULAR: Denies palpitations or left sided chest pain.    ABDOMEN: No abdominal pain, constipation, diarrhea, nausea, vomiting or rectal bleeding.   URINARY: No frequency, dysuria, hematuria, or burning on urination.  REPRODUCTIVE: See HPI.   HEMATOLOGIC: No easy bruisability or excessive bleeding.  MUSCULOSKELETAL: Denies joint pain or swelling.   NEUROLOGIC: Denies syncope or weakness.   PSYCHIATRIC: Denies depression, anxiety or mood swings.    PHYSICAL EXAM:  APPEARANCE: Well nourished, well developed, in no acute distress.  AFFECT: WNL, alert and oriented x 3  SKIN: No acne or hirsutism  NECK: Neck symmetric without masses or thyromegaly  NODES: No inguinal, cervical, axillary, or femoral lymph node enlargement  CHEST: Good respiratory effect  ABDOMEN: Soft.  No tenderness or masses.  No hepatosplenomegaly.  No hernias.  PELVIC: Normal external genitalia without lesions.  Normal hair distribution.  Adequate perineal body, normal urethral meatus.  Vagina atropic rugated without lesions or discharge.  Vaginal cuff intact. Bimanual exam shows uterus and cervix to be surgically absent.    EXTREMITIES: No edema.      ICD-10-CM ICD-9-CM    1. Vaginal atrophy  N95.2 627.3 prasterone, dhea, (INTRAROSA) 6.5 mg Inst        Discussed possible causes of vaginal bleeding after intercourse - most likely vaginal atrophy. Discussed treatment options - OTC lubricants and estrogen vs. DHEA replacement. Recommended DHEA to repair vaginal tissue and increase lubrication and OTC lubricants. Patient agrees.    RTC in 3 months to re-evaluate symptoms.

## 2025-02-05 ENCOUNTER — CLINICAL SUPPORT (OUTPATIENT)
Dept: REHABILITATION | Facility: HOSPITAL | Age: 55
End: 2025-02-05
Payer: COMMERCIAL

## 2025-02-05 DIAGNOSIS — M54.50 LOW BACK PAIN RADIATING TO RIGHT LEG: Primary | ICD-10-CM

## 2025-02-05 DIAGNOSIS — M54.42 CHRONIC LEFT-SIDED LOW BACK PAIN WITH LEFT-SIDED SCIATICA: ICD-10-CM

## 2025-02-05 DIAGNOSIS — M54.16 LEFT LUMBAR RADICULOPATHY: ICD-10-CM

## 2025-02-05 DIAGNOSIS — M79.604 LOW BACK PAIN RADIATING TO RIGHT LEG: Primary | ICD-10-CM

## 2025-02-05 DIAGNOSIS — G89.29 CHRONIC LEFT-SIDED LOW BACK PAIN WITH LEFT-SIDED SCIATICA: ICD-10-CM

## 2025-02-05 PROCEDURE — 97113 AQUATIC THERAPY/EXERCISES: CPT

## 2025-02-05 NOTE — PROGRESS NOTES
Outpatient Rehab    Physical Therapy Visit    Patient Name: Laz Quintero  MRN: [id number]  YOB: 1970  Today's Date: 2/6/2025    Therapy Diagnosis:   Encounter Diagnoses   Name Primary?    Low back pain radiating to right leg Yes    Chronic left-sided low back pain with left-sided sciatica     Left lumbar radiculopathy      Physician: Ramin Sloan MD    Physician Orders: Eval and Treat  Medical Diagnosis:     Visit # / Visits Authorized:  1 / 20   Date of Evaluation:  1/29/25  Insurance Authorization Period: 2/3/25 to 12/31/25  Plan of Care Certification:  1/29/25 to 3/29/25      Time In: 12:15 pm  Time Out: 1:15 pm  Total Time: 60  Total Billable Time: 60         Subjective   The patient reports that she is achy today in her back and her hip..  Pain reported as 6/10.      Objective            Treatment:  Therapeutic Exercise  Therapeutic Exercise Activity 1: FUNCTIONAL MOBILITY TRAINING x 2 laps each at beginning and 1 lap each at end of session  Walk forward/backward/lateral  Therapeutic Exercise Activity 2: LE EX x 30 reps (w/ RTB-next visit)   Squat, Heel raise with gluteal set, Hip abduction, Hip flex, HS curl , Hip Ext  Therapeutic Exercise Activity 3: SEATED ON POOL STOOL  Sit to stand with green [place] ball, Clam,  LAQ,  Hip flexion with Green yuk ball  Therapeutic Exercise Activity 4: UE EX/CORE  x 20 with multicolor dumbbells  Shoulder flex/ext,   Shoulder horizontal abd/add,   Shoulder abd/add, Mini squat with push/pull red kickboard  Therapeutic Exercise Activity 5: ENDURANCE  LTR x 2'  Bicycle in // bars x 2'  Lumbar decompression x 2'  Therapeutic Exercise Activity 6: Walking marches x 2 laps with Green [place] ball    Patient's spiritual, cultural, and educational needs considered and patient agreeable to plan of care and goals.     Assessment & Plan   Assessment: The patient tolerated here initial pool treatment session well today. Due to the gap in treatment since 10/2024 the RTB  was held today and will attempt to re-add to her treatment session as tolerated.  She reported some discomfort in her back with the walking marches which improved by keeping the yuk ball closer to her body.  Evaluation/Treatment Tolerance: Patient tolerated treatment well        Plan: Progress POC as tolerted by the patient.    Goals:   Active       Ambulation/movement       Patient will ambulate with normal gait pattern with no device       Start:  01/29/25    Expected End:  04/04/25               Functional outcome       Patient stated goal: Decrease low back and left hip pain to be able to get back to work, at least part-time.        Start:  01/29/25    Expected End:  04/04/25            Patient will demonstrate independence in home program for support of progression       Start:  01/29/25    Expected End:  04/04/25               Physical Therapy          Range of Motion       Patient will achieve spinal flexion to 60 degrees       Start:  01/29/25    Expected End:  04/04/25            Patient will achieve spinal extension to 15 degrees       Start:  01/29/25    Expected End:  04/04/25            Patient will achieve bilateral spinal side bending ROM 30 degrees       Start:  01/29/25    Expected End:  04/04/25               Strength       Patient will achieve bilateral hip extension strength of 3+/5       Start:  01/29/25    Expected End:  04/04/25            Patient will achieve bilateral hip abduction strength of 4-/5       Start:  01/29/25    Expected End:  04/04/25            Patient will achieve bilateral hip external rotation strength of 4-/5 in 90 degrees hip flexion       Start:  01/29/25    Expected End:  04/04/25            Patient will achieve bilateral hip internal rotation strength of 4-/5 in 90 degrees hip flexion       Start:  01/29/25    Expected End:  04/04/25               Transferring       Patient will transfer from supine to seated when transferring out of bed in the morning with no greater  than 4/10 low back pain provocation       Start:  01/29/25    Expected End:  04/04/25                Frances Reddy, PT

## 2025-02-05 NOTE — ANESTHESIA PROCEDURE NOTES
Arterial    Diagnosis: thymoma    Patient location during procedure: done in OR    Staffing  Authorizing Provider: Maciel Davila MD  Performing Provider: Teo Chawla MD    Anesthesiologist was present at the time of the procedure.    Preanesthetic Checklist  Completed: patient identified, IV checked, site marked, risks and benefits discussed, surgical consent, monitors and equipment checked, pre-op evaluation, timeout performed and anesthesia consent givenArterial  Skin Prep: chlorhexidine gluconate and isopropyl alcohol  Local Infiltration: none  Orientation: left  Location: radial    Catheter Size: 20 G  Catheter placement by Ultrasound guidance. Heme positive aspiration all ports.   Vessel Caliber: small, patent, compressibility normal  Vascular Doppler:  not done  Needle advanced into vessel with real time Ultrasound guidance.Insertion Attempts: 1  Assessment  Dressing: secured with tape and tegaderm  Patient: Tolerated well           
Intubation    Date/Time: 3/21/2023 7:50 AM  Performed by: Teo Chawla MD  Authorized by: Maciel Davila MD     Intubation:     Induction:  Intravenous    Intubated:  Postinduction    Mask Ventilation:  Easy with oral airway    Attempts:  1    Attempted By:  Resident anesthesiologist    Method of Intubation:  Video laryngoscopy    Blade:  Martinez 3    Laryngeal View Grade: Grade I - full view of cords      Difficult Airway Encountered?: No      Complications:  None    Airway Device:  Double lumen tube left    Airway Device Size:  37F    Style/Cuff Inflation:  Cuffed (inflated to minimal occlusive pressure)    Secured at:  The lips    Placement Verified By:  Capnometry and Revisualization with laryngoscopy    Complicating Factors:  None    Findings Post-Intubation:  BS equal bilateral and atraumatic/condition of teeth unchanged    
Controlled with current regime

## 2025-02-06 ENCOUNTER — CLINICAL SUPPORT (OUTPATIENT)
Dept: REHABILITATION | Facility: HOSPITAL | Age: 55
End: 2025-02-06
Payer: COMMERCIAL

## 2025-02-06 DIAGNOSIS — M54.16 LEFT LUMBAR RADICULOPATHY: ICD-10-CM

## 2025-02-06 DIAGNOSIS — M54.42 CHRONIC LEFT-SIDED LOW BACK PAIN WITH LEFT-SIDED SCIATICA: ICD-10-CM

## 2025-02-06 DIAGNOSIS — G89.29 CHRONIC LEFT-SIDED LOW BACK PAIN WITH LEFT-SIDED SCIATICA: ICD-10-CM

## 2025-02-06 DIAGNOSIS — M79.604 LOW BACK PAIN RADIATING TO RIGHT LEG: Primary | ICD-10-CM

## 2025-02-06 DIAGNOSIS — M54.50 LOW BACK PAIN RADIATING TO RIGHT LEG: Primary | ICD-10-CM

## 2025-02-06 PROCEDURE — 97113 AQUATIC THERAPY/EXERCISES: CPT | Mod: CQ

## 2025-02-06 NOTE — PROGRESS NOTES
"  Outpatient Rehab    Physical Therapy Visit    Patient Name: Laz Quintero  MRN: [id number]  YOB: 1970  Today's Date: 2/6/2025    Therapy Diagnosis: No diagnosis found.  Physician: Ramin Sloan MD    Physician Orders: Eval and Treat  Medical Diagnosis:   Encounter Diagnoses   Name Primary?    Low back pain radiating to right leg Yes    Chronic left-sided low back pain with left-sided sciatica     Left lumbar radiculopathy        Visit # / Visits Authorized:  2 / 20   Date of Evaluation:  1/29/2025  Insurance Authorization Period: 2/03/2025 to 3/29/2025  Plan of Care Certification:  1/29/2025 to 3/29/2025      Time In: 10:30 am    Time Out: 11:25 am   Total Time:   55    Total Billable Time: 55 minutes         Subjective   States complaints of "more my left hip than back today".  Pain reported as 5/10. Left hip "6/10"    Objective            Treatment:  Therapeutic Exercise  Therapeutic Exercise Activity 1: FUNCTIONAL MOBILITY TRAINING x 2 laps each at beginning and 1 lap each at end of session  Walk forward/backward/lateral  Therapeutic Exercise Activity 2: LE EX x 20 reps with Pink theraband   Squat, Heel raise with gluteal set, Hip abduction, Hip flex, HS curl , Hip Ext  Therapeutic Exercise Activity 3: SEATED ON POOL STOOL  Sit to stand with green [place] ball, Clam,  LAQ,  Hip flexion with Green yuk ball  Therapeutic Exercise Activity 4: UE EX/CORE  x 25 with multicolor dumbbells  Shoulder flex/ext,   Shoulder horizontal abd/add,   Shoulder abd/add, Mini squat with push/pull red kickboard  Therapeutic Exercise Activity 5: ENDURANCE  LTR x 2'  Bicycle in // bars x 2'  Lumbar decompression x 2'  Therapeutic Exercise Activity 6: Walking marches x 2 laps with Green [place] ball    Patient's spiritual, cultural, and educational needs considered and patient agreeable to plan of care and goals.     Assessment & Plan   Assessment: Patient able to increase activity as noted with addition of Pink " theraband with LE therex and increased reps with UE therex today.  Able to complete entire session with minimal cues for technique.  Several short rest breaks taken, but able to complete session without complaint of increased pain.           Plan: Progress POC as tolerted by the patient.    Goals:   Active       Ambulation/movement       Patient will ambulate with normal gait pattern with no device       Start:  01/29/25    Expected End:  04/04/25               Functional outcome       Patient stated goal: Decrease low back and left hip pain to be able to get back to work, at least part-time.        Start:  01/29/25    Expected End:  04/04/25            Patient will demonstrate independence in home program for support of progression       Start:  01/29/25    Expected End:  04/04/25               Physical Therapy          Range of Motion       Patient will achieve spinal flexion to 60 degrees       Start:  01/29/25    Expected End:  04/04/25            Patient will achieve spinal extension to 15 degrees       Start:  01/29/25    Expected End:  04/04/25            Patient will achieve bilateral spinal side bending ROM 30 degrees       Start:  01/29/25    Expected End:  04/04/25               Strength       Patient will achieve bilateral hip extension strength of 3+/5       Start:  01/29/25    Expected End:  04/04/25            Patient will achieve bilateral hip abduction strength of 4-/5       Start:  01/29/25    Expected End:  04/04/25            Patient will achieve bilateral hip external rotation strength of 4-/5 in 90 degrees hip flexion       Start:  01/29/25    Expected End:  04/04/25            Patient will achieve bilateral hip internal rotation strength of 4-/5 in 90 degrees hip flexion       Start:  01/29/25    Expected End:  04/04/25               Transferring       Patient will transfer from supine to seated when transferring out of bed in the morning with no greater than 4/10 low back pain provocation        Start:  01/29/25    Expected End:  04/04/25                Amina Muniz, PTA

## 2025-02-13 ENCOUNTER — CLINICAL SUPPORT (OUTPATIENT)
Dept: REHABILITATION | Facility: HOSPITAL | Age: 55
End: 2025-02-13

## 2025-02-13 DIAGNOSIS — M54.42 CHRONIC LEFT-SIDED LOW BACK PAIN WITH LEFT-SIDED SCIATICA: ICD-10-CM

## 2025-02-13 DIAGNOSIS — M79.604 LOW BACK PAIN RADIATING TO RIGHT LEG: Primary | ICD-10-CM

## 2025-02-13 DIAGNOSIS — M54.16 LEFT LUMBAR RADICULOPATHY: ICD-10-CM

## 2025-02-13 DIAGNOSIS — G89.29 CHRONIC LEFT-SIDED LOW BACK PAIN WITH LEFT-SIDED SCIATICA: ICD-10-CM

## 2025-02-13 DIAGNOSIS — M54.50 LOW BACK PAIN RADIATING TO RIGHT LEG: Primary | ICD-10-CM

## 2025-02-13 PROCEDURE — 97113 AQUATIC THERAPY/EXERCISES: CPT

## 2025-02-13 NOTE — PROGRESS NOTES
"  Outpatient Rehab    Physical Therapy Visit    Patient Name: Laz Quintero  MRN: [id number]  YOB: 1970  Today's Date: 2/13/2025    Therapy Diagnosis: No diagnosis found.  Physician: Ramin Sloan MD    Physician Orders: Eval and Treat  Medical Diagnosis:   Encounter Diagnoses   Name Primary?    Low back pain radiating to right leg Yes    Chronic left-sided low back pain with left-sided sciatica     Left lumbar radiculopathy        Visit # / Visits Authorized:  3/20   Date of Evaluation:  1/29/2025  Insurance Authorization Period: 2/03/2025 to 3/29/2025  Plan of Care Certification:  1/29/2025 to 3/29/2025      Time In: 1225   Time Out: 1330  Total Time: 65    Total Billable Time: 55 minutes         Subjective   The patient reports that she went to her pain management MD today and they recomende her continue with more Aquatic PT..  Pain reported as 6/10. Left hip "6/10"    Objective            Treatment:  Therapeutic Exercise  Therapeutic Exercise Activity 1: FUNCTIONAL MOBILITY TRAINING x 2 laps each at beginning and 1 lap each at end of session  Walk forward/backward/lateral  Therapeutic Exercise Activity 2: LE EX x 25 reps with Pink theraband   Squat, Heel raise with gluteal set, Hip abduction, Hip flex, HS curl , Hip Ext  Therapeutic Exercise Activity 3: SEATED ON POOL STOOL  Sit to stand with green [place] ball, Clam,  LAQ,  Hip flexion with Green yuk ball  Therapeutic Exercise Activity 4: UE EX/CORE  x 25 with multicolor dumbbells  Shoulder flex/ext,   Shoulder horizontal abd/add,   Shoulder abd/add, Mini squat with push/pull  kickboard  Therapeutic Exercise Activity 5: ENDURANCE  LTR x 2'  Bicycle in // bars x 2'  Lumbar decompression x 2'  Therapeutic Exercise Activity 6: Walking marches x 2 laps with Green [place] ball    Patient's spiritual, cultural, and educational needs considered and patient agreeable to plan of care and goals.     Assessment & Plan   Assessment: The patient presented " with elevated pain levels in her L hip and back today with the greatest discomfort with hip flexion on the L and with SL standing on the L LE.  This pain did not limit her ability to participate fully in the treatment.           Plan: Progress POC as tolerted by the patient.    Goals:   Active       Ambulation/movement       Patient will ambulate with normal gait pattern with no device       Start:  01/29/25    Expected End:  04/04/25               Functional outcome       Patient stated goal: Decrease low back and left hip pain to be able to get back to work, at least part-time.  (Progressing)       Start:  01/29/25    Expected End:  04/04/25            Patient will demonstrate independence in home program for support of progression (Progressing)       Start:  01/29/25    Expected End:  04/04/25               Physical Therapy          Range of Motion       Patient will achieve spinal flexion to 60 degrees (Progressing)       Start:  01/29/25    Expected End:  04/04/25            Patient will achieve spinal extension to 15 degrees (Progressing)       Start:  01/29/25    Expected End:  04/04/25            Patient will achieve bilateral spinal side bending ROM 30 degrees (Progressing)       Start:  01/29/25    Expected End:  04/04/25               Strength       Patient will achieve bilateral hip extension strength of 3+/5 (Progressing)       Start:  01/29/25    Expected End:  04/04/25            Patient will achieve bilateral hip abduction strength of 4-/5 (Progressing)       Start:  01/29/25    Expected End:  04/04/25            Patient will achieve bilateral hip external rotation strength of 4-/5 in 90 degrees hip flexion (Progressing)       Start:  01/29/25    Expected End:  04/04/25            Patient will achieve bilateral hip internal rotation strength of 4-/5 in 90 degrees hip flexion (Progressing)       Start:  01/29/25    Expected End:  04/04/25               Transferring       Patient will transfer from  supine to seated when transferring out of bed in the morning with no greater than 4/10 low back pain provocation       Start:  01/29/25    Expected End:  04/04/25                Frances Reddy, PT

## 2025-02-14 ENCOUNTER — CLINICAL SUPPORT (OUTPATIENT)
Dept: REHABILITATION | Facility: HOSPITAL | Age: 55
End: 2025-02-14

## 2025-02-14 DIAGNOSIS — M54.50 LOW BACK PAIN RADIATING TO RIGHT LEG: Primary | ICD-10-CM

## 2025-02-14 DIAGNOSIS — G89.29 CHRONIC LEFT-SIDED LOW BACK PAIN WITH LEFT-SIDED SCIATICA: ICD-10-CM

## 2025-02-14 DIAGNOSIS — M79.604 LOW BACK PAIN RADIATING TO RIGHT LEG: Primary | ICD-10-CM

## 2025-02-14 DIAGNOSIS — M54.16 LEFT LUMBAR RADICULOPATHY: ICD-10-CM

## 2025-02-14 DIAGNOSIS — M54.42 CHRONIC LEFT-SIDED LOW BACK PAIN WITH LEFT-SIDED SCIATICA: ICD-10-CM

## 2025-02-14 PROCEDURE — 97113 AQUATIC THERAPY/EXERCISES: CPT

## 2025-02-14 NOTE — PROGRESS NOTES
Outpatient Rehab    Physical Therapy Visit    Patient Name: Laz Quintero  MRN: [id number]  YOB: 1970  Today's Date: 2/14/2025    Therapy Diagnosis:   Encounter Diagnoses   Name Primary?    Low back pain radiating to right leg Yes    Chronic left-sided low back pain with left-sided sciatica     Left lumbar radiculopathy      Physician: Ramin Sloan MD    Physician Orders: Eval and Treat  Medical Diagnosis:   Encounter Diagnoses   Name Primary?    Low back pain radiating to right leg Yes    Chronic left-sided low back pain with left-sided sciatica     Left lumbar radiculopathy        Date of Evaluation:  1/29/2025  Insurance Authorization Period: 2/03/2025 to 3/29/2025  Plan of Care Certification:  1/29/2025 to 3/29/2025   Visit # / Visits Authorized:  4/20 + Eval    Time In: 1015   Time Out: 1120  Total Time: 65    Total Billable Time: 30 minutes         Subjective   The patient reports that she is feeling better than yesterday. She staed normally her hip and back are sore and tight after PT but today it is tight but less sore..  Pain reported as 5/10.      Objective            Treatment:  Therapeutic Exercise  Therapeutic Exercise Activity 1: FUNCTIONAL MOBILITY TRAINING x 2 laps each at beginning and 1 lap each at end of session  Walk forward/backward/lateral  Therapeutic Exercise Activity 2: LE EX x 30 reps with Pink theraband (advance to GTB next visit)   Squat, Heel raise with gluteal set, Hip abduction, Hip flex, HS curl , Hip Ext  Therapeutic Exercise Activity 3: SEATED ON POOL STOOL  Sit to stand with green [place] ball, Clam,  LAQ,  Hip flexion with Green yuk ball  Therapeutic Exercise Activity 4: UE EX/CORE  x 30 with multicolor dumbbells  Shoulder flex/ext,   Shoulder horizontal abd/add,   Shoulder abd/add, Mini squat with push/pull  kickboard  Therapeutic Exercise Activity 5: ENDURANCE  LTR x 2'  Bicycle in // bars x 2'  Lumbar decompression x 2'  Therapeutic Exercise Activity 6:  Walking marches and tandem walking  x 2 laps with Green [place] ball    Patient's spiritual, cultural, and educational needs considered and patient agreeable to plan of care and goals.     Assessment & Plan   Assessment: The patient demonsrated some UE fatigue with thh yuk ball which led to some slight increase in lumbar lordosis and back pain this improved with lowering the ball closer to the water for walking marches and tandem walking.  Evaluation/Treatment Tolerance: Patient tolerated treatment well        Plan:      Goals:   Active       Ambulation/movement       Patient will ambulate with normal gait pattern with no device       Start:  01/29/25    Expected End:  04/04/25               Functional outcome       Patient stated goal: Decrease low back and left hip pain to be able to get back to work, at least part-time.  (Progressing)       Start:  01/29/25    Expected End:  04/04/25            Patient will demonstrate independence in home program for support of progression (Progressing)       Start:  01/29/25    Expected End:  04/04/25               Physical Therapy          Range of Motion       Patient will achieve spinal flexion to 60 degrees (Progressing)       Start:  01/29/25    Expected End:  04/04/25            Patient will achieve spinal extension to 15 degrees (Progressing)       Start:  01/29/25    Expected End:  04/04/25            Patient will achieve bilateral spinal side bending ROM 30 degrees (Progressing)       Start:  01/29/25    Expected End:  04/04/25               Strength       Patient will achieve bilateral hip extension strength of 3+/5 (Progressing)       Start:  01/29/25    Expected End:  04/04/25            Patient will achieve bilateral hip abduction strength of 4-/5 (Progressing)       Start:  01/29/25    Expected End:  04/04/25            Patient will achieve bilateral hip external rotation strength of 4-/5 in 90 degrees hip flexion (Progressing)       Start:  01/29/25    Expected End:   04/04/25            Patient will achieve bilateral hip internal rotation strength of 4-/5 in 90 degrees hip flexion (Progressing)       Start:  01/29/25    Expected End:  04/04/25               Transferring       Patient will transfer from supine to seated when transferring out of bed in the morning with no greater than 4/10 low back pain provocation       Start:  01/29/25    Expected End:  04/04/25                Frances Reddy, PT

## 2025-02-18 ENCOUNTER — CLINICAL SUPPORT (OUTPATIENT)
Dept: REHABILITATION | Facility: HOSPITAL | Age: 55
End: 2025-02-18

## 2025-02-18 DIAGNOSIS — M54.50 LOW BACK PAIN RADIATING TO RIGHT LEG: Primary | ICD-10-CM

## 2025-02-18 DIAGNOSIS — M54.42 CHRONIC LEFT-SIDED LOW BACK PAIN WITH LEFT-SIDED SCIATICA: ICD-10-CM

## 2025-02-18 DIAGNOSIS — M79.604 LOW BACK PAIN RADIATING TO RIGHT LEG: Primary | ICD-10-CM

## 2025-02-18 DIAGNOSIS — G89.29 CHRONIC LEFT-SIDED LOW BACK PAIN WITH LEFT-SIDED SCIATICA: ICD-10-CM

## 2025-02-18 PROCEDURE — 97113 AQUATIC THERAPY/EXERCISES: CPT

## 2025-02-18 NOTE — PROGRESS NOTES
Outpatient Rehab    Physical Therapy Visit    Patient Name: Laz Quintero  MRN: [id number]  YOB: 1970  Today's Date: 2/18/2025    Therapy Diagnosis:   No diagnosis found.    Physician: Ramin Sloan MD    Physician Orders: Eval and Treat  Medical Diagnosis:   Encounter Diagnoses   Name Primary?    Low back pain radiating to right leg Yes    Chronic left-sided low back pain with left-sided sciatica     Left lumbar radiculopathy        Date of Evaluation:  1/29/2025  Insurance Authorization Period: 2/03/2025 to 3/29/2025  Plan of Care Certification:  1/29/2025 to 3/29/2025   Visit # / Visits Authorized:  4/20 + Eval    Time In:   1:30 PM  Time Out:  2:28 PM  Total Time:   57   Total Billable Time: 33 minutes         Subjective   Patient says that her pain management doctor suggests she f/u with her orthopedist for an additional hip injection and evaluation for additional PT visits..  Pain reported as 5/10.      Objective            Treatment:  Therapeutic Exercise  Therapeutic Exercise Activity 1: FUNCTIONAL MOBILITY TRAINING x 2 laps each at beginning and 1 lap each at end of session  Walk forward/backward/lateral  Therapeutic Exercise Activity 2: LE EX x 30 repsw/GTB  Squat, Heel raise with gluteal set, Hip abduction, Hip flex, HS curl , Hip Ext  Therapeutic Exercise Activity 3: SEATED ON POOL STOOL w/GTB  Sit to stand with green [place] ball, Clam,  LAQ,  Hip flexion with Green yuk x 30  Therapeutic Exercise Activity 4: UE EX/CORE  x 30 with multicolor dumbbells  Shoulder flex/ext,   Shoulder horizontal abd/add,   Shoulder abd/add, Mini squat with push/pull  kickboard  Therapeutic Exercise Activity 5: ENDURANCE  LTR x 2'  Bicycle in // bars x 2'  Lumbar decompression x 2'  Therapeutic Exercise Activity 6: Walking marches and tandem walking  x 2 laps with Green [place] ball    Assessment & Plan   Assessment: Patient showing gradual improvement in global hip strength with good tolerance to advanced  resistance. However, dynamic core stability remains impaired with hip pain provocation during marches with forward press. This is likely due to increased sheer forces at lumbar spine and poor multifidi stability. Able to reduce with decreased forward press range of motion. Continue to progress patient as tolerated.  Evaluation/Treatment Tolerance: Patient limited by pain    Patient will continue to benefit from skilled outpatient physical therapy to address the deficits listed in the problem list box on initial evaluation, provide pt/family education and to maximize pt's level of independence in the home and community environment.     Patient's spiritual, cultural, and educational needs considered and patient agreeable to plan of care and goals.           Plan: Progress POC as tolerted by the patient.    Goals:   Active       Ambulation/movement       Patient will ambulate with normal gait pattern with no device       Start:  01/29/25    Expected End:  04/04/25               Functional outcome       Patient stated goal: Decrease low back and left hip pain to be able to get back to work, at least part-time.  (Progressing)       Start:  01/29/25    Expected End:  04/04/25            Patient will demonstrate independence in home program for support of progression (Progressing)       Start:  01/29/25    Expected End:  04/04/25               Physical Therapy          Range of Motion       Patient will achieve spinal flexion to 60 degrees (Progressing)       Start:  01/29/25    Expected End:  04/04/25            Patient will achieve spinal extension to 15 degrees (Progressing)       Start:  01/29/25    Expected End:  04/04/25            Patient will achieve bilateral spinal side bending ROM 30 degrees (Progressing)       Start:  01/29/25    Expected End:  04/04/25               Strength       Patient will achieve bilateral hip extension strength of 3+/5 (Progressing)       Start:  01/29/25    Expected End:  04/04/25             Patient will achieve bilateral hip abduction strength of 4-/5 (Progressing)       Start:  01/29/25    Expected End:  04/04/25            Patient will achieve bilateral hip external rotation strength of 4-/5 in 90 degrees hip flexion (Progressing)       Start:  01/29/25    Expected End:  04/04/25            Patient will achieve bilateral hip internal rotation strength of 4-/5 in 90 degrees hip flexion (Progressing)       Start:  01/29/25    Expected End:  04/04/25               Transferring       Patient will transfer from supine to seated when transferring out of bed in the morning with no greater than 4/10 low back pain provocation       Start:  01/29/25    Expected End:  04/04/25                Zora Liu, PT

## 2025-02-20 ENCOUNTER — CLINICAL SUPPORT (OUTPATIENT)
Dept: REHABILITATION | Facility: HOSPITAL | Age: 55
End: 2025-02-20

## 2025-02-20 DIAGNOSIS — M54.42 CHRONIC LEFT-SIDED LOW BACK PAIN WITH LEFT-SIDED SCIATICA: ICD-10-CM

## 2025-02-20 DIAGNOSIS — G89.29 CHRONIC LEFT-SIDED LOW BACK PAIN WITH LEFT-SIDED SCIATICA: ICD-10-CM

## 2025-02-20 DIAGNOSIS — M79.604 LOW BACK PAIN RADIATING TO RIGHT LEG: Primary | ICD-10-CM

## 2025-02-20 DIAGNOSIS — M54.50 LOW BACK PAIN RADIATING TO RIGHT LEG: Primary | ICD-10-CM

## 2025-02-20 DIAGNOSIS — M54.16 LEFT LUMBAR RADICULOPATHY: ICD-10-CM

## 2025-02-20 PROCEDURE — 97113 AQUATIC THERAPY/EXERCISES: CPT | Mod: CQ

## 2025-02-20 NOTE — PROGRESS NOTES
"    Outpatient Rehab    Physical Therapy Visit    Patient Name: Laz Quintero  MRN: [id number]  YOB: 1970  Today's Date: 2/20/2025    Therapy Diagnosis:   Encounter Diagnoses   Name Primary?    Low back pain radiating to right leg Yes    Chronic left-sided low back pain with left-sided sciatica     Left lumbar radiculopathy        Physician: Ramin Sloan MD    Physician Orders: Eval and Treat  Medical Diagnosis:   Encounter Diagnoses   Name Primary?    Low back pain radiating to right leg Yes    Chronic left-sided low back pain with left-sided sciatica     Left lumbar radiculopathy        Date of Evaluation:  1/29/2025  Insurance Authorization Period: 2/03/2025 to 3/29/2025  Plan of Care Certification:  1/29/2025 to 3/29/2025   Visit # / Visits Authorized:  5/20 + Eval    PTA Visit: 1/5    Time In: 0920   Time Out: 1015  Total Time: 55   Total Billable Time: 55 minutes         Subjective   States that she is "achy" all over.  Reports she stayed in bed most of the day yesterday, wanted to go to a Yoga class but didn't.  She further reports that she did do some stretching at home..  Pain reported as 7/10. low back and left  hip down to toes    Objective            Treatment:  Therapeutic Exercise  Therapeutic Exercise Activity 1: FUNCTIONAL MOBILITY TRAINING x 2 laps each at beginning and 1 lap each at end of session  Walk forward/backward/lateral  Therapeutic Exercise Activity 2: LE EX x 30 repsw/GTB  Squat, Heel raise with gluteal set, Hip abduction, Hip flex, HS curl , Hip Ext  Therapeutic Exercise Activity 3: SEATED ON POOL STOOL w/GTB  Sit to stand with green Yuk ball, Clam,  LAQ,  Hip flexion with Green yuk x 30  Therapeutic Exercise Activity 4: UE EX/CORE  x 30 with multicolor dumbbells  Shoulder flex/ext,   Shoulder horizontal abd/add,   Shoulder abd/add, Mini squat with push/pull  kickboard  Therapeutic Exercise Activity 5: ENDURANCE  LTR x 2'  Bicycle in // bars x 2'  Lumbar " "decompression x 2'  Therapeutic Exercise Activity 6: Walking marches and tandem walking  x 2 laps with Green [place] ball    Assessment & Plan   Assessment: Patient continues to show gradual improvement in global hip strength with good tolerance to advanced resistance. However, dynamic core stability rnoted with hip pain provocation during walking marches and tandem walk with yuk ball.  Able to reduce with decreased fUE extension, holding ball closer to body.  Continue to progress patient as tolerated. States pain level about the same after treatment,  "I thought it was going to be worse - but it's ok"       Patient will continue to benefit from skilled outpatient physical therapy to address the deficits listed in the problem list box on initial evaluation, provide pt/family education and to maximize pt's level of independence in the home and community environment.     Patient's spiritual, cultural, and educational needs considered and patient agreeable to plan of care and goals.           Plan:      Goals:   Active       Ambulation/movement       Patient will ambulate with normal gait pattern with no device       Start:  01/29/25    Expected End:  04/04/25               Functional outcome       Patient stated goal: Decrease low back and left hip pain to be able to get back to work, at least part-time.  (Progressing)       Start:  01/29/25    Expected End:  04/04/25            Patient will demonstrate independence in home program for support of progression (Progressing)       Start:  01/29/25    Expected End:  04/04/25               Physical Therapy          Range of Motion       Patient will achieve spinal flexion to 60 degrees (Progressing)       Start:  01/29/25    Expected End:  04/04/25            Patient will achieve spinal extension to 15 degrees (Progressing)       Start:  01/29/25    Expected End:  04/04/25            Patient will achieve bilateral spinal side bending ROM 30 degrees (Progressing)       Start:  " 01/29/25    Expected End:  04/04/25               Strength       Patient will achieve bilateral hip extension strength of 3+/5 (Progressing)       Start:  01/29/25    Expected End:  04/04/25            Patient will achieve bilateral hip abduction strength of 4-/5 (Progressing)       Start:  01/29/25    Expected End:  04/04/25            Patient will achieve bilateral hip external rotation strength of 4-/5 in 90 degrees hip flexion (Progressing)       Start:  01/29/25    Expected End:  04/04/25            Patient will achieve bilateral hip internal rotation strength of 4-/5 in 90 degrees hip flexion (Progressing)       Start:  01/29/25    Expected End:  04/04/25               Transferring       Patient will transfer from supine to seated when transferring out of bed in the morning with no greater than 4/10 low back pain provocation       Start:  01/29/25    Expected End:  04/04/25                Amina Muniz PTA

## 2025-02-25 ENCOUNTER — CLINICAL SUPPORT (OUTPATIENT)
Dept: REHABILITATION | Facility: HOSPITAL | Age: 55
End: 2025-02-25
Payer: COMMERCIAL

## 2025-02-25 DIAGNOSIS — M79.604 LOW BACK PAIN RADIATING TO RIGHT LEG: Primary | ICD-10-CM

## 2025-02-25 DIAGNOSIS — M54.16 LUMBAR RADICULOPATHY: ICD-10-CM

## 2025-02-25 DIAGNOSIS — M54.16 RIGHT LUMBAR RADICULOPATHY: ICD-10-CM

## 2025-02-25 DIAGNOSIS — M54.16 LEFT LUMBAR RADICULOPATHY: ICD-10-CM

## 2025-02-25 DIAGNOSIS — M54.50 LOW BACK PAIN RADIATING TO RIGHT LEG: Primary | ICD-10-CM

## 2025-02-25 DIAGNOSIS — G89.29 CHRONIC LEFT-SIDED LOW BACK PAIN WITH LEFT-SIDED SCIATICA: ICD-10-CM

## 2025-02-25 DIAGNOSIS — M54.42 CHRONIC LEFT-SIDED LOW BACK PAIN WITH LEFT-SIDED SCIATICA: ICD-10-CM

## 2025-02-25 PROCEDURE — 97113 AQUATIC THERAPY/EXERCISES: CPT

## 2025-02-25 NOTE — PROGRESS NOTES
Outpatient Rehab    Physical Therapy Visit    Patient Name: Laz Quintero  MRN: [id number]  YOB: 1970  Today's Date: 2/25/2025    Therapy Diagnosis:   No diagnosis found.      Physician: Ramin Sloan MD    Physician Orders: Eval and Treat  Medical Diagnosis:   Encounter Diagnoses   Name Primary?    Low back pain radiating to right leg Yes    Chronic left-sided low back pain with left-sided sciatica     Left lumbar radiculopathy        Date of Evaluation:  1/29/2025  Insurance Authorization Period: 2/03/2025 to 3/29/2025  Plan of Care Certification:  1/29/2025 to 3/29/2025   Visit # / Visits Authorized:  6/20 + Eval    PTA Visit: 1/5    Time In:  1:30 PM  Time Out:  2:25 Pm  Total Time:  55 min.  Total Billable Time: 55 minutes       Subjective   Patient reports that she's getting back to her baseline level of pain from the exacerbation last week. Overall she feels much better moving around since restarting aquatic therapy..  Pain reported as 5/10.      Objective            Treatment:  Therapeutic Exercise  Therapeutic Exercise Activity 1: FUNCTIONAL MOBILITY TRAINING x 2 laps each at beginning and 1 lap each at end of session  Walk forward/backward/lateral  Therapeutic Exercise Activity 2: LE EX x 30 reps w/GTB  Squat, Heel raise with gluteal set, Hip abduction, Hip flex, HS curl, Hip Ext  Therapeutic Exercise Activity 3: SEATED ON POOL STOOL w/GTB  Sit to stand with green [place] ball, Clam,  LAQ,  Hip flexion with Green yuk x 30  Therapeutic Exercise Activity 4: UE EX/CORE  x 30 with multicolor dumbbells  Shoulder flex/ext, Shoulder horizontal abd/add, Shoulder abd/add, Mini squat with blue push/pull kickboard  Therapeutic Exercise Activity 5: ENDURANCE  LTR x 2'  Bicycle in // bars x 2'  Lumbar decompression x 2'  Therapeutic Exercise Activity 6: Walking marches and tandem walking  x 2 laps with Green [place] ball (at chest)  Therapeutic Exercise Activity 7: Seated LTR crossed leg stretch x 1'  x 1    Assessment & Plan   Assessment: Patient presents with decreased pain levels from last week. All weighted forward presses regressed to isometric hold of weight at center of gravity due to exacerbation in low back pain from forward press with marching last week. She continues to exhibit mild to moderate lumbar lordosis and upright posture tendancy when squatting, but benefitted from cues to maintain nose over toes when in squat position. Integrated seated lumbar trunk rotation stretch to improve ilium posterior tilt range of motion limited by impaired muscle length of quadratus lumborum and lateral hip rotators. Progress patient as tolerated.  Evaluation/Treatment Tolerance: Patient tolerated treatment well    Patient will continue to benefit from skilled outpatient physical therapy to address the deficits listed in the problem list box on initial evaluation, provide pt/family education and to maximize pt's level of independence in the home and community environment.     Patient's spiritual, cultural, and educational needs considered and patient agreeable to plan of care and goals.           Plan:      Goals:   Active       Ambulation/movement       Patient will ambulate with normal gait pattern with no device       Start:  01/29/25    Expected End:  04/04/25               Functional outcome       Patient stated goal: Decrease low back and left hip pain to be able to get back to work, at least part-time.  (Progressing)       Start:  01/29/25    Expected End:  04/04/25            Patient will demonstrate independence in home program for support of progression (Progressing)       Start:  01/29/25    Expected End:  04/04/25               Physical Therapy          Range of Motion       Patient will achieve spinal flexion to 60 degrees (Progressing)       Start:  01/29/25    Expected End:  04/04/25            Patient will achieve spinal extension to 15 degrees (Progressing)       Start:  01/29/25    Expected End:   04/04/25            Patient will achieve bilateral spinal side bending ROM 30 degrees (Progressing)       Start:  01/29/25    Expected End:  04/04/25               Strength       Patient will achieve bilateral hip extension strength of 3+/5 (Progressing)       Start:  01/29/25    Expected End:  04/04/25            Patient will achieve bilateral hip abduction strength of 4-/5 (Progressing)       Start:  01/29/25    Expected End:  04/04/25            Patient will achieve bilateral hip external rotation strength of 4-/5 in 90 degrees hip flexion (Progressing)       Start:  01/29/25    Expected End:  04/04/25            Patient will achieve bilateral hip internal rotation strength of 4-/5 in 90 degrees hip flexion (Progressing)       Start:  01/29/25    Expected End:  04/04/25               Transferring       Patient will transfer from supine to seated when transferring out of bed in the morning with no greater than 4/10 low back pain provocation       Start:  01/29/25    Expected End:  04/04/25                Zora Liu, PT

## 2025-02-27 ENCOUNTER — CLINICAL SUPPORT (OUTPATIENT)
Dept: REHABILITATION | Facility: HOSPITAL | Age: 55
End: 2025-02-27
Payer: COMMERCIAL

## 2025-02-27 DIAGNOSIS — M54.50 LOW BACK PAIN RADIATING TO RIGHT LEG: Primary | ICD-10-CM

## 2025-02-27 DIAGNOSIS — G89.29 CHRONIC LEFT-SIDED LOW BACK PAIN WITH LEFT-SIDED SCIATICA: ICD-10-CM

## 2025-02-27 DIAGNOSIS — M54.16 LEFT LUMBAR RADICULOPATHY: ICD-10-CM

## 2025-02-27 DIAGNOSIS — M54.42 CHRONIC LEFT-SIDED LOW BACK PAIN WITH LEFT-SIDED SCIATICA: ICD-10-CM

## 2025-02-27 DIAGNOSIS — M79.604 LOW BACK PAIN RADIATING TO RIGHT LEG: Primary | ICD-10-CM

## 2025-02-27 PROCEDURE — 97113 AQUATIC THERAPY/EXERCISES: CPT

## 2025-02-27 NOTE — PROGRESS NOTES
Outpatient Rehab    Physical Therapy Visit    Patient Name: Laz Quintero  MRN: [id number]  YOB: 1970  Today's Date: 2/27/2025    Therapy Diagnosis:   No diagnosis found.      Physician: Ramin Sloan MD    Physician Orders: Eval and Treat  Medical Diagnosis:   Encounter Diagnoses   Name Primary?    Low back pain radiating to right leg Yes    Chronic left-sided low back pain with left-sided sciatica     Left lumbar radiculopathy      Date of Evaluation:  1/29/2025  Insurance Authorization Period: 2/03/2025 to 3/29/2025  Plan of Care Certification:  1/29/2025 to 3/29/2025   Visit # / Visits Authorized:  8/20 + Eval    PTA Visit: 0/5    Time In: 1120  Time Out: 1220  Total Time: 60  Total Billable Time: 55 minutes       Subjective   Her pain is about a 5/10 in her back and her hip today..  Pain reported as 5/10. low back and left  hip down to toes    Objective            Treatment:  Therapeutic Exercise  Therapeutic Exercise Activity 1: FUNCTIONAL MOBILITY TRAINING x 2 laps each at beginning and 1 lap each at end of session  Walk forward/backward/lateral  Therapeutic Exercise Activity 2: LE EX x 30 reps w/GTB  Squat, Heel raise ON POOL LADDER, Hip abduction, Hip flex, HS curl, Hip Ext  Therapeutic Exercise Activity 3: SEATED ON POOL STOOL w/GTB  Sit to stand with green [place] ball, Clam,  LAQ,  Hip flexion with Green yuk x 30  Therapeutic Exercise Activity 4: UE EX/CORE  x 30 with multicolor dumbbells  Shoulder flex/ext, Shoulder horizontal abd/add, Shoulder abd/add, Mini squat with blue push/pull kickboard  Therapeutic Exercise Activity 5: ENDURANCE  LTR x 2'  Bicycle in // bars x 2'  Lumbar decompression x 2'  Therapeutic Exercise Activity 6: Walking marches and tandem walking  x 2 laps with Green [place] ball (at chest)  Therapeutic Exercise Activity 7: Seated LTR crossed leg stretch x 1' x 1  Therapeutic Exercise Activity 8: Step up on 4 inch step leading with L LE 2 x 10 rep    Assessment &  Plan   Assessment: The patient was able to add step ups on 4 inch step leading with the left today with no increase in symptoms. Will continue to work on step ups for improved aiblity to negotiate steps in the community.  Evaluation/Treatment Tolerance: Patient tolerated treatment well    Patient will continue to benefit from skilled outpatient physical therapy to address the deficits listed in the problem list box on initial evaluation, provide pt/family education and to maximize pt's level of independence in the home and community environment.     Patient's spiritual, cultural, and educational needs considered and patient agreeable to plan of care and goals.           Plan: Progress POC as tolerted by the patient.    Goals:   Active       Ambulation/movement       Patient will ambulate with normal gait pattern with no device       Start:  01/29/25    Expected End:  04/04/25               Functional outcome       Patient stated goal: Decrease low back and left hip pain to be able to get back to work, at least part-time.  (Progressing)       Start:  01/29/25    Expected End:  04/04/25            Patient will demonstrate independence in home program for support of progression (Progressing)       Start:  01/29/25    Expected End:  04/04/25               Physical Therapy          Range of Motion       Patient will achieve spinal flexion to 60 degrees (Progressing)       Start:  01/29/25    Expected End:  04/04/25            Patient will achieve spinal extension to 15 degrees (Progressing)       Start:  01/29/25    Expected End:  04/04/25            Patient will achieve bilateral spinal side bending ROM 30 degrees (Progressing)       Start:  01/29/25    Expected End:  04/04/25               Strength       Patient will achieve bilateral hip extension strength of 3+/5 (Progressing)       Start:  01/29/25    Expected End:  04/04/25            Patient will achieve bilateral hip abduction strength of 4-/5 (Progressing)        Start:  01/29/25    Expected End:  04/04/25            Patient will achieve bilateral hip external rotation strength of 4-/5 in 90 degrees hip flexion (Progressing)       Start:  01/29/25    Expected End:  04/04/25            Patient will achieve bilateral hip internal rotation strength of 4-/5 in 90 degrees hip flexion (Progressing)       Start:  01/29/25    Expected End:  04/04/25               Transferring       Patient will transfer from supine to seated when transferring out of bed in the morning with no greater than 4/10 low back pain provocation       Start:  01/29/25    Expected End:  04/04/25                Frances Reddy, PT

## 2025-03-03 ENCOUNTER — HOSPITAL ENCOUNTER (OUTPATIENT)
Dept: RADIOLOGY | Facility: HOSPITAL | Age: 55
Discharge: HOME | End: 2025-03-03
Attending: INTERNAL MEDICINE
Payer: MEDICARE

## 2025-03-03 ENCOUNTER — CLINICAL SUPPORT (OUTPATIENT)
Dept: REHABILITATION | Facility: HOSPITAL | Age: 55
End: 2025-03-03
Payer: COMMERCIAL

## 2025-03-03 DIAGNOSIS — E04.1 THYROID NODULE: ICD-10-CM

## 2025-03-03 DIAGNOSIS — M54.42 CHRONIC LEFT-SIDED LOW BACK PAIN WITH LEFT-SIDED SCIATICA: ICD-10-CM

## 2025-03-03 DIAGNOSIS — M54.50 LOW BACK PAIN RADIATING TO RIGHT LEG: Primary | ICD-10-CM

## 2025-03-03 DIAGNOSIS — G89.29 CHRONIC LEFT-SIDED LOW BACK PAIN WITH LEFT-SIDED SCIATICA: ICD-10-CM

## 2025-03-03 DIAGNOSIS — M79.604 LOW BACK PAIN RADIATING TO RIGHT LEG: Primary | ICD-10-CM

## 2025-03-03 PROCEDURE — 76536 US EXAM OF HEAD AND NECK: CPT | Mod: TC

## 2025-03-03 PROCEDURE — 76536 US EXAM OF HEAD AND NECK: CPT | Mod: 26,,, | Performed by: INTERNAL MEDICINE

## 2025-03-03 PROCEDURE — 97113 AQUATIC THERAPY/EXERCISES: CPT

## 2025-03-03 NOTE — PROGRESS NOTES
Outpatient Rehab    Physical Therapy Visit    Patient Name: Laz Quintero  MRN: [id number]  YOB: 1970  Today's Date: 3/3/2025    Therapy Diagnosis:   No diagnosis found.      Physician: Ramin Sloan MD    Physician Orders: Eval and Treat  Medical Diagnosis:   Encounter Diagnoses   Name Primary?    Low back pain radiating to right leg Yes    Chronic left-sided low back pain with left-sided sciatica     Left lumbar radiculopathy      Date of Evaluation:  1/29/2025  Insurance Authorization Period: 2/03/2025 to 3/29/2025  Plan of Care Certification:  1/29/2025 to 3/29/2025   Visit # / Visits Authorized:  10/12     PTA Visit: 0/5    Time In: 0825  Time Out: 0930  Total Time: 65  Total Billable Time: 60 minutes       Subjective   Her pain is about a 6/10 today. She stated she is very stiff this morning..  Pain reported as 6/10.      Objective            Treatment:  Therapeutic Exercise  Therapeutic Exercise Activity 1: FUNCTIONAL MOBILITY TRAINING x 2 laps each at beginning and 1 lap each at end of session  Walk forward/backward/lateral  Therapeutic Exercise Activity 2: LE EX x 30 reps w/GTB  Squat, Heel raise ON POOL LADDER, Hip abduction, Hip flex, HS curl, Hip Ext  Therapeutic Exercise Activity 3: SEATED ON POOL STOOL w/GTB  Sit to stand with green [place] ball, Clam,  LAQ,  Hip flexion with Green yuk x 30  Therapeutic Exercise Activity 4: UE EX/CORE  x 30 with multicolor dumbbells  Shoulder flex/ext, Shoulder horizontal abd/add, Shoulder abd/add, Mini squat with blue push/pull kickboard  Therapeutic Exercise Activity 5: ENDURANCE  LTR x 2'  Bicycle in // bars x 2'  Lumbar decompression x 2'  Therapeutic Exercise Activity 6: Walking marches and tandem walking  x 2 laps with Green [place] ball (at chest)  Therapeutic Exercise Activity 7: Seated LTR crossed leg stretch x 1' x 1  Therapeutic Exercise Activity 8: Step up on 4 inch step leading with L LE 2 x 10 rep    Assessment & Plan   Assessment: The  patient reported reduced stiffness and improved mobility throughout the duration of the session today.  Evaluation/Treatment Tolerance: Patient tolerated treatment well    Patient will continue to benefit from skilled outpatient physical therapy to address the deficits listed in the problem list box on initial evaluation, provide pt/family education and to maximize pt's level of independence in the home and community environment.     Patient's spiritual, cultural, and educational needs considered and patient agreeable to plan of care and goals.           Plan: Progress POC as tolerted by the patient.    Goals:   Active       Ambulation/movement       Patient will ambulate with normal gait pattern with no device (Progressing)       Start:  01/29/25    Expected End:  04/04/25               Functional outcome       Patient stated goal: Decrease low back and left hip pain to be able to get back to work, at least part-time.  (Progressing)       Start:  01/29/25    Expected End:  04/04/25            Patient will demonstrate independence in home program for support of progression (Progressing)       Start:  01/29/25    Expected End:  04/04/25               Physical Therapy          Range of Motion       Patient will achieve spinal flexion to 60 degrees (Progressing)       Start:  01/29/25    Expected End:  04/04/25            Patient will achieve spinal extension to 15 degrees (Progressing)       Start:  01/29/25    Expected End:  04/04/25            Patient will achieve bilateral spinal side bending ROM 30 degrees (Progressing)       Start:  01/29/25    Expected End:  04/04/25               Strength       Patient will achieve bilateral hip extension strength of 3+/5 (Progressing)       Start:  01/29/25    Expected End:  04/04/25            Patient will achieve bilateral hip abduction strength of 4-/5 (Progressing)       Start:  01/29/25    Expected End:  04/04/25            Patient will achieve bilateral hip external  rotation strength of 4-/5 in 90 degrees hip flexion (Progressing)       Start:  01/29/25    Expected End:  04/04/25            Patient will achieve bilateral hip internal rotation strength of 4-/5 in 90 degrees hip flexion (Progressing)       Start:  01/29/25    Expected End:  04/04/25               Transferring       Patient will transfer from supine to seated when transferring out of bed in the morning with no greater than 4/10 low back pain provocation       Start:  01/29/25    Expected End:  04/04/25                Frances Reddy, PT

## 2025-03-05 ENCOUNTER — CLINICAL SUPPORT (OUTPATIENT)
Dept: REHABILITATION | Facility: HOSPITAL | Age: 55
End: 2025-03-05
Payer: COMMERCIAL

## 2025-03-05 DIAGNOSIS — G89.29 CHRONIC LEFT-SIDED LOW BACK PAIN WITH LEFT-SIDED SCIATICA: ICD-10-CM

## 2025-03-05 DIAGNOSIS — M79.604 LOW BACK PAIN RADIATING TO RIGHT LEG: Primary | ICD-10-CM

## 2025-03-05 DIAGNOSIS — M54.42 CHRONIC LEFT-SIDED LOW BACK PAIN WITH LEFT-SIDED SCIATICA: ICD-10-CM

## 2025-03-05 DIAGNOSIS — M54.16 LEFT LUMBAR RADICULOPATHY: ICD-10-CM

## 2025-03-05 DIAGNOSIS — M54.50 LOW BACK PAIN RADIATING TO RIGHT LEG: Primary | ICD-10-CM

## 2025-03-05 PROCEDURE — 97113 AQUATIC THERAPY/EXERCISES: CPT

## 2025-03-05 PROCEDURE — 97164 PT RE-EVAL EST PLAN CARE: CPT

## 2025-03-05 NOTE — PROGRESS NOTES
Outpatient Rehab    Physical Therapy Progress Note : Updated Plan of Care    Patient Name: Laz Quintero  MRN: [id number]  YOB: 1970  Encounter Date: 3/5/2025    Therapy Diagnosis:   Encounter Diagnoses   Name Primary?    Low back pain radiating to right leg Yes    Chronic left-sided low back pain with left-sided sciatica     Left lumbar radiculopathy      Physician: Ramin Sloan MD    Physician Orders: Eval and Treat      Physician Orders: Eval and Treat  Medical Diagnosis:   Encounter Diagnoses   Name Primary?    Low back pain radiating to right leg Yes    Chronic left-sided low back pain with left-sided sciatica     Left lumbar radiculopathy      Date of Evaluation:  1/29/2025  Insurance Authorization Period: 2/03/2025 to 3/29/2025  Plan of Care Certification:  3/5/25 to 5/5/25  Visit # / Visits Authorized:  11/12        Time In: 1020   Time Out: 1130  Total Time: 70   Total Billable Time: 40       Subjective   She is feeling better today. She is less stiff and her pain levels have not been as high. She stated her pain is a 4/10 today. She has been using her pain cream on her hip and back. The patient stated that she feels her core is stronger and is able to hold bags easier. She does have to watch how heavy the bags are but overall feels stronger. In addition she feels her walking is improved. She feels like the Aquatic Pt helps on the days she has her flare ups. She can get into the pool and get moving easier and with reduced pain..  Pain reported as 4/10. low back and left  hip down to toes    Objective   Posture  Patient presents with a Forward head position. Increased thoracic kyphosis is observed.                       Hip Strength - Planes of Motion   Right Strength Right Pain Left Strength Left  Pain   Flexion (L2) 4- Yes 3- Yes   Extension 3+ Yes       ABduction 4- Yes       ADduction 3+ Yes       Internal Rotation 3+ Yes       External Rotation 3+ Yes           Knee Strength    Right Strength Right Pain Left Strength Left  Pain   Flexion (S2) 3+ Yes 3- Yes   Prone Flexion           Extension (L3) 3+ Yes 3- Yes          Ankle/Foot Strength - Planes of Motion   Right Strength Right Pain Left Strength Left  Pain   Dorsiflexion (L4) 4-   4-     Plantar Flexion (S1) 4-   4-     Inversion 4-   4-     Eversion 4-   4-     Great Toe Flexion           Great Toe Extension (L5)           Lesser Toes Flexion           Lesser Toes Extension                  Transfers Assessment  Sit to Stand Assistance: Independent  Chair to Bed Assistance: Independent      Fall Risk  Functional mobility test results suggest the patient is: At Risk for Falls  Timed Up & Go (TUG)  Time: 8 seconds     An older adult who takes >=12 seconds to complete the TUG is at risk for falling.       Sit to Stand Testing  The patient completed 5 sit to stand transfers in 14 sec. Patient reports no increase in pain from baseline              Gait Analysis  Gait Pattern: Antalgic    Right Side Walking Observations  Increased: Stance Time  Decreased: Arm Swing       Left Side Walking Observations  Increased: Stance Time  Decreased: Arm Swing     Gait Analysis Details  3/5/25--The patient ambulates with slight antalgic gait and reduced step length more limited on the L than the R.          Treatment:  Therapeutic Exercise  Therapeutic Exercise Activity 1: FUNCTIONAL MOBILITY TRAINING x 2 laps each at beginning and 1 lap each at end of session  Walk forward/backward/lateral  Therapeutic Exercise Activity 2: LE EX x 30 reps w/GTB  Squat, Heel raise ON POOL LADDER, Hip abduction, Hip flex, HS curl, Hip Ext  Therapeutic Exercise Activity 3: SEATED ON POOL STOOL w/GTB  Sit to stand with green Yuk ball, Clam,  LAQ,  Hip flexion with Green yuk x 30  Therapeutic Exercise Activity 4: UE EX/CORE  x 30 with multicolor dumbbells  Shoulder flex/ext, Shoulder horizontal abd/add, Shoulder abd/add, Mini squat with blue push/pull kickboard  Therapeutic  Exercise Activity 5: ENDURANCE  LTR x 2'  Bicycle in // bars x 2'  Lumbar decompression x 2'  Therapeutic Exercise Activity 6: Walking marches and tandem walking  x 2 laps with Green [place] ball (at chest)  Therapeutic Exercise Activity 7: Seated LTR crossed leg stretch x 1' x 1  Therapeutic Exercise Activity 8: Step up on 4 inch step leading with L LE 2 x 10 rep    Assessment & Plan   Assessment  Laz presents with a condition of Moderate complexity.   Presentation of Symptoms: Stable  Will Comorbidities Impact Care: Yes       Functional Limitations: Activity tolerance, Disrupted sleep pattern, Driving, Bed mobility, Carrying objects, Completing self-care activities, Decreased ambulation distance/endurance, Gait limitations, Functional mobility, Pain with ADLs/IADLs, Painful locomotion/ambulation, Participating in leisure activities, Performing household chores, Range of motion, Squatting, Transfers  Impairments: Impaired physical strength, Activity intolerance, Abnormal or restricted range of motion, Abnormal muscle tone, Abnormal muscle firing, Abnormal gait, Abnormal coordination    Patient Goal for Therapy (PT): Decrease low back and left hip pain to be able to get back to work, at least part-time.  Prognosis: Fair  Prognosis Details: Chronicity of condition affecting patient prognosis.  Assessment Details: The patient has attended 11 Aquatic PT visits since the start of care. Subjectively she reports reduced pain levels since starting back with PT in addition to feeling stronger in her core and improved ability to carry bags.  She has demonstrated consistent attendance with good tolerance to therapy progressions.  Objectively she continues with limited L LE MMT which is limited by pain reports with testing. She has improved TUG and 5 times sit to stand times indicating improved functional mobility.  At this time she could benefit from additional skilled PT interventions to continue to improve functional strength  and reduce pain for return to work.     Plan  From a physical therapy perspective, the patient would benefit from: Skilled Rehab Services    Planned therapy interventions include: Manual therapy, Therapeutic activities, Therapeutic exercise, Neuromuscular re-education, Aquatic therapy, and Gait training.            Visit Frequency: 2 times Per Week for 8 Weeks.       This plan was discussed with Patient.   Discussion participants: Agreed Upon Plan of Care             Patient will continue to benefit from skilled outpatient physical therapy to address the deficits listed in the problem list box on initial evaluation, provide pt/family education and to maximize pt's level of independence in the home and community environment.     Patient's spiritual, cultural, and educational needs considered and patient agreeable to plan of care and goals.           Goals:   Active       Ambulation/movement       Patient will ambulate with normal gait pattern with no device (Progressing)       Start:  01/29/25    Expected End:  04/04/25               Functional outcome       Patient stated goal: Decrease low back and left hip pain to be able to get back to work, at least part-time.  (Progressing)       Start:  01/29/25    Expected End:  04/04/25            Patient will demonstrate independence in home program for support of progression (Met)       Start:  01/29/25    Expected End:  04/04/25    Resolved:  03/05/25            Physical Therapy          Range of Motion       Patient will achieve spinal flexion to 60 degrees (Progressing)       Start:  01/29/25    Expected End:  04/04/25            Patient will achieve spinal extension to 15 degrees (Progressing)       Start:  01/29/25    Expected End:  04/04/25            Patient will achieve bilateral spinal side bending ROM 30 degrees (Progressing)       Start:  01/29/25    Expected End:  04/04/25               Strength       Patient will achieve bilateral hip extension strength of  3+/5 (Progressing)       Start:  01/29/25    Expected End:  04/04/25            Patient will achieve bilateral hip abduction strength of 4-/5 (Progressing)       Start:  01/29/25    Expected End:  04/04/25            Patient will achieve bilateral hip external rotation strength of 4-/5 in 90 degrees hip flexion (Progressing)       Start:  01/29/25    Expected End:  04/04/25            Patient will achieve bilateral hip internal rotation strength of 4-/5 in 90 degrees hip flexion (Progressing)       Start:  01/29/25    Expected End:  04/04/25               Transferring       Patient will transfer from supine to seated when transferring out of bed in the morning with no greater than 4/10 low back pain provocation (Progressing)       Start:  01/29/25    Expected End:  04/04/25                Frances Reddy, PT

## 2025-03-05 NOTE — LETTER
March 5, 2025  Ramin Sloan MD  [address]  [address]  [facility] [address]    To whom it may concern,     The attached plan of care is being sent to you for review and reference.    You may indicate your approval by signing the document electronically, or by faxing/mailing a signed copy of the final page of this document back to the attention of Frances Reddy, PT:         Plan of Care 3/5/25   Effective from: 3/5/2025  Effective to: 5/5/2025    Plan ID: [id number]            Participants as of Finalize on 3/5/2025    Name Type Comments Contact Info    Ramin Sloan MD Referring Provider  [phone]       Last Plan Note     Author: Frances Reddy, PT Status: Incomplete Last edited: 3/5/2025 10:30 AM         Outpatient Rehab    Physical Therapy Progress Note : Updated Plan of Care    Patient Name: Laz Quintero  MRN: [id number]  YOB: 1970  Encounter Date: 3/5/2025    Therapy Diagnosis:   Encounter Diagnoses   Name Primary?    Low back pain radiating to right leg Yes    Chronic left-sided low back pain with left-sided sciatica     Left lumbar radiculopathy      Physician: Ramin Sloan MD    Physician Orders: Eval and Treat      Physician Orders: Eval and Treat  Medical Diagnosis:   Encounter Diagnoses   Name Primary?    Low back pain radiating to right leg Yes    Chronic left-sided low back pain with left-sided sciatica     Left lumbar radiculopathy      Date of Evaluation:  1/29/2025  Insurance Authorization Period: 2/03/2025 to 3/29/2025  Plan of Care Certification:  3/5/25 to 5/5/25  Visit # / Visits Authorized:  11/12        Time In: 1020   Time Out: 1130  Total Time: 70   Total Billable Time: 40       Subjective  She is feeling better today. She is less stiff and her pain levels have not been as high. She stated her pain is a 4/10 today. She has been using her pain cream on her hip and back. The patient stated that she feels her core is  stronger and is able to hold bags easier. She does have to watch how heavy the bags are but overall feels stronger. In addition she feels her walking is improved. She feels like the Aquatic Pt helps on the days she has her flare ups. She can get into the pool and get moving easier and with reduced pain..  Pain reported as 4/10. low back and left  hip down to toes    Objective  Posture  Patient presents with a Forward head position. Increased thoracic kyphosis is observed.                       Hip Strength - Planes of Motion   Right Strength Right Pain Left Strength Left  Pain   Flexion (L2) 4- Yes 3- Yes   Extension 3+ Yes       ABduction 4- Yes       ADduction 3+ Yes       Internal Rotation 3+ Yes       External Rotation 3+ Yes           Knee Strength   Right Strength Right Pain Left Strength Left  Pain   Flexion (S2) 3+ Yes 3- Yes   Prone Flexion           Extension (L3) 3+ Yes 3- Yes          Ankle/Foot Strength - Planes of Motion   Right Strength Right Pain Left Strength Left  Pain   Dorsiflexion (L4) 4-   4-     Plantar Flexion (S1) 4-   4-     Inversion 4-   4-     Eversion 4-   4-     Great Toe Flexion           Great Toe Extension (L5)           Lesser Toes Flexion           Lesser Toes Extension                  Transfers Assessment  Sit to Stand Assistance: Independent  Chair to Bed Assistance: Independent      Fall Risk  Functional mobility test results suggest the patient is: At Risk for Falls  Timed Up & Go (TUG)  Time: 8 seconds     An older adult who takes >=12 seconds to complete the TUG is at risk for falling.       Sit to Stand Testing  The patient completed 5 sit to stand transfers in 14 sec. Patient reports no increase in pain from baseline              Gait Analysis  Gait Pattern: Antalgic    Right Side Walking Observations  Increased: Stance Time  Decreased: Arm Swing       Left Side Walking Observations  Increased: Stance Time  Decreased: Arm Swing     Gait Analysis Details  3/5/25--The  patient ambulates with slight antalgic gait and reduced step length more limited on the L than the R.          Treatment:  Therapeutic Exercise  Therapeutic Exercise Activity 1: FUNCTIONAL MOBILITY TRAINING x 2 laps each at beginning and 1 lap each at end of session  Walk forward/backward/lateral  Therapeutic Exercise Activity 2: LE EX x 30 reps w/GTB  Squat, Heel raise ON POOL LADDER, Hip abduction, Hip flex, HS curl, Hip Ext  Therapeutic Exercise Activity 3: SEATED ON POOL STOOL w/GTB  Sit to stand with green Yuk ball, Clam,  LAQ,  Hip flexion with Green yuk x 30  Therapeutic Exercise Activity 4: UE EX/CORE  x 30 with multicolor dumbbells  Shoulder flex/ext, Shoulder horizontal abd/add, Shoulder abd/add, Mini squat with blue push/pull kickboard  Therapeutic Exercise Activity 5: ENDURANCE  LTR x 2'  Bicycle in // bars x 2'  Lumbar decompression x 2'  Therapeutic Exercise Activity 6: Walking marches and tandem walking  x 2 laps with Green [place] ball (at chest)  Therapeutic Exercise Activity 7: Seated LTR crossed leg stretch x 1' x 1  Therapeutic Exercise Activity 8: Step up on 4 inch step leading with L LE 2 x 10 rep    Assessment & Plan  Assessment  Laz presents with a condition of Moderate complexity.   Presentation of Symptoms: Stable  Will Comorbidities Impact Care: Yes       Functional Limitations: Activity tolerance, Disrupted sleep pattern, Driving, Bed mobility, Carrying objects, Completing self-care activities, Decreased ambulation distance/endurance, Gait limitations, Functional mobility, Pain with ADLs/IADLs, Painful locomotion/ambulation, Participating in leisure activities, Performing household chores, Range of motion, Squatting, Transfers  Impairments: Impaired physical strength, Activity intolerance, Abnormal or restricted range of motion, Abnormal muscle tone, Abnormal muscle firing, Abnormal gait, Abnormal coordination    Patient Goal for Therapy (PT): Decrease low back and left hip pain to be able  to get back to work, at least part-time.  Prognosis: Fair  Prognosis Details: Chronicity of condition affecting patient prognosis.  Assessment Details: The patient has attended 11 Aquatic PT visits since the start of care. Subjectively she reports reduced pain levels since starting back with PT in addition to feeling stronger in her core and improved ability to carry bags.  She has demonstrated consistent attendance with good tolerance to therapy progressions.  Objectively she continues with limited L LE MMT which is limited by pain reports with testing. She has improved TUG and 5 times sit to stand times indicating improved functional mobility.  At this time she could benefit from additional skilled PT interventions to continue to improve functional strength and reduce pain for return to work.     Plan  From a physical therapy perspective, the patient would benefit from: Skilled Rehab Services    Planned therapy interventions include: Manual therapy, Therapeutic activities, Therapeutic exercise, Neuromuscular re-education, Aquatic therapy, and Gait training.            Visit Frequency: 2 times Per Week for 8 Weeks.       This plan was discussed with Patient.   Discussion participants: Agreed Upon Plan of Care             Patient will continue to benefit from skilled outpatient physical therapy to address the deficits listed in the problem list box on initial evaluation, provide pt/family education and to maximize pt's level of independence in the home and community environment.     Patient's spiritual, cultural, and educational needs considered and patient agreeable to plan of care and goals.           Goals:   Active       Ambulation/movement       Patient will ambulate with normal gait pattern with no device (Progressing)       Start:  01/29/25    Expected End:  04/04/25               Functional outcome       Patient stated goal: Decrease low back and left hip pain to be able to get back to work, at least  part-time.  (Progressing)       Start:  01/29/25    Expected End:  04/04/25            Patient will demonstrate independence in home program for support of progression (Met)       Start:  01/29/25    Expected End:  04/04/25    Resolved:  03/05/25            Physical Therapy          Range of Motion       Patient will achieve spinal flexion to 60 degrees (Progressing)       Start:  01/29/25    Expected End:  04/04/25            Patient will achieve spinal extension to 15 degrees (Progressing)       Start:  01/29/25    Expected End:  04/04/25            Patient will achieve bilateral spinal side bending ROM 30 degrees (Progressing)       Start:  01/29/25    Expected End:  04/04/25               Strength       Patient will achieve bilateral hip extension strength of 3+/5 (Progressing)       Start:  01/29/25    Expected End:  04/04/25            Patient will achieve bilateral hip abduction strength of 4-/5 (Progressing)       Start:  01/29/25    Expected End:  04/04/25            Patient will achieve bilateral hip external rotation strength of 4-/5 in 90 degrees hip flexion (Progressing)       Start:  01/29/25    Expected End:  04/04/25            Patient will achieve bilateral hip internal rotation strength of 4-/5 in 90 degrees hip flexion (Progressing)       Start:  01/29/25    Expected End:  04/04/25               Transferring       Patient will transfer from supine to seated when transferring out of bed in the morning with no greater than 4/10 low back pain provocation (Progressing)       Start:  01/29/25    Expected End:  04/04/25                Frances Reddy PT           Current Participants as of 3/5/2025    Name Type Comments Contact Info    Ramin Sloan MD Referring Provider  [phone]    Signature pending            Sincerely,      Frances Reddy PT  [facility]                                                            Dear Frances Reddy PT,    RE: MsIsaias Laz Quintero, MRN:  [id number]    I certify that I have reviewed the attached plan of care and agree to the details within.        ___________________________  ___________________________  Provider Printed Name   Provider Signed Name      ___________________________  Date and Time General: +fever, no weakness, no fatigue  HEENT: No congestion, no blurry vision, no rhinorrhea, no ear pain, no throat pain  Respiratory: No cough, no shortness of breath  Cardiac: No chest pain, no palpitations  GI: No abdominal pain, +diarrhea, +vomiting, +nausea, no constipation  : No dysuria, no hematuria  MSK: No swelling in extremities, no arthralgias, no back pain  Neuro: No headache, no dizziness

## 2025-03-11 ENCOUNTER — CLINICAL SUPPORT (OUTPATIENT)
Dept: REHABILITATION | Facility: HOSPITAL | Age: 55
End: 2025-03-11
Payer: COMMERCIAL

## 2025-03-11 DIAGNOSIS — M79.604 LOW BACK PAIN RADIATING TO RIGHT LEG: Primary | ICD-10-CM

## 2025-03-11 DIAGNOSIS — M54.42 CHRONIC LEFT-SIDED LOW BACK PAIN WITH LEFT-SIDED SCIATICA: ICD-10-CM

## 2025-03-11 DIAGNOSIS — G89.29 CHRONIC LEFT-SIDED LOW BACK PAIN WITH LEFT-SIDED SCIATICA: ICD-10-CM

## 2025-03-11 DIAGNOSIS — M54.50 LOW BACK PAIN RADIATING TO RIGHT LEG: Primary | ICD-10-CM

## 2025-03-11 DIAGNOSIS — M54.16 LEFT LUMBAR RADICULOPATHY: ICD-10-CM

## 2025-03-11 PROCEDURE — 97113 AQUATIC THERAPY/EXERCISES: CPT

## 2025-03-11 NOTE — PROGRESS NOTES
Outpatient Rehab    Physical Therapy Visit    Patient Name: Laz Quintero  MRN: [id number]  YOB: 1970  Encounter Date: 3/11/2025    Therapy Diagnosis:   Encounter Diagnoses   Name Primary?    Low back pain radiating to right leg Yes    Chronic left-sided low back pain with left-sided sciatica     Left lumbar radiculopathy      Physician: Ramin Sloan MD    Physician Orders: Eval and Treat    Medical Diagnosis:   Encounter Diagnoses   Name Primary?    Low back pain radiating to right leg Yes    Chronic left-sided low back pain with left-sided sciatica     Left lumbar radiculopathy      Date of Evaluation:  1/29/2025  Insurance Authorization Period: 2/03/2025 to 3/29/2025  Plan of Care Certification:  3/5/25 to 5/5/25  Visit # / Visits Authorized:  12/12     Time In: 1015   Time Out: 1120  Total Time: 65   Total Billable Time: 40         Subjective   She is feeling ok today. She stated she is feeling more pain in the hip today vs the back..  Pain reported as 5/10. low back and left  hip down to toes    Objective            Treatment:  Therapeutic Exercise  Therapeutic Exercise Activity 1: FUNCTIONAL MOBILITY TRAINING x 2 laps each at beginning and 1 lap each at end of session  Walk forward/backward/lateral  Therapeutic Exercise Activity 2: LE EX x 30 reps w/GTB  Squat, Heel raise ON POOL LADDER, Hip abduction, Hip flex, HS curl, Hip Ext  Therapeutic Exercise Activity 3: SEATED ON POOL STOOL w/GTB  Sit to stand with green Yuk ball, Clam,  LAQ,  Hip flexion with Green yuk x 30  Therapeutic Exercise Activity 4: UE EX/CORE  x 30 with multicolor dumbbells  Shoulder flex/ext, Shoulder horizontal abd/add, Shoulder abd/add, Mini squat with blue push/pull kickboard  Therapeutic Exercise Activity 5: ENDURANCE  LTR x 2'  Bicycle in // bars x 2'  Lumbar decompression x 2'  Therapeutic Exercise Activity 6: Walking marches and tandem walking  x 2 laps with Green Yuk ball (at chest)  Therapeutic Exercise  Activity 7: Seated LTR crossed leg stretch x 1' x 1  Therapeutic Exercise Activity 8: Step up on 4 inch step leading with L LE 2 x 10 rep    Assessment & Plan   Assessment: The patient reported some increased L sided back pain throughout the session so patient was educated to modify her marches. She performed the walking marches and tandem walking with yuk ball closer to body to reduce the lever arm and reduce lumbar stress. This allowed her to complete all reps of tandem and marching today.  At this time the patient has completed 12 of 12 authroized visits.   The 1010 for additonal visits was submitted by the MD office and the patient could benefit from additional PT pending additonal insurance authorization.  Evaluation/Treatment Tolerance: Patient tolerated treatment well    Patient will continue to benefit from skilled outpatient physical therapy to address the deficits listed in the problem list box on initial evaluation, provide pt/family education and to maximize pt's level of independence in the home and community environment.     Patient's spiritual, cultural, and educational needs considered and patient agreeable to plan of care and goals.           Plan: Progress POC as tolerated pending additional authroized visits.    Goals:   Active       Ambulation/movement       Patient will ambulate with normal gait pattern with no device (Progressing)       Start:  01/29/25    Expected End:  04/04/25               Functional outcome       Patient stated goal: Decrease low back and left hip pain to be able to get back to work, at least part-time.  (Progressing)       Start:  01/29/25    Expected End:  04/04/25            Patient will demonstrate independence in home program for support of progression (Met)       Start:  01/29/25    Expected End:  04/04/25    Resolved:  03/05/25            Physical Therapy          Range of Motion       Patient will achieve spinal flexion to 60 degrees (Progressing)       Start:   01/29/25    Expected End:  04/04/25            Patient will achieve spinal extension to 15 degrees (Progressing)       Start:  01/29/25    Expected End:  04/04/25            Patient will achieve bilateral spinal side bending ROM 30 degrees (Progressing)       Start:  01/29/25    Expected End:  04/04/25               Strength       Patient will achieve bilateral hip extension strength of 3+/5 (Progressing)       Start:  01/29/25    Expected End:  04/04/25            Patient will achieve bilateral hip abduction strength of 4-/5 (Progressing)       Start:  01/29/25    Expected End:  04/04/25            Patient will achieve bilateral hip external rotation strength of 4-/5 in 90 degrees hip flexion (Progressing)       Start:  01/29/25    Expected End:  04/04/25            Patient will achieve bilateral hip internal rotation strength of 4-/5 in 90 degrees hip flexion (Progressing)       Start:  01/29/25    Expected End:  04/04/25               Transferring       Patient will transfer from supine to seated when transferring out of bed in the morning with no greater than 4/10 low back pain provocation (Progressing)       Start:  01/29/25    Expected End:  04/04/25                Frances Reddy, PT

## 2025-04-10 ENCOUNTER — HOSPITAL ENCOUNTER (OUTPATIENT)
Dept: RADIOLOGY | Facility: HOSPITAL | Age: 55
Discharge: HOME | End: 2025-04-10
Attending: STUDENT IN AN ORGANIZED HEALTH CARE EDUCATION/TRAINING PROGRAM
Payer: MEDICARE

## 2025-04-10 ENCOUNTER — OFFICE VISIT (OUTPATIENT)
Dept: CARDIOTHORACIC SURGERY | Facility: CLINIC | Age: 55
End: 2025-04-10
Attending: STUDENT IN AN ORGANIZED HEALTH CARE EDUCATION/TRAINING PROGRAM
Payer: MEDICARE

## 2025-04-10 VITALS
SYSTOLIC BLOOD PRESSURE: 143 MMHG | HEIGHT: 66 IN | WEIGHT: 159.81 LBS | HEART RATE: 71 BPM | DIASTOLIC BLOOD PRESSURE: 90 MMHG | OXYGEN SATURATION: 99 % | BODY MASS INDEX: 25.68 KG/M2

## 2025-04-10 DIAGNOSIS — J98.59 MEDIASTINAL MASS: Primary | ICD-10-CM

## 2025-04-10 DIAGNOSIS — J98.59 MEDIASTINAL MASS: ICD-10-CM

## 2025-04-10 PROCEDURE — 71250 CT THORAX DX C-: CPT | Mod: TC

## 2025-04-10 PROCEDURE — 71250 CT THORAX DX C-: CPT | Mod: 26,,, | Performed by: RADIOLOGY

## 2025-04-10 PROCEDURE — 99214 OFFICE O/P EST MOD 30 MIN: CPT | Mod: S$PBB,,, | Performed by: STUDENT IN AN ORGANIZED HEALTH CARE EDUCATION/TRAINING PROGRAM

## 2025-04-10 PROCEDURE — 99214 OFFICE O/P EST MOD 30 MIN: CPT | Mod: PBBFAC,25 | Performed by: STUDENT IN AN ORGANIZED HEALTH CARE EDUCATION/TRAINING PROGRAM

## 2025-04-10 PROCEDURE — 99999 PR PBB SHADOW E&M-EST. PATIENT-LVL IV: CPT | Mod: PBBFAC,,, | Performed by: STUDENT IN AN ORGANIZED HEALTH CARE EDUCATION/TRAINING PROGRAM

## 2025-04-10 RX ORDER — MELOXICAM 15 MG/1
15 TABLET ORAL
COMMUNITY
Start: 2025-03-28

## 2025-04-10 RX ORDER — BUPROPION HYDROCHLORIDE 150 MG/1
150 TABLET ORAL
COMMUNITY
Start: 2024-10-22

## 2025-04-10 NOTE — PROGRESS NOTES
Subjective     Patient ID: Laz Quintero is a 54 y.o. female.    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 Margins negative. negative VPI. negative Lymphovascular invasion. [id number]. [place] Stage 1.      Post-operative therapy: surveillance    HPI  Patient is a 52 y.o. female never smoker with HLD, h/o lumbar fusion (08/2022), and hyperparathyroidism who presents to clinic today for 2 year follow up s/p right robotic assisted mediastinal mass excision with partial thymectomy. Uncomplicated post-operative course. Today patient reports she is doing well overall. No surgical complaints. Staying active, goes to the gym a few times a week.    Review of Systems   Constitutional:  Negative for appetite change, chills, diaphoresis, fatigue, fever and unexpected weight change.   HENT: Negative.     Respiratory:  Negative for cough, choking, chest tightness and shortness of breath.    Cardiovascular:  Negative for chest pain, palpitations, leg swelling and claudication.   Gastrointestinal: Negative.    Endocrine: Negative.    Musculoskeletal: Negative.    Integumentary:  Negative.   Neurological: Negative.    Psychiatric/Behavioral: Negative.            Objective     Physical Exam  Constitutional:       Appearance: Normal appearance.   Eyes:      Extraocular Movements: Extraocular movements intact.   Cardiovascular:      Rate and Rhythm: Normal rate and regular rhythm.      Pulses: Normal pulses.   Pulmonary:      Effort: Pulmonary effort is normal.      Breath sounds: Normal breath sounds.   Abdominal:      General: Abdomen is flat.      Palpations: Abdomen is soft.   Skin:     General: Skin is warm and dry.   Neurological:       General: No focal deficit present.      Mental Status: She is alert and oriented to person, place, and time. Mental status is at baseline.   Psychiatric:         Mood and Affect: Mood normal.         Behavior: Behavior normal.         Thought Content: Thought content normal.     Diagnostics:     Chest CT 04/11/24: images reviewed. [facility].     Chest CT 4/10/25: Images reviewed     Assessment and Plan     Patient is a 52 y.o. female never smoker with HLD, h/o lumbar fusion (08/2022), and hyperparathyroidism who presents to clinic today for 2 week follow up s/p right robotic assisted mediastinal mass excision with partial thymectomy. No evidence of disease on todays CT Chest    Plan:   RTC in 1 year with a Chest CT. Will need yearly CT scan for 10 years

## 2025-04-15 DIAGNOSIS — M54.16 LUMBAR RADICULOPATHY: Primary | ICD-10-CM

## 2025-04-21 ENCOUNTER — CLINICAL SUPPORT (OUTPATIENT)
Dept: REHABILITATION | Facility: HOSPITAL | Age: 55
End: 2025-04-21
Attending: PHYSICAL MEDICINE & REHABILITATION
Payer: COMMERCIAL

## 2025-04-21 DIAGNOSIS — M54.16 LEFT LUMBAR RADICULOPATHY: ICD-10-CM

## 2025-04-21 DIAGNOSIS — M79.604 LOW BACK PAIN RADIATING TO RIGHT LEG: Primary | ICD-10-CM

## 2025-04-21 DIAGNOSIS — G89.29 CHRONIC LEFT-SIDED LOW BACK PAIN WITH LEFT-SIDED SCIATICA: ICD-10-CM

## 2025-04-21 DIAGNOSIS — M54.50 LOW BACK PAIN RADIATING TO RIGHT LEG: Primary | ICD-10-CM

## 2025-04-21 DIAGNOSIS — M54.42 CHRONIC LEFT-SIDED LOW BACK PAIN WITH LEFT-SIDED SCIATICA: ICD-10-CM

## 2025-04-21 PROCEDURE — 97113 AQUATIC THERAPY/EXERCISES: CPT

## 2025-04-21 NOTE — PROGRESS NOTES
Outpatient Rehab    Physical Therapy Visit    Patient Name: Laz Quintero  MRN: [id number]  YOB: 1970  Encounter Date: 4/21/2025    Therapy Diagnosis:   Encounter Diagnoses   Name Primary?    Low back pain radiating to right leg Yes    Chronic left-sided low back pain with left-sided sciatica     Left lumbar radiculopathy      Physician: Ramin Sloan MD    Physician Orders: Eval and Treat    Medical Diagnosis:   Encounter Diagnoses   Name Primary?    Low back pain radiating to right leg Yes    Chronic left-sided low back pain with left-sided sciatica     Left lumbar radiculopathy      Date of Evaluation:  1/29/2025  Insurance Authorization Period: 2/03/2025 to 3/29/2025  Plan of Care Certification:  3/5/25 to 5/5/25  Visit # / Visits Authorized:  1/12  13 visits total     Time In: 0755   Time Out: 0855  Total Time: 60   Total Billable Time: 60         Subjective   The patient stated she saw Dr. Sloan who appealed the denial for [facility].  She stated her Work Comp asked him if she was able to return to work and he stated she is not able to return to work at this time until she gets the hip taken care of.  She reports that she tried to stay as active as she could while waiting for more PT approval. She reports she did hot worx core and flexibility classes which she liked and felt helpful..  Pain reported as 7/10. Low back pain and 6/10 L hip pain.    Objective         Fall Risk  Functional mobility test results suggest the patient is: At Risk for Falls  Timed Up & Go (TUG)  Time: 11 seconds     An older adult who takes >=12 seconds to complete the TUG is at risk for falling.       Sit to Stand Testing  The patient completed 5 sit to stand transfers in 17 sec. The patient reported some pain coming in today but nothing from baseline                  Treatment:  Therapeutic Exercise  TE 1: FUNCTIONAL MOBILITY TRAINING x 2 laps each at beginning and 1 lap each at end of session  Walk  forward/backward/lateral  TE 2: Standing LE EX x 20 with PINK TBAND (advance to green as tolerated)                  Heel raise with gluteal set,  Hip abduction,  Hip flexion,  Hip Extension,   HS curls, Squat  TE 3: Seated on Pool stool with Pink tband  x 20 reps          Sit to stand with Green Yuk ball, Hip flexion with Green yuk ball,  Clam,  LAQ  TE 4: UE EX/CORE x 20 with Multi color Dumbbells                    flex/ext TA activation, Shoulder horizontal abd/add TA activation, Shoulder abd/add with TA activation, Kick board push/pull  TE 5: Walking marches x 2 laps and Tandem walking x 2 laps with Green yuk ball  TE 6: Step up on 4 inch step x15 B  TE 7: Seated Piriformis stretch 1 x 1'  TE 8: ENDURANCE   LTR x 2',    bicycle x 2'    Assessment & Plan   Assessment: The patient attends PT after greater than 1 month gap in treatment today due to initial insurance denial.  She has regressed TUG and 5 times sit to stand duration compared to the previous measurements.  The patient's resistance band was down graded to [place] tband due to the large gap in treatment.  She was able to complete all exercises at lower resistance with good quality noted.  Evaluation/Treatment Tolerance: Patient tolerated treatment well    Patient will continue to benefit from skilled outpatient physical therapy to address the deficits listed in the problem list box on initial evaluation, provide pt/family education and to maximize pt's level of independence in the home and community environment.     Patient's spiritual, cultural, and educational needs considered and patient agreeable to plan of care and goals.           Plan: Progress POC as tolerated by the patient.    Goals:   Active       Ambulation/movement       Patient will ambulate with normal gait pattern with no device (Progressing)       Start:  01/29/25    Expected End:  04/04/25               Functional outcome       Patient stated goal: Decrease low back and left hip pain to  be able to get back to work, at least part-time.  (Progressing)       Start:  01/29/25    Expected End:  04/04/25            Patient will demonstrate independence in home program for support of progression (Met)       Start:  01/29/25    Expected End:  04/04/25    Resolved:  03/05/25            Physical Therapy          Range of Motion       Patient will achieve spinal flexion to 60 degrees (Ongoing)       Start:  01/29/25    Expected End:  04/04/25            Patient will achieve spinal extension to 15 degrees (Ongoing)       Start:  01/29/25    Expected End:  04/04/25            Patient will achieve bilateral spinal side bending ROM 30 degrees (Ongoing)       Start:  01/29/25    Expected End:  04/04/25               Strength       Patient will achieve bilateral hip extension strength of 3+/5 (Progressing)       Start:  01/29/25    Expected End:  04/04/25            Patient will achieve bilateral hip abduction strength of 4-/5 (Progressing)       Start:  01/29/25    Expected End:  04/04/25            Patient will achieve bilateral hip external rotation strength of 4-/5 in 90 degrees hip flexion (Progressing)       Start:  01/29/25    Expected End:  04/04/25            Patient will achieve bilateral hip internal rotation strength of 4-/5 in 90 degrees hip flexion (Progressing)       Start:  01/29/25    Expected End:  04/04/25               Transferring       Patient will transfer from supine to seated when transferring out of bed in the morning with no greater than 4/10 low back pain provocation (Progressing)       Start:  01/29/25    Expected End:  04/04/25                Frances Reddy, PT

## 2025-04-23 ENCOUNTER — CLINICAL SUPPORT (OUTPATIENT)
Dept: REHABILITATION | Facility: HOSPITAL | Age: 55
End: 2025-04-23
Attending: PHYSICAL MEDICINE & REHABILITATION
Payer: COMMERCIAL

## 2025-04-23 DIAGNOSIS — G89.29 CHRONIC LEFT-SIDED LOW BACK PAIN WITH LEFT-SIDED SCIATICA: ICD-10-CM

## 2025-04-23 DIAGNOSIS — M79.604 LOW BACK PAIN RADIATING TO RIGHT LEG: Primary | ICD-10-CM

## 2025-04-23 DIAGNOSIS — M54.50 LOW BACK PAIN RADIATING TO RIGHT LEG: Primary | ICD-10-CM

## 2025-04-23 DIAGNOSIS — M54.42 CHRONIC LEFT-SIDED LOW BACK PAIN WITH LEFT-SIDED SCIATICA: ICD-10-CM

## 2025-04-23 DIAGNOSIS — M54.16 LEFT LUMBAR RADICULOPATHY: ICD-10-CM

## 2025-04-23 PROCEDURE — 97113 AQUATIC THERAPY/EXERCISES: CPT

## 2025-04-23 NOTE — PROGRESS NOTES
Outpatient Rehab    Physical Therapy Visit    Patient Name: Laz Quintero  MRN: [id number]  YOB: 1970  Encounter Date: 4/23/2025    Therapy Diagnosis:   Encounter Diagnoses   Name Primary?    Low back pain radiating to right leg Yes    Chronic left-sided low back pain with left-sided sciatica     Left lumbar radiculopathy      Physician: Ramin Sloan MD    Physician Orders: Eval and Treat  Medical Diagnosis: Lumbar radiculopathy    Visit # / Visits Authorized:  2 / 12  14 visits total  Insurance Authorization Period: 4/15/2025 to 4/15/2026  Date of Evaluation: 1/29/25  Plan of Care Certification: 4/21/25 to 5/25/25     PT/PTA: PT   Number of PTA visits since last PT visit:0  Time In: 0915   Time Out: 1025  Total Time: 70   Total Billable Time: 70         Subjective   The patient reports that she was not too sore after the last visit..  Pain reported as 6/10. Low back pain and 6/10 L hip pain.    Objective            Treatment:  Therapeutic Exercise  TE 1: FUNCTIONAL MOBILITY TRAINING x 2 laps each at beginning and 1 lap each at end of session  Walk forward/backward/lateral  TE 2: Standing LE EX x 30 with PINK TBAND (advance to green as tolerated)                  Heel raise with gluteal set-on pool ladder step,  Hip abduction,  Hip flexion,  Hip Extension,   HS curls, Squat  TE 3: Seated on Pool stool with Pink tband  x 30 reps          Sit to stand with Green Yuk ball, Hip flexion with Green yuk ball,  Clam,  LAQ  TE 4: UE EX/CORE x 30 with Multi color Dumbbells               Shoulder Flex/ext TA activation, Shoulder horizontal abd/add TA activation, Shoulder abd/add with TA activation, Kick board push/pull              and added isometric kick board push down 2 x 1'  TE 5: Walking marches x 2 laps and Tandem walking x 2 laps with Green yuk ball  TE 6: Step up on 8 inch step x15 B  TE 7: Seated Piriformis stretch 1 x 1'  TE 8: ENDURANCE   LTR x 2',    bicycle x 2'      Time Entry(in  minutes):  Aquatic Therapy Time Entry: 70    Assessment & Plan   Assessment: The patient presented with some minimal soreness from the previous session and was able to tolerate advanced reps to 30 for all UE and LE exercises.  In addition, she was able to perform advanced height of step up to 8 inch step. She reported some fatigue and soreness in her arms in addition to some tightness in her back after the treatment. But overall felt it was a good workout and treatment today.  Evaluation/Treatment Tolerance: Patient tolerated treatment well    Patient will continue to benefit from skilled outpatient physical therapy to address the deficits listed in the problem list box on initial evaluation, provide pt/family education and to maximize pt's level of independence in the home and community environment.     Patient's spiritual, cultural, and educational needs considered and patient agreeable to plan of care and goals.           Plan: Progress POC as tolerated by the patient, advance to [facility] next visit.    Goals:   Active       Ambulation/movement       Patient will ambulate with normal gait pattern with no device (Progressing)       Start:  01/29/25    Expected End:  04/04/25               Functional outcome       Patient stated goal: Decrease low back and left hip pain to be able to get back to work, at least part-time.  (Progressing)       Start:  01/29/25    Expected End:  04/04/25            Patient will demonstrate independence in home program for support of progression (Met)       Start:  01/29/25    Expected End:  04/04/25    Resolved:  03/05/25            Physical Therapy          Range of Motion       Patient will achieve spinal flexion to 60 degrees (Ongoing)       Start:  01/29/25    Expected End:  04/04/25            Patient will achieve spinal extension to 15 degrees (Ongoing)       Start:  01/29/25    Expected End:  04/04/25            Patient will achieve bilateral spinal side bending ROM 30  degrees (Ongoing)       Start:  01/29/25    Expected End:  04/04/25               Strength       Patient will achieve bilateral hip extension strength of 3+/5 (Progressing)       Start:  01/29/25    Expected End:  04/04/25            Patient will achieve bilateral hip abduction strength of 4-/5 (Progressing)       Start:  01/29/25    Expected End:  04/04/25            Patient will achieve bilateral hip external rotation strength of 4-/5 in 90 degrees hip flexion (Progressing)       Start:  01/29/25    Expected End:  04/04/25            Patient will achieve bilateral hip internal rotation strength of 4-/5 in 90 degrees hip flexion (Progressing)       Start:  01/29/25    Expected End:  04/04/25               Transferring       Patient will transfer from supine to seated when transferring out of bed in the morning with no greater than 4/10 low back pain provocation (Progressing)       Start:  01/29/25    Expected End:  04/04/25                Frances Rdedy, PT

## 2025-05-08 ENCOUNTER — CLINICAL SUPPORT (OUTPATIENT)
Dept: REHABILITATION | Facility: HOSPITAL | Age: 55
End: 2025-05-08
Payer: COMMERCIAL

## 2025-05-08 DIAGNOSIS — M54.50 LOW BACK PAIN RADIATING TO RIGHT LEG: Primary | ICD-10-CM

## 2025-05-08 DIAGNOSIS — G89.29 CHRONIC LEFT-SIDED LOW BACK PAIN WITH LEFT-SIDED SCIATICA: ICD-10-CM

## 2025-05-08 DIAGNOSIS — M54.16 LEFT LUMBAR RADICULOPATHY: ICD-10-CM

## 2025-05-08 DIAGNOSIS — M79.604 LOW BACK PAIN RADIATING TO RIGHT LEG: Primary | ICD-10-CM

## 2025-05-08 DIAGNOSIS — M54.42 CHRONIC LEFT-SIDED LOW BACK PAIN WITH LEFT-SIDED SCIATICA: ICD-10-CM

## 2025-05-08 PROCEDURE — 97113 AQUATIC THERAPY/EXERCISES: CPT

## 2025-05-08 NOTE — PROGRESS NOTES
Outpatient Rehab    Physical Therapy Visit    Patient Name: Laz Quintero  MRN: [id number]  YOB: 1970  Encounter Date: 5/8/2025    Therapy Diagnosis:   Encounter Diagnoses   Name Primary?    Low back pain radiating to right leg Yes    Chronic left-sided low back pain with left-sided sciatica     Left lumbar radiculopathy      Physician: Ramin Sloan MD    Physician Orders: Eval and Treat  Medical Diagnosis: Lumbar radiculopathy    Visit # / Visits Authorized:  3 / 12  14 visits total  Insurance Authorization Period: 4/15/2025 to 4/15/2026  Date of Evaluation: 1/29/25  Plan of Care Certification: 4/21/25 to 5/25/25     PT/PTA: PT   Number of PTA visits since last PT visit:0  Time In: 0910   Time Out: 1015  Total Time: 65   Total Billable Time: 65         Subjective   She stated she has been doing her exercises and her pain is a 5/10 today..  Pain reported as 5/10.      Objective            Treatment:  Therapeutic Exercise  TE 1: FUNCTIONAL MOBILITY TRAINING x 2 laps each at beginning and 1 lap each at end of session  Walk forward/backward/lateral  TE 2: Standing LE EX x 30 with PINK TBAND (advance to green as tolerated)                  Heel raise with gluteal set-on pool ladder step,  Hip abduction,  Hip flexion,  Hip Extension,   HS curls, Squat  TE 3: Seated on Pool stool with Pink tband  x 30 reps          Sit to stand with Green Yuk ball, Hip flexion with Green yuk ball,  Clam,  LAQ  TE 4: UE EX/CORE x 30 with Multi color Dumbbells               Shoulder Flex/ext TA activation, Shoulder horizontal abd/add TA activation, Shoulder abd/add with TA activation, Kick board push/pull              and  isometric kick board push down 2 x 1'  TE 5: Walking marches x 2 laps and Tandem walking x 2 laps with Green yuk ball  TE 6: Step up on 8 inch step x20 B  TE 7: Seated Piriformis stretch 1 x 1'  TE 8: ENDURANCE   LTR x 2',    bicycle x 2'      Time Entry(in minutes):  Aquatic Therapy Time Entry:  65    Assessment & Plan   Assessment: The patient tolerated her session well today and will trial advancement to [place] theraband at the next visit.  Evaluation/Treatment Tolerance: Patient tolerated treatment well    Patient will continue to benefit from skilled outpatient physical therapy to address the deficits listed in the problem list box on initial evaluation, provide pt/family education and to maximize pt's level of independence in the home and community environment.     Patient's spiritual, cultural, and educational needs considered and patient agreeable to plan of care and goals.           Plan: Progress POC as tolerated by the patient, advance to [facility] next visit.    Goals:   Active       Ambulation/movement       Patient will ambulate with normal gait pattern with no device (Progressing)       Start:  01/29/25    Expected End:  04/04/25               Functional outcome       Patient stated goal: Decrease low back and left hip pain to be able to get back to work, at least part-time.  (Progressing)       Start:  01/29/25    Expected End:  04/04/25            Patient will demonstrate independence in home program for support of progression (Met)       Start:  01/29/25    Expected End:  04/04/25    Resolved:  03/05/25            Physical Therapy          Range of Motion       Patient will achieve spinal flexion to 60 degrees (Ongoing)       Start:  01/29/25    Expected End:  04/04/25            Patient will achieve spinal extension to 15 degrees (Ongoing)       Start:  01/29/25    Expected End:  04/04/25            Patient will achieve bilateral spinal side bending ROM 30 degrees (Ongoing)       Start:  01/29/25    Expected End:  04/04/25               Strength       Patient will achieve bilateral hip extension strength of 3+/5 (Progressing)       Start:  01/29/25    Expected End:  04/04/25            Patient will achieve bilateral hip abduction strength of 4-/5 (Progressing)       Start:  01/29/25     Expected End:  04/04/25            Patient will achieve bilateral hip external rotation strength of 4-/5 in 90 degrees hip flexion (Progressing)       Start:  01/29/25    Expected End:  04/04/25            Patient will achieve bilateral hip internal rotation strength of 4-/5 in 90 degrees hip flexion (Progressing)       Start:  01/29/25    Expected End:  04/04/25               Transferring       Patient will transfer from supine to seated when transferring out of bed in the morning with no greater than 4/10 low back pain provocation (Progressing)       Start:  01/29/25    Expected End:  04/04/25                Frances Reddy, PT

## 2025-05-09 ENCOUNTER — CLINICAL SUPPORT (OUTPATIENT)
Dept: REHABILITATION | Facility: HOSPITAL | Age: 55
End: 2025-05-09
Payer: COMMERCIAL

## 2025-05-09 DIAGNOSIS — G89.29 CHRONIC LEFT-SIDED LOW BACK PAIN WITH LEFT-SIDED SCIATICA: ICD-10-CM

## 2025-05-09 DIAGNOSIS — M54.50 LOW BACK PAIN RADIATING TO RIGHT LEG: Primary | ICD-10-CM

## 2025-05-09 DIAGNOSIS — M79.604 LOW BACK PAIN RADIATING TO RIGHT LEG: Primary | ICD-10-CM

## 2025-05-09 DIAGNOSIS — M54.16 LEFT LUMBAR RADICULOPATHY: ICD-10-CM

## 2025-05-09 DIAGNOSIS — M54.42 CHRONIC LEFT-SIDED LOW BACK PAIN WITH LEFT-SIDED SCIATICA: ICD-10-CM

## 2025-05-09 PROCEDURE — 97113 AQUATIC THERAPY/EXERCISES: CPT

## 2025-05-09 NOTE — PROGRESS NOTES
Outpatient Rehab    Physical Therapy Visit    Patient Name: Laz Quintero  MRN: [id number]  YOB: 1970  Encounter Date: 5/9/2025    Therapy Diagnosis:   Encounter Diagnoses   Name Primary?    Low back pain radiating to right leg Yes    Chronic left-sided low back pain with left-sided sciatica     Left lumbar radiculopathy      Physician: Ramin Sloan MD    Physician Orders: Eval and Treat  Medical Diagnosis:     Visit # / Visits Authorized:    14 visits total  Insurance Authorization Period:   Date of Evaluation: 1/29/25  Plan of Care Certification: 4/21/25 to 5/25/25     PT/PTA: PT   Number of PTA visits since last PT visit:0  Time In: 1012   Time Out: 1115  Total Time: 63   Total Billable Time: 63         Subjective   The patient reports that she is sore from her treatment yesterday. But it is nothing she did not expect..  Pain reported as 6/10. Low back pain and 6/10 L hip pain.    Objective            Treatment:  Therapeutic Exercise  TE 1: FUNCTIONAL MOBILITY TRAINING x 2 laps each at beginning and 1 lap each at end of session  Walk forward/backward/lateral  TE 2: Standing LE EX x 20 with GREEN TBAND              Heel raise with gluteal set-on pool ladder step,  Hip abduction,  Hip flexion,  Hip Extension,   HS curls, Squat  TE 3: Seated on Pool stool x20 reps with GREEN TBAND      Sit to stand with Green Yuk ball, Hip flexion with Green yuk ball,  Clam,  LAQ  TE 4: UE EX/CORE x 30 with Multi color Dumbbells               Shoulder Flex/ext TA activation, Shoulder horizontal abd/add TA activation, Shoulder abd/add with TA activation, Kick board push/pull              and  isometric kick board push down 2 x 1'  TE 5: Walking marches x 2 laps and Tandem walking x 2 laps with Green yuk ball  TE 6: Step up on 8 inch step x20 B  TE 7: Seated Piriformis stretch 1 x 1'  TE 8: ENDURANCE   LTR x 2',    bicycle x 2', lumbar decompression x 2'      Time Entry(in minutes):  Aquatic Therapy Time  Entry: 63    Assessment & Plan   Assessment: The patient attended Aquatic PT back to back days with some soreness from yesterday's treatment. Despite this she was able to tolerate the advanced resistance to green theraband for all LE exercises today.  Evaluation/Treatment Tolerance: Patient tolerated treatment well    Patient will continue to benefit from skilled outpatient physical therapy to address the deficits listed in the problem list box on initial evaluation, provide pt/family education and to maximize pt's level of independence in the home and community environment.     Patient's spiritual, cultural, and educational needs considered and patient agreeable to plan of care and goals.           Plan: Progress POC as tolerated by the patient.    Goals:   Active       Ambulation/movement       Patient will ambulate with normal gait pattern with no device (Progressing)       Start:  01/29/25    Expected End:  04/04/25               Functional outcome       Patient stated goal: Decrease low back and left hip pain to be able to get back to work, at least part-time.  (Progressing)       Start:  01/29/25    Expected End:  04/04/25            Patient will demonstrate independence in home program for support of progression (Met)       Start:  01/29/25    Expected End:  04/04/25    Resolved:  03/05/25            Physical Therapy          Range of Motion       Patient will achieve spinal flexion to 60 degrees (Ongoing)       Start:  01/29/25    Expected End:  04/04/25            Patient will achieve spinal extension to 15 degrees (Ongoing)       Start:  01/29/25    Expected End:  04/04/25            Patient will achieve bilateral spinal side bending ROM 30 degrees (Ongoing)       Start:  01/29/25    Expected End:  04/04/25               Strength       Patient will achieve bilateral hip extension strength of 3+/5 (Progressing)       Start:  01/29/25    Expected End:  04/04/25            Patient will achieve bilateral hip  abduction strength of 4-/5 (Progressing)       Start:  01/29/25    Expected End:  04/04/25            Patient will achieve bilateral hip external rotation strength of 4-/5 in 90 degrees hip flexion (Progressing)       Start:  01/29/25    Expected End:  04/04/25            Patient will achieve bilateral hip internal rotation strength of 4-/5 in 90 degrees hip flexion (Progressing)       Start:  01/29/25    Expected End:  04/04/25               Transferring       Patient will transfer from supine to seated when transferring out of bed in the morning with no greater than 4/10 low back pain provocation (Progressing)       Start:  01/29/25    Expected End:  04/04/25                Frances Reddy, PT

## 2025-05-12 ENCOUNTER — CLINICAL SUPPORT (OUTPATIENT)
Dept: REHABILITATION | Facility: HOSPITAL | Age: 55
End: 2025-05-12
Payer: COMMERCIAL

## 2025-05-12 DIAGNOSIS — M54.16 LEFT LUMBAR RADICULOPATHY: ICD-10-CM

## 2025-05-12 DIAGNOSIS — G89.29 CHRONIC LEFT-SIDED LOW BACK PAIN WITH LEFT-SIDED SCIATICA: ICD-10-CM

## 2025-05-12 DIAGNOSIS — M54.42 CHRONIC LEFT-SIDED LOW BACK PAIN WITH LEFT-SIDED SCIATICA: ICD-10-CM

## 2025-05-12 DIAGNOSIS — M79.604 LOW BACK PAIN RADIATING TO RIGHT LEG: Primary | ICD-10-CM

## 2025-05-12 DIAGNOSIS — M54.50 LOW BACK PAIN RADIATING TO RIGHT LEG: Primary | ICD-10-CM

## 2025-05-12 PROCEDURE — 97113 AQUATIC THERAPY/EXERCISES: CPT | Mod: CQ

## 2025-05-12 NOTE — PROGRESS NOTES
Outpatient Rehab    Physical Therapy Visit    Patient Name: Laz Quintero  MRN: [id number]  YOB: 1970  Encounter Date: 5/12/2025    Therapy Diagnosis:   Encounter Diagnoses   Name Primary?    Low back pain radiating to right leg Yes    Chronic left-sided low back pain with left-sided sciatica     Left lumbar radiculopathy        Physician: Ramin Sloan MD    Physician Orders: Eval and Treat  Medical Diagnosis: Lumbar radiculopathy    Visit # / Visits Authorized:  4 / 12  14 visits total  Insurance Authorization Period: 4/15/2025 to 4/15/2026  Date of Evaluation: 1/29/25  Plan of Care Certification: 4/21/25 to 5/25/25     PT/PTA: PTA   Number of PTA visits since last PT visit:1  Time In: 0930   Time Out: 1028  Total Time: 58   Total Billable Time: 30         Subjective   Patient reports her pain is about the same/.  Pain reported as 6/10.      Objective            Treatment:  Therapeutic Exercise  TE 1: FUNCTIONAL MOBILITY TRAINING x 2 laps each at beginning and 1 lap each at end of session  Walk forward/backward/lateral  TE 2: Standing LE EX x 20 with GREEN TBAND Heel raise with gluteal set-on pool ladder step, Hip abduction,  Hip flexion, Hip Extension, HS curls, Squat  TE 3: Seated on Pool stool x20 reps with GREEN TBAND  Sit to stand with Green Yuk ball, Hip flexion with Green yuk ball,  Clam,  LAQ  TE 4: UE EX/CORE x 30 with Multi color Dumbbells Shoulder Flex/ext TA activation, Shoulder horizontal abd/add TA activation, Shoulder abd/add with TA activation, Kick board push/pull and  isometric kick board push down 2 x 1'  TE 5: Walking marches x 2 laps and Tandem walking x 2 laps with Green yuk ball  TE 6: Step up on 8 inch step x20 B  TE 7: Seated Piriformis stretch 1 x 1'  TE 8: ENDURANCE   LTR x 2',    bicycle x 2', lumbar decompression x 2'        Time Entry(in minutes):  Aquatic Therapy Time Entry: 30    Assessment & Plan   Assessment: Patient was still a little sore from last week  and required 3 short rest breaks, less than 10 sec each.  Patient completed her therapy as noted above with no increase in symptoms prior to leaving the clinic.       Patient will continue to benefit from skilled outpatient physical therapy to address the deficits listed in the problem list box on initial evaluation, provide pt/family education and to maximize pt's level of independence in the home and community environment.     Patient's spiritual, cultural, and educational needs considered and patient agreeable to plan of care and goals.           Plan: Progress POC as tolerated by the patient.    Goals:   Active       Ambulation/movement       Patient will ambulate with normal gait pattern with no device (Progressing)       Start:  01/29/25    Expected End:  04/04/25               Functional outcome       Patient stated goal: Decrease low back and left hip pain to be able to get back to work, at least part-time.  (Progressing)       Start:  01/29/25    Expected End:  04/04/25            Patient will demonstrate independence in home program for support of progression (Met)       Start:  01/29/25    Expected End:  04/04/25    Resolved:  03/05/25            Physical Therapy          Range of Motion       Patient will achieve spinal flexion to 60 degrees (Ongoing)       Start:  01/29/25    Expected End:  04/04/25            Patient will achieve spinal extension to 15 degrees (Ongoing)       Start:  01/29/25    Expected End:  04/04/25            Patient will achieve bilateral spinal side bending ROM 30 degrees (Ongoing)       Start:  01/29/25    Expected End:  04/04/25               Strength       Patient will achieve bilateral hip extension strength of 3+/5 (Progressing)       Start:  01/29/25    Expected End:  04/04/25            Patient will achieve bilateral hip abduction strength of 4-/5 (Progressing)       Start:  01/29/25    Expected End:  04/04/25            Patient will achieve bilateral hip external rotation  strength of 4-/5 in 90 degrees hip flexion (Progressing)       Start:  01/29/25    Expected End:  04/04/25            Patient will achieve bilateral hip internal rotation strength of 4-/5 in 90 degrees hip flexion (Progressing)       Start:  01/29/25    Expected End:  04/04/25               Transferring       Patient will transfer from supine to seated when transferring out of bed in the morning with no greater than 4/10 low back pain provocation (Progressing)       Start:  01/29/25    Expected End:  04/04/25                Randy Jackson, PTA

## 2025-05-14 ENCOUNTER — CLINICAL SUPPORT (OUTPATIENT)
Dept: REHABILITATION | Facility: HOSPITAL | Age: 55
End: 2025-05-14
Payer: COMMERCIAL

## 2025-05-14 DIAGNOSIS — M54.16 LEFT LUMBAR RADICULOPATHY: Primary | ICD-10-CM

## 2025-05-14 DIAGNOSIS — M79.604 LOW BACK PAIN RADIATING TO RIGHT LEG: ICD-10-CM

## 2025-05-14 DIAGNOSIS — M54.50 LOW BACK PAIN RADIATING TO RIGHT LEG: ICD-10-CM

## 2025-05-14 DIAGNOSIS — M54.42 CHRONIC LEFT-SIDED LOW BACK PAIN WITH LEFT-SIDED SCIATICA: ICD-10-CM

## 2025-05-14 DIAGNOSIS — G89.29 CHRONIC LEFT-SIDED LOW BACK PAIN WITH LEFT-SIDED SCIATICA: ICD-10-CM

## 2025-05-14 PROCEDURE — 97113 AQUATIC THERAPY/EXERCISES: CPT | Mod: CQ

## 2025-05-14 NOTE — PROGRESS NOTES
Outpatient Rehab    Physical Therapy Visit    Patient Name: Laz Quintero  MRN: [id number]  YOB: 1970  Encounter Date: 5/14/2025    Therapy Diagnosis:   Encounter Diagnoses   Name Primary?    Left lumbar radiculopathy Yes    Chronic left-sided low back pain with left-sided sciatica     Low back pain radiating to right leg          Physician: Ramin Sloan MD    Physician Orders: Eval and Treat  Medical Diagnosis: Lumbar radiculopathy    Visit # / Visits Authorized:  5 / 12  14 visits total  Insurance Authorization Period: 4/15/2025 to 4/15/2026  Date of Evaluation: 1/29/25  Plan of Care Certification: 4/21/25 to 5/25/25     PT/PTA: PTA   Number of PTA visits since last PT visit:2  Time In: 0930   Time Out: 1030  Total Time: 60   Total Billable Time: 60         Subjective   Patient reports having an increase in back pain that wraps around into her groin area..  Pain reported as 7/10.      Objective            Treatment:  Therapeutic Exercise  TE 1: FUNCTIONAL MOBILITY TRAINING x 2 laps each at beginning and 1 lap each at end of session  Walk forward/backward/lateral  TE 2: Standing LE EX x 20 with GREEN TBAND Heel raise with gluteal set-on pool ladder step, Hip abduction,  Hip flexion, Hip Extension, HS curls, Squat  TE 3: Seated on Pool stool x20 reps with GREEN TBAND  Sit to stand with Green Yuk ball, Hip flexion with Green yuk ball,  Clam,  LAQ  TE 4: UE EX/CORE x 30 with Multi color Dumbbells Shoulder Flex/ext TA activation, Shoulder horizontal abd/add TA activation, Shoulder abd/add with TA activation, Kick board push/pull and  isometric kick board push down 2 x 1'  TE 5: Walking marches x 2 laps and Tandem walking x 2 laps with Green yuk ball --NO YUK BALL today  TE 6: Step up on 8 inch step x20 B  TE 7: Seated Piriformis stretch 1 x 1'  TE 8: ENDURANCE   LTR x 2',    bicycle x 2', lumbar decompression x 2'          Time Entry(in minutes):  Aquatic Therapy Time Entry: 60    Assessment &  Plan   Assessment: Patient was experienceing an increase in pain with several exercise although she did not stop.  After patient finished with the pool exercises, I had her to lie down on an exam table and found her hips were right anteriorly rotated.  Performed push/pull with patient to achieve a level pelvis which decreased her pain and she was able to walk without pain.       Patient will continue to benefit from skilled outpatient physical therapy to address the deficits listed in the problem list box on initial evaluation, provide pt/family education and to maximize pt's level of independence in the home and community environment.     Patient's spiritual, cultural, and educational needs considered and patient agreeable to plan of care and goals.           Plan: Progress POC as tolerated by the patient.    Goals:   Active       Ambulation/movement       Patient will ambulate with normal gait pattern with no device (Progressing)       Start:  01/29/25    Expected End:  04/04/25               Functional outcome       Patient stated goal: Decrease low back and left hip pain to be able to get back to work, at least part-time.  (Progressing)       Start:  01/29/25    Expected End:  04/04/25            Patient will demonstrate independence in home program for support of progression (Met)       Start:  01/29/25    Expected End:  04/04/25    Resolved:  03/05/25            Physical Therapy          Range of Motion       Patient will achieve spinal flexion to 60 degrees (Ongoing)       Start:  01/29/25    Expected End:  04/04/25            Patient will achieve spinal extension to 15 degrees (Ongoing)       Start:  01/29/25    Expected End:  04/04/25            Patient will achieve bilateral spinal side bending ROM 30 degrees (Ongoing)       Start:  01/29/25    Expected End:  04/04/25               Strength       Patient will achieve bilateral hip extension strength of 3+/5 (Progressing)       Start:  01/29/25    Expected  End:  04/04/25            Patient will achieve bilateral hip abduction strength of 4-/5 (Progressing)       Start:  01/29/25    Expected End:  04/04/25            Patient will achieve bilateral hip external rotation strength of 4-/5 in 90 degrees hip flexion (Progressing)       Start:  01/29/25    Expected End:  04/04/25            Patient will achieve bilateral hip internal rotation strength of 4-/5 in 90 degrees hip flexion (Progressing)       Start:  01/29/25    Expected End:  04/04/25               Transferring       Patient will transfer from supine to seated when transferring out of bed in the morning with no greater than 4/10 low back pain provocation (Progressing)       Start:  01/29/25    Expected End:  04/04/25                Randy Jackson, PTA

## 2025-05-20 ENCOUNTER — CLINICAL SUPPORT (OUTPATIENT)
Dept: REHABILITATION | Facility: HOSPITAL | Age: 55
End: 2025-05-20
Payer: COMMERCIAL

## 2025-05-20 DIAGNOSIS — M54.16 LEFT LUMBAR RADICULOPATHY: ICD-10-CM

## 2025-05-20 DIAGNOSIS — M54.42 CHRONIC LEFT-SIDED LOW BACK PAIN WITH LEFT-SIDED SCIATICA: ICD-10-CM

## 2025-05-20 DIAGNOSIS — M54.50 LOW BACK PAIN RADIATING TO RIGHT LEG: Primary | ICD-10-CM

## 2025-05-20 DIAGNOSIS — G89.29 CHRONIC LEFT-SIDED LOW BACK PAIN WITH LEFT-SIDED SCIATICA: ICD-10-CM

## 2025-05-20 DIAGNOSIS — M79.604 LOW BACK PAIN RADIATING TO RIGHT LEG: Primary | ICD-10-CM

## 2025-05-20 PROCEDURE — 97113 AQUATIC THERAPY/EXERCISES: CPT

## 2025-05-20 NOTE — PROGRESS NOTES
Outpatient Rehab    Physical Therapy Visit    Patient Name: Laz Quintero  MRN: [id number]  YOB: 1970  Encounter Date: 5/20/2025    Therapy Diagnosis:   Encounter Diagnoses   Name Primary?    Low back pain radiating to right leg Yes    Chronic left-sided low back pain with left-sided sciatica     Left lumbar radiculopathy          Physician: Ramin Sloan MD    Physician Orders: Eval and Treat  Medical Diagnosis: Lumbar radiculopathy    Visit # / Visits Authorized:  6 / 12  19 visits total  Insurance Authorization Period: 4/15/2025 to 4/15/2026  Date of Evaluation: 1/29/25  Plan of Care Certification: 4/21/25 to 5/25/25     PT/PTA: PT   Number of PTA visits since last PT visit:0  Time In: 0930   Time Out: 1040  Total Time: 70   Total Billable Time: 30         Subjective   The patient reports she is feeling more stiffness in her back than anything today..  Pain reported as 6/10. Low back pain and 6/10 L hip pain.    Objective            Treatment:  Therapeutic Exercise  TE 1: FUNCTIONAL MOBILITY TRAINING x 2 laps each at beginning and 1 lap each at end of session  Walk forward/backward/lateral  TE 2: Standing LE EX x 30 with GREEN TBAND Heel raise with gluteal set-on pool ladder step, Hip abduction,  Hip flexion, Hip Extension, HS curls, Squat  TE 3: Seated on Pool stool x20 reps with GREEN TBAND  Sit to stand with Green Yuk ball, Hip flexion with Green yuk ball,  Clam,  LAQ  TE 4: UE EX/CORE x 30 with Multi color Dumbbells Shoulder Flex/ext TA activation, Shoulder horizontal abd/add TA activation, Shoulder abd/add with TA activation, Kick board push/pull and  isometric kick board push down 2 x 1'  TE 5: Walking marches x 2 laps and Tandem walking x 2 laps with Green yuk ball --NO YUK BALL today  TE 6: Step up on 8 inch step x20 B  TE 7: Seated Piriformis stretch 1 x 1'  TE 8: ENDURANCE   LTR x 2',    bicycle x 2', lumbar decompression x 2'          Time Entry(in minutes):  Aquatic Therapy  Time Entry: 30    Assessment & Plan   Assessment: The patient tolerated her session well today with the ability to add back all exercises held at the previous visit.  She required some minimal cues to avoid lumbar extension with seated exercises with good imrpovement noted following the cues.  Evaluation/Treatment Tolerance: Patient tolerated treatment well    Patient will continue to benefit from skilled outpatient physical therapy to address the deficits listed in the problem list box on initial evaluation, provide pt/family education and to maximize pt's level of independence in the home and community environment.     Patient's spiritual, cultural, and educational needs considered and patient agreeable to plan of care and goals.           Plan: Progress POC as tolerated by the patient.    Goals:   Active       Ambulation/movement       Patient will ambulate with normal gait pattern with no device (Progressing)       Start:  01/29/25    Expected End:  04/04/25               Functional outcome       Patient stated goal: Decrease low back and left hip pain to be able to get back to work, at least part-time.  (Progressing)       Start:  01/29/25    Expected End:  04/04/25            Patient will demonstrate independence in home program for support of progression (Met)       Start:  01/29/25    Expected End:  04/04/25    Resolved:  03/05/25            Physical Therapy          Range of Motion       Patient will achieve spinal flexion to 60 degrees (Ongoing)       Start:  01/29/25    Expected End:  04/04/25            Patient will achieve spinal extension to 15 degrees (Ongoing)       Start:  01/29/25    Expected End:  04/04/25            Patient will achieve bilateral spinal side bending ROM 30 degrees (Ongoing)       Start:  01/29/25    Expected End:  04/04/25               Strength       Patient will achieve bilateral hip extension strength of 3+/5 (Progressing)       Start:  01/29/25    Expected End:  04/04/25             Patient will achieve bilateral hip abduction strength of 4-/5 (Progressing)       Start:  01/29/25    Expected End:  04/04/25            Patient will achieve bilateral hip external rotation strength of 4-/5 in 90 degrees hip flexion (Progressing)       Start:  01/29/25    Expected End:  04/04/25            Patient will achieve bilateral hip internal rotation strength of 4-/5 in 90 degrees hip flexion (Progressing)       Start:  01/29/25    Expected End:  04/04/25               Transferring       Patient will transfer from supine to seated when transferring out of bed in the morning with no greater than 4/10 low back pain provocation (Progressing)       Start:  01/29/25    Expected End:  04/04/25                Frances Reddy, PT

## 2025-05-22 ENCOUNTER — CLINICAL SUPPORT (OUTPATIENT)
Dept: REHABILITATION | Facility: HOSPITAL | Age: 55
End: 2025-05-22
Payer: COMMERCIAL

## 2025-05-22 DIAGNOSIS — G89.29 CHRONIC LEFT-SIDED LOW BACK PAIN WITH LEFT-SIDED SCIATICA: ICD-10-CM

## 2025-05-22 DIAGNOSIS — M79.604 LOW BACK PAIN RADIATING TO RIGHT LEG: Primary | ICD-10-CM

## 2025-05-22 DIAGNOSIS — M54.42 CHRONIC LEFT-SIDED LOW BACK PAIN WITH LEFT-SIDED SCIATICA: ICD-10-CM

## 2025-05-22 DIAGNOSIS — M54.16 LEFT LUMBAR RADICULOPATHY: ICD-10-CM

## 2025-05-22 DIAGNOSIS — M54.50 LOW BACK PAIN RADIATING TO RIGHT LEG: Primary | ICD-10-CM

## 2025-05-22 PROCEDURE — 97113 AQUATIC THERAPY/EXERCISES: CPT

## 2025-05-26 NOTE — PROGRESS NOTES
Outpatient Rehab    Physical Therapy Visit    Patient Name: Laz Quintero  MRN: [id number]  YOB: 1970  Encounter Date: 5/22/2025    Therapy Diagnosis:   Encounter Diagnoses   Name Primary?    Low back pain radiating to right leg Yes    Chronic left-sided low back pain with left-sided sciatica     Left lumbar radiculopathy          Physician: Ramin Sloan MD    Physician Orders: Eval and Treat  Medical Diagnosis: Lumbar radiculopathy    Visit # / Visits Authorized:  7 / 12  19 visits total  Insurance Authorization Period: 4/15/2025 to 4/15/2026  Date of Evaluation: 1/29/25  Plan of Care Certification: 4/21/25 to 5/25/25     PT/PTA: PT   Number of PTA visits since last PT visit:0  Time In: 0915   Time Out: 1030  Total Time: 75   Total Billable Time: 45         Subjective   She stated she is feeling better today than the other day and the stiffness is less bothersome.  Pain reported as 5/10.      Objective            Treatment:  Therapeutic Exercise  TE 1: FUNCTIONAL MOBILITY TRAINING x 2 laps each at beginning and 1 lap each at end of session  Walk forward/backward/lateral  TE 2: Standing LE EX x 30 with GREEN TBAND                          Heel raise with gluteal set-on pool ladder step, Hip abduction,  Hip flexion, Hip Extension, HS curls, Squat  TE 3: Seated on Pool stool x20 reps with GREEN TBAND  Sit to stand with Green Yuk ball, Hip flexion with Green yuk ball,  Clam,  LAQ  TE 4: UE EX/CORE x 30 with Yellow Dumbbells                 Shoulder Flex/ext TA activation, Shoulder horizontal abd/add TA activation, Shoulder abd/add with TA activation, Kick board push/pull and  isometric kick board push down 2 x 1'  TE 5: Walking marches x 2 laps and Tandem walking x 2 laps with Green yuk ball  TE 6: Step up on 8 inch step x20 B  TE 7: Seated Piriformis stretch 1 x 1'  TE 8: ENDURANCE   LTR x 2',    bicycle x 2', lumbar decompression x 2'          Time Entry(in minutes):  Aquatic Therapy Time  Entry: 45    Assessment & Plan   Assessment: The patient reported some discomfort in her back with the walking marches/tandem with the yuk ball therefore ball remained close to her body. This reduced her symptoms.  Evaluation/Treatment Tolerance: Patient tolerated treatment well    Patient will continue to benefit from skilled outpatient physical therapy to address the deficits listed in the problem list box on initial evaluation, provide pt/family education and to maximize pt's level of independence in the home and community environment.     Patient's spiritual, cultural, and educational needs considered and patient agreeable to plan of care and goals.           Plan: Progress POC as tolerated by the patient.    Goals:   Active       Ambulation/movement       Patient will ambulate with normal gait pattern with no device (Progressing)       Start:  01/29/25    Expected End:  04/04/25               Functional outcome       Patient stated goal: Decrease low back and left hip pain to be able to get back to work, at least part-time.  (Progressing)       Start:  01/29/25    Expected End:  04/04/25            Patient will demonstrate independence in home program for support of progression (Met)       Start:  01/29/25    Expected End:  04/04/25    Resolved:  03/05/25            Physical Therapy          Range of Motion       Patient will achieve spinal flexion to 60 degrees (Ongoing)       Start:  01/29/25    Expected End:  04/04/25            Patient will achieve spinal extension to 15 degrees (Ongoing)       Start:  01/29/25    Expected End:  04/04/25            Patient will achieve bilateral spinal side bending ROM 30 degrees (Ongoing)       Start:  01/29/25    Expected End:  04/04/25               Strength       Patient will achieve bilateral hip extension strength of 3+/5 (Progressing)       Start:  01/29/25    Expected End:  04/04/25            Patient will achieve bilateral hip abduction strength of 4-/5  (Progressing)       Start:  01/29/25    Expected End:  04/04/25            Patient will achieve bilateral hip external rotation strength of 4-/5 in 90 degrees hip flexion (Progressing)       Start:  01/29/25    Expected End:  04/04/25            Patient will achieve bilateral hip internal rotation strength of 4-/5 in 90 degrees hip flexion (Progressing)       Start:  01/29/25    Expected End:  04/04/25               Transferring       Patient will transfer from supine to seated when transferring out of bed in the morning with no greater than 4/10 low back pain provocation (Progressing)       Start:  01/29/25    Expected End:  04/04/25                Frances Reddy, PT

## 2025-05-27 ENCOUNTER — CLINICAL SUPPORT (OUTPATIENT)
Dept: REHABILITATION | Facility: HOSPITAL | Age: 55
End: 2025-05-27
Payer: COMMERCIAL

## 2025-05-27 DIAGNOSIS — M79.604 LOW BACK PAIN RADIATING TO RIGHT LEG: Primary | ICD-10-CM

## 2025-05-27 DIAGNOSIS — M54.42 CHRONIC LEFT-SIDED LOW BACK PAIN WITH LEFT-SIDED SCIATICA: ICD-10-CM

## 2025-05-27 DIAGNOSIS — M54.16 LEFT LUMBAR RADICULOPATHY: ICD-10-CM

## 2025-05-27 DIAGNOSIS — G89.29 CHRONIC LEFT-SIDED LOW BACK PAIN WITH LEFT-SIDED SCIATICA: ICD-10-CM

## 2025-05-27 DIAGNOSIS — M54.50 LOW BACK PAIN RADIATING TO RIGHT LEG: Primary | ICD-10-CM

## 2025-05-27 PROCEDURE — 97113 AQUATIC THERAPY/EXERCISES: CPT

## 2025-05-27 NOTE — PROGRESS NOTES
Outpatient Rehab    Physical Therapy Visit    Patient Name: Laz Quintero  MRN: [id number]  YOB: 1970  Encounter Date: 5/27/2025    Therapy Diagnosis:   Encounter Diagnoses   Name Primary?    Low back pain radiating to right leg Yes    Chronic left-sided low back pain with left-sided sciatica     Left lumbar radiculopathy      Physician: Ramin Sloan MD    Physician Orders: Eval and Treat  Medical Diagnosis: Lumbar radiculopathy    Visit # / Visits Authorized:  8 / 12  21 visits total  Insurance Authorization Period: 4/15/2025 to 4/15/2026  Date of Evaluation: 1/29/25  Plan of Care Certification: 4/21/25 to 5/25/25, re-eval next visit     PT/PTA: PT   Number of PTA visits since last PT visit:0  Time In: 0920   Time Out: 1025  Total Time: 65   Total Billable Time: 60         Subjective   She stated she is ok today but having more pain in the hip than the back today..  Pain reported as 6/10. Low back pain and 6/10 L hip pain.    Objective            Treatment:  Therapeutic Exercise  TE 1: FUNCTIONAL MOBILITY TRAINING x 2 laps each at beginning and 1 lap each at end of session  Walk forward/backward/lateral  TE 2: Standing LE EX x 30 with GREEN TBAND                          Heel raise with gluteal set-on pool ladder step, Hip abduction,  Hip flexion, Hip Extension, HS curls, Squat  TE 3: Seated on Pool stool x20 reps with GREEN TBAND  Sit to stand with Green Yuk ball, Hip flexion with Green yuk ball,  Clam,  LAQ  TE 4: UE EX/CORE x 30 with Yellow Dumbbells                 Shoulder Flex/ext TA activation, Shoulder horizontal abd/add TA activation, Shoulder abd/add with TA activation, Kick board push/pull and  isometric kick board push down 2 x 1'  TE 5: Walking marches x 2 laps and Tandem walking x 2 laps with Green yuk ball  TE 6: Step up on 8 inch step x20 B  TE 7: Seated Piriformis stretch 1 x 1'  TE 8: ENDURANCE   LTR x 2',    bicycle x 2', lumbar decompression x 2'          Time  Entry(in minutes):  Aquatic Therapy Time Entry: 60    Assessment & Plan   Assessment: The patient overall presented with some elevated pain levels today in her L posterior hip region which was relatively unchanged throughout the session today.  She was educated to work on her posterior pelvic tilt to avoid lumbar extension which slightly reduced some posterior hip tension.  Evaluation/Treatment Tolerance: Patient tolerated treatment well    Patient will continue to benefit from skilled outpatient physical therapy to address the deficits listed in the problem list box on initial evaluation, provide pt/family education and to maximize pt's level of independence in the home and community environment.     Patient's spiritual, cultural, and educational needs considered and patient agreeable to plan of care and goals.           Plan: Progress POC as tolerated by the patient.    Goals:   Active       Ambulation/movement       Patient will ambulate with normal gait pattern with no device (Progressing)       Start:  01/29/25    Expected End:  04/04/25               Functional outcome       Patient stated goal: Decrease low back and left hip pain to be able to get back to work, at least part-time.  (Progressing)       Start:  01/29/25    Expected End:  04/04/25            Patient will demonstrate independence in home program for support of progression (Met)       Start:  01/29/25    Expected End:  04/04/25    Resolved:  03/05/25            Physical Therapy          Range of Motion       Patient will achieve spinal flexion to 60 degrees (Ongoing)       Start:  01/29/25    Expected End:  04/04/25            Patient will achieve spinal extension to 15 degrees (Ongoing)       Start:  01/29/25    Expected End:  04/04/25            Patient will achieve bilateral spinal side bending ROM 30 degrees (Ongoing)       Start:  01/29/25    Expected End:  04/04/25               Strength       Patient will achieve bilateral hip extension  strength of 3+/5 (Progressing)       Start:  01/29/25    Expected End:  04/04/25            Patient will achieve bilateral hip abduction strength of 4-/5 (Progressing)       Start:  01/29/25    Expected End:  04/04/25            Patient will achieve bilateral hip external rotation strength of 4-/5 in 90 degrees hip flexion (Progressing)       Start:  01/29/25    Expected End:  04/04/25            Patient will achieve bilateral hip internal rotation strength of 4-/5 in 90 degrees hip flexion (Progressing)       Start:  01/29/25    Expected End:  04/04/25               Transferring       Patient will transfer from supine to seated when transferring out of bed in the morning with no greater than 4/10 low back pain provocation (Progressing)       Start:  01/29/25    Expected End:  04/04/25                Frances Reddy, PT

## 2025-05-29 ENCOUNTER — CLINICAL SUPPORT (OUTPATIENT)
Dept: REHABILITATION | Facility: HOSPITAL | Age: 55
End: 2025-05-29
Payer: COMMERCIAL

## 2025-05-29 DIAGNOSIS — G89.29 CHRONIC LEFT-SIDED LOW BACK PAIN WITH LEFT-SIDED SCIATICA: ICD-10-CM

## 2025-05-29 DIAGNOSIS — M54.50 LOW BACK PAIN RADIATING TO RIGHT LEG: Primary | ICD-10-CM

## 2025-05-29 DIAGNOSIS — M54.42 CHRONIC LEFT-SIDED LOW BACK PAIN WITH LEFT-SIDED SCIATICA: ICD-10-CM

## 2025-05-29 DIAGNOSIS — M54.16 LEFT LUMBAR RADICULOPATHY: ICD-10-CM

## 2025-05-29 DIAGNOSIS — M79.604 LOW BACK PAIN RADIATING TO RIGHT LEG: Primary | ICD-10-CM

## 2025-05-29 PROCEDURE — 97164 PT RE-EVAL EST PLAN CARE: CPT

## 2025-05-29 PROCEDURE — 97113 AQUATIC THERAPY/EXERCISES: CPT

## 2025-05-29 NOTE — PROGRESS NOTES
Outpatient Rehab    Physical Therapy Progress Note : Updated Plan of Care    Patient Name: Laz Quintero  MRN: [id number]  YOB: 1970  Encounter Date: 5/29/2025    Therapy Diagnosis:   Encounter Diagnoses   Name Primary?    Low back pain radiating to right leg Yes    Chronic left-sided low back pain with left-sided sciatica     Left lumbar radiculopathy      Physician: Ramin Sloan MD    Physician Orders: Eval and Treat  Medical Diagnosis: Lumbar radiculopathy    Visit # / Visits Authorized:  9 / 12  Insurance Authorization Period: 4/15/2025 to 4/15/2026  Date of Evaluation: 1/29/25  Plan of Care Certification: 5/29/25 to 7/1/25     PT/PTA: PT   Number of PTA visits since last PT visit:0  Time In: 0926   Time Out: 1035  Total Time (in minutes): 69   Total Billable Time (in minutes): 30    Precautions:       Subjective   The patient reports that overall she feels like her balance and coordination are a little bit better since returning to Aquatic PT. She stated it feels about 60% improved.  She did report that she feels like she has been having more flare ups in her pain lately with pain in her groin and left lower back/posterior hip.She feels most of the flare ups with walking more, lifting anything heavy like a gallon of milk and sweeping her house. She feels stronger with the lifting but it increases her pain. She goes back to [facility] 6/24/25 where she feels she will need to get an injection. She is still waiting for her referral to Ortho for assessment and the plan for her L hip labral tear..  Pain reported as 7/10. Low back pain and 7/10 L hip pain.    Objective   Knee Observations  Right Knee Observations  Not Present: Straight Leg Raise Extensor Lag  Left Knee Observations  Present: Straight Leg Raise Extensor Lag            Lumbar Range of Motion   Lumbar AROM measured via percent of deficit.  Flexion limited 75% with reduced reversal of lumbar lordosis with pain L lumbar, Extension to  neutral with pain left lumbar and posterior hip, SB R limited 60%, SB L limited 60%           Lumbar Strength   Strength Pain   Flexion 3+     Extension 3+     Right Lateral Flexion       Left Lateral Flexion 3+ Yes   Right Rotation       Left Rotation                    Hip Strength - Planes of Motion   Right Strength Right Pain Left Strength Left  Pain   Flexion (L2) 4   4- Yes   Extension 4   3- Yes   ABduction 4+   3- Yes   ADduction 4         Internal Rotation 4   3- Yes   External Rotation 4   3 Yes       Knee Strength   Right Strength Right Pain Left Strength Left  Pain   Flexion (S2) 4   4- Yes   Prone Flexion           Extension (L3) 4   3-       Knee Extensor Lag  Lag Present: Left  No Lag: Right              Fall Risk  Functional mobility test results suggest the patient is not: At Risk for Falls  Timed Up & Go (TUG)  Time: 9 seconds     An older adult who takes >=12 seconds to complete the TUG is at risk for falling.       Sit to Stand Testing  The patient completed 5 sit to stand transfers in 16 sec. A little achy in the L lumbar and posterior hip region and reports that the area feels aggravated                  Treatment:  Therapeutic Exercise  TE 1: FUNCTIONAL MOBILITY TRAINING x 2 laps each at beginning and 1 lap each at end of session  Walk forward/backward/lateral  TE 2: Standing LE EX x 30 with GREEN TBAND                          Heel raise with gluteal set-on pool ladder step, Hip abduction,  Hip flexion, Hip Extension, HS curls, Squat  TE 3: Seated on Pool stool x20 reps with GREEN TBAND                  Sit to stand with Green Yuk ball, Hip flexion with Green yuk ball,  Clam,  LAQ  TE 4: UE EX/CORE x 30 with Yellow Dumbbells                      Shoulder Flex/ext TA activation, Shoulder horizontal abd/add TA activation, Shoulder abd/add with TA activation, Kick board push/pull and  isometric kick board push down 2 x 1'  TE 5: Walking marches x 2 laps and Tandem walking x 2 laps with Green yuk  ball (no Yuk ball today)  TE 6: Step up on 8 inch step x20 B  TE 7: Seated Piriformis stretch 1 x 1'  TE 8: ENDURANCE   LTR x 2',    bicycle x 2', lumbar decompression x 2'      Time Entry(in minutes):  PT Re-Evaluation Time Entry: 10  Aquatic Therapy Time Entry: 20    Assessment & Plan   Assessment  Laz presents with a condition of Moderate complexity.   Presentation of Symptoms: Stable  Will Comorbidities Impact Care: Yes  Multiple treatment areas with chronic nature affecting one another (lumbar/left hip)    Functional Limitations: Activity tolerance, Driving, Carrying objects, Completing self-care activities, Decreased ambulation distance/endurance, Gait limitations, Functional mobility, Pain with ADLs/IADLs, Painful locomotion/ambulation, Participating in leisure activities, Performing household chores, Range of motion, Squatting, Transfers  Impairments: Impaired physical strength, Activity intolerance, Abnormal or restricted range of motion, Abnormal gait  Personal Factors Affecting Prognosis: Emotional    Patient Goal for Therapy (PT): Decrease low back and left hip pain to be able to get back to work, at least part-time.  Prognosis: Fair  Assessment Details: The patient has attended 22 Aquatic PT visits since the start of care and 10 since returning to the clinic following re-authorization.  Subjectively the patient reports that she has been having more flare ups of the L hip and lower back recently with pain with increased walking, carrying heavy items (gallon of milk) and with doing more sweeping around her house.  Objectively, the patient has no change in her pain levels at worst and improved pain levels at best.   Objectively she continues with limited lumbar AROM with pain with flexion, extension and SB L. She demonstrates improved B LE MMT compared to the initial evaluation but has pain reports with L MMT testing. Functionally, she has improved time to complete the TUG but slightly increased time to  complete the 5 times sit to stand testing.  She continues to remain motivated to participate in therapy. She is approaching the maximum benefit of the Aquatic setting with a plateau of progress due to being limited by continued L hip pain/labral tear.   She stated she is still waiting for approval to see the Orthopedist regarding her L hip labral tear.  She could benefit from further evaluation and treatment of her left hip.     Plan  From a physical therapy perspective, the patient would benefit from: Skilled Rehab Services    Planned therapy interventions include: Manual therapy, Therapeutic activities, Therapeutic exercise, Neuromuscular re-education, Aquatic therapy, and Gait training.            Visit Frequency: 2 times Per Week for 4 Weeks.       This plan was discussed with Patient.   Discussion participants: Agreed Upon Plan of Care             The patient will continue to benefit from skilled outpatient physical therapy in order to address the deficits listed in the problem list on the initial evaluation, provide patient and family education, and maximize the patients level of independence in the home and community environments.     The patient's spiritual, cultural, and educational needs were considered, and the patient is agreeable to the plan of care and goals.           Goals:   Active       Ambulation/movement       Patient will ambulate with normal gait pattern with no device (Progressing)       Start:  01/29/25    Expected End:  06/30/25               Functional outcome       Patient stated goal: Decrease low back and left hip pain to be able to get back to work, at least part-time.  (Not Progressing)       Start:  01/29/25    Expected End:  06/30/25            Patient will demonstrate independence in home program for support of progression (Met)       Start:  01/29/25    Expected End:  04/04/25    Resolved:  03/05/25            Physical Therapy          Range of Motion       Patient will achieve  spinal flexion to 60 degrees (Ongoing)       Start:  01/29/25    Expected End:  06/30/25            Patient will achieve spinal extension to 15 degrees (Ongoing)       Start:  01/29/25    Expected End:  06/30/25            Patient will achieve bilateral spinal side bending ROM 30 degrees (Ongoing)       Start:  01/29/25    Expected End:  06/30/25               Strength       Patient will achieve bilateral hip extension strength of 3+/5 (Progressing)       Start:  01/29/25    Expected End:  06/30/25            Patient will achieve bilateral hip abduction strength of 4-/5 (Progressing)       Start:  01/29/25    Expected End:  06/30/25            Patient will achieve bilateral hip external rotation strength of 4-/5 in 90 degrees hip flexion (Progressing)       Start:  01/29/25    Expected End:  06/30/25            Patient will achieve bilateral hip internal rotation strength of 4-/5 in 90 degrees hip flexion (Progressing)       Start:  01/29/25    Expected End:  06/30/25              Resolved       Transferring       Patient will transfer from supine to seated when transferring out of bed in the morning with no greater than 4/10 low back pain provocation (Met)       Start:  01/29/25    Expected End:  04/04/25    Resolved:  05/30/25             Frances Reddy, PT

## 2025-05-29 NOTE — LETTER
May 30, 2025  Ramin Sloan MD  [address] [place] [address]  [facility] [address]    To whom it may concern,     The attached plan of care is being sent to you for review and reference.    You may indicate your approval by signing the document electronically, or by faxing/mailing a signed copy of the final page of this document back to the attention of Frances Reddy, PT:         Plan of Care 5/30/25   Effective from: 5/30/2025  Effective to: 6/30/2025    Plan ID: [id number]            Participants as of Finalize on 5/30/2025    Name Type Comments Contact Info    Ramin Sloan MD Referring Provider  [phone]       Last Plan Note     Author: Frances Reddy, PT Status: Signed Last edited: 3/5/2025 10:30 AM         Outpatient Rehab    Physical Therapy Progress Note : Updated Plan of Care    Patient Name: Laz Quintero  MRN: [id number]  YOB: 1970  Encounter Date: 3/5/2025    Therapy Diagnosis:   Encounter Diagnoses   Name Primary?    Low back pain radiating to right leg Yes    Chronic left-sided low back pain with left-sided sciatica     Left lumbar radiculopathy      Physician: Ramin Sloan MD    Physician Orders: Eval and Treat      Physician Orders: Eval and Treat  Medical Diagnosis:   Encounter Diagnoses   Name Primary?    Low back pain radiating to right leg Yes    Chronic left-sided low back pain with left-sided sciatica     Left lumbar radiculopathy      Date of Evaluation:  1/29/2025  Insurance Authorization Period: 2/03/2025 to 3/29/2025  Plan of Care Certification:  3/5/25 to 5/5/25  Visit # / Visits Authorized:  11/12        Time In: 1020   Time Out: 1130  Total Time: 70   Total Billable Time: 40       Subjective   She is feeling better today. She is less stiff and her pain levels have not been as high. She stated her pain is a 4/10 today. She has been using her pain cream on her hip and back. The patient stated that she feels her core is  stronger and is able to hold bags easier. She does have to watch how heavy the bags are but overall feels stronger. In addition she feels her walking is improved. She feels like the Aquatic Pt helps on the days she has her flare ups. She can get into the pool and get moving easier and with reduced pain..  Pain reported as 4/10. low back and left  hip down to toes    Objective   Posture  Patient presents with a Forward head position. Increased thoracic kyphosis is observed.                       Hip Strength - Planes of Motion   Right Strength Right Pain Left Strength Left  Pain   Flexion (L2) 4- Yes 3- Yes   Extension 3+ Yes       ABduction 4- Yes       ADduction 3+ Yes       Internal Rotation 3+ Yes       External Rotation 3+ Yes           Knee Strength   Right Strength Right Pain Left Strength Left  Pain   Flexion (S2) 3+ Yes 3- Yes   Prone Flexion           Extension (L3) 3+ Yes 3- Yes          Ankle/Foot Strength - Planes of Motion   Right Strength Right Pain Left Strength Left  Pain   Dorsiflexion (L4) 4-   4-     Plantar Flexion (S1) 4-   4-     Inversion 4-   4-     Eversion 4-   4-     Great Toe Flexion           Great Toe Extension (L5)           Lesser Toes Flexion           Lesser Toes Extension                  Transfers Assessment  Sit to Stand Assistance: Independent  Chair to Bed Assistance: Independent      Fall Risk  Functional mobility test results suggest the patient is: At Risk for Falls  Timed Up & Go (TUG)  Time: 8 seconds     An older adult who takes >=12 seconds to complete the TUG is at risk for falling.       Sit to Stand Testing  The patient completed 5 sit to stand transfers in 14 sec. Patient reports no increase in pain from baseline              Gait Analysis  Gait Pattern: Antalgic    Right Side Walking Observations  Increased: Stance Time  Decreased: Arm Swing       Left Side Walking Observations  Increased: Stance Time  Decreased: Arm Swing     Gait Analysis Details  3/5/25--The  patient ambulates with slight antalgic gait and reduced step length more limited on the L than the R.          Treatment:  Therapeutic Exercise  Therapeutic Exercise Activity 1: FUNCTIONAL MOBILITY TRAINING x 2 laps each at beginning and 1 lap each at end of session  Walk forward/backward/lateral  Therapeutic Exercise Activity 2: LE EX x 30 reps w/GTB  Squat, Heel raise ON POOL LADDER, Hip abduction, Hip flex, HS curl, Hip Ext  Therapeutic Exercise Activity 3: SEATED ON POOL STOOL w/GTB  Sit to stand with green Yuk ball, Clam,  LAQ,  Hip flexion with Green yuk x 30  Therapeutic Exercise Activity 4: UE EX/CORE  x 30 with multicolor dumbbells  Shoulder flex/ext, Shoulder horizontal abd/add, Shoulder abd/add, Mini squat with blue push/pull kickboard  Therapeutic Exercise Activity 5: ENDURANCE  LTR x 2'  Bicycle in // bars x 2'  Lumbar decompression x 2'  Therapeutic Exercise Activity 6: Walking marches and tandem walking  x 2 laps with Green Yuk ball (at chest)  Therapeutic Exercise Activity 7: Seated LTR crossed leg stretch x 1' x 1  Therapeutic Exercise Activity 8: Step up on 4 inch step leading with L LE 2 x 10 rep    Assessment & Plan   Assessment  Laz presents with a condition of Moderate complexity.   Presentation of Symptoms: Stable  Will Comorbidities Impact Care: Yes       Functional Limitations: Activity tolerance, Disrupted sleep pattern, Driving, Bed mobility, Carrying objects, Completing self-care activities, Decreased ambulation distance/endurance, Gait limitations, Functional mobility, Pain with ADLs/IADLs, Painful locomotion/ambulation, Participating in leisure activities, Performing household chores, Range of motion, Squatting, Transfers  Impairments: Impaired physical strength, Activity intolerance, Abnormal or restricted range of motion, Abnormal muscle tone, Abnormal muscle firing, Abnormal gait, Abnormal coordination    Patient Goal for Therapy (PT): Decrease low back and left hip pain to be able  to get back to work, at least part-time.  Prognosis: Fair  Prognosis Details: Chronicity of condition affecting patient prognosis.  Assessment Details: The patient has attended 11 Aquatic PT visits since the start of care. Subjectively she reports reduced pain levels since starting back with PT in addition to feeling stronger in her core and improved ability to carry bags.  She has demonstrated consistent attendance with good tolerance to therapy progressions.  Objectively she continues with limited L LE MMT which is limited by pain reports with testing. She has improved TUG and 5 times sit to stand times indicating improved functional mobility.  At this time she could benefit from additional skilled PT interventions to continue to improve functional strength and reduce pain for return to work.     Plan  From a physical therapy perspective, the patient would benefit from: Skilled Rehab Services    Planned therapy interventions include: Manual therapy, Therapeutic activities, Therapeutic exercise, Neuromuscular re-education, Aquatic therapy, and Gait training.            Visit Frequency: 2 times Per Week for 8 Weeks.       This plan was discussed with Patient.   Discussion participants: Agreed Upon Plan of Care             Patient will continue to benefit from skilled outpatient physical therapy to address the deficits listed in the problem list box on initial evaluation, provide pt/family education and to maximize pt's level of independence in the home and community environment.     Patient's spiritual, cultural, and educational needs considered and patient agreeable to plan of care and goals.           Goals:   Active       Ambulation/movement       Patient will ambulate with normal gait pattern with no device (Progressing)       Start:  01/29/25    Expected End:  04/04/25               Functional outcome       Patient stated goal: Decrease low back and left hip pain to be able to get back to work, at least  part-time.  (Progressing)       Start:  01/29/25    Expected End:  04/04/25            Patient will demonstrate independence in home program for support of progression (Met)       Start:  01/29/25    Expected End:  04/04/25    Resolved:  03/05/25            Physical Therapy          Range of Motion       Patient will achieve spinal flexion to 60 degrees (Progressing)       Start:  01/29/25    Expected End:  04/04/25            Patient will achieve spinal extension to 15 degrees (Progressing)       Start:  01/29/25    Expected End:  04/04/25            Patient will achieve bilateral spinal side bending ROM 30 degrees (Progressing)       Start:  01/29/25    Expected End:  04/04/25               Strength       Patient will achieve bilateral hip extension strength of 3+/5 (Progressing)       Start:  01/29/25    Expected End:  04/04/25            Patient will achieve bilateral hip abduction strength of 4-/5 (Progressing)       Start:  01/29/25    Expected End:  04/04/25            Patient will achieve bilateral hip external rotation strength of 4-/5 in 90 degrees hip flexion (Progressing)       Start:  01/29/25    Expected End:  04/04/25            Patient will achieve bilateral hip internal rotation strength of 4-/5 in 90 degrees hip flexion (Progressing)       Start:  01/29/25    Expected End:  04/04/25               Transferring       Patient will transfer from supine to seated when transferring out of bed in the morning with no greater than 4/10 low back pain provocation (Progressing)       Start:  01/29/25    Expected End:  04/04/25                Frances Reddy PT           Current Participants as of 5/30/2025    Name Type Comments Contact Info    Ramin Sloan MD Referring Provider  [phone]    Signature pending            Sincerely,      Frances Reddy PT  [facility]                                                            Dear Frances Reddy PT,    RE: MsIsaias Laz Quintero, MRN:  [id number]    I certify that I have reviewed the attached plan of care and agree to the details within.        ___________________________  ___________________________  Provider Printed Name   Provider Signed Name      ___________________________  Date and Time

## 2025-06-02 ENCOUNTER — CLINICAL SUPPORT (OUTPATIENT)
Dept: REHABILITATION | Facility: HOSPITAL | Age: 55
End: 2025-06-02
Payer: COMMERCIAL

## 2025-06-02 DIAGNOSIS — M54.50 LOW BACK PAIN RADIATING TO RIGHT LEG: Primary | ICD-10-CM

## 2025-06-02 DIAGNOSIS — M79.604 LOW BACK PAIN RADIATING TO RIGHT LEG: Primary | ICD-10-CM

## 2025-06-02 DIAGNOSIS — M54.16 LEFT LUMBAR RADICULOPATHY: ICD-10-CM

## 2025-06-02 DIAGNOSIS — G89.29 CHRONIC LEFT-SIDED LOW BACK PAIN WITH LEFT-SIDED SCIATICA: ICD-10-CM

## 2025-06-02 DIAGNOSIS — M54.42 CHRONIC LEFT-SIDED LOW BACK PAIN WITH LEFT-SIDED SCIATICA: ICD-10-CM

## 2025-06-02 PROCEDURE — 97113 AQUATIC THERAPY/EXERCISES: CPT | Mod: CQ

## 2025-06-05 ENCOUNTER — CLINICAL SUPPORT (OUTPATIENT)
Dept: REHABILITATION | Facility: HOSPITAL | Age: 55
End: 2025-06-05
Payer: COMMERCIAL

## 2025-06-05 DIAGNOSIS — M79.604 LOW BACK PAIN RADIATING TO RIGHT LEG: Primary | ICD-10-CM

## 2025-06-05 DIAGNOSIS — M54.42 CHRONIC LEFT-SIDED LOW BACK PAIN WITH LEFT-SIDED SCIATICA: ICD-10-CM

## 2025-06-05 DIAGNOSIS — M54.50 LOW BACK PAIN RADIATING TO RIGHT LEG: Primary | ICD-10-CM

## 2025-06-05 DIAGNOSIS — G89.29 CHRONIC LEFT-SIDED LOW BACK PAIN WITH LEFT-SIDED SCIATICA: ICD-10-CM

## 2025-06-05 DIAGNOSIS — M54.16 LEFT LUMBAR RADICULOPATHY: ICD-10-CM

## 2025-06-05 PROCEDURE — 97113 AQUATIC THERAPY/EXERCISES: CPT

## 2025-06-10 ENCOUNTER — CLINICAL SUPPORT (OUTPATIENT)
Dept: REHABILITATION | Facility: HOSPITAL | Age: 55
End: 2025-06-10
Payer: COMMERCIAL

## 2025-06-10 DIAGNOSIS — M54.16 LEFT LUMBAR RADICULOPATHY: ICD-10-CM

## 2025-06-10 DIAGNOSIS — M54.50 LOW BACK PAIN RADIATING TO RIGHT LEG: Primary | ICD-10-CM

## 2025-06-10 DIAGNOSIS — M54.42 CHRONIC LEFT-SIDED LOW BACK PAIN WITH LEFT-SIDED SCIATICA: ICD-10-CM

## 2025-06-10 DIAGNOSIS — M79.604 LOW BACK PAIN RADIATING TO RIGHT LEG: Primary | ICD-10-CM

## 2025-06-10 DIAGNOSIS — G89.29 CHRONIC LEFT-SIDED LOW BACK PAIN WITH LEFT-SIDED SCIATICA: ICD-10-CM

## 2025-06-10 PROCEDURE — 97113 AQUATIC THERAPY/EXERCISES: CPT

## 2025-06-10 NOTE — PROGRESS NOTES
"  Outpatient Rehab    Physical Therapy Visit    Patient Name: Laz Quintero  MRN: [id number]  YOB: 1970  Encounter Date: 6/10/2025    Therapy Diagnosis:   Encounter Diagnoses   Name Primary?    Low back pain radiating to right leg Yes    Chronic left-sided low back pain with left-sided sciatica     Left lumbar radiculopathy      Physician: Ramin Sloan MD    Physician Orders: Eval and Treat  Medical Diagnosis: Lumbar radiculopathy    Visit # / Visits Authorized:  12 / 12  Insurance Authorization Period: 4/15/2025 to 4/15/2026  Date of Evaluation: 1/29/2025  Plan of Care Certification: 5/30/2025 to 6/30/2025      PT/PTA: PT   Number of PTA visits since last PT visit:0  Time In: 0905   Time Out: 1010  Total Time (in minutes): 65   Total Billable Time (in minutes): 65        Precautions: R hip labral tear, standard       Subjective   The patient reports her pain is about a 6/10 today and overall feeling "pretty good.".  Pain reported as 6/10. Low back pain and 6/10 L hip pain.    Objective            Treatment:  Therapeutic Exercise  TE 1: FUNCTIONAL MOBILITY TRAINING x 2 laps each at beginning and 1 lap each at end of session  Walk forward/backward/lateral  TE 2: Standing LE EX x 30 with GREEN TBAND                          Heel raise with gluteal set-on pool ladder step, Hip abduction,  Hip flexion, Hip Extension, HS curls, Squat  TE 3: Seated on Pool stool x20 reps with GREEN TBAND            Sit to stand with Green Yuk ball, Hip flexion with Green yuk ball,  Clam,  LAQ  TE 4: UE EX/CORE x 30 with Yellow Dumbbells                            Shoulder Flex/ext TA activation, Shoulder horizontal abd/add TA activation, Shoulder abd/add with TA activation, Kick board push/pull and  isometric kick board push down 2 x 1'  TE 5: Walking marches x 2 laps and Tandem walking x 2 laps with Green yuk ball  TE 6: Step up on 8 inch step x20 B  TE 7: Seated Piriformis stretch 1 x 1'  TE 8: ENDURANCE   LTR x " 2',    bicycle x 2', lumbar decompression x 2'      Time Entry(in minutes):  Aquatic Therapy Time Entry: 65    Assessment & Plan   Assessment: The patient was able to perform her full treatment today without an increase in symptoms. She continues to progress well with established exercises and was educated to continue her HEP and pool program as she is able to while awaiting additional insurance visits. She voiced understanding.       The patient will continue to benefit from skilled outpatient physical therapy in order to address the deficits listed in the problem list on the initial evaluation, provide patient and family education, and maximize the patients level of independence in the home and community environments.     The patient's spiritual, cultural, and educational needs were considered, and the patient is agreeable to the plan of care and goals.           Plan: The patient has completed 12 of 12 authorized work comp visits. Future treatment pending additional insurance authorization.    Goals:   Active       Ambulation/movement       Patient will ambulate with normal gait pattern with no device (Progressing)       Start:  01/29/25    Expected End:  06/30/25               Functional outcome       Patient stated goal: Decrease low back and left hip pain to be able to get back to work, at least part-time.  (Not Progressing)       Start:  01/29/25    Expected End:  06/30/25            Patient will demonstrate independence in home program for support of progression (Met)       Start:  01/29/25    Expected End:  04/04/25    Resolved:  03/05/25            Physical Therapy          Range of Motion       Patient will achieve spinal flexion to 60 degrees (Ongoing)       Start:  01/29/25    Expected End:  06/30/25            Patient will achieve spinal extension to 15 degrees (Ongoing)       Start:  01/29/25    Expected End:  06/30/25            Patient will achieve bilateral spinal side bending ROM 30 degrees  (Ongoing)       Start:  01/29/25    Expected End:  06/30/25               Strength       Patient will achieve bilateral hip extension strength of 3+/5 (Progressing)       Start:  01/29/25    Expected End:  06/30/25            Patient will achieve bilateral hip abduction strength of 4-/5 (Progressing)       Start:  01/29/25    Expected End:  06/30/25            Patient will achieve bilateral hip external rotation strength of 4-/5 in 90 degrees hip flexion (Progressing)       Start:  01/29/25    Expected End:  06/30/25            Patient will achieve bilateral hip internal rotation strength of 4-/5 in 90 degrees hip flexion (Progressing)       Start:  01/29/25    Expected End:  06/30/25              Resolved       Transferring       Patient will transfer from supine to seated when transferring out of bed in the morning with no greater than 4/10 low back pain provocation (Met)       Start:  01/29/25    Expected End:  04/04/25    Resolved:  05/30/25             Frances Reddy, PT

## 2025-06-17 ENCOUNTER — OFFICE VISIT (OUTPATIENT)
Dept: INTERNAL MEDICINE | Facility: CLINIC | Age: 55
End: 2025-06-17
Payer: MEDICARE

## 2025-06-17 ENCOUNTER — TELEPHONE (OUTPATIENT)
Dept: OBSTETRICS AND GYNECOLOGY | Facility: CLINIC | Age: 55
End: 2025-06-17
Payer: MEDICARE

## 2025-06-17 ENCOUNTER — LAB VISIT (OUTPATIENT)
Dept: LAB | Facility: HOSPITAL | Age: 55
End: 2025-06-17
Attending: INTERNAL MEDICINE
Payer: MEDICARE

## 2025-06-17 DIAGNOSIS — E04.1 THYROID NODULE: ICD-10-CM

## 2025-06-17 DIAGNOSIS — Z78.0 MENOPAUSE: ICD-10-CM

## 2025-06-17 DIAGNOSIS — E55.9 VITAMIN D DEFICIENCY: ICD-10-CM

## 2025-06-17 DIAGNOSIS — H02.9 EYELID LESION: Primary | ICD-10-CM

## 2025-06-17 DIAGNOSIS — C37 TYPE AB THYMOMA: ICD-10-CM

## 2025-06-17 DIAGNOSIS — I10 HYPERTENSION, UNSPECIFIED TYPE: ICD-10-CM

## 2025-06-17 DIAGNOSIS — Z12.31 SCREENING MAMMOGRAM, ENCOUNTER FOR: ICD-10-CM

## 2025-06-17 DIAGNOSIS — I10 PRIMARY HYPERTENSION: ICD-10-CM

## 2025-06-17 LAB
25(OH)D3+25(OH)D2 SERPL-MCNC: 54 NG/ML (ref 30–96)
ABSOLUTE EOSINOPHIL (OHS): 0.06 K/UL
ABSOLUTE MONOCYTE (OHS): 0.41 K/UL (ref 0.3–1)
ABSOLUTE NEUTROPHIL COUNT (OHS): 4.71 K/UL (ref 1.8–7.7)
ALBUMIN SERPL BCP-MCNC: 4.7 G/DL (ref 3.5–5.2)
ALP SERPL-CCNC: 87 UNIT/L (ref 40–150)
ALT SERPL W/O P-5'-P-CCNC: 15 UNIT/L (ref 10–44)
ANION GAP (OHS): 8 MMOL/L (ref 8–16)
AST SERPL-CCNC: 16 UNIT/L (ref 11–45)
BASOPHILS # BLD AUTO: 0.03 K/UL
BASOPHILS NFR BLD AUTO: 0.4 %
BILIRUB SERPL-MCNC: 0.3 MG/DL (ref 0.1–1)
BUN SERPL-MCNC: 11 MG/DL (ref 6–20)
CALCIUM SERPL-MCNC: 10.2 MG/DL (ref 8.7–10.5)
CHLORIDE SERPL-SCNC: 106 MMOL/L (ref 95–110)
CHOLEST SERPL-MCNC: 287 MG/DL (ref 120–199)
CHOLEST/HDLC SERPL: 3.6 {RATIO} (ref 2–5)
CO2 SERPL-SCNC: 27 MMOL/L (ref 23–29)
CREAT SERPL-MCNC: 0.7 MG/DL (ref 0.5–1.4)
ERYTHROCYTE [DISTWIDTH] IN BLOOD BY AUTOMATED COUNT: 12.7 % (ref 11.5–14.5)
GFR SERPLBLD CREATININE-BSD FMLA CKD-EPI: >60 ML/MIN/1.73/M2
GLUCOSE SERPL-MCNC: 92 MG/DL (ref 70–110)
HCT VFR BLD AUTO: 44.4 % (ref 37–48.5)
HDLC SERPL-MCNC: 80 MG/DL (ref 40–75)
HDLC SERPL: 27.9 % (ref 20–50)
HGB BLD-MCNC: 14.2 GM/DL (ref 12–16)
IMM GRANULOCYTES # BLD AUTO: 0.03 K/UL (ref 0–0.04)
IMM GRANULOCYTES NFR BLD AUTO: 0.4 % (ref 0–0.5)
LDLC SERPL CALC-MCNC: 189.8 MG/DL (ref 63–159)
LYMPHOCYTES # BLD AUTO: 2.54 K/UL (ref 1–4.8)
MCH RBC QN AUTO: 28.2 PG (ref 27–31)
MCHC RBC AUTO-ENTMCNC: 32 G/DL (ref 32–36)
MCV RBC AUTO: 88 FL (ref 82–98)
NONHDLC SERPL-MCNC: 207 MG/DL
NUCLEATED RBC (/100WBC) (OHS): 0 /100 WBC
PLATELET # BLD AUTO: 308 K/UL (ref 150–450)
PMV BLD AUTO: 10 FL (ref 9.2–12.9)
POTASSIUM SERPL-SCNC: 4.9 MMOL/L (ref 3.5–5.1)
PROT SERPL-MCNC: 8 GM/DL (ref 6–8.4)
RBC # BLD AUTO: 5.03 M/UL (ref 4–5.4)
RELATIVE EOSINOPHIL (OHS): 0.8 %
RELATIVE LYMPHOCYTE (OHS): 32.6 % (ref 18–48)
RELATIVE MONOCYTE (OHS): 5.3 % (ref 4–15)
RELATIVE NEUTROPHIL (OHS): 60.5 % (ref 38–73)
SODIUM SERPL-SCNC: 141 MMOL/L (ref 136–145)
TRIGL SERPL-MCNC: 86 MG/DL (ref 30–150)
TSH SERPL-ACNC: 0.71 UIU/ML (ref 0.4–4)
WBC # BLD AUTO: 7.78 K/UL (ref 3.9–12.7)

## 2025-06-17 PROCEDURE — 99999 PR PBB SHADOW E&M-EST. PATIENT-LVL V: CPT | Mod: PBBFAC,,, | Performed by: INTERNAL MEDICINE

## 2025-06-17 PROCEDURE — 85025 COMPLETE CBC W/AUTO DIFF WBC: CPT

## 2025-06-17 PROCEDURE — 82306 VITAMIN D 25 HYDROXY: CPT

## 2025-06-17 PROCEDURE — 84478 ASSAY OF TRIGLYCERIDES: CPT

## 2025-06-17 PROCEDURE — 99215 OFFICE O/P EST HI 40 MIN: CPT | Mod: PBBFAC | Performed by: INTERNAL MEDICINE

## 2025-06-17 PROCEDURE — 99214 OFFICE O/P EST MOD 30 MIN: CPT | Mod: S$PBB,,, | Performed by: INTERNAL MEDICINE

## 2025-06-17 PROCEDURE — G2211 COMPLEX E/M VISIT ADD ON: HCPCS | Mod: ,,, | Performed by: INTERNAL MEDICINE

## 2025-06-17 PROCEDURE — 84443 ASSAY THYROID STIM HORMONE: CPT

## 2025-06-17 PROCEDURE — 84450 TRANSFERASE (AST) (SGOT): CPT

## 2025-06-17 PROCEDURE — 36415 COLL VENOUS BLD VENIPUNCTURE: CPT

## 2025-06-17 RX ORDER — HYDROCHLOROTHIAZIDE 12.5 MG/1
12.5 TABLET ORAL DAILY
Qty: 90 TABLET | Refills: 1 | Status: SHIPPED | OUTPATIENT
Start: 2025-06-17 | End: 2026-06-17

## 2025-06-17 NOTE — TELEPHONE ENCOUNTER
----- Message from Med Assistant Adrienne sent at 6/17/2025  9:57 AM CDT -----  Dr.Lisa Moreno Put A Referral In For Patient To Schedule With OB/GYN    Please Call And Assist Patient With Scheduling    Thank You        Menopause [Z78.0]

## 2025-06-19 VITALS
TEMPERATURE: 99 F | HEART RATE: 68 BPM | DIASTOLIC BLOOD PRESSURE: 86 MMHG | SYSTOLIC BLOOD PRESSURE: 132 MMHG | OXYGEN SATURATION: 99 % | WEIGHT: 157.88 LBS | HEIGHT: 66 IN | BODY MASS INDEX: 25.37 KG/M2

## 2025-06-19 PROBLEM — C37 TYPE AB THYMOMA: Status: ACTIVE | Noted: 2025-06-19

## 2025-06-19 NOTE — PROGRESS NOTES
Subjective:       Patient ID: Laz Quintero is a 54 y.o. female.    Chief Complaint: Hypertension    HPI  She returns for management of hypertension.  She has had hypertension for over a year.  Current treatment has included medications outlined in medication list.  She denies chest pain or shortness of breath.  No palpitations.  Denies left arm or neck pain.  She has a thyroid nodule.  Denies fatigue     Medications: See medication list     Social history: Does not smoke, does not drink alcohol      Review of Systems   Constitutional:  Negative for chills, fatigue, fever and unexpected weight change.   Respiratory:  Negative for chest tightness and shortness of breath.    Cardiovascular:  Negative for chest pain and palpitations.   Gastrointestinal:  Negative for abdominal pain and blood in stool.   Neurological:  Negative for dizziness, syncope, numbness and headaches.       Objective:      Physical Exam  HENT:      Right Ear: External ear normal.      Left Ear: External ear normal.      Nose: Nose normal.      Mouth/Throat:      Mouth: Mucous membranes are moist.      Pharynx: Oropharynx is clear.   Eyes:      Pupils: Pupils are equal, round, and reactive to light.   Cardiovascular:      Rate and Rhythm: Normal rate and regular rhythm.      Heart sounds: No murmur heard.  Pulmonary:      Breath sounds: Normal breath sounds.   Abdominal:      General: There is no distension.      Palpations: There is no hepatomegaly or splenomegaly.      Tenderness: There is no abdominal tenderness.   Musculoskeletal:      Cervical back: Normal range of motion.   Lymphadenopathy:      Cervical: No cervical adenopathy.      Upper Body:      Right upper body: No axillary adenopathy.      Left upper body: No axillary adenopathy.   Neurological:      Cranial Nerves: No cranial nerve deficit.      Sensory: No sensory deficit.      Motor: Motor function is intact.      Deep Tendon Reflexes: Reflexes are normal and symmetric.          Assessment/Plan       Assessment and plan: 1.  Hypertension: Controlled.  Continue hydrochlorothiazide.  Check CMP, lipid panel, CBC   2.  Thyroid nodule: Check TSH    Visit today included increased complexity associated with the care of the episodic problem hypertension addressed and managing the longitudinal care of the patient due to the serious and/or complex managed problem(s) hypertension, thyroid nodule.

## 2025-06-21 ENCOUNTER — TELEPHONE (OUTPATIENT)
Dept: INTERNAL MEDICINE | Facility: CLINIC | Age: 55
End: 2025-06-21
Payer: MEDICARE

## 2025-06-21 DIAGNOSIS — E78.5 HYPERLIPIDEMIA, UNSPECIFIED HYPERLIPIDEMIA TYPE: Primary | ICD-10-CM

## 2025-06-24 RX ORDER — ATORVASTATIN CALCIUM 10 MG/1
10 TABLET, FILM COATED ORAL DAILY
Qty: 90 TABLET | Refills: 1 | Status: SHIPPED | OUTPATIENT
Start: 2025-06-24 | End: 2025-06-27

## 2025-06-24 NOTE — TELEPHONE ENCOUNTER
Prescription sent     I would like to recheck her level in 3 months.  Lab orders are in.  Please schedule lab in 3 months

## 2025-06-24 NOTE — TELEPHONE ENCOUNTER
Notified pt of message from PCP re:need for cholesterol medication.  Pt asked to have Rx sent to [id number] - [place] - [id number] (Ph: [phone]).

## 2025-06-24 NOTE — TELEPHONE ENCOUNTER
Pt was notified of need for repeat labs - scheduled 10/01/25 because pt will be out of town in weeks prior to 10/01/25.

## 2025-06-26 ENCOUNTER — HOSPITAL ENCOUNTER (INPATIENT)
Facility: HOSPITAL | Age: 55
LOS: 5 days | Discharge: HOME | DRG: 392 | End: 2025-07-02
Attending: EMERGENCY MEDICINE | Admitting: EMERGENCY MEDICINE
Payer: MEDICARE

## 2025-06-26 DIAGNOSIS — R42 DIZZINESS: ICD-10-CM

## 2025-06-26 DIAGNOSIS — R07.9 CHEST PAIN: ICD-10-CM

## 2025-06-26 DIAGNOSIS — R20.2 TINGLING IN EXTREMITIES: ICD-10-CM

## 2025-06-26 DIAGNOSIS — R10.9 ABDOMINAL PAIN, UNSPECIFIED ABDOMINAL LOCATION: Primary | ICD-10-CM

## 2025-06-26 PROBLEM — R10.84 GENERALIZED ABDOMINAL PAIN: Status: ACTIVE | Noted: 2018-01-19

## 2025-06-26 LAB
ABSOLUTE EOSINOPHIL (OHS): 0.08 K/UL
ABSOLUTE MONOCYTE (OHS): 0.51 K/UL (ref 0.3–1)
ABSOLUTE NEUTROPHIL COUNT (OHS): 4.89 K/UL (ref 1.8–7.7)
ALBUMIN SERPL BCP-MCNC: 4.4 G/DL (ref 3.5–5.2)
ALP SERPL-CCNC: 85 UNIT/L (ref 40–150)
ALT SERPL W/O P-5'-P-CCNC: 19 UNIT/L (ref 10–44)
ANION GAP (OHS): 11 MMOL/L (ref 8–16)
AST SERPL-CCNC: 18 UNIT/L (ref 11–45)
BASOPHILS # BLD AUTO: 0.02 K/UL
BASOPHILS NFR BLD AUTO: 0.2 %
BILIRUB SERPL-MCNC: 0.7 MG/DL (ref 0.1–1)
BILIRUB UR QL STRIP.AUTO: NEGATIVE
BNP SERPL-MCNC: 14 PG/ML (ref 0–99)
BUN SERPL-MCNC: 9 MG/DL (ref 6–20)
CALCIUM SERPL-MCNC: 10 MG/DL (ref 8.7–10.5)
CHLORIDE SERPL-SCNC: 102 MMOL/L (ref 95–110)
CLARITY UR: CLEAR
CO2 SERPL-SCNC: 27 MMOL/L (ref 23–29)
COLOR UR AUTO: COLORLESS
CREAT SERPL-MCNC: 0.7 MG/DL (ref 0.5–1.4)
ERYTHROCYTE [DISTWIDTH] IN BLOOD BY AUTOMATED COUNT: 12.4 % (ref 11.5–14.5)
GFR SERPLBLD CREATININE-BSD FMLA CKD-EPI: >60 ML/MIN/1.73/M2
GLUCOSE SERPL-MCNC: 91 MG/DL (ref 70–110)
GLUCOSE UR QL STRIP: NEGATIVE
HCT VFR BLD AUTO: 42.6 % (ref 37–48.5)
HGB BLD-MCNC: 13.7 GM/DL (ref 12–16)
HGB UR QL STRIP: NEGATIVE
IMM GRANULOCYTES # BLD AUTO: 0.02 K/UL (ref 0–0.04)
IMM GRANULOCYTES NFR BLD AUTO: 0.2 % (ref 0–0.5)
INFLUENZA A MOLECULAR (OHS): NEGATIVE
INFLUENZA B MOLECULAR (OHS): NEGATIVE
KETONES UR QL STRIP: NEGATIVE
LEUKOCYTE ESTERASE UR QL STRIP: NEGATIVE
LIPASE SERPL-CCNC: 10 U/L (ref 4–60)
LYMPHOCYTES # BLD AUTO: 2.74 K/UL (ref 1–4.8)
MCH RBC QN AUTO: 28.1 PG (ref 27–31)
MCHC RBC AUTO-ENTMCNC: 32.2 G/DL (ref 32–36)
MCV RBC AUTO: 88 FL (ref 82–98)
NITRITE UR QL STRIP: NEGATIVE
NUCLEATED RBC (/100WBC) (OHS): 0 /100 WBC
OHS QRS DURATION: 78 MS
OHS QTC CALCULATION: 441 MS
PH UR STRIP: 6 [PH]
PLATELET # BLD AUTO: 285 K/UL (ref 150–450)
PMV BLD AUTO: 9.3 FL (ref 9.2–12.9)
POTASSIUM SERPL-SCNC: 3.5 MMOL/L (ref 3.5–5.1)
PROT SERPL-MCNC: 8.3 GM/DL (ref 6–8.4)
PROT UR QL STRIP: NEGATIVE
RBC # BLD AUTO: 4.87 M/UL (ref 4–5.4)
RELATIVE EOSINOPHIL (OHS): 1 %
RELATIVE LYMPHOCYTE (OHS): 33.2 % (ref 18–48)
RELATIVE MONOCYTE (OHS): 6.2 % (ref 4–15)
RELATIVE NEUTROPHIL (OHS): 59.2 % (ref 38–73)
SARS-COV-2 RDRP RESP QL NAA+PROBE: NEGATIVE
SODIUM SERPL-SCNC: 140 MMOL/L (ref 136–145)
SP GR UR STRIP: 1
TROPONIN I SERPL HS-MCNC: 15 NG/L
TROPONIN I SERPL HS-MCNC: 18 NG/L
TROPONIN I SERPL HS-MCNC: 18 NG/L
TROPONIN I SERPL HS-MCNC: 19 NG/L
TROPONIN I SERPL HS-MCNC: 20 NG/L
UROBILINOGEN UR STRIP-ACNC: NEGATIVE EU/DL
WBC # BLD AUTO: 8.26 K/UL (ref 3.9–12.7)

## 2025-06-26 PROCEDURE — 87502 INFLUENZA DNA AMP PROBE: CPT | Performed by: PHYSICIAN ASSISTANT

## 2025-06-26 PROCEDURE — 81003 URINALYSIS AUTO W/O SCOPE: CPT | Performed by: PHYSICIAN ASSISTANT

## 2025-06-26 PROCEDURE — 85025 COMPLETE CBC W/AUTO DIFF WBC: CPT | Performed by: PHYSICIAN ASSISTANT

## 2025-06-26 PROCEDURE — 63600175 PHARM REV CODE 636 W HCPCS: Performed by: PHYSICIAN ASSISTANT

## 2025-06-26 PROCEDURE — 94761 N-INVAS EAR/PLS OXIMETRY MLT: CPT

## 2025-06-26 PROCEDURE — 25500020 PHARM REV CODE 255: Performed by: EMERGENCY MEDICINE

## 2025-06-26 PROCEDURE — G0378 HOSPITAL OBSERVATION PER HR: HCPCS

## 2025-06-26 PROCEDURE — 93005 ELECTROCARDIOGRAM TRACING: CPT

## 2025-06-26 PROCEDURE — 84484 ASSAY OF TROPONIN QUANT: CPT | Mod: 91 | Performed by: PHYSICIAN ASSISTANT

## 2025-06-26 PROCEDURE — 83690 ASSAY OF LIPASE: CPT | Performed by: PHYSICIAN ASSISTANT

## 2025-06-26 PROCEDURE — 93010 ELECTROCARDIOGRAM REPORT: CPT | Mod: ,,, | Performed by: STUDENT IN AN ORGANIZED HEALTH CARE EDUCATION/TRAINING PROGRAM

## 2025-06-26 PROCEDURE — U0002 COVID-19 LAB TEST NON-CDC: HCPCS | Performed by: PHYSICIAN ASSISTANT

## 2025-06-26 PROCEDURE — 96374 THER/PROPH/DIAG INJ IV PUSH: CPT

## 2025-06-26 PROCEDURE — 25000003 PHARM REV CODE 250: Performed by: PHYSICIAN ASSISTANT

## 2025-06-26 PROCEDURE — 83880 ASSAY OF NATRIURETIC PEPTIDE: CPT | Performed by: PHYSICIAN ASSISTANT

## 2025-06-26 PROCEDURE — 96375 TX/PRO/DX INJ NEW DRUG ADDON: CPT

## 2025-06-26 PROCEDURE — 84484 ASSAY OF TROPONIN QUANT: CPT | Performed by: PHYSICIAN ASSISTANT

## 2025-06-26 PROCEDURE — 80053 COMPREHEN METABOLIC PANEL: CPT | Performed by: PHYSICIAN ASSISTANT

## 2025-06-26 PROCEDURE — 96376 TX/PRO/DX INJ SAME DRUG ADON: CPT

## 2025-06-26 RX ORDER — FAMOTIDINE 10 MG/ML
20 INJECTION, SOLUTION INTRAVENOUS
Status: COMPLETED | OUTPATIENT
Start: 2025-06-26 | End: 2025-06-26

## 2025-06-26 RX ORDER — MORPHINE SULFATE 2 MG/ML
6 INJECTION, SOLUTION INTRAMUSCULAR; INTRAVENOUS
Refills: 0 | Status: COMPLETED | OUTPATIENT
Start: 2025-06-26 | End: 2025-06-26

## 2025-06-26 RX ORDER — QUETIAPINE FUMARATE 50 MG/1
150 TABLET, EXTENDED RELEASE ORAL NIGHTLY PRN
Status: DISCONTINUED | OUTPATIENT
Start: 2025-06-26 | End: 2025-07-02 | Stop reason: HOSPADM

## 2025-06-26 RX ORDER — DROPERIDOL 2.5 MG/ML
0.62 INJECTION, SOLUTION INTRAMUSCULAR; INTRAVENOUS EVERY 8 HOURS PRN
Status: DISCONTINUED | OUTPATIENT
Start: 2025-06-26 | End: 2025-06-27

## 2025-06-26 RX ORDER — ACETAMINOPHEN 325 MG/1
650 TABLET ORAL EVERY 4 HOURS PRN
Status: DISCONTINUED | OUTPATIENT
Start: 2025-06-26 | End: 2025-06-28

## 2025-06-26 RX ORDER — PANTOPRAZOLE SODIUM 40 MG/10ML
40 INJECTION, POWDER, LYOPHILIZED, FOR SOLUTION INTRAVENOUS
Status: COMPLETED | OUTPATIENT
Start: 2025-06-26 | End: 2025-06-26

## 2025-06-26 RX ORDER — MORPHINE SULFATE 4 MG/ML
4 INJECTION, SOLUTION INTRAMUSCULAR; INTRAVENOUS EVERY 6 HOURS PRN
Status: DISCONTINUED | OUTPATIENT
Start: 2025-06-26 | End: 2025-06-29

## 2025-06-26 RX ORDER — METOCLOPRAMIDE HYDROCHLORIDE 5 MG/ML
10 INJECTION INTRAMUSCULAR; INTRAVENOUS
Status: COMPLETED | OUTPATIENT
Start: 2025-06-26 | End: 2025-06-26

## 2025-06-26 RX ORDER — SUCRALFATE 1 G/10ML
1 SUSPENSION ORAL 3 TIMES DAILY PRN
Status: DISCONTINUED | OUTPATIENT
Start: 2025-06-26 | End: 2025-06-27

## 2025-06-26 RX ORDER — DICYCLOMINE HYDROCHLORIDE 10 MG/ML
20 INJECTION INTRAMUSCULAR 3 TIMES DAILY PRN
Status: DISCONTINUED | OUTPATIENT
Start: 2025-06-26 | End: 2025-06-27

## 2025-06-26 RX ORDER — NAPROXEN SODIUM 220 MG/1
81 TABLET, FILM COATED ORAL DAILY
Status: DISCONTINUED | OUTPATIENT
Start: 2025-06-27 | End: 2025-07-02 | Stop reason: HOSPADM

## 2025-06-26 RX ORDER — HYDROMORPHONE HYDROCHLORIDE 1 MG/ML
0.5 INJECTION, SOLUTION INTRAMUSCULAR; INTRAVENOUS; SUBCUTANEOUS
Refills: 0 | Status: COMPLETED | OUTPATIENT
Start: 2025-06-26 | End: 2025-06-26

## 2025-06-26 RX ORDER — DROPERIDOL 2.5 MG/ML
1.25 INJECTION, SOLUTION INTRAMUSCULAR; INTRAVENOUS
Status: COMPLETED | OUTPATIENT
Start: 2025-06-26 | End: 2025-06-26

## 2025-06-26 RX ORDER — SODIUM CHLORIDE 0.9 % (FLUSH) 0.9 %
10 SYRINGE (ML) INJECTION
Status: DISCONTINUED | OUTPATIENT
Start: 2025-06-26 | End: 2025-07-02 | Stop reason: HOSPADM

## 2025-06-26 RX ORDER — LIDOCAINE HYDROCHLORIDE 20 MG/ML
15 SOLUTION OROPHARYNGEAL ONCE
Status: COMPLETED | OUTPATIENT
Start: 2025-06-26 | End: 2025-06-26

## 2025-06-26 RX ORDER — ONDANSETRON HYDROCHLORIDE 2 MG/ML
4 INJECTION, SOLUTION INTRAVENOUS EVERY 8 HOURS PRN
Status: DISCONTINUED | OUTPATIENT
Start: 2025-06-26 | End: 2025-06-27

## 2025-06-26 RX ORDER — PANTOPRAZOLE SODIUM 40 MG/1
40 TABLET, DELAYED RELEASE ORAL DAILY
Status: DISCONTINUED | OUTPATIENT
Start: 2025-06-27 | End: 2025-06-28

## 2025-06-26 RX ORDER — ALUMINUM HYDROXIDE, MAGNESIUM HYDROXIDE, AND SIMETHICONE 1200; 120; 1200 MG/30ML; MG/30ML; MG/30ML
30 SUSPENSION ORAL ONCE
Status: COMPLETED | OUTPATIENT
Start: 2025-06-26 | End: 2025-06-26

## 2025-06-26 RX ORDER — ONDANSETRON HYDROCHLORIDE 2 MG/ML
4 INJECTION, SOLUTION INTRAVENOUS
Status: COMPLETED | OUTPATIENT
Start: 2025-06-26 | End: 2025-06-26

## 2025-06-26 RX ADMIN — FAMOTIDINE 20 MG: 10 INJECTION, SOLUTION INTRAVENOUS at 01:06

## 2025-06-26 RX ADMIN — HYDROMORPHONE HYDROCHLORIDE 0.5 MG: 1 INJECTION, SOLUTION INTRAMUSCULAR; INTRAVENOUS; SUBCUTANEOUS at 02:06

## 2025-06-26 RX ADMIN — ALUMINUM HYDROXIDE, MAGNESIUM HYDROXIDE, AND SIMETHICONE 30 ML: 200; 200; 20 SUSPENSION ORAL at 01:06

## 2025-06-26 RX ADMIN — LIDOCAINE HYDROCHLORIDE 15 ML: 20 SOLUTION ORAL at 01:06

## 2025-06-26 RX ADMIN — MORPHINE SULFATE 6 MG: 2 INJECTION, SOLUTION INTRAMUSCULAR; INTRAVENOUS at 02:06

## 2025-06-26 RX ADMIN — METOCLOPRAMIDE 10 MG: 5 INJECTION, SOLUTION INTRAMUSCULAR; INTRAVENOUS at 02:06

## 2025-06-26 RX ADMIN — DROPERIDOL 1.25 MG: 2.5 INJECTION, SOLUTION INTRAMUSCULAR; INTRAVENOUS at 03:06

## 2025-06-26 RX ADMIN — IOHEXOL 75 ML: 350 INJECTION, SOLUTION INTRAVENOUS at 03:06

## 2025-06-26 RX ADMIN — ONDANSETRON 4 MG: 2 INJECTION INTRAMUSCULAR; INTRAVENOUS at 11:06

## 2025-06-26 RX ADMIN — ONDANSETRON 4 MG: 2 INJECTION INTRAMUSCULAR; INTRAVENOUS at 01:06

## 2025-06-26 RX ADMIN — PANTOPRAZOLE SODIUM 40 MG: 40 INJECTION, POWDER, LYOPHILIZED, FOR SOLUTION INTRAVENOUS at 06:06

## 2025-06-26 RX ADMIN — SODIUM CHLORIDE, POTASSIUM CHLORIDE, SODIUM LACTATE AND CALCIUM CHLORIDE 1000 ML: 600; 310; 30; 20 INJECTION, SOLUTION INTRAVENOUS at 01:06

## 2025-06-26 RX ADMIN — MORPHINE SULFATE 4 MG: 4 INJECTION INTRAVENOUS at 11:06

## 2025-06-26 NOTE — ED PROVIDER NOTES
Encounter Date: 6/26/2025       History     Chief Complaint   Patient presents with    Nausea    Vomiting    Chest Pain     54-year-old female with hyperparathyroidism, hyperlipidemia, hypertension presents for nausea, vomiting, abdominal pain and chest pain for several days duration.  Her symptoms started on Monday, initially feeling queasy followed by nausea, vomiting and fever.  This was followed by abdominal pain which radiates across her abdomen from her RLQ to her RUQ and across her epigastrium.  She then started to have chest pain which feels burning and like something is stuck in her chest.  She reports associated with frequent watery diarrhea now with some mucus.  She tried taking Imodium, liquid IV without any improvement.  Reports that her partner was also ill at the same time, however he is now feeling well again and she seems to be worsening.  She thought her symptoms might be due to food poisoning, however notes that they ate different things and nothing that seemed questionable.  No recent travel or antibiotic use.    The history is provided by the patient and medical records. No  was used.     Review of patient's allergies indicates:   Allergen Reactions    Clindamycin     Sulfa (sulfonamide antibiotics) Anaphylaxis    Sulfa dyne     Sulfamethoxazole-trimethoprim      Other reaction(s): Anaphylaxis    Dermabond [tissue glues] Rash     Past Medical History:   Diagnosis Date    Abnormal Pap smear     Abnormal Pap smear of cervix     Annular tear of lumbar disc, L5-S1. 07/26/2012    Chronic LBP     HPTH (hyperparathyroidism)     Hyperlipidemia     Menorrhagia     PONV (postoperative nausea and vomiting)     Primary hypertension 01/31/2023    Right lumbar radiculopathy 07/26/2012    Seasonal allergies      Past Surgical History:   Procedure Laterality Date    BACK SURGERY  2018    CHOLECYSTECTOMY      COLONOSCOPY N/A 01/19/2018    Procedure: COLONOSCOPY;  Surgeon: Malachi Olmedo MD;   Location: [facility] ENDO (4TH FLR);  Service: Endoscopy;  Laterality: N/A;    COLONOSCOPY N/A 12/28/2022    Procedure: COLONOSCOPY;  Surgeon: Ezekiel Alanis MD;  Location: [facility] ENDO (4TH FLR);  Service: Endoscopy;  Laterality: N/A;  inst via portal  pre call complete [facility]    ESOPHAGOGASTRODUODENOSCOPY N/A 09/06/2019    Procedure: EGD (ESOPHAGOGASTRODUODENOSCOPY);  Surgeon: Jose Carlos Ventura MD;  Location: [facility] ENDO (4TH FLR);  Service: Endoscopy;  Laterality: N/A;  pt requesting ASAP    ESOPHAGOGASTRODUODENOSCOPY N/A 6/13/2024    Procedure: EGD (ESOPHAGOGASTRODUODENOSCOPY);  Surgeon: Malachi Olmedo MD;  Location: [facility] (2ND FLR);  Service: Endoscopy;  Laterality: N/A;    ESOPHAGOGASTRODUODENOSCOPY N/A 9/3/2024    Procedure: EGD (ESOPHAGOGASTRODUODENOSCOPY);  Surgeon: Saurav Draper MD;  Location: [facility] (4TH FLR);  Service: Endoscopy;  Laterality: N/A;  Ref by:ana Hurley sent via portal.AC  8/23-lvm for pre call-tb  8/30-precall complete-KPvt    HYSTERECTOMY, TOTAL, LAPAROSCOPIC, WITH SALPINGECTOMY  06/16/2015    INJECTION OF ANESTHETIC AGENT AROUND MULTIPLE INTERCOSTAL NERVES Right 03/21/2023    Procedure: BLOCK, NERVE, INTERCOSTAL, 2 OR MORE;  Surgeon: Anton Evangelista MD;  Location: [facility] OR [address] [place] [address];  Service: Cardiothoracic;  Laterality: Right;    TUBAL LIGATION      Essure    WISDOM TOOTH EXTRACTION      2005    XI ROBOTIC RATS Right 03/21/2023    Procedure: XI ROBOTIC THYMECTOMY;  Surgeon: Anton Evangelista MD;  Location: [facility] OR [address] [place] [address];  Service: Cardiothoracic;  Laterality: Right;     Family History   Problem Relation Name Age of Onset    Hypertension Mother      Diabetes Father      Colon polyps Father      Hypertension Maternal Grandmother      Multiple myeloma Maternal Grandmother      Stroke Paternal Grandmother  68    Breast cancer Neg Hx      Ovarian cancer Neg Hx      Melanoma Neg Hx      Colon cancer Neg Hx      Amblyopia Neg Hx      Blindness Neg Hx      Cataracts Neg Hx       Macular degeneration Neg Hx      Retinal detachment Neg Hx      Strabismus Neg Hx       Social History[1]  Review of Systems    Physical Exam     Initial Vitals   BP Pulse Resp Temp SpO2   06/26/25 1213 06/26/25 1143 06/26/25 1143 06/26/25 1143 06/26/25 1143   (!) 144/94 96 16 97.9 °F (36.6 °C) 100 %      MAP       --                Physical Exam    Nursing note and vitals reviewed.  Constitutional: She appears well-developed and well-nourished. She is not diaphoretic. No distress.   HENT:   Head: Normocephalic and atraumatic.   Cardiovascular:  Normal rate, regular rhythm, normal heart sounds and intact distal pulses.     Exam reveals no gallop and no friction rub.       No murmur heard.  Pulmonary/Chest: Breath sounds normal. No respiratory distress. She has no wheezes. She has no rhonchi. She has no rales. She exhibits no tenderness.   Abdominal: Abdomen is soft. Bowel sounds are normal. She exhibits no distension and no mass. There is generalized abdominal tenderness. There is no rebound and no guarding.   Musculoskeletal:         General: Normal range of motion.     Neurological: She is alert and oriented to person, place, and time.   Skin: Skin is warm and dry.   Psychiatric: She has a normal mood and affect.         ED Course   Procedures  Labs Reviewed   TROPONIN I HIGH SENSITIVITY - Abnormal       Result Value    Troponin High Sensitive 19 (*)    URINALYSIS, REFLEX TO URINE CULTURE - Abnormal    Color, UA Colorless (*)     Appearance, UA Clear      pH, UA 6.0      Spec Grav UA 1.005      Protein, UA Negative      Glucose, UA Negative      Ketones, UA Negative      Bilirubin, UA Negative      Blood, UA Negative      Nitrites, UA Negative      Urobilinogen, UA Negative      Leukocyte Esterase, UA Negative     TROPONIN I HIGH SENSITIVITY - Abnormal    Troponin High Sensitive 20 (*)    INFLUENZA A & B BY MOLECULAR - Normal    INFLUENZA A MOLECULAR Negative      INFLUENZA B MOLECULAR  Negative     COMPREHENSIVE  METABOLIC PANEL - Normal    Sodium 140      Potassium 3.5      Chloride 102      CO2 27      Glucose 91      BUN 9      Creatinine 0.7      Calcium 10.0      Protein Total 8.3      Albumin 4.4      Bilirubin Total 0.7      ALP 85      AST 18      ALT 19      Anion Gap 11      eGFR >60     B-TYPE NATRIURETIC PEPTIDE - Normal    BNP 14     LIPASE - Normal    Lipase Level 10     SARS-COV-2 RNA AMPLIFICATION, QUAL - Normal    SARS COV-2 Molecular Negative     CBC WITH DIFFERENTIAL - Normal    WBC 8.26      RBC 4.87      HGB 13.7      HCT 42.6      MCV 88      MCH 28.1      MCHC 32.2      RDW 12.4      Platelet Count 285      MPV 9.3      Nucleated RBC 0      Neut % 59.2      Lymph % 33.2      Mono % 6.2      Eos % 1.0      Basophil % 0.2      Imm Grans % 0.2      Neut # 4.89      Lymph # 2.74      Mono # 0.51      Eos # 0.08      Baso # 0.02      Imm Grans # 0.02     CBC W/ AUTO DIFFERENTIAL    Narrative:     The following orders were created for panel order CBC auto differential.  Procedure                               Abnormality         Status                     ---------                               -----------         ------                     CBC with Differential[[id number]]       Normal              Final result                 Please view results for these tests on the individual orders.   GREY TOP URINE HOLD     EKG Readings: (Independently Interpreted)   Initial Reading: No STEMI. Rhythm: Normal Sinus Rhythm. Heart Rate: 67. Ectopy: No Ectopy. ST Segments: Normal ST Segments. Clinical Impression: Normal Sinus Rhythm     ECG Results              EKG 12-lead (Final result)        Collection Time Result Time QRS Duration OHS QTC Calculation    06/26/25 11:46:42 06/26/25 15:06:02 78 441                     Final result by Interface, Lab In [facility] (06/26/25 15:06:11)                   Narrative:    Test Reason : R07.9,    Vent. Rate :  67 BPM     Atrial Rate :  67 BPM     P-R Int : 136 ms          QRS Dur :   78 ms      QT Int : 418 ms       P-R-T Axes :  68   0  30 degrees    QTcB Int : 441 ms    Normal sinus rhythm  Anterior infarct ,age undetermined  Abnormal ECG  When compared with ECG of 12-Jun-2024 22:20,  Anterior infarct is now Present  Confirmed by Reed Jordan ([id number]) on 6/26/2025 3:05:59 PM    Referred By:            Confirmed By: Reed Jordan                                  Imaging Results              CT Abdomen Pelvis With IV Contrast NO Oral Contrast (Final result)  Result time 06/26/25 15:27:58      Final result by Sami Shannon MD (06/26/25 15:27:58)                   Impression:      No acute process or CT findings identified to explain patient's symptoms of nonlocalized abdominal pain.    Minimal colonic diverticulosis without diverticulitis.    Grossly stable other incidental/nonemergent findings in the body of the report.      Electronically signed by: Sami Shannon MD  Date:    06/26/2025  Time:    15:27               Narrative:    EXAMINATION:  CT ABDOMEN PELVIS WITH IV CONTRAST    CLINICAL HISTORY:  Abdominal pain, acute, nonlocalized;    TECHNIQUE:  Low dose axial images, sagittal and coronal reformations were obtained from the lung bases to the pubic symphysis following the IV administration of 75 mL of Omnipaque 350 .  Oral contrast was not given.    COMPARISON:  Chest CT 04/10/2025, abdominal series and pelvic ultrasound 06/11/2024, CT abdomen and pelvis 06/10/2024 and 05/27/2012    FINDINGS:  Base of the heart is within normal limits for age, stable.  Imaged lung bases show minimal dependent atelectasis and minimal scattered platelike scarring versus atelectasis.  No consolidation or pleural effusion.    Gallbladder not identified and may be surgically absent.  No significant biliary ductal dilatation.    Liver is normal in size.  Previous subcentimeter hypoattenuating focus at the right hepatic dome is not identified on today's study.  Portal vasculature appears  patent.    Pancreas, spleen, stomach, duodenum and bilateral adrenal glands are within normal limits.    Bilateral kidneys are normal in size, shape and location with symmetric normal enhancement.  Grossly stable appearance of lower left renal parapelvic 2.8 cm hypodensity which measures slightly higher than water attenuation and may reflect a complex cyst, since at least 2012, and likely benign.  No hydronephrosis or significant perinephric stranding.  Ureters are nondilated.  Urinary bladder is well distended without wall thickening or adjacent inflammatory change.  Uterus is surgically absent.  No adnexal mass or significant volume free pelvic fluid.  Pelvic phleboliths noted.    Few scattered colonic diverticula without diverticulitis.  No evidence of bowel obstruction or acute bowel inflammation.  Appendix and terminal ileum are within normal limits.  No bowel pneumatosis or portal venous gas.    No ascites, free air or lymphadenopathy by CT criteria.  No significant atherosclerosis.  No aortic aneurysm or dissection.    Osseous structures appear stable including surgical changes of the spine with posterior fusion at L5-S1, without acute or destructive process.  Extraperitoneal soft tissues appear stable without acute findings.                                       X-Ray Chest PA And Lateral (Final result)  Result time 06/26/25 13:46:21      Final result by Yonny Boothe III, MD (06/26/25 13:46:21)                   Impression:      No acute process seen.      Electronically signed by: Yonny Boothe MD  Date:    06/26/2025  Time:    13:46               Narrative:    EXAMINATION:  XR CHEST PA AND LATERAL    CLINICAL HISTORY:  Chest pain, unspecified    FINDINGS:  Chest two views:    Heart size is normal.  Lungs are clear and the bones are noncontributory.                                       Medications   pantoprazole injection 40 mg (has no administration in time range)   lactated ringers bolus 1,000 mL  (0 mLs Intravenous Stopped 6/26/25 1411)   ondansetron injection 4 mg (4 mg Intravenous Given 6/26/25 1302)   aluminum-magnesium hydroxide-simethicone 200-200-20 mg/5 mL suspension 30 mL (30 mLs Oral Given 6/26/25 1309)     And   LIDOcaine viscous HCl 2% oral solution 15 mL (15 mLs Oral Given 6/26/25 1309)   famotidine (PF) injection 20 mg (20 mg Intravenous Given 6/26/25 1302)   metoclopramide injection 10 mg (10 mg Intravenous Given 6/26/25 1406)   morphine injection 6 mg (6 mg Intravenous Given 6/26/25 1407)   HYDROmorphone injection 0.5 mg (0.5 mg Intravenous Given 6/26/25 1443)   droPERidol injection 1.25 mg (1.25 mg Intravenous Given 6/26/25 1512)   iohexoL (OMNIPAQUE 350) injection 75 mL (75 mLs Intravenous Given 6/26/25 1507)     Medical Decision Making  54 year old female presenting for abdominal pain, nausea, vomiting, diarrhea and chest pain.  She is mildly hypertensive with otherwise normal vitals.  She appears uncomfortable but nontoxic.    Differential diagnosis:  Gastroenteritis   PUD   Pancreatitis   Diverticulitis   Appendicitis   Low suspicion for ACS    Will check labs, analgesics, antiemetics, get imaging and reassess.    Labs but her pain has been persistent notable for mildly elevated troponin, flat on repeat. She will be admitted to [facility]OU for further management. I discussed this patient with my supervising physician.    Amount and/or Complexity of Data Reviewed  Labs: ordered. Decision-making details documented in ED Course.  Radiology: ordered.  ECG/medicine tests: ordered and independent interpretation performed.    Risk  OTC drugs.  Prescription drug management.  Parenteral controlled substances.  Decision regarding hospitalization.              Attending Attestation:     Physician Attestation Statement for NP/PA:   I personally made/approved the management plan and take responsibility for the patient management.    Other NP/PA Attestation Additions:    History of Present Illness: 53 yo  female presenting with abdominal pain, N/V, burning chest pain.  Partner with similar sx at home, but hers are persisting.    Physical Exam: Appears uncomfortable  RRR, lungs clear  Abdomen soft, mild TTP lower quadrants, non-peritonitic  No LE edema or calf tenderness    Medical Decision Making: EKG on my independent interpretation:   Rate 67, NSR, No STEMI  Compared to 6/12/2024 EKG     Labs reviewed - HS trop 19 ->20.  No acute CXR findings. No UTI, no leukocytosis, no anemia.   Flu/COVID swabs negative.  Food borne illness versus viral.  Lower suspicion for PE, no SOB, hypoxia, would reconsider if condition changes. No acute CT A/P findings.   Agree with admission, initially discussed with Hospital Medicine, recommend EDOU for further sx tx, stress ECHO.             ED Course as of 06/26/25 1706   Thu Jun 26, 2025   1320 SARS COV-2 MOLECULAR: Negative [AB]   1320 WBC: 8.26  No leukocytosis  [AB]   1326 WBC: 8.26 [CC]   1326 Hemoglobin: 13.7 [CC]   1332 Chest improved with GI cocktail, nausea and abdominal pain persistent. Will give additional analgesics   [CC]   1335 Lipase: 10 [CC]   1335 Creatinine: 0.7 [CC]   1335 Influenza A, Molecular: Negative [CC]   1335 Influenza B, Molecular: Negative [CC]   1341 BNP: 14 [CC]   1349 Troponin I High Sensitivity(!): 19 [CC]   1413 Leukocyte Esterase, UA: Negative [CC]   1535 Troponin I High Sensitivity(!): 20 [CC]      ED Course User Index  [AB] Keith Segal MD  [CC] Nichelle Felder PA-C                           Clinical Impression:  Final diagnoses:  [R07.9] Chest pain  [R10.9] Abdominal pain, unspecified abdominal location (Primary)          ED Disposition Condition    Observation                     Nichelle Felder PA-C  06/26/25 1706         [1]   Social History  Tobacco Use    Smoking status: Never     Passive exposure: Never    Smokeless tobacco: Never   Substance Use Topics    Alcohol use: No    Drug use: No        Keith Segal MD  06/27/25  0731

## 2025-06-26 NOTE — H&P
ED Observation Unit  History and Physical      I assumed care of this patient from the Main ED at onset of observation time, 17:01 on 06/26/2025.       History of Present Illness:    54-year-old female with hyperparathyroidism, hyperlipidemia, hypertension presents for nausea, vomiting, abdominal pain and chest pain for several days duration. Her symptoms started on Monday, initially feeling queasy followed by nausea, vomiting and fever. This was followed by abdominal pain which radiates across her abdomen from her RLQ to her RUQ and across her epigastrium. She then started to have chest pain which feels burning and like something is stuck in her chest. She reports associated with frequent watery diarrhea now with some mucus. She tried taking Imodium, liquid IV without any improvement. Reports that her partner was also ill at the same time, however he is now feeling well again and she seems to be worsening. She thought her symptoms might be due to food poisoning, however notes that they ate different things and nothing that seemed questionable. No recent travel or antibiotic use.  (copy from ED Provider Note)    I reviewed the ED Provider Note dated 6/26/2025 prior to my evaluation of this patient.  I reviewed all labs and imaging performed in the Main ED, prior to patient being placed in Observation. Patient was placed in the ED Observation Unit for Chest pain.    PMHx   Past Medical History:   Diagnosis Date    Abnormal Pap smear     Abnormal Pap smear of cervix     Annular tear of lumbar disc, L5-S1. 07/26/2012    Chronic LBP     HPTH (hyperparathyroidism)     Hyperlipidemia     Menorrhagia     PONV (postoperative nausea and vomiting)     Primary hypertension 01/31/2023    Right lumbar radiculopathy 07/26/2012    Seasonal allergies       Past Surgical History:   Procedure Laterality Date    BACK SURGERY  2018    CHOLECYSTECTOMY      COLONOSCOPY N/A 01/19/2018    Procedure: COLONOSCOPY;  Surgeon: Malachi Olmedo,  MD;  Location: [facility] (4TH FLR);  Service: Endoscopy;  Laterality: N/A;    COLONOSCOPY N/A 12/28/2022    Procedure: COLONOSCOPY;  Surgeon: Ezekiel Alanis MD;  Location: [facility] (4TH FLR);  Service: Endoscopy;  Laterality: N/A;  inst via portal  pre call complete [facility]    ESOPHAGOGASTRODUODENOSCOPY N/A 09/06/2019    Procedure: EGD (ESOPHAGOGASTRODUODENOSCOPY);  Surgeon: Jose Carlos Ventura MD;  Location: [facility] (4TH FLR);  Service: Endoscopy;  Laterality: N/A;  pt requesting ASAP    ESOPHAGOGASTRODUODENOSCOPY N/A 6/13/2024    Procedure: EGD (ESOPHAGOGASTRODUODENOSCOPY);  Surgeon: Malachi Olmedo MD;  Location: [facility] (2ND FLR);  Service: Endoscopy;  Laterality: N/A;    ESOPHAGOGASTRODUODENOSCOPY N/A 9/3/2024    Procedure: EGD (ESOPHAGOGASTRODUODENOSCOPY);  Surgeon: Saurav Draper MD;  Location: [facility] (4TH FLR);  Service: Endoscopy;  Laterality: N/A;  Ref by:ana Hurley sent via portal.AC  8/23-lvm for pre call-tb  8/30-precall complete-KPvt    HYSTERECTOMY, TOTAL, LAPAROSCOPIC, WITH SALPINGECTOMY  06/16/2015    INJECTION OF ANESTHETIC AGENT AROUND MULTIPLE INTERCOSTAL NERVES Right 03/21/2023    Procedure: BLOCK, NERVE, INTERCOSTAL, 2 OR MORE;  Surgeon: Anton Evangelista MD;  Location: [address];  Service: Cardiothoracic;  Laterality: Right;    TUBAL LIGATION      [facility]    WISDOM TOOTH EXTRACTION      2005    XI ROBOTIC RATS Right 03/21/2023    Procedure: XI ROBOTIC THYMECTOMY;  Surgeon: Anton Evangelista MD;  Location: [facility] OR [address] [place] [address];  Service: Cardiothoracic;  Laterality: Right;        Family Hx   Family History   Problem Relation Name Age of Onset    Hypertension Mother      Diabetes Father      Colon polyps Father      Hypertension Maternal Grandmother      Multiple myeloma Maternal Grandmother      Stroke Paternal Grandmother  68    Breast cancer Neg Hx      Ovarian cancer Neg Hx      Melanoma Neg Hx      Colon cancer Neg Hx      Amblyopia Neg Hx      Blindness Neg Hx       Cataracts Neg Hx      Macular degeneration Neg Hx      Retinal detachment Neg Hx      Strabismus Neg Hx          Social Hx   Social History     Socioeconomic History    Marital status: Single   Occupational History    Occupation: nurse     Employer: [employer]   Tobacco Use    Smoking status: Never     Passive exposure: Never    Smokeless tobacco: Never   Substance and Sexual Activity    Alcohol use: No    Drug use: No    Sexual activity: Yes     Partners: Male     Birth control/protection: See Surgical Hx     Comment:  ; new partner since 2018   Other Topics Concern    Are you pregnant or think you may be? No    Breast-feeding No     Social Drivers of Health     Financial Resource Strain: Medium Risk (12/11/2024)    Overall Financial Resource Strain (CARDIA)     Difficulty of Paying Living Expenses: Somewhat hard   Food Insecurity: Food Insecurity Present (12/11/2024)    Hunger Vital Sign     Worried About Running Out of Food in the Last Year: Sometimes true     Ran Out of Food in the Last Year: Sometimes true   Transportation Needs: No Transportation Needs (3/22/2023)    PRAPARE - Transportation     Lack of Transportation (Medical): No     Lack of Transportation (Non-Medical): No   Physical Activity: Unknown (12/11/2024)    Exercise Vital Sign     Days of Exercise per Week: 0 days   Stress: Stress Concern Present (12/11/2024)    [employer] [place] [employer] - Occupational Stress Questionnaire     Feeling of Stress : Very much   Housing Stability: Low Risk  (3/22/2023)    Housing Stability Vital Sign     Unable to Pay for Housing in the Last Year: No     Number of Places Lived in the Last Year: 1     Unstable Housing in the Last Year: No        Vital Signs   Vitals:    06/26/25 1407 06/26/25 1443 06/26/25 1730 06/26/25 1842   BP:   (!) 147/83 (!) 144/81   BP Location:       Patient Position:       Pulse:   61 63   Resp: 18 16 16    Temp:   98.5 °F (36.9 °C) 98.2 °F (36.8 °C)    TempSrc:   Oral Oral   SpO2:   98% 95%   Weight:       Height:            Review of Systems  Review of Systems   Constitutional:  Negative for fever.   Cardiovascular:  Positive for chest pain.   Gastrointestinal:  Positive for abdominal pain, diarrhea, nausea and vomiting. Negative for blood in stool.       Physical Exam  Physical Exam  Vitals and nursing note reviewed.   Constitutional:       General: She is not in acute distress.     Appearance: Normal appearance. She is not toxic-appearing.   HENT:      Head: Normocephalic and atraumatic.      Nose: Nose normal.   Eyes:      Conjunctiva/sclera: Conjunctivae normal.   Cardiovascular:      Rate and Rhythm: Normal rate and regular rhythm.   Pulmonary:      Effort: Pulmonary effort is normal. No respiratory distress.      Breath sounds: Normal breath sounds.   Abdominal:      Tenderness: There is generalized abdominal tenderness.   Musculoskeletal:         General: Normal range of motion.      Cervical back: Neck supple.   Skin:     Findings: No rash.   Neurological:      General: No focal deficit present.      Mental Status: She is alert and oriented to person, place, and time.   Psychiatric:         Mood and Affect: Mood normal.         Behavior: Behavior normal.         Medications:   Scheduled Meds:   [START ON 6/27/2025] aspirin  81 mg Oral Daily    [START ON 6/27/2025] pantoprazole  40 mg Oral Daily     Continuous Infusions:  PRN Meds:.  Current Facility-Administered Medications:     acetaminophen, 650 mg, Oral, Q4H PRN    dicyclomine, 20 mg, Intramuscular, TID PRN    droPERidol, 0.625 mg, Intravenous, Q8H PRN    morphine, 4 mg, Intravenous, Q6H PRN    ondansetron, 4 mg, Intravenous, Q8H PRN    sodium chloride 0.9%, 10 mL, Intravenous, PRN    sucralfate, 1 g, Oral, TID PRN      Assessment/Plan:  Chest pain  - Troponin elevated however remains flat.   - Improved with GI cocktail, low suspicion for cardiac etiology  - EKG with new anterior changes   - CXR with  acute process  - Plan for stress echo in the AM in the setting of newly elevated troponin   - NPO at midnight    Abdominal pain    - CT negative for acute causes of pain. Diverticulosis without signs of diverticulitis   - Unremarkable Chem and lipase    - History of chronic gastritis, Protonix daily   - Carafate and bentyl PRN   - Morphine for severe pain    - Serial abdominal exams    - Trend CBC/CMP to assess for leukocytosis or intraabdominal lab abnormalities    - Consider upgrade and GI consult if not improved by the AM    Nausea and vomiting   - IV Zofran PRN   - Droperidol for intractable vomiting    - Advance diet as tolerate/PO challenge             Case was discussed with the ED provider, Nichelle Felder PA-C

## 2025-06-27 PROBLEM — R19.7 NAUSEA VOMITING AND DIARRHEA: Status: ACTIVE | Noted: 2024-06-11

## 2025-06-27 LAB
AORTIC SIZE INDEX (SOV): 1.7 CM/M2
AORTIC SIZE INDEX: 1.4 CM/M2
ASCENDING AORTA: 2.6 CM
AV AREA BY CONTINUOUS VTI: 2.7 CM2
AV INDEX (PROSTH): 0.82
AV LVOT MEAN GRADIENT: 3 MMHG
AV LVOT PEAK GRADIENT: 5 MMHG
AV MEAN GRADIENT: 5 MMHG
AV PEAK GRADIENT: 9 MMHG
AV VALVE AREA BY VELOCITY RATIO: 2.3 CM²
AV VALVE AREA: 2.6 CM2
AV VELOCITY RATIO: 0.73
BSA FOR ECHO PROCEDURE: 1.84 M2
CHOLEST SERPL-MCNC: 203 MG/DL (ref 120–199)
CHOLEST/HDLC SERPL: 3.6 {RATIO} (ref 2–5)
CV ECHO LV RWT: 0.43 CM
CV STRESS BASE HR: 71 BPM
DIASTOLIC BLOOD PRESSURE: 71 MMHG
DOP CALC AO PEAK VEL: 1.5 M/S
DOP CALC AO VTI: 31.7 CM
DOP CALC LVOT AREA: 3.1 CM2
DOP CALC LVOT DIAMETER: 2 CM
DOP CALC LVOT PEAK VEL: 1.1 M/S
DOP CALC LVOT STROKE VOLUME: 82 CM3
DOP CALCLVOT PEAK VEL VTI: 26.1 CM
E WAVE DECELERATION TIME: 154 MS
E/A RATIO: 0.81
E/E' RATIO: 6 M/S
ECHO EF ESTIMATED: 57 %
ECHO LV POSTERIOR WALL: 0.9 CM (ref 0.6–1.1)
FRACTIONAL SHORTENING: 28.6 % (ref 28–44)
HDLC SERPL-MCNC: 56 MG/DL (ref 40–75)
HDLC SERPL: 27.6 % (ref 20–50)
HOLD SPECIMEN: NORMAL
INTERVENTRICULAR SEPTUM: 0.8 CM (ref 0.6–1.1)
IVC DIAMETER: 1.65 CM
IVRT: 69 MS
LA MAJOR: 5.6 CM
LA MINOR: 5.4 CM
LA WIDTH: 3.7 CM
LDLC SERPL CALC-MCNC: 129.4 MG/DL (ref 63–159)
LEFT ATRIUM SIZE: 3.5 CM
LEFT ATRIUM VOLUME INDEX: 33 ML/M2
LEFT ATRIUM VOLUME: 61 CM3
LEFT INTERNAL DIMENSION IN SYSTOLE: 3 CM (ref 2.1–4)
LEFT VENTRICLE DIASTOLIC VOLUME INDEX: 43.96 ML/M2
LEFT VENTRICLE DIASTOLIC VOLUME: 80 ML
LEFT VENTRICLE MASS INDEX: 60.3 G/M2
LEFT VENTRICLE SYSTOLIC VOLUME INDEX: 18.7 ML/M2
LEFT VENTRICLE SYSTOLIC VOLUME: 34 ML
LEFT VENTRICULAR INTERNAL DIMENSION IN DIASTOLE: 4.2 CM (ref 3.5–6)
LEFT VENTRICULAR MASS: 109.8 G
LV LATERAL E/E' RATIO: 5.9
LV SEPTAL E/E' RATIO: 6.4
MV A" WAVE DURATION": 88.49 MS
MV PEAK A VEL: 0.95 M/S
MV PEAK E VEL: 0.77 M/S
NONHDLC SERPL-MCNC: 147 MG/DL
OHS CV CPX 1 MINUTE RECOVERY HEART RATE: 141 BPM
OHS CV CPX 85 PERCENT MAX PREDICTED HEART RATE MALE: 143
OHS CV CPX MAX PREDICTED HEART RATE: 168
OHS CV CPX PATIENT IS FEMALE: 1
OHS CV CPX PATIENT IS MALE: 0
OHS CV CPX PEAK DIASTOLIC BLOOD PRESSURE: 70 MMHG
OHS CV CPX PEAK HEAR RATE: 146 BPM
OHS CV CPX PEAK RATE PRESSURE PRODUCT: NORMAL
OHS CV CPX PEAK SYSTOLIC BLOOD PRESSURE: 145 MMHG
OHS CV CPX PERCENT MAX PREDICTED HEART RATE ACHIEVED: 91
OHS CV CPX RATE PRESSURE PRODUCT PRESENTING: 8449
OHS CV INITIAL DOSE: 10 MCG/KG/MIN
OHS CV PEAK DOSE: 30 MCG/KG/MIN
OHS CV RV/LV RATIO: 0.76 CM
PISA TR MAX VEL: 2.9 M/S
PULM VEIN A" WAVE DURATION": 88.49 MS
PULM VEIN S/D RATIO: 1.32
PULMONIC VEIN PEAK A VELOCITY: 0.9 M/S
PV PEAK D VEL: 0.59 M/S
PV PEAK S VEL: 0.78 M/S
RA MAJOR: 4.16 CM
RA PRESSURE ESTIMATED: 3 MMHG
RA WIDTH: 3.5 CM
RIGHT VENTRICLE DIASTOLIC BASEL DIMENSION: 3.2 CM
RV TB RVSP: 6 MMHG
RV TISSUE DOPPLER FREE WALL SYSTOLIC VELOCITY 1 (APICAL 4 CHAMBER VIEW): 17.4 CM/S
SINUS: 3.1 CM
STJ: 2.5 CM
STRESS ST DEPRESSION: 0.5 MM
SYSTOLIC BLOOD PRESSURE: 119 MMHG
TDI LATERAL: 0.13 M/S
TDI SEPTAL: 0.12 M/S
TDI: 0.13 M/S
TRICUSPID ANNULAR PLANE SYSTOLIC EXCURSION: 2.4 CM
TRIGL SERPL-MCNC: 88 MG/DL (ref 30–150)
TROPONIN I SERPL HS-MCNC: 18 NG/L
TV PEAK GRADIENT: 34 MMHG
TV REST PULMONARY ARTERY PRESSURE: 37 MMHG
Z-SCORE OF LEFT VENTRICULAR DIMENSION IN END DIASTOLE: -1.82
Z-SCORE OF LEFT VENTRICULAR DIMENSION IN END SYSTOLE: -0.29

## 2025-06-27 PROCEDURE — 82705 FATS/LIPIDS FECES QUAL: CPT

## 2025-06-27 PROCEDURE — 94761 N-INVAS EAR/PLS OXIMETRY MLT: CPT

## 2025-06-27 PROCEDURE — 82272 OCCULT BLD FECES 1-3 TESTS: CPT

## 2025-06-27 PROCEDURE — 25000003 PHARM REV CODE 250: Performed by: STUDENT IN AN ORGANIZED HEALTH CARE EDUCATION/TRAINING PROGRAM

## 2025-06-27 PROCEDURE — 25000003 PHARM REV CODE 250: Performed by: PHYSICIAN ASSISTANT

## 2025-06-27 PROCEDURE — 21400001 HC TELEMETRY ROOM

## 2025-06-27 PROCEDURE — 63600175 PHARM REV CODE 636 W HCPCS: Performed by: STUDENT IN AN ORGANIZED HEALTH CARE EDUCATION/TRAINING PROGRAM

## 2025-06-27 PROCEDURE — 87449 NOS EACH ORGANISM AG IA: CPT

## 2025-06-27 PROCEDURE — 63600175 PHARM REV CODE 636 W HCPCS: Performed by: PHYSICIAN ASSISTANT

## 2025-06-27 PROCEDURE — 87425 ROTAVIRUS AG IA: CPT

## 2025-06-27 PROCEDURE — 87046 STOOL CULTR AEROBIC BACT EA: CPT

## 2025-06-27 PROCEDURE — 25000003 PHARM REV CODE 250

## 2025-06-27 PROCEDURE — 89055 LEUKOCYTE ASSESSMENT FECAL: CPT

## 2025-06-27 PROCEDURE — 96372 THER/PROPH/DIAG INJ SC/IM: CPT | Performed by: PHYSICIAN ASSISTANT

## 2025-06-27 PROCEDURE — 96375 TX/PRO/DX INJ NEW DRUG ADDON: CPT

## 2025-06-27 PROCEDURE — 87427 SHIGA-LIKE TOXIN AG IA: CPT

## 2025-06-27 PROCEDURE — 99285 EMERGENCY DEPT VISIT HI MDM: CPT | Mod: 25

## 2025-06-27 PROCEDURE — 87045 FECES CULTURE AEROBIC BACT: CPT

## 2025-06-27 PROCEDURE — 80061 LIPID PANEL: CPT | Performed by: PHYSICIAN ASSISTANT

## 2025-06-27 PROCEDURE — 96376 TX/PRO/DX INJ SAME DRUG ADON: CPT

## 2025-06-27 PROCEDURE — 96361 HYDRATE IV INFUSION ADD-ON: CPT

## 2025-06-27 RX ORDER — PROCHLORPERAZINE MALEATE 5 MG
5 TABLET ORAL 3 TIMES DAILY PRN
Status: DISCONTINUED | OUTPATIENT
Start: 2025-06-27 | End: 2025-06-29

## 2025-06-27 RX ORDER — ALUMINUM HYDROXIDE, MAGNESIUM HYDROXIDE, AND SIMETHICONE 1200; 120; 1200 MG/30ML; MG/30ML; MG/30ML
30 SUSPENSION ORAL ONCE
Status: COMPLETED | OUTPATIENT
Start: 2025-06-28 | End: 2025-06-27

## 2025-06-27 RX ORDER — DOBUTAMINE HYDROCHLORIDE 400 MG/100ML
30 INJECTION, SOLUTION INTRAVENOUS
Status: COMPLETED | OUTPATIENT
Start: 2025-06-27 | End: 2025-06-27

## 2025-06-27 RX ORDER — METHOCARBAMOL 750 MG/1
750 TABLET, FILM COATED ORAL 4 TIMES DAILY PRN
Status: DISCONTINUED | OUTPATIENT
Start: 2025-06-27 | End: 2025-07-02 | Stop reason: HOSPADM

## 2025-06-27 RX ORDER — HYDROCHLOROTHIAZIDE 12.5 MG/1
12.5 TABLET ORAL DAILY
Status: DISCONTINUED | OUTPATIENT
Start: 2025-06-28 | End: 2025-07-02 | Stop reason: HOSPADM

## 2025-06-27 RX ORDER — ATROPINE SULFATE 0.1 MG/ML
0.25 INJECTION INTRAVENOUS
Status: COMPLETED | OUTPATIENT
Start: 2025-06-27 | End: 2025-06-27

## 2025-06-27 RX ORDER — ATROPINE SULFATE 0.1 MG/ML
0.25 INJECTION INTRAVENOUS
Status: DISCONTINUED | OUTPATIENT
Start: 2025-06-27 | End: 2025-06-27

## 2025-06-27 RX ORDER — DICYCLOMINE HYDROCHLORIDE 10 MG/1
20 CAPSULE ORAL 3 TIMES DAILY PRN
Status: DISCONTINUED | OUTPATIENT
Start: 2025-06-27 | End: 2025-06-28

## 2025-06-27 RX ORDER — SUCRALFATE 1 G/10ML
1 SUSPENSION ORAL
Status: DISCONTINUED | OUTPATIENT
Start: 2025-06-27 | End: 2025-07-01

## 2025-06-27 RX ORDER — SODIUM CHLORIDE 9 MG/ML
1000 INJECTION, SOLUTION INTRAVENOUS
Status: COMPLETED | OUTPATIENT
Start: 2025-06-27 | End: 2025-06-27

## 2025-06-27 RX ORDER — TRAZODONE HYDROCHLORIDE 50 MG/1
50 TABLET ORAL NIGHTLY
Status: DISCONTINUED | OUTPATIENT
Start: 2025-06-27 | End: 2025-07-02 | Stop reason: HOSPADM

## 2025-06-27 RX ORDER — ONDANSETRON HYDROCHLORIDE 2 MG/ML
4 INJECTION, SOLUTION INTRAVENOUS EVERY 8 HOURS PRN
Status: DISCONTINUED | OUTPATIENT
Start: 2025-06-27 | End: 2025-06-29

## 2025-06-27 RX ORDER — SODIUM CHLORIDE 9 MG/ML
1000 INJECTION, SOLUTION INTRAVENOUS CONTINUOUS
Status: ACTIVE | OUTPATIENT
Start: 2025-06-27 | End: 2025-06-28

## 2025-06-27 RX ORDER — LIDOCAINE HYDROCHLORIDE 20 MG/ML
15 SOLUTION OROPHARYNGEAL ONCE
Status: COMPLETED | OUTPATIENT
Start: 2025-06-28 | End: 2025-06-27

## 2025-06-27 RX ORDER — ENOXAPARIN SODIUM 100 MG/ML
40 INJECTION SUBCUTANEOUS EVERY 24 HOURS
Status: DISCONTINUED | OUTPATIENT
Start: 2025-06-27 | End: 2025-07-02 | Stop reason: HOSPADM

## 2025-06-27 RX ORDER — PROMETHAZINE HYDROCHLORIDE 25 MG/1
25 SUPPOSITORY RECTAL
Status: COMPLETED | OUTPATIENT
Start: 2025-06-27 | End: 2025-06-27

## 2025-06-27 RX ADMIN — SODIUM CHLORIDE 1000 ML: 9 INJECTION, SOLUTION INTRAVENOUS at 05:06

## 2025-06-27 RX ADMIN — SODIUM CHLORIDE 1000 ML: 9 INJECTION, SOLUTION INTRAVENOUS at 08:06

## 2025-06-27 RX ADMIN — LIDOCAINE HYDROCHLORIDE 15 ML: 20 SOLUTION ORAL at 11:06

## 2025-06-27 RX ADMIN — PROCHLORPERAZINE MALEATE 5 MG: 5 TABLET ORAL at 11:06

## 2025-06-27 RX ADMIN — TRAZODONE HYDROCHLORIDE 50 MG: 50 TABLET ORAL at 07:06

## 2025-06-27 RX ADMIN — DOBUTAMINE 30 MCG/KG/MIN: 12.5 INJECTION, SOLUTION, CONCENTRATE INTRAVENOUS at 09:06

## 2025-06-27 RX ADMIN — ATROPINE SULFATE 0.25 MG: 0.1 INJECTION INTRAVENOUS at 09:06

## 2025-06-27 RX ADMIN — SUCRALFATE 1 G: 1 SUSPENSION ORAL at 12:06

## 2025-06-27 RX ADMIN — ASPIRIN 81 MG CHEWABLE TABLET 81 MG: 81 TABLET CHEWABLE at 08:06

## 2025-06-27 RX ADMIN — ONDANSETRON 4 MG: 2 INJECTION INTRAMUSCULAR; INTRAVENOUS at 07:06

## 2025-06-27 RX ADMIN — SUCRALFATE 1 G: 1 SUSPENSION ORAL at 07:06

## 2025-06-27 RX ADMIN — MORPHINE SULFATE 4 MG: 4 INJECTION INTRAVENOUS at 11:06

## 2025-06-27 RX ADMIN — PANTOPRAZOLE SODIUM 40 MG: 40 TABLET, DELAYED RELEASE ORAL at 08:06

## 2025-06-27 RX ADMIN — SUCRALFATE 1 G: 1 SUSPENSION ORAL at 05:06

## 2025-06-27 RX ADMIN — PROMETHAZINE HYDROCHLORIDE 25 MG: 25 SUPPOSITORY RECTAL at 12:06

## 2025-06-27 RX ADMIN — ALUMINUM HYDROXIDE, MAGNESIUM HYDROXIDE, AND SIMETHICONE 30 ML: 200; 200; 20 SUSPENSION ORAL at 11:06

## 2025-06-27 RX ADMIN — SODIUM CHLORIDE 1000 ML: 9 INJECTION, SOLUTION INTRAVENOUS at 03:06

## 2025-06-27 RX ADMIN — DICYCLOMINE HYDROCHLORIDE 20 MG: 10 CAPSULE ORAL at 07:06

## 2025-06-27 RX ADMIN — ONDANSETRON 4 MG: 2 INJECTION INTRAMUSCULAR; INTRAVENOUS at 08:06

## 2025-06-27 RX ADMIN — DICYCLOMINE HYDROCHLORIDE 20 MG: 10 INJECTION INTRAMUSCULAR at 12:06

## 2025-06-27 NOTE — ASSESSMENT & PLAN NOTE
55 yo F with PMHx of HTN, HLD, hyperparathyroidism, spinal fusion, and anterior mediastinal mass s/p excision with partial thymectomy who presented for abdominal and chest pain with associated n/v/d that started 06/23. Vomiting has resolved, but still with >3 episodes of diarrhea daily. CT negative for acute process. History of gastritis noted, but lower suspicion for this based on lower abdominal pain and diarrhea.     - GI consulted; appreciate recs  - continue ppi  - prn antiemetics, bentyl, and carafate  - stool studies ordered   - start cIVFs  - CLD, ADAT

## 2025-06-27 NOTE — PLAN OF CARE
[facility] - Emergency Dept  Initial Discharge Assessment       Primary Care Provider: Charlotte Jacinto MD    Admission Diagnosis: Chest pain [R07.9]    Admission Date: 6/26/2025    Pt is independent with their ADL's and ambulation, does not require assistance. Post acute was discussed with pt. Pt d/c home with no needs at the moment. Pt family/friend to provide transportation home when medically ready.    Expected Discharge Date:     Transition of Care Barriers: (P) None    Payor: MEDICARE / Plan: MEDICARE PART A & B / Product Type: Government /     Extended Emergency Contact Information  Primary Emergency Contact: Gracy Garza  Address: [address] [place] [address]           [place] [address] [facility]  Home Phone: [phone]  Mobile Phone: [phone]  Relation: Mother    Discharge Plan A: (P) Home, Home with family  Discharge Plan B: (P) Home with family, Home      [id number] - [facility] [address]  AT [facility] [address] [facility] [address] [facility]  [address]  [facility] [address]  Phone: [phone] Fax: [phone]    [facility] - [place], [place] - [address] [facility]  [address] [facility] [address]  Phone: [phone] Fax: [phone]    [facility] [place], [place] - [address] [address]  [address] [address]  [facility] [address]  Phone: [phone] Fax: [phone]      Initial Assessment (most recent)       Adult Discharge Assessment - 06/27/25 1132          Discharge Assessment    Assessment Type Discharge Planning Assessment (P)      Confirmed/corrected address, phone number and insurance Yes (P)      Confirmed Demographics Correct on Facesheet (P)      Source of Information patient (P)      People in Home child(jeanine), dependent (P)      Do you expect to return to your current living situation? Yes (P)      Do you have help at home or someone to help you manage your care at home? No (P)      Prior to hospitilization cognitive status: Alert/Oriented (P)      Current cognitive status:  Alert/Oriented (P)      Walking or Climbing Stairs Difficulty no (P)      Dressing/Bathing Difficulty no (P)      Home Accessibility stairs to enter home (P)      Number of Stairs, Main Entrance three (P)      Home Layout Able to live on 1st floor (P)      Equipment Currently Used at Home none (P)      Readmission within 30 days? No (P)      Patient currently being followed by outpatient case management? No (P)      Do you currently have service(s) that help you manage your care at home? No (P)      Do you take prescription medications? Yes (P)      Do you have prescription coverage? Yes (P)      Do you have any problems affording any of your prescribed medications? No (P)      Is the patient taking medications as prescribed? yes (P)      Who is going to help you get home at discharge? family/friend (P)      How do you get to doctors appointments? car, drives self (P)      Are you on dialysis? No (P)      Do you take coumadin? No (P)      Discharge Plan A Home;Home with family (P)      Discharge Plan B Home with family;Home (P)      DME Needed Upon Discharge  none (P)      Discharge Plan discussed with: Patient (P)      Transition of Care Barriers None (P)         Physical Activity    On average, how many days per week do you engage in moderate to strenuous exercise (like a brisk walk)? 1 day (P)      On average, how many minutes do you engage in exercise at this level? 50 min (P)         Financial Resource Strain    How hard is it for you to pay for the very basics like food, housing, medical care, and heating? Not very hard (P)         Housing Stability    In the last 12 months, was there a time when you were not able to pay the mortgage or rent on time? No (P)      At any time in the past 12 months, were you homeless or living in a shelter (including now)? No (P)         Transportation Needs    In the past 12 months, has lack of transportation kept you from medical appointments or from getting medications? No (P)       In the past 12 months, has lack of transportation kept you from meetings, work, or from getting things needed for daily living? No (P)         Food Insecurity    Within the past 12 months, you worried that your food would run out before you got the money to buy more. Never true (P)      Within the past 12 months, the food you bought just didn't last and you didn't have money to get more. Never true (P)         Stress    Do you feel stress - tense, restless, nervous, or anxious, or unable to sleep at night because your mind is troubled all the time - these days? Only a little (P)         Social Isolation    How often do you feel lonely or isolated from those around you?  Never (P)         Alcohol Use    Q1: How often do you have a drink containing alcohol? Never (P)      Q2: How many drinks containing alcohol do you have on a typical day when you are drinking? Patient does not drink (P)      Q3: How often do you have six or more drinks on one occasion? Never (P)         Utilities    In the past 12 months has the electric, gas, oil, or water company threatened to shut off services in your home? No (P)         Health Literacy    How often do you need to have someone help you when you read instructions, pamphlets, or other written material from your doctor or pharmacy? Never (P)                       GLENN Uriarte, CSW.   Case Management  [facility]  Email: [email or web address]

## 2025-06-27 NOTE — ASSESSMENT & PLAN NOTE
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 Vomiting has resolved, but still with >3 episodes of diarrhea daily. CT negative for acute process. History of gastritis noted, but lower suspicion for this based on lower abdominal pain and diarrhea.     - GI consulted; appreciate recs  - continue ppi  - prn antiemetics, bentyl, and carafate  - stool studies ordered   - start cIVFs  - CLD, ADAT

## 2025-06-27 NOTE — PHARMACY MED REC
"            Admission Medication History     The home medication history was taken by Samantha Koehler.    You may go to "Admission" then "Reconcile Home Medications" tabs to review and/or act upon these items.     The home medication list has been updated by the Pharmacy department.   Please read ALL comments highlighted in yellow.   Please address this information as you see fit.    Feel free to contact us if you have any questions or require assistance.      The medications listed below were removed from the home medication list. Please reorder if appropriate:  Patient reports no longer taking the following medication(s):  Atorvastatin (Lipitor) 10 Mg Tablet  Cymbalta 60 Mg Capsule  Estradiol (Divigel) 1 Mg/Gram (0.1 %) Topical Gel  Gabapentin (Neurontin) 300 Mg Capsule    Medications listed below were obtained from: Patient/family and Analytic software- [employer]  Current Outpatient Medications on File Prior to Encounter   Medication Sig    albuterol (PROVENTIL/VENTOLIN HFA) 90 mcg/actuation inhaler INHALE 2 PUFFS INTO THE LUNGS BY MOUTH EVERY 6 HOURS AS NEEDED FOR WHEEZING. RESCUE  .    ALPRAZolam (XANAX) 0.25 MG tablet Take 1 tablet (0.25 mg total) by mouth daily as needed for Anxiety.      benzonatate (TESSALON) 200 MG capsule Take 1 capsule (200 mg total) by mouth 3 (three) times daily as needed for Cough.      buPROPion (WELLBUTRIN XL) 150 MG TB24 tablet Take 150 mg by mouth once daily.      butalbital-acetaminophen-caffeine -40 mg (FIORICET, ESGIC) -40 mg per tab Take 1 tablet by mouth every 4 (four) hours as needed for headaches       diclofenac sodium (VOLTAREN) 1 % Gel Apply 2 g topically once daily. Use gloves to apply      fluticasone propionate (FLONASE) 50 mcg/actuation nasal spray 1 spray (50 mcg total) by Each Nostril route once daily  .    hydroCHLOROthiazide 12.5 MG Tab Take 1 tablet (12.5 mg total) by mouth once daily.      meclizine (ANTIVERT) 25 mg tablet Take 1 tablet (25 mg " total) by mouth 3 (three) times daily as needed for Dizziness.      meloxicam (MOBIC) 15 MG tablet Take 15 mg by mouth daily       methocarbamoL (ROBAXIN) 750 MG Tab Take 750 mg by mouth every 12 (twelve) hours.      pantoprazole (PROTONIX) 40 MG tablet Take 1 tablet (40 mg total) by mouth once daily.      prasterone, dhea, (INTRAROSA) 6.5 mg Inst Place 6.5 mg vaginally every evening.      pregabalin (LYRICA) 75 MG capsule Take 75 mg by mouth 2 (two) times daily.      promethazine (PHENERGAN) 25 MG tablet Take 1 tablet (25 mg total) by mouth every 6 (six) hours as needed for Nausea.      promethazine (PHENERGAN) 25 MG tablet Take 1 tablet (25 mg total) by mouth every 6 (six) hours as needed for Nausea.      promethazine-dextromethorphan (PROMETHAZINE-DM) 6.25-15 mg/5 mL Syrp Take 5 mLs by mouth every 4 (four) hours as needed (for cough).      QUEtiapine (SEROQUEL XR) 150 mg Tb24 Take 150 mg by mouth every evening.      traZODone (DESYREL) 100 MG tablet Take 100 mg by mouth every evening.       Pt Is Getting Workers Comp For Meds So She Pays [place]     Samantha Koehler  [id number]      .

## 2025-06-27 NOTE — PROGRESS NOTES
ED Observation Unit  Progress Note      HPI   54-year-old female with hyperparathyroidism, hyperlipidemia, hypertension presents for nausea, vomiting, abdominal pain and chest pain for several days duration. Her symptoms started on Monday, initially feeling queasy followed by nausea, vomiting and fever. This was followed by abdominal pain which radiates across her abdomen from her RLQ to her RUQ and across her epigastrium. She then started to have chest pain which feels burning and like something is stuck in her chest. She reports associated with frequent watery diarrhea now with some mucus. She tried taking Imodium, liquid IV without any improvement. Reports that her partner was also ill at the same time, however he is now feeling well again and she seems to be worsening. She thought her symptoms might be due to food poisoning, however notes that they ate different things and nothing that seemed questionable. No recent travel or antibiotic use.  (copy from ED Provider Note)     I reviewed the ED Provider Note dated 6/26/2025 prior to my evaluation of this patient.  I reviewed all labs and imaging performed in the Main ED, prior to patient being placed in Observation. Patient was placed in the ED Observation Unit for Chest pain.    Interval History   Stress test was completed and showed no evidence of ischemia or other cardiac abnormality.  Troponins flat. Patient is still complaining of slight chest discomfort cup epigastric pain and nausea.  Has not had any active vomiting.  Vital signs stable.     PMHx   Past Medical History:   Diagnosis Date    Abnormal Pap smear     Abnormal Pap smear of cervix     Annular tear of lumbar disc, L5-S1. 07/26/2012    Chronic LBP     HPTH (hyperparathyroidism)     Hyperlipidemia     Menorrhagia     PONV (postoperative nausea and vomiting)     Primary hypertension 01/31/2023    Right lumbar radiculopathy 07/26/2012    Seasonal allergies       Past Surgical History:   Procedure  Laterality Date    BACK SURGERY  2018    CHOLECYSTECTOMY      COLONOSCOPY N/A 01/19/2018    Procedure: COLONOSCOPY;  Surgeon: Malachi Olmedo MD;  Location: [facility] ENDO (4TH FLR);  Service: Endoscopy;  Laterality: N/A;    COLONOSCOPY N/A 12/28/2022    Procedure: COLONOSCOPY;  Surgeon: Ezekiel Alanis MD;  Location: [facility] ENDO (4TH FLR);  Service: Endoscopy;  Laterality: N/A;  inst via portal  pre call complete [facility]    ESOPHAGOGASTRODUODENOSCOPY N/A 09/06/2019    Procedure: EGD (ESOPHAGOGASTRODUODENOSCOPY);  Surgeon: Jose Carlos Ventura MD;  Location: [facility] ENDO (4TH FLR);  Service: Endoscopy;  Laterality: N/A;  pt requesting ASAP    ESOPHAGOGASTRODUODENOSCOPY N/A 6/13/2024    Procedure: EGD (ESOPHAGOGASTRODUODENOSCOPY);  Surgeon: Malachi Olmedo MD;  Location: [facility] ENDO (2ND FLR);  Service: Endoscopy;  Laterality: N/A;    ESOPHAGOGASTRODUODENOSCOPY N/A 9/3/2024    Procedure: EGD (ESOPHAGOGASTRODUODENOSCOPY);  Surgeon: Saurav Draper MD;  Location: [facility] ENDO (4TH FLR);  Service: Endoscopy;  Laterality: N/A;  Ref by:ana Hurley sent via portal.AC  8/23-lvm for pre call-tb  8/30-precall complete-KPvt    HYSTERECTOMY, TOTAL, LAPAROSCOPIC, WITH SALPINGECTOMY  06/16/2015    INJECTION OF ANESTHETIC AGENT AROUND MULTIPLE INTERCOSTAL NERVES Right 03/21/2023    Procedure: BLOCK, NERVE, INTERCOSTAL, 2 OR MORE;  Surgeon: Anton Evangelista MD;  Location: [facility] OR [address] [place] [address];  Service: Cardiothoracic;  Laterality: Right;    TUBAL LIGATION      Essure    WISDOM TOOTH EXTRACTION      2005    XI ROBOTIC RATS Right 03/21/2023    Procedure: XI ROBOTIC THYMECTOMY;  Surgeon: Anton Evangelista MD;  [facility];  Service: Cardiothoracic;  Laterality: Right;        Family Hx   Family History   Problem Relation Name Age of Onset    Hypertension Mother      Diabetes Father      Colon polyps Father      Hypertension Maternal Grandmother      Multiple myeloma Maternal Grandmother      Stroke Paternal Grandmother  68     Breast cancer Neg Hx      Ovarian cancer Neg Hx      Melanoma Neg Hx      Colon cancer Neg Hx      Amblyopia Neg Hx      Blindness Neg Hx      Cataracts Neg Hx      Macular degeneration Neg Hx      Retinal detachment Neg Hx      Strabismus Neg Hx          Social Hx   Social History     Socioeconomic History    Marital status: Single   Occupational History    Occupation: nurse     Employer: [employer]   Tobacco Use    Smoking status: Never     Passive exposure: Never    Smokeless tobacco: Never   Substance and Sexual Activity    Alcohol use: No    Drug use: No    Sexual activity: Yes     Partners: Male     Birth control/protection: See Surgical Hx     Comment:  ; new partner since 2018   Other Topics Concern    Are you pregnant or think you may be? No    Breast-feeding No     Social Drivers of Health     Financial Resource Strain: Low Risk  (6/27/2025)    Overall Financial Resource Strain (CARDIA)     Difficulty of Paying Living Expenses: Not very hard   Food Insecurity: No Food Insecurity (6/27/2025)    Hunger Vital Sign     Worried About Running Out of Food in the Last Year: Never true     Ran Out of Food in the Last Year: Never true   Transportation Needs: No Transportation Needs (6/27/2025)    PRAPARE - Transportation     Lack of Transportation (Medical): No     Lack of Transportation (Non-Medical): No   Physical Activity: Insufficiently Active (6/27/2025)    Exercise Vital Sign     Days of Exercise per Week: 1 day     Minutes of Exercise per Session: 50 min   Stress: No Stress Concern Present (6/27/2025)    [employer] [place] [place] [employer] - Occupational Stress Questionnaire     Feeling of Stress : Only a little   Housing Stability: Low Risk  (6/27/2025)    Housing Stability Vital Sign     Unable to Pay for Housing in the Last Year: No     Homeless in the Last Year: No        Vital Signs   Vitals:    06/27/25 0758 06/27/25 0914 06/27/25 1125 06/27/25 1230   BP: (!) 150/80 119/71   "122/77   BP Location:  Right arm     Patient Position:  Lying     Pulse: 90 71  Comment: SR  (!) 59   Resp: 18 18 18 18   Temp: 98 °F (36.7 °C)   98.9 °F (37.2 °C)   TempSrc: Oral   Oral   SpO2: 100%   97%   Weight:  72.6 kg (160 lb)     Height:  5' 6" (1.676 m)          Review of Systems  Review of Systems   Cardiovascular:  Positive for chest pain.   Gastrointestinal:  Positive for abdominal pain and nausea.       Brief Physical Exam/Reassessment   Physical Exam  Vitals reviewed.   Constitutional:       General: She is not in acute distress.     Appearance: She is not diaphoretic.   HENT:      Head: Normocephalic and atraumatic.   Cardiovascular:      Rate and Rhythm: Normal rate and regular rhythm.      Heart sounds: Heart sounds not distant. No murmur heard.     No friction rub. No gallop.   Pulmonary:      Effort: Pulmonary effort is normal. No respiratory distress.      Breath sounds: Normal breath sounds. No decreased breath sounds, wheezing, rhonchi or rales.   Abdominal:      Palpations: Abdomen is soft. There is no mass.      Tenderness: There is generalized abdominal tenderness. There is no guarding.      Comments: Mild tenderness throughout the abdomen    Musculoskeletal:      Cervical back: Neck supple.   Skin:     General: Skin is warm and dry.   Neurological:      Mental Status: She is alert.   Psychiatric:         Mood and Affect: Mood and affect normal.         Labs/Imaging   Labs Reviewed   TROPONIN I HIGH SENSITIVITY - Abnormal       Result Value    Troponin High Sensitive 19 (*)    URINALYSIS, REFLEX TO URINE CULTURE - Abnormal    Color, UA Colorless (*)     Appearance, UA Clear      pH, UA 6.0      Spec Grav UA 1.005      Protein, UA Negative      Glucose, UA Negative      Ketones, UA Negative      Bilirubin, UA Negative      Blood, UA Negative      Nitrites, UA Negative      Urobilinogen, UA Negative      Leukocyte Esterase, UA Negative     TROPONIN I HIGH SENSITIVITY - Abnormal    Troponin " High Sensitive 20 (*)    TROPONIN I HIGH SENSITIVITY - Abnormal    Troponin High Sensitive 18 (*)    TROPONIN I HIGH SENSITIVITY - Abnormal    Troponin High Sensitive 15 (*)    TROPONIN I HIGH SENSITIVITY - Abnormal    Troponin High Sensitive 18 (*)    TROPONIN I HIGH SENSITIVITY - Abnormal    Troponin High Sensitive 18 (*)    LIPID PANEL - Abnormal    Cholesterol Total 203 (*)     Triglyceride 88      HDL Cholesterol 56      LDL Cholesterol 129.4      HDL/Cholesterol Ratio 27.6      Cholesterol/HDL Ratio 3.6      Non HDL Cholesterol 147     INFLUENZA A & B BY MOLECULAR - Normal    INFLUENZA A MOLECULAR Negative      INFLUENZA B MOLECULAR  Negative     COMPREHENSIVE METABOLIC PANEL - Normal    Sodium 140      Potassium 3.5      Chloride 102      CO2 27      Glucose 91      BUN 9      Creatinine 0.7      Calcium 10.0      Protein Total 8.3      Albumin 4.4      Bilirubin Total 0.7      ALP 85      AST 18      ALT 19      Anion Gap 11      eGFR >60     B-TYPE NATRIURETIC PEPTIDE - Normal    BNP 14     LIPASE - Normal    Lipase Level 10     SARS-COV-2 RNA AMPLIFICATION, QUAL - Normal    SARS COV-2 Molecular Negative     CBC WITH DIFFERENTIAL - Normal    WBC 8.26      RBC 4.87      HGB 13.7      HCT 42.6      MCV 88      MCH 28.1      MCHC 32.2      RDW 12.4      Platelet Count 285      MPV 9.3      Nucleated RBC 0      Neut % 59.2      Lymph % 33.2      Mono % 6.2      Eos % 1.0      Basophil % 0.2      Imm Grans % 0.2      Neut # 4.89      Lymph # 2.74      Mono # 0.51      Eos # 0.08      Baso # 0.02      Imm Grans # 0.02     CBC W/ AUTO DIFFERENTIAL    Narrative:     The following orders were created for panel order CBC auto differential.  Procedure                               Abnormality         Status                     ---------                               -----------         ------                     CBC with Differential[[id number]]       Normal              Final result                 Please view  results for these tests on the individual orders.   GREY TOP URINE HOLD    Extra Tube Hold for add-ons.        Imaging Results              CT Abdomen Pelvis With IV Contrast NO Oral Contrast (Final result)  Result time 06/26/25 15:27:58      Final result by Sami Shannon MD (06/26/25 15:27:58)                   Impression:      No acute process or CT findings identified to explain patient's symptoms of nonlocalized abdominal pain.    Minimal colonic diverticulosis without diverticulitis.    Grossly stable other incidental/nonemergent findings in the body of the report.      Electronically signed by: Sami Shannon MD  Date:    06/26/2025  Time:    15:27               Narrative:    EXAMINATION:  CT ABDOMEN PELVIS WITH IV CONTRAST    CLINICAL HISTORY:  Abdominal pain, acute, nonlocalized;    TECHNIQUE:  Low dose axial images, sagittal and coronal reformations were obtained from the lung bases to the pubic symphysis following the IV administration of 75 mL of Omnipaque 350 .  Oral contrast was not given.    COMPARISON:  Chest CT 04/10/2025, abdominal series and pelvic ultrasound 06/11/2024, CT abdomen and pelvis 06/10/2024 and 05/27/2012    FINDINGS:  Base of the heart is within normal limits for age, stable.  Imaged lung bases show minimal dependent atelectasis and minimal scattered platelike scarring versus atelectasis.  No consolidation or pleural effusion.    Gallbladder not identified and may be surgically absent.  No significant biliary ductal dilatation.    Liver is normal in size.  Previous subcentimeter hypoattenuating focus at the right hepatic dome is not identified on today's study.  Portal vasculature appears patent.    Pancreas, spleen, stomach, duodenum and bilateral adrenal glands are within normal limits.    Bilateral kidneys are normal in size, shape and location with symmetric normal enhancement.  Grossly stable appearance of lower left renal parapelvic 2.8 cm hypodensity which measures slightly  higher than water attenuation and may reflect a complex cyst, since at least 2012, and likely benign.  No hydronephrosis or significant perinephric stranding.  Ureters are nondilated.  Urinary bladder is well distended without wall thickening or adjacent inflammatory change.  Uterus is surgically absent.  No adnexal mass or significant volume free pelvic fluid.  Pelvic phleboliths noted.    Few scattered colonic diverticula without diverticulitis.  No evidence of bowel obstruction or acute bowel inflammation.  Appendix and terminal ileum are within normal limits.  No bowel pneumatosis or portal venous gas.    No ascites, free air or lymphadenopathy by CT criteria.  No significant atherosclerosis.  No aortic aneurysm or dissection.    Osseous structures appear stable including surgical changes of the spine with posterior fusion at L5-S1, without acute or destructive process.  Extraperitoneal soft tissues appear stable without acute findings.                                       X-Ray Chest PA And Lateral (Final result)  Result time 06/26/25 13:46:21      Final result by Yonny Boothe III, MD (06/26/25 13:46:21)                   Impression:      No acute process seen.      Electronically signed by: Yonny Boothe MD  Date:    06/26/2025  Time:    13:46               Narrative:    EXAMINATION:  XR CHEST PA AND LATERAL    CLINICAL HISTORY:  Chest pain, unspecified    FINDINGS:  Chest two views:    Heart size is normal.  Lungs are clear and the bones are noncontributory.                                       I reviewed all labs, imaging, EKGs.     Plan   Chest pain  - Troponin elevated however remains flat.   - Improved with GI cocktail, low suspicion for cardiac etiology  - EKG with new anterior changes   - CXR with acute process  - exercise stress echo today  - NPO at midnight     Abdominal pain               - CT negative for acute causes of pain. Diverticulosis without signs of diverticulitis              -  Unremarkable Chem and lipase               - History of chronic gastritis, Protonix daily              - Carafate and bentyl PRN              - Morphine for severe pain               - Serial abdominal exams               - Trend CBC/CMP to assess for leukocytosis or intraabdominal lab abnormalities               - Consider upgrade and GI consult if not improved by the AM     Nausea and vomiting              - IV Zofran PRN              - Droperidol for intractable vomiting               - Advance diet as tolerate/PO challenge      I have discussed this case with DARRYL Vences.

## 2025-06-27 NOTE — SUBJECTIVE & OBJECTIVE
Past Medical History:   Diagnosis Date    Abnormal Pap smear     Abnormal Pap smear of cervix     Annular tear of lumbar disc, L5-S1. 07/26/2012    Chronic LBP     HPTH (hyperparathyroidism)     Hyperlipidemia     Menorrhagia     PONV (postoperative nausea and vomiting)     Primary hypertension 01/31/2023    Right lumbar radiculopathy 07/26/2012    Seasonal allergies        Past Surgical History:   Procedure Laterality Date    BACK SURGERY  2018    CHOLECYSTECTOMY      COLONOSCOPY N/A 01/19/2018    Procedure: COLONOSCOPY;  Surgeon: Malachi Olmedo MD;  Location: [facility] ENDO (4TH FLR);  Service: Endoscopy;  Laterality: N/A;    COLONOSCOPY N/A 12/28/2022    Procedure: COLONOSCOPY;  Surgeon: Ezekiel Alanis MD;  Location: [facility] ENDO (4TH FLR);  Service: Endoscopy;  Laterality: N/A;  inst via portal  pre call complete [facility]    ESOPHAGOGASTRODUODENOSCOPY N/A 09/06/2019    Procedure: EGD (ESOPHAGOGASTRODUODENOSCOPY);  Surgeon: Jose Carlos Ventura MD;  Location: [facility] (4TH FLR);  Service: Endoscopy;  Laterality: N/A;  pt requesting ASAP    ESOPHAGOGASTRODUODENOSCOPY N/A 6/13/2024    Procedure: EGD (ESOPHAGOGASTRODUODENOSCOPY);  Surgeon: Malachi Olmedo MD;  Location: [facility] (2ND FLR);  Service: Endoscopy;  Laterality: N/A;    ESOPHAGOGASTRODUODENOSCOPY N/A 9/3/2024    Procedure: EGD (ESOPHAGOGASTRODUODENOSCOPY);  Surgeon: Saurav Draper MD;  Location: [facility] ENDO (4TH FLR);  Service: Endoscopy;  Laterality: N/A;  Ref by:Dr. Woodward,ana sent via portal.AC  8/23-lvm for pre call-tb  8/30-precall complete-KPvt    HYSTERECTOMY, TOTAL, LAPAROSCOPIC, WITH SALPINGECTOMY  06/16/2015    INJECTION OF ANESTHETIC AGENT AROUND MULTIPLE INTERCOSTAL NERVES Right 03/21/2023    Procedure: BLOCK, NERVE, INTERCOSTAL, 2 OR MORE;  Surgeon: Anton Evangelista MD;  Location: [facility] OR 2ND FLR;  Service: Cardiothoracic;  Laterality: Right;    TUBAL LIGATION      Essure    WISDOM TOOTH EXTRACTION      2005    XI ROBOTIC RATS Right  03/21/2023    Procedure: XI ROBOTIC THYMECTOMY;  Surgeon: Anton Evangelista MD;  Location: [facility] OR [address] [place] [address];  Service: Cardiothoracic;  Laterality: Right;       Review of patient's allergies indicates:   Allergen Reactions    Clindamycin     Sulfa (sulfonamide antibiotics) Anaphylaxis    Sulfa dyne     Sulfamethoxazole-trimethoprim      Other reaction(s): Anaphylaxis    Dermabond [tissue glues] Rash       No current facility-administered medications on file prior to encounter.     Current Outpatient Medications on File Prior to Encounter   Medication Sig    albuterol (PROVENTIL/VENTOLIN HFA) 90 mcg/actuation inhaler INHALE 2 PUFFS INTO THE LUNGS BY MOUTH EVERY 6 HOURS AS NEEDED FOR WHEEZING. RESCUE.    ALPRAZolam (XANAX) 0.25 MG tablet Take 1 tablet (0.25 mg total) by mouth daily as needed for Anxiety.    benzonatate (TESSALON) 200 MG capsule Take 1 capsule (200 mg total) by mouth 3 (three) times daily as needed for Cough.    buPROPion (WELLBUTRIN XL) 150 MG TB24 tablet Take 150 mg by mouth once daily.    butalbital-acetaminophen-caffeine -40 mg (FIORICET, ESGIC) -40 mg per tablet Take 1 tablet by mouth every 4 (four) hours as needed.    diclofenac sodium (VOLTAREN) 1 % Gel Apply 2 g topically once daily. Use gloves to apply    fluticasone propionate (FLONASE) 50 mcg/actuation nasal spray 1 spray (50 mcg total) by Each Nostril route once daily.    hydroCHLOROthiazide 12.5 MG Tab Take 1 tablet (12.5 mg total) by mouth once daily.    meclizine (ANTIVERT) 25 mg tablet Take 1 tablet (25 mg total) by mouth 3 (three) times daily as needed for Dizziness.    meloxicam (MOBIC) 15 MG tablet Take 15 mg by mouth once daily.    methocarbamoL (ROBAXIN) 750 MG Tab Take 750 mg by mouth every 12 (twelve) hours.    pantoprazole (PROTONIX) 40 MG tablet Take 1 tablet (40 mg total) by mouth once daily.    prasterone, dhea, (INTRAROSA) 6.5 mg Inst Place 6.5 mg vaginally every evening.    pregabalin (LYRICA) 75 MG capsule Take  75 mg by mouth 2 (two) times daily.    promethazine (PHENERGAN) 25 MG tablet Take 1 tablet (25 mg total) by mouth every 6 (six) hours as needed for Nausea.    promethazine (PHENERGAN) 25 MG tablet Take 1 tablet (25 mg total) by mouth every 6 (six) hours as needed for Nausea.    promethazine-dextromethorphan (PROMETHAZINE-DM) 6.25-15 mg/5 mL Syrp Take 5 mLs by mouth every 4 (four) hours as needed (for cough).    QUEtiapine (SEROQUEL XR) 150 mg Tb24 Take 150 mg by mouth every evening.    traZODone (DESYREL) 100 MG tablet Take 100 mg by mouth every evening.    [DISCONTINUED] atorvastatin (LIPITOR) 10 MG tablet Take 1 tablet (10 mg total) by mouth once daily.    [DISCONTINUED] CYMBALTA 60 mg capsule Take 60 mg by mouth.    [DISCONTINUED] estradioL (DIVIGEL) 1 mg/gram (0.1 %) topical gel Place 1 g onto the skin once daily.    [DISCONTINUED] gabapentin (NEURONTIN) 300 MG capsule Take 1 capsule (300 mg total) by mouth 2 (two) times daily AND 2 capsules (600 mg total) every evening.     Family History       Problem Relation (Age of Onset)    Colon polyps Father    Diabetes Father    Hypertension Mother, Maternal Grandmother    Multiple myeloma Maternal Grandmother    Stroke Paternal Grandmother (68)          Tobacco Use    Smoking status: Never     Passive exposure: Never    Smokeless tobacco: Never   Substance and Sexual Activity    Alcohol use: No    Drug use: No    Sexual activity: Yes     Partners: Male     Birth control/protection: See Surgical Hx     Comment:  ; new partner since 2018     Review of Systems   Constitutional:  Positive for appetite change, chills and fever (subjective). Negative for activity change.   HENT:  Negative for trouble swallowing.    Eyes:  Negative for photophobia and visual disturbance.   Respiratory:  Negative for cough, chest tightness and shortness of breath.    Cardiovascular:  Positive for chest pain. Negative for palpitations and leg swelling.   Gastrointestinal:  Positive for  abdominal pain, diarrhea, nausea and vomiting. Negative for constipation.   Genitourinary:  Negative for dysuria, frequency and hematuria.   Musculoskeletal:  Negative for back pain, gait problem and neck pain.   Skin:  Negative for rash and wound.   Neurological:  Negative for dizziness, syncope, speech difficulty and light-headedness.   Psychiatric/Behavioral:  Negative for agitation and confusion. The patient is not nervous/anxious.      Objective:     Vital Signs (Most Recent):  Temp: 98.2 °F (36.8 °C) (06/27/25 1613)  Pulse: 79 (06/27/25 1613)  Resp: 18 (06/27/25 1613)  BP: (!) 142/84 (06/27/25 1613)  SpO2: 97 % (06/27/25 1613) Vital Signs (24h Range):  Temp:  [97.7 °F (36.5 °C)-98.9 °F (37.2 °C)] 98.2 °F (36.8 °C)  Pulse:  [58-90] 79  Resp:  [16-20] 18  SpO2:  [95 %-100 %] 97 %  BP: (114-150)/(58-84) 142/84     Weight: 72.6 kg (160 lb)  Body mass index is 25.82 kg/m².     Physical Exam  Vitals and nursing note reviewed.   Constitutional:       General: She is not in acute distress.     Appearance: She is well-developed. She is not ill-appearing.   HENT:      Head: Normocephalic and atraumatic.      Mouth/Throat:      Pharynx: No oropharyngeal exudate.   Eyes:      Conjunctiva/sclera: Conjunctivae normal.      Pupils: Pupils are equal, round, and reactive to light.   Cardiovascular:      Rate and Rhythm: Normal rate and regular rhythm.      Heart sounds: Normal heart sounds.   Pulmonary:      Effort: Pulmonary effort is normal. No respiratory distress.      Breath sounds: Normal breath sounds. No wheezing.   Abdominal:      General: There is no distension.      Palpations: Abdomen is soft.      Tenderness: There is abdominal tenderness (generalized, but worst to RUQ and RLQ).      Comments: Overactive bowel sounds   Musculoskeletal:         General: No tenderness. Normal range of motion.      Cervical back: Normal range of motion and neck supple.   Lymphadenopathy:      Cervical: No cervical adenopathy.    Skin:     General: Skin is warm and dry.      Capillary Refill: Capillary refill takes less than 2 seconds.      Findings: No rash.   Neurological:      Mental Status: She is alert and oriented to person, place, and time.      Cranial Nerves: No cranial nerve deficit.      Sensory: No sensory deficit.      Coordination: Coordination normal.   Psychiatric:         Behavior: Behavior normal.         Thought Content: Thought content normal.         Judgment: Judgment normal.              CRANIAL NERVES     CN III, IV, VI   Pupils are equal, round, and reactive to light.       Significant Labs: All pertinent labs within the past 24 hours have been reviewed.  CBC:   Recent Labs   Lab 06/26/25  1241   WBC 8.26   HGB 13.7   HCT 42.6        CMP:   Recent Labs   Lab 06/26/25  1241      K 3.5      CO2 27   GLU 91   BUN 9   CREATININE 0.7   CALCIUM 10.0   PROT 8.3   ALBUMIN 4.4   BILITOT 0.7   ALKPHOS 85   AST 18   ALT 19   ANIONGAP 11     Cardiac Markers:   Recent Labs   Lab 06/26/25  1241   BNP 14     Lipase:   Recent Labs   Lab 06/26/25  1241   LIPASE 10     Troponin:   Recent Labs   Lab 06/26/25  1939 06/26/25  2044 06/26/25  2352   TROPONINIHS 18* 15* 18*     TSH:   Recent Labs   Lab 06/17/25  1003   TSH 0.713     Urine Studies:   Recent Labs   Lab 06/26/25  1236   COLORU Colorless*   APPEARANCEUA Clear   PHUR 6.0   SPECGRAV 1.005   PROTEINUA Negative   GLUCUA Negative   BILIRUBINUA Negative   OCCULTUA Negative   NITRITE Negative   UROBILINOGEN Negative   LEUKOCYTESUR Negative       Significant Imaging: I have reviewed all pertinent imaging results/findings within the past 24 hours.  Imaging Results              CT Abdomen Pelvis With IV Contrast NO Oral Contrast (Final result)  Result time 06/26/25 15:27:58      Final result by Sami Shannon MD (06/26/25 15:27:58)                   Impression:      No acute process or CT findings identified to explain patient's symptoms of nonlocalized abdominal  pain.    Minimal colonic diverticulosis without diverticulitis.    Grossly stable other incidental/nonemergent findings in the body of the report.      Electronically signed by: Sami Shannon MD  Date:    06/26/2025  Time:    15:27               Narrative:    EXAMINATION:  CT ABDOMEN PELVIS WITH IV CONTRAST    CLINICAL HISTORY:  Abdominal pain, acute, nonlocalized;    TECHNIQUE:  Low dose axial images, sagittal and coronal reformations were obtained from the lung bases to the pubic symphysis following the IV administration of 75 mL of Omnipaque 350 .  Oral contrast was not given.    COMPARISON:  Chest CT 04/10/2025, abdominal series and pelvic ultrasound 06/11/2024, CT abdomen and pelvis 06/10/2024 and 05/27/2012    FINDINGS:  Base of the heart is within normal limits for age, stable.  Imaged lung bases show minimal dependent atelectasis and minimal scattered platelike scarring versus atelectasis.  No consolidation or pleural effusion.    Gallbladder not identified and may be surgically absent.  No significant biliary ductal dilatation.    Liver is normal in size.  Previous subcentimeter hypoattenuating focus at the right hepatic dome is not identified on today's study.  Portal vasculature appears patent.    Pancreas, spleen, stomach, duodenum and bilateral adrenal glands are within normal limits.    Bilateral kidneys are normal in size, shape and location with symmetric normal enhancement.  Grossly stable appearance of lower left renal parapelvic 2.8 cm hypodensity which measures slightly higher than water attenuation and may reflect a complex cyst, since at least 2012, and likely benign.  No hydronephrosis or significant perinephric stranding.  Ureters are nondilated.  Urinary bladder is well distended without wall thickening or adjacent inflammatory change.  Uterus is surgically absent.  No adnexal mass or significant volume free pelvic fluid.  Pelvic phleboliths noted.    Few scattered colonic diverticula without  diverticulitis.  No evidence of bowel obstruction or acute bowel inflammation.  Appendix and terminal ileum are within normal limits.  No bowel pneumatosis or portal venous gas.    No ascites, free air or lymphadenopathy by CT criteria.  No significant atherosclerosis.  No aortic aneurysm or dissection.    Osseous structures appear stable including surgical changes of the spine with posterior fusion at L5-S1, without acute or destructive process.  Extraperitoneal soft tissues appear stable without acute findings.                                       X-Ray Chest PA And Lateral (Final result)  Result time 06/26/25 13:46:21      Final result by Yonny Boothe III, MD (06/26/25 13:46:21)                   Impression:      No acute process seen.      Electronically signed by: Yonny Boothe MD  Date:    06/26/2025  Time:    13:46               Narrative:    EXAMINATION:  XR CHEST PA AND LATERAL    CLINICAL HISTORY:  Chest pain, unspecified    FINDINGS:  Chest two views:    Heart size is normal.  Lungs are clear and the bones are noncontributory.

## 2025-06-27 NOTE — H&P
"[facility] - Emergency Dept  [facility] Medicine  History & Physical    Patient Name: Laz Quintero  MRN: [id number]  Patient Class: IP- Inpatient  Admission Date: 6/26/2025  Attending Physician: Summer Cuenca MD   Primary Care Provider: Charlotte Jacinto MD         Patient information was obtained from patient and ER records.     Subjective:     Principal Problem:Generalized abdominal pain    Chief Complaint:   Chief Complaint   Patient presents with    Nausea    Vomiting    Chest Pain        HPI: Laz Quintero is a 53 yo F with PMHx of HTN, HLD, hyperparathyroidism, spinal fusion, and anterior mediastinal mass s/p excision with partial thymectomy who presented to ED for abdominal and chest pain. Patient reports her symptoms started on Monday with L sided chest and epigastric pain. Pain was associated with n/v/d and subjective fever with chills. States that she has not been able to tolerate any food by mouth since Tuesday so her vomiting has resolved. No recent travel or antibiotic use.  CT A/P without acute process. Intake labs grossly unremarkable, except for HS trop mildly elevated. Trop remained flat. She was placed in observation in the EDOU. An echo stress test was performed today, which was negative for ischemia. PO challenged in EDOU and she immediately developed abdominal cramping and had an episode of diarrhea. On my evaluation, she reports diarrhea is starting to improve, but she still had 3 soft BM today that were loose, green and "foamy." Reports while in EDOU she just had a severe episode of electric shocking pain that started in her RLQ and radiated up to her RUQ. GI consulted. Admitted to  for further evaluation.     Past Medical History:   Diagnosis Date    Abnormal Pap smear     Abnormal Pap smear of cervix     Annular tear of lumbar disc, L5-S1. 07/26/2012    Chronic LBP     HPTH (hyperparathyroidism)     Hyperlipidemia     Menorrhagia     PONV (postoperative nausea and vomiting)  "    Primary hypertension 01/31/2023    Right lumbar radiculopathy 07/26/2012    Seasonal allergies        Past Surgical History:   Procedure Laterality Date    BACK SURGERY  2018    CHOLECYSTECTOMY      COLONOSCOPY N/A 01/19/2018    Procedure: COLONOSCOPY;  Surgeon: Malachi Olmedo MD;  Location: [facility] ENDO (4TH FLR);  Service: Endoscopy;  Laterality: N/A;    COLONOSCOPY N/A 12/28/2022    Procedure: COLONOSCOPY;  Surgeon: Ezekiel Alanis MD;  Location: [facility] ENDO (4TH FLR);  Service: Endoscopy;  Laterality: N/A;  inst via portal  pre call complete [facility]    ESOPHAGOGASTRODUODENOSCOPY N/A 09/06/2019    Procedure: EGD (ESOPHAGOGASTRODUODENOSCOPY);  Surgeon: Jose Carlos Ventura MD;  Location: [facility] ENDO (4TH FLR);  Service: Endoscopy;  Laterality: N/A;  pt requesting ASA    ESOPHAGOGASTRODUODENOSCOPY N/A 6/13/2024    Procedure: EGD (ESOPHAGOGASTRODUODENOSCOPY);  Surgeon: Malachi Olmedo MD;  Location: [facility] (2ND FLR);  Service: Endoscopy;  Laterality: N/A;    ESOPHAGOGASTRODUODENOSCOPY N/A 9/3/2024    Procedure: EGD (ESOPHAGOGASTRODUODENOSCOPY);  Surgeon: Saurav Draper MD;  Location: [facility] (4TH FLR);  Service: Endoscopy;  Laterality: N/A;  Ref by:Dr. Woodward,instr sent via portal.AC  8/23-lvm for pre call-tb  8/30-precall complete-KPvt    HYSTERECTOMY, TOTAL, LAPAROSCOPIC, WITH SALPINGECTOMY  06/16/2015    INJECTION OF ANESTHETIC AGENT AROUND MULTIPLE INTERCOSTAL NERVES Right 03/21/2023    Procedure: BLOCK, NERVE, INTERCOSTAL, 2 OR MORE;  Surgeon: Anton Evangelista MD;  Location: [facility] OR 2ND FLR;  Service: Cardiothoracic;  Laterality: Right;    TUBAL LIGATION      Essure    WISDOM TOOTH EXTRACTION      2005    XI ROBOTIC RATS Right 03/21/2023    Procedure: XI ROBOTIC THYMECTOMY;  Surgeon: Anton Evangelista MD;  Location: [facility] OR 2ND FLR;  Service: Cardiothoracic;  Laterality: Right;       Review of patient's allergies indicates:   Allergen Reactions    Clindamycin     Sulfa (sulfonamide antibiotics)  Anaphylaxis    Sulfa dyne     Sulfamethoxazole-trimethoprim      Other reaction(s): Anaphylaxis    Dermabond [tissue glues] Rash       No current facility-administered medications on file prior to encounter.     Current Outpatient Medications on File Prior to Encounter   Medication Sig    albuterol (PROVENTIL/VENTOLIN HFA) 90 mcg/actuation inhaler INHALE 2 PUFFS INTO THE LUNGS BY MOUTH EVERY 6 HOURS AS NEEDED FOR WHEEZING. RESCUE.    ALPRAZolam (XANAX) 0.25 MG tablet Take 1 tablet (0.25 mg total) by mouth daily as needed for Anxiety.    benzonatate (TESSALON) 200 MG capsule Take 1 capsule (200 mg total) by mouth 3 (three) times daily as needed for Cough.    buPROPion (WELLBUTRIN XL) 150 MG TB24 tablet Take 150 mg by mouth once daily.    butalbital-acetaminophen-caffeine -40 mg (FIORICET, ESGIC) -40 mg per tablet Take 1 tablet by mouth every 4 (four) hours as needed.    diclofenac sodium (VOLTAREN) 1 % Gel Apply 2 g topically once daily. Use gloves to apply    fluticasone propionate (FLONASE) 50 mcg/actuation nasal spray 1 spray (50 mcg total) by Each Nostril route once daily.    hydroCHLOROthiazide 12.5 MG Tab Take 1 tablet (12.5 mg total) by mouth once daily.    meclizine (ANTIVERT) 25 mg tablet Take 1 tablet (25 mg total) by mouth 3 (three) times daily as needed for Dizziness.    meloxicam (MOBIC) 15 MG tablet Take 15 mg by mouth once daily.    methocarbamoL (ROBAXIN) 750 MG Tab Take 750 mg by mouth every 12 (twelve) hours.    pantoprazole (PROTONIX) 40 MG tablet Take 1 tablet (40 mg total) by mouth once daily.    prasterone, dhea, (INTRAROSA) 6.5 mg Inst Place 6.5 mg vaginally every evening.    pregabalin (LYRICA) 75 MG capsule Take 75 mg by mouth 2 (two) times daily.    promethazine (PHENERGAN) 25 MG tablet Take 1 tablet (25 mg total) by mouth every 6 (six) hours as needed for Nausea.    promethazine (PHENERGAN) 25 MG tablet Take 1 tablet (25 mg total) by mouth every 6 (six) hours as needed for  Nausea.    promethazine-dextromethorphan (PROMETHAZINE-DM) 6.25-15 mg/5 mL Syrp Take 5 mLs by mouth every 4 (four) hours as needed (for cough).    QUEtiapine (SEROQUEL XR) 150 mg Tb24 Take 150 mg by mouth every evening.    traZODone (DESYREL) 100 MG tablet Take 100 mg by mouth every evening.    [DISCONTINUED] atorvastatin (LIPITOR) 10 MG tablet Take 1 tablet (10 mg total) by mouth once daily.    [DISCONTINUED] CYMBALTA 60 mg capsule Take 60 mg by mouth.    [DISCONTINUED] estradioL (DIVIGEL) 1 mg/gram (0.1 %) topical gel Place 1 g onto the skin once daily.    [DISCONTINUED] gabapentin (NEURONTIN) 300 MG capsule Take 1 capsule (300 mg total) by mouth 2 (two) times daily AND 2 capsules (600 mg total) every evening.     Family History       Problem Relation (Age of Onset)    Colon polyps Father    Diabetes Father    Hypertension Mother, Maternal Grandmother    Multiple myeloma Maternal Grandmother    Stroke Paternal Grandmother (68)          Tobacco Use    Smoking status: Never     Passive exposure: Never    Smokeless tobacco: Never   Substance and Sexual Activity    Alcohol use: No    Drug use: No    Sexual activity: Yes     Partners: Male     Birth control/protection: See Surgical Hx     Comment:  ; new partner since 2018     Review of Systems   Constitutional:  Positive for appetite change, chills and fever (subjective). Negative for activity change.   HENT:  Negative for trouble swallowing.    Eyes:  Negative for photophobia and visual disturbance.   Respiratory:  Negative for cough, chest tightness and shortness of breath.    Cardiovascular:  Positive for chest pain. Negative for palpitations and leg swelling.   Gastrointestinal:  Positive for abdominal pain, diarrhea, nausea and vomiting. Negative for constipation.   Genitourinary:  Negative for dysuria, frequency and hematuria.   Musculoskeletal:  Negative for back pain, gait problem and neck pain.   Skin:  Negative for rash and wound.   Neurological:   Negative for dizziness, syncope, speech difficulty and light-headedness.   Psychiatric/Behavioral:  Negative for agitation and confusion. The patient is not nervous/anxious.      Objective:     Vital Signs (Most Recent):  Temp: 98.2 °F (36.8 °C) (06/27/25 1613)  Pulse: 79 (06/27/25 1613)  Resp: 18 (06/27/25 1613)  BP: (!) 142/84 (06/27/25 1613)  SpO2: 97 % (06/27/25 1613) Vital Signs (24h Range):  Temp:  [97.7 °F (36.5 °C)-98.9 °F (37.2 °C)] 98.2 °F (36.8 °C)  Pulse:  [58-90] 79  Resp:  [16-20] 18  SpO2:  [95 %-100 %] 97 %  BP: (114-150)/(58-84) 142/84     Weight: 72.6 kg (160 lb)  Body mass index is 25.82 kg/m².     Physical Exam  Vitals and nursing note reviewed.   Constitutional:       General: She is not in acute distress.     Appearance: She is well-developed. She is not ill-appearing.   HENT:      Head: Normocephalic and atraumatic.      Mouth/Throat:      Pharynx: No oropharyngeal exudate.   Eyes:      Conjunctiva/sclera: Conjunctivae normal.      Pupils: Pupils are equal, round, and reactive to light.   Cardiovascular:      Rate and Rhythm: Normal rate and regular rhythm.      Heart sounds: Normal heart sounds.   Pulmonary:      Effort: Pulmonary effort is normal. No respiratory distress.      Breath sounds: Normal breath sounds. No wheezing.   Abdominal:      General: There is no distension.      Palpations: Abdomen is soft.      Tenderness: There is abdominal tenderness (generalized, but worst to RUQ and RLQ).      Comments: Overactive bowel sounds   Musculoskeletal:         General: No tenderness. Normal range of motion.      Cervical back: Normal range of motion and neck supple.   Lymphadenopathy:      Cervical: No cervical adenopathy.   Skin:     General: Skin is warm and dry.      Capillary Refill: Capillary refill takes less than 2 seconds.      Findings: No rash.   Neurological:      Mental Status: She is alert and oriented to person, place, and time.      Cranial Nerves: No cranial nerve deficit.       Sensory: No sensory deficit.      Coordination: Coordination normal.   Psychiatric:         Behavior: Behavior normal.         Thought Content: Thought content normal.         Judgment: Judgment normal.              CRANIAL NERVES     CN III, IV, VI   Pupils are equal, round, and reactive to light.       Significant Labs: All pertinent labs within the past 24 hours have been reviewed.  CBC:   Recent Labs   Lab 06/26/25  1241   WBC 8.26   HGB 13.7   HCT 42.6        CMP:   Recent Labs   Lab 06/26/25  1241      K 3.5      CO2 27   GLU 91   BUN 9   CREATININE 0.7   CALCIUM 10.0   PROT 8.3   ALBUMIN 4.4   BILITOT 0.7   ALKPHOS 85   AST 18   ALT 19   ANIONGAP 11     Cardiac Markers:   Recent Labs   Lab 06/26/25  1241   BNP 14     Lipase:   Recent Labs   Lab 06/26/25  1241   LIPASE 10     Troponin:   Recent Labs   Lab 06/26/25  1939 06/26/25  2044 06/26/25  2352   TROPONINIHS 18* 15* 18*     TSH:   Recent Labs   Lab 06/17/25  1003   TSH 0.713     Urine Studies:   Recent Labs   Lab 06/26/25  1236   COLORU Colorless*   APPEARANCEUA Clear   PHUR 6.0   SPECGRAV 1.005   PROTEINUA Negative   GLUCUA Negative   BILIRUBINUA Negative   OCCULTUA Negative   NITRITE Negative   UROBILINOGEN Negative   LEUKOCYTESUR Negative       Significant Imaging: I have reviewed all pertinent imaging results/findings within the past 24 hours.  Imaging Results              CT Abdomen Pelvis With IV Contrast NO Oral Contrast (Final result)  Result time 06/26/25 15:27:58      Final result by Sami Shannon MD (06/26/25 15:27:58)                   Impression:      No acute process or CT findings identified to explain patient's symptoms of nonlocalized abdominal pain.    Minimal colonic diverticulosis without diverticulitis.    Grossly stable other incidental/nonemergent findings in the body of the report.      Electronically signed by: Sami Shannon MD  Date:    06/26/2025  Time:    15:27               Narrative:    EXAMINATION:  CT  ABDOMEN PELVIS WITH IV CONTRAST    CLINICAL HISTORY:  Abdominal pain, acute, nonlocalized;    TECHNIQUE:  Low dose axial images, sagittal and coronal reformations were obtained from the lung bases to the pubic symphysis following the IV administration of 75 mL of Omnipaque 350 .  Oral contrast was not given.    COMPARISON:  Chest CT 04/10/2025, abdominal series and pelvic ultrasound 06/11/2024, CT abdomen and pelvis 06/10/2024 and 05/27/2012    FINDINGS:  Base of the heart is within normal limits for age, stable.  Imaged lung bases show minimal dependent atelectasis and minimal scattered platelike scarring versus atelectasis.  No consolidation or pleural effusion.    Gallbladder not identified and may be surgically absent.  No significant biliary ductal dilatation.    Liver is normal in size.  Previous subcentimeter hypoattenuating focus at the right hepatic dome is not identified on today's study.  Portal vasculature appears patent.    Pancreas, spleen, stomach, duodenum and bilateral adrenal glands are within normal limits.    Bilateral kidneys are normal in size, shape and location with symmetric normal enhancement.  Grossly stable appearance of lower left renal parapelvic 2.8 cm hypodensity which measures slightly higher than water attenuation and may reflect a complex cyst, since at least 2012, and likely benign.  No hydronephrosis or significant perinephric stranding.  Ureters are nondilated.  Urinary bladder is well distended without wall thickening or adjacent inflammatory change.  Uterus is surgically absent.  No adnexal mass or significant volume free pelvic fluid.  Pelvic phleboliths noted.    Few scattered colonic diverticula without diverticulitis.  No evidence of bowel obstruction or acute bowel inflammation.  Appendix and terminal ileum are within normal limits.  No bowel pneumatosis or portal venous gas.    No ascites, free air or lymphadenopathy by CT criteria.  No significant atherosclerosis.  No  aortic aneurysm or dissection.    Osseous structures appear stable including surgical changes of the spine with posterior fusion at L5-S1, without acute or destructive process.  Extraperitoneal soft tissues appear stable without acute findings.                                       X-Ray Chest PA And Lateral (Final result)  Result time 06/26/25 13:46:21      Final result by Yonny Boothe III, MD (06/26/25 13:46:21)                   Impression:      No acute process seen.      Electronically signed by: Yonny Boothe MD  Date:    06/26/2025  Time:    13:46               Narrative:    EXAMINATION:  XR CHEST PA AND LATERAL    CLINICAL HISTORY:  Chest pain, unspecified    FINDINGS:  Chest two views:    Heart size is normal.  Lungs are clear and the bones are noncontributory.                                    Assessment/Plan:     Assessment & Plan  Generalized abdominal pain  Nausea vomiting and diarrhea  55 yo F with PMHx of HTN, HLD, hyperparathyroidism, spinal fusion, and anterior mediastinal mass s/p excision with partial thymectomy who presented for abdominal and chest pain with associated n/v/d that started 06/23. Vomiting has resolved, but still with >3 episodes of diarrhea daily. CT negative for acute process. History of gastritis noted, but lower suspicion for this based on lower abdominal pain and diarrhea.     - GI consulted; appreciate recs  - continue ppi  - prn antiemetics, bentyl, and carafate  - stool studies ordered   - start cIVFs  - CLD, ADAT    Chronic left-sided low back pain with sciatica  - continue prn robaxin   Primary hypertension  Patient's blood pressure range in the last 24 hours was: BP  Min: 114/58  Max: 150/80.The patient's inpatient anti-hypertensive regimen is listed below:  Current Antihypertensives  hydroCHLOROthiazide tablet 12.5 mg, Daily, Oral    Plan  - BP is controlled, no changes needed to their regimen  Type AB thymoma  S/p resection  - follows with OP CTS  Chest pain  -  HS trop mildly elevated, but flat  - EKG in ED concerning for possible anterior infarct  - exercise stress echo negative for ischemia   - suspect chest pain 2/2 GI source   - will monitor tele   VTE Risk Mitigation (From admission, onward)      None            SDOH Screening:  The patient was screened for utility difficulties, food insecurity, transport difficulties, housing insecurity, and interpersonal safety and there were no concerns identified this admission.      Natalie Maguire PA-C  Department of Hospital Medicine  [facility] - Emergency Dept

## 2025-06-27 NOTE — ASSESSMENT & PLAN NOTE
- HS trop mildly elevated, but flat  - EKG in ED concerning for possible anterior infarct  - exercise stress echo negative for ischemia   - suspect chest pain 2/2 GI source   - will monitor tele

## 2025-06-27 NOTE — ED NOTES
Assumed care of patient at this time from AARON Morrissey. Pt in hospital gown and currently lying in stretcher. Pt denied restroom use. No other complaints from pt at this time. Care ongoing.

## 2025-06-27 NOTE — ED NOTES
Assumed care of patient at this time from AARON Villagomez. Hospital bed side rails up x3, locked in lowest position, call bell is within reach. Pt instructed to call staff for mobility. Family remains at bedside. Pt denies any further needs at this time, in NAD, AAOx4.

## 2025-06-27 NOTE — ASSESSMENT & PLAN NOTE
Patient's blood pressure range in the last 24 hours was: BP  Min: 114/58  Max: 150/80.The patient's inpatient anti-hypertensive regimen is listed below:  Current Antihypertensives  hydroCHLOROthiazide tablet 12.5 mg, Daily, Oral    Plan  - BP is controlled, no changes needed to their regimen

## 2025-06-27 NOTE — ED NOTES
Patient complaining of midsternal CP, vitals obtained and baby aspirin given. Telemetry being closely monitored. Provider notified.

## 2025-06-27 NOTE — HPI
"Laz Quintero is a 53 yo F with PMHx of HTN, HLD, hyperparathyroidism, spinal fusion, and anterior mediastinal mass s/p excision with partial thymectomy who presented to ED for abdominal and chest pain. Patient reports her symptoms started on Monday with L sided chest and epigastric pain. Pain was associated with n/v/d and subjective fever with chills. States that she has not been able to tolerate any food by mouth since Tuesday so her vomiting has resolved. No recent travel or antibiotic use.  CT A/P without acute process. Intake labs grossly unremarkable, except for HS trop mildly elevated. Trop remained flat. She was placed in observation in the EDOU. An echo stress test was performed today, which was negative for ischemia. PO challenged in EDOU and she immediately developed abdominal cramping and had an episode of diarrhea. On my evaluation, she reports diarrhea is starting to improve, but she still had 3 soft BM today that were loose, green and "foamy." Reports while in EDOU she just had a severe episode of electric shocking pain that started in her RLQ and radiated up to her RUQ. GI consulted. Admitted to  for further evaluation.   "

## 2025-06-28 LAB
ABSOLUTE EOSINOPHIL (OHS): 0.07 K/UL
ABSOLUTE MONOCYTE (OHS): 0.4 K/UL (ref 0.3–1)
ABSOLUTE NEUTROPHIL COUNT (OHS): 3.05 K/UL (ref 1.8–7.7)
ALBUMIN SERPL BCP-MCNC: 3.6 G/DL (ref 3.5–5.2)
ALP SERPL-CCNC: 70 UNIT/L (ref 40–150)
ALT SERPL W/O P-5'-P-CCNC: 19 UNIT/L (ref 10–44)
ANION GAP (OHS): 12 MMOL/L (ref 8–16)
AST SERPL-CCNC: 19 UNIT/L (ref 11–45)
BASOPHILS # BLD AUTO: 0.03 K/UL
BASOPHILS NFR BLD AUTO: 0.5 %
BILIRUB SERPL-MCNC: 0.4 MG/DL (ref 0.1–1)
BUN SERPL-MCNC: 5 MG/DL (ref 6–20)
CALCIUM SERPL-MCNC: 9 MG/DL (ref 8.7–10.5)
CHLORIDE SERPL-SCNC: 109 MMOL/L (ref 95–110)
CO2 SERPL-SCNC: 19 MMOL/L (ref 23–29)
CREAT SERPL-MCNC: 0.7 MG/DL (ref 0.5–1.4)
ERYTHROCYTE [DISTWIDTH] IN BLOOD BY AUTOMATED COUNT: 12.5 % (ref 11.5–14.5)
GFR SERPLBLD CREATININE-BSD FMLA CKD-EPI: >60 ML/MIN/1.73/M2
GLUCOSE SERPL-MCNC: 97 MG/DL (ref 70–110)
HCT VFR BLD AUTO: 39.1 % (ref 37–48.5)
HGB BLD-MCNC: 12.9 GM/DL (ref 12–16)
IMM GRANULOCYTES # BLD AUTO: 0.01 K/UL (ref 0–0.04)
IMM GRANULOCYTES NFR BLD AUTO: 0.2 % (ref 0–0.5)
LYMPHOCYTES # BLD AUTO: 2.3 K/UL (ref 1–4.8)
MAGNESIUM SERPL-MCNC: 1.8 MG/DL (ref 1.6–2.6)
MCH RBC QN AUTO: 29.2 PG (ref 27–31)
MCHC RBC AUTO-ENTMCNC: 33 G/DL (ref 32–36)
MCV RBC AUTO: 89 FL (ref 82–98)
NUCLEATED RBC (/100WBC) (OHS): 0 /100 WBC
PLATELET # BLD AUTO: 239 K/UL (ref 150–450)
PMV BLD AUTO: 9.1 FL (ref 9.2–12.9)
POTASSIUM SERPL-SCNC: 3.7 MMOL/L (ref 3.5–5.1)
PROT SERPL-MCNC: 6.7 GM/DL (ref 6–8.4)
RBC # BLD AUTO: 4.42 M/UL (ref 4–5.4)
RELATIVE EOSINOPHIL (OHS): 1.2 %
RELATIVE LYMPHOCYTE (OHS): 39.2 % (ref 18–48)
RELATIVE MONOCYTE (OHS): 6.8 % (ref 4–15)
RELATIVE NEUTROPHIL (OHS): 52.1 % (ref 38–73)
SODIUM SERPL-SCNC: 140 MMOL/L (ref 136–145)
TROPONIN I SERPL HS-MCNC: 15 NG/L
WBC # BLD AUTO: 5.86 K/UL (ref 3.9–12.7)

## 2025-06-28 PROCEDURE — 25000003 PHARM REV CODE 250: Performed by: STUDENT IN AN ORGANIZED HEALTH CARE EDUCATION/TRAINING PROGRAM

## 2025-06-28 PROCEDURE — 80053 COMPREHEN METABOLIC PANEL: CPT | Performed by: STUDENT IN AN ORGANIZED HEALTH CARE EDUCATION/TRAINING PROGRAM

## 2025-06-28 PROCEDURE — 36415 COLL VENOUS BLD VENIPUNCTURE: CPT | Performed by: STUDENT IN AN ORGANIZED HEALTH CARE EDUCATION/TRAINING PROGRAM

## 2025-06-28 PROCEDURE — 63600175 PHARM REV CODE 636 W HCPCS: Performed by: STUDENT IN AN ORGANIZED HEALTH CARE EDUCATION/TRAINING PROGRAM

## 2025-06-28 PROCEDURE — 27000207 HC ISOLATION

## 2025-06-28 PROCEDURE — 93010 ELECTROCARDIOGRAM REPORT: CPT | Mod: ,,, | Performed by: INTERNAL MEDICINE

## 2025-06-28 PROCEDURE — 93005 ELECTROCARDIOGRAM TRACING: CPT

## 2025-06-28 PROCEDURE — 63600175 PHARM REV CODE 636 W HCPCS: Performed by: PHYSICIAN ASSISTANT

## 2025-06-28 PROCEDURE — 25000003 PHARM REV CODE 250

## 2025-06-28 PROCEDURE — 85025 COMPLETE CBC W/AUTO DIFF WBC: CPT | Performed by: STUDENT IN AN ORGANIZED HEALTH CARE EDUCATION/TRAINING PROGRAM

## 2025-06-28 PROCEDURE — 21400001 HC TELEMETRY ROOM

## 2025-06-28 PROCEDURE — 25000003 PHARM REV CODE 250: Performed by: PHYSICIAN ASSISTANT

## 2025-06-28 PROCEDURE — 94761 N-INVAS EAR/PLS OXIMETRY MLT: CPT

## 2025-06-28 PROCEDURE — 83735 ASSAY OF MAGNESIUM: CPT | Performed by: STUDENT IN AN ORGANIZED HEALTH CARE EDUCATION/TRAINING PROGRAM

## 2025-06-28 PROCEDURE — 84484 ASSAY OF TROPONIN QUANT: CPT | Performed by: STUDENT IN AN ORGANIZED HEALTH CARE EDUCATION/TRAINING PROGRAM

## 2025-06-28 RX ORDER — DICYCLOMINE HYDROCHLORIDE 10 MG/1
20 CAPSULE ORAL 4 TIMES DAILY
Status: DISCONTINUED | OUTPATIENT
Start: 2025-06-28 | End: 2025-07-01

## 2025-06-28 RX ORDER — PANTOPRAZOLE SODIUM 40 MG/10ML
40 INJECTION, POWDER, LYOPHILIZED, FOR SOLUTION INTRAVENOUS DAILY
Status: DISCONTINUED | OUTPATIENT
Start: 2025-06-28 | End: 2025-06-30

## 2025-06-28 RX ORDER — MECLIZINE HYDROCHLORIDE 25 MG/1
25 TABLET ORAL 3 TIMES DAILY PRN
Status: DISCONTINUED | OUTPATIENT
Start: 2025-06-28 | End: 2025-07-02 | Stop reason: HOSPADM

## 2025-06-28 RX ORDER — ACETAMINOPHEN 325 MG/1
650 TABLET ORAL EVERY 4 HOURS PRN
Status: DISCONTINUED | OUTPATIENT
Start: 2025-06-28 | End: 2025-07-01

## 2025-06-28 RX ADMIN — SUCRALFATE 1 G: 1 SUSPENSION ORAL at 04:06

## 2025-06-28 RX ADMIN — MECLIZINE HYDROCHLORIDE 25 MG: 25 TABLET ORAL at 10:06

## 2025-06-28 RX ADMIN — SUCRALFATE 1 G: 1 SUSPENSION ORAL at 05:06

## 2025-06-28 RX ADMIN — PANTOPRAZOLE SODIUM 40 MG: 40 INJECTION, POWDER, LYOPHILIZED, FOR SOLUTION INTRAVENOUS at 11:06

## 2025-06-28 RX ADMIN — DICYCLOMINE HYDROCHLORIDE 20 MG: 10 CAPSULE ORAL at 04:06

## 2025-06-28 RX ADMIN — DICYCLOMINE HYDROCHLORIDE 20 MG: 10 CAPSULE ORAL at 05:06

## 2025-06-28 RX ADMIN — MECLIZINE HYDROCHLORIDE 25 MG: 25 TABLET ORAL at 04:06

## 2025-06-28 RX ADMIN — SODIUM CHLORIDE, POTASSIUM CHLORIDE, SODIUM LACTATE AND CALCIUM CHLORIDE 1000 ML: 600; 310; 30; 20 INJECTION, SOLUTION INTRAVENOUS at 04:06

## 2025-06-28 RX ADMIN — PANTOPRAZOLE SODIUM 40 MG: 40 TABLET, DELAYED RELEASE ORAL at 08:06

## 2025-06-28 RX ADMIN — SUCRALFATE 1 G: 1 SUSPENSION ORAL at 08:06

## 2025-06-28 RX ADMIN — TRAZODONE HYDROCHLORIDE 50 MG: 50 TABLET ORAL at 09:06

## 2025-06-28 RX ADMIN — DICYCLOMINE HYDROCHLORIDE 20 MG: 10 CAPSULE ORAL at 09:06

## 2025-06-28 RX ADMIN — DICYCLOMINE HYDROCHLORIDE 20 MG: 10 CAPSULE ORAL at 11:06

## 2025-06-28 RX ADMIN — METHOCARBAMOL 750 MG: 750 TABLET ORAL at 11:06

## 2025-06-28 RX ADMIN — SUCRALFATE 1 G: 1 SUSPENSION ORAL at 11:06

## 2025-06-28 RX ADMIN — SUCRALFATE 1 G: 1 SUSPENSION ORAL at 09:06

## 2025-06-28 RX ADMIN — MORPHINE SULFATE 4 MG: 4 INJECTION INTRAVENOUS at 10:06

## 2025-06-28 RX ADMIN — ONDANSETRON 4 MG: 2 INJECTION INTRAMUSCULAR; INTRAVENOUS at 11:06

## 2025-06-28 RX ADMIN — ASPIRIN 81 MG CHEWABLE TABLET 81 MG: 81 TABLET CHEWABLE at 08:06

## 2025-06-28 RX ADMIN — MORPHINE SULFATE 4 MG: 4 INJECTION INTRAVENOUS at 11:06

## 2025-06-28 NOTE — ASSESSMENT & PLAN NOTE
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 Vomiting has resolved, but still with >3 episodes of diarrhea daily. CT negative for acute process. History of gastritis noted, but lower suspicion for this based on lower abdominal pain and diarrhea.     - IV ppi  - bentyl and carafate ordered   - stool studies ordered   - wean off fluids   - CLD, advance diet to GI soft diet   -if pain does not improve with above consider GI consult

## 2025-06-28 NOTE — PROGRESS NOTES
"[facility] - Internal Medicine [facility]  Progress Note    Patient Name: Laz Quintero  MRN: [id number]  Patient Class: IP- Inpatient   Admission Date: 6/26/2025  Length of Stay: 1 days  Attending Physician: Summer Cuenca MD  Primary Care Provider: Charlotte Jacinto MD        Subjective     Principal Problem:Generalized abdominal pain        HPI:  Laz Quintero is a 55 yo F with PMHx of HTN, HLD, hyperparathyroidism, spinal fusion, and anterior mediastinal mass s/p excision with partial thymectomy who presented to ED for abdominal and chest pain. Patient reports her symptoms started on Monday with L sided chest and epigastric pain. Pain was associated with n/v/d and subjective fever with chills. States that she has not been able to tolerate any food by mouth since Tuesday so her vomiting has resolved. No recent travel or antibiotic use.  CT A/P without acute process. Intake labs grossly unremarkable, except for HS trop mildly elevated. Trop remained flat. She was placed in observation in the EDOU. An echo stress test was performed today, which was negative for ischemia. PO challenged in EDOU and she immediately developed abdominal cramping and had an episode of diarrhea. On my evaluation, she reports diarrhea is starting to improve, but she still had 3 soft BM today that were loose, green and "foamy." Reports while in EDOU she just had a severe episode of electric shocking pain that started in her RLQ and radiated up to her RUQ. GI consulted. Admitted to  for further evaluation.     Overview/Hospital Course:  No notes on file    Interval History: This AM had low HR. Still with lower abdominal pain that radiates upward loyd when drinking. Agreeable to advancing diet.     Review of Systems  Objective:     Vital Signs (Most Recent):  Temp: 97.9 °F (36.6 °C) (06/28/25 0715)  Pulse: 60 (06/28/25 0715)  Resp: 18 (06/28/25 0715)  BP: 129/84 (06/28/25 0715)  SpO2: 97 % (06/28/25 0715) Vital " Signs (24h Range):  Temp:  [97.9 °F (36.6 °C)-98.9 °F (37.2 °C)] 97.9 °F (36.6 °C)  Pulse:  [52-81] 60  Resp:  [16-18] 18  SpO2:  [97 %-98 %] 97 %  BP: (106-142)/(68-84) 129/84     Weight: 73.3 kg (161 lb 9.6 oz)  Body mass index is 26.08 kg/m².  No intake or output data in the 24 hours ending 06/28/25 1007      Physical Exam  Constitutional:       Appearance: Normal appearance.   HENT:      Head: Normocephalic and atraumatic.      Nose: Nose normal.      Mouth/Throat:      Mouth: Mucous membranes are moist.   Eyes:      Extraocular Movements: Extraocular movements intact.      Pupils: Pupils are equal, round, and reactive to light.   Cardiovascular:      Rate and Rhythm: Normal rate and regular rhythm.      Heart sounds: No murmur heard.     No gallop.   Pulmonary:      Effort: Pulmonary effort is normal.      Breath sounds: Normal breath sounds. No wheezing or rales.   Abdominal:      General: Abdomen is flat. Bowel sounds are normal. There is no distension.      Palpations: Abdomen is soft.      Tenderness: There is abdominal tenderness.   Musculoskeletal:         General: No swelling or tenderness. Normal range of motion.      Cervical back: Normal range of motion and neck supple.      Right lower leg: No edema.      Left lower leg: No edema.   Skin:     General: Skin is warm and dry.      Capillary Refill: Capillary refill takes less than 2 seconds.   Neurological:      General: No focal deficit present.      Mental Status: She is alert. Mental status is at baseline.   Psychiatric:         Mood and Affect: Mood normal.               Significant Labs: All pertinent labs within the past 24 hours have been reviewed.    Significant Imaging: I have reviewed all pertinent imaging results/findings within the past 24 hours.      Assessment & Plan  Generalized abdominal pain  Nausea vomiting and diarrhea  55 yo F with PMHx of HTN, HLD, hyperparathyroidism, spinal fusion, and anterior mediastinal mass s/p excision with  partial thymectomy who presented for abdominal and chest pain with associated n/v/d that started 06/23. Vomiting has resolved, but still with >3 episodes of diarrhea daily. CT negative for acute process. History of gastritis noted, but lower suspicion for this based on lower abdominal pain and diarrhea.     - IV ppi  - bentyl and carafate ordered   - stool studies ordered   - wean off fluids   - CLD, advance diet to GI soft diet   -if pain does not improve with above consider GI consult   Chest pain  - HS trop mildly elevated, but flat  - EKG in ED concerning for possible anterior infarct  - exercise stress echo negative for ischemia   - suspect chest pain 2/2 GI source   - will monitor tele   Chronic left-sided low back pain with sciatica  - continue prn robaxin   Primary hypertension  Patient's blood pressure range in the last 24 hours was: BP  Min: 106/68  Max: 142/84.The patient's inpatient anti-hypertensive regimen is listed below:  Current Antihypertensives  hydroCHLOROthiazide tablet 12.5 mg, Daily, Oral    Plan  - BP is controlled, no changes needed to their regimen  Type AB thymoma  S/p resection  - follows with OP CTS  VTE Risk Mitigation (From admission, onward)           Ordered     enoxaparin injection 40 mg  Daily         06/27/25 1631     IP VTE HIGH RISK PATIENT  Once         06/27/25 1631                    Discharge Planning   JOHNIE:      Code Status: Full Code   Medical Readiness for Discharge Date:   Discharge Plan A: Home, Home with family                        Summer Cuenca MD  Department of Hospital Medicine   [facility] - Internal Medicine Telemetry

## 2025-06-28 NOTE — NURSING
Orthostatic blood pressure in Left and Right  arm:    Left arm:   Lying        110/70  Sitting      145/84  Standing  146/94    Right arm:  Lying        123/76  Sitting       148/87  Standing   167/99

## 2025-06-28 NOTE — PROGRESS NOTES
06/28/25 0715   Vital Signs   Temp 97.9 °F (36.6 °C)   Temp Source Oral   Pulse 60   Heart Rate Source Monitor   Resp 18   SpO2 97 %   Device (Oxygen Therapy) room air   /84   BP Location Left arm   BP Method Automatic   Patient Position Sitting     During bedside handoff with Jael WALKER, pt reported feeling lightheaded and lower abd felt harder than usual. When assessed pt abd felt hard and taut. Pt HR on telemetry dropped down to 40's-50's. Nurse obtained a set of vitals and notified Summer Cuenca MD.   Patient is alert and oriented ×4, in no acute distress. No new complaints reported. Routine care provided. Handoff given to oncoming nurse.

## 2025-06-28 NOTE — HOSPITAL COURSE
While on the floor started on IV PPI and carafate scheduled for gastritis. Supportive medications for pain given as well as IVF. Diet started and advanced as tolerated. She tolerated diet but still with what felt like abdominal shocks. Course compliated by chest pain, arm tingling, and dizziness. Romberg positive and patient with gait instability. Orthostatics negative. Carotid US ordered and negative. Repeat EKG ordered and tropnin which were reassuring. US doppler Upper extremity ordered. Neuro consulted and recommended MRI/MRA which was reassuring. Likely hemiplegic migraine given history of headaches as well with possible neuroapthy. Will have outpatient Neurology follow up and will start Trazodone for migraine ppx as well as sumatriptan PRN. GI consulted and EGD performed and negative. Likely neuropathy vs MSK pain. Overall workup reassuring and patient was comfortable with DC home and close Neurology followup.     Pt deemed appropriate for discharge; seen and examined prior to departure. Plan discussed with pt, who was agreeable and amenable; medications were discussed and reviewed, outpatient follow-up scheduled, ER precautions were given, all questions were answered to the pt's satisfaction, and she was subsequently discharged.

## 2025-06-28 NOTE — ASSESSMENT & PLAN NOTE
Patient's blood pressure range in the last 24 hours was: BP  Min: 106/68  Max: 142/84.The patient's inpatient anti-hypertensive regimen is listed below:  Current Antihypertensives  hydroCHLOROthiazide tablet 12.5 mg, Daily, Oral    Plan  - BP is controlled, no changes needed to their regimen

## 2025-06-28 NOTE — SUBJECTIVE & OBJECTIVE
Interval History: This AM had low HR. Still with lower abdominal pain that radiates upward loyd when drinking. Agreeable to advancing diet.     Review of Systems  Objective:     Vital Signs (Most Recent):  Temp: 97.9 °F (36.6 °C) (06/28/25 0715)  Pulse: 60 (06/28/25 0715)  Resp: 18 (06/28/25 0715)  BP: 129/84 (06/28/25 0715)  SpO2: 97 % (06/28/25 0715) Vital Signs (24h Range):  Temp:  [97.9 °F (36.6 °C)-98.9 °F (37.2 °C)] 97.9 °F (36.6 °C)  Pulse:  [52-81] 60  Resp:  [16-18] 18  SpO2:  [97 %-98 %] 97 %  BP: (106-142)/(68-84) 129/84     Weight: 73.3 kg (161 lb 9.6 oz)  Body mass index is 26.08 kg/m².  No intake or output data in the 24 hours ending 06/28/25 1007      Physical Exam  Constitutional:       Appearance: Normal appearance.   HENT:      Head: Normocephalic and atraumatic.      Nose: Nose normal.      Mouth/Throat:      Mouth: Mucous membranes are moist.   Eyes:      Extraocular Movements: Extraocular movements intact.      Pupils: Pupils are equal, round, and reactive to light.   Cardiovascular:      Rate and Rhythm: Normal rate and regular rhythm.      Heart sounds: No murmur heard.     No gallop.   Pulmonary:      Effort: Pulmonary effort is normal.      Breath sounds: Normal breath sounds. No wheezing or rales.   Abdominal:      General: Abdomen is flat. Bowel sounds are normal. There is no distension.      Palpations: Abdomen is soft.      Tenderness: There is abdominal tenderness.   Musculoskeletal:         General: No swelling or tenderness. Normal range of motion.      Cervical back: Normal range of motion and neck supple.      Right lower leg: No edema.      Left lower leg: No edema.   Skin:     General: Skin is warm and dry.      Capillary Refill: Capillary refill takes less than 2 seconds.   Neurological:      General: No focal deficit present.      Mental Status: She is alert. Mental status is at baseline.   Psychiatric:         Mood and Affect: Mood normal.               Significant Labs: All  pertinent labs within the past 24 hours have been reviewed.    Significant Imaging: I have reviewed all pertinent imaging results/findings within the past 24 hours.

## 2025-06-29 PROBLEM — R20.0 NUMBNESS AND TINGLING IN RIGHT HAND: Status: ACTIVE | Noted: 2025-06-29

## 2025-06-29 PROBLEM — R42 VERTIGO: Status: ACTIVE | Noted: 2025-06-29

## 2025-06-29 PROBLEM — R20.2 NUMBNESS AND TINGLING IN RIGHT HAND: Status: ACTIVE | Noted: 2025-06-29

## 2025-06-29 LAB
ABSOLUTE EOSINOPHIL (OHS): 0.12 K/UL
ABSOLUTE MONOCYTE (OHS): 0.4 K/UL (ref 0.3–1)
ABSOLUTE NEUTROPHIL COUNT (OHS): 3.47 K/UL (ref 1.8–7.7)
ALBUMIN SERPL BCP-MCNC: 3.6 G/DL (ref 3.5–5.2)
ANION GAP (OHS): 8 MMOL/L (ref 8–16)
BASOPHILS # BLD AUTO: 0.03 K/UL
BASOPHILS NFR BLD AUTO: 0.4 %
BUN SERPL-MCNC: 9 MG/DL (ref 6–20)
C DIFF GDH STL QL: NEGATIVE
C DIFF TOX A+B STL QL IA: NEGATIVE
CALCIUM SERPL-MCNC: 9.2 MG/DL (ref 8.7–10.5)
CHLORIDE SERPL-SCNC: 107 MMOL/L (ref 95–110)
CO2 SERPL-SCNC: 25 MMOL/L (ref 23–29)
CREAT SERPL-MCNC: 0.7 MG/DL (ref 0.5–1.4)
ERYTHROCYTE [DISTWIDTH] IN BLOOD BY AUTOMATED COUNT: 12.2 % (ref 11.5–14.5)
GFR SERPLBLD CREATININE-BSD FMLA CKD-EPI: >60 ML/MIN/1.73/M2
GLUCOSE SERPL-MCNC: 88 MG/DL (ref 70–110)
HCT VFR BLD AUTO: 36.9 % (ref 37–48.5)
HGB BLD-MCNC: 12.1 GM/DL (ref 12–16)
IMM GRANULOCYTES # BLD AUTO: 0.02 K/UL (ref 0–0.04)
IMM GRANULOCYTES NFR BLD AUTO: 0.3 % (ref 0–0.5)
LYMPHOCYTES # BLD AUTO: 2.77 K/UL (ref 1–4.8)
MAGNESIUM SERPL-MCNC: 1.8 MG/DL (ref 1.6–2.6)
MCH RBC QN AUTO: 28.1 PG (ref 27–31)
MCHC RBC AUTO-ENTMCNC: 32.8 G/DL (ref 32–36)
MCV RBC AUTO: 86 FL (ref 82–98)
NUCLEATED RBC (/100WBC) (OHS): 0 /100 WBC
OB PNL STL: NEGATIVE
OHS QRS DURATION: 74 MS
OHS QTC CALCULATION: 438 MS
PHOSPHATE SERPL-MCNC: 3.8 MG/DL (ref 2.7–4.5)
PLATELET # BLD AUTO: 235 K/UL (ref 150–450)
PMV BLD AUTO: 9.2 FL (ref 9.2–12.9)
POTASSIUM SERPL-SCNC: 4.4 MMOL/L (ref 3.5–5.1)
RBC # BLD AUTO: 4.3 M/UL (ref 4–5.4)
RELATIVE EOSINOPHIL (OHS): 1.8 %
RELATIVE LYMPHOCYTE (OHS): 40.7 % (ref 18–48)
RELATIVE MONOCYTE (OHS): 5.9 % (ref 4–15)
RELATIVE NEUTROPHIL (OHS): 50.9 % (ref 38–73)
SODIUM SERPL-SCNC: 140 MMOL/L (ref 136–145)
WBC # BLD AUTO: 6.81 K/UL (ref 3.9–12.7)
WBC #/AREA STL HPF: NORMAL /[HPF]

## 2025-06-29 PROCEDURE — 27000207 HC ISOLATION

## 2025-06-29 PROCEDURE — 25000003 PHARM REV CODE 250: Performed by: PHYSICIAN ASSISTANT

## 2025-06-29 PROCEDURE — 25000003 PHARM REV CODE 250

## 2025-06-29 PROCEDURE — 94761 N-INVAS EAR/PLS OXIMETRY MLT: CPT

## 2025-06-29 PROCEDURE — 85025 COMPLETE CBC W/AUTO DIFF WBC: CPT | Performed by: STUDENT IN AN ORGANIZED HEALTH CARE EDUCATION/TRAINING PROGRAM

## 2025-06-29 PROCEDURE — 63600175 PHARM REV CODE 636 W HCPCS: Performed by: STUDENT IN AN ORGANIZED HEALTH CARE EDUCATION/TRAINING PROGRAM

## 2025-06-29 PROCEDURE — 36415 COLL VENOUS BLD VENIPUNCTURE: CPT | Performed by: STUDENT IN AN ORGANIZED HEALTH CARE EDUCATION/TRAINING PROGRAM

## 2025-06-29 PROCEDURE — 80069 RENAL FUNCTION PANEL: CPT | Performed by: STUDENT IN AN ORGANIZED HEALTH CARE EDUCATION/TRAINING PROGRAM

## 2025-06-29 PROCEDURE — 25000003 PHARM REV CODE 250: Performed by: STUDENT IN AN ORGANIZED HEALTH CARE EDUCATION/TRAINING PROGRAM

## 2025-06-29 PROCEDURE — 21400001 HC TELEMETRY ROOM

## 2025-06-29 PROCEDURE — 83735 ASSAY OF MAGNESIUM: CPT | Performed by: STUDENT IN AN ORGANIZED HEALTH CARE EDUCATION/TRAINING PROGRAM

## 2025-06-29 RX ORDER — PROMETHAZINE HYDROCHLORIDE 12.5 MG/1
25 TABLET ORAL EVERY 4 HOURS PRN
Status: DISCONTINUED | OUTPATIENT
Start: 2025-06-29 | End: 2025-07-02 | Stop reason: HOSPADM

## 2025-06-29 RX ORDER — SIMETHICONE 80 MG
1 TABLET,CHEWABLE ORAL 3 TIMES DAILY PRN
Status: DISCONTINUED | OUTPATIENT
Start: 2025-06-29 | End: 2025-07-02 | Stop reason: HOSPADM

## 2025-06-29 RX ORDER — PROMETHAZINE HYDROCHLORIDE 12.5 MG/1
25 TABLET ORAL EVERY 4 HOURS PRN
Status: DISCONTINUED | OUTPATIENT
Start: 2025-06-29 | End: 2025-06-29

## 2025-06-29 RX ADMIN — ONDANSETRON 4 MG: 2 INJECTION INTRAMUSCULAR; INTRAVENOUS at 08:06

## 2025-06-29 RX ADMIN — PANTOPRAZOLE SODIUM 40 MG: 40 INJECTION, POWDER, LYOPHILIZED, FOR SOLUTION INTRAVENOUS at 09:06

## 2025-06-29 RX ADMIN — DICYCLOMINE HYDROCHLORIDE 20 MG: 10 CAPSULE ORAL at 05:06

## 2025-06-29 RX ADMIN — SUCRALFATE 1 G: 1 SUSPENSION ORAL at 08:06

## 2025-06-29 RX ADMIN — DICYCLOMINE HYDROCHLORIDE 20 MG: 10 CAPSULE ORAL at 08:06

## 2025-06-29 RX ADMIN — SUCRALFATE 1 G: 1 SUSPENSION ORAL at 03:06

## 2025-06-29 RX ADMIN — SUCRALFATE 1 G: 1 SUSPENSION ORAL at 11:06

## 2025-06-29 RX ADMIN — ASPIRIN 81 MG CHEWABLE TABLET 81 MG: 81 TABLET CHEWABLE at 08:06

## 2025-06-29 RX ADMIN — METHOCARBAMOL 750 MG: 750 TABLET ORAL at 03:06

## 2025-06-29 RX ADMIN — DICYCLOMINE HYDROCHLORIDE 20 MG: 10 CAPSULE ORAL at 12:06

## 2025-06-29 RX ADMIN — METHOCARBAMOL 750 MG: 750 TABLET ORAL at 08:06

## 2025-06-29 RX ADMIN — SUCRALFATE 1 G: 1 SUSPENSION ORAL at 05:06

## 2025-06-29 RX ADMIN — MECLIZINE HYDROCHLORIDE 25 MG: 25 TABLET ORAL at 08:06

## 2025-06-29 RX ADMIN — SIMETHICONE 80 MG: 80 TABLET, CHEWABLE ORAL at 10:06

## 2025-06-29 RX ADMIN — MECLIZINE HYDROCHLORIDE 25 MG: 25 TABLET ORAL at 11:06

## 2025-06-29 NOTE — ASSESSMENT & PLAN NOTE
-pt with numbness and tingling as well as vertigo symptoms   -Orthostatics ordered  -initial concern for BP difference on R vs L arm but on recheck all equal   -no history of syncope at this time but pt with prodromal syncope symptoms   -will order Carotid US and UE US for further evaluation   -CT A/P reviewed no signs of aneurysm or dissection, EKG and Troponin as well as stress test for symptoms with chest pain reassuring   -CT chest 4/25 reviewed no recurrent mass present   -meclizine PRN while other workup above   -exam negative for carpal tunnel and spurlings signs. Hx of herniated lumbar discs and fusion but no significant history of neck pain or bilateral radiculopathy signs

## 2025-06-29 NOTE — PROGRESS NOTES
"[facility] - Internal Medicine [facility] Medicine  Progress Note    Patient Name: Laz Quintero  MRN: [id number]  Patient Class: IP- Inpatient   Admission Date: 6/26/2025  Length of Stay: 2 days  Attending Physician: Summer Cuenca MD  Primary Care Provider: Charlotte Jacinto MD        Subjective     Principal Problem:Generalized abdominal pain        HPI:  Laz Quintero is a 55 yo F with PMHx of HTN, HLD, hyperparathyroidism, spinal fusion, and anterior mediastinal mass s/p excision with partial thymectomy who presented to ED for abdominal and chest pain. Patient reports her symptoms started on Monday with L sided chest and epigastric pain. Pain was associated with n/v/d and subjective fever with chills. States that she has not been able to tolerate any food by mouth since Tuesday so her vomiting has resolved. No recent travel or antibiotic use.  CT A/P without acute process. Intake labs grossly unremarkable, except for HS trop mildly elevated. Trop remained flat. She was placed in observation in the EDOU. An echo stress test was performed today, which was negative for ischemia. PO challenged in EDOU and she immediately developed abdominal cramping and had an episode of diarrhea. On my evaluation, she reports diarrhea is starting to improve, but she still had 3 soft BM today that were loose, green and "foamy." Reports while in EDOU she just had a severe episode of electric shocking pain that started in her RLQ and radiated up to her RUQ. GI consulted. Admitted to  for further evaluation.     Overview/Hospital Course:  While on the floor started on IV PPI and carafate scheduled for gastritis. Supportive medications for pain given as well as IVF. Diet started and advanced as tolerated.     Interval History: Still with abdominal pain and nausea. Meclizine helped symptoms. Still with symptoms of vertigo and dizziness with position changes as well as tingling of the hands. EKG and troponin " reassuring.     Review of Systems  Objective:     Vital Signs (Most Recent):  Temp: 97.8 °F (36.6 °C) (06/29/25 1130)  Pulse: (!) 54 (06/29/25 1130)  Resp: 18 (06/29/25 1130)  BP: 120/76 (06/29/25 1130)  SpO2: 100 % (06/29/25 1130) Vital Signs (24h Range):  Temp:  [97.6 °F (36.4 °C)-98.9 °F (37.2 °C)] 97.8 °F (36.6 °C)  Pulse:  [54-69] 54  Resp:  [18-20] 18  SpO2:  [98 %-100 %] 100 %  BP: (108-167)/(63-99) 120/76     Weight: 73.3 kg (161 lb 9.6 oz)  Body mass index is 26.08 kg/m².    Intake/Output Summary (Last 24 hours) at 6/29/2025 1155  Last data filed at 6/29/2025 0900  Gross per 24 hour   Intake 720 ml   Output --   Net 720 ml         Physical Exam  Constitutional:       Appearance: Normal appearance.   HENT:      Head: Normocephalic and atraumatic.      Nose: Nose normal.      Mouth/Throat:      Mouth: Mucous membranes are moist.   Eyes:      Extraocular Movements: Extraocular movements intact.      Pupils: Pupils are equal, round, and reactive to light.   Cardiovascular:      Rate and Rhythm: Normal rate and regular rhythm.      Heart sounds: No murmur heard.     No gallop.   Pulmonary:      Effort: Pulmonary effort is normal.      Breath sounds: Normal breath sounds. No wheezing or rales.   Abdominal:      General: Abdomen is flat. Bowel sounds are normal. There is no distension.      Palpations: Abdomen is soft.      Tenderness: There is abdominal tenderness.   Musculoskeletal:         General: No swelling or tenderness. Normal range of motion.      Cervical back: Normal range of motion and neck supple.      Right lower leg: No edema.      Left lower leg: No edema.   Skin:     General: Skin is warm and dry.      Capillary Refill: Capillary refill takes less than 2 seconds.   Neurological:      General: No focal deficit present.      Mental Status: She is alert. Mental status is at baseline.   Psychiatric:         Mood and Affect: Mood normal.               Significant Labs: All pertinent labs within the  past 24 hours have been reviewed.    Significant Imaging: I have reviewed all pertinent imaging results/findings within the past 24 hours.      Assessment & Plan  Generalized abdominal pain  Nausea vomiting and diarrhea  53 yo F with PMHx of HTN, HLD, hyperparathyroidism, spinal fusion, and anterior mediastinal mass s/p excision with partial thymectomy who presented for abdominal and chest pain with associated n/v/d that started 06/23. Vomiting has resolved, but still with >3 episodes of diarrhea daily. CT negative for acute process. History of gastritis noted, but lower suspicion for this based on lower abdominal pain and diarrhea.     - IV ppi  - bentyl and carafate ordered   -PRN meclizine and phenergan   - stool studies ordered, add GPP    - weaned off fluids   - started with CLD, advanced diet to GI soft diet   -GI consulted for 6/30   Chest pain  - HS trop mildly elevated, but flat  - EKG in ED concerning for possible anterior infarct  - exercise stress echo negative for ischemia   - suspect chest pain 2/2 GI source   - will monitor tele   -repeat of troponin and EKG 6/28 for chest pain reassuring   Vertigo  Numbness and tingling in right hand  -pt with numbness and tingling as well as vertigo symptoms   -Orthostatics ordered  -initial concern for BP difference on R vs L arm but on recheck all equal   -no history of syncope at this time but pt with prodromal syncope symptoms   -will order Carotid US and UE US for further evaluation   -CT A/P reviewed no signs of aneurysm or dissection, EKG and Troponin as well as stress test for symptoms with chest pain reassuring   -CT chest 4/25 reviewed no recurrent mass present   -meclizine PRN while other workup above   -exam negative for carpal tunnel and spurlings signs. Hx of herniated lumbar discs and fusion but no significant history of neck pain or bilateral radiculopathy signs   Chronic left-sided low back pain with sciatica  - continue prn robaxin   Primary  hypertension  Patient's blood pressure range in the last 24 hours was: BP  Min: 108/67  Max: 167/99.The patient's inpatient anti-hypertensive regimen is listed below:  Current Antihypertensives  hydroCHLOROthiazide tablet 12.5 mg, Daily, Oral    Plan  - BP is controlled, no changes needed to their regimen  Type AB thymoma  S/p resection  - follows with OP CTS    Last CT 4/25  Mediastinum: No evidence of recurrent mass in the anterior mediastinum.     Base of neck/thyroid: There is a 2.4 cm right thyroid nodule slightly increased in size (from 22 mm) on 04/11/2024.  This was better evaluated on the thyroid ultrasound from 03/03/2025.  VTE Risk Mitigation (From admission, onward)           Ordered     enoxaparin injection 40 mg  Daily         06/27/25 1631     IP VTE HIGH RISK PATIENT  Once         06/27/25 1631                    Discharge Planning   JOHNIE: 6/30/2025     Code Status: Full Code   Medical Readiness for Discharge Date:   Discharge Plan A: Home, Home with family                        Summer Cuenca MD  Department of Hospital Medicine   [facility] - Internal Medicine Telemetry

## 2025-06-29 NOTE — ASSESSMENT & PLAN NOTE
- HS trop mildly elevated, but flat  - EKG in ED concerning for possible anterior infarct  - exercise stress echo negative for ischemia   - suspect chest pain 2/2 GI source   - will monitor tele   -repeat of troponin and EKG 6/28 for chest pain reassuring

## 2025-06-29 NOTE — SUBJECTIVE & OBJECTIVE
Interval History: Still with abdominal pain and nausea. Meclizine helped symptoms. Still with symptoms of vertigo and dizziness with position changes as well as tingling of the hands. EKG and troponin reassuring.     Review of Systems  Objective:     Vital Signs (Most Recent):  Temp: 97.8 °F (36.6 °C) (06/29/25 1130)  Pulse: (!) 54 (06/29/25 1130)  Resp: 18 (06/29/25 1130)  BP: 120/76 (06/29/25 1130)  SpO2: 100 % (06/29/25 1130) Vital Signs (24h Range):  Temp:  [97.6 °F (36.4 °C)-98.9 °F (37.2 °C)] 97.8 °F (36.6 °C)  Pulse:  [54-69] 54  Resp:  [18-20] 18  SpO2:  [98 %-100 %] 100 %  BP: (108-167)/(63-99) 120/76     Weight: 73.3 kg (161 lb 9.6 oz)  Body mass index is 26.08 kg/m².    Intake/Output Summary (Last 24 hours) at 6/29/2025 1155  Last data filed at 6/29/2025 0900  Gross per 24 hour   Intake 720 ml   Output --   Net 720 ml         Physical Exam  Constitutional:       Appearance: Normal appearance.   HENT:      Head: Normocephalic and atraumatic.      Nose: Nose normal.      Mouth/Throat:      Mouth: Mucous membranes are moist.   Eyes:      Extraocular Movements: Extraocular movements intact.      Pupils: Pupils are equal, round, and reactive to light.   Cardiovascular:      Rate and Rhythm: Normal rate and regular rhythm.      Heart sounds: No murmur heard.     No gallop.   Pulmonary:      Effort: Pulmonary effort is normal.      Breath sounds: Normal breath sounds. No wheezing or rales.   Abdominal:      General: Abdomen is flat. Bowel sounds are normal. There is no distension.      Palpations: Abdomen is soft.      Tenderness: There is abdominal tenderness.   Musculoskeletal:         General: No swelling or tenderness. Normal range of motion.      Cervical back: Normal range of motion and neck supple.      Right lower leg: No edema.      Left lower leg: No edema.   Skin:     General: Skin is warm and dry.      Capillary Refill: Capillary refill takes less than 2 seconds.   Neurological:      General: No  focal deficit present.      Mental Status: She is alert. Mental status is at baseline.   Psychiatric:         Mood and Affect: Mood normal.               Significant Labs: All pertinent labs within the past 24 hours have been reviewed.    Significant Imaging: I have reviewed all pertinent imaging results/findings within the past 24 hours.

## 2025-06-29 NOTE — ASSESSMENT & PLAN NOTE
S/p resection  - follows with OP CTS    Last CT 4/25  Mediastinum: No evidence of recurrent mass in the anterior mediastinum.     Base of neck/thyroid: There is a 2.4 cm right thyroid nodule slightly increased in size (from 22 mm) on 04/11/2024.  This was better evaluated on the thyroid ultrasound from 03/03/2025.

## 2025-06-29 NOTE — ASSESSMENT & PLAN NOTE
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 Vomiting has resolved, but still with >3 episodes of diarrhea daily. CT negative for acute process. History of gastritis noted, but lower suspicion for this based on lower abdominal pain and diarrhea.     - IV ppi  - bentyl and carafate ordered   -PRN meclizine and phenergan   - stool studies ordered, add GPP    - weaned off fluids   - started with CLD, advanced diet to GI soft diet   -GI consulted for 6/30

## 2025-06-29 NOTE — ASSESSMENT & PLAN NOTE
Patient's blood pressure range in the last 24 hours was: BP  Min: 108/67  Max: 167/99.The patient's inpatient anti-hypertensive regimen is listed below:  Current Antihypertensives  hydroCHLOROthiazide tablet 12.5 mg, Daily, Oral    Plan  - BP is controlled, no changes needed to their regimen

## 2025-06-30 ENCOUNTER — ANESTHESIA EVENT (OUTPATIENT)
Dept: ENDOSCOPY | Facility: HOSPITAL | Age: 55
End: 2025-06-30
Payer: MEDICARE

## 2025-06-30 ENCOUNTER — ANESTHESIA (OUTPATIENT)
Dept: ENDOSCOPY | Facility: HOSPITAL | Age: 55
End: 2025-06-30
Payer: MEDICARE

## 2025-06-30 LAB
ANION GAP (OHS): 8 MMOL/L (ref 8–16)
BUN SERPL-MCNC: 9 MG/DL (ref 6–20)
C COLI+JEJ+UPSA DNA STL QL NAA+NON-PROBE: NEGATIVE
CALCIUM SERPL-MCNC: 9.2 MG/DL (ref 8.7–10.5)
CHLORIDE SERPL-SCNC: 106 MMOL/L (ref 95–110)
CO2 SERPL-SCNC: 27 MMOL/L (ref 23–29)
CREAT SERPL-MCNC: 0.7 MG/DL (ref 0.5–1.4)
E COLI SXT1 STL QL IA: NEGATIVE
E COLI SXT2 STL QL IA: NEGATIVE
ERYTHROCYTE [DISTWIDTH] IN BLOOD BY AUTOMATED COUNT: 12 % (ref 11.5–14.5)
GFR SERPLBLD CREATININE-BSD FMLA CKD-EPI: >60 ML/MIN/1.73/M2
GLUCOSE SERPL-MCNC: 96 MG/DL (ref 70–110)
HCT VFR BLD AUTO: 37.9 % (ref 37–48.5)
HGB BLD-MCNC: 12.3 GM/DL (ref 12–16)
MAGNESIUM SERPL-MCNC: 1.9 MG/DL (ref 1.6–2.6)
MCH RBC QN AUTO: 27.9 PG (ref 27–31)
MCHC RBC AUTO-ENTMCNC: 32.5 G/DL (ref 32–36)
MCV RBC AUTO: 86 FL (ref 82–98)
PLATELET # BLD AUTO: 209 K/UL (ref 150–450)
PMV BLD AUTO: 10.1 FL (ref 9.2–12.9)
POTASSIUM SERPL-SCNC: 4 MMOL/L (ref 3.5–5.1)
RBC # BLD AUTO: 4.41 M/UL (ref 4–5.4)
RV AG STL QL IA.RAPID: NEGATIVE
SODIUM SERPL-SCNC: 141 MMOL/L (ref 136–145)
WBC # BLD AUTO: 6.44 K/UL (ref 3.9–12.7)

## 2025-06-30 PROCEDURE — 25000003 PHARM REV CODE 250: Performed by: STUDENT IN AN ORGANIZED HEALTH CARE EDUCATION/TRAINING PROGRAM

## 2025-06-30 PROCEDURE — 36415 COLL VENOUS BLD VENIPUNCTURE: CPT | Performed by: STUDENT IN AN ORGANIZED HEALTH CARE EDUCATION/TRAINING PROGRAM

## 2025-06-30 PROCEDURE — 63600175 PHARM REV CODE 636 W HCPCS: Performed by: STUDENT IN AN ORGANIZED HEALTH CARE EDUCATION/TRAINING PROGRAM

## 2025-06-30 PROCEDURE — 21400001 HC TELEMETRY ROOM

## 2025-06-30 PROCEDURE — 85027 COMPLETE CBC AUTOMATED: CPT | Performed by: STUDENT IN AN ORGANIZED HEALTH CARE EDUCATION/TRAINING PROGRAM

## 2025-06-30 PROCEDURE — 82310 ASSAY OF CALCIUM: CPT | Performed by: STUDENT IN AN ORGANIZED HEALTH CARE EDUCATION/TRAINING PROGRAM

## 2025-06-30 PROCEDURE — 83735 ASSAY OF MAGNESIUM: CPT | Performed by: STUDENT IN AN ORGANIZED HEALTH CARE EDUCATION/TRAINING PROGRAM

## 2025-06-30 PROCEDURE — 25000003 PHARM REV CODE 250

## 2025-06-30 PROCEDURE — 99223 1ST HOSP IP/OBS HIGH 75: CPT | Mod: GC,,, | Performed by: INTERNAL MEDICINE

## 2025-06-30 PROCEDURE — 25000003 PHARM REV CODE 250: Performed by: PHYSICIAN ASSISTANT

## 2025-06-30 RX ORDER — PANTOPRAZOLE SODIUM 40 MG/10ML
40 INJECTION, POWDER, LYOPHILIZED, FOR SOLUTION INTRAVENOUS 2 TIMES DAILY
Status: DISCONTINUED | OUTPATIENT
Start: 2025-06-30 | End: 2025-07-01

## 2025-06-30 RX ADMIN — PROMETHAZINE HYDROCHLORIDE 25 MG: 12.5 TABLET ORAL at 12:06

## 2025-06-30 RX ADMIN — TRAZODONE HYDROCHLORIDE 50 MG: 50 TABLET ORAL at 12:06

## 2025-06-30 RX ADMIN — PANTOPRAZOLE SODIUM 40 MG: 40 INJECTION, POWDER, LYOPHILIZED, FOR SOLUTION INTRAVENOUS at 08:06

## 2025-06-30 RX ADMIN — SUCRALFATE 1 G: 1 SUSPENSION ORAL at 04:06

## 2025-06-30 RX ADMIN — DICYCLOMINE HYDROCHLORIDE 20 MG: 10 CAPSULE ORAL at 04:06

## 2025-06-30 RX ADMIN — TRAZODONE HYDROCHLORIDE 50 MG: 50 TABLET ORAL at 08:06

## 2025-06-30 RX ADMIN — SUCRALFATE 1 G: 1 SUSPENSION ORAL at 08:06

## 2025-06-30 RX ADMIN — METHOCARBAMOL 750 MG: 750 TABLET ORAL at 07:06

## 2025-06-30 RX ADMIN — ASPIRIN 81 MG CHEWABLE TABLET 81 MG: 81 TABLET CHEWABLE at 07:06

## 2025-06-30 RX ADMIN — DICYCLOMINE HYDROCHLORIDE 20 MG: 10 CAPSULE ORAL at 07:06

## 2025-06-30 RX ADMIN — SUCRALFATE 1 G: 1 SUSPENSION ORAL at 06:06

## 2025-06-30 RX ADMIN — DICYCLOMINE HYDROCHLORIDE 20 MG: 10 CAPSULE ORAL at 08:06

## 2025-06-30 RX ADMIN — MECLIZINE HYDROCHLORIDE 25 MG: 25 TABLET ORAL at 07:06

## 2025-06-30 RX ADMIN — PANTOPRAZOLE SODIUM 40 MG: 40 INJECTION, POWDER, LYOPHILIZED, FOR SOLUTION INTRAVENOUS at 07:06

## 2025-06-30 RX ADMIN — PROMETHAZINE HYDROCHLORIDE 25 MG: 12.5 TABLET ORAL at 07:06

## 2025-06-30 RX ADMIN — DICYCLOMINE HYDROCHLORIDE 20 MG: 10 CAPSULE ORAL at 12:06

## 2025-06-30 RX ADMIN — METHOCARBAMOL 750 MG: 750 TABLET ORAL at 11:06

## 2025-06-30 NOTE — ASSESSMENT & PLAN NOTE
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 Vomiting has resolved, but still with >3 episodes of diarrhea daily. CT negative for acute process. History of gastritis noted, but lower suspicion for this based on lower abdominal pain and diarrhea.     - IV ppi  - bentyl and carafate ordered   -PRN meclizine and phenergan   - stool studies ordered, add GPP    - weaned off fluids   - started with CLD, advanced diet to GI soft diet   -GI consulted for 6/30 and plan for EGD 7/1/25

## 2025-06-30 NOTE — PLAN OF CARE
[facility] - Internal Medicine Telemetry  Discharge Reassessment    Primary Care Provider: Charlotte Jacinto MD    Expected Discharge Date: 7/2/2025    Reassessment (most recent)       Discharge Reassessment - 06/30/25 1612          Discharge Reassessment    Assessment Type Discharge Planning Reassessment     Did the patient's condition or plan change since previous assessment? No     Discharge Plan discussed with: Patient     Communicated JOHNIE with patient/caregiver No     Discharge Plan A Home;Home with family     Discharge Plan B Home;Home with family     Transition of Care Barriers None     Why the patient remains in the hospital Requires continued medical care   GI SOFt/NPO       Post-Acute Status    Post-Acute Authorization Other     Other Status No Post-Acute Service Needs     Hospital Resources/Appts/Education Provided Provided education on problems/symptoms using teachback     Discharge Delays None known at this time                   SW met with Pt to discuss DC plan.  Pt is ambulatory  with no medical equipment. Pt verbalized understanding and a motivation to manage her symptoms proactively after medical clearance from [facility]. Pt     Discharge Plan A and Plan B have been determined by review of patient's clinical status, future medical and therapeutic needs, and coverage/benefits for post-acute care in coordination with multidisciplinary team members.

## 2025-06-30 NOTE — SUBJECTIVE & OBJECTIVE
Interval History: NAEO. Still with significant abdominal pain. Still with episodes of dizziness, tingling of the arm, and feeling like she's off balance when she walks.     Review of Systems  Objective:     Vital Signs (Most Recent):  Temp: 98.4 °F (36.9 °C) (06/30/25 1122)  Pulse: 62 (06/30/25 1122)  Resp: 18 (06/30/25 1122)  BP: 122/80 (06/30/25 1122)  SpO2: 100 % (06/30/25 1122) Vital Signs (24h Range):  Temp:  [97.5 °F (36.4 °C)-99.1 °F (37.3 °C)] 98.4 °F (36.9 °C)  Pulse:  [56-84] 62  Resp:  [18-20] 18  SpO2:  [93 %-100 %] 100 %  BP: (105-124)/(66-82) 122/80     Weight: 73.3 kg (161 lb 9.6 oz)  Body mass index is 26.08 kg/m².    Intake/Output Summary (Last 24 hours) at 6/30/2025 1316  Last data filed at 6/30/2025 1020  Gross per 24 hour   Intake 680 ml   Output --   Net 680 ml         Physical Exam  Constitutional:       Appearance: Normal appearance.   HENT:      Head: Normocephalic and atraumatic.      Nose: Nose normal.      Mouth/Throat:      Mouth: Mucous membranes are moist.   Eyes:      Extraocular Movements: Extraocular movements intact.      Pupils: Pupils are equal, round, and reactive to light.   Cardiovascular:      Rate and Rhythm: Normal rate and regular rhythm.      Heart sounds: No murmur heard.     No gallop.   Pulmonary:      Effort: Pulmonary effort is normal.      Breath sounds: Normal breath sounds. No wheezing or rales.   Abdominal:      General: Abdomen is flat. Bowel sounds are normal. There is no distension.      Palpations: Abdomen is soft.      Tenderness: There is abdominal tenderness.   Musculoskeletal:         General: No swelling or tenderness. Normal range of motion.      Cervical back: Normal range of motion and neck supple.      Right lower leg: No edema.      Left lower leg: No edema.   Skin:     General: Skin is warm and dry.      Capillary Refill: Capillary refill takes less than 2 seconds.   Neurological:      General: No focal deficit present.      Mental Status: She is  alert. Mental status is at baseline.      Motor: Weakness present.      Coordination: Coordination abnormal.      Gait: Gait abnormal.   Psychiatric:         Mood and Affect: Mood normal.               Significant Labs: All pertinent labs within the past 24 hours have been reviewed.    Significant Imaging: I have reviewed all pertinent imaging results/findings within the past 24 hours.

## 2025-06-30 NOTE — ANESTHESIA PREPROCEDURE EVALUATION
[facility]  Anesthesia Pre-Operative Evaluation         Patient Name: Laz Quintero  YOB: 1970  MRN: [id number]    SUBJECTIVE:     Pre-operative evaluation for Procedure(s) (LRB):  EGD (ESOPHAGOGASTRODUODENOSCOPY) (N/A)     06/30/2025    Laz Quintero is a 54 y.o. female w/ a significant PMHx of HTN, HLD, PONV, hyperparathyroidism, spinal fusion, and anterior mediastinal mass s/p excision with partial thymectomy who presented to ED for abdominal and chest pain.    Patient now presents for the above procedure(s) due to N and V and diarrhea.    Stress echo:        Stress Protocol: The patient was infused intravenously with dobutamine. The patient received a graduated infusion of the stress agent beginning at 10.0 mcg/kg/min to a peak dose of 30.0 mcg/kg/min. The peak heart rate was 146 bpm, which is 91% of age predicted maximum heart rate. The patient was also given atropine for a total dose of .25 mg. The patient reported no symptoms during the stress test. The test was stopped because the end of the protocol was reached.    Left Ventricle: The left ventricle is normal in size. Ventricular mass is normal. Normal wall thickness. There is concentric remodeling. There is normal systolic function with a visually estimated ejection fraction of 55 - 60%. There is normal diastolic function.    Right Ventricle: The right ventricle is normal in size measuring 3.2 cm. Systolic function is normal.    Tricuspid Valve: There is mild to moderate regurgitation.    Pulmonary Artery: The estimated pulmonary artery systolic pressure is 37 mmHg.    IVC/SVC: Normal venous pressure at 3 mmHg.    Baseline ECG: The Baseline ECG reveals sinus rhythm. The axis is normal. The baseline ECG has non-specific ST-T wave changes.    Stress ECG: There is 0.5 mm of upward-sloping ST segment depression in the inferolateral leads (II, III, aVF, V5 and V6) noted during stress. During stress, occasional PVCs are  noted. There is normal blood pressure response with stress.    ECG Conclusion: The ECG portion of the study is negative for ischemia.    Post-stress Conclusion: The study is normal and negative with no echocardiographic evidence of stress induced ischemia.       LDA:   Peripheral IV 06/26/25 1241 20 G Right Antecubital (Active)   Site Assessment Clean;Dry;Intact 06/30/25 0755   Extremity Assessment Distal to IV No abnormal discoloration;No redness;No swelling;No warmth 06/29/25 2100   Dressing Status Clean;Dry;Intact 06/30/25 0755   Dressing Intervention Integrity maintained 06/30/25 0755   Dressing Change Due 06/30/25 06/29/25 2100   Site Change Due 06/30/25 06/30/25 0755   Reason Not Rotated Not due 06/29/25 2100   Number of days: 3       Peripheral IV Single Lumen 06/28/25 0526 22 G Posterior;Right Hand (Active)   Site Assessment Clean;Dry;Intact 06/30/25 0755   Extremity Assessment Distal to IV No abnormal discoloration;No redness;No swelling;No warmth 06/29/25 2100   Line Status Saline locked;Flushed 06/29/25 2100   Dressing Status Clean;Dry;Intact 06/30/25 0755   Dressing Intervention Integrity maintained 06/30/25 0755   Dressing Change Due 07/02/25 06/29/25 2100   Site Change Due 07/02/25 06/30/25 0755   Reason Not Rotated Not due 06/29/25 2100   Number of days: 2       Prev airway:     Date/Time: 3/21/2023 7:50 AM  Performed by: Teo Chawla MD  Authorized by: Maciel Davila MD      Intubation:     Induction:  Intravenous    Intubated:  Postinduction    Mask Ventilation:  Easy with oral airway    Attempts:  1    Attempted By:  Resident anesthesiologist    Method of Intubation:  Video laryngoscopy    Blade:  Martinez 3    Laryngeal View Grade: Grade I - full view of cords      Difficult Airway Encountered?: No      Complications:  None    Airway Device:  Double lumen tube left    Airway Device Size:  37F    Style/Cuff Inflation:  Cuffed (inflated to minimal occlusive pressure)    Secured at:  The  lips    Placement Verified By:  Capnometry and Revisualization with laryngoscopy    Complicating Factors:  None    Findings Post-Intubation:  BS equal bilateral and atraumatic/condition of teeth unchanged       Drips: None documented.      Problem List[1]    Review of patient's allergies indicates:   Allergen Reactions    Clindamycin     Sulfa (sulfonamide antibiotics) Anaphylaxis    Sulfa dyne     Sulfamethoxazole-trimethoprim      Other reaction(s): Anaphylaxis    Dermabond [tissue glues] Rash       Current Inpatient Medications:   aspirin  81 mg Oral Daily    dicyclomine  20 mg Oral QID    enoxparin  40 mg Subcutaneous Daily    hydroCHLOROthiazide  12.5 mg Oral Daily    pantoprazole  40 mg Intravenous Daily    sucralfate  1 g Oral QID (AC & HS)    traZODone  50 mg Oral QHS       Medications Ordered Prior to Encounter[2]    Past Surgical History:   Procedure Laterality Date    BACK SURGERY  2018    CHOLECYSTECTOMY      COLONOSCOPY N/A 01/19/2018    Procedure: COLONOSCOPY;  Surgeon: Malachi Olmedo MD;  Location: [facility] (4TH FLR);  Service: Endoscopy;  Laterality: N/A;    COLONOSCOPY N/A 12/28/2022    Procedure: COLONOSCOPY;  Surgeon: Ezekiel Alanis MD;  Location: [facility] (4TH FLR);  Service: Endoscopy;  Laterality: N/A;  inst via portal  pre call complete [facility]    ESOPHAGOGASTRODUODENOSCOPY N/A 09/06/2019    Procedure: EGD (ESOPHAGOGASTRODUODENOSCOPY);  Surgeon: Jose Carlos Ventura MD;  Location: [facility] (4TH FLR);  Service: Endoscopy;  Laterality: N/A;  pt requesting ASAP    ESOPHAGOGASTRODUODENOSCOPY N/A 6/13/2024    Procedure: EGD (ESOPHAGOGASTRODUODENOSCOPY);  Surgeon: Malachi Olmedo MD;  Location: [facility] (2ND FLR);  Service: Endoscopy;  Laterality: N/A;    ESOPHAGOGASTRODUODENOSCOPY N/A 9/3/2024    Procedure: EGD (ESOPHAGOGASTRODUODENOSCOPY);  Surgeon: Saurav Draper MD;  Location: [facility] (4TH FLR);  Service: Endoscopy;  Laterality: N/A;  Ref by:ana Hurley sent via  [email or web address].AC  8/23-lvm for pre call-tb  8/30-precall complete-KPvt    HYSTERECTOMY, TOTAL, LAPAROSCOPIC, WITH SALPINGECTOMY  06/16/2015    INJECTION OF ANESTHETIC AGENT AROUND MULTIPLE INTERCOSTAL NERVES Right 03/21/2023    Procedure: BLOCK, NERVE, INTERCOSTAL, 2 OR MORE;  Surgeon: Anton Evangelista MD;  Location: [facility] OR [address] [place] [address];  Service: Cardiothoracic;  Laterality: Right;    TUBAL LIGATION      Essure    WISDOM TOOTH EXTRACTION      2005    XI ROBOTIC RATS Right 03/21/2023    Procedure: XI ROBOTIC THYMECTOMY;  Surgeon: Anton Evangelista MD;  Location: [facility] OR [address] [place] [address];  Service: Cardiothoracic;  Laterality: Right;       Social History[3]    OBJECTIVE:     Vital Signs Range (Last 24H):  Temp:  [36.4 °C (97.5 °F)-37.3 °C (99.1 °F)]   Pulse:  [56-84]   Resp:  [18-20]   BP: (105-124)/(66-82)   SpO2:  [93 %-100 %]       Significant Labs:  Lab Results   Component Value Date    WBC 6.44 06/30/2025    HGB 12.3 06/30/2025    HCT 37.9 06/30/2025     06/30/2025    CHOL 203 (H) 06/27/2025    TRIG 88 06/27/2025    HDL 56 06/27/2025    ALT 19 06/28/2025    AST 19 06/28/2025     06/30/2025    K 4.0 06/30/2025     06/30/2025    CREATININE 0.7 06/30/2025    BUN 9 06/30/2025    CO2 27 06/30/2025    TSH 0.713 06/17/2025    INR 1.0 04/12/2015    HGBA1C 5.3 06/17/2024       Diagnostic Studies: No relevant studies.    EKG:   Results for orders placed or performed during the hospital encounter of 06/26/25   EKG 12-lead    Collection Time: 06/28/25  4:09 PM   Result Value Ref Range    QRS Duration 74 ms    [facility] QTC Calculation 438 ms    Narrative    Test Reason : R07.9,    Vent. Rate :  62 BPM     Atrial Rate :  62 BPM     P-R Int : 136 ms          QRS Dur :  74 ms      QT Int : 432 ms       P-R-T Axes :  72  18  28 degrees    QTcB Int : 438 ms    Normal sinus rhythm  Normal ECG  When compared with ECG of 27-Jun-2025 08:51,  Nonspecific T wave abnormality no longer evident in Lateral leads  Confirmed by Ru Keane ([id number])  "on 6/29/2025 9:46:22 AM    Referred By: AAAREFERRAL SELF           Confirmed By: Ru Keane       2D ECHO:  TTE:  Results for orders placed or performed during the hospital encounter of 12/19/22   Echo   Result Value Ref Range    BSA 1.88 m2    TDI SEPTAL 0.04 m/s    LV LATERAL E/E' RATIO 7.00 m/s    LV SEPTAL E/E' RATIO 10.50 m/s    LA WIDTH 3.36 cm    TDI LATERAL 0.06 m/s    LVIDd 3.95 3.5 - 6.0 cm    IVS 1.03 0.6 - 1.1 cm    PW 0.88 0.6 - 1.1 cm    LVIDs 2.61 2.1 - 4.0 cm    FS 34 28 - 44 %    LA Vol 34.55 cm3    Sinus 2.78 cm    STJ 2.54 cm    Ascending aorta 2.66 cm    LV mass 116.75 g    LA size 3.01 cm    RVDD 3.52 cm    TAPSE 1.43 cm    RV S' 11.17 cm/s    Left Ventricle Relative Wall Thickness 0.45 cm    AV mean gradient 3 mmHg    AV valve area 2.51 cm2    AV Velocity Ratio 0.67     AV index (prosthetic) 0.68     MV valve area p 1/2 method 5.83 cm2    E/A ratio 0.67     Mean e' 0.05 m/s    E wave deceleration time 130.09 msec    MV "A" wave duration 9.99 msec    Pulm vein S/D ratio 2.26     LVOT diameter 2.17 cm    LVOT area 3.7 cm2    LVOT peak abdulaziz 0.73 m/s    LVOT peak VTI 13.47 cm    Ao peak abdulaziz 1.09 m/s    Ao VTI 19.85 cm    LVOT stroke volume 49.79 cm3    AV peak gradient 5 mmHg    E/E' ratio 8.40 m/s    MV Peak E Abdulaziz 0.42 m/s    TR Max Abdulaziz 2.16 m/s    MV stenosis pressure 1/2 time 37.73 ms    MV Peak A Abdulaziz 0.63 m/s    PV Peak S Abdulaziz 0.61 m/s    PV Peak D Abdulaziz 0.27 m/s    LV Systolic Volume 24.89 mL    LV Systolic Volume Index 13.4 mL/m2    LV Diastolic Volume 67.93 mL    LV Diastolic Volume Index 36.52 mL/m2    BENIGNO 18.6 mL/m2    LV Mass Index 63 g/m2    RA Major Axis 3.79 cm    Left Atrium Minor Axis 4.18 cm    Left Atrium Major Axis 3.87 cm    Triscuspid Valve Regurgitation Peak Gradient 19 mmHg    BENIGNO (MOD) 15.8 mL/m2    LA Vol (MOD) 29.39 cm3    RA Width 2.44 cm    Right Atrial Pressure (from IVC) 3 mmHg    QEF 46 %    EF 50 %    TV resting pulmonary artery pressure 22 mmHg    Narrative    · The " left ventricle is normal in size with concentric remodeling and low   normal systolic function.  · The estimated ejection fraction is 50%.  · Grade I left ventricular diastolic dysfunction.  · Normal right ventricular size with normal right ventricular systolic   function.  · Mild pulmonic regurgitation.  · The quantitatively derived ejection fraction is 46%.  · Mild tricuspid regurgitation.  · Normal central venous pressure (3 mmHg).  · The estimated PA systolic pressure is 22 mmHg.          DOMINICK:  No results found for this or any previous visit.    ASSESSMENT/PLAN:          Pre-op Assessment    I have reviewed the Patient Summary Reports.     I have reviewed the Nursing Notes. I have reviewed the NPO Status.   I have reviewed the Medications.     Review of Systems  Anesthesia Hx:  No problems with previous Anesthesia   History of prior surgery of interest to airway management or planning:           Personal Hx of Anesthesia complications, Post-Operative Nausea/Vomiting                    Hematology/Oncology:       -- Denies Anemia:                  Denies Current/Recent Cancer                EENT/Dental:         Denies Otitis Media        Cardiovascular:     Hypertension    Denies CAD.        Denies CHF.    hyperlipidemia                               Pulmonary:    Denies COPD.                     Renal/:   Denies Chronic Renal Disease.                Hepatic/GI:      Denies GERD.                Musculoskeletal:  Denies Arthritis.          Spine Disorders:             Neurological:    Denies CVA. Neuromuscular Disease,            Denies Dementia                         Endocrine:  Denies Diabetes.   Hyperparathyroid        Psych:  Denies Psychiatric History.                  Physical Exam  General: Well nourished, Cooperative and Alert    Airway:  Mallampati: II   Mouth Opening: Normal  TM Distance: Normal  Tongue: Normal  Neck ROM: Normal ROM    Dental:  Intact    Chest/Lungs:  Clear to auscultation, Normal  Respiratory Rate    Heart:  Rate: Normal  Rhythm: Regular Rhythm  Sounds: Normal        Anesthesia Plan  Type of Anesthesia, risks & benefits discussed:    Anesthesia Type: Gen Natural Airway, Gen ETT  Intra-op Monitoring Plan: Standard ASA Monitors  Post Op Pain Control Plan: multimodal analgesia and IV/PO Opioids PRN  Induction:  IV  Airway Plan: Direct, Post-Induction  Informed Consent: Informed consent signed with the Patient and all parties understand the risks and agree with anesthesia plan.  All questions answered.   ASA Score: 3  Day of Surgery Review of History & Physical: H&P Update referred to the surgeon/provider.    Ready For Surgery From Anesthesia Perspective.     .           [1]   Patient Active Problem List  Diagnosis    Low back pain radiating to right leg    Seasonal allergies    HPTH (hyperparathyroidism)    Chronic LBP    Right lumbar radiculopathy    Anxiety and depression    S/P laparoscopic hysterectomy    Left lumbar radiculitis    Generalized abdominal pain    Herniated nucleus pulposus, L5-S1, left    Chronic left-sided low back pain with sciatica    Weakness of both lower extremities    Decreased range of motion of trunk and back    Pre-op exam    Palpitations    Primary hypertension    Mediastinal mass    Weakness    Pain    Left lumbar radiculopathy    Nausea vomiting and diarrhea    Chronic gastritis without bleeding    Type AB thymoma    Chest pain    Vertigo    Numbness and tingling in right hand   [2]   No current facility-administered medications on file prior to encounter.     Current Outpatient Medications on File Prior to Encounter   Medication Sig Dispense Refill    albuterol (PROVENTIL/VENTOLIN HFA) 90 mcg/actuation inhaler INHALE 2 PUFFS INTO THE LUNGS BY MOUTH EVERY 6 HOURS AS NEEDED FOR WHEEZING. RESCUE. 8.5 g 0    ALPRAZolam (XANAX) 0.25 MG tablet Take 1 tablet (0.25 mg total) by mouth daily as needed for Anxiety. 20 tablet 1    benzonatate (TESSALON) 200 MG capsule Take 1  capsule (200 mg total) by mouth 3 (three) times daily as needed for Cough. 20 capsule 0    buPROPion (WELLBUTRIN XL) 150 MG TB24 tablet Take 150 mg by mouth once daily.      butalbital-acetaminophen-caffeine -40 mg (FIORICET, ESGIC) -40 mg per tablet Take 1 tablet by mouth every 4 (four) hours as needed.      diclofenac sodium (VOLTAREN) 1 % Gel Apply 2 g topically once daily. Use gloves to apply 100 g 1    fluticasone propionate (FLONASE) 50 mcg/actuation nasal spray 1 spray (50 mcg total) by Each Nostril route once daily. 11.1 mL 0    hydroCHLOROthiazide 12.5 MG Tab Take 1 tablet (12.5 mg total) by mouth once daily. 90 tablet 1    meclizine (ANTIVERT) 25 mg tablet Take 1 tablet (25 mg total) by mouth 3 (three) times daily as needed for Dizziness. 30 tablet 0    meloxicam (MOBIC) 15 MG tablet Take 15 mg by mouth once daily.      methocarbamoL (ROBAXIN) 750 MG Tab Take 750 mg by mouth every 12 (twelve) hours.      pantoprazole (PROTONIX) 40 MG tablet Take 1 tablet (40 mg total) by mouth once daily. 90 tablet 0    prasterone, dhea, (INTRAROSA) 6.5 mg Inst Place 6.5 mg vaginally every evening. 30 each 11    pregabalin (LYRICA) 75 MG capsule Take 75 mg by mouth 2 (two) times daily.      promethazine (PHENERGAN) 25 MG tablet Take 1 tablet (25 mg total) by mouth every 6 (six) hours as needed for Nausea. 20 tablet 0    promethazine (PHENERGAN) 25 MG tablet Take 1 tablet (25 mg total) by mouth every 6 (six) hours as needed for Nausea. 20 tablet 0    promethazine-dextromethorphan (PROMETHAZINE-DM) 6.25-15 mg/5 mL Syrp Take 5 mLs by mouth every 4 (four) hours as needed (for cough). 118 mL 0    QUEtiapine (SEROQUEL XR) 150 mg Tb24 Take 150 mg by mouth every evening.      traZODone (DESYREL) 100 MG tablet Take 100 mg by mouth every evening.     [3]   Social History  Socioeconomic History    Marital status: Single   Occupational History    Occupation: nurse     Employer: [employer]   Tobacco Use     Smoking status: Never     Passive exposure: Never    Smokeless tobacco: Never   Substance and Sexual Activity    Alcohol use: No    Drug use: No    Sexual activity: Yes     Partners: Male     Birth control/protection: See Surgical Hx     Comment:  ; new partner since 2018   Other Topics Concern    Are you pregnant or think you may be? No    Breast-feeding No     Social Drivers of Health     Financial Resource Strain: Low Risk  (6/27/2025)    Overall Financial Resource Strain (CARDIA)     Difficulty of Paying Living Expenses: Not very hard   Food Insecurity: No Food Insecurity (6/27/2025)    Hunger Vital Sign     Worried About Running Out of Food in the Last Year: Never true     Ran Out of Food in the Last Year: Never true   Transportation Needs: No Transportation Needs (6/27/2025)    PRAPARE - Transportation     Lack of Transportation (Medical): No     Lack of Transportation (Non-Medical): No   Physical Activity: Insufficiently Active (6/27/2025)    Exercise Vital Sign     Days of Exercise per Week: 1 day     Minutes of Exercise per Session: 50 min   Stress: No Stress Concern Present (6/27/2025)    [employer] [place] [employer] - Occupational Stress Questionnaire     Feeling of Stress : Only a little   Housing Stability: Low Risk  (6/27/2025)    Housing Stability Vital Sign     Unable to Pay for Housing in the Last Year: No     Homeless in the Last Year: No

## 2025-06-30 NOTE — ASSESSMENT & PLAN NOTE
Patient's blood pressure range in the last 24 hours was: BP  Min: 105/66  Max: 124/82.The patient's inpatient anti-hypertensive regimen is listed below:  Current Antihypertensives  hydroCHLOROthiazide tablet 12.5 mg, Daily, Oral    Plan  - BP is controlled, no changes needed to their regimen

## 2025-06-30 NOTE — CONSULTS
[facility]  Gastroenterology  Consult Note    Patient Name: Laz Quintero  MRN: [id number]  Admission Date: 6/26/2025  Hospital Length of Stay: 3 days  Code Status: Full Code   Attending Provider: Summer Cuenca MD   Consulting Provider: aMt Fisher MD  Primary Care Physician: Charlotte Jacinto MD  Principal Problem:Generalized abdominal pain    Inpatient consult to Gastroenterology  Consult performed by: Mat Fisher MD  Consult ordered by: Summer Cuenca MD        Subjective:     HPI: Laz Quintero is a 54 y.o. female with history of PUD, HTN, HLD, hyperparathyroidism, spinal fusion, and anterior mediastinal mass s/p excision with partial thymectomy who presented to ED for abdominal and chest pain.  Patient has been having trouble tolerating p.o. intake due to lower abdominal pain that occurs after any p.o. intake.  Patient reports that this pain feels like an electric shock that starts on the left lower quadrant and radiates across her lower abdomen.  It is exacerbated with movement, palpation of the area and intermittently with p.o. intake.  She also had diarrhea initially which has since improved.  Patient reports nausea, vomiting as associated symptoms.  She denies any hematemesis, melena, hematochezia.  Of note, patient reports that this electric shock type pain is similar to when she had peptic ulcers in the past.    Vitals stable.  Labs with hemoglobin at baseline of 12.3, platelets 209, BUN 9, creatinine 0.7.  CT abdomen pelvis with IV contrast on 06/26 without acute process, showed minimal colon diverticulosis.    EGD on 06/13/2024 done for epigastric abdominal pain, nausea and vomiting:  Impression:            - Normal esophageal anastomosis.                          - Esophagogastric landmarks identified.                          - Erosive gastritis. Biopsied.                          - Non-bleeding gastric ulcer with a clean ulcer                           base (Dayron Class III).                          - Gastric ulcers with a clean ulcer base (Dayron                          Class III).     Repeat EGD on 09/03/2024:  Impression:            - Normal esophagus.                          - Normal stomach.                          - Normal examined duodenum.                          - No specimens collected.     ROS - negative other than what is stated in the HPI    Objective:     Vitals:    06/30/25 1522   BP: 112/74   Pulse: 80   Resp: 16   Temp: 98.2 °F (36.8 °C)       Constitutional:  not in acute distress and well developed  HENT: Head: Normal, normocephalic, atraumatic.  Eyes: conjunctiva clear and sclera nonicteric  GI: soft, nondistended, and tenderness marked to touch of LLQ without even palpation of the area  Skin: normal color  Neurological: alert, oriented x3  Psychiatric: mood and affect are within normal limits, pt is a good historian; no memory problems were noted    Significant Labs:  Reviewed pertinent laboratory tests    Significant Imaging:  See HPI.      Assessment/Plan:     Laz Quintero is a 54 y.o. female with history of PUD, HTN, HLD, hyperparathyroidism, spinal fusion, and anterior mediastinal mass s/p excision with partial thymectomy who presented to ED for abdominal/chest pain with associated intolerance of p.o. intake, nausea, vomiting, initial diarrhea which has since improved.  Patient's abdominal pain appears to be musculoskeletal in nature mostly given that the pain worsens with positional changes and very slight touch to the LLQ area.  This is not consistent with a GI source of pain.  However, because patient notes that her symptoms are similar to when she had PUD in the past, can assess further with EGD.    Problem List:  LLQ abdominal pain  Nausea, vomiting  History of peptic ulcer disease    Recommendations:  - Plan for EGD tomorrow  - Maintain IV access with 2 large bore Ivs  - Intravascular resuscitation/support  with IVFs   - Ok for diet today.  NPO at midnight  - Hold all NSAIDs and anticoagulants, unless contraindicated  - Bolus IV pantoprazole 80mg followed by 40mg BID  - Please correct any coagulopathy with platelets and FFP for goal of platelets >50K and INR <2.0  - Please notify GI team if there is significant change in patient's clinical status    Thank you for involving us in the care of Laz Quintero. Please call with any additional questions, concerns or changes in the patient's clinical status. We will continue to follow.     Discussed with Dr. Honorio Fisher MD  Gastroenterology Fellow PGY-IV  [facility]

## 2025-06-30 NOTE — ASSESSMENT & PLAN NOTE
-pt with numbness and tingling as well as vertigo symptoms   -Orthostatics ordered  -initial concern for BP difference on R vs L arm but on recheck all equal   -no history of syncope at this time but pt with prodromal syncope symptoms   -will order Carotid US and UE US for further evaluation   -CT A/P reviewed no signs of aneurysm or dissection, EKG and Troponin as well as stress test for symptoms with chest pain reassuring   -CT chest 4/25 reviewed no recurrent mass present   -meclizine PRN while other workup above   -exam negative for carpal tunnel and spurlings signs. Hx of herniated lumbar discs and fusion but no significant history of neck pain or bilateral radiculopathy signs   -will discuss with Neuro and other further causes

## 2025-06-30 NOTE — PROGRESS NOTES
"[facility] Medicine  Progress Note    Patient Name: Laz Quintero  MRN: [id number]  Patient Class: IP- Inpatient   Admission Date: 6/26/2025  Length of Stay: 3 days  Attending Physician: Summer Cuenca MD  Primary Care Provider: Charlotte Jacinto MD        Subjective     Principal Problem:Generalized abdominal pain        HPI:  Laz Quintero is a 53 yo F with PMHx of HTN, HLD, hyperparathyroidism, spinal fusion, and anterior mediastinal mass s/p excision with partial thymectomy who presented to ED for abdominal and chest pain. Patient reports her symptoms started on Monday with L sided chest and epigastric pain. Pain was associated with n/v/d and subjective fever with chills. States that she has not been able to tolerate any food by mouth since Tuesday so her vomiting has resolved. No recent travel or antibiotic use.  CT A/P without acute process. Intake labs grossly unremarkable, except for HS trop mildly elevated. Trop remained flat. She was placed in observation in the EDOU. An echo stress test was performed today, which was negative for ischemia. PO challenged in EDOU and she immediately developed abdominal cramping and had an episode of diarrhea. On my evaluation, she reports diarrhea is starting to improve, but she still had 3 soft BM today that were loose, green and "foamy." Reports while in EDOU she just had a severe episode of electric shocking pain that started in her RLQ and radiated up to her RUQ. GI consulted. Admitted to  for further evaluation.     Overview/Hospital Course:  While on the floor started on IV PPI and carafate scheduled for gastritis. Supportive medications for pain given as well as IVF. Diet started and advanced as tolerated.     Interval History: NAEO. Still with significant abdominal pain. Still with episodes of dizziness, tingling of the arm, and feeling like she's off balance when she walks.     Review of Systems  Objective: "     Vital Signs (Most Recent):  Temp: 98.4 °F (36.9 °C) (06/30/25 1122)  Pulse: 62 (06/30/25 1122)  Resp: 18 (06/30/25 1122)  BP: 122/80 (06/30/25 1122)  SpO2: 100 % (06/30/25 1122) Vital Signs (24h Range):  Temp:  [97.5 °F (36.4 °C)-99.1 °F (37.3 °C)] 98.4 °F (36.9 °C)  Pulse:  [56-84] 62  Resp:  [18-20] 18  SpO2:  [93 %-100 %] 100 %  BP: (105-124)/(66-82) 122/80     Weight: 73.3 kg (161 lb 9.6 oz)  Body mass index is 26.08 kg/m².    Intake/Output Summary (Last 24 hours) at 6/30/2025 1316  Last data filed at 6/30/2025 1020  Gross per 24 hour   Intake 680 ml   Output --   Net 680 ml         Physical Exam  Constitutional:       Appearance: Normal appearance.   HENT:      Head: Normocephalic and atraumatic.      Nose: Nose normal.      Mouth/Throat:      Mouth: Mucous membranes are moist.   Eyes:      Extraocular Movements: Extraocular movements intact.      Pupils: Pupils are equal, round, and reactive to light.   Cardiovascular:      Rate and Rhythm: Normal rate and regular rhythm.      Heart sounds: No murmur heard.     No gallop.   Pulmonary:      Effort: Pulmonary effort is normal.      Breath sounds: Normal breath sounds. No wheezing or rales.   Abdominal:      General: Abdomen is flat. Bowel sounds are normal. There is no distension.      Palpations: Abdomen is soft.      Tenderness: There is abdominal tenderness.   Musculoskeletal:         General: No swelling or tenderness. Normal range of motion.      Cervical back: Normal range of motion and neck supple.      Right lower leg: No edema.      Left lower leg: No edema.   Skin:     General: Skin is warm and dry.      Capillary Refill: Capillary refill takes less than 2 seconds.   Neurological:      General: No focal deficit present.      Mental Status: She is alert. Mental status is at baseline.      Motor: Weakness present.      Coordination: Coordination abnormal.      Gait: Gait abnormal.   Psychiatric:         Mood and Affect: Mood normal.                Significant Labs: All pertinent labs within the past 24 hours have been reviewed.    Significant Imaging: I have reviewed all pertinent imaging results/findings within the past 24 hours.      Assessment & Plan  Generalized abdominal pain  Nausea vomiting and diarrhea  55 yo F with PMHx of HTN, HLD, hyperparathyroidism, spinal fusion, and anterior mediastinal mass s/p excision with partial thymectomy who presented for abdominal and chest pain with associated n/v/d that started 06/23. Vomiting has resolved, but still with >3 episodes of diarrhea daily. CT negative for acute process. History of gastritis noted, but lower suspicion for this based on lower abdominal pain and diarrhea.     - IV ppi  - bentyl and carafate ordered   -PRN meclizine and phenergan   - stool studies ordered, add GPP    - weaned off fluids   - started with CLD, advanced diet to GI soft diet   -GI consulted for 6/30 and plan for EGD 7/1/25   Chest pain  - HS trop mildly elevated, but flat  - EKG in ED concerning for possible anterior infarct  - exercise stress echo negative for ischemia   - suspect chest pain 2/2 GI source   - will monitor tele   -repeat of troponin and EKG 6/28 for chest pain reassuring   Vertigo  Numbness and tingling in right hand  -pt with numbness and tingling as well as vertigo symptoms   -Orthostatics ordered  -initial concern for BP difference on R vs L arm but on recheck all equal   -no history of syncope at this time but pt with prodromal syncope symptoms   -will order Carotid US and UE US for further evaluation   -CT A/P reviewed no signs of aneurysm or dissection, EKG and Troponin as well as stress test for symptoms with chest pain reassuring   -CT chest 4/25 reviewed no recurrent mass present   -meclizine PRN while other workup above   -exam negative for carpal tunnel and spurlings signs. Hx of herniated lumbar discs and fusion but no significant history of neck pain or bilateral radiculopathy signs   -will  discuss with Neuro and other further causes   Chronic left-sided low back pain with sciatica  - continue prn robaxin   Primary hypertension  Patient's blood pressure range in the last 24 hours was: BP  Min: 105/66  Max: 124/82.The patient's inpatient anti-hypertensive regimen is listed below:  Current Antihypertensives  hydroCHLOROthiazide tablet 12.5 mg, Daily, Oral    Plan  - BP is controlled, no changes needed to their regimen  Type AB thymoma  S/p resection  - follows with OP CTS    Last CT 4/25  Mediastinum: No evidence of recurrent mass in the anterior mediastinum.     Base of neck/thyroid: There is a 2.4 cm right thyroid nodule slightly increased in size (from 22 mm) on 04/11/2024.  This was better evaluated on the thyroid ultrasound from 03/03/2025.  VTE Risk Mitigation (From admission, onward)           Ordered     enoxaparin injection 40 mg  Daily         06/27/25 1631     IP VTE HIGH RISK PATIENT  Once         06/27/25 1631                    Discharge Planning   JOHNIE: 7/1/2025     Code Status: Full Code   Medical Readiness for Discharge Date:   Discharge Plan A: Home with family                        Summer Cuenca MD  Department of Hospital Medicine   [facility] - Internal Medicine Telemetry

## 2025-07-01 PROBLEM — G43.109 MIGRAINE WITH VISUAL AURA: Status: ACTIVE | Noted: 2025-07-01

## 2025-07-01 LAB
ANION GAP (OHS): 10 MMOL/L (ref 8–16)
BACTERIA STL CULT: NORMAL
BUN SERPL-MCNC: 6 MG/DL (ref 6–20)
CALCIUM SERPL-MCNC: 9.1 MG/DL (ref 8.7–10.5)
CHLORIDE SERPL-SCNC: 106 MMOL/L (ref 95–110)
CO2 SERPL-SCNC: 24 MMOL/L (ref 23–29)
CREAT SERPL-MCNC: 0.7 MG/DL (ref 0.5–1.4)
EAG (OHS): 108 MG/DL (ref 68–131)
ERYTHROCYTE [DISTWIDTH] IN BLOOD BY AUTOMATED COUNT: 11.9 % (ref 11.5–14.5)
FAT STL QL: NORMAL
FOLATE SERPL-MCNC: 8 NG/ML (ref 4–24)
GFR SERPLBLD CREATININE-BSD FMLA CKD-EPI: >60 ML/MIN/1.73/M2
GLUCOSE SERPL-MCNC: 90 MG/DL (ref 70–110)
HBA1C MFR BLD: 5.4 % (ref 4–5.6)
HCT VFR BLD AUTO: 37.2 % (ref 37–48.5)
HGB BLD-MCNC: 12.3 GM/DL (ref 12–16)
LEFT CBA DIAS: 18 CM/S
LEFT CBA SYS: 51 CM/S
LEFT CCA DIST DIAS: 21 CM/S
LEFT CCA DIST SYS: 71 CM/S
LEFT CCA MID DIAS: 22 CM/S
LEFT CCA MID SYS: 88 CM/S
LEFT CCA PROX DIAS: 25 CM/S
LEFT CCA PROX SYS: 94 CM/S
LEFT ECA DIAS: 13 CM/S
LEFT ECA SYS: 72 CM/S
LEFT ICA DIST DIAS: 31 CM/S
LEFT ICA DIST SYS: 71 CM/S
LEFT ICA MID DIAS: 35 CM/S
LEFT ICA MID SYS: 79 CM/S
LEFT ICA PROX DIAS: 27 CM/S
LEFT ICA PROX SYS: 74 CM/S
LEFT VERTEBRAL DIAS: 19 CM/S
LEFT VERTEBRAL SYS: 56 CM/S
MAGNESIUM SERPL-MCNC: 1.8 MG/DL (ref 1.6–2.6)
MCH RBC QN AUTO: 28.1 PG (ref 27–31)
MCHC RBC AUTO-ENTMCNC: 33.1 G/DL (ref 32–36)
MCV RBC AUTO: 85 FL (ref 82–98)
NEUTRAL FAT STL QL: NORMAL
OHS CV CAROTID RIGHT ICA EDV HIGHEST: 47
OHS CV CAROTID ULTRASOUND LEFT ICA/CCA RATIO: 1.11
OHS CV CAROTID ULTRASOUND RIGHT ICA/CCA RATIO: 1.09
OHS CV PV CAROTID LEFT HIGHEST CCA: 94
OHS CV PV CAROTID LEFT HIGHEST ICA: 79
OHS CV PV CAROTID RIGHT HIGHEST CCA: 97
OHS CV PV CAROTID RIGHT HIGHEST ICA: 101
OHS CV US CAROTID LEFT HIGHEST EDV: 35
PLATELET # BLD AUTO: 263 K/UL (ref 150–450)
PMV BLD AUTO: 9.5 FL (ref 9.2–12.9)
POTASSIUM SERPL-SCNC: 3.8 MMOL/L (ref 3.5–5.1)
RBC # BLD AUTO: 4.38 M/UL (ref 4–5.4)
RIGHT CBA DIAS: 20 CM/S
RIGHT CBA SYS: 66 CM/S
RIGHT CCA DIST DIAS: 27 CM/S
RIGHT CCA DIST SYS: 93 CM/S
RIGHT CCA MID DIAS: 22 CM/S
RIGHT CCA MID SYS: 87 CM/S
RIGHT CCA PROX DIAS: 23 CM/S
RIGHT CCA PROX SYS: 97 CM/S
RIGHT ECA DIAS: 13 CM/S
RIGHT ECA SYS: 84 CM/S
RIGHT ICA DIST DIAS: 47 CM/S
RIGHT ICA DIST SYS: 101 CM/S
RIGHT ICA MID DIAS: 32 CM/S
RIGHT ICA MID SYS: 82 CM/S
RIGHT ICA PROX DIAS: 32 CM/S
RIGHT ICA PROX SYS: 89 CM/S
RIGHT VERTEBRAL DIAS: 21 CM/S
RIGHT VERTEBRAL SYS: 77 CM/S
SODIUM SERPL-SCNC: 140 MMOL/L (ref 136–145)
VIT B12 SERPL-MCNC: 628 PG/ML (ref 210–950)
WBC # BLD AUTO: 7.74 K/UL (ref 3.9–12.7)

## 2025-07-01 PROCEDURE — 43235 EGD DIAGNOSTIC BRUSH WASH: CPT | Mod: GC,,, | Performed by: INTERNAL MEDICINE

## 2025-07-01 PROCEDURE — 83735 ASSAY OF MAGNESIUM: CPT | Performed by: STUDENT IN AN ORGANIZED HEALTH CARE EDUCATION/TRAINING PROGRAM

## 2025-07-01 PROCEDURE — 82746 ASSAY OF FOLIC ACID SERUM: CPT | Performed by: STUDENT IN AN ORGANIZED HEALTH CARE EDUCATION/TRAINING PROGRAM

## 2025-07-01 PROCEDURE — 83921 ORGANIC ACID SINGLE QUANT: CPT | Performed by: STUDENT IN AN ORGANIZED HEALTH CARE EDUCATION/TRAINING PROGRAM

## 2025-07-01 PROCEDURE — 82607 VITAMIN B-12: CPT | Performed by: STUDENT IN AN ORGANIZED HEALTH CARE EDUCATION/TRAINING PROGRAM

## 2025-07-01 PROCEDURE — 83036 HEMOGLOBIN GLYCOSYLATED A1C: CPT | Performed by: STUDENT IN AN ORGANIZED HEALTH CARE EDUCATION/TRAINING PROGRAM

## 2025-07-01 PROCEDURE — 43235 EGD DIAGNOSTIC BRUSH WASH: CPT | Performed by: INTERNAL MEDICINE

## 2025-07-01 PROCEDURE — 21400001 HC TELEMETRY ROOM

## 2025-07-01 PROCEDURE — 63600175 PHARM REV CODE 636 W HCPCS: Performed by: STUDENT IN AN ORGANIZED HEALTH CARE EDUCATION/TRAINING PROGRAM

## 2025-07-01 PROCEDURE — 84425 ASSAY OF VITAMIN B-1: CPT | Performed by: STUDENT IN AN ORGANIZED HEALTH CARE EDUCATION/TRAINING PROGRAM

## 2025-07-01 PROCEDURE — 94761 N-INVAS EAR/PLS OXIMETRY MLT: CPT

## 2025-07-01 PROCEDURE — 85027 COMPLETE CBC AUTOMATED: CPT | Performed by: STUDENT IN AN ORGANIZED HEALTH CARE EDUCATION/TRAINING PROGRAM

## 2025-07-01 PROCEDURE — 99223 1ST HOSP IP/OBS HIGH 75: CPT | Mod: ,,, | Performed by: PSYCHIATRY & NEUROLOGY

## 2025-07-01 PROCEDURE — 25000003 PHARM REV CODE 250

## 2025-07-01 PROCEDURE — 80048 BASIC METABOLIC PNL TOTAL CA: CPT | Performed by: STUDENT IN AN ORGANIZED HEALTH CARE EDUCATION/TRAINING PROGRAM

## 2025-07-01 PROCEDURE — 99499 UNLISTED E&M SERVICE: CPT | Mod: ,,, | Performed by: PHYSICIAN ASSISTANT

## 2025-07-01 PROCEDURE — 36415 COLL VENOUS BLD VENIPUNCTURE: CPT | Performed by: STUDENT IN AN ORGANIZED HEALTH CARE EDUCATION/TRAINING PROGRAM

## 2025-07-01 PROCEDURE — 25000003 PHARM REV CODE 250: Performed by: NURSE ANESTHETIST, CERTIFIED REGISTERED

## 2025-07-01 PROCEDURE — 37000009 HC ANESTHESIA EA ADD 15 MINS: Performed by: INTERNAL MEDICINE

## 2025-07-01 PROCEDURE — 63600175 PHARM REV CODE 636 W HCPCS: Performed by: NURSE ANESTHETIST, CERTIFIED REGISTERED

## 2025-07-01 PROCEDURE — 25000003 PHARM REV CODE 250: Performed by: PHYSICIAN ASSISTANT

## 2025-07-01 PROCEDURE — 84207 ASSAY OF VITAMIN B-6: CPT | Performed by: STUDENT IN AN ORGANIZED HEALTH CARE EDUCATION/TRAINING PROGRAM

## 2025-07-01 PROCEDURE — 25000003 PHARM REV CODE 250: Performed by: STUDENT IN AN ORGANIZED HEALTH CARE EDUCATION/TRAINING PROGRAM

## 2025-07-01 PROCEDURE — 0DJ08ZZ INSPECTION OF UPPER INTESTINAL TRACT, VIA NATURAL OR ARTIFICIAL OPENING ENDOSCOPIC: ICD-10-PCS | Performed by: INTERNAL MEDICINE

## 2025-07-01 PROCEDURE — 37000008 HC ANESTHESIA 1ST 15 MINUTES: Performed by: INTERNAL MEDICINE

## 2025-07-01 RX ORDER — GLUCAGON 1 MG
1 KIT INJECTION
Status: DISCONTINUED | OUTPATIENT
Start: 2025-07-01 | End: 2025-07-01 | Stop reason: HOSPADM

## 2025-07-01 RX ORDER — ACETAMINOPHEN 325 MG/1
650 TABLET ORAL EVERY 4 HOURS PRN
Status: DISCONTINUED | OUTPATIENT
Start: 2025-07-01 | End: 2025-07-02 | Stop reason: HOSPADM

## 2025-07-01 RX ORDER — ONDANSETRON HYDROCHLORIDE 2 MG/ML
4 INJECTION, SOLUTION INTRAVENOUS ONCE AS NEEDED
Status: COMPLETED | OUTPATIENT
Start: 2025-07-01 | End: 2025-07-01

## 2025-07-01 RX ORDER — LORAZEPAM 2 MG/ML
1 INJECTION INTRAMUSCULAR ONCE AS NEEDED
Status: COMPLETED | OUTPATIENT
Start: 2025-07-01 | End: 2025-07-01

## 2025-07-01 RX ORDER — LIDOCAINE HYDROCHLORIDE 20 MG/ML
INJECTION INTRAVENOUS
Status: DISCONTINUED | OUTPATIENT
Start: 2025-07-01 | End: 2025-07-01

## 2025-07-01 RX ORDER — LORAZEPAM 0.5 MG/1
1 TABLET ORAL ONCE AS NEEDED
Status: DISCONTINUED | OUTPATIENT
Start: 2025-07-01 | End: 2025-07-01

## 2025-07-01 RX ORDER — PROPOFOL 10 MG/ML
VIAL (ML) INTRAVENOUS CONTINUOUS PRN
Status: DISCONTINUED | OUTPATIENT
Start: 2025-07-01 | End: 2025-07-01

## 2025-07-01 RX ORDER — PROPOFOL 10 MG/ML
VIAL (ML) INTRAVENOUS
Status: DISCONTINUED | OUTPATIENT
Start: 2025-07-01 | End: 2025-07-01

## 2025-07-01 RX ORDER — ACETAMINOPHEN 500 MG
1000 TABLET ORAL ONCE
Status: COMPLETED | OUTPATIENT
Start: 2025-07-01 | End: 2025-07-01

## 2025-07-01 RX ORDER — DICYCLOMINE HYDROCHLORIDE 10 MG/1
20 CAPSULE ORAL 4 TIMES DAILY
Status: DISCONTINUED | OUTPATIENT
Start: 2025-07-01 | End: 2025-07-02 | Stop reason: HOSPADM

## 2025-07-01 RX ORDER — SODIUM CHLORIDE 0.9 % (FLUSH) 0.9 %
10 SYRINGE (ML) INJECTION
Status: DISCONTINUED | OUTPATIENT
Start: 2025-07-01 | End: 2025-07-01 | Stop reason: HOSPADM

## 2025-07-01 RX ADMIN — GLYCOPYRROLATE 0.2 MG: 0.2 INJECTION, SOLUTION INTRAMUSCULAR; INTRAVENOUS at 09:07

## 2025-07-01 RX ADMIN — PROPOFOL 150 MCG/KG/MIN: 10 INJECTION, EMULSION INTRAVENOUS at 09:07

## 2025-07-01 RX ADMIN — TRAZODONE HYDROCHLORIDE 50 MG: 50 TABLET ORAL at 09:07

## 2025-07-01 RX ADMIN — ACETAMINOPHEN 1000 MG: 500 TABLET ORAL at 12:07

## 2025-07-01 RX ADMIN — SODIUM CHLORIDE: 0.9 INJECTION, SOLUTION INTRAVENOUS at 09:07

## 2025-07-01 RX ADMIN — PROPOFOL 100 MG: 10 INJECTION, EMULSION INTRAVENOUS at 09:07

## 2025-07-01 RX ADMIN — ASPIRIN 81 MG CHEWABLE TABLET 81 MG: 81 TABLET CHEWABLE at 07:07

## 2025-07-01 RX ADMIN — LIDOCAINE HYDROCHLORIDE 100 MG: 20 INJECTION INTRAVENOUS at 09:07

## 2025-07-01 RX ADMIN — SUCRALFATE 1 G: 1 SUSPENSION ORAL at 06:07

## 2025-07-01 RX ADMIN — DICYCLOMINE HYDROCHLORIDE 20 MG: 10 CAPSULE ORAL at 12:07

## 2025-07-01 RX ADMIN — DICYCLOMINE HYDROCHLORIDE 20 MG: 10 CAPSULE ORAL at 09:07

## 2025-07-01 RX ADMIN — MECLIZINE HYDROCHLORIDE 25 MG: 25 TABLET ORAL at 03:07

## 2025-07-01 RX ADMIN — LORAZEPAM 1 MG: 2 INJECTION INTRAMUSCULAR; INTRAVENOUS at 01:07

## 2025-07-01 RX ADMIN — PANTOPRAZOLE SODIUM 40 MG: 40 INJECTION, POWDER, LYOPHILIZED, FOR SOLUTION INTRAVENOUS at 07:07

## 2025-07-01 RX ADMIN — DICYCLOMINE HYDROCHLORIDE 20 MG: 10 CAPSULE ORAL at 03:07

## 2025-07-01 RX ADMIN — DICYCLOMINE HYDROCHLORIDE 20 MG: 10 CAPSULE ORAL at 07:07

## 2025-07-01 RX ADMIN — ACETAMINOPHEN 650 MG: 325 TABLET ORAL at 03:07

## 2025-07-01 RX ADMIN — ONDANSETRON 4 MG: 2 INJECTION INTRAMUSCULAR; INTRAVENOUS at 10:07

## 2025-07-01 NOTE — TRANSFER OF CARE
"Anesthesia Transfer of Care Note    Patient: Laz Quintero    Procedure(s) Performed: Procedure(s) (LRB):  EGD (ESOPHAGOGASTRODUODENOSCOPY) (N/A)    Patient location: PACU    Anesthesia Type: general    Transport from OR: Transported from OR on 2-3 L/min O2 by NC with adequate spontaneous ventilation    Post pain: adequate analgesia    Post assessment: no apparent anesthetic complications    Post vital signs: stable    Level of consciousness: sedated    Nausea/Vomiting: no nausea/vomiting    Complications: none    Transfer of care protocol was followed      Last vitals: Visit Vitals  BP (!) 102/57 (BP Location: Left arm, Patient Position: Lying)   Pulse 105   Temp 36.7 °C (98.1 °F) (Temporal)   Resp (!) 24   Ht 5' 6" (1.676 m)   Wt 73.3 kg (161 lb 9.6 oz)   LMP 06/07/2015 (Exact Date)   SpO2 95%   Breastfeeding No   BMI 26.08 kg/m²     "

## 2025-07-01 NOTE — ASSESSMENT & PLAN NOTE
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 Vomiting has resolved, but still with >3 episodes of diarrhea daily. CT negative for acute process. History of gastritis noted, but lower suspicion for this based on lower abdominal pain and diarrhea.     - convert to PO PPI  - bentyl and carafate ordered   -PRN meclizine and phenergan   - stool studies ordered, add GPP. Diarrhea resolved. Unable to obtain   - weaned off fluids   - started with CLD, advanced diet to GI soft diet   -GI consulted for 6/30, S/P EGD with GI and findings reassuring

## 2025-07-01 NOTE — SUBJECTIVE & OBJECTIVE
Subjective:     Interval History:   Bitemporal headache persists, as well as nausea and intermittent dizziness  Transient RUE paresthesias, lasts minutes when present  EGD unremarkable today  MRI brain WO contrast and MRA head/neck without contrast unremarkable  Nutritional labs pending    Current Medications[1]    Review of Systems   Constitutional:  Positive for activity change and fatigue. Negative for fever.   HENT:  Negative for trouble swallowing and voice change.    Eyes:  Positive for visual disturbance. Negative for pain and redness.   Respiratory:  Negative for cough and shortness of breath.    Cardiovascular:  Negative for leg swelling.   Gastrointestinal:  Positive for nausea. Negative for vomiting.   Musculoskeletal:  Negative for gait problem and neck stiffness.   Neurological:  Positive for dizziness, numbness and headaches. Negative for tremors, seizures, syncope, facial asymmetry, speech difficulty and weakness.   Psychiatric/Behavioral:  Positive for sleep disturbance.      Objective:     Vital Signs (Most Recent):  Temp: 98.1 °F (36.7 °C) (07/01/25 1531)  Pulse: 61 (07/01/25 1531)  Resp: 18 (07/01/25 1531)  BP: 112/80 (07/01/25 1531)  SpO2: 100 % (07/01/25 1531) Vital Signs (24h Range):  Temp:  [97.8 °F (36.6 °C)-98.3 °F (36.8 °C)] 98.1 °F (36.7 °C)  Pulse:  [] 61  Resp:  [14-25] 18  SpO2:  [92 %-100 %] 100 %  BP: ()/(54-89) 112/80     Weight: 73.3 kg (161 lb 9.6 oz)  Body mass index is 26.08 kg/m².     Physical Exam  Eyes:      Extraocular Movements: EOM normal.      Pupils: Pupils are equal, round, and reactive to light.   Neurological:      Coordination: Finger-Nose-Finger Test normal.   Psychiatric:         Speech: Speech normal.          NEUROLOGICAL EXAMINATION:     MENTAL STATUS   Oriented to person.   Oriented to place.   Follows 1 step commands.   Attention: normal. Concentration: normal.   Speech: speech is normal   Level of consciousness: alert  Knowledge: good.   Normal  comprehension.     CRANIAL NERVES     CN II   Visual fields full to confrontation.     CN III, IV, VI   Pupils are equal, round, and reactive to light.  Extraocular motions are normal.   Nystagmus: none   Ophthalmoparesis: none    CN VII   Facial expression full, symmetric.     CN VIII   Hearing: intact    CN IX, X   Palate: symmetric    CN XI   CN XI normal.     CN XII   CN XII normal.     MOTOR EXAM   Muscle bulk: normal  Overall muscle tone: normal  Right arm pronator drift: absent  Left arm pronator drift: absent    Strength   Right deltoid: 5/5  Left deltoid: 5/5  Right biceps: 5/5  Left biceps: 5/5  Right triceps: 5/5  Left triceps: 5/5  Right interossei: 5/5  Left interossei: 5/5  Right anterior tibial: 5/5  Left anterior tibial: 5/5  Right posterior tibial: 5/5  Left posterior tibial: 5/5  Right peroneal: 5/5  Left peroneal: 5/5    GAIT AND COORDINATION     Gait  Gait: (deferred)     Coordination   Finger to nose coordination: normal    Tremor   Resting tremor: absent    Significant Labs: All pertinent lab results from the past 24 hours have been reviewed.    Significant Imaging: I have reviewed all pertinent imaging results/findings within the past 24 hours.    MRI Brain WO contrast (7/1/25):  FINDINGS:The brain parenchyma is normal in signal and contour.  The ventricles are normal in size and configuration without evidence for hydrocephalus.  There is no midline shift or mass effect.  There is no abnormal parenchymal susceptibility to suggest parenchymal hemorrhage.  No restricted diffusion to suggest acute infarction.  There is no abnormal intra or extra-axial fluid collection.  The major intracranial T2 flow voids are present.     Impression:Unremarkable noncontrast MRI brain as detailed above specifically without evidence for acute infarction    MRA Brain/Neck WO contrast (7/1/25):  Unremarkable MRA of the head specifically without evidence for focal stenosis or intracranial aneurysm.Unremarkable motion  distorted MRA of the neck as detailed above without evidence for significant focal stenosis or occlusion.           [1]   Current Facility-Administered Medications   Medication Dose Route Frequency Provider Last Rate Last Admin    acetaminophen tablet 650 mg  650 mg Oral Q4H PRN Johnna Vides DO   650 mg at 07/01/25 1518    aspirin chewable tablet 81 mg  81 mg Oral Daily Rose Vences PA-C   81 mg at 07/01/25 0755    dicyclomine capsule 20 mg  20 mg Oral QID Citlali Zaidi PA-C   20 mg at 07/01/25 1543    enoxaparin injection 40 mg  40 mg Subcutaneous Daily Natalie Maguire PA-C        hydroCHLOROthiazide tablet 12.5 mg  12.5 mg Oral Daily Natalie Maguire PA-C        meclizine tablet 25 mg  25 mg Oral TID PRN Summer Cuenca MD   25 mg at 07/01/25 1518    methocarbamoL tablet 750 mg  750 mg Oral QID PRN Natalie Maguire PA-C   750 mg at 06/30/25 2313    pantoprazole injection 40 mg  40 mg Intravenous BID Summer Cuenca MD   40 mg at 07/01/25 0755    promethazine tablet 25 mg  25 mg Oral Q4H PRN Summer Cuenca MD   25 mg at 06/30/25 1947    QUEtiapine 24 hr tablet 150 mg  150 mg Oral Nightly PRN Rose Vences PA-C        simethicone chewable tablet 80 mg  1 tablet Oral TID PRN Summer Cuenca MD   80 mg at 06/29/25 1056    sodium chloride 0.9% flush 10 mL  10 mL Intravenous PRN Rose Vences PA-C        traZODone tablet 50 mg  50 mg Oral QHS Natalie Maguire PA-C   50 mg at 06/30/25 2040

## 2025-07-01 NOTE — SUBJECTIVE & OBJECTIVE
Interval History: NAEO. S/P EGD and findings reassuring. Discussed MRI results with patient as well.     Review of Systems  Objective:     Vital Signs (Most Recent):  Temp: 98 °F (36.7 °C) (07/01/25 1206)  Pulse: 61 (07/01/25 1206)  Resp: 18 (07/01/25 1206)  BP: 128/83 (07/01/25 1206)  SpO2: 99 % (07/01/25 1206) Vital Signs (24h Range):  Temp:  [97.8 °F (36.6 °C)-98.3 °F (36.8 °C)] 98 °F (36.7 °C)  Pulse:  [] 61  Resp:  [14-25] 18  SpO2:  [92 %-100 %] 99 %  BP: ()/(54-89) 128/83     Weight: 73.3 kg (161 lb 9.6 oz)  Body mass index is 26.08 kg/m².    Intake/Output Summary (Last 24 hours) at 7/1/2025 1443  Last data filed at 7/1/2025 1200  Gross per 24 hour   Intake 720 ml   Output --   Net 720 ml         Physical Exam  Constitutional:       Appearance: Normal appearance.   HENT:      Head: Normocephalic and atraumatic.      Nose: Nose normal.      Mouth/Throat:      Mouth: Mucous membranes are moist.   Eyes:      Extraocular Movements: Extraocular movements intact.      Pupils: Pupils are equal, round, and reactive to light.   Cardiovascular:      Rate and Rhythm: Normal rate and regular rhythm.      Heart sounds: No murmur heard.     No gallop.   Pulmonary:      Effort: Pulmonary effort is normal.      Breath sounds: Normal breath sounds. No wheezing or rales.   Abdominal:      General: Abdomen is flat. Bowel sounds are normal. There is no distension.      Palpations: Abdomen is soft.      Tenderness: There is abdominal tenderness.   Musculoskeletal:         General: No swelling or tenderness. Normal range of motion.      Cervical back: Normal range of motion and neck supple.      Right lower leg: No edema.      Left lower leg: No edema.   Skin:     General: Skin is warm and dry.      Capillary Refill: Capillary refill takes less than 2 seconds.   Neurological:      General: No focal deficit present.      Mental Status: She is alert. Mental status is at baseline.      Motor: Weakness present.       Coordination: Coordination abnormal.      Gait: Gait abnormal.   Psychiatric:         Mood and Affect: Mood normal.               Significant Labs: All pertinent labs within the past 24 hours have been reviewed.    Significant Imaging: I have reviewed all pertinent imaging results/findings within the past 24 hours.

## 2025-07-01 NOTE — CONSULTS
[facility] - Internal Medicine Telemetry  Neurology  Consult Note    Patient Name: Laz Quintero  MRN: [id number]  Admission Date: 6/26/2025  Hospital Length of Stay: 4 days  Code Status: Full Code   Attending Provider: Summer Cuenca MD   Consulting Provider: Vickie Culp PA-C  Primary Care Physician: Charlotte Jacinto MD  Principal Problem:Generalized abdominal pain    Subjective:     Chief Complaint:  headache, paresthesias      HPI:   54 year-old lady with a history of hypertension, hyperlipidemia, hyperparathyroidism, anterior mediastinal mass (thymoma per chart, s/p partial thymectomy: myasthenia studies--AChR binding, MuSK, LRP4, P/Q type calcium channel antibodies negative), lumbar degenerative disc disease s/p L5/S1 fusion. She presented to [facility] on 6/27 due to left-sided chest and epigastric pain (also reported right-sided abdominal pain), nausea, vomiting, diarrhea, and subjective fevers/rigors: CT abdomen/pelvis, stress echocardiogram unrevealing--currently pending upper endoscopy with gastroenterology, planned for 7/1. Per IM team, since admission, patient has reported paresthesias in the RUE along with dizziness: neurology consulted for opinion regarding this.     Patient states that ~2 months ago, she began having bitemporal headache (squeezing/throbbing quality), accompanied by positive visual phenomena described as white dots that first appear in the right visual fields and subsequently spread throughout. She may have numbness and tingling throughout the right upper and lower extremity while all of this is happening, occurs in ~30% of headaches. Also accompanied by nausea, vomiting, photophobia, phonophobia, osmophobia. She will often go to sleep (typically overnight) and the headache/other symptoms will be gone by morning. The headaches may occur up to daily. Since admission, she notes intermittent right retro-orbital headache (similar quality as aforementioned headache, despite difference  in location: not currently present), accompanied by fairly persistent right-sided numbness (arm and leg, throughout), and disequilibrium. The disequilibrium is new to her, though on further questioning she notes that she has experienced some unsteadiness on her feet while walking in the dark.       Subjective:     Interval History:   Bitemporal headache persists, as well as nausea and intermittent dizziness  Transient RUE paresthesias, lasts minutes when present  EGD unremarkable today  MRI brain WO contrast and MRA head/neck without contrast unremarkable  Nutritional labs pending    Current Medications[1]    Review of Systems   Constitutional:  Positive for activity change and fatigue. Negative for fever.   HENT:  Negative for trouble swallowing and voice change.    Eyes:  Positive for visual disturbance. Negative for pain and redness.   Respiratory:  Negative for cough and shortness of breath.    Cardiovascular:  Negative for leg swelling.   Gastrointestinal:  Positive for nausea. Negative for vomiting.   Musculoskeletal:  Negative for gait problem and neck stiffness.   Neurological:  Positive for dizziness, numbness and headaches. Negative for tremors, seizures, syncope, facial asymmetry, speech difficulty and weakness.   Psychiatric/Behavioral:  Positive for sleep disturbance.      Objective:     Vital Signs (Most Recent):  Temp: 98.1 °F (36.7 °C) (07/01/25 1531)  Pulse: 61 (07/01/25 1531)  Resp: 18 (07/01/25 1531)  BP: 112/80 (07/01/25 1531)  SpO2: 100 % (07/01/25 1531) Vital Signs (24h Range):  Temp:  [97.8 °F (36.6 °C)-98.3 °F (36.8 °C)] 98.1 °F (36.7 °C)  Pulse:  [] 61  Resp:  [14-25] 18  SpO2:  [92 %-100 %] 100 %  BP: ()/(54-89) 112/80     Weight: 73.3 kg (161 lb 9.6 oz)  Body mass index is 26.08 kg/m².     Physical Exam  Eyes:      Extraocular Movements: EOM normal.      Pupils: Pupils are equal, round, and reactive to light.   Neurological:      Coordination: Finger-Nose-Finger Test normal.    Psychiatric:         Speech: Speech normal.          NEUROLOGICAL EXAMINATION:     MENTAL STATUS   Oriented to person.   Oriented to place.   Follows 1 step commands.   Attention: normal. Concentration: normal.   Speech: speech is normal   Level of consciousness: alert  Knowledge: good.   Normal comprehension.     CRANIAL NERVES     CN II   Visual fields full to confrontation.     CN III, IV, VI   Pupils are equal, round, and reactive to light.  Extraocular motions are normal.   Nystagmus: none   Ophthalmoparesis: none    CN VII   Facial expression full, symmetric.     CN VIII   Hearing: intact    CN IX, X   Palate: symmetric    CN XI   CN XI normal.     CN XII   CN XII normal.     MOTOR EXAM   Muscle bulk: normal  Overall muscle tone: normal  Right arm pronator drift: absent  Left arm pronator drift: absent    Strength   Right deltoid: 5/5  Left deltoid: 5/5  Right biceps: 5/5  Left biceps: 5/5  Right triceps: 5/5  Left triceps: 5/5  Right interossei: 5/5  Left interossei: 5/5  Right anterior tibial: 5/5  Left anterior tibial: 5/5  Right posterior tibial: 5/5  Left posterior tibial: 5/5  Right peroneal: 5/5  Left peroneal: 5/5    GAIT AND COORDINATION     Gait  Gait: (deferred)     Coordination   Finger to nose coordination: normal    Tremor   Resting tremor: absent    Significant Labs: All pertinent lab results from the past 24 hours have been reviewed.    Significant Imaging: I have reviewed all pertinent imaging results/findings within the past 24 hours.    MRI Brain WO contrast (7/1/25):  FINDINGS:The brain parenchyma is normal in signal and contour.  The ventricles are normal in size and configuration without evidence for hydrocephalus.  There is no midline shift or mass effect.  There is no abnormal parenchymal susceptibility to suggest parenchymal hemorrhage.  No restricted diffusion to suggest acute infarction.  There is no abnormal intra or extra-axial fluid collection.  The major intracranial T2 flow  voids are present.     Impression:Unremarkable noncontrast MRI brain as detailed above specifically without evidence for acute infarction    MRA Brain/Neck WO contrast (7/1/25):  Unremarkable MRA of the head specifically without evidence for focal stenosis or intracranial aneurysm.Unremarkable motion distorted MRA of the neck as detailed above without evidence for significant focal stenosis or occlusion.      Assessment and Plan:     Migraine with visual aura  54 y.o. female with HTN, HLD, hyperparathyroidism, anterior mediastinal mass s/p partial thymectomy (MG Ab negative), lumbar degenerative disc disease s/p L5/S1 fusion presented with chest and epigastric pain. Neurology consulted for headache, dizziness and transient paresthesias in the RUE. Pt reports headaches at least 1-2 weekly, chronic insomnia, intermittent pins and needles sensation to RUE including all digits lasting minutes. Also reports some retro orbital pain on R associated with headaches with nausea and visual disturbance described as floaters/white spots. Imaging this admission with MRI brain WO contrast and MRA head/neck WO contrast unremarkable for structural etiology. HA with migrainosus features with visual aura    Recommendations:  --trazodone 50 mg qhs for migraine maintenance  Dual benefit given chronic insomnia  Encouraged patient to take regularly with expected benefit after 1-2 months  --imitrex 50 mg prn for rescue therapy  If sx persist or return, can repeat dose after >2 hours (max dose is 200 mg per 24 hours)  --message sent to support staff to arrange outpatient follow-up in clinic for ongoing headache control   --while inpatient, can continue IVFs, Mg sulfate 2 grams, compazine 10 mg     VTE Risk Mitigation (From admission, onward)           Ordered     enoxaparin injection 40 mg  Daily         06/27/25 1631     IP VTE HIGH RISK PATIENT  Once         06/27/25 1631                  Thank you for your consult. I will sign off.  Please contact us if you have any additional questions.    Vickie Culp PA-C  General Neurology Consult        [1]   Current Facility-Administered Medications   Medication Dose Route Frequency Provider Last Rate Last Admin    acetaminophen tablet 650 mg  650 mg Oral Q4H PRN Johnna Vides DO   650 mg at 07/01/25 1518    aspirin chewable tablet 81 mg  81 mg Oral Daily Rose Vences PA-C   81 mg at 07/01/25 0755    dicyclomine capsule 20 mg  20 mg Oral QID Citlali Zaidi PA-C   20 mg at 07/01/25 1543    enoxaparin injection 40 mg  40 mg Subcutaneous Daily Natalie Maguire PA-C        hydroCHLOROthiazide tablet 12.5 mg  12.5 mg Oral Daily Natalie Maguire PA-C        meclizine tablet 25 mg  25 mg Oral TID PRN Summer Cuenca MD   25 mg at 07/01/25 1518    methocarbamoL tablet 750 mg  750 mg Oral QID PRN Natalie Maguire PA-C   750 mg at 06/30/25 2313    pantoprazole injection 40 mg  40 mg Intravenous BID Summer Cuenca MD   40 mg at 07/01/25 0755    promethazine tablet 25 mg  25 mg Oral Q4H PRN Summer Cuenca MD   25 mg at 06/30/25 1947    QUEtiapine 24 hr tablet 150 mg  150 mg Oral Nightly PRN Rose Vences PA-C        simethicone chewable tablet 80 mg  1 tablet Oral TID PRN Summer Cuenca MD   80 mg at 06/29/25 1056    sodium chloride 0.9% flush 10 mL  10 mL Intravenous PRN Rose Vences PA-C        traZODone tablet 50 mg  50 mg Oral QHS Natalie Maguire PA-C   50 mg at 06/30/25 2040

## 2025-07-01 NOTE — H&P
Short Stay Endoscopy History and Physical    PCP - Charlotte Jacinto MD     Procedure - EGD  ASA - per anesthesia  Mallampati - per anesthesia  History of Anesthesia problems - no  Family history Anesthesia problems -  no   Plan of anesthesia - General    HPI:  This is a 54 y.o. female here for evaluation of :     abdominal pain      ROS:  Constitutional: No fevers, chills, No weight loss  CV: No chest pain  Pulm: No cough, No shortness of breath  Ophtho: No vision changes  GI: see HPI  Derm: No rash    Medical History:  has a past medical history of Abnormal Pap smear, Abnormal Pap smear of cervix, Annular tear of lumbar disc, L5-S1. (07/26/2012), Chronic LBP, HPTH (hyperparathyroidism), Hyperlipidemia, Menorrhagia, PONV (postoperative nausea and vomiting), Primary hypertension (01/31/2023), Right lumbar radiculopathy (07/26/2012), and Seasonal allergies.    Surgical History:  has a past surgical history that includes Moncure tooth extraction; Cholecystectomy; Tubal ligation; hysterectomy, total, laparoscopic, with salpingectomy (06/16/2015); Colonoscopy (N/A, 01/19/2018); Esophagogastroduodenoscopy (N/A, 09/06/2019); Back surgery (2018); Colonoscopy (N/A, 12/28/2022); xi robotic rats (Right, 03/21/2023); Injection of anesthetic agent around multiple intercostal nerves (Right, 03/21/2023); Esophagogastroduodenoscopy (N/A, 6/13/2024); and Esophagogastroduodenoscopy (N/A, 9/3/2024).    Family History: family history includes Colon polyps in her father; Diabetes in her father; Hypertension in her maternal grandmother and mother; Multiple myeloma in her maternal grandmother; Stroke (age of onset: 68) in her paternal grandmother.. Otherwise no colon cancer, inflammatory bowel disease, or GI malignancies.    Social History:  reports that she has never smoked. She has never been exposed to tobacco smoke. She has never used smokeless tobacco. She reports that she does not drink alcohol and does not use drugs.    Review  of patient's allergies indicates:   Allergen Reactions    Clindamycin     Sulfa (sulfonamide antibiotics) Anaphylaxis    Sulfa dyne     Sulfamethoxazole-trimethoprim      Other reaction(s): Anaphylaxis    Dermabond [tissue glues] Rash       Medications:   Prescriptions Prior to Admission[1]    Physical Exam:    Vital Signs:   Vitals:    07/01/25 0910   BP:    Pulse: 76   Resp:    Temp:        General Appearance: Well appearing in no acute distress      Labs:  Lab Results   Component Value Date    WBC 7.74 07/01/2025    HGB 12.3 07/01/2025    HCT 37.2 07/01/2025     07/01/2025    CHOL 203 (H) 06/27/2025    TRIG 88 06/27/2025    HDL 56 06/27/2025    ALT 19 06/28/2025    AST 19 06/28/2025     07/01/2025    K 3.8 07/01/2025     07/01/2025    CREATININE 0.7 07/01/2025    BUN 6 07/01/2025    CO2 24 07/01/2025    TSH 0.713 06/17/2025    INR 1.0 04/12/2015    HGBA1C 5.4 07/01/2025       I have explained the risks and benefits of endoscopy procedures to the patient including but not limited to bleeding, perforation, infection, and death.  The patient was asked if they understand and allowed to ask any further questions to their satisfaction.      Mat Fisher MD         [1]   Medications Prior to Admission   Medication Sig Dispense Refill Last Dose/Taking    albuterol (PROVENTIL/VENTOLIN HFA) 90 mcg/actuation inhaler INHALE 2 PUFFS INTO THE LUNGS BY MOUTH EVERY 6 HOURS AS NEEDED FOR WHEEZING. RESCUE. 8.5 g 0 Taking    ALPRAZolam (XANAX) 0.25 MG tablet Take 1 tablet (0.25 mg total) by mouth daily as needed for Anxiety. 20 tablet 1 Taking As Needed    benzonatate (TESSALON) 200 MG capsule Take 1 capsule (200 mg total) by mouth 3 (three) times daily as needed for Cough. 20 capsule 0 Taking As Needed    buPROPion (WELLBUTRIN XL) 150 MG TB24 tablet Take 150 mg by mouth once daily.   Taking    butalbital-acetaminophen-caffeine -40 mg (FIORICET, ESGIC) -40 mg per tablet Take 1 tablet by mouth  every 4 (four) hours as needed.   Taking As Needed    diclofenac sodium (VOLTAREN) 1 % Gel Apply 2 g topically once daily. Use gloves to apply 100 g 1 Taking    fluticasone propionate (FLONASE) 50 mcg/actuation nasal spray 1 spray (50 mcg total) by Each Nostril route once daily. 11.1 mL 0 Taking    hydroCHLOROthiazide 12.5 MG Tab Take 1 tablet (12.5 mg total) by mouth once daily. 90 tablet 1 Taking    meclizine (ANTIVERT) 25 mg tablet Take 1 tablet (25 mg total) by mouth 3 (three) times daily as needed for Dizziness. 30 tablet 0 Taking As Needed    meloxicam (MOBIC) 15 MG tablet Take 15 mg by mouth once daily.   Taking    methocarbamoL (ROBAXIN) 750 MG Tab Take 750 mg by mouth every 12 (twelve) hours.   Taking    pantoprazole (PROTONIX) 40 MG tablet Take 1 tablet (40 mg total) by mouth once daily. 90 tablet 0 Taking    prasterone, dhea, (INTRAROSA) 6.5 mg Inst Place 6.5 mg vaginally every evening. 30 each 11 Taking    pregabalin (LYRICA) 75 MG capsule Take 75 mg by mouth 2 (two) times daily.   Taking    promethazine (PHENERGAN) 25 MG tablet Take 1 tablet (25 mg total) by mouth every 6 (six) hours as needed for Nausea. 20 tablet 0 Taking As Needed    promethazine (PHENERGAN) 25 MG tablet Take 1 tablet (25 mg total) by mouth every 6 (six) hours as needed for Nausea. 20 tablet 0 Taking As Needed    promethazine-dextromethorphan (PROMETHAZINE-DM) 6.25-15 mg/5 mL Syrp Take 5 mLs by mouth every 4 (four) hours as needed (for cough). 118 mL 0 Taking As Needed    QUEtiapine (SEROQUEL XR) 150 mg Tb24 Take 150 mg by mouth every evening.   Taking    traZODone (DESYREL) 100 MG tablet Take 100 mg by mouth every evening.   Taking

## 2025-07-01 NOTE — NURSING TRANSFER
Nursing Transfer Note      7/1/2025   10:31 AM    Nurse giving handoff: Bartolo RN PACU  Nurse receiving handoff: Maciel FRANK    Reason patient is being transferred: recovered from anesthesia    Transfer To: 1158    Transfer via stretcher    Transfer with cardiac monitoring    Transported by PCT x1      Telemetry: Box Number [id number], Rate 67, Rhythm NSR, and Telemetry  Philippe  Order for Tele Monitor? Yes    Patient belongings transferred with patient: none    Chart send with patient: Yes    Notified: family via surgical texting system , mother via phone    Patient reassessed at: 7/1/2025 at 1000  1  Upon arrival to floor: cardiac monitor applied, patient oriented to room, and bed in lowest position

## 2025-07-01 NOTE — ASSESSMENT & PLAN NOTE
54 y.o. female with HTN, HLD, hyperparathyroidism, anterior mediastinal mass s/p partial thymectomy (MG Ab negative), lumbar degenerative disc disease s/p L5/S1 fusion presented with chest and epigastric pain. Neurology consulted for headache, dizziness and transient paresthesias in the RUE. Pt reports headaches at least 1-2 weekly, chronic insomnia, intermittent pins and needles sensation to RUE including all digits lasting minutes. Also reports some retro orbital pain on R associated with headaches with nausea and visual disturbance described as floaters/white spots. Imaging this admission with MRI brain WO contrast and MRA head/neck WO contrast unremarkable for structural etiology. HA with migrainosus features with visual aura    Recommendations:  --trazodone 50 mg qhs for migraine maintenance  Dual benefit given chronic insomnia  Encouraged patient to take regularly with expected benefit after 1-2 months  --imitrex 50 mg prn for rescue therapy  If sx persist or return, can repeat dose after >2 hours (max dose is 200 mg per 24 hours)  --message sent to support staff to arrange outpatient follow-up in clinic for ongoing headache control   --while inpatient, can continue IVFs, Mg sulfate 2 grams, compazine 10 mg

## 2025-07-01 NOTE — ANESTHESIA POSTPROCEDURE EVALUATION
Anesthesia Post Evaluation    Patient: Laz Quintero    Procedure(s) Performed: Procedure(s) (LRB):  EGD (ESOPHAGOGASTRODUODENOSCOPY) (N/A)    Final Anesthesia Type: general      Patient location during evaluation: PACU  Patient participation: Yes- Able to Participate  Level of consciousness: awake and alert  Post-procedure vital signs: reviewed and stable  Pain management: adequate  Airway patency: patent  OMAIRA mitigation strategies: Multimodal analgesia  PONV status at discharge: No PONV  Anesthetic complications: no      Cardiovascular status: blood pressure returned to baseline, hemodynamically stable and stable  Respiratory status: unassisted, room air and spontaneous ventilation  Hydration status: euvolemic  Follow-up not needed.              Vitals Value Taken Time   /70 07/01/25 10:46   Temp 36.8 °C (98.2 °F) 07/01/25 10:45   Pulse 67 07/01/25 10:56   Resp 31 07/01/25 10:50   SpO2 100 % 07/01/25 10:56   Vitals shown include unfiled device data.      Event Time   Out of Recovery 10:56:00         Pain/Maria Guadalupe Score: Pain Rating Prior to Med Admin: 8 (7/1/2025 12:37 AM)  Maria Guadalupe Score: 10 (7/1/2025 10:15 AM)

## 2025-07-01 NOTE — PLAN OF CARE
Inpatient consult to General Neurology  Consult performed by: Kendell Snyder DO  Consult ordered by: Summer Cuenca MD        General Neurology: Plan of Care    Ms. Quintero is a 54 year-old lady with a history of hypertension, hyperlipidemia, hyperparathyroidism, anterior mediastinal mass (thymoma per chart, s/p partial thymectomy: myasthenia studies--AChR binding, MuSK, LRP4, P/Q type calcium channel antibodies negative), lumbar degenerative disc disease s/p L5/S1 fusion. She presented to [facility] on 6/27 due to left-sided chest and epigastric pain (also reported right-sided abdominal pain), nausea, vomiting, diarrhea, and subjective fevers/rigors: CT abdomen/pelvis, stress echocardiogram unrevealing--currently pending upper endoscopy with gastroenterology, planned for 7/1. Per IM team, since admission, patient has reported paresthesias in the RUE along with dizziness: neurology consulted for opinion regarding this.    Patient states that ~2 months ago, she began having bitemporal headache (squeezing/throbbing quality), accompanied by positive visual phenomena described as white dots that first appear in the right visual fields and subsequently spread throughout. She may have numbness and tingling throughout the right upper and lower extremity while all of this is happening, occurs in ~30% of headaches. Also accompanied by nausea, vomiting, photophobia, phonophobia, osmophobia. She will often go to sleep (typically overnight) and the headache/other symptoms will be gone by morning. The headaches may occur up to daily. Since admission, she notes intermittent right retro-orbital headache (similar quality as aforementioned headache, despite difference in location: not currently present), accompanied by fairly persistent right-sided numbness (arm and leg, throughout), and disequilibrium. The disequilibrium is new to her, though on further questioning she notes that she has experienced some unsteadiness on her feet while  walking in the dark.    Examination: A&O to person, place, time, situation; no aphasia, neglect, extinction (tactile or visual); pupils reactive to light/accomodation, subtle physiologic anisocoria (L 2.5mm, R 2mm), visual fields full (count fingers), EOM intact, muscles of facial expression intact, trigeminal sensory/motor function intact bilaterally. No pronator drift. Strength 5/5 throughout. Sensation: length-dependent polyneuropathy with decreased small fiber sensation to mid-shin. Romberg positive. No appendicular or axial ataxia. DTRs: 3+ biceps and brachioradialis, 2+ patellars, 1+ achilles--bilaterally. Plantar reflex is down-going bilaterally.    Headache does have multiple migrainous features. Considered temporal arteritis briefly, however fluctuating visual symptoms, and prolonged timeline without vision loss do not fit. With new headache in a patient > 50 years old, consistently lateralizing features, and risk factors of HTN, HLD, neuroimaging is indicated. With evidence of large and small fiber polyneuropathy on exam, suggest metabolic work-up as detailed below.    -MRI brain w/o contrast  -MRA head and neck w/o contrast  -B1, B6, B9, B12/MMA, A1C  -If inpatient headache cocktail is needed, may start with magnesium 2g IV, Compazine 10mg IV, 1L NS (all given together)  -Full consult with staff attestation to follow tomorrow    Discussed with Dr. Petersen.

## 2025-07-01 NOTE — PROVATION PATIENT INSTRUCTIONS
Discharge Summary/Instructions after an Endoscopic Procedure  Patient Name: Laz Quintero  Patient MRN: [id number]  Patient YOB: 1970 Tuesday, July 1, 2025  Malachi Olmedo MD  Dear patient,  As a result of recent federal legislation (The Federal Cures Act), you may   receive lab or pathology results from your procedure in your [facility]   account before your physician is able to contact you. Your physician or   their representative will relay the results to you with their   recommendations at their soonest availability.  Thank you,  RESTRICTIONS:  During your procedure today, you received medications for sedation.  These   medications may affect your judgment, balance and coordination.  Therefore,   for 24 hours, you have the following restrictions:   - DO NOT drive a car, operate machinery, make legal/financial decisions,   sign important papers or drink alcohol.    ACTIVITY:  Today: no heavy lifting, straining or running due to procedural   sedation/anesthesia.  The following day: return to full activity including work.  DIET:  Eat and drink normally unless instructed otherwise.     TREATMENT FOR COMMON SIDE EFFECTS:  - Mild abdominal pain, nausea, belching, bloating or excessive gas:  rest,   eat lightly and use a heating pad.  - Sore Throat: treat with throat lozenges and/or gargle with warm salt   water.  - Because air was used during the procedure, expelling large amounts of air   from your rectum or belching is normal.  - If a bowel prep was taken, you may not have a bowel movement for 1-3 days.    This is normal.  SYMPTOMS TO WATCH FOR AND REPORT TO YOUR PHYSICIAN:  1. Abdominal pain or bloating, other than gas cramps.  2. Chest pain.  3. Back pain.  4. Signs of infection such as: chills or fever occurring within 24 hours   after the procedure.  5. Rectal bleeding, which would show as bright red, maroon, or black stools.   (A tablespoon of blood from the rectum is not serious, especially if    hemorrhoids are present.)  6. Vomiting.  7. Weakness or dizziness.  GO DIRECTLY TO THE NEAREST EMERGENCY ROOM IF YOU HAVE ANY OF THE FOLLOWING:      Difficulty breathing              Chills and/or fever over 101 F   Persistent vomiting and/or vomiting blood   Severe abdominal pain   Severe chest pain   Black, tarry stools   Bleeding- more than one tablespoon   Any other symptom or condition that you feel may need urgent attention  Your doctor recommends these additional instructions:  If any biopsies were taken, your doctors clinic will contact you in 1 to 2   weeks with any results.  - [facility].   - Resume previous diet.   - Continue present medications.   - Return to primary care physician.   - Would recommend workup of abdominal pain outside of GI tract, especially   given significant tenderness to slight touch of abdomen. This appears to be   neuropathic vs musculoskeletal in nature.  - The findings and recommendations were discussed with the patient.   - The findings and recommendations were discussed with the referring   physician.  For questions, problems or results please call your physician - Malachi Olmedo MD at Work:  [phone].  [facility], EMERGENCY ROOM PHONE NUMBER: [phone]  IF A COMPLICATION OR EMERGENCY SITUATION ARISES AND YOU ARE UNABLE TO REACH   YOUR PHYSICIAN - GO DIRECTLY TO THE EMERGENCY ROOM.  Malachi Olmedo MD  7/1/2025 10:01:10 AM  This report has been verified and signed electronically.  Dear patient,  As a result of recent federal legislation (The Federal Cures Act), you may   receive lab or pathology results from your procedure in your [facility]   account before your physician is able to contact you. Your physician or   their representative will relay the results to you with their   recommendations at their soonest availability.  Thank you,  [employer]ATION

## 2025-07-01 NOTE — ASSESSMENT & PLAN NOTE
Patient's blood pressure range in the last 24 hours was: BP  Min: 94/54  Max: 153/74.The patient's inpatient anti-hypertensive regimen is listed below:  Current Antihypertensives  hydroCHLOROthiazide tablet 12.5 mg, Daily, Oral    Plan  - BP is controlled, no changes needed to their regimen

## 2025-07-01 NOTE — PROGRESS NOTES
"[facility] - Internal Medicine [facility] Medicine  Progress Note    Patient Name: Laz Quintero  MRN: [id number]  Patient Class: IP- Inpatient   Admission Date: 6/26/2025  Length of Stay: 4 days  Attending Physician: Summer Cuenca MD  Primary Care Provider: Charlotte Jacinto MD        Subjective     Principal Problem:Generalized abdominal pain        HPI:  Laz Quintero is a 55 yo F with PMHx of HTN, HLD, hyperparathyroidism, spinal fusion, and anterior mediastinal mass s/p excision with partial thymectomy who presented to ED for abdominal and chest pain. Patient reports her symptoms started on Monday with L sided chest and epigastric pain. Pain was associated with n/v/d and subjective fever with chills. States that she has not been able to tolerate any food by mouth since Tuesday so her vomiting has resolved. No recent travel or antibiotic use.  CT A/P without acute process. Intake labs grossly unremarkable, except for HS trop mildly elevated. Trop remained flat. She was placed in observation in the [facility]. An echo stress test was performed today, which was negative for ischemia. PO challenged in [facility] and she immediately developed abdominal cramping and had an episode of diarrhea. On my evaluation, she reports diarrhea is starting to improve, but she still had 3 soft BM today that were loose, green and "foamy." Reports while in [facility] she just had a severe episode of electric shocking pain that started in her RLQ and radiated up to her RUQ. GI consulted. Admitted to  for further evaluation.     Overview/Hospital Course:  While on the floor started on IV PPI and carafate scheduled for gastritis. Supportive medications for pain given as well as IVF. Diet started and advanced as tolerated.     Interval History: NAEO. S/P EGD and findings reassuring. Discussed MRI results with patient as well.     Review of Systems  Objective:     Vital Signs (Most Recent):  Temp: 98 °F (36.7 °C) (07/01/25 " 1206)  Pulse: 61 (07/01/25 1206)  Resp: 18 (07/01/25 1206)  BP: 128/83 (07/01/25 1206)  SpO2: 99 % (07/01/25 1206) Vital Signs (24h Range):  Temp:  [97.8 °F (36.6 °C)-98.3 °F (36.8 °C)] 98 °F (36.7 °C)  Pulse:  [] 61  Resp:  [14-25] 18  SpO2:  [92 %-100 %] 99 %  BP: ()/(54-89) 128/83     Weight: 73.3 kg (161 lb 9.6 oz)  Body mass index is 26.08 kg/m².    Intake/Output Summary (Last 24 hours) at 7/1/2025 1443  Last data filed at 7/1/2025 1200  Gross per 24 hour   Intake 720 ml   Output --   Net 720 ml         Physical Exam  Constitutional:       Appearance: Normal appearance.   HENT:      Head: Normocephalic and atraumatic.      Nose: Nose normal.      Mouth/Throat:      Mouth: Mucous membranes are moist.   Eyes:      Extraocular Movements: Extraocular movements intact.      Pupils: Pupils are equal, round, and reactive to light.   Cardiovascular:      Rate and Rhythm: Normal rate and regular rhythm.      Heart sounds: No murmur heard.     No gallop.   Pulmonary:      Effort: Pulmonary effort is normal.      Breath sounds: Normal breath sounds. No wheezing or rales.   Abdominal:      General: Abdomen is flat. Bowel sounds are normal. There is no distension.      Palpations: Abdomen is soft.      Tenderness: There is abdominal tenderness.   Musculoskeletal:         General: No swelling or tenderness. Normal range of motion.      Cervical back: Normal range of motion and neck supple.      Right lower leg: No edema.      Left lower leg: No edema.   Skin:     General: Skin is warm and dry.      Capillary Refill: Capillary refill takes less than 2 seconds.   Neurological:      General: No focal deficit present.      Mental Status: She is alert. Mental status is at baseline.      Motor: Weakness present.      Coordination: Coordination abnormal.      Gait: Gait abnormal.   Psychiatric:         Mood and Affect: Mood normal.               Significant Labs: All pertinent labs within the past 24 hours have been  reviewed.    Significant Imaging: I have reviewed all pertinent imaging results/findings within the past 24 hours.      Assessment & Plan  Generalized abdominal pain  Nausea vomiting and diarrhea  55 yo F with PMHx of HTN, HLD, hyperparathyroidism, spinal fusion, and anterior mediastinal mass s/p excision with partial thymectomy who presented for abdominal and chest pain with associated n/v/d that started 06/23. Vomiting has resolved, but still with >3 episodes of diarrhea daily. CT negative for acute process. History of gastritis noted, but lower suspicion for this based on lower abdominal pain and diarrhea.     - convert to PO PPI  - bentyl and carafate ordered   -PRN meclizine and phenergan   - stool studies ordered, add GPP. Diarrhea resolved. Unable to obtain   - weaned off fluids   - started with [facility], advanced diet to GI soft diet   -GI consulted for 6/30, S/P EGD with GI and findings reassuring   Chest pain  - HS trop mildly elevated, but flat  - EKG in ED concerning for possible anterior infarct  - exercise stress echo negative for ischemia   - suspect chest pain 2/2 GI source   - will monitor tele   -repeat of troponin and EKG 6/28 for chest pain reassuring   Vertigo  Numbness and tingling in right hand  -pt with numbness and tingling as well as vertigo symptoms   -Orthostatics ordered  -initial concern for BP difference on R vs L arm but on recheck all equal   -no history of syncope at this time but pt with prodromal syncope symptoms   -will order Carotid US and UE US for further evaluation   -CT A/P reviewed no signs of aneurysm or dissection, EKG and Troponin as well as stress test for symptoms with chest pain reassuring   -CT chest 4/25 reviewed no recurrent mass present   -meclizine PRN while other workup above   -exam negative for carpal tunnel and spurlings signs. Hx of herniated lumbar discs and fusion but no significant history of neck pain or bilateral radiculopathy signs   -Neuro consulted.  MRI/MRA negative. Possible hemiplegic migraine vs neuropathy.   -would likely benefit from outpatient follow up   Chronic left-sided low back pain with sciatica  - continue prn robaxin   Primary hypertension  Patient's blood pressure range in the last 24 hours was: BP  Min: 94/54  Max: 153/74.The patient's inpatient anti-hypertensive regimen is listed below:  Current Antihypertensives  hydroCHLOROthiazide tablet 12.5 mg, Daily, Oral    Plan  - BP is controlled, no changes needed to their regimen  Type AB thymoma  S/p resection  - follows with OP CTS    Last CT 4/25  Mediastinum: No evidence of recurrent mass in the anterior mediastinum.     Base of neck/thyroid: There is a 2.4 cm right thyroid nodule slightly increased in size (from 22 mm) on 04/11/2024.  This was better evaluated on the thyroid ultrasound from 03/03/2025.  VTE Risk Mitigation (From admission, onward)           Ordered     enoxaparin injection 40 mg  Daily         06/27/25 1631     IP VTE HIGH RISK PATIENT  Once         06/27/25 1631                    Discharge Planning   JOHNIE: 7/2/2025     Code Status: Full Code   Medical Readiness for Discharge Date:   Discharge Plan A: Home, Home with family   Discharge Delays: None known at this time                    Summer Cuenca MD  Department of Hospital Medicine   [facility] - Internal Medicine Telemetry

## 2025-07-01 NOTE — ASSESSMENT & PLAN NOTE
-pt with numbness and tingling as well as vertigo symptoms   -Orthostatics ordered  -initial concern for BP difference on R vs L arm but on recheck all equal   -no history of syncope at this time but pt with prodromal syncope symptoms   -will order Carotid US and UE US for further evaluation   -CT A/P reviewed no signs of aneurysm or dissection, EKG and Troponin as well as stress test for symptoms with chest pain reassuring   -CT chest 4/25 reviewed no recurrent mass present   -meclizine PRN while other workup above   -exam negative for carpal tunnel and spurlings signs. Hx of herniated lumbar discs and fusion but no significant history of neck pain or bilateral radiculopathy signs   -Neuro consulted. MRI/MRA negative. Possible hemiplegic migraine vs neuropathy.   -would likely benefit from outpatient follow up

## 2025-07-01 NOTE — TREATMENT PLAN
GI Treatment Plan    S/p EGD today    Impression:            - Normal esophagus.                          - Normal gastric body and antrum.                          - Normal examined duodenum.                          - No specimens collected.   Recommendation:        - [facility].                          - Resume previous diet.                          - Continue present medications.                          - Return to primary care physician.                          - Would recommend workup of abdominal pain outside                          of GI tract, especially given significant                          tenderness to slight touch of abdomen. This                          appears to be neuropathic vs musculoskeletal in                          nature.                          - The findings and recommendations were discussed                          with the patient.                          - The findings and recommendations were discussed                          with the referring physician.     GI will sign off    Mat Fisher MD  Gastroenterology Fellow, PGY-IV

## 2025-07-02 VITALS
TEMPERATURE: 98 F | WEIGHT: 161.63 LBS | BODY MASS INDEX: 25.97 KG/M2 | DIASTOLIC BLOOD PRESSURE: 79 MMHG | OXYGEN SATURATION: 98 % | SYSTOLIC BLOOD PRESSURE: 118 MMHG | RESPIRATION RATE: 18 BRPM | HEIGHT: 66 IN | HEART RATE: 69 BPM

## 2025-07-02 LAB
RIGHT WRIST BRACHIAL INDEX: 1.11 WBI
UPPER ARTERIAL RIGHT ARM AXILLARY SYS MAX: 99 CM/S
UPPER ARTERIAL RIGHT ARM BRACHIAL SYS MAX: 56 CM/S
UPPER ARTERIAL RIGHT ARM RADIAL SYS MAX: 49 CM/S
UPPER ARTERIAL RIGHT ARM SUBCLAVIAN SYS MAX: 116 CM/S
UPPER ARTERIAL RIGHT ARM ULNAR SYS MAX: 40 CM/S

## 2025-07-02 PROCEDURE — 25000003 PHARM REV CODE 250: Performed by: STUDENT IN AN ORGANIZED HEALTH CARE EDUCATION/TRAINING PROGRAM

## 2025-07-02 PROCEDURE — 25000003 PHARM REV CODE 250

## 2025-07-02 PROCEDURE — 25000003 PHARM REV CODE 250: Performed by: PHYSICIAN ASSISTANT

## 2025-07-02 RX ORDER — TRAZODONE HYDROCHLORIDE 50 MG/1
50 TABLET ORAL NIGHTLY
Qty: 30 TABLET | Refills: 11 | Status: SHIPPED | OUTPATIENT
Start: 2025-07-02 | End: 2025-07-02

## 2025-07-02 RX ORDER — SUMATRIPTAN SUCCINATE 50 MG/1
50 TABLET ORAL EVERY 6 HOURS PRN
Qty: 120 TABLET | Refills: 0 | Status: SHIPPED | OUTPATIENT
Start: 2025-07-02 | End: 2025-07-02

## 2025-07-02 RX ORDER — SUMATRIPTAN SUCCINATE 50 MG/1
50 TABLET ORAL EVERY 6 HOURS PRN
Qty: 120 TABLET | Refills: 0 | Status: SHIPPED | OUTPATIENT
Start: 2025-07-02 | End: 2025-08-01

## 2025-07-02 RX ORDER — TRAZODONE HYDROCHLORIDE 50 MG/1
50 TABLET ORAL NIGHTLY
Qty: 30 TABLET | Refills: 11 | Status: SHIPPED | OUTPATIENT
Start: 2025-07-02 | End: 2026-07-02

## 2025-07-02 RX ORDER — AMOXICILLIN 250 MG
1 CAPSULE ORAL DAILY PRN
Status: DISCONTINUED | OUTPATIENT
Start: 2025-07-02 | End: 2025-07-02 | Stop reason: HOSPADM

## 2025-07-02 RX ORDER — AMOXICILLIN 250 MG
1 CAPSULE ORAL DAILY PRN
Qty: 30 TABLET | Refills: 0 | Status: SHIPPED | OUTPATIENT
Start: 2025-07-02 | End: 2025-07-02

## 2025-07-02 RX ORDER — DICYCLOMINE HYDROCHLORIDE 10 MG/1
20 CAPSULE ORAL EVERY 8 HOURS PRN
Qty: 180 CAPSULE | Refills: 0 | Status: SHIPPED | OUTPATIENT
Start: 2025-07-02 | End: 2025-08-01

## 2025-07-02 RX ORDER — MECLIZINE HCL 12.5 MG 12.5 MG/1
25 TABLET ORAL 3 TIMES DAILY PRN
Qty: 42 TABLET | Refills: 0 | Status: SHIPPED | OUTPATIENT
Start: 2025-07-02 | End: 2025-07-16

## 2025-07-02 RX ORDER — AMOXICILLIN 250 MG
1 CAPSULE ORAL DAILY PRN
Qty: 30 TABLET | Refills: 0 | Status: SHIPPED | OUTPATIENT
Start: 2025-07-02 | End: 2025-08-01

## 2025-07-02 RX ADMIN — DICYCLOMINE HYDROCHLORIDE 20 MG: 10 CAPSULE ORAL at 01:07

## 2025-07-02 RX ADMIN — MECLIZINE HYDROCHLORIDE 25 MG: 25 TABLET ORAL at 09:07

## 2025-07-02 RX ADMIN — DICYCLOMINE HYDROCHLORIDE 20 MG: 10 CAPSULE ORAL at 09:07

## 2025-07-02 RX ADMIN — METHOCARBAMOL 750 MG: 750 TABLET ORAL at 12:07

## 2025-07-02 RX ADMIN — ASPIRIN 81 MG CHEWABLE TABLET 81 MG: 81 TABLET CHEWABLE at 09:07

## 2025-07-02 RX ADMIN — SENNOSIDES AND DOCUSATE SODIUM 1 TABLET: 50; 8.6 TABLET ORAL at 01:07

## 2025-07-02 RX ADMIN — MECLIZINE HYDROCHLORIDE 25 MG: 25 TABLET ORAL at 01:07

## 2025-07-02 NOTE — PLAN OF CARE
SW attempted delivery of IMM, not delivered due to patient out of room. SW attempted delivery via phone, not delivered verbally due to no answer. SW confirmed w/ nursing that patient has discharged from the unit.                GLENN Bryant, LMSW  [facility]  Case Management  Ext. [phone]

## 2025-07-02 NOTE — ASSESSMENT & PLAN NOTE
55 yo F with PMHx of HTN, HLD, hyperparathyroidism, spinal fusion, and anterior mediastinal mass s/p excision with partial thymectomy who presented for abdominal and chest pain with associated n/v/d that started 06/23. Vomiting has resolved, but still with >3 episodes of diarrhea daily. CT negative for acute process. History of gastritis noted, but lower suspicion for this based on lower abdominal pain and diarrhea.     - convert to PO PPI  - bentyl and carafate ordered   -PRN meclizine and phenergan   - stool studies ordered, add GPP. Diarrhea resolved. Unable to obtain   - weaned off fluids   - started with [facility], Diet advanced without significant issues   -GI consulted for 6/30, S/P EGD with GI and findings reassuring

## 2025-07-02 NOTE — ASSESSMENT & PLAN NOTE
-pt with numbness and tingling as well as vertigo symptoms   -Orthostatics ordered  -initial concern for BP difference on R vs L arm but on recheck all equal   -no history of syncope at this time but pt with prodromal syncope symptoms   -will order Carotid US and UE US for further evaluation   -CT A/P reviewed no signs of aneurysm or dissection, EKG and Troponin as well as stress test for symptoms with chest pain reassuring   -CT chest 4/25 reviewed no recurrent mass present   -meclizine PRN while other workup above   -exam negative for carpal tunnel and spurlings signs. Hx of herniated lumbar discs and fusion but no significant history of neck pain or bilateral radiculopathy signs   -Neuro consulted. MRI/MRA negative. Possible hemiplegic migraine vs neuropathy.   -would likely benefit from outpatient follow up     DC on Trazadone for migraine ppx and Sumatriptan PRN  Follow up with Neurology

## 2025-07-02 NOTE — NURSING
Discharge instructions reviewed with patient. Patient verbalized understanding of discharge instructions. IV removed. Awaiting transport.

## 2025-07-02 NOTE — DISCHARGE SUMMARY
"[facility] - Internal Medicine [facility]  Discharge Summary      Patient Name: Laz Quintero  MRN: [id number]  JOE: [id number]  Patient Class: IP- Inpatient  Admission Date: 6/26/2025  Hospital Length of Stay: 5 days  Discharge Date and Time: 07/02/2025 11:45 AM  Attending Physician: Summer Cuenca MD   Discharging Provider: Summer Cuenca MD  Primary Care Provider: Charlotte Jacinto MD  Hospital Medicine Team: [facility] Summer Cuenca MD  Primary Care Team: [facility]    HPI:   Laz Quintero is a 55 yo F with PMHx of HTN, HLD, hyperparathyroidism, spinal fusion, and anterior mediastinal mass s/p excision with partial thymectomy who presented to ED for abdominal and chest pain. Patient reports her symptoms started on Monday with L sided chest and epigastric pain. Pain was associated with n/v/d and subjective fever with chills. States that she has not been able to tolerate any food by mouth since Tuesday so her vomiting has resolved. No recent travel or antibiotic use.  CT A/P without acute process. Intake labs grossly unremarkable, except for HS trop mildly elevated. Trop remained flat. She was placed in observation in the EDOU. An echo stress test was performed today, which was negative for ischemia. PO challenged in EDOU and she immediately developed abdominal cramping and had an episode of diarrhea. On my evaluation, she reports diarrhea is starting to improve, but she still had 3 soft BM today that were loose, green and "foamy." Reports while in EDOU she just had a severe episode of electric shocking pain that started in her RLQ and radiated up to her RUQ. GI consulted. Admitted to  for further evaluation.     Procedure(s) (LRB):  EGD (ESOPHAGOGASTRODUODENOSCOPY) (N/A)      Hospital Course:   While on the floor started on IV PPI and carafate scheduled for gastritis. Supportive medications for pain given as well as IVF. Diet started and advanced as tolerated. She " tolerated diet but still with what felt like abdominal shocks. Course compliated by chest pain, arm tingling, and dizziness. Romberg positive and patient with gait instability. Orthostatics negative. Carotid US ordered and negative. Repeat EKG ordered and tropnin which were reassuring. US doppler Upper extremity ordered. Neuro consulted and recommended MRI/MRA which was reassuring. Likely hemiplegic migraine given history of headaches as well with possible neuroapthy. Will have outpatient Neurology follow up and will start Trazodone for migraine ppx as well as sumatriptan PRN. GI consulted and EGD performed and negative. Likely neuropathy vs MSK pain. Overall workup reassuring and patient was comfortable with DC home and close Neurology followup.     Pt deemed appropriate for discharge; seen and examined prior to departure. Plan discussed with pt, who was agreeable and amenable; medications were discussed and reviewed, outpatient follow-up scheduled, ER precautions were given, all questions were answered to the pt's satisfaction, and she was subsequently discharged.       Goals of Care Treatment Preferences:  Code Status: Full Code         Consults:   Consults (From admission, onward)          Status Ordering Provider     Inpatient consult to General Neurology  Once        Provider:  (Not yet assigned)    Completed PAUL GONZALEZ     Inpatient consult to Gastroenterology  Once        Provider:  (Not yet assigned)    Completed PAUL GONZALEZ            Assessment & Plan  Generalized abdominal pain  Nausea vomiting and diarrhea  55 yo F with PMHx of HTN, HLD, hyperparathyroidism, spinal fusion, and anterior mediastinal mass s/p excision with partial thymectomy who presented for abdominal and chest pain with associated n/v/d that started 06/23. Vomiting has resolved, but still with >3 episodes of diarrhea daily. CT negative for acute process. History of gastritis noted, but lower suspicion for this based on  lower abdominal pain and diarrhea.     - convert to PO PPI  - bentyl and carafate ordered   -PRN meclizine and phenergan   - stool studies ordered, add GPP. Diarrhea resolved. Unable to obtain   - weaned off fluids   - started with CLD, Diet advanced without significant issues   -GI consulted for 6/30, S/P EGD with GI and findings reassuring   Chest pain  - HS trop mildly elevated, but flat  - EKG in ED concerning for possible anterior infarct  - exercise stress echo negative for ischemia   - suspect chest pain 2/2 GI source   - will monitor tele   -repeat of troponin and EKG 6/28 for chest pain reassuring   Vertigo  Numbness and tingling in right hand  -pt with numbness and tingling as well as vertigo symptoms   -Orthostatics ordered  -initial concern for BP difference on R vs L arm but on recheck all equal   -no history of syncope at this time but pt with prodromal syncope symptoms   -will order Carotid US and UE US for further evaluation   -CT A/P reviewed no signs of aneurysm or dissection, EKG and Troponin as well as stress test for symptoms with chest pain reassuring   -CT chest 4/25 reviewed no recurrent mass present   -meclizine PRN while other workup above   -exam negative for carpal tunnel and spurlings signs. Hx of herniated lumbar discs and fusion but no significant history of neck pain or bilateral radiculopathy signs   -Neuro consulted. MRI/MRA negative. Possible hemiplegic migraine vs neuropathy.   -would likely benefit from outpatient follow up     DC on Trazadone for migraine ppx and Sumatriptan PRN  Follow up with Neurology   Chronic left-sided low back pain with sciatica  - continue prn robaxin   Primary hypertension  Patient's blood pressure range in the last 24 hours was: BP  Min: 107/65  Max: 133/76.The patient's inpatient anti-hypertensive regimen is listed below:  Current Antihypertensives  hydroCHLOROthiazide tablet 12.5 mg, Daily, Oral    Plan  - BP is controlled, no changes needed to their  regimen  Type AB thymoma  S/p resection  - follows with OP CTS    Last CT 4/25  Mediastinum: No evidence of recurrent mass in the anterior mediastinum.     Base of neck/thyroid: There is a 2.4 cm right thyroid nodule slightly increased in size (from 22 mm) on 04/11/2024.  This was better evaluated on the thyroid ultrasound from 03/03/2025.  Migraine with visual aura      Final Active Diagnoses:    Diagnosis Date Noted POA    PRINCIPAL PROBLEM:  Generalized abdominal pain [R10.84] 01/19/2018 Yes    Migraine with visual aura [G43.109] 07/01/2025 Yes    Vertigo [R42] 06/29/2025 Yes    Numbness and tingling in right hand [R20.0, R20.2] 06/29/2025 Yes    Chest pain [R07.9] 06/26/2025 Yes    Type AB thymoma [C37] 06/19/2025 Yes    Nausea vomiting and diarrhea [R11.2, R19.7] 06/11/2024 Yes    Primary hypertension [I10] 01/31/2023 Yes    Chronic left-sided low back pain with sciatica [M54.40, G89.29] 01/22/2018 Yes      Problems Resolved During this Admission:       Discharged Condition: good    Disposition:     Follow Up:   Follow-up Information       Charlotte Jacinto MD Follow up in 1 week(s).    Specialty: Internal Medicine  Contact information:  [facility] [address]  [place] [address]  [phone]                           Patient Instructions:      Ambulatory referral/consult to Neurology   Standing Status: Future   Referral Priority: Routine Referral Type: Consultation   Referral Reason: Specialty Services Required   Requested Specialty: Neurology   Number of Visits Requested: 1       Significant Diagnostic Studies: N/A    Pending Diagnostic Studies:       Procedure Component Value Units Date/Time    Methylmalonic Acid, Serum [[id number]] Collected: 07/01/25 0251    Order Status: Sent Lab Status: In process Updated: 07/01/25 0424    Specimen: Blood     Vitamin B1 [[id number]] Collected: 07/01/25 0251    Order Status: Sent Lab Status: In process Updated: 07/01/25 0424    Specimen: Blood     Vitamin B6 [[id number]]  Collected: 07/01/25 0251    Order Status: Sent Lab Status: In process Updated: 07/01/25 0423    Specimen: Blood            Medications:  Reconciled Home Medications:      Medication List        START taking these medications      senna-docusate 8.6-50 mg per tablet  Commonly known as: PERICOLACE  Take 1 tablet by mouth daily as needed for Constipation.     sumatriptan 50 MG tablet  Commonly known as: IMITREX  Take 1 tablet (50 mg total) by mouth every 6 (six) hours as needed for Migraine.            CHANGE how you take these medications      promethazine 25 MG tablet  Commonly known as: PHENERGAN  Take 1 tablet (25 mg total) by mouth every 6 (six) hours as needed for Nausea.  What changed: Another medication with the same name was removed. Continue taking this medication, and follow the directions you see here.     traZODone 50 MG tablet  Commonly known as: DESYREL  Take 1 tablet (50 mg total) by mouth every evening.  What changed:   medication strength  how much to take            CONTINUE taking these medications      hydroCHLOROthiazide 12.5 MG Tab  Take 1 tablet (12.5 mg total) by mouth once daily.     INTRAROSA 6.5 mg Inst  Generic drug: prasterone (DHEA)  Place 6.5 mg vaginally every evening.     methocarbamoL 750 MG Tab  Commonly known as: ROBAXIN  Take 750 mg by mouth every 12 (twelve) hours.     pantoprazole 40 MG tablet  Commonly known as: PROTONIX  Take 1 tablet (40 mg total) by mouth once daily.     pregabalin 75 MG capsule  Commonly known as: LYRICA  Take 75 mg by mouth 2 (two) times daily.            STOP taking these medications      albuterol 90 mcg/actuation inhaler  Commonly known as: PROVENTIL/VENTOLIN HFA     ALPRAZolam 0.25 MG tablet  Commonly known as: XANAX     benzonatate 200 MG capsule  Commonly known as: TESSALON     buPROPion 150 MG TB24 tablet  Commonly known as: WELLBUTRIN XL     butalbital-acetaminophen-caffeine -40 mg -40 mg per tablet  Commonly known as: FIORICET, ESGIC      diclofenac sodium 1 % Gel  Commonly known as: VOLTAREN     fluticasone propionate 50 mcg/actuation nasal spray  Commonly known as: FLONASE     meclizine 25 mg tablet  Commonly known as: ANTIVERT     meloxicam 15 MG tablet  Commonly known as: MOBIC     promethazine-dextromethorphan 6.25-15 mg/5 mL Syrp  Commonly known as: PROMETHAZINE-DM     QUEtiapine 150 mg Tb24  Commonly known as: SEROQUEL XR              Indwelling Lines/Drains at time of discharge:   Lines/Drains/Airways       None                       Time spent on the discharge of patient: 35 minutes         Summer Cuenca MD  Department of Hospital Medicine  [facility] - Internal Medicine Telemetry

## 2025-07-02 NOTE — DISCHARGE INSTRUCTIONS
You were treated for dizziness and arm tingling which we think is part of your migraines. We will have close Neurology follow up. They will call you to schedule an appt.   No abnormalities seen on EGD for abdominal pain.   Cardiac workup has also been reassuring   I will send you with stool softeners and headache medication

## 2025-07-02 NOTE — ASSESSMENT & PLAN NOTE
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 Vomiting has resolved, but still with >3 episodes of diarrhea daily. CT negative for acute process. History of gastritis noted, but lower suspicion for this based on lower abdominal pain and diarrhea.     - convert to PO PPI  - bentyl and carafate ordered   -PRN meclizine and phenergan   - stool studies ordered, add GPP. Diarrhea resolved. Unable to obtain   - weaned off fluids   - started with CLD, Diet advanced without significant issues   -GI consulted for 6/30, S/P EGD with GI and findings reassuring

## 2025-07-03 ENCOUNTER — TELEPHONE (OUTPATIENT)
Dept: NEUROLOGY | Facility: CLINIC | Age: 55
End: 2025-07-03
Payer: MEDICARE

## 2025-07-03 ENCOUNTER — PATIENT MESSAGE (OUTPATIENT)
Dept: NEUROLOGY | Facility: CLINIC | Age: 55
End: 2025-07-03
Payer: MEDICARE

## 2025-07-03 NOTE — TELEPHONE ENCOUNTER
----- Message from Vickie Culp PA-C sent at 7/1/2025  4:34 PM CDT -----  Regarding: hospital follow-up  Jack Bowers,  If you can help us get this patient scheduled in resident clinic with [facility] in the next few months for headache f/u for migraines, that would be greatly appreciated!    Thank you,  Vickie

## 2025-07-03 NOTE — PLAN OF CARE
[facility] - Internal Medicine Telemetry  Discharge Final Note    Primary Care Provider: Charlotte Jacinto MD    Expected Discharge Date: 7/2/2025    Patient discharged to home via personal transportation.     Discharge Plan A and Plan B have been determined by review of patient's clinical status, future medical and therapeutic needs, and coverage/benefits for post-acute care in coordination with multidisciplinary team members.        Final Discharge Note (most recent)       Final Note - 07/03/25 1037          Final Note    Assessment Type Final Discharge Note (P)      Anticipated Discharge Disposition Home or Self Care (P)      What phone number can be called within the next 1-3 days to see how you are doing after discharge? [phone] (P)      Hospital Resources/Appts/Education Provided Provided education on problems/symptoms using teachback (P)         Post-Acute Status    Post-Acute Authorization Other (P)      Other Status Awaiting f/u Appts (P)                        Contact Info       Charlotte Jacinto MD   Specialty: Internal Medicine   Relationship: PCP - General    [facility] [address]   Phone: [phone]       Next Steps: Follow up in 1 week(s)            Future Appointments   Date Time Provider Department Center   7/15/2025 10:40 AM [facility] OIC-MAMMO2 [facility]   8/28/2025  3:00 PM Iesha Ye MD [facility] OBN [facility]   10/1/2025  8:30 AM LAB, APPOINTMENT [facility] IM [facility]   10/22/2025 11:00 AM Ayleen Grant OD [facility] OPTIC[facility]   12/17/2025  9:00 AM Charlotte Jacinto MD [facility] IM [facility]   4/9/2026  9:00 AM N[facility] CT1 NOM CT CHANELLE Kwon   4/9/2026  9:30 AM Anton Evangelista MD [facility] THORAC Pancho [place], MSW, LMSW [facility]  Case Management  Ext. [phone]

## 2025-07-05 LAB — W METHYLMALONIC ACID: 0.14 UMOL/L

## 2025-07-07 LAB — W VITAMIN B1: 50 UG/L

## 2025-07-17 ENCOUNTER — HOSPITAL ENCOUNTER (OUTPATIENT)
Dept: RADIOLOGY | Facility: HOSPITAL | Age: 55
Discharge: HOME | End: 2025-07-17
Attending: INTERNAL MEDICINE
Payer: MEDICARE

## 2025-07-17 DIAGNOSIS — Z12.31 SCREENING MAMMOGRAM, ENCOUNTER FOR: ICD-10-CM

## 2025-07-17 PROCEDURE — 77067 SCR MAMMO BI INCL CAD: CPT | Mod: TC

## 2025-07-24 ENCOUNTER — OUTPATIENT CASE MANAGEMENT (OUTPATIENT)
Dept: ADMINISTRATIVE | Facility: OTHER | Age: 55
End: 2025-07-24
Payer: MEDICARE

## 2025-07-31 ENCOUNTER — OUTPATIENT CASE MANAGEMENT (OUTPATIENT)
Dept: ADMINISTRATIVE | Facility: OTHER | Age: 55
End: 2025-07-31
Payer: MEDICARE

## 2025-08-04 ENCOUNTER — TELEPHONE (OUTPATIENT)
Dept: NEUROLOGY | Facility: CLINIC | Age: 55
End: 2025-08-04
Payer: MEDICARE

## 2025-08-06 ENCOUNTER — OUTPATIENT CASE MANAGEMENT (OUTPATIENT)
Dept: ADMINISTRATIVE | Facility: OTHER | Age: 55
End: 2025-08-06
Payer: MEDICARE

## 2025-08-27 ENCOUNTER — HOSPITAL ENCOUNTER (OUTPATIENT)
Dept: RADIOLOGY | Facility: HOSPITAL | Age: 55
Discharge: HOME | End: 2025-08-27
Attending: PHYSICIAN ASSISTANT
Payer: MEDICARE

## 2025-08-27 ENCOUNTER — OFFICE VISIT (OUTPATIENT)
Dept: INTERNAL MEDICINE | Facility: CLINIC | Age: 55
End: 2025-08-27
Payer: MEDICARE

## 2025-08-27 VITALS
HEART RATE: 68 BPM | HEIGHT: 66 IN | WEIGHT: 165.56 LBS | BODY MASS INDEX: 26.61 KG/M2 | SYSTOLIC BLOOD PRESSURE: 122 MMHG | DIASTOLIC BLOOD PRESSURE: 72 MMHG | OXYGEN SATURATION: 97 %

## 2025-08-27 DIAGNOSIS — K11.8 PAIN IN SALIVARY GLAND REGION: Primary | ICD-10-CM

## 2025-08-27 DIAGNOSIS — K11.20 PAROTITIS: ICD-10-CM

## 2025-08-27 DIAGNOSIS — E04.2 MULTINODULAR THYROID: ICD-10-CM

## 2025-08-27 DIAGNOSIS — L08.9 LOCAL SKIN INFECTION: ICD-10-CM

## 2025-08-27 PROCEDURE — 99215 OFFICE O/P EST HI 40 MIN: CPT | Mod: S$PBB,,, | Performed by: PHYSICIAN ASSISTANT

## 2025-08-27 PROCEDURE — 99999 PR PBB SHADOW E&M-EST. PATIENT-LVL IV: CPT | Mod: PBBFAC,,, | Performed by: PHYSICIAN ASSISTANT

## 2025-08-27 PROCEDURE — 76536 US EXAM OF HEAD AND NECK: CPT | Mod: TC

## 2025-08-27 PROCEDURE — 99214 OFFICE O/P EST MOD 30 MIN: CPT | Mod: PBBFAC | Performed by: PHYSICIAN ASSISTANT

## 2025-08-27 PROCEDURE — 76536 US EXAM OF HEAD AND NECK: CPT | Mod: 26,,, | Performed by: RADIOLOGY

## 2025-08-27 RX ORDER — AMOXICILLIN AND CLAVULANATE POTASSIUM 875; 125 MG/1; MG/1
1 TABLET, FILM COATED ORAL EVERY 12 HOURS
Qty: 20 TABLET | Refills: 0 | Status: SHIPPED | OUTPATIENT
Start: 2025-08-27 | End: 2025-09-06

## 2025-08-27 RX ORDER — FLUCONAZOLE 150 MG/1
150 TABLET ORAL
Qty: 3 TABLET | Refills: 0 | Status: SHIPPED | OUTPATIENT
Start: 2025-08-27 | End: 2025-09-03

## 2025-08-27 RX ORDER — MUPIROCIN 20 MG/G
OINTMENT TOPICAL 3 TIMES DAILY
Qty: 30 G | Refills: 0 | Status: SHIPPED | OUTPATIENT
Start: 2025-08-27

## 2025-08-27 RX ORDER — IBUPROFEN 600 MG/1
600 TABLET, FILM COATED ORAL 3 TIMES DAILY
Qty: 30 TABLET | Refills: 0 | Status: SHIPPED | OUTPATIENT
Start: 2025-08-27 | End: 2025-09-06

## 2025-08-27 RX ORDER — TIZANIDINE HYDROCHLORIDE 4 MG/1
4 TABLET ORAL 2 TIMES DAILY PRN
COMMUNITY
Start: 2025-08-07

## 2025-08-28 ENCOUNTER — OFFICE VISIT (OUTPATIENT)
Dept: OBSTETRICS AND GYNECOLOGY | Facility: CLINIC | Age: 55
End: 2025-08-28
Payer: MEDICARE

## 2025-08-28 VITALS
BODY MASS INDEX: 26.44 KG/M2 | WEIGHT: 163.81 LBS | SYSTOLIC BLOOD PRESSURE: 120 MMHG | DIASTOLIC BLOOD PRESSURE: 85 MMHG

## 2025-08-28 DIAGNOSIS — N95.1 VASOMOTOR SYMPTOMS DUE TO MENOPAUSE: Primary | ICD-10-CM

## 2025-08-28 PROCEDURE — 99999 PR PBB SHADOW E&M-EST. PATIENT-LVL III: CPT | Mod: PBBFAC,,, | Performed by: OBSTETRICS & GYNECOLOGY

## 2025-08-28 PROCEDURE — 99214 OFFICE O/P EST MOD 30 MIN: CPT | Mod: S$PBB,,, | Performed by: OBSTETRICS & GYNECOLOGY

## 2025-08-28 PROCEDURE — 99213 OFFICE O/P EST LOW 20 MIN: CPT | Mod: PBBFAC,PO | Performed by: OBSTETRICS & GYNECOLOGY

## 2025-08-28 RX ORDER — ESTRADIOL 0.1 MG/D
1 FILM, EXTENDED RELEASE TRANSDERMAL
Qty: 24 PATCH | Refills: 3 | Status: SHIPPED | OUTPATIENT
Start: 2025-08-28 | End: 2026-08-28

## 2025-09-03 ENCOUNTER — TELEPHONE (OUTPATIENT)
Dept: INTERVENTIONAL RADIOLOGY/VASCULAR | Facility: CLINIC | Age: 55
End: 2025-09-03
Payer: MEDICARE

## 2025-09-03 DIAGNOSIS — E04.1 THYROID NODULE: Primary | ICD-10-CM

## (undated) DEVICE — CONTAINER SPECIMEN STRL 4OZ

## (undated) DEVICE — GOWN SMART IMP BREATHABLE XXLG

## (undated) DEVICE — SUT 2-0 VICRYL / CT-1

## (undated) DEVICE — DRESSING TRANS 2X2 TEGADERM

## (undated) DEVICE — DRESSING TELFA N ADH 3X8

## (undated) DEVICE — DRESSING SURGICAL 1X3

## (undated) DEVICE — CANNULA SEAL 12MM

## (undated) DEVICE — DRAPE INCISE IOBAN 2 23X17IN

## (undated) DEVICE — BAG TISS RETRV MONARCH 10MM

## (undated) DEVICE — OBTURATOR BLADELESS 8MM XI CLR

## (undated) DEVICE — DRAPE ARM DAVINCI XI

## (undated) DEVICE — GLOVE BIOGEL ORTHOPEDIC 7.5

## (undated) DEVICE — SUT 0 VICRYL / CT-1

## (undated) DEVICE — CLOSURE SKIN STERI STRIP 1/2X4

## (undated) DEVICE — DRAPE ABDOMINAL TIBURON 14X11

## (undated) DEVICE — NDL SPINAL 18GX3.5 SPINOCAN

## (undated) DEVICE — DRESSING TRANS 4X4 TEGADERM

## (undated) DEVICE — CANNULA REDUCER 12-8MM

## (undated) DEVICE — ELECTRODE REM PLYHSV RETURN 9

## (undated) DEVICE — LOOP VESSEL BLUE MAXI

## (undated) DEVICE — SPONGE IV DRAIN 4X4 STERILE

## (undated) DEVICE — SYR SLIP TIP 20CC

## (undated) DEVICE — SEAL UNIVERSAL 5MM-8MM XI

## (undated) DEVICE — SOL WATER STRL IRR 1000ML

## (undated) DEVICE — SYR ONLY LUER LOCK 20CC

## (undated) DEVICE — SUT MCRYL PLUS 4-0 PS2 27IN

## (undated) DEVICE — SUT 0 30IN ETHIBOND EXCEL G

## (undated) DEVICE — ELECTRODE BLADE INSULATED 1 IN

## (undated) DEVICE — TUBING SUC UNIV W/CONN 12FT

## (undated) DEVICE — DRAPE COLUMN DAVINCI XI

## (undated) DEVICE — DRAIN CHAN RND HUBLS 8MM 24FR

## (undated) DEVICE — SUT MONOCRYL UD 4-0 27 PS-1

## (undated) DEVICE — DRAPE SCOPE PILLOW WARMER

## (undated) DEVICE — DRAIN CHEST DRY SUCTION

## (undated) DEVICE — TRAY MINOR GEN SURG OMC

## (undated) DEVICE — TAPE CURAD SILK ADH 3INX10YD

## (undated) DEVICE — SUT SILK 0 STRANDS 30IN BLK

## (undated) DEVICE — SUT VICRYL PLUS 2-0 CT1 18

## (undated) DEVICE — KIT ANTIFOG W/SPONG & FLUID

## (undated) DEVICE — PORT AIRSEAL 12/120MM LPI

## (undated) DEVICE — COVER LIGHT HANDLE

## (undated) DEVICE — BAG INZII TISS RETRV 12/15MM